# Patient Record
Sex: MALE | Race: WHITE | NOT HISPANIC OR LATINO | URBAN - METROPOLITAN AREA
[De-identification: names, ages, dates, MRNs, and addresses within clinical notes are randomized per-mention and may not be internally consistent; named-entity substitution may affect disease eponyms.]

---

## 2017-12-31 ENCOUNTER — INPATIENT (INPATIENT)
Facility: HOSPITAL | Age: 67
LOS: 1 days | Discharge: HOME | End: 2018-01-02
Attending: INTERNAL MEDICINE

## 2017-12-31 DIAGNOSIS — E78.5 HYPERLIPIDEMIA, UNSPECIFIED: ICD-10-CM

## 2017-12-31 DIAGNOSIS — I47.1 SUPRAVENTRICULAR TACHYCARDIA: ICD-10-CM

## 2017-12-31 DIAGNOSIS — F17.210 NICOTINE DEPENDENCE, CIGARETTES, UNCOMPLICATED: ICD-10-CM

## 2017-12-31 DIAGNOSIS — I21.3 ST ELEVATION (STEMI) MYOCARDIAL INFARCTION OF UNSPECIFIED SITE: ICD-10-CM

## 2017-12-31 DIAGNOSIS — N40.0 BENIGN PROSTATIC HYPERPLASIA WITHOUT LOWER URINARY TRACT SYMPTOMS: ICD-10-CM

## 2017-12-31 DIAGNOSIS — I87.2 VENOUS INSUFFICIENCY (CHRONIC) (PERIPHERAL): ICD-10-CM

## 2017-12-31 DIAGNOSIS — I25.119 ATHEROSCLEROTIC HEART DISEASE OF NATIVE CORONARY ARTERY WITH UNSPECIFIED ANGINA PECTORIS: ICD-10-CM

## 2017-12-31 DIAGNOSIS — Z79.84 LONG TERM (CURRENT) USE OF ORAL HYPOGLYCEMIC DRUGS: ICD-10-CM
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
## 2017-12-31 DIAGNOSIS — Z85.828 PERSONAL HISTORY OF OTHER MALIGNANT NEOPLASM OF SKIN: ICD-10-CM

## 2017-12-31 DIAGNOSIS — I97.89 OTHER POSTPROCEDURAL COMPLICATIONS AND DISORDERS OF THE CIRCULATORY SYSTEM, NOT ELSEWHERE CLASSIFIED: ICD-10-CM

## 2017-12-31 DIAGNOSIS — E11.9 TYPE 2 DIABETES MELLITUS WITHOUT COMPLICATIONS: ICD-10-CM

## 2018-02-07 ENCOUNTER — INPATIENT (INPATIENT)
Facility: HOSPITAL | Age: 68
LOS: 0 days | Discharge: HOME | End: 2018-02-08
Attending: INTERNAL MEDICINE

## 2018-02-07 ENCOUNTER — OUTPATIENT (OUTPATIENT)
Dept: OUTPATIENT SERVICES | Facility: HOSPITAL | Age: 68
LOS: 1 days | Discharge: HOME | End: 2018-02-07

## 2018-02-07 VITALS
TEMPERATURE: 98 F | HEART RATE: 71 BPM | OXYGEN SATURATION: 96 % | DIASTOLIC BLOOD PRESSURE: 62 MMHG | RESPIRATION RATE: 17 BRPM | SYSTOLIC BLOOD PRESSURE: 125 MMHG

## 2018-02-07 DIAGNOSIS — I25.10 ATHEROSCLEROTIC HEART DISEASE OF NATIVE CORONARY ARTERY WITHOUT ANGINA PECTORIS: ICD-10-CM

## 2018-02-07 DIAGNOSIS — E11.9 TYPE 2 DIABETES MELLITUS WITHOUT COMPLICATIONS: ICD-10-CM

## 2018-02-07 DIAGNOSIS — Z98.890 OTHER SPECIFIED POSTPROCEDURAL STATES: Chronic | ICD-10-CM

## 2018-02-07 DIAGNOSIS — Z88.0 ALLERGY STATUS TO PENICILLIN: ICD-10-CM

## 2018-02-07 LAB
HCT VFR BLD CALC: 38.3 % — LOW (ref 42–52)
HGB BLD-MCNC: 13.5 G/DL — LOW (ref 14–18)
MCHC RBC-ENTMCNC: 30.3 PG — SIGNIFICANT CHANGE UP (ref 27–31)
MCHC RBC-ENTMCNC: 35.2 G/DL — SIGNIFICANT CHANGE UP (ref 32–37)
MCV RBC AUTO: 86.1 FL — SIGNIFICANT CHANGE UP (ref 80–94)
NRBC # BLD: 0 /100 WBCS — SIGNIFICANT CHANGE UP (ref 0–0)
PLATELET # BLD AUTO: 143 K/UL — SIGNIFICANT CHANGE UP (ref 130–400)
RBC # BLD: 4.45 M/UL — LOW (ref 4.7–6.1)
RBC # FLD: 13.2 % — SIGNIFICANT CHANGE UP (ref 11.5–14.5)
WBC # BLD: 9.66 K/UL — SIGNIFICANT CHANGE UP (ref 4.8–10.8)
WBC # FLD AUTO: 9.66 K/UL — SIGNIFICANT CHANGE UP (ref 4.8–10.8)

## 2018-02-07 RX ORDER — SODIUM CHLORIDE 9 MG/ML
1000 INJECTION INTRAMUSCULAR; INTRAVENOUS; SUBCUTANEOUS
Qty: 0 | Refills: 0 | Status: DISCONTINUED | OUTPATIENT
Start: 2018-02-07 | End: 2018-02-07

## 2018-02-07 RX ORDER — GLIMEPIRIDE 1 MG
4 TABLET ORAL DAILY
Qty: 0 | Refills: 0 | Status: DISCONTINUED | OUTPATIENT
Start: 2018-02-07 | End: 2018-02-07

## 2018-02-07 RX ORDER — TAMSULOSIN HYDROCHLORIDE 0.4 MG/1
0.4 CAPSULE ORAL ONCE
Qty: 0 | Refills: 0 | Status: DISCONTINUED | OUTPATIENT
Start: 2018-02-07 | End: 2018-02-07

## 2018-02-07 RX ORDER — CLOPIDOGREL BISULFATE 75 MG/1
300 TABLET, FILM COATED ORAL ONCE
Qty: 0 | Refills: 0 | Status: COMPLETED | OUTPATIENT
Start: 2018-02-07 | End: 2018-02-07

## 2018-02-07 RX ORDER — ASPIRIN/CALCIUM CARB/MAGNESIUM 324 MG
81 TABLET ORAL DAILY
Qty: 0 | Refills: 0 | Status: DISCONTINUED | OUTPATIENT
Start: 2018-02-07 | End: 2018-02-08

## 2018-02-07 RX ORDER — METOPROLOL TARTRATE 50 MG
25 TABLET ORAL ONCE
Qty: 0 | Refills: 0 | Status: COMPLETED | OUTPATIENT
Start: 2018-02-07 | End: 2018-02-07

## 2018-02-07 RX ORDER — ATORVASTATIN CALCIUM 80 MG/1
80 TABLET, FILM COATED ORAL ONCE
Qty: 0 | Refills: 0 | Status: COMPLETED | OUTPATIENT
Start: 2018-02-07 | End: 2018-02-07

## 2018-02-07 RX ORDER — SENNA PLUS 8.6 MG/1
2 TABLET ORAL ONCE
Qty: 0 | Refills: 0 | Status: DISCONTINUED | OUTPATIENT
Start: 2018-02-07 | End: 2018-02-07

## 2018-02-07 RX ORDER — TAMSULOSIN HYDROCHLORIDE 0.4 MG/1
0.4 CAPSULE ORAL AT BEDTIME
Qty: 0 | Refills: 0 | Status: DISCONTINUED | OUTPATIENT
Start: 2018-02-07 | End: 2018-02-08

## 2018-02-07 RX ORDER — CLOPIDOGREL BISULFATE 75 MG/1
75 TABLET, FILM COATED ORAL DAILY
Qty: 0 | Refills: 0 | Status: DISCONTINUED | OUTPATIENT
Start: 2018-02-07 | End: 2018-02-08

## 2018-02-07 RX ORDER — SENNA PLUS 8.6 MG/1
2 TABLET ORAL ONCE
Qty: 0 | Refills: 0 | Status: COMPLETED | OUTPATIENT
Start: 2018-02-07 | End: 2018-02-07

## 2018-02-07 RX ORDER — ATORVASTATIN CALCIUM 80 MG/1
80 TABLET, FILM COATED ORAL ONCE
Qty: 0 | Refills: 0 | Status: DISCONTINUED | OUTPATIENT
Start: 2018-02-07 | End: 2018-02-07

## 2018-02-07 RX ORDER — SODIUM CHLORIDE 9 MG/ML
1000 INJECTION INTRAMUSCULAR; INTRAVENOUS; SUBCUTANEOUS
Qty: 0 | Refills: 0 | Status: DISCONTINUED | OUTPATIENT
Start: 2018-02-07 | End: 2018-02-08

## 2018-02-07 RX ADMIN — Medication 100 MILLIGRAM(S): at 14:42

## 2018-02-07 RX ADMIN — Medication 2.5 MILLIGRAM(S): at 21:48

## 2018-02-07 RX ADMIN — SODIUM CHLORIDE 50 MILLILITER(S): 9 INJECTION INTRAMUSCULAR; INTRAVENOUS; SUBCUTANEOUS at 12:23

## 2018-02-07 RX ADMIN — SODIUM CHLORIDE 75 MILLILITER(S): 9 INJECTION INTRAMUSCULAR; INTRAVENOUS; SUBCUTANEOUS at 19:10

## 2018-02-07 RX ADMIN — SENNA PLUS 2 TABLET(S): 8.6 TABLET ORAL at 21:39

## 2018-02-07 RX ADMIN — ATORVASTATIN CALCIUM 80 MILLIGRAM(S): 80 TABLET, FILM COATED ORAL at 21:40

## 2018-02-07 RX ADMIN — CLOPIDOGREL BISULFATE 300 MILLIGRAM(S): 75 TABLET, FILM COATED ORAL at 15:05

## 2018-02-07 RX ADMIN — TAMSULOSIN HYDROCHLORIDE 0.4 MILLIGRAM(S): 0.4 CAPSULE ORAL at 21:39

## 2018-02-07 NOTE — H&P PST ADULT - HISTORY OF PRESENT ILLNESS
68 y/o male here for staged PCI. Pt had recent STEMI 12/31/17 with stent to distal RCA with REUBEN. Pt here for staged PCI of prox LAD that was 70% and distal 80-90%, will do Rota stent of LAD via groin

## 2018-02-07 NOTE — H&P PST ADULT - PMH
ASHD (arteriosclerotic heart disease)  STEMI 12/31/17, PCI RCA  Dyspnea, unspecified type    Hypertension, unspecified type    Type 2 diabetes mellitus without complication, unspecified long term insulin use status

## 2018-02-07 NOTE — H&P PST ADULT - FAMILY HISTORY
Mother  Still living? Unknown  Family history of myocardial infarction, Age at diagnosis: Age Unknown

## 2018-02-07 NOTE — PROGRESS NOTE ADULT - SUBJECTIVE AND OBJECTIVE BOX
I have personally seen and examined the patient.  I agree with the history and physical which I have reviewed and noted any changes below.  02-07-18 @ 14:20    PRE-OP DIAGNOSIS:  CP Post STEMI with Class 3 stable angina and proximal LAD      PROCEDURE:   LEFT HEART CATHERIZATION    Physician:  Brandon Murillo MD  Assistant:  MD    ANESTHESIA TYPE:  [ X] Sedation  [ X] Local/Regional  [   ]General Anesthesia    ESTIMATED BLOOD LOSS:      less than 10 mL    CONDITION  [X ] Good  [   ] Fair  [   ] Serious  [   ] Critical      SPECIMENS REMOVED (IF APPLICABLE):   Wire        IMPLANTS (IF APPLICABLE) Stent to LAD prox and distal      FINDINGS:  LEFT HEART CATHERIZATION                                    LVEF 50%:  LVEDP:12mmHG    Left main NORMAL    LAD:  Proximal 80% Distal 99%                       Diag:  NORMAL    Left Circumflex:  NORMAL  OM:  NORMAL    Right Cornary Artery:  Patent stent  RPDA:  NORMAL  RPL:  NORMAL      RIGHT HEART CATHERIZATION ( Not Performed)  PA:  PCW:  CO/CI:    PERCUTANEOUS CORONARY INTERVENTIONS: (3.5 x 38 prox and 2.5 x 18 huong distal after Rota      COMPLICATIONS:  None      POST-OP DIAGNOSIS CAD    [ ] Normal Coronary Arteries  [ ] Luminal Irregularities  [ ] Non-obstructive CAD        PLAN OF CARE    [ ] D/C Home today   [x ]  D/C in AM  [ ] Return to In-patient bed  [ x] Admit for observation  [ ] Return for staged procedure:  [ ] CT Surgery consult called  [x ]  Continue DAPT, B-blocker & Statin therapy  [ ]  Medical Therapy  [ X] Aggressive risk factor modification. The patient should follow a low fat and low calorie diet.  CBC in 6 hours.  No need for am CBC

## 2018-02-08 ENCOUNTER — TRANSCRIPTION ENCOUNTER (OUTPATIENT)
Age: 68
End: 2018-02-08

## 2018-02-08 VITALS
RESPIRATION RATE: 17 BRPM | DIASTOLIC BLOOD PRESSURE: 61 MMHG | SYSTOLIC BLOOD PRESSURE: 125 MMHG | HEART RATE: 88 BPM | TEMPERATURE: 98 F

## 2018-02-08 LAB
ANION GAP SERPL CALC-SCNC: 8 MMOL/L — SIGNIFICANT CHANGE UP (ref 7–14)
BUN SERPL-MCNC: 11 MG/DL — SIGNIFICANT CHANGE UP (ref 10–20)
CALCIUM SERPL-MCNC: 9.1 MG/DL — SIGNIFICANT CHANGE UP (ref 8.5–10.1)
CHLORIDE SERPL-SCNC: 107 MMOL/L — SIGNIFICANT CHANGE UP (ref 98–110)
CO2 SERPL-SCNC: 24 MMOL/L — SIGNIFICANT CHANGE UP (ref 17–32)
CREAT SERPL-MCNC: 0.8 MG/DL — SIGNIFICANT CHANGE UP (ref 0.7–1.5)
GLUCOSE SERPL-MCNC: 216 MG/DL — HIGH (ref 70–110)
HCT VFR BLD CALC: 41.7 % — LOW (ref 42–52)
HGB BLD-MCNC: 14.4 G/DL — SIGNIFICANT CHANGE UP (ref 14–18)
MCHC RBC-ENTMCNC: 30 PG — SIGNIFICANT CHANGE UP (ref 27–31)
MCHC RBC-ENTMCNC: 34.5 G/DL — SIGNIFICANT CHANGE UP (ref 32–37)
MCV RBC AUTO: 86.9 FL — SIGNIFICANT CHANGE UP (ref 80–94)
NRBC # BLD: 0 /100 WBCS — SIGNIFICANT CHANGE UP (ref 0–0)
PLATELET # BLD AUTO: 142 K/UL — SIGNIFICANT CHANGE UP (ref 130–400)
POTASSIUM SERPL-MCNC: 4 MMOL/L — SIGNIFICANT CHANGE UP (ref 3.5–5)
POTASSIUM SERPL-SCNC: 4 MMOL/L — SIGNIFICANT CHANGE UP (ref 3.5–5)
RBC # BLD: 4.8 M/UL — SIGNIFICANT CHANGE UP (ref 4.7–6.1)
RBC # FLD: 13 % — SIGNIFICANT CHANGE UP (ref 11.5–14.5)
SODIUM SERPL-SCNC: 139 MMOL/L — SIGNIFICANT CHANGE UP (ref 135–146)
WBC # BLD: 10.97 K/UL — HIGH (ref 4.8–10.8)
WBC # FLD AUTO: 10.97 K/UL — HIGH (ref 4.8–10.8)

## 2018-02-08 RX ORDER — ATORVASTATIN CALCIUM 80 MG/1
80 TABLET, FILM COATED ORAL AT BEDTIME
Qty: 0 | Refills: 0 | Status: DISCONTINUED | OUTPATIENT
Start: 2018-02-08 | End: 2018-02-08

## 2018-02-08 RX ORDER — METOPROLOL TARTRATE 50 MG
25 TABLET ORAL DAILY
Qty: 0 | Refills: 0 | Status: DISCONTINUED | OUTPATIENT
Start: 2018-02-08 | End: 2018-02-08

## 2018-02-08 RX ADMIN — Medication 81 MILLIGRAM(S): at 11:27

## 2018-02-08 RX ADMIN — Medication 25 MILLIGRAM(S): at 11:29

## 2018-02-08 RX ADMIN — CLOPIDOGREL BISULFATE 75 MILLIGRAM(S): 75 TABLET, FILM COATED ORAL at 11:27

## 2018-02-08 RX ADMIN — Medication 2.5 MILLIGRAM(S): at 05:05

## 2018-02-08 NOTE — DISCHARGE NOTE ADULT - CARE PROVIDER_API CALL
Brandon Murillo), Cardiovascular Disease; Interventional Cardiology  [address] [place] [address]  Phone: [phone]  Fax: [phone]

## 2018-02-08 NOTE — DISCHARGE NOTE ADULT - CARE PLAN
Goal:	prevent complications  Assessment and plan of treatment:	continue current medications and follow-up with Dr. Murillo

## 2018-02-08 NOTE — PROGRESS NOTE ADULT - SUBJECTIVE AND OBJECTIVE BOX
CARDIAC CATH NP    S/P PCI of  LAD with REUBEN via Right Femoral access.  Vascade closure    PT AMBULATING WITHOUT SYMPTOMS  Denies chest pain, shortness of breath or dizziness      GENERAL: NAD, appears well  HEART: S1S2, no murmurs, no JVD  LUNGS: Non-labored. CTA B/L, no wheeze, no rales  ABDOMEN:  soft, non-tender +BS  EXTREMITY:             Groin:  Dressing removed.  Soft,  no hematoma	    VASCULAR:  2+Rad/ 2+PTs /2+DPs/  NEURO: A&Ox 3      EKG: NSR, NO ACUTE ST CHANGES  LABS                        13.5   9.66  )-----------( 143      ( 07 Feb 2018 13:45 )             38.3           AM LABS   PENDING          A/P:  I discussed the case with Interventional Cardiologist Dr. MAXWELL  & recommends the following:    S/P PCI  	         Continue ASA/PLAVIX,  B-Blocker, Statin Therapy, ACEI                   GIVE ONE MONTH SUPPLY OF DAPT	  ++++CHECK CBC, IF NORMAL CAN D/C HOME TODAY                                    Pt given instructions on importance of taking antiplatelet medication or risk acute stent thrombosis/death                   Post cath instructions, access site care and activity restrictions reviewed with patient                     Discussed with patient to return to hospital if experience chest pain, shortness breath, dizziness and site bleeding                   Aggressive risk factor modication, diet counseling                                     Follow up with Cardiology Dr  in one week.  Instructed to call and make an appointment

## 2018-02-08 NOTE — DISCHARGE NOTE ADULT - PATIENT PORTAL LINK FT
You can access the [facility], offered by [facility], by registering with the following website: [email or web address]

## 2018-02-08 NOTE — DISCHARGE NOTE ADULT - MEDICATION SUMMARY - MEDICATIONS TO TAKE
I will START or STAY ON the medications listed below when I get home from the hospital:    Low Dose ASA 81 mg oral tablet  -- 1 tab(s) by mouth once a day  -- Indication: For Hypertension, unspecified type    enalapril 2.5 mg oral tablet  -- 1 tab(s) by mouth once a day  -- Indication: For Hypertension, unspecified type    Flomax 0.4 mg oral capsule  -- 1 cap(s) by mouth once a day  -- Indication: For prostate    Lipitor 80 mg oral tablet  -- 1 tab(s) by mouth once a day  -- Indication: For ASHD (arteriosclerotic heart disease)    Plavix 75 mg oral tablet  -- 1 tab(s) by mouth once a day  -- Indication: For ASHD (arteriosclerotic heart disease)    metoprolol succinate 25 mg oral tablet, extended release  -- 1 tab(s) by mouth once a day  -- Indication: For Hypertension, unspecified type

## 2018-02-08 NOTE — DISCHARGE NOTE ADULT - HOSPITAL COURSE
68 y/o male here for staged PCI. Pt had recent STEMI 12/31/17 with stent to distal RCA with REUBEN. Pt here for staged PCI of prox LAD that was 70% and distal 80-90%, will do Rota stent of LAD via groin. LEFT HEART CATHERIZATION                        LVEF 50%:  LVEDP:12mmHG  Left main NORMAL  LAD:  Proximal 80% Distal 99%                       Diag:  NORMAL  Left Circumflex:  NORMAL  OM:  NORMAL  Right Cornary Artery:  Patent stent  RPDA:  NORMAL  RPL:  NORMAL  CP Post STEMI with Class 3 stable angina and proximal LAD  The patient should follow a low fat and low calorie diet.

## 2018-02-20 DIAGNOSIS — I10 ESSENTIAL (PRIMARY) HYPERTENSION: ICD-10-CM

## 2018-02-20 DIAGNOSIS — I25.10 ATHEROSCLEROTIC HEART DISEASE OF NATIVE CORONARY ARTERY WITHOUT ANGINA PECTORIS: ICD-10-CM

## 2018-02-20 DIAGNOSIS — Z87.891 PERSONAL HISTORY OF NICOTINE DEPENDENCE: ICD-10-CM

## 2018-02-20 DIAGNOSIS — Z88.0 ALLERGY STATUS TO PENICILLIN: ICD-10-CM

## 2018-02-20 DIAGNOSIS — I25.119 ATHEROSCLEROTIC HEART DISEASE OF NATIVE CORONARY ARTERY WITH UNSPECIFIED ANGINA PECTORIS: ICD-10-CM

## 2018-02-20 DIAGNOSIS — I25.2 OLD MYOCARDIAL INFARCTION: ICD-10-CM

## 2018-02-20 DIAGNOSIS — E11.9 TYPE 2 DIABETES MELLITUS WITHOUT COMPLICATIONS: ICD-10-CM

## 2018-09-25 ENCOUNTER — OUTPATIENT (OUTPATIENT)
Dept: OUTPATIENT SERVICES | Facility: HOSPITAL | Age: 68
LOS: 1 days | Discharge: HOME | End: 2018-09-25

## 2018-09-25 DIAGNOSIS — Z98.890 OTHER SPECIFIED POSTPROCEDURAL STATES: Chronic | ICD-10-CM

## 2018-09-25 DIAGNOSIS — J44.9 CHRONIC OBSTRUCTIVE PULMONARY DISEASE, UNSPECIFIED: ICD-10-CM

## 2018-09-25 DIAGNOSIS — R06.00 DYSPNEA, UNSPECIFIED: ICD-10-CM

## 2018-09-25 PROBLEM — I25.10 ATHEROSCLEROTIC HEART DISEASE OF NATIVE CORONARY ARTERY WITHOUT ANGINA PECTORIS: Chronic | Status: ACTIVE | Noted: 2018-02-07

## 2018-09-25 PROBLEM — E11.9 TYPE 2 DIABETES MELLITUS WITHOUT COMPLICATIONS: Chronic | Status: ACTIVE | Noted: 2018-02-07

## 2018-09-25 PROBLEM — I10 ESSENTIAL (PRIMARY) HYPERTENSION: Chronic | Status: ACTIVE | Noted: 2018-02-07

## 2019-10-02 ENCOUNTER — INPATIENT (INPATIENT)
Facility: HOSPITAL | Age: 69
LOS: 1 days | Discharge: HOME | End: 2019-10-04
Attending: HOSPITALIST | Admitting: HOSPITALIST
Payer: MEDICARE

## 2019-10-02 VITALS
HEART RATE: 58 BPM | TEMPERATURE: 94 F | DIASTOLIC BLOOD PRESSURE: 70 MMHG | OXYGEN SATURATION: 98 % | SYSTOLIC BLOOD PRESSURE: 154 MMHG | RESPIRATION RATE: 22 BRPM

## 2019-10-02 DIAGNOSIS — Z82.49 FAMILY HISTORY OF ISCHEMIC HEART DISEASE AND OTHER DISEASES OF THE CIRCULATORY SYSTEM: ICD-10-CM

## 2019-10-02 DIAGNOSIS — Z53.29 PROCEDURE AND TREATMENT NOT CARRIED OUT BECAUSE OF PATIENT'S DECISION FOR OTHER REASONS: ICD-10-CM

## 2019-10-02 DIAGNOSIS — N40.0 BENIGN PROSTATIC HYPERPLASIA WITHOUT LOWER URINARY TRACT SYMPTOMS: ICD-10-CM
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
## 2019-10-02 DIAGNOSIS — I25.110 ATHEROSCLEROTIC HEART DISEASE OF NATIVE CORONARY ARTERY WITH UNSTABLE ANGINA PECTORIS: ICD-10-CM

## 2019-10-02 DIAGNOSIS — R31.9 HEMATURIA, UNSPECIFIED: ICD-10-CM

## 2019-10-02 DIAGNOSIS — Z98.890 OTHER SPECIFIED POSTPROCEDURAL STATES: Chronic | ICD-10-CM

## 2019-10-02 DIAGNOSIS — Z79.02 LONG TERM (CURRENT) USE OF ANTITHROMBOTICS/ANTIPLATELETS: ICD-10-CM

## 2019-10-02 DIAGNOSIS — E78.5 HYPERLIPIDEMIA, UNSPECIFIED: ICD-10-CM

## 2019-10-02 DIAGNOSIS — Z98.49 CATARACT EXTRACTION STATUS, UNSPECIFIED EYE: Chronic | ICD-10-CM

## 2019-10-02 DIAGNOSIS — I12.9 HYPERTENSIVE CHRONIC KIDNEY DISEASE WITH STAGE 1 THROUGH STAGE 4 CHRONIC KIDNEY DISEASE, OR UNSPECIFIED CHRONIC KIDNEY DISEASE: ICD-10-CM

## 2019-10-02 DIAGNOSIS — I21.4 NON-ST ELEVATION (NSTEMI) MYOCARDIAL INFARCTION: ICD-10-CM

## 2019-10-02 DIAGNOSIS — Z88.0 ALLERGY STATUS TO PENICILLIN: ICD-10-CM

## 2019-10-02 DIAGNOSIS — Z87.891 PERSONAL HISTORY OF NICOTINE DEPENDENCE: ICD-10-CM

## 2019-10-02 DIAGNOSIS — Z98.49 CATARACT EXTRACTION STATUS, UNSPECIFIED EYE: ICD-10-CM

## 2019-10-02 DIAGNOSIS — I25.2 OLD MYOCARDIAL INFARCTION: ICD-10-CM

## 2019-10-02 DIAGNOSIS — Z98.890 OTHER SPECIFIED POSTPROCEDURAL STATES: ICD-10-CM

## 2019-10-02 DIAGNOSIS — E03.9 HYPOTHYROIDISM, UNSPECIFIED: ICD-10-CM
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
## 2019-10-02 DIAGNOSIS — E11.22 TYPE 2 DIABETES MELLITUS WITH DIABETIC CHRONIC KIDNEY DISEASE: ICD-10-CM

## 2019-10-02 DIAGNOSIS — N18.3 CHRONIC KIDNEY DISEASE, STAGE 3 (MODERATE): ICD-10-CM

## 2019-10-02 DIAGNOSIS — T82.867A THROMBOSIS DUE TO CARDIAC PROSTHETIC DEVICES, IMPLANTS AND GRAFTS, INITIAL ENCOUNTER: ICD-10-CM

## 2019-10-02 LAB
ALBUMIN SERPL ELPH-MCNC: 4.6 G/DL — SIGNIFICANT CHANGE UP (ref 3.5–5.2)
ALP SERPL-CCNC: 64 U/L — SIGNIFICANT CHANGE UP (ref 30–115)
ALT FLD-CCNC: 23 U/L — SIGNIFICANT CHANGE UP (ref 0–41)
ANION GAP SERPL CALC-SCNC: 13 MMOL/L — SIGNIFICANT CHANGE UP (ref 7–14)
APTT BLD: 34.6 SEC — SIGNIFICANT CHANGE UP (ref 27–39.2)
AST SERPL-CCNC: 22 U/L — SIGNIFICANT CHANGE UP (ref 0–41)
BASOPHILS # BLD AUTO: 0.01 K/UL — SIGNIFICANT CHANGE UP (ref 0–0.2)
BASOPHILS NFR BLD AUTO: 0.1 % — SIGNIFICANT CHANGE UP (ref 0–1)
BILIRUB SERPL-MCNC: 0.4 MG/DL — SIGNIFICANT CHANGE UP (ref 0.2–1.2)
BUN SERPL-MCNC: 22 MG/DL — HIGH (ref 10–20)
CALCIUM SERPL-MCNC: 9.3 MG/DL — SIGNIFICANT CHANGE UP (ref 8.5–10.1)
CHLORIDE SERPL-SCNC: 105 MMOL/L — SIGNIFICANT CHANGE UP (ref 98–110)
CHOLEST SERPL-MCNC: 128 MG/DL — SIGNIFICANT CHANGE UP (ref 100–200)
CO2 SERPL-SCNC: 25 MMOL/L — SIGNIFICANT CHANGE UP (ref 17–32)
CREAT SERPL-MCNC: 1.6 MG/DL — HIGH (ref 0.7–1.5)
EOSINOPHIL # BLD AUTO: 0 K/UL — SIGNIFICANT CHANGE UP (ref 0–0.7)
EOSINOPHIL NFR BLD AUTO: 0 % — SIGNIFICANT CHANGE UP (ref 0–8)
GLUCOSE BLDC GLUCOMTR-MCNC: 215 MG/DL — HIGH (ref 70–99)
GLUCOSE BLDC GLUCOMTR-MCNC: 227 MG/DL — HIGH (ref 70–99)
GLUCOSE SERPL-MCNC: 238 MG/DL — HIGH (ref 70–99)
HCT VFR BLD CALC: 39.3 % — LOW (ref 42–52)
HDLC SERPL-MCNC: 47 MG/DL — SIGNIFICANT CHANGE UP
HGB BLD-MCNC: 13 G/DL — LOW (ref 14–18)
IMM GRANULOCYTES NFR BLD AUTO: 0.6 % — HIGH (ref 0.1–0.3)
INR BLD: 1.11 RATIO — SIGNIFICANT CHANGE UP (ref 0.65–1.3)
LIPID PNL WITH DIRECT LDL SERPL: 70 MG/DL — SIGNIFICANT CHANGE UP (ref 4–129)
LYMPHOCYTES # BLD AUTO: 1.57 K/UL — SIGNIFICANT CHANGE UP (ref 1.2–3.4)
LYMPHOCYTES # BLD AUTO: 17.9 % — LOW (ref 20.5–51.1)
MCHC RBC-ENTMCNC: 26.9 PG — LOW (ref 27–31)
MCHC RBC-ENTMCNC: 33.1 G/DL — SIGNIFICANT CHANGE UP (ref 32–37)
MCV RBC AUTO: 81.2 FL — SIGNIFICANT CHANGE UP (ref 80–94)
MONOCYTES # BLD AUTO: 0.68 K/UL — HIGH (ref 0.1–0.6)
MONOCYTES NFR BLD AUTO: 7.7 % — SIGNIFICANT CHANGE UP (ref 1.7–9.3)
NEUTROPHILS # BLD AUTO: 6.48 K/UL — SIGNIFICANT CHANGE UP (ref 1.4–6.5)
NEUTROPHILS NFR BLD AUTO: 73.7 % — SIGNIFICANT CHANGE UP (ref 42.2–75.2)
NRBC # BLD: 0 /100 WBCS — SIGNIFICANT CHANGE UP (ref 0–0)
PLATELET # BLD AUTO: 127 K/UL — LOW (ref 130–400)
POTASSIUM SERPL-MCNC: 4.5 MMOL/L — SIGNIFICANT CHANGE UP (ref 3.5–5)
POTASSIUM SERPL-SCNC: 4.5 MMOL/L — SIGNIFICANT CHANGE UP (ref 3.5–5)
PROT SERPL-MCNC: 7.1 G/DL — SIGNIFICANT CHANGE UP (ref 6–8)
PROTHROM AB SERPL-ACNC: 12.8 SEC — SIGNIFICANT CHANGE UP (ref 9.95–12.87)
RBC # BLD: 4.84 M/UL — SIGNIFICANT CHANGE UP (ref 4.7–6.1)
RBC # FLD: 15.9 % — HIGH (ref 11.5–14.5)
SODIUM SERPL-SCNC: 143 MMOL/L — SIGNIFICANT CHANGE UP (ref 135–146)
TOTAL CHOLESTEROL/HDL RATIO MEASUREMENT: 2.7 RATIO — LOW (ref 4–5.5)
TRIGL SERPL-MCNC: 77 MG/DL — SIGNIFICANT CHANGE UP (ref 10–149)
TROPONIN T SERPL-MCNC: 0.06 NG/ML — CRITICAL HIGH
WBC # BLD: 8.79 K/UL — SIGNIFICANT CHANGE UP (ref 4.8–10.8)
WBC # FLD AUTO: 8.79 K/UL — SIGNIFICANT CHANGE UP (ref 4.8–10.8)

## 2019-10-02 PROCEDURE — 93010 ELECTROCARDIOGRAM REPORT: CPT | Mod: 77

## 2019-10-02 PROCEDURE — 71045 X-RAY EXAM CHEST 1 VIEW: CPT | Mod: 26

## 2019-10-02 PROCEDURE — 93010 ELECTROCARDIOGRAM REPORT: CPT | Mod: 76

## 2019-10-02 PROCEDURE — 99285 EMERGENCY DEPT VISIT HI MDM: CPT

## 2019-10-02 RX ORDER — SODIUM CHLORIDE 9 MG/ML
600 INJECTION INTRAMUSCULAR; INTRAVENOUS; SUBCUTANEOUS
Refills: 0 | Status: DISCONTINUED | OUTPATIENT
Start: 2019-10-02 | End: 2019-10-03

## 2019-10-02 RX ORDER — DEXTROSE 50 % IN WATER 50 %
25 SYRINGE (ML) INTRAVENOUS ONCE
Refills: 0 | Status: DISCONTINUED | OUTPATIENT
Start: 2019-10-02 | End: 2019-10-04

## 2019-10-02 RX ORDER — TAMSULOSIN HYDROCHLORIDE 0.4 MG/1
0.8 CAPSULE ORAL AT BEDTIME
Refills: 0 | Status: DISCONTINUED | OUTPATIENT
Start: 2019-10-02 | End: 2019-10-04

## 2019-10-02 RX ORDER — DOCUSATE SODIUM 100 MG
200 CAPSULE ORAL AT BEDTIME
Refills: 0 | Status: DISCONTINUED | OUTPATIENT
Start: 2019-10-02 | End: 2019-10-04

## 2019-10-02 RX ORDER — SODIUM CHLORIDE 9 MG/ML
1000 INJECTION, SOLUTION INTRAVENOUS ONCE
Refills: 0 | Status: COMPLETED | OUTPATIENT
Start: 2019-10-02 | End: 2019-10-02

## 2019-10-02 RX ORDER — PANTOPRAZOLE SODIUM 20 MG/1
40 TABLET, DELAYED RELEASE ORAL
Refills: 0 | Status: DISCONTINUED | OUTPATIENT
Start: 2019-10-02 | End: 2019-10-02

## 2019-10-02 RX ORDER — INSULIN LISPRO 100/ML
VIAL (ML) SUBCUTANEOUS
Refills: 0 | Status: DISCONTINUED | OUTPATIENT
Start: 2019-10-02 | End: 2019-10-03

## 2019-10-02 RX ORDER — TAMSULOSIN HYDROCHLORIDE 0.4 MG/1
1 CAPSULE ORAL
Qty: 0 | Refills: 0 | DISCHARGE

## 2019-10-02 RX ORDER — METOPROLOL TARTRATE 50 MG
25 TABLET ORAL DAILY
Refills: 0 | Status: DISCONTINUED | OUTPATIENT
Start: 2019-10-02 | End: 2019-10-04

## 2019-10-02 RX ORDER — ATORVASTATIN CALCIUM 80 MG/1
1 TABLET, FILM COATED ORAL
Qty: 0 | Refills: 0 | DISCHARGE

## 2019-10-02 RX ORDER — ASPIRIN/CALCIUM CARB/MAGNESIUM 324 MG
81 TABLET ORAL DAILY
Refills: 0 | Status: DISCONTINUED | OUTPATIENT
Start: 2019-10-02 | End: 2019-10-04

## 2019-10-02 RX ORDER — INFLUENZA VIRUS VACCINE 15; 15; 15; 15 UG/.5ML; UG/.5ML; UG/.5ML; UG/.5ML
0.5 SUSPENSION INTRAMUSCULAR ONCE
Refills: 0 | Status: COMPLETED | OUTPATIENT
Start: 2019-10-02 | End: 2019-10-02

## 2019-10-02 RX ORDER — SODIUM CHLORIDE 9 MG/ML
1000 INJECTION, SOLUTION INTRAVENOUS
Refills: 0 | Status: DISCONTINUED | OUTPATIENT
Start: 2019-10-02 | End: 2019-10-04

## 2019-10-02 RX ORDER — INSULIN GLARGINE 100 [IU]/ML
8 INJECTION, SOLUTION SUBCUTANEOUS AT BEDTIME
Refills: 0 | Status: DISCONTINUED | OUTPATIENT
Start: 2019-10-02 | End: 2019-10-03

## 2019-10-02 RX ORDER — PRASUGREL 5 MG/1
10 TABLET, FILM COATED ORAL DAILY
Refills: 0 | Status: DISCONTINUED | OUTPATIENT
Start: 2019-10-03 | End: 2019-10-04

## 2019-10-02 RX ORDER — GLUCAGON INJECTION, SOLUTION 0.5 MG/.1ML
1 INJECTION, SOLUTION SUBCUTANEOUS ONCE
Refills: 0 | Status: DISCONTINUED | OUTPATIENT
Start: 2019-10-02 | End: 2019-10-04

## 2019-10-02 RX ORDER — DEXTROSE 50 % IN WATER 50 %
12.5 SYRINGE (ML) INTRAVENOUS ONCE
Refills: 0 | Status: DISCONTINUED | OUTPATIENT
Start: 2019-10-02 | End: 2019-10-04

## 2019-10-02 RX ORDER — DEXTROSE 50 % IN WATER 50 %
15 SYRINGE (ML) INTRAVENOUS ONCE
Refills: 0 | Status: DISCONTINUED | OUTPATIENT
Start: 2019-10-02 | End: 2019-10-04

## 2019-10-02 RX ORDER — EPTIFIBATIDE 2 MG/ML
2 INJECTION, SOLUTION INTRAVENOUS
Qty: 75 | Refills: 0 | Status: DISCONTINUED | OUTPATIENT
Start: 2019-10-02 | End: 2019-10-03

## 2019-10-02 RX ORDER — ATORVASTATIN CALCIUM 80 MG/1
80 TABLET, FILM COATED ORAL AT BEDTIME
Refills: 0 | Status: DISCONTINUED | OUTPATIENT
Start: 2019-10-02 | End: 2019-10-04

## 2019-10-02 RX ADMIN — SODIUM CHLORIDE 1000 MILLILITER(S): 9 INJECTION, SOLUTION INTRAVENOUS at 16:25

## 2019-10-02 RX ADMIN — INSULIN GLARGINE 8 UNIT(S): 100 INJECTION, SOLUTION SUBCUTANEOUS at 22:32

## 2019-10-02 RX ADMIN — ATORVASTATIN CALCIUM 80 MILLIGRAM(S): 80 TABLET, FILM COATED ORAL at 21:46

## 2019-10-02 RX ADMIN — TAMSULOSIN HYDROCHLORIDE 0.8 MILLIGRAM(S): 0.4 CAPSULE ORAL at 21:50

## 2019-10-02 NOTE — CONSULT NOTE ADULT - ASSESSMENT
68 yr old male with PMH of DM, HTN, DLD, STEMI, CAD s/p PCI of distal RCA, mid/distal LAD (dec 2017/ feb 2018), CKD 3, BPH p/w sudden onset substernal 5/10 chest pressure at rest, constant for 2 hours.      ACS/ unstable angina:      -admit to CCU  -cont ASA, plavix, statin, BB, ACEi  -check ECHO, lipids, HBA1C  -repeat CE  -urgent cardiac cath to r/o occlusive CAD  -periprocedural IV hydration   -further management will be based on cath findings

## 2019-10-02 NOTE — CHART NOTE - NSCHARTNOTEFT_GEN_A_CORE
PREOPERATIVE DAY OF PROCEDURE EVALUATION:  I have personally seen and examined the patient.  I agree with the history and physical which I have reviewed and noted any changes below.  (Signed electronically by Dr Murillo)  10-02-19 @ 16:50

## 2019-10-02 NOTE — CHART NOTE - NSCHARTNOTEFT_GEN_A_CORE
PRE-OP DIAGNOSIS: STEMI    PROCEDURE: [facility] with coronary angiography    Physician: Dr Murillo  Assistant: Abilio    ANESTHESIA TYPE:  [  ]General Anesthesia  [ x ] Sedation  [  ] Local/Regional    ESTIMATED BLOOD LOSS:    10   mL    CONDITION  [  ] Critical  [  ] Serious  [  ]Fair  [ x ]Good    IV CONTRAST:   170     mL    FINDINGS  Left Heart Catheterization:  LVEF%: 45%  LVEDP:   [ ] Normal Coronary Arteries  [ ] Luminal Irregularities  [ ] Non-obstructive CAD    ACCESS:    [x ] right radial artery  [ ] right femoral artery    LEFT HEART CATHETERIZATION                                    Left main: no disease  LAD: patent stent in mid segment, mild disease distally  Left Circumflex: mild disease  Right Coronary Artery: 100% distal thrombotic occlusion      INTERVENTION  IMPLANTS: 1 REUBEN      POST-OP DIAGNOSIS  100% occlusion of distal RCA s/p PCI with REUBEN to distal RCA ,and POBA to RPL    PLAN OF CARE  admit to CCU  Asa/effient  integrilin drip for 12 hrs  check cbc every 6 hrs ( monitor for thrombocytopenia)  iv fluids for 6hrs  check 2d echo  bb, statin

## 2019-10-02 NOTE — ED PROVIDER NOTE - OBJECTIVE STATEMENT
Pt is a 67 y/o male with hx of HTN, DLD, DM, CAD s/p PCIx3 (tamburrino), presents to ED for chest pressure that began 1 hour PTA, midsternal crushing, moderate. + diaphoresis, eructations that slightly improved since onset. No SOB, vomiting, syncope.

## 2019-10-02 NOTE — H&P ADULT - NSICDXPASTMEDICALHX_GEN_ALL_CORE_FT
PAST MEDICAL HISTORY:  ASHD (arteriosclerotic heart disease) STEMI 12/31/17, PCI RCA    Chronic kidney disease (CKD) III    Dyspnea, unspecified type     Hypertension, unspecified type     Type 2 diabetes mellitus without complication, unspecified long term insulin use status

## 2019-10-02 NOTE — CONSULT NOTE ADULT - SUBJECTIVE AND OBJECTIVE BOX
Patient is a 68y old  Male who presents with a chief complaint of chest pain   HPI:  68 yr old male with PMH of DM, HTN, DLD, STEMI, CAD s/p PCI of distal RCA, mid/distal LAD (dec 2017/ feb 2018), CKD 3, BPH p/w sudden onset substernal 5/10 chest pressure at rest, constant for 2 hours. denies any SOB/LOC/ palpitations/ fever/ abd pain/ vomiting. in ED STEMI code was called  to which cardiology team responded immediately and assessed pt at bedside. ECGs reviewed (no significant ST elevation on ECG) and case discussed with interventional cardiologist on call and STEMI code was canceled. pt will be transferred to cath lab for urgent cardiac cath in view of active chest pain and suspected unstable angina.     ROS:  All other systems reviewed and are negative    PAST MEDICAL & SURGICAL HISTORY  Type 2 diabetes mellitus without complication, unspecified long term insulin use status  Hypertension, unspecified type  Dyspnea, unspecified type  ASHD (arteriosclerotic heart disease): STEMI 12/31/17, PCI RCA  History of ligation of vein: 2012  History of tonsillectomy: 1957      FAMILY HISTORY:  FAMILY HISTORY:  Family history of myocardial infarction (Mother)      SOCIAL HISTORY:  []smoker- former smoker   [-]Alcohol  [-]Drug    ALLERGIES:  penicillin (Rash (Severe))      MEDICATIONS:  MEDICATIONS  (STANDING):    MEDICATIONS  (PRN):      HOME MEDICATIONS:  Home Medications:  enalapril 2.5 mg oral tablet: 1 tab(s) orally once a day (07 Feb 2018 11:38)  Flomax 0.4 mg oral capsule: 1 cap(s) orally once a day (07 Feb 2018 11:38)  Lipitor 80 mg oral tablet: 1 tab(s) orally once a day (07 Feb 2018 11:38)  Low Dose ASA 81 mg oral tablet: 1 tab(s) orally once a day (07 Feb 2018 11:38)  metoprolol succinate 25 mg oral tablet, extended release: 1 tab(s) orally once a day (07 Feb 2018 11:38)  Plavix 75 mg oral tablet: 1 tab(s) orally once a day (07 Feb 2018 11:38)      VITALS:   T(F): 93.5 (10-02 @ 15:22), Max: 93.5 (10-02 @ 15:22)  HR: 74 (10-02 @ 16:31) (58 - 74)  BP: 141/71 (10-02 @ 16:31) (141/71 - 171/76)  BP(mean): --  RR: 20 (10-02 @ 16:31) (20 - 22)  SpO2: 98% (10-02 @ 16:31) (98% - 98%)    I&O's Summary      PHYSICAL EXAM:  GEN: Alert and oriented X 3, No acute distress  HEENT: no pallor  NECK: Supple, no JVD  LUNGS: Clear to auscultation bilaterally  CARDIOVASCULAR: S1/S2 regular, no murmurs or rubs  ABD: Soft, BS+  EXT: No Lower extremity edema/cyanosis,  normal right dayna test  NEURO: Non focal  SKIN: Intact    LABS:                        13.0   8.79  )-----------( 127      ( 02 Oct 2019 15:50 )             39.3     10-02    143  |  105  |  22<H>  ----------------------------<  238<H>  4.5   |  25  |  1.6<H>    Ca    9.3      02 Oct 2019 15:50    TPro  7.1  /  Alb  4.6  /  TBili  0.4  /  DBili  x   /  AST  22  /  ALT  23  /  AlkPhos  64  10-02    PT/INR - ( 02 Oct 2019 15:50 )   PT: 12.80 sec;   INR: 1.11 ratio         PTT - ( 02 Oct 2019 15:50 )  PTT:34.6 sec  Troponin T, Serum: 0.06 ng/mL <HH> (10-02-19 @ 15:50)    CARDIAC MARKERS ( 02 Oct 2019 15:50 )  x     / 0.06 ng/mL / x     / x     / x            Troponin trend:      10-02 Chol 128 LDL 70 HDL 47 Trig 77  Hemoglobin A1C   Thyroid      RADIOLOGY:    < from: Cardiac Cath Lab - Adult (02.07.18 @ 11:52) >  VENTRICLES: Analysis of regional contractile function demonstrated    inferolateral hypokinesis. Global left ventricular function was mildly    depressed. EF calculated by contrast ventriculography was 45 %.         CORONARY CIRCULATION: The coronary circulation is right dominant. Proximal    LAD: There was a diffuse 80 % stenosis. The lesion wasirregularly    contoured and heavily calcified. There was JOEL grade 3 flow through the    vessel (brisk flow). This lesion is a likely culprit for the patient's    anginal symptoms. An intervention was performed. Mid LAD: Distal LAD:    There was a tubular 85 % stenosis. There was JOEL grade 3 flow through the    vessel (brisk flow). This lesion is a likely culprit for the patient's    anginal symptoms. An intervention was performed. Circumflex: Angiography    showed minor luminal irregularitieswith no flow limiting lesions. RCA:    Angiography showed mild atherosclerosis with no flow limiting lesions.    Patent stent in the distal RCA.    < end of copied text >    -CATHETERIZATION: PCI of distal RCA, mid/ distal LAD dec 2017/ feb 2018      < from: Xray Chest 1 View-PORTABLE IMMEDIATE (10.02.19 @ 16:16) >  IMPRESSION:     Low lung volumes. No focal consolidation.    < end of copied text >    ECG:  SR

## 2019-10-02 NOTE — H&P ADULT - ASSESSMENT
# ACS  -  100% occlusion of distal RCA   - s/p PCI with REUBEN to distal RCA ,and POBA to RPL.  - Integrilin drip for 12 hrs; Check cbc every 6 hrs ( monitor for thrombocytopenia)  - Continue DAPT ( Effient anf ASA), Metoprolol , High intensity statin  - Check TTE  - Check lipid profile and HbA1c      # CKD III  - Cr at baseline  - Monitor renal function after contrast  - Keep on IV hydration    # DMII   Start basal bolus Insulin if >180      # HTN  Continue Enalapril # ACS  -  100% occlusion of distal RCA   - s/p PCI with REUBEN to distal RCA ,and POBA to RPL.  - Integrilin drip for 12 hrs; Check cbc every 6 hrs ( monitor for thrombocytopenia)  - Continue DAPT ( Effient anf ASA), Metoprolol , High intensity statin  - Check TTE  - Check lipid profile and HbA1c      # CKD III  - Cr at baseline  - Monitor renal function after contrast  - Keep on IV hydration    # DMII   Start basal bolus Insulin if >180      # HTN  Continue Enalapril    #BPH  - Continue Flomax

## 2019-10-02 NOTE — H&P ADULT - NSICDXFAMILYHX_GEN_ALL_CORE_FT
FAMILY HISTORY:  Mother  Still living? Unknown  Family history of myocardial infarction, Age at diagnosis: Age Unknown

## 2019-10-02 NOTE — ED ADULT NURSE NOTE - OBJECTIVE STATEMENT
patient with pmh of 3x stents complaining of chest pain for two hours. denies any shortness of breathe. ekg completed in triage stemi called

## 2019-10-02 NOTE — H&P ADULT - HISTORY OF PRESENT ILLNESS
68 year old gentleman known  to have  CAD s/p PCIx3, HTN, DM II, presents to ED for chest pressure that began 1 hour PTA, midsternal crushing, moderate. + diaphoresis, eructations that slightly improved since onset. No SOB, vomiting, syncope. 68 year old gentleman known  to have CAD s/p PCIx3, HTN, DM II, CKD III presents to ED for sudden onset of chest pain that began while at rest  3 hours prior to presentation. Pain is midsternal and crushing, associated with diaphoresis. Patient still had the pain on presentation although slightly better. Patient  is known to have CAD. He had history of STEMI s/p  s/p PCI of distal RCA, mid/distal LAD ( December 2017 and February 2018). In ED, STEMI code was called. ECGs showed no significant ST elevation. Patient was transferred to cath lab for persistent chest pain. He was found to have 100% occlusion of distal RCA s/p PCI with REUBEN to distal RCA ,and POBA to RPL.

## 2019-10-02 NOTE — H&P ADULT - NSHPLABSRESULTS_GEN_ALL_CORE
13.0   8.79  )-----------( 127      ( 02 Oct 2019 15:50 )             39.3     10-02    143  |  105  |  22<H>  ----------------------------<  238<H>  4.5   |  25  |  1.6<H>    Ca    9.3      02 Oct 2019 15:50    TPro  7.1  /  Alb  4.6  /  TBili  0.4  /  DBili  x   /  AST  22  /  ALT  23  /  AlkPhos  64  10-02    PT/INR - ( 02 Oct 2019 15:50 )   PT: 12.80 sec;   INR: 1.11 ratio         PTT - ( 02 Oct 2019 15:50 )  PTT:34.6 sec      Troponin T, Serum: 0.06 ng/mL <HH> (10-02-19 @ 15:50)      CARDIAC MARKERS ( 02 Oct 2019 15:50 )  x     / 0.06 ng/mL / x     / x     / x

## 2019-10-02 NOTE — ED ADULT NURSE NOTE - NSIMPLEMENTINTERV_GEN_ALL_ED
Implemented All Universal Safety Interventions:  [place] to call system. Call bell, personal items and telephone within reach. Instruct patient to call for assistance. Room bathroom lighting operational. Non-slip footwear when patient is off stretcher. Physically safe environment: no spills, clutter or unnecessary equipment. Stretcher in lowest position, wheels locked, appropriate side rails in place.

## 2019-10-02 NOTE — ED PROVIDER NOTE - PROGRESS NOTE DETAILS
PODLOG: EKG concerning for evolving inferior STEMI so STEMI code called upon arrival. Serial EKGs show no changes. STEMI cancelled by cardiology fellow. Will continue to monitor. Endorsed to Dr. Pelaez. PODLOG: Dr. Murillo saw pt in ED, will take pt to cath lab. Hospitalist keke. Endorsed to CCU fellow.

## 2019-10-02 NOTE — CHART NOTE - NSCHARTNOTEFT_GEN_A_CORE
Pre cath note:    indication:  [ ] STEMI                [ x] NSTEMI                 [ ] Acute coronary syndrome                     [ ]Unstable Angina   [ ] high risk  [ ] intermediate risk  [ ] low risk                     [ ] Stable Angina     non-invasive testing:                          Date:                     result: [ ] high risk  [ ] intermediate risk  [ ] low risk    Anti- Anginal medications:                    [ ] not used                       [ ] used                   [ ] not used but strong indication not to use    Ejection Fraction                   [ ] <29            [ ] 30-39%   [ ] 40-49%     [ ]>50%    CHF                   [ ] active (within last 14 days on meds   [ ] Chronic (on meds but no exacerbation)    COPD                   [ ] mild (on chronic bronchodilators)  [ ] moderate (on chronic steroid therapy)      [ ] severe (indication for home O2 or PACO2 >50)    Other risk factors:                       [x ] Previous MI                     [ ] CVA/ stroke                    [ ] carotid stent/ CEA                    [ ] PVD/PAD- (arterial aneurysm, non-palpable pulses, tortuous vessel with inability to insert catheter, infra-renal dissection, renal or subclavian artery stenosis)                    [ ] diabetic                    [ ] previous CABG                    [x ] Renal Failure                           13.0   8.79  )-----------( 127      ( 02 Oct 2019 15:50 )             39.3     10-02    143  |  105  |  22<H>  ----------------------------<  238<H>  4.5   |  25  |  1.6<H>    Ca    9.3      02 Oct 2019 15:50    TPro  7.1  /  Alb  4.6  /  TBili  0.4  /  DBili  x   /  AST  22  /  ALT  23  /  AlkPhos  64  10-02

## 2019-10-02 NOTE — ED PROVIDER NOTE - CLINICAL SUMMARY MEDICAL DECISION MAKING FREE TEXT BOX
67 y/o male with hx of CAD presented to ED for chest pain, similar to prior MI. STEMI called upon arrival due to concerning EKG. STEMI cancelled by after pt evaluated by cardiology, Dr. Murillo, will take pt to cath lab and admit to CCU. Endorsed to CCU resident.

## 2019-10-02 NOTE — H&P ADULT - NSICDXPASTSURGICALHX_GEN_ALL_CORE_FT
PAST SURGICAL HISTORY:  History of ligation of vein 2012    History of tonsillectomy 1957    S/P cataract surgery

## 2019-10-02 NOTE — ED PROVIDER NOTE - ATTENDING CONTRIBUTION TO CARE
I personally evaluated the patient. I reviewed the Resident’s or Physician Assistant’s note (as assigned above), and agree with the findings and plan except as documented in my note.    Pt is a 67 y/o male with hx of HTN, DLD, DM, CAD s/p PCIx3 (tamburrino), presents to ED for chest pressure that began 1 hour PTA, midsternal crushing, moderate. + diaphoresis, eructations that slightly improved since onset. No SOB, vomiting, syncope.    Constitutional: Well developed, well nourished. NAD.  Head: Normocephalic, atraumatic.  Eyes: PERRL. EOMI.  ENT: No nasal discharge. Mucous membranes moist.  Neck: Supple. Painless ROM.  Cardiovascular: Normal S1, S2. Regular rate and rhythm. No murmurs, rubs, or gallops.  Pulmonary: Normal respiratory rate and effort. Lungs clear to auscultation bilaterally. No wheezing, rales, or rhonchi.  Abdominal: Soft. Nondistended. Nontender. No rebound, guarding, rigidity.  Extremities. Pelvis stable. No lower extremity edema, symmetric calves.  Skin: No rashes, cyanosis.  Neuro: AAOx3. No focal neurological deficits.  Psych: Normal mood. Normal affect.

## 2019-10-02 NOTE — ED ADULT TRIAGE NOTE - CHIEF COMPLAINT QUOTE
Pt from home c/o midsternal chest pain beginning this morning, radiating up his neck. Pt also reports nausea & burping and states this feels like the MI he had 2 years ago. Pt has stents x3. Pt from home c/o midsternal chest pain beginning this morning, radiating up his neck. Pt also reports nausea & burping and states this feels like the MI he had 2 years ago. Pt has stents x3.    Code STEMI called as per MD Pelaez.

## 2019-10-02 NOTE — H&P ADULT - NSHPPHYSICALEXAM_GEN_ALL_CORE
VITAL SIGNS: Last 24 Hours  T(C): 36 (02 Oct 2019 18:30), Max: 36 (02 Oct 2019 18:30)  T(F): 96.8 (02 Oct 2019 18:30), Max: 96.8 (02 Oct 2019 18:30)  HR: 84 (02 Oct 2019 18:45) (58 - 84)  BP: 128/70 (02 Oct 2019 18:45) (123/74 - 171/76)  BP(mean): 98 (02 Oct 2019 18:45) (96 - 98)  RR: 22 (02 Oct 2019 18:45) (12 - 22)  SpO2: 96% (02 Oct 2019 18:45) (96% - 99%)    GENERAL: NAD, well-developed, AAOx3  HEENT:  Atraumatic, Normocephalic. EOMI, PERRLA, conjunctiva and sclera clear, No JVD  PULMONARY: Clear to auscultation bilaterally; No wheeze  CARDIOVASCULAR: Regular rate and rhythm; No murmurs, rubs, or gallops  GASTROINTESTINAL: Soft, Nontender, Nondistended; Bowel sounds present  MUSCULOSKELETAL:  2+ Peripheral Pulses, No clubbing, cyanosis, or edema  NEUROLOGY: non-focal  SKIN: No rashes or lesions

## 2019-10-02 NOTE — ED ADULT NURSE NOTE - CHIEF COMPLAINT QUOTE
Pt from home c/o midsternal chest pain beginning this morning, radiating up his neck. Pt also reports nausea & burping and states this feels like the MI he had 2 years ago. Pt has stents x3.    Code STEMI called as per MD Pelaez.

## 2019-10-03 ENCOUNTER — TRANSCRIPTION ENCOUNTER (OUTPATIENT)
Age: 69
End: 2019-10-03

## 2019-10-03 LAB
ALBUMIN SERPL ELPH-MCNC: 3.7 G/DL — SIGNIFICANT CHANGE UP (ref 3.5–5.2)
ALP SERPL-CCNC: 57 U/L — SIGNIFICANT CHANGE UP (ref 30–115)
ALT FLD-CCNC: 25 U/L — SIGNIFICANT CHANGE UP (ref 0–41)
ANION GAP SERPL CALC-SCNC: 9 MMOL/L — SIGNIFICANT CHANGE UP (ref 7–14)
AST SERPL-CCNC: 81 U/L — HIGH (ref 0–41)
BASOPHILS # BLD AUTO: 0.01 K/UL — SIGNIFICANT CHANGE UP (ref 0–0.2)
BASOPHILS # BLD AUTO: 0.01 K/UL — SIGNIFICANT CHANGE UP (ref 0–0.2)
BASOPHILS NFR BLD AUTO: 0.1 % — SIGNIFICANT CHANGE UP (ref 0–1)
BASOPHILS NFR BLD AUTO: 0.1 % — SIGNIFICANT CHANGE UP (ref 0–1)
BILIRUB SERPL-MCNC: 0.4 MG/DL — SIGNIFICANT CHANGE UP (ref 0.2–1.2)
BUN SERPL-MCNC: 17 MG/DL — SIGNIFICANT CHANGE UP (ref 10–20)
CALCIUM SERPL-MCNC: 8.6 MG/DL — SIGNIFICANT CHANGE UP (ref 8.5–10.1)
CHLORIDE SERPL-SCNC: 108 MMOL/L — SIGNIFICANT CHANGE UP (ref 98–110)
CHOLEST SERPL-MCNC: 104 MG/DL — SIGNIFICANT CHANGE UP (ref 100–200)
CO2 SERPL-SCNC: 26 MMOL/L — SIGNIFICANT CHANGE UP (ref 17–32)
CREAT SERPL-MCNC: 1.4 MG/DL — SIGNIFICANT CHANGE UP (ref 0.7–1.5)
EOSINOPHIL # BLD AUTO: 0 K/UL — SIGNIFICANT CHANGE UP (ref 0–0.7)
EOSINOPHIL # BLD AUTO: 0 K/UL — SIGNIFICANT CHANGE UP (ref 0–0.7)
EOSINOPHIL NFR BLD AUTO: 0 % — SIGNIFICANT CHANGE UP (ref 0–8)
EOSINOPHIL NFR BLD AUTO: 0 % — SIGNIFICANT CHANGE UP (ref 0–8)
ESTIMATED AVERAGE GLUCOSE: 177 MG/DL — HIGH (ref 68–114)
GLUCOSE BLDC GLUCOMTR-MCNC: 126 MG/DL — HIGH (ref 70–99)
GLUCOSE BLDC GLUCOMTR-MCNC: 133 MG/DL — HIGH (ref 70–99)
GLUCOSE BLDC GLUCOMTR-MCNC: 168 MG/DL — HIGH (ref 70–99)
GLUCOSE BLDC GLUCOMTR-MCNC: 174 MG/DL — HIGH (ref 70–99)
GLUCOSE SERPL-MCNC: 155 MG/DL — HIGH (ref 70–99)
HBA1C BLD-MCNC: 7.8 % — HIGH (ref 4–5.6)
HCT VFR BLD CALC: 32 % — LOW (ref 42–52)
HCT VFR BLD CALC: 34 % — LOW (ref 42–52)
HCT VFR BLD CALC: 35.2 % — LOW (ref 42–52)
HCV AB S/CO SERPL IA: 0.11 S/CO — SIGNIFICANT CHANGE UP (ref 0–0.99)
HCV AB SERPL-IMP: SIGNIFICANT CHANGE UP
HDLC SERPL-MCNC: 40 MG/DL — SIGNIFICANT CHANGE UP
HGB BLD-MCNC: 10.4 G/DL — LOW (ref 14–18)
HGB BLD-MCNC: 10.9 G/DL — LOW (ref 14–18)
HGB BLD-MCNC: 11.4 G/DL — LOW (ref 14–18)
IMM GRANULOCYTES NFR BLD AUTO: 0.5 % — HIGH (ref 0.1–0.3)
IMM GRANULOCYTES NFR BLD AUTO: 0.6 % — HIGH (ref 0.1–0.3)
LIPID PNL WITH DIRECT LDL SERPL: 57 MG/DL — SIGNIFICANT CHANGE UP (ref 4–129)
LYMPHOCYTES # BLD AUTO: 1.24 K/UL — SIGNIFICANT CHANGE UP (ref 1.2–3.4)
LYMPHOCYTES # BLD AUTO: 1.32 K/UL — SIGNIFICANT CHANGE UP (ref 1.2–3.4)
LYMPHOCYTES # BLD AUTO: 14.3 % — LOW (ref 20.5–51.1)
LYMPHOCYTES # BLD AUTO: 15.6 % — LOW (ref 20.5–51.1)
MCHC RBC-ENTMCNC: 26 PG — LOW (ref 27–31)
MCHC RBC-ENTMCNC: 26.5 PG — LOW (ref 27–31)
MCHC RBC-ENTMCNC: 26.8 PG — LOW (ref 27–31)
MCHC RBC-ENTMCNC: 32.1 G/DL — SIGNIFICANT CHANGE UP (ref 32–37)
MCHC RBC-ENTMCNC: 32.4 G/DL — SIGNIFICANT CHANGE UP (ref 32–37)
MCHC RBC-ENTMCNC: 32.5 G/DL — SIGNIFICANT CHANGE UP (ref 32–37)
MCV RBC AUTO: 81.1 FL — SIGNIFICANT CHANGE UP (ref 80–94)
MCV RBC AUTO: 81.7 FL — SIGNIFICANT CHANGE UP (ref 80–94)
MCV RBC AUTO: 82.5 FL — SIGNIFICANT CHANGE UP (ref 80–94)
MONOCYTES # BLD AUTO: 0.67 K/UL — HIGH (ref 0.1–0.6)
MONOCYTES # BLD AUTO: 0.87 K/UL — HIGH (ref 0.1–0.6)
MONOCYTES NFR BLD AUTO: 10.1 % — HIGH (ref 1.7–9.3)
MONOCYTES NFR BLD AUTO: 7.9 % — SIGNIFICANT CHANGE UP (ref 1.7–9.3)
NEUTROPHILS # BLD AUTO: 6.4 K/UL — SIGNIFICANT CHANGE UP (ref 1.4–6.5)
NEUTROPHILS # BLD AUTO: 6.49 K/UL — SIGNIFICANT CHANGE UP (ref 1.4–6.5)
NEUTROPHILS NFR BLD AUTO: 75 % — SIGNIFICANT CHANGE UP (ref 42.2–75.2)
NEUTROPHILS NFR BLD AUTO: 75.8 % — HIGH (ref 42.2–75.2)
NRBC # BLD: 0 /100 WBCS — SIGNIFICANT CHANGE UP (ref 0–0)
PLATELET # BLD AUTO: 109 K/UL — LOW (ref 130–400)
PLATELET # BLD AUTO: 114 K/UL — LOW (ref 130–400)
PLATELET # BLD AUTO: 122 K/UL — LOW (ref 130–400)
POTASSIUM SERPL-MCNC: 4 MMOL/L — SIGNIFICANT CHANGE UP (ref 3.5–5)
POTASSIUM SERPL-SCNC: 4 MMOL/L — SIGNIFICANT CHANGE UP (ref 3.5–5)
PROT SERPL-MCNC: 5.7 G/DL — LOW (ref 6–8)
RBC # BLD: 3.88 M/UL — LOW (ref 4.7–6.1)
RBC # BLD: 4.19 M/UL — LOW (ref 4.7–6.1)
RBC # BLD: 4.31 M/UL — LOW (ref 4.7–6.1)
RBC # FLD: 16.1 % — HIGH (ref 11.5–14.5)
RBC # FLD: 16.2 % — HIGH (ref 11.5–14.5)
RBC # FLD: 16.5 % — HIGH (ref 11.5–14.5)
SODIUM SERPL-SCNC: 143 MMOL/L — SIGNIFICANT CHANGE UP (ref 135–146)
TOTAL CHOLESTEROL/HDL RATIO MEASUREMENT: 2.6 RATIO — LOW (ref 4–5.5)
TRIGL SERPL-MCNC: 66 MG/DL — SIGNIFICANT CHANGE UP (ref 10–149)
TROPONIN T SERPL-MCNC: 3.19 NG/ML — CRITICAL HIGH
TSH SERPL-MCNC: 6.47 UIU/ML — HIGH (ref 0.27–4.2)
WBC # BLD: 8.1 K/UL — SIGNIFICANT CHANGE UP (ref 4.8–10.8)
WBC # BLD: 8.45 K/UL — SIGNIFICANT CHANGE UP (ref 4.8–10.8)
WBC # BLD: 8.65 K/UL — SIGNIFICANT CHANGE UP (ref 4.8–10.8)
WBC # FLD AUTO: 8.1 K/UL — SIGNIFICANT CHANGE UP (ref 4.8–10.8)
WBC # FLD AUTO: 8.45 K/UL — SIGNIFICANT CHANGE UP (ref 4.8–10.8)
WBC # FLD AUTO: 8.65 K/UL — SIGNIFICANT CHANGE UP (ref 4.8–10.8)

## 2019-10-03 PROCEDURE — 93306 TTE W/DOPPLER COMPLETE: CPT | Mod: 26

## 2019-10-03 PROCEDURE — 99222 1ST HOSP IP/OBS MODERATE 55: CPT

## 2019-10-03 PROCEDURE — 99223 1ST HOSP IP/OBS HIGH 75: CPT | Mod: AI

## 2019-10-03 PROCEDURE — 71045 X-RAY EXAM CHEST 1 VIEW: CPT | Mod: 26

## 2019-10-03 PROCEDURE — 93010 ELECTROCARDIOGRAM REPORT: CPT

## 2019-10-03 RX ORDER — INSULIN GLARGINE 100 [IU]/ML
15 INJECTION, SOLUTION SUBCUTANEOUS AT BEDTIME
Refills: 0 | Status: DISCONTINUED | OUTPATIENT
Start: 2019-10-03 | End: 2019-10-03

## 2019-10-03 RX ORDER — INSULIN LISPRO 100/ML
VIAL (ML) SUBCUTANEOUS
Refills: 0 | Status: DISCONTINUED | OUTPATIENT
Start: 2019-10-03 | End: 2019-10-04

## 2019-10-03 RX ORDER — CLOPIDOGREL BISULFATE 75 MG/1
1 TABLET, FILM COATED ORAL
Qty: 0 | Refills: 0 | DISCHARGE

## 2019-10-03 RX ORDER — LEVOTHYROXINE SODIUM 125 MCG
75 TABLET ORAL DAILY
Refills: 0 | Status: DISCONTINUED | OUTPATIENT
Start: 2019-10-03 | End: 2019-10-04

## 2019-10-03 RX ORDER — INSULIN GLARGINE 100 [IU]/ML
8 INJECTION, SOLUTION SUBCUTANEOUS AT BEDTIME
Refills: 0 | Status: DISCONTINUED | OUTPATIENT
Start: 2019-10-03 | End: 2019-10-04

## 2019-10-03 RX ORDER — PRASUGREL 5 MG/1
1 TABLET, FILM COATED ORAL
Qty: 30 | Refills: 0
Start: 2019-10-03 | End: 2019-11-01

## 2019-10-03 RX ORDER — ONDANSETRON 8 MG/1
4 TABLET, FILM COATED ORAL ONCE
Refills: 0 | Status: COMPLETED | OUTPATIENT
Start: 2019-10-03 | End: 2019-10-03

## 2019-10-03 RX ORDER — INSULIN LISPRO 100/ML
5 VIAL (ML) SUBCUTANEOUS
Refills: 0 | Status: DISCONTINUED | OUTPATIENT
Start: 2019-10-03 | End: 2019-10-04

## 2019-10-03 RX ORDER — LEVOTHYROXINE SODIUM 125 MCG
1 TABLET ORAL
Qty: 0 | Refills: 0 | DISCHARGE
Start: 2019-10-03

## 2019-10-03 RX ORDER — PRASUGREL 5 MG/1
1 TABLET, FILM COATED ORAL
Qty: 0 | Refills: 0 | DISCHARGE
Start: 2019-10-03

## 2019-10-03 RX ADMIN — Medication 81 MILLIGRAM(S): at 12:33

## 2019-10-03 RX ADMIN — INSULIN GLARGINE 8 UNIT(S): 100 INJECTION, SOLUTION SUBCUTANEOUS at 22:00

## 2019-10-03 RX ADMIN — EPTIFIBATIDE 17.27 MICROGRAM(S)/KG/MIN: 2 INJECTION, SOLUTION INTRAVENOUS at 04:43

## 2019-10-03 RX ADMIN — ATORVASTATIN CALCIUM 80 MILLIGRAM(S): 80 TABLET, FILM COATED ORAL at 21:19

## 2019-10-03 RX ADMIN — Medication 75 MICROGRAM(S): at 06:43

## 2019-10-03 RX ADMIN — TAMSULOSIN HYDROCHLORIDE 0.8 MILLIGRAM(S): 0.4 CAPSULE ORAL at 21:19

## 2019-10-03 RX ADMIN — PRASUGREL 10 MILLIGRAM(S): 5 TABLET, FILM COATED ORAL at 12:33

## 2019-10-03 RX ADMIN — ONDANSETRON 4 MILLIGRAM(S): 8 TABLET, FILM COATED ORAL at 07:42

## 2019-10-03 RX ADMIN — Medication 25 MILLIGRAM(S): at 05:27

## 2019-10-03 RX ADMIN — Medication 2.5 MILLIGRAM(S): at 05:27

## 2019-10-03 RX ADMIN — Medication 200 MILLIGRAM(S): at 21:19

## 2019-10-03 NOTE — PROGRESS NOTE ADULT - SUBJECTIVE AND OBJECTIVE BOX
Cardiology Follow up    ADINSHIRLENE SUBRAMANIANPPE   68yMale  PAST MEDICAL & SURGICAL HISTORY:  Chronic kidney disease (CKD): III  Type 2 diabetes mellitus without complication, unspecified long term insulin use status  Hypertension, unspecified type  Dyspnea, unspecified type  ASHD (arteriosclerotic heart disease): STEMI 12/31/17, PCI RCA  S/P cataract surgery  History of ligation of vein: 2012  History of tonsillectomy: 1957    Allergies    penicillin (Rash (Severe))    Intolerances        Patient without complaints. Pt ambulated without issues/symptoms  Denies CP, SOB, palpitations, or dizziness  No events on telemetry overnight    Vital Signs Last 24 Hrs  T(C): 36.1 (03 Oct 2019 00:00), Max: 36.1 (03 Oct 2019 00:00)  T(F): 97 (03 Oct 2019 00:00), Max: 97 (03 Oct 2019 00:00)  HR: 72 (03 Oct 2019 10:15) (58 - 84)  BP: 110/64 (03 Oct 2019 10:15) (105/60 - 171/76)  BP(mean): 74 (03 Oct 2019 10:15) (74 - 102)  RR: 29 (03 Oct 2019 10:15) (12 - 40)  SpO2: 97% (03 Oct 2019 10:15) (94% - 99%)Allergies    penicillin (Rash (Severe))    Intolerances        NAD, appears well  S1S2, no murmurs, no JVD  CTA B/L, no wheeze, no rales  SNT +BS  Ext:    Right/Left             Groin:  NO hematoma,     NO bruit, dressing; C/D/I   	   Right/Left              Radial : NO   hematoma,     bleeding, dressing; C/D/I      Pulses:  +Rad/ +PTs /+DPs/ same as baseline  A&Ox 3    EKG         Ventricular Rate 70 BPM    Atrial Rate 70 BPM    P-R Interval 146 ms    QRS Duration 90 ms    Q-T Interval 426 ms    QTC Calculation(Bezet) 460 ms    P Axis 58 degrees    R Axis -21 degrees    T Axis 23 degrees    Diagnosis Line Sinus rhythm with Premature atrial complexes  Possible Inferior infarct , age undetermined  Abnormal ECG    Confirmed by Flex Greenberg [id number]) on 10/3/2019 8:47:18 AM                                                                                                              2D ECHO    LABS                        10.9   8.65  )-----------( 109      ( 03 Oct 2019 05:05 )             34.0     10-03    143  |  108  |  17  ----------------------------<  155<H>  4.0   |  26  |  1.4    Ca    8.6      03 Oct 2019 05:05    TPro  5.7<L>  /  Alb  3.7  /  TBili  0.4  /  DBili  x   /  AST  81<H>  /  ALT  25  /  AlkPhos  57  10-03    CARDIAC MARKERS ( 03 Oct 2019 05:05 )  x     / 3.19 ng/mL / x     / x     / x      CARDIAC MARKERS ( 02 Oct 2019 15:50 )  x     / 0.06 ng/mL / x     / x     / x          LIVER FUNCTIONS - ( 03 Oct 2019 05:05 )  Alb: 3.7 g/dL / Pro: 5.7 g/dL / ALK PHOS: 57 U/L / ALT: 25 U/L / AST: 81 U/L / GGT: x             A/P:  I discussed the case with Interventional Cardiologist                 & recommends the following:    S/P PCI  	         Continue DAPT,  B-Blocker, Statin Therapy                                    Pt given instructions on importance of taking antiplatelet medication or risk acute stent thrombosis/death                   Post cath instructions, access site care and activity restrictions reviewed with patient                     Discussed with patient to return to hospital if experience chest pain, shortness breath, dizziness and site bleeding                   Aggressive risk factor modication, diet counseling, smoking cessation discussed with patient                       Can discharge patient from cardiac standpoint                    Follow up with Cardiology Dr                   in one week.  Instructed to call and make an appointment Cardiology Follow up    SHIRLENE BUSHPPE   68yMale  PAST MEDICAL & SURGICAL HISTORY:  Chronic kidney disease (CKD): III  Type 2 diabetes mellitus without complication, unspecified long term insulin use status  Hypertension, unspecified type  Dyspnea, unspecified type  ASHD (arteriosclerotic heart disease): STEMI 12/31/17, PCI RCA  S/P cataract surgery  History of ligation of vein: 2012  History of tonsillectomy: 1957    Allergies    penicillin (Rash (Severe))    Intolerances        Patient without complaints. Pt ambulated without issues/symptoms  Denies CP, SOB, palpitations, or dizziness  NSVT on telemetry overnight    Vital Signs Last 24 Hrs  T(C): 36.1 (03 Oct 2019 00:00), Max: 36.1 (03 Oct 2019 00:00)  T(F): 97 (03 Oct 2019 00:00), Max: 97 (03 Oct 2019 00:00)  HR: 72 (03 Oct 2019 10:15) (58 - 84)  BP: 110/64 (03 Oct 2019 10:15) (105/60 - 171/76)  BP(mean): 74 (03 Oct 2019 10:15) (74 - 102)  RR: 29 (03 Oct 2019 10:15) (12 - 40)  SpO2: 97% (03 Oct 2019 10:15) (94% - 99%)Allergies    penicillin (Rash (Severe))    Intolerances        NAD, appears well  S1S2, no murmurs, no JVD  CTA B/L, no wheeze, no rales  SNT +BS  EXT:  Right Radial : NO  hematoma or bleeding, dressing; C/D/I  , + pulses, no s/s of infx    Pulses:  +Rad/ +PTs /+DPs/ same as baseline  A&Ox 3    EKG         Ventricular Rate 70 BPM    Atrial Rate 70 BPM    P-R Interval 146 ms    QRS Duration 90 ms    Q-T Interval 426 ms    QTC Calculation(Bezet) 460 ms    P Axis 58 degrees    R Axis -21 degrees    T Axis 23 degrees    Diagnosis Line Sinus rhythm with Premature atrial complexes  Possible Inferior infarct , age undetermined  Abnormal ECG    Confirmed by Flex Greenberg [id number]) on 10/3/2019 8:47:18 AM                                                                                                              2D ECHO P    LABS                        10.9   8.65  )-----------( 109      ( 03 Oct 2019 05:05 )             34.0     10-03    143  |  108  |  17  ----------------------------<  155<H>  4.0   |  26  |  1.4    Ca    8.6      03 Oct 2019 05:05    TPro  5.7<L>  /  Alb  3.7  /  TBili  0.4  /  DBili  x   /  AST  81<H>  /  ALT  25  /  AlkPhos  57  10-03    CARDIAC MARKERS ( 03 Oct 2019 05:05 )  x     / 3.19 ng/mL / x     / x     / x      CARDIAC MARKERS ( 02 Oct 2019 15:50 )  x     / 0.06 ng/mL / x     / x     / x          LIVER FUNCTIONS - ( 03 Oct 2019 05:05 )  Alb: 3.7 g/dL / Pro: 5.7 g/dL / ALK PHOS: 57 U/L / ALT: 25 U/L / AST: 81 U/L / GGT: x             A/P:  I discussed the case with Interventional Cardiologist                 & recommends the following:    S/P PCI  	         Continue DAPT,  B-Blocker, Statin Therapy                                    Pt given instructions on importance of taking antiplatelet medication or risk acute stent thrombosis/death                   Post cath instructions, access site care and activity restrictions reviewed with patient                     Discussed with patient to return to hospital if experience chest pain, shortness breath, dizziness and site bleeding                   Aggressive risk factor modication, diet counseling, smoking cessation discussed with patient Cardiology Follow up    SHIRLENE BUSHPPE   68yMale  PAST MEDICAL & SURGICAL HISTORY:  Chronic kidney disease (CKD): III  Type 2 diabetes mellitus without complication, unspecified long term insulin use status  Hypertension, unspecified type  Dyspnea, unspecified type  ASHD (arteriosclerotic heart disease): STEMI 12/31/17, PCI RCA  S/P cataract surgery  History of ligation of vein: 2012  History of tonsillectomy: 1957    Allergies    penicillin (Rash (Severe))    Intolerances        Patient without complaints. Pt ambulated without issues/symptoms  Denies CP, SOB, palpitations, or dizziness  NSVT on telemetry overnight    Vital Signs Last 24 Hrs  T(C): 36.1 (03 Oct 2019 00:00), Max: 36.1 (03 Oct 2019 00:00)  T(F): 97 (03 Oct 2019 00:00), Max: 97 (03 Oct 2019 00:00)  HR: 72 (03 Oct 2019 10:15) (58 - 84)  BP: 110/64 (03 Oct 2019 10:15) (105/60 - 171/76)  BP(mean): 74 (03 Oct 2019 10:15) (74 - 102)  RR: 29 (03 Oct 2019 10:15) (12 - 40)  SpO2: 97% (03 Oct 2019 10:15) (94% - 99%)Allergies    penicillin (Rash (Severe))    Intolerances        NAD, appears well  S1S2, no murmurs, no JVD  CTA B/L, no wheeze, no rales  SNT +BS  EXT:  Right Radial : NO  hematoma or bleeding, dressing; C/D/I  , + pulses, no s/s of infx    Pulses:  +Rad/ +PTs /+DPs/ same as baseline  A&Ox 3    EKG         Ventricular Rate 70 BPM    Atrial Rate 70 BPM    P-R Interval 146 ms    QRS Duration 90 ms    Q-T Interval 426 ms    QTC Calculation(Bezet) 460 ms    P Axis 58 degrees    R Axis -21 degrees    T Axis 23 degrees    Diagnosis Line Sinus rhythm with Premature atrial complexes  Possible Inferior infarct , age undetermined  Abnormal ECG    Confirmed by Flex Greenberg [id number]) on 10/3/2019 8:47:18 AM                                                                                                              2D ECHO P    LABS                        10.9   8.65  )-----------( 109      ( 03 Oct 2019 05:05 )             34.0     10-03    143  |  108  |  17  ----------------------------<  155<H>  4.0   |  26  |  1.4    Ca    8.6      03 Oct 2019 05:05    TPro  5.7<L>  /  Alb  3.7  /  TBili  0.4  /  DBili  x   /  AST  81<H>  /  ALT  25  /  AlkPhos  57  10-03    CARDIAC MARKERS ( 03 Oct 2019 05:05 )  x     / 3.19 ng/mL / x     / x     / x      CARDIAC MARKERS ( 02 Oct 2019 15:50 )  x     / 0.06 ng/mL / x     / x     / x          LIVER FUNCTIONS - ( 03 Oct 2019 05:05 )  Alb: 3.7 g/dL / Pro: 5.7 g/dL / ALK PHOS: 57 U/L / ALT: 25 U/L / AST: 81 U/L / GGT: x             A/P:  I discussed the case with Interventional Cardiologist Dr. Murillo & recommends the following:    S/P PCI dRCA/POBA to RPL/STEMI  	     Continue DAPT(asa 81mg daily, effient 10 mg daily,  B-Blocker, Statin Therapy, ACE                   confirm effient coverage prior to d/c                    f/u echo and A1c results                    f/u urology recommendations                    oob-ch, ambulate                    trend CE, monitor Cr, encourage po fluids                    keep K>4, Mg >2                   Pt given instructions on importance of taking antiplatelet medication or risk acute stent thrombosis/death                   Post cath instructions, access site care and activity restrictions reviewed with patient                     Discussed with patient to return to hospital if experience chest pain, shortness breath, dizziness and site bleeding                   Aggressive risk factor modication, diet counseling, smoking cessation discussed with patient Cardiology Follow up    ANDREE BUSH   68yMale  PAST MEDICAL & SURGICAL HISTORY:  Chronic kidney disease (CKD): III  Type 2 diabetes mellitus without complication, unspecified long term insulin use status  Hypertension, unspecified type  Dyspnea, unspecified type  ASHD (arteriosclerotic heart disease): STEMI 12/31/17, PCI RCA  S/P cataract surgery  History of ligation of vein: 2012  History of tonsillectomy: 1957    Allergies    penicillin (Rash (Severe))    Intolerances        Patient without complaints. Pt ambulated without issues/symptoms  Denies CP, SOB, palpitations, or dizziness  NSVT on telemetry overnight    Vital Signs Last 24 Hrs  T(C): 36.1 (03 Oct 2019 00:00), Max: 36.1 (03 Oct 2019 00:00)  T(F): 97 (03 Oct 2019 00:00), Max: 97 (03 Oct 2019 00:00)  HR: 72 (03 Oct 2019 10:15) (58 - 84)  BP: 110/64 (03 Oct 2019 10:15) (105/60 - 171/76)  BP(mean): 74 (03 Oct 2019 10:15) (74 - 102)  RR: 29 (03 Oct 2019 10:15) (12 - 40)  SpO2: 97% (03 Oct 2019 10:15) (94% - 99%)Allergies    penicillin (Rash (Severe))    Intolerances        NAD, appears well  S1S2, no murmurs, no JVD  CTA B/L, no wheeze, no rales  SNT +BS  EXT:  Right Radial : NO  hematoma or bleeding, dressing; C/D/I  , + pulses, no s/s of infx    Pulses:  +Rad/ +PTs /+DPs/ same as baseline  A&Ox 3    EKG         Ventricular Rate 70 BPM    Atrial Rate 70 BPM    P-R Interval 146 ms    QRS Duration 90 ms    Q-T Interval 426 ms    QTC Calculation(Bezet) 460 ms    P Axis 58 degrees    R Axis -21 degrees    T Axis 23 degrees    Diagnosis Line Sinus rhythm with Premature atrial complexes  Possible Inferior infarct , age undetermined  Abnormal ECG    Confirmed by Flex Brandt [id number]) on 10/3/2019 8:47:18 AM                                                                                                              2D ECHO  EXAM:  2-D ECHO (TTE) COMPLETE        PROCEDURE DATE:  10/03/2019      INTERPRETATION:  REPORT:    TRANSTHORACIC ECHOCARDIOGRAM REPORT         Patient Name:   ANDREE BUSH Accession #: [id number]  Medical Rec #:  [id number]      Height:      68.0 in 172.7 cm  YOB: 1950      Weight:      238.0 lb 107.95 kg  Patient Age:    68 years       BSA:         2.20 m²  Patient Gender: M              BP:          142/72 mmHg       Date of Exam:        10/3/2019 7:17:45 AM  Referring Physician: [id number] ED UNASSIGNED  Sonographer:         Santosh Ochoa  Reading Physician:   Flex Brandt M.D.    Procedure:     2D Echo/Doppler/Color Doppler Complete.  Indications:   R07.9 - Chest Pain, unspecified  Diagnosis:     Chest pain, unspecified- R07.9  Study Details: Technically fair study. Study quality was adversely   affected due                 to body habitus.         Summary:   1. Left ventricular ejection fraction, by visual estimation, is 55 to   60%.   2. Basal and mid inferolateral wall and mid inferior segment are   abnormal as described above.   3. Spectral Doppler shows impaired relaxation pattern of left   ventricular myocardial filling (Grade I diastolic dysfunction).    PHYSICIAN INTERPRETATION:  Left Ventricle: Normal left ventricular size and wall thicknesses, with   normal systolic and diastolic function. Left ventricular ejection   fraction, by visual estimation, is 55 to 60%. Spectral Doppler shows   impaired relaxation pattern of left ventricular myocardial filling (Grade   I diastolic dysfunction).       LV Wall Scoring:  The basal and mid inferolateral wall and mid inferior segment are   hypokinetic.  All remaining scored segments are normal.    Right Ventricle: Normal right ventricular size and function.  Left Atrium: Normal left atrial size.  Right Atrium: Normal right atrial size.  Pericardium: There is no evidence of pericardial effusion.  Mitral Valve: Structurally normal mitral valve, with normal leaflet   excursion. The mitral valve is normal in structure. No evidence of mitral   valve regurgitation is seen.  Tricuspid Valve: Structurally normal tricuspid valve, with normal leaflet   excursion. The tricuspid valve is normal in structure. No tricuspid   regurgitation is visualized.  Aortic Valve: Normal trileaflet aortic valve with normal opening. The   aortic valve is normal. No evidence of aortic valve regurgitation is seen.  Pulmonic Valve: Structurally normal pulmonic valve, with normal leaflet   excursion. The pulmonic valve is normal. No indication of pulmonic valve   regurgitation.  Aorta: The aortic root and ascending aorta are structurally normal, with   no evidence of dilitation.  Pulmonary Artery: The main pulmonary artery is normal in size.       2D AND M-MODE MEASUREMENTS (normal ranges within parentheses):  Left                  Normal   Aorta/Left             Normal  Ventricle:                     Atrium:  IVSd (2D):  1.20 cm  (0.7-1.1) AoV Cusp       1.98  (1.5-2.6)  LVPWd (2D): 1.20 cm  (0.7-1.1) Separation:cm  LVIDd (2D): 4.23 cm  (3.4-5.7) Left Atrium    3.40  (1.9-4.0)  LVIDs (2D): 2.81 cm            (Mmode):        cm  LV FS (2D):  33.6 %   (>25%)   LA Volume      31.0  Relative      0.57    (<0.42)  Index         ml/m²  Wall  Right  Thickness                      Ventricle:                                 RVd (2D):      2.57 cm    SPECTRAL DOPPLER ANALYSIS:  LV DIASTOLIC FUNCTION:  MV Peak E: 0.94 m/s Decel Time: 205 msec  MV Peak A: 0.98 m/s  E/A Ratio: 0.96    Aortic Valve:  AoV VMax:    1.31 m/s AoV Area, Vmax: 3.17 cm² Vmax Indx: 1.44 cm²/m²  AoV Pk Grad: 6.8 mmHg    LVOT Vmax: 1.01 m/s  LVOT VTI:  0.22 m  LVOT Diam: 2.29 cm    Mitral Valve:  MV P1/2 Time: 59.53 msec  MV Area, PHT: 3.70 cm²       [id number] Flex Brandt M.D., Electronically signed on 10/3/2019 at 11:10:18   AM              *** Final ***                    FLEX BRANDT MD  This document has been electronically signed. Oct  3 2019  7:17AM            Ventricular Rate 70 BPM    Atrial Rate 70 BPM    P-R Interval 146 ms    QRS Duration 90 ms    Q-T Interval 426 ms    QTC Calculation(Bezet) 460 ms    P Axis 58 degrees    R Axis -21 degrees    T Axis 23 degrees    Diagnosis Line Sinus rhythm with Premature atrial complexes  Possible Inferior infarct , age undetermined  Abnormal ECG    Confirmed by Flex Brandt ([id number]) on 10/3/2019 8:47:18 AM        LABS                        10.9   8.65  )-----------( 109      ( 03 Oct 2019 05:05 )             34.0     10-03    143  |  108  |  17  ----------------------------<  155<H>  4.0   |  26  |  1.4    Ca    8.6      03 Oct 2019 05:05    TPro  5.7<L>  /  Alb  3.7  /  TBili  0.4  /  DBili  x   /  AST  81<H>  /  ALT  25  /  AlkPhos  57  10-03    CARDIAC MARKERS ( 03 Oct 2019 05:05 )  x     / 3.19 ng/mL / x     / x     / x      CARDIAC MARKERS ( 02 Oct 2019 15:50 )  x     / 0.06 ng/mL / x     / x     / x          LIVER FUNCTIONS - ( 03 Oct 2019 05:05 )  Alb: 3.7 g/dL / Pro: 5.7 g/dL / ALK PHOS: 57 U/L / ALT: 25 U/L / AST: 81 U/L / GGT: x             A/P:  I discussed the case with Interventional Cardiologist Dr. Murillo & recommends the following:    S/P PCI dRCA/POBA to RPL/STEMI  	     Continue DAPT(asa 81mg daily, effient 10 mg daily,  B-Blocker, Statin Therapy, ACE                   confirm effient coverage prior to d/c                    f/u echo and A1c results                    f/u urology recommendations                    oob-ch, ambulate                    trend CE, monitor Cr, encourage po fluids                    keep K>4, Mg >2                   Pt given instructions on importance of taking antiplatelet medication or risk acute stent thrombosis/death                   Post cath instructions, access site care and activity restrictions reviewed with patient                     Discussed with patient to return to hospital if experience chest pain, shortness breath, dizziness and site bleeding                   Aggressive risk factor modication, diet counseling, smoking cessation discussed with patient Cardiology Follow up    ANDREE BUSH   68yMale  PAST MEDICAL & SURGICAL HISTORY:  Chronic kidney disease (CKD): III  Type 2 diabetes mellitus without complication, unspecified long term insulin use status  Hypertension, unspecified type  Dyspnea, unspecified type  ASHD (arteriosclerotic heart disease): STEMI 12/31/17, PCI RCA  S/P cataract surgery  History of ligation of vein: 2012  History of tonsillectomy: 1957    Allergies    penicillin (Rash (Severe))    Intolerances        Patient without complaints. Pt ambulated without issues/symptoms  Denies CP, SOB, palpitations, or dizziness  NSVT on telemetry overnight    Vital Signs Last 24 Hrs  T(C): 36.1 (03 Oct 2019 00:00), Max: 36.1 (03 Oct 2019 00:00)  T(F): 97 (03 Oct 2019 00:00), Max: 97 (03 Oct 2019 00:00)  HR: 72 (03 Oct 2019 10:15) (58 - 84)  BP: 110/64 (03 Oct 2019 10:15) (105/60 - 171/76)  BP(mean): 74 (03 Oct 2019 10:15) (74 - 102)  RR: 29 (03 Oct 2019 10:15) (12 - 40)  SpO2: 97% (03 Oct 2019 10:15) (94% - 99%)Allergies    penicillin (Rash (Severe))    Intolerances        NAD, appears well  S1S2, no murmurs, no JVD  CTA B/L, no wheeze, no rales  SNT +BS  EXT:  Right Radial : NO  hematoma or bleeding, dressing; C/D/I  , + pulses, no s/s of infx    Pulses:  +Rad/ +PTs /+DPs/ same as baseline  A&Ox 3    EKG         Ventricular Rate 70 BPM    Atrial Rate 70 BPM    P-R Interval 146 ms    QRS Duration 90 ms    Q-T Interval 426 ms    QTC Calculation(Bezet) 460 ms    P Axis 58 degrees    R Axis -21 degrees    T Axis 23 degrees    Diagnosis Line Sinus rhythm with Premature atrial complexes  Possible Inferior infarct , age undetermined  Abnormal ECG    Confirmed by Flex Brandt ([id number]) on 10/3/2019 8:47:18 AM                                                                                                              2D ECHO  EXAM:  2-D ECHO (TTE) COMPLETE        PROCEDURE DATE:  10/03/2019      INTERPRETATION:  REPORT:    TRANSTHORACIC ECHOCARDIOGRAM REPORT         Patient Name:   ANDREE BUSH Accession #: [id number]  Medical Rec #:  [id number]      Height:      68.0 in 172.7 cm  YOB: 1950      Weight:      238.0 lb 107.95 kg  Patient Age:    68 years       BSA:         2.20 m²  Patient Gender: M              BP:          142/72 mmHg       Date of Exam:        10/3/2019 7:17:45 AM  Referring Physician: [id number] ED UNASSIGNED  Sonographer:         Santosh Ochoa  Reading Physician:   Flex Brandt M.D.    Procedure:     2D Echo/Doppler/Color Doppler Complete.  Indications:   R07.9 - Chest Pain, unspecified  Diagnosis:     Chest pain, unspecified- R07.9  Study Details: Technically fair study. Study quality was adversely   affected due                 to body habitus.         Summary:   1. Left ventricular ejection fraction, by visual estimation, is 55 to   60%.   2. Basal and mid inferolateral wall and mid inferior segment are   abnormal as described above.   3. Spectral Doppler shows impaired relaxation pattern of left   ventricular myocardial filling (Grade I diastolic dysfunction).    PHYSICIAN INTERPRETATION:  Left Ventricle: Normal left ventricular size and wall thicknesses, with   normal systolic and diastolic function. Left ventricular ejection   fraction, by visual estimation, is 55 to 60%. Spectral Doppler shows   impaired relaxation pattern of left ventricular myocardial filling (Grade   I diastolic dysfunction).       LV Wall Scoring:  The basal and mid inferolateral wall and mid inferior segment are   hypokinetic.  All remaining scored segments are normal.    Right Ventricle: Normal right ventricular size and function.  Left Atrium: Normal left atrial size.  Right Atrium: Normal right atrial size.  Pericardium: There is no evidence of pericardial effusion.  Mitral Valve: Structurally normal mitral valve, with normal leaflet   excursion. The mitral valve is normal in structure. No evidence of mitral   valve regurgitation is seen.  Tricuspid Valve: Structurally normal tricuspid valve, with normal leaflet   excursion. The tricuspid valve is normal in structure. No tricuspid   regurgitation is visualized.  Aortic Valve: Normal trileaflet aortic valve with normal opening. The   aortic valve is normal. No evidence of aortic valve regurgitation is seen.  Pulmonic Valve: Structurally normal pulmonic valve, with normal leaflet   excursion. The pulmonic valve is normal. No indication of pulmonic valve   regurgitation.  Aorta: The aortic root and ascending aorta are structurally normal, with   no evidence of dilitation.  Pulmonary Artery: The main pulmonary artery is normal in size.       2D AND M-MODE MEASUREMENTS (normal ranges within parentheses):  Left                  Normal   Aorta/Left             Normal  Ventricle:                     Atrium:  IVSd (2D):  1.20 cm  (0.7-1.1) AoV Cusp       1.98  (1.5-2.6)  LVPWd (2D): 1.20 cm  (0.7-1.1) Separation:cm  LVIDd (2D): 4.23 cm  (3.4-5.7) Left Atrium    3.40  (1.9-4.0)  LVIDs (2D): 2.81 cm            (Mmode):        cm  LV FS (2D):  33.6 %   (>25%)   LA Volume      31.0  Relative      0.57    (<0.42)  Index         ml/m²  Wall  Right  Thickness                      Ventricle:                                 RVd (2D):      2.57 cm    SPECTRAL DOPPLER ANALYSIS:  LV DIASTOLIC FUNCTION:  MV Peak E: 0.94 m/s Decel Time: 205 msec  MV Peak A: 0.98 m/s  E/A Ratio: 0.96    Aortic Valve:  AoV VMax:    1.31 m/s AoV Area, Vmax: 3.17 cm² Vmax Indx: 1.44 cm²/m²  AoV Pk Grad: 6.8 mmHg    LVOT Vmax: 1.01 m/s  LVOT VTI:  0.22 m  LVOT Diam: 2.29 cm    Mitral Valve:  MV P1/2 Time: 59.53 msec  MV Area, PHT: 3.70 cm²       [id number] Flex Brandt M.D., Electronically signed on 10/3/2019 at 11:10:18   AM              *** Final ***                    FLEX BRANDT MD  This document has been electronically signed. Oct  3 2019  7:17AM            Ventricular Rate 70 BPM    Atrial Rate 70 BPM    P-R Interval 146 ms    QRS Duration 90 ms    Q-T Interval 426 ms    QTC Calculation(Bezet) 460 ms    P Axis 58 degrees    R Axis -21 degrees    T Axis 23 degrees    Diagnosis Line Sinus rhythm with Premature atrial complexes  Possible Inferior infarct , age undetermined  Abnormal ECG    Confirmed by Flex Brandt ([id number]) on 10/3/2019 8:47:18 AM        LABS                        10.9   8.65  )-----------( 109      ( 03 Oct 2019 05:05 )             34.0     10-03    143  |  108  |  17  ----------------------------<  155<H>  4.0   |  26  |  1.4    Ca    8.6      03 Oct 2019 05:05    TPro  5.7<L>  /  Alb  3.7  /  TBili  0.4  /  DBili  x   /  AST  81<H>  /  ALT  25  /  AlkPhos  57  10-03    CARDIAC MARKERS ( 03 Oct 2019 05:05 )  x     / 3.19 ng/mL / x     / x     / x      CARDIAC MARKERS ( 02 Oct 2019 15:50 )  x     / 0.06 ng/mL / x     / x     / x          LIVER FUNCTIONS - ( 03 Oct 2019 05:05 )  Alb: 3.7 g/dL / Pro: 5.7 g/dL / ALK PHOS: 57 U/L / ALT: 25 U/L / AST: 81 U/L / GGT: x             A/P:  I discussed the case with Interventional Cardiologist Dr. Murillo & recommends the following:    S/P PCI dRCA/POBA to RPL/STEMI  	     Continue DAPT(asa 81mg daily, effient 10 mg daily,  B-Blocker, Statin Therapy, ACE                   confirm effient coverage prior to d/c                    f/u echo and A1c results                    f/u urology recommendations                    oob-ch, ambulate                    trend CE, monitor Cr, encourage po fluids                    keep K>4, Mg >2                   Pt given instructions on importance of taking antiplatelet medication or risk acute stent thrombosis/death                   Post cath instructions, access site care and activity restrictions reviewed with patient                     Discussed with patient to return to hospital if experience chest pain, shortness breath, dizziness and site bleeding                   Aggressive risk factor modication, diet counseling, smoking cessation discussed with patient        agree

## 2019-10-03 NOTE — DISCHARGE NOTE PROVIDER - HOSPITAL COURSE
68 year old male with history of CAD s/p PCIx3, HTN, DM II, CKD III presents to ED for sudden onset of chest pain that began while at rest  3 hours prior to presentation. Pain is midsternal and crushing, associated with diaphoresis. Patient still had the pain on presentation although slightly better. Patient  is known to have CAD. He had history of STEMI s/p  s/p PCI of distal RCA, mid/distal LAD ( December 2017 and February 2018). In ED, STEMI code was called. ECGs showed no significant ST elevation. Patient was transferred to cath lab for persistent chest pain. He was found to have 100% occlusion of distal RCA s/p PCI with REUBEN to distal RCA ,and POBA to RPL.

## 2019-10-03 NOTE — DISCHARGE NOTE PROVIDER - CARE PROVIDER_API CALL
Brandon Murillo)  Cardiovascular Disease; Interventional Cardiology  [address] [place] [address]  Phone: [phone]  Fax: [phone]  Follow Up Time: 1 week

## 2019-10-03 NOTE — CONSULT NOTE ADULT - ATTENDING COMMENTS
agree
Pt seen on Oct 3rd 2019  Agree with above  hematuria likely due to recent catheterization  has history of bph  interested in possible minimally invasive approaches for treatment of bph  follow up as outpatient for further evaluation and discussion

## 2019-10-03 NOTE — PROGRESS NOTE ADULT - SUBJECTIVE AND OBJECTIVE BOX
PATIENT:  ANDREE BUSH  [id number]    CHIEF COMPLAINT:  Patient is a 68y old  Male who presents with a chief complaint of Chest pain (03 Oct 2019 11:07)      INTERVAL HISTORYOVERNIGHT EVENTS:  Hematuria this morning. No dysuria, abdominal pain, fever, chest pain, SOB.         MEDICATIONS:  MEDICATIONS  (STANDING):  aspirin  chewable 81 milliGRAM(s) Oral daily  atorvastatin 80 milliGRAM(s) Oral at bedtime  dextrose 5%. 1000 milliLiter(s) (50 mL/Hr) IV Continuous <Continuous>  dextrose 50% Injectable 12.5 Gram(s) IV Push once  dextrose 50% Injectable 25 Gram(s) IV Push once  dextrose 50% Injectable 25 Gram(s) IV Push once  docusate sodium 200 milliGRAM(s) Oral at bedtime  enalapril 2.5 milliGRAM(s) Oral daily  levothyroxine 75 MICROGram(s) Oral daily  metoprolol succinate ER 25 milliGRAM(s) Oral daily  prasugrel 10 milliGRAM(s) Oral daily  tamsulosin 0.8 milliGRAM(s) Oral at bedtime    MEDICATIONS  (PRN):  dextrose 40% Gel 15 Gram(s) Oral once PRN Blood Glucose LESS THAN 70 milliGRAM(s)/deciliter  glucagon  Injectable 1 milliGRAM(s) IntraMuscular once PRN Glucose LESS THAN 70 milligrams/deciliter      ALLERGIES:  Allergies    penicillin (Rash (Severe))    Intolerances        OBJECTIVE:  ICU Vital Signs Last 24 Hrs  T(C): 36.2 (03 Oct 2019 08:00), Max: 36.2 (03 Oct 2019 08:00)  T(F): 97.1 (03 Oct 2019 08:00), Max: 97.1 (03 Oct 2019 08:00)  HR: 60 (03 Oct 2019 14:00) (60 - 84)  BP: 96/57 (03 Oct 2019 14:00) (96/57 - 150/63)  BP(mean): 74 (03 Oct 2019 14:00) (74 - 102)  ABP: --  ABP(mean): --  RR: 17 (03 Oct 2019 14:00) (12 - 40)  SpO2: 96% (03 Oct 2019 14:00) (94% - 99%)      Adult Advanced Hemodynamics Last 24 Hrs  CVP(mm Hg): --  CVP(cm H2O): --  CO: --  CI: --  PA: --  PA(mean): --  PCWP: --  SVR: --  SVRI: --  PVR: --  PVRI: --  CAPILLARY BLOOD GLUCOSE      POCT Blood Glucose.: 168 mg/dL (03 Oct 2019 12:27)  POCT Blood Glucose.: 126 mg/dL (03 Oct 2019 08:09)  POCT Blood Glucose.: 227 mg/dL (02 Oct 2019 21:48)  POCT Blood Glucose.: 215 mg/dL (02 Oct 2019 21:09)    CAPILLARY BLOOD GLUCOSE      POCT Blood Glucose.: 168 mg/dL (03 Oct 2019 12:27)    I&O's Summary    02 Oct 2019 07:01  -  03 Oct 2019 07:00  --------------------------------------------------------  IN: 807.6 mL / OUT: 1850 mL / NET: -1042.4 mL    03 Oct 2019 07:01  -  03 Oct 2019 15:52  --------------------------------------------------------  IN: 240 mL / OUT: 850 mL / NET: -610 mL      Daily Height in cm: 172.72 (03 Oct 2019 10:28)    Daily Weight in k.5 (03 Oct 2019 05:00)    PHYSICAL EXAMINATION:  General: WN/WD NAD  HEENT: PERRLA, EOMI, moist mucous membranes  Neurology: A&Ox3, nonfocal  Respiratory: CTA B/L, normal respiratory effort, no wheezes, crackles, rales  CV: RRR, S1S2, no murmurs, rubs or gallops  Abdominal: Soft, NT, ND  Extremities: No edema  Incisions:   Tubes:    LABS:                          10.9   8.65  )-----------( 109      ( 03 Oct 2019 05:05 )             34.0     10-03    143  |  108  |  17  ----------------------------<  155<H>  4.0   |  26  |  1.4    Ca    8.6      03 Oct 2019 05:05    TPro  5.7<L>  /  Alb  3.7  /  TBili  0.4  /  DBili  x   /  AST  81<H>  /  ALT  25  /  AlkPhos  57  10-03    LIVER FUNCTIONS - ( 03 Oct 2019 05:05 )  Alb: 3.7 g/dL / Pro: 5.7 g/dL / ALK PHOS: 57 U/L / ALT: 25 U/L / AST: 81 U/L / GGT: x           PT/INR - ( 02 Oct 2019 15:50 )   PT: 12.80 sec;   INR: 1.11 ratio         PTT - ( 02 Oct 2019 15:50 )  PTT:34.6 sec  Troponin T, Serum: 3.19 ng/mL (10-03 @ 05:05)    CARDIAC MARKERS ( 03 Oct 2019 05:05 )  x     / 3.19 ng/mL / x     / x     / x      CARDIAC MARKERS ( 02 Oct 2019 15:50 )  x     / 0.06 ng/mL / x     / x     / x              TELEMETRY:     EKG:     IMAGING: PATIENT:  ANDREE BUSH  [id number]    CHIEF COMPLAINT:  Patient is a 68y old  Male who presents with a chief complaint of Chest pain (03 Oct 2019 11:07)      INTERVAL HISTORYOVERNIGHT EVENTS:  Hematuria this morning. No dysuria, abdominal pain, fever, chest pain, SOB.         MEDICATIONS:  MEDICATIONS  (STANDING):  aspirin  chewable 81 milliGRAM(s) Oral daily  atorvastatin 80 milliGRAM(s) Oral at bedtime  dextrose 5%. 1000 milliLiter(s) (50 mL/Hr) IV Continuous <Continuous>  dextrose 50% Injectable 12.5 Gram(s) IV Push once  dextrose 50% Injectable 25 Gram(s) IV Push once  dextrose 50% Injectable 25 Gram(s) IV Push once  docusate sodium 200 milliGRAM(s) Oral at bedtime  enalapril 2.5 milliGRAM(s) Oral daily  levothyroxine 75 MICROGram(s) Oral daily  metoprolol succinate ER 25 milliGRAM(s) Oral daily  prasugrel 10 milliGRAM(s) Oral daily  tamsulosin 0.8 milliGRAM(s) Oral at bedtime    MEDICATIONS  (PRN):  dextrose 40% Gel 15 Gram(s) Oral once PRN Blood Glucose LESS THAN 70 milliGRAM(s)/deciliter  glucagon  Injectable 1 milliGRAM(s) IntraMuscular once PRN Glucose LESS THAN 70 milligrams/deciliter      ALLERGIES:  Allergies    penicillin (Rash (Severe))    Intolerances        OBJECTIVE:  ICU Vital Signs Last 24 Hrs  T(C): 36.2 (03 Oct 2019 08:00), Max: 36.2 (03 Oct 2019 08:00)  T(F): 97.1 (03 Oct 2019 08:00), Max: 97.1 (03 Oct 2019 08:00)  HR: 60 (03 Oct 2019 14:00) (60 - 84)  BP: 96/57 (03 Oct 2019 14:00) (96/57 - 150/63)  BP(mean): 74 (03 Oct 2019 14:00) (74 - 102)  RR: 17 (03 Oct 2019 14:00) (12 - 40)  SpO2: 96% (03 Oct 2019 14:00) (94% - 99%)      POCT Blood Glucose.: 168 mg/dL (03 Oct 2019 12:27)  POCT Blood Glucose.: 126 mg/dL (03 Oct 2019 08:09)  POCT Blood Glucose.: 227 mg/dL (02 Oct 2019 21:48)  POCT Blood Glucose.: 215 mg/dL (02 Oct 2019 21:09)    POCT Blood Glucose.: 168 mg/dL (03 Oct 2019 12:27)    I&O's Summary    02 Oct 2019 07:01  -  03 Oct 2019 07:00  --------------------------------------------------------  IN: 807.6 mL / OUT: 1850 mL / NET: -1042.4 mL    03 Oct 2019 07:01  -  03 Oct 2019 15:52  --------------------------------------------------------  IN: 240 mL / OUT: 850 mL / NET: -610 mL      Daily Height in cm: 172.72 (03 Oct 2019 10:28)    Daily Weight in k.5 (03 Oct 2019 05:00)    PHYSICAL EXAMINATION:  General: WN/WD NAD  HEENT: PERRLA, EOMI, moist mucous membranes  Neurology: A&Ox3, nonfocal  Respiratory: CTA B/L, normal respiratory effort, no wheezes, crackles, rales  CV: RRR, S1S2, no murmurs, rubs or gallops  Abdominal: Soft, NT, ND  Extremities: No edema      LABS:                          10.9   8.65  )-----------( 109      ( 03 Oct 2019 05:05 )             34.0     10    143  |  108  |  17  ----------------------------<  155<H>  4.0   |  26  |  1.4    Ca    8.6      03 Oct 2019 05:05    TPro  5.7<L>  /  Alb  3.7  /  TBili  0.4  /  DBili  x   /  AST  81<H>  /  ALT  25  /  AlkPhos  57  10-03    LIVER FUNCTIONS - ( 03 Oct 2019 05:05 )  Alb: 3.7 g/dL / Pro: 5.7 g/dL / ALK PHOS: 57 U/L / ALT: 25 U/L / AST: 81 U/L / GGT: x           PT/INR - ( 02 Oct 2019 15:50 )   PT: 12.80 sec;   INR: 1.11 ratio         PTT - ( 02 Oct 2019 15:50 )  PTT:34.6 sec  Troponin T, Serum: 3.19 ng/mL (10-03 @ 05:05)    CARDIAC MARKERS ( 03 Oct 2019 05:05 )  x     / 3.19 ng/mL / x     / x     / x      CARDIAC MARKERS ( 02 Oct 2019 15:50 )  x     / 0.06 ng/mL / x     / x     / x        TELEMETRY: no malignant arrythmia    TTE:   Summary:   1. Left ventricular ejection fraction, by visual estimation, is 55 to 60%.   2. Basal and mid inferolateral wall and mid inferior segment are abnormal as described above.   3. Spectral Doppler shows impaired relaxation pattern of left ventricular myocardial filling (Grade I diastolic dysfunction).

## 2019-10-03 NOTE — CONSULT NOTE ADULT - ASSESSMENT
69y/o M on ASA, Plavix, Integrillin drip, s/p PCI for 100% RCA occlusion, with hematuria, resolving    - No acute  intervention at this time, hematuria is self resolving  - Discussed plan with medical resident, recall if any issues voiding  - Will d/w attending

## 2019-10-03 NOTE — DISCHARGE NOTE PROVIDER - NSDCCPCAREPLAN_GEN_ALL_CORE_FT
PRINCIPAL DISCHARGE DIAGNOSIS  Diagnosis: Chest pain  Assessment and Plan of Treatment: - s/p cath  - continue beta-blocker, statin, aspirin, effient      SECONDARY DISCHARGE DIAGNOSES  Diagnosis: Abnormal EKG  Assessment and Plan of Treatment: PRINCIPAL DISCHARGE DIAGNOSIS  Diagnosis: Unstable angina  Assessment and Plan of Treatment: You were having chest pain due to Coronary Artery Disease  Take all your medications as instructed  Take ASA, Effient, Statins, Betablockers  Follow up with the Cardiologist in 1-2 weeks  Return to the ER for any emergency      SECONDARY DISCHARGE DIAGNOSES  Diagnosis: Painless hematuria  Assessment and Plan of Treatment: You will need to follow up with your urologist  If you spike a fever call your  as you may need antibiotics given ?recent prostatitis history.   Your urine analysis was okay  Follow up your urine cultures to rule out infection   Ask Medical records to be sent to your

## 2019-10-03 NOTE — CONSULT NOTE ADULT - SUBJECTIVE AND OBJECTIVE BOX
Patient is a 68y old  Male who presents with a chief complaint of Chest pain (03 Oct 2019 10:28)    UROLOGY: Pt is a 67y/o M s/p PCI for 100% occlusion of distal RCA, on ASA, Plavix, Integrillin drip; urology consulted 2/2 hematuria. Pt states that he was not able to urinate after being under anesthesia for PCI yesterday evening, and was straight catheterized, with 650cc UO. He noticed some blood at the end, and states that he was able to urinate spontaneously afterwards, but urinated a clear dark red color with clots. At bedside, pt urinated into urinal and presented to me a clear dark urine with a few clots. Pt states that he has an enlarged prostate and that he follows with a urologist in [place]. Denies fever, nausea, vomiting, dysuria, urgency, or frequency at this time.     HPI:  68 year old gentleman known  to have CAD s/p PCIx3, HTN, DM II, CKD III presents to ED for sudden onset of chest pain that began while at rest  3 hours prior to presentation. Pain is midsternal and crushing, associated with diaphoresis. Patient still had the pain on presentation although slightly better. Patient  is known to have CAD. He had history of STEMI s/p  s/p PCI of distal RCA, mid/distal LAD ( 2017 and 2018). In ED, STEMI code was called. ECGs showed no significant ST elevation. Patient was transferred to cath lab for persistent chest pain. He was found to have 100% occlusion of distal RCA s/p PCI with REUBEN to distal RCA ,and POBA to RPL. (02 Oct 2019 16:46)      PAST MEDICAL & SURGICAL HISTORY:  Chronic kidney disease (CKD): III  Type 2 diabetes mellitus without complication, unspecified long term insulin use status  Hypertension, unspecified type  Dyspnea, unspecified type  ASHD (arteriosclerotic heart disease): STEMI 17, PCI RCA  S/P cataract surgery  History of ligation of vein:   History of tonsillectomy: 7      REVIEW OF SYSTEMS:  CONSTITUTIONAL:  fevers or chills  HEENT: No visual changes  ENDO: No sweating  NECK: No pain or stiffness  MUSCULOSKELETAL: No back pain, no joint pain  RESPIRATORY: No shortness of breath  CARDIOVASCULAR: No chest pain  GASTROINTESTINAL: No abdominal or epigastric pain. No nausea, vomiting,  No diarrhea or constipation.   NEUROLOGICAL: No mental status changes  PSYCH: No depression, no mood changes  SKIN: No itching      MEDICATIONS  (STANDING):  aspirin  chewable 81 milliGRAM(s) Oral daily  atorvastatin 80 milliGRAM(s) Oral at bedtime  dextrose 5%. 1000 milliLiter(s) (50 mL/Hr) IV Continuous <Continuous>  dextrose 50% Injectable 12.5 Gram(s) IV Push once  dextrose 50% Injectable 25 Gram(s) IV Push once  dextrose 50% Injectable 25 Gram(s) IV Push once  docusate sodium 200 milliGRAM(s) Oral at bedtime  enalapril 2.5 milliGRAM(s) Oral daily  insulin glargine Injectable (LANTUS) 8 Unit(s) SubCutaneous at bedtime  insulin lispro (HumaLOG) corrective regimen sliding scale   SubCutaneous three times a day before meals  levothyroxine 75 MICROGram(s) Oral daily  metoprolol succinate ER 25 milliGRAM(s) Oral daily  prasugrel 10 milliGRAM(s) Oral daily  tamsulosin 0.8 milliGRAM(s) Oral at bedtime    MEDICATIONS  (PRN):  dextrose 40% Gel 15 Gram(s) Oral once PRN Blood Glucose LESS THAN 70 milliGRAM(s)/deciliter  glucagon  Injectable 1 milliGRAM(s) IntraMuscular once PRN Glucose LESS THAN 70 milligrams/deciliter      Allergies  penicillin (Rash (Severe))    SOCIAL HISTORY: No illicit drug use    FAMILY HISTORY:  Family history of myocardial infarction (Mother    Vital Signs Last 24 Hrs  T(C): 36.1 (03 Oct 2019 00:00), Max: 36.1 (03 Oct 2019 00:00)  T(F): 97 (03 Oct 2019 00:00), Max: 97 (03 Oct 2019 00:00)  HR: 72 (03 Oct 2019 10:15) (58 - 84)  BP: 110/64 (03 Oct 2019 10:15) (105/60 - 171/76)  BP(mean): 74 (03 Oct 2019 10:15) (74 - 102)  RR: 29 (03 Oct 2019 10:15) (12 - 40)  SpO2: 97% (03 Oct 2019 10:15) (94% - 99%)    Daily Height in cm: 172.72 (03 Oct 2019 10:28)    Daily Weight in k.5 (03 Oct 2019 05:00)    PHYSICAL EXAM:  Constitutional: awake, alert, NAD, lying in bed comfortably  Neck: no pain  Back: No CVA tenderness  Respiratory: No accessory respiratory muscle use  Abd: soft, nontender, nondistended, bladder nonpalpable  : Urinating by self, clear dark urine present in urinal with a few small clots, no suprapubic tenderness.  Extremities: no edema  Neurological: A/O x 3  Psychiatric: Normal mood, normal affect  Skin: No rashes    I&O's Summary    02 Oct 2019 07:01  -  03 Oct 2019 07:00  --------------------------------------------------------  IN: 807.6 mL / OUT: 1850 mL / NET: -1042.4 mL    03 Oct 2019 07:01  -  03 Oct 2019 10:48  --------------------------------------------------------  IN: 120 mL / OUT: 400 mL / NET: -280 mL        LABS:                        10.9   8.65  )-----------( 109      ( 03 Oct 2019 05:05 )             34.0     10-03    143  |  108  |  17  ----------------------------<  155<H>  4.0   |  26  |  1.4    Ca    8.6      03 Oct 2019 05:05    TPro  5.7<L>  /  Alb  3.7  /  TBili  0.4  /  DBili  x   /  AST  81<H>  /  ALT  25  /  AlkPhos  57  10-03    PT/INR - ( 02 Oct 2019 15:50 )   PT: 12.80 sec;   INR: 1.11 ratio         PTT - ( 02 Oct 2019 15:50 )  PTT:34.6 sec

## 2019-10-03 NOTE — PROGRESS NOTE ADULT - ASSESSMENT
68 year old male with history of CAD s/p PCIx3, HTN, DM II, CKD III presents to ED for sudden onset of chest pain that began while at rest  3 hours prior to presentation. Pain is midsternal and crushing, associated with diaphoresis. Patient still had the pain on presentation although slightly better. Patient  is known to have CAD. He had history of STEMI s/p  s/p PCI of distal RCA, mid/distal LAD ( December 2017 and February 2018). In ED, STEMI code was called. ECGs showed no significant ST elevation. Patient was transferred to cath lab for persistent chest pain. He was found to have 100% occlusion of distal RCA s/p PCI with REUBEN to distal RCA ,and POBA to RPL.    #Unstable Angina  - cath (10/2):  100% occlusion of distal RCA s/p PCI with REUBEN to distal RCA ,and POBA to RPL.  - finished integrillin drip, on beta-blocker, statin, aspirin  - f/u echo    #BPH  - on flomax    #hematuria  - likely from traumatic straight cath yesterday, will sono bladder/kidneys if dysuria  - monitor CBC  - urology following 68 year old male with history of CAD s/p PCIx3, HTN, DM II, CKD III presents to ED for sudden onset of chest pain that began while at rest  3 hours prior to presentation. Pain is midsternal and crushing, associated with diaphoresis. Patient still had the pain on presentation although slightly better. Patient  is known to have CAD. He had history of STEMI s/p  s/p PCI of distal RCA, mid/distal LAD ( December 2017 and February 2018). In ED, STEMI code was called. ECGs showed no significant ST elevation. Patient was transferred to cath lab for persistent chest pain. He was found to have 100% occlusion of distal RCA s/p PCI with REUBEN to distal RCA ,and POBA to RPL.    #Unstable Angina  - cath (10/2):  100% occlusion of distal RCA s/p PCI with REUBEN to distal RCA ,and POBA to RPL.  - finished integrillin drip, continue with beta-blocker, statin, aspirin, effient   - f/u echo    #BPH  - on flomax    #hematuria  - likely from traumatic straight cath yesterday, will sono bladder/kidneys if dysuria  - monitor CBC  - urology following 68 year old male with history of CAD s/p PCIx3, HTN, DM II, CKD III presents to ED for sudden onset of chest pain that began while at rest  3 hours prior to presentation. Pain is midsternal and crushing, associated with diaphoresis. Patient still had the pain on presentation although slightly better. Patient  is known to have CAD. He had history of STEMI s/p  s/p PCI of distal RCA, mid/distal LAD ( December 2017 and February 2018). In ED, STEMI code was called. ECGs showed no significant ST elevation. Patient was transferred to cath lab for persistent chest pain. He was found to have 100% occlusion of distal RCA s/p PCI with REUBEN to distal RCA ,and POBA to RPL.    #Unstable Angina  - cath (10/2):  100% occlusion of distal RCA s/p PCI with REUBEN to distal RCA ,and POBA to RPL.  - finished integrillin drip, continue with beta-blocker, statin, aspirin, effient   - f/u echo    #BPH  - on flomax    #hematuria  - likely from traumatic straight cath yesterday, will sono bladder/kidneys if dysuria  - monitor CBC  - urology following    Attending Attestation  Pt seen and examined and case d/w CCU Team and Pt at bedside.  Unstable Angina s/p REUBEN stent to Distal RCA  - c/w care per orders   Hematuria   - will watch and f/u  eval     Dispo: Possible d/c home tomorrow / f/u labs

## 2019-10-04 ENCOUNTER — TRANSCRIPTION ENCOUNTER (OUTPATIENT)
Age: 69
End: 2019-10-04

## 2019-10-04 VITALS
SYSTOLIC BLOOD PRESSURE: 118 MMHG | RESPIRATION RATE: 16 BRPM | TEMPERATURE: 99 F | HEART RATE: 76 BPM | DIASTOLIC BLOOD PRESSURE: 63 MMHG

## 2019-10-04 LAB
ALBUMIN SERPL ELPH-MCNC: 3.7 G/DL — SIGNIFICANT CHANGE UP (ref 3.5–5.2)
ALP SERPL-CCNC: 53 U/L — SIGNIFICANT CHANGE UP (ref 30–115)
ALT FLD-CCNC: 23 U/L — SIGNIFICANT CHANGE UP (ref 0–41)
ANION GAP SERPL CALC-SCNC: 10 MMOL/L — SIGNIFICANT CHANGE UP (ref 7–14)
APPEARANCE UR: ABNORMAL
AST SERPL-CCNC: 48 U/L — HIGH (ref 0–41)
BASOPHILS # BLD AUTO: 0.01 K/UL — SIGNIFICANT CHANGE UP (ref 0–0.2)
BASOPHILS NFR BLD AUTO: 0.1 % — SIGNIFICANT CHANGE UP (ref 0–1)
BILIRUB SERPL-MCNC: 0.4 MG/DL — SIGNIFICANT CHANGE UP (ref 0.2–1.2)
BILIRUB UR-MCNC: NEGATIVE — SIGNIFICANT CHANGE UP
BUN SERPL-MCNC: 15 MG/DL — SIGNIFICANT CHANGE UP (ref 10–20)
CALCIUM SERPL-MCNC: 8.4 MG/DL — LOW (ref 8.5–10.1)
CHLORIDE SERPL-SCNC: 106 MMOL/L — SIGNIFICANT CHANGE UP (ref 98–110)
CO2 SERPL-SCNC: 26 MMOL/L — SIGNIFICANT CHANGE UP (ref 17–32)
COLOR SPEC: ABNORMAL
CREAT SERPL-MCNC: 1.4 MG/DL — SIGNIFICANT CHANGE UP (ref 0.7–1.5)
DIFF PNL FLD: ABNORMAL
EOSINOPHIL # BLD AUTO: 0 K/UL — SIGNIFICANT CHANGE UP (ref 0–0.7)
EOSINOPHIL NFR BLD AUTO: 0 % — SIGNIFICANT CHANGE UP (ref 0–8)
GLUCOSE BLDC GLUCOMTR-MCNC: 118 MG/DL — HIGH (ref 70–99)
GLUCOSE BLDC GLUCOMTR-MCNC: 179 MG/DL — HIGH (ref 70–99)
GLUCOSE SERPL-MCNC: 107 MG/DL — HIGH (ref 70–99)
GLUCOSE UR QL: 100 MG/DL
HCT VFR BLD CALC: 34.6 % — LOW (ref 42–52)
HGB BLD-MCNC: 11.1 G/DL — LOW (ref 14–18)
IMM GRANULOCYTES NFR BLD AUTO: 0.7 % — HIGH (ref 0.1–0.3)
KETONES UR-MCNC: NEGATIVE — SIGNIFICANT CHANGE UP
LEUKOCYTE ESTERASE UR-ACNC: ABNORMAL
LYMPHOCYTES # BLD AUTO: 1.17 K/UL — LOW (ref 1.2–3.4)
LYMPHOCYTES # BLD AUTO: 13.5 % — LOW (ref 20.5–51.1)
MCHC RBC-ENTMCNC: 26.3 PG — LOW (ref 27–31)
MCHC RBC-ENTMCNC: 32.1 G/DL — SIGNIFICANT CHANGE UP (ref 32–37)
MCV RBC AUTO: 82 FL — SIGNIFICANT CHANGE UP (ref 80–94)
MONOCYTES # BLD AUTO: 0.76 K/UL — HIGH (ref 0.1–0.6)
MONOCYTES NFR BLD AUTO: 8.8 % — SIGNIFICANT CHANGE UP (ref 1.7–9.3)
NEUTROPHILS # BLD AUTO: 6.64 K/UL — HIGH (ref 1.4–6.5)
NEUTROPHILS NFR BLD AUTO: 76.9 % — HIGH (ref 42.2–75.2)
NITRITE UR-MCNC: NEGATIVE — SIGNIFICANT CHANGE UP
NRBC # BLD: 0 /100 WBCS — SIGNIFICANT CHANGE UP (ref 0–0)
PH UR: 6 — SIGNIFICANT CHANGE UP (ref 5–8)
PLATELET # BLD AUTO: 101 K/UL — LOW (ref 130–400)
POTASSIUM SERPL-MCNC: 3.7 MMOL/L — SIGNIFICANT CHANGE UP (ref 3.5–5)
POTASSIUM SERPL-SCNC: 3.7 MMOL/L — SIGNIFICANT CHANGE UP (ref 3.5–5)
PROT SERPL-MCNC: 5.7 G/DL — LOW (ref 6–8)
PROT UR-MCNC: >=300 MG/DL
RBC # BLD: 4.22 M/UL — LOW (ref 4.7–6.1)
RBC # FLD: 16.5 % — HIGH (ref 11.5–14.5)
RBC CASTS # UR COMP ASSIST: >50 /HPF
SODIUM SERPL-SCNC: 142 MMOL/L — SIGNIFICANT CHANGE UP (ref 135–146)
SP GR SPEC: 1.02 — SIGNIFICANT CHANGE UP (ref 1.01–1.03)
TROPONIN T SERPL-MCNC: 2.07 NG/ML — CRITICAL HIGH
UROBILINOGEN FLD QL: 0.2 MG/DL — SIGNIFICANT CHANGE UP (ref 0.2–0.2)
WBC # BLD: 8.64 K/UL — SIGNIFICANT CHANGE UP (ref 4.8–10.8)
WBC # FLD AUTO: 8.64 K/UL — SIGNIFICANT CHANGE UP (ref 4.8–10.8)

## 2019-10-04 PROCEDURE — 93010 ELECTROCARDIOGRAM REPORT: CPT

## 2019-10-04 PROCEDURE — 99238 HOSP IP/OBS DSCHRG MGMT 30/<: CPT

## 2019-10-04 RX ORDER — ACETAMINOPHEN 500 MG
650 TABLET ORAL ONCE
Refills: 0 | Status: COMPLETED | OUTPATIENT
Start: 2019-10-04 | End: 2019-10-04

## 2019-10-04 RX ORDER — CHLORHEXIDINE GLUCONATE 213 G/1000ML
1 SOLUTION TOPICAL
Refills: 0 | Status: DISCONTINUED | OUTPATIENT
Start: 2019-10-04 | End: 2019-10-04

## 2019-10-04 RX ADMIN — Medication 75 MICROGRAM(S): at 05:57

## 2019-10-04 RX ADMIN — Medication 650 MILLIGRAM(S): at 12:08

## 2019-10-04 RX ADMIN — Medication 650 MILLIGRAM(S): at 12:09

## 2019-10-04 RX ADMIN — Medication 81 MILLIGRAM(S): at 12:08

## 2019-10-04 RX ADMIN — Medication 25 MILLIGRAM(S): at 05:57

## 2019-10-04 RX ADMIN — PRASUGREL 10 MILLIGRAM(S): 5 TABLET, FILM COATED ORAL at 12:08

## 2019-10-04 RX ADMIN — Medication 2.5 MILLIGRAM(S): at 05:57

## 2019-10-04 NOTE — PROGRESS NOTE ADULT - SUBJECTIVE AND OBJECTIVE BOX
Cardiology Follow up    ANDREE BUSH   68y Male  PAST MEDICAL & SURGICAL HISTORY:  Chronic kidney disease (CKD): III  Type 2 diabetes mellitus without complication, unspecified long term insulin use status  Hypertension, unspecified type  Dyspnea, unspecified type  ASHD (arteriosclerotic heart disease): STEMI 12/31/17, PCI RCA  S/P cataract surgery  History of ligation of vein: 2012  History of tonsillectomy: 1957       HPI:  68 year old gentleman known  to have CAD s/p PCIx3, HTN, DM II, CKD III presents to ED for sudden onset of chest pain that began while at rest  3 hours prior to presentation. Pain is midsternal and crushing, associated with diaphoresis. Patient still had the pain on presentation although slightly better. Patient  is known to have CAD. He had history of STEMI s/p  s/p PCI of distal RCA, mid/distal LAD ( December 2017 and February 2018). In ED, STEMI code was called. ECGs showed no significant ST elevation. Patient was transferred to cath lab for persistent chest pain. He was found to have 100% occlusion of distal RCA s/p PCI with REUBEN to distal RCA ,and POBA to RPL. (02 Oct 2019 16:46)    Allergies    penicillin (Rash (Severe))    Intolerances      Patient reports urinary urgency/hesitation this am with hematuria. Pt ambulated without issues/symptoms  Denies CP, SOB, palpitations, or dizziness  NSVT on telemetry overnight    Vital Signs Last 24 Hrs  T(C): 37.6 (04 Oct 2019 08:00), Max: 37.6 (04 Oct 2019 08:00)  T(F): 99.7 (04 Oct 2019 08:00), Max: 99.7 (04 Oct 2019 08:00)  HR: 78 (04 Oct 2019 08:00) (60 - 88)  BP: 101/56 (04 Oct 2019 08:00) (96/57 - 142/64)  BP(mean): 68 (04 Oct 2019 08:00) (61 - 93)  RR: 19 (04 Oct 2019 08:00) (16 - 26)  SpO2: 98% (04 Oct 2019 08:00) (95% - 99%)    MEDICATIONS  (STANDING):  aspirin  chewable 81 milliGRAM(s) Oral daily  atorvastatin 80 milliGRAM(s) Oral at bedtime  chlorhexidine 4% Liquid 1 Application(s) Topical two times a day  dextrose 5%. 1000 milliLiter(s) (50 mL/Hr) IV Continuous <Continuous>  dextrose 50% Injectable 12.5 Gram(s) IV Push once  dextrose 50% Injectable 25 Gram(s) IV Push once  dextrose 50% Injectable 25 Gram(s) IV Push once  docusate sodium 200 milliGRAM(s) Oral at bedtime  enalapril 2.5 milliGRAM(s) Oral daily  insulin glargine Injectable (LANTUS) 8 Unit(s) SubCutaneous at bedtime  insulin lispro (HumaLOG) corrective regimen sliding scale   SubCutaneous three times a day before meals  insulin lispro Injectable (HumaLOG) 5 Unit(s) SubCutaneous three times a day before meals  levothyroxine 75 MICROGram(s) Oral daily  metoprolol succinate ER 25 milliGRAM(s) Oral daily  prasugrel 10 milliGRAM(s) Oral daily  tamsulosin 0.8 milliGRAM(s) Oral at bedtime    MEDICATIONS  (PRN):  dextrose 40% Gel 15 Gram(s) Oral once PRN Blood Glucose LESS THAN 70 milliGRAM(s)/deciliter  glucagon  Injectable 1 milliGRAM(s) IntraMuscular once PRN Glucose LESS THAN 70 milligrams/deciliter      REVIEW OF SYSTEMS:          CONSTITUTIONAL: No weakness, fevers or chills          EYES/ENT: No visual changes;  No vertigo or throat pain           NECK: No pain or stiffness          RESPIRATORY: No cough, wheezing, hemoptysis          CARDIOVASCULAR: no pain, no CARNEY, no palpitations           GASTROINTESTINAL: No abdominal or epigastric pain. No nausea, vomiting, or hematemesis;           GENITOURINARY: + dysuria, frequency , +hematuria          NEUROLOGICAL: No numbness or weakness          SKIN: No itching, rashes    PHYSICAL EXAM:           CONSTITUTIONAL: Well-developed; well-nourished; in no acute distress  	SKIN: warm, dry  	HEAD: Normocephalic; atraumatic  	EYES: PERRL.  	ENT: No nasal discharge, airway clear, mucous membranes moist  	NECK: Supple; non tender.  	CARD: +S1, +S2, no murmurs, gallops, or rubs. (Regular) rate and rhythm    	RESP: No wheezes, rales or rhonchi. CTA B/L  	ABD: soft ntnd, + BS x 4 quadrants  	EXT: moves all extremities,  no clubbing, cyanosis or edema  	NEURO: Alert and oriented x3, no focal deficits          PSYCH: Cooperative, appropriate          VASCULAR:  + Rad / + PTs / + DPs          EXTREMITY:  	   Right Radial: Dressing removed, access site soft, no hematoma, no pain, no numbness, no signs and symptoms of infection             ECG: P                                                                                                                2D ECHO:   EXAM:  2-D ECHO (TTE) COMPLETE        PROCEDURE DATE:  10/03/2019      INTERPRETATION:  REPORT:    TRANSTHORACIC ECHOCARDIOGRAM REPORT         Patient Name:   ANDREE BUSH Accession #: [id number]  Medical Rec #:  [id number]      Height:      68.0 in 172.7 cm  YOB: 1950      Weight:      238.0 lb 107.95 kg  Patient Age:    68 years       BSA:         2.20 m²  Patient Gender: M              BP:          142/72 mmHg       Date of Exam:        10/3/2019 7:17:45 AM  Referring Physician: [id number] ED UNASSIGNED  Sonographer:         Santosh Ochoa  Reading Physician:   Flex Greenberg M.D.    Procedure:     2D Echo/Doppler/Color Doppler Complete.  Indications:   R07.9 - Chest Pain, unspecified  Diagnosis:     Chest pain, unspecified- R07.9  Study Details: Technically fair study. Study quality was adversely   affected due                 to body habitus.         Summary:   1. Left ventricular ejection fraction, by visual estimation, is 55 to   60%.   2. Basal and mid inferolateral wall and mid inferior segment are   abnormal as described above.   3. Spectral Doppler shows impaired relaxation pattern of left   ventricular myocardial filling (Grade I diastolic dysfunction).    PHYSICIAN INTERPRETATION:  Left Ventricle: Normal left ventricular size and wall thicknesses, with   normal systolic and diastolic function. Left ventricular ejection   fraction, by visual estimation, is 55 to 60%. Spectral Doppler shows   impaired relaxation pattern of left ventricular myocardial filling (Grade   I diastolic dysfunction).       LV Wall Scoring:  The basal and mid inferolateral wall and mid inferior segment are   hypokinetic.  All remaining scored segments are normal.    Right Ventricle: Normal right ventricular size and function.  Left Atrium: Normal left atrial size.  Right Atrium: Normal right atrial size.  Pericardium: There is no evidence of pericardial effusion.  Mitral Valve: Structurally normal mitral valve, with normal leaflet   excursion. The mitral valve is normal in structure. No evidence of mitral   valve regurgitation is seen.  Tricuspid Valve: Structurally normal tricuspid valve, with normal leaflet   excursion. The tricuspid valve is normal in structure. No tricuspid   regurgitation is visualized.  Aortic Valve: Normal trileaflet aortic valve with normal opening. The   aortic valve is normal. No evidence of aortic valve regurgitation is seen.  Pulmonic Valve: Structurally normal pulmonic valve, with normal leaflet   excursion. The pulmonic valve is normal. No indication of pulmonic valve   regurgitation.  Aorta: The aortic root and ascending aorta are structurally normal, with   no evidence of dilitation.  Pulmonary Artery: The main pulmonary artery is normal in size.       2D AND M-MODE MEASUREMENTS (normal ranges within parentheses):  Left                  Normal   Aorta/Left             Normal  Ventricle:                     Atrium:  IVSd (2D):  1.20 cm  (0.7-1.1) AoV Cusp       1.98  (1.5-2.6)  LVPWd (2D): 1.20 cm  (0.7-1.1) Separation:cm  LVIDd (2D): 4.23 cm  (3.4-5.7) Left Atrium    3.40  (1.9-4.0)  LVIDs (2D): 2.81 cm            (Mmode):        cm  LV FS (2D):  33.6 %   (>25%)   LA Volume      31.0  Relative      0.57    (<0.42)  Index         ml/m²  Wall  Right  Thickness                      Ventricle:                                 RVd (2D):      2.57 cm    SPECTRAL DOPPLER ANALYSIS:  LV DIASTOLIC FUNCTION:  MV Peak E: 0.94 m/s Decel Time: 205 msec  MV Peak A: 0.98 m/s  E/A Ratio: 0.96    Aortic Valve:  AoV VMax:    1.31 m/s AoV Area, Vmax: 3.17 cm² Vmax Indx: 1.44 cm²/m²  AoV Pk Grad: 6.8 mmHg    LVOT Vmax: 1.01 m/s  LVOT VTI:  0.22 m  LVOT Diam: 2.29 cm    Mitral Valve:  MV P1/2 Time: 59.53 msec  MV Area, PHT: 3.70 cm²       [id number] Flex Greenberg M.D., Electronically signed on 10/3/2019 at 11:10:18   AM              *** Final ***              LABS:                        11.1   8.64  )-----------( 101      ( 04 Oct 2019 04:26 )             34.6     10-04    142  |  106  |  15  ----------------------------<  107<H>  3.7   |  26  |  1.4    Ca    8.4<L>      04 Oct 2019 04:26    TPro  5.7<L>  /  Alb  3.7  /  TBili  0.4  /  DBili  x   /  AST  48<H>  /  ALT  23  /  AlkPhos  53  10-04    CARDIAC MARKERS ( 04 Oct 2019 04:26 )  x     / 2.07 ng/mL / x     / x     / x      CARDIAC MARKERS ( 03 Oct 2019 05:05 )  x     / 3.19 ng/mL / x     / x     / x      CARDIAC MARKERS ( 02 Oct 2019 15:50 )  x     / 0.06 ng/mL / x     / x     / x          LIVER FUNCTIONS - ( 04 Oct 2019 04:26 )  Alb: 3.7 g/dL / Pro: 5.7 g/dL / ALK PHOS: 53 U/L / ALT: 23 U/L / AST: 48 U/L / GGT: x             S/P PCI dRCA/POBA to RPL/STEMI  	     Continue DAPT(asa 81mg daily, effient 10 mg daily,  B-Blocker, Statin Therapy, ACE                   effient coverage confirmed, $11 copay, pt aware and agreeable                   f/u urology recommendations regarding hematuria/ hesitancy, f/u UA                   encourage po fluids                    oob-ch, ambulate                    CE trending down, H/H stable                    keep K>4, Mg >2                   Pt given instructions on importance of taking antiplatelet medication or risk acute stent thrombosis/death                   Post cath instructions, access site care and activity restrictions reviewed with patient                     Discussed with patient to return to hospital if experience chest pain, shortness breath, dizziness and site bleeding                   Aggressive risk factor modication, diet counseling, smoking cessation discussed with patient                   f/u with Dr. Murillo in 1-2 weeks after discharge Cardiology Follow up    ANDREE BUSH   68y Male  PAST MEDICAL & SURGICAL HISTORY:  Chronic kidney disease (CKD): III  Type 2 diabetes mellitus without complication, unspecified long term insulin use status  Hypertension, unspecified type  Dyspnea, unspecified type  ASHD (arteriosclerotic heart disease): STEMI 12/31/17, PCI RCA  S/P cataract surgery  History of ligation of vein: 2012  History of tonsillectomy: 1957       HPI:  68 year old gentleman known  to have CAD s/p PCIx3, HTN, DM II, CKD III presents to ED for sudden onset of chest pain that began while at rest  3 hours prior to presentation. Pain is midsternal and crushing, associated with diaphoresis. Patient still had the pain on presentation although slightly better. Patient  is known to have CAD. He had history of STEMI s/p  s/p PCI of distal RCA, mid/distal LAD ( December 2017 and February 2018). In ED, STEMI code was called. ECGs showed no significant ST elevation. Patient was transferred to cath lab for persistent chest pain. He was found to have 100% occlusion of distal RCA s/p PCI with REUBEN to distal RCA ,and POBA to RPL. (02 Oct 2019 16:46)    Allergies    penicillin (Rash (Severe))    Intolerances      Patient reports urinary urgency/hesitation this am with hematuria. Pt ambulated without issues/symptoms  Denies CP, SOB, palpitations, or dizziness  NSVT on telemetry overnight    Vital Signs Last 24 Hrs  T(C): 37.6 (04 Oct 2019 08:00), Max: 37.6 (04 Oct 2019 08:00)  T(F): 99.7 (04 Oct 2019 08:00), Max: 99.7 (04 Oct 2019 08:00)  HR: 78 (04 Oct 2019 08:00) (60 - 88)  BP: 101/56 (04 Oct 2019 08:00) (96/57 - 142/64)  BP(mean): 68 (04 Oct 2019 08:00) (61 - 93)  RR: 19 (04 Oct 2019 08:00) (16 - 26)  SpO2: 98% (04 Oct 2019 08:00) (95% - 99%)    MEDICATIONS  (STANDING):  aspirin  chewable 81 milliGRAM(s) Oral daily  atorvastatin 80 milliGRAM(s) Oral at bedtime  chlorhexidine 4% Liquid 1 Application(s) Topical two times a day  dextrose 5%. 1000 milliLiter(s) (50 mL/Hr) IV Continuous <Continuous>  dextrose 50% Injectable 12.5 Gram(s) IV Push once  dextrose 50% Injectable 25 Gram(s) IV Push once  dextrose 50% Injectable 25 Gram(s) IV Push once  docusate sodium 200 milliGRAM(s) Oral at bedtime  enalapril 2.5 milliGRAM(s) Oral daily  insulin glargine Injectable (LANTUS) 8 Unit(s) SubCutaneous at bedtime  insulin lispro (HumaLOG) corrective regimen sliding scale   SubCutaneous three times a day before meals  insulin lispro Injectable (HumaLOG) 5 Unit(s) SubCutaneous three times a day before meals  levothyroxine 75 MICROGram(s) Oral daily  metoprolol succinate ER 25 milliGRAM(s) Oral daily  prasugrel 10 milliGRAM(s) Oral daily  tamsulosin 0.8 milliGRAM(s) Oral at bedtime    MEDICATIONS  (PRN):  dextrose 40% Gel 15 Gram(s) Oral once PRN Blood Glucose LESS THAN 70 milliGRAM(s)/deciliter  glucagon  Injectable 1 milliGRAM(s) IntraMuscular once PRN Glucose LESS THAN 70 milligrams/deciliter      REVIEW OF SYSTEMS:          CONSTITUTIONAL: No weakness, fevers or chills          EYES/ENT: No visual changes;  No vertigo or throat pain           NECK: No pain or stiffness          RESPIRATORY: No cough, wheezing, hemoptysis          CARDIOVASCULAR: no pain, no CARNEY, no palpitations           GASTROINTESTINAL: No abdominal or epigastric pain. No nausea, vomiting, or hematemesis;           GENITOURINARY: + dysuria, frequency , +hematuria          NEUROLOGICAL: No numbness or weakness          SKIN: No itching, rashes    PHYSICAL EXAM:           CONSTITUTIONAL: Well-developed; well-nourished; in no acute distress  	SKIN: warm, dry  	HEAD: Normocephalic; atraumatic  	EYES: PERRL.  	ENT: No nasal discharge, airway clear, mucous membranes moist  	NECK: Supple; non tender.  	CARD: +S1, +S2, no murmurs, gallops, or rubs. (Regular) rate and rhythm    	RESP: No wheezes, rales or rhonchi. CTA B/L  	ABD: soft ntnd, + BS x 4 quadrants  	EXT: moves all extremities,  no clubbing, cyanosis or edema  	NEURO: Alert and oriented x3, no focal deficits          PSYCH: Cooperative, appropriate          VASCULAR:  + Rad / + PTs / + DPs          EXTREMITY:  	   Right Radial: Dressing removed, access site soft, no hematoma, no pain, no numbness, no signs and symptoms of infection             ECG: P                                                                                                                2D ECHO:   EXAM:  2-D ECHO (TTE) COMPLETE        PROCEDURE DATE:  10/03/2019      INTERPRETATION:  REPORT:    TRANSTHORACIC ECHOCARDIOGRAM REPORT         Patient Name:   ANDREE BUSH Accession #: [id number]  Medical Rec #:  [id number]      Height:      68.0 in 172.7 cm  YOB: 1950      Weight:      238.0 lb 107.95 kg  Patient Age:    68 years       BSA:         2.20 m²  Patient Gender: M              BP:          142/72 mmHg       Date of Exam:        10/3/2019 7:17:45 AM  Referring Physician: [id number] ED UNASSIGNED  Sonographer:         Santosh Ochoa  Reading Physician:   Flex Greenberg M.D.    Procedure:     2D Echo/Doppler/Color Doppler Complete.  Indications:   R07.9 - Chest Pain, unspecified  Diagnosis:     Chest pain, unspecified- R07.9  Study Details: Technically fair study. Study quality was adversely   affected due                 to body habitus.         Summary:   1. Left ventricular ejection fraction, by visual estimation, is 55 to   60%.   2. Basal and mid inferolateral wall and mid inferior segment are   abnormal as described above.   3. Spectral Doppler shows impaired relaxation pattern of left   ventricular myocardial filling (Grade I diastolic dysfunction).    PHYSICIAN INTERPRETATION:  Left Ventricle: Normal left ventricular size and wall thicknesses, with   normal systolic and diastolic function. Left ventricular ejection   fraction, by visual estimation, is 55 to 60%. Spectral Doppler shows   impaired relaxation pattern of left ventricular myocardial filling (Grade   I diastolic dysfunction).       LV Wall Scoring:  The basal and mid inferolateral wall and mid inferior segment are   hypokinetic.  All remaining scored segments are normal.    Right Ventricle: Normal right ventricular size and function.  Left Atrium: Normal left atrial size.  Right Atrium: Normal right atrial size.  Pericardium: There is no evidence of pericardial effusion.  Mitral Valve: Structurally normal mitral valve, with normal leaflet   excursion. The mitral valve is normal in structure. No evidence of mitral   valve regurgitation is seen.  Tricuspid Valve: Structurally normal tricuspid valve, with normal leaflet   excursion. The tricuspid valve is normal in structure. No tricuspid   regurgitation is visualized.  Aortic Valve: Normal trileaflet aortic valve with normal opening. The   aortic valve is normal. No evidence of aortic valve regurgitation is seen.  Pulmonic Valve: Structurally normal pulmonic valve, with normal leaflet   excursion. The pulmonic valve is normal. No indication of pulmonic valve   regurgitation.  Aorta: The aortic root and ascending aorta are structurally normal, with   no evidence of dilitation.  Pulmonary Artery: The main pulmonary artery is normal in size.       2D AND M-MODE MEASUREMENTS (normal ranges within parentheses):  Left                  Normal   Aorta/Left             Normal  Ventricle:                     Atrium:  IVSd (2D):  1.20 cm  (0.7-1.1) AoV Cusp       1.98  (1.5-2.6)  LVPWd (2D): 1.20 cm  (0.7-1.1) Separation:cm  LVIDd (2D): 4.23 cm  (3.4-5.7) Left Atrium    3.40  (1.9-4.0)  LVIDs (2D): 2.81 cm            (Mmode):        cm  LV FS (2D):  33.6 %   (>25%)   LA Volume      31.0  Relative      0.57    (<0.42)  Index         ml/m²  Wall  Right  Thickness                      Ventricle:                                 RVd (2D):      2.57 cm    SPECTRAL DOPPLER ANALYSIS:  LV DIASTOLIC FUNCTION:  MV Peak E: 0.94 m/s Decel Time: 205 msec  MV Peak A: 0.98 m/s  E/A Ratio: 0.96    Aortic Valve:  AoV VMax:    1.31 m/s AoV Area, Vmax: 3.17 cm² Vmax Indx: 1.44 cm²/m²  AoV Pk Grad: 6.8 mmHg    LVOT Vmax: 1.01 m/s  LVOT VTI:  0.22 m  LVOT Diam: 2.29 cm    Mitral Valve:  MV P1/2 Time: 59.53 msec  MV Area, PHT: 3.70 cm²       [id number] Flex Greenberg M.D., Electronically signed on 10/3/2019 at 11:10:18   AM              *** Final ***              LABS:                        11.1   8.64  )-----------( 101      ( 04 Oct 2019 04:26 )             34.6     10-04    142  |  106  |  15  ----------------------------<  107<H>  3.7   |  26  |  1.4    Ca    8.4<L>      04 Oct 2019 04:26    TPro  5.7<L>  /  Alb  3.7  /  TBili  0.4  /  DBili  x   /  AST  48<H>  /  ALT  23  /  AlkPhos  53  10-04    CARDIAC MARKERS ( 04 Oct 2019 04:26 )  x     / 2.07 ng/mL / x     / x     / x      CARDIAC MARKERS ( 03 Oct 2019 05:05 )  x     / 3.19 ng/mL / x     / x     / x      CARDIAC MARKERS ( 02 Oct 2019 15:50 )  x     / 0.06 ng/mL / x     / x     / x          LIVER FUNCTIONS - ( 04 Oct 2019 04:26 )  Alb: 3.7 g/dL / Pro: 5.7 g/dL / ALK PHOS: 53 U/L / ALT: 23 U/L / AST: 48 U/L / GGT: x             S/P PCI dRCA/POBA to RPL/STEMI  	     Continue DAPT(asa 81mg daily, effient 10 mg daily,  B-Blocker, Statin Therapy, ACE                   DVT/ GI prophylaxis                   effient coverage confirmed, $11 copay, pt aware and agreeable                   f/u urology recommendations regarding hematuria/ hesitancy, f/u UA                   encourage po fluids                    oob-ch, ambulate                    CE trending down, H/H stable                    keep K>4, Mg >2                   Pt given instructions on importance of taking antiplatelet medication or risk acute stent thrombosis/death                   Post cath instructions, access site care and activity restrictions reviewed with patient                     Discussed with patient to return to hospital if experience chest pain, shortness breath, dizziness and site bleeding                   Aggressive risk factor modication, diet counseling, smoking cessation discussed with patient                   f/u with Dr. Murillo in 1-2 weeks after discharge Cardiology Follow up    ANDREE BUSH   68y Male  PAST MEDICAL & SURGICAL HISTORY:  Chronic kidney disease (CKD): III  Type 2 diabetes mellitus without complication, unspecified long term insulin use status  Hypertension, unspecified type  Dyspnea, unspecified type  ASHD (arteriosclerotic heart disease): STEMI 12/31/17, PCI RCA  S/P cataract surgery  History of ligation of vein: 2012  History of tonsillectomy: 1957       HPI:  68 year old gentleman known  to have CAD s/p PCIx3, HTN, DM II, CKD III presents to ED for sudden onset of chest pain that began while at rest  3 hours prior to presentation. Pain is midsternal and crushing, associated with diaphoresis. Patient still had the pain on presentation although slightly better. Patient  is known to have CAD. He had history of STEMI s/p  s/p PCI of distal RCA, mid/distal LAD ( December 2017 and February 2018). In ED, STEMI code was called. ECGs showed no significant ST elevation. Patient was transferred to cath lab for persistent chest pain. He was found to have 100% occlusion of distal RCA s/p PCI with REUBEN to distal RCA ,and POBA to RPL. (02 Oct 2019 16:46)    Allergies    penicillin (Rash (Severe))    Intolerances      Patient reports urinary urgency/hesitation this am with hematuria. Pt ambulated without issues/symptoms  Denies CP, SOB, palpitations, or dizziness  NSVT on telemetry overnight    Vital Signs Last 24 Hrs  T(C): 37.6 (04 Oct 2019 08:00), Max: 37.6 (04 Oct 2019 08:00)  T(F): 99.7 (04 Oct 2019 08:00), Max: 99.7 (04 Oct 2019 08:00)  HR: 78 (04 Oct 2019 08:00) (60 - 88)  BP: 101/56 (04 Oct 2019 08:00) (96/57 - 142/64)  BP(mean): 68 (04 Oct 2019 08:00) (61 - 93)  RR: 19 (04 Oct 2019 08:00) (16 - 26)  SpO2: 98% (04 Oct 2019 08:00) (95% - 99%)    MEDICATIONS  (STANDING):  aspirin  chewable 81 milliGRAM(s) Oral daily  atorvastatin 80 milliGRAM(s) Oral at bedtime  chlorhexidine 4% Liquid 1 Application(s) Topical two times a day  dextrose 5%. 1000 milliLiter(s) (50 mL/Hr) IV Continuous <Continuous>  dextrose 50% Injectable 12.5 Gram(s) IV Push once  dextrose 50% Injectable 25 Gram(s) IV Push once  dextrose 50% Injectable 25 Gram(s) IV Push once  docusate sodium 200 milliGRAM(s) Oral at bedtime  enalapril 2.5 milliGRAM(s) Oral daily  insulin glargine Injectable (LANTUS) 8 Unit(s) SubCutaneous at bedtime  insulin lispro (HumaLOG) corrective regimen sliding scale   SubCutaneous three times a day before meals  insulin lispro Injectable (HumaLOG) 5 Unit(s) SubCutaneous three times a day before meals  levothyroxine 75 MICROGram(s) Oral daily  metoprolol succinate ER 25 milliGRAM(s) Oral daily  prasugrel 10 milliGRAM(s) Oral daily  tamsulosin 0.8 milliGRAM(s) Oral at bedtime    MEDICATIONS  (PRN):  dextrose 40% Gel 15 Gram(s) Oral once PRN Blood Glucose LESS THAN 70 milliGRAM(s)/deciliter  glucagon  Injectable 1 milliGRAM(s) IntraMuscular once PRN Glucose LESS THAN 70 milligrams/deciliter      REVIEW OF SYSTEMS:          CONSTITUTIONAL: No weakness, fevers or chills          EYES/ENT: No visual changes;  No vertigo or throat pain           NECK: No pain or stiffness          RESPIRATORY: No cough, wheezing, hemoptysis          CARDIOVASCULAR: no pain, no CARNEY, no palpitations           GASTROINTESTINAL: No abdominal or epigastric pain. No nausea, vomiting, or hematemesis;           GENITOURINARY: + dysuria, frequency , +hematuria          NEUROLOGICAL: No numbness or weakness          SKIN: No itching, rashes    PHYSICAL EXAM:           CONSTITUTIONAL: Well-developed; well-nourished; in no acute distress  	SKIN: warm, dry  	HEAD: Normocephalic; atraumatic  	EYES: PERRL.  	ENT: No nasal discharge, airway clear, mucous membranes moist  	NECK: Supple; non tender.  	CARD: +S1, +S2, no murmurs, gallops, or rubs. (Regular) rate and rhythm    	RESP: No wheezes, rales or rhonchi. CTA B/L  	ABD: soft ntnd, + BS x 4 quadrants  	EXT: moves all extremities,  no clubbing, cyanosis or edema  	NEURO: Alert and oriented x3, no focal deficits          PSYCH: Cooperative, appropriate          VASCULAR:  + Rad / + PTs / + DPs          EXTREMITY:  	   Right Radial: Dressing removed, access site soft, no hematoma, no pain, no numbness, no signs and symptoms of infection             ECG: P                                                                                                                2D ECHO:   EXAM:  2-D ECHO (TTE) COMPLETE        PROCEDURE DATE:  10/03/2019      INTERPRETATION:  REPORT:    TRANSTHORACIC ECHOCARDIOGRAM REPORT         Patient Name:   ANDREE BUSH Accession #: [id number]  Medical Rec #:  [id number]      Height:      68.0 in 172.7 cm  YOB: 1950      Weight:      238.0 lb 107.95 kg  Patient Age:    68 years       BSA:         2.20 m²  Patient Gender: M              BP:          142/72 mmHg       Date of Exam:        10/3/2019 7:17:45 AM  Referring Physician: [id number] ED UNASSIGNED  Sonographer:         Santosh Ochoa  Reading Physician:   Flex Greenberg M.D.    Procedure:     2D Echo/Doppler/Color Doppler Complete.  Indications:   R07.9 - Chest Pain, unspecified  Diagnosis:     Chest pain, unspecified- R07.9  Study Details: Technically fair study. Study quality was adversely   affected due                 to body habitus.         Summary:   1. Left ventricular ejection fraction, by visual estimation, is 55 to   60%.   2. Basal and mid inferolateral wall and mid inferior segment are   abnormal as described above.   3. Spectral Doppler shows impaired relaxation pattern of left   ventricular myocardial filling (Grade I diastolic dysfunction).    PHYSICIAN INTERPRETATION:  Left Ventricle: Normal left ventricular size and wall thicknesses, with   normal systolic and diastolic function. Left ventricular ejection   fraction, by visual estimation, is 55 to 60%. Spectral Doppler shows   impaired relaxation pattern of left ventricular myocardial filling (Grade   I diastolic dysfunction).       LV Wall Scoring:  The basal and mid inferolateral wall and mid inferior segment are   hypokinetic.  All remaining scored segments are normal.    Right Ventricle: Normal right ventricular size and function.  Left Atrium: Normal left atrial size.  Right Atrium: Normal right atrial size.  Pericardium: There is no evidence of pericardial effusion.  Mitral Valve: Structurally normal mitral valve, with normal leaflet   excursion. The mitral valve is normal in structure. No evidence of mitral   valve regurgitation is seen.  Tricuspid Valve: Structurally normal tricuspid valve, with normal leaflet   excursion. The tricuspid valve is normal in structure. No tricuspid   regurgitation is visualized.  Aortic Valve: Normal trileaflet aortic valve with normal opening. The   aortic valve is normal. No evidence of aortic valve regurgitation is seen.  Pulmonic Valve: Structurally normal pulmonic valve, with normal leaflet   excursion. The pulmonic valve is normal. No indication of pulmonic valve   regurgitation.  Aorta: The aortic root and ascending aorta are structurally normal, with   no evidence of dilitation.  Pulmonary Artery: The main pulmonary artery is normal in size.       2D AND M-MODE MEASUREMENTS (normal ranges within parentheses):  Left                  Normal   Aorta/Left             Normal  Ventricle:                     Atrium:  IVSd (2D):  1.20 cm  (0.7-1.1) AoV Cusp       1.98  (1.5-2.6)  LVPWd (2D): 1.20 cm  (0.7-1.1) Separation:cm  LVIDd (2D): 4.23 cm  (3.4-5.7) Left Atrium    3.40  (1.9-4.0)  LVIDs (2D): 2.81 cm            (Mmode):        cm  LV FS (2D):  33.6 %   (>25%)   LA Volume      31.0  Relative      0.57    (<0.42)  Index         ml/m²  Wall  Right  Thickness                      Ventricle:                                 RVd (2D):      2.57 cm    SPECTRAL DOPPLER ANALYSIS:  LV DIASTOLIC FUNCTION:  MV Peak E: 0.94 m/s Decel Time: 205 msec  MV Peak A: 0.98 m/s  E/A Ratio: 0.96    Aortic Valve:  AoV VMax:    1.31 m/s AoV Area, Vmax: 3.17 cm² Vmax Indx: 1.44 cm²/m²  AoV Pk Grad: 6.8 mmHg    LVOT Vmax: 1.01 m/s  LVOT VTI:  0.22 m  LVOT Diam: 2.29 cm    Mitral Valve:  MV P1/2 Time: 59.53 msec  MV Area, PHT: 3.70 cm²       [id number] Flex Gerenberg M.D., Electronically signed on 10/3/2019 at 11:10:18   AM              *** Final ***              LABS:                        11.1   8.64  )-----------( 101      ( 04 Oct 2019 04:26 )             34.6     10-04    142  |  106  |  15  ----------------------------<  107<H>  3.7   |  26  |  1.4    Ca    8.4<L>      04 Oct 2019 04:26    TPro  5.7<L>  /  Alb  3.7  /  TBili  0.4  /  DBili  x   /  AST  48<H>  /  ALT  23  /  AlkPhos  53  10-04    CARDIAC MARKERS ( 04 Oct 2019 04:26 )  x     / 2.07 ng/mL / x     / x     / x      CARDIAC MARKERS ( 03 Oct 2019 05:05 )  x     / 3.19 ng/mL / x     / x     / x      CARDIAC MARKERS ( 02 Oct 2019 15:50 )  x     / 0.06 ng/mL / x     / x     / x          LIVER FUNCTIONS - ( 04 Oct 2019 04:26 )  Alb: 3.7 g/dL / Pro: 5.7 g/dL / ALK PHOS: 53 U/L / ALT: 23 U/L / AST: 48 U/L / GGT: x             S/P PCI dRCA/POBA to RPL/STEMI  	     Continue DAPT(asa 81mg daily, effient 10 mg daily,  B-Blocker, Statin Therapy, ACE                   DVT/ GI prophylaxis                   effient coverage confirmed, $11 copay, pt aware and agreeable                   f/u urology recommendations regarding hematuria/ hesitancy, f/u UA                   encourage po fluids                    oob-ch, ambulate                    CE trending down, H/H stable                    keep K>4, Mg >2                   Pt given instructions on importance of taking antiplatelet medication or risk acute stent thrombosis/death                   Post cath instructions, access site care and activity restrictions reviewed with patient                     Discussed with patient to return to hospital if experience chest pain, shortness breath, dizziness and site bleeding                   Aggressive risk factor modication, diet counseling, smoking cessation discussed with patient                   f/u with Dr. Murillo in 1-2 weeks after discharge           agree

## 2019-10-04 NOTE — PROGRESS NOTE ADULT - ASSESSMENT
68 year old male with history of CAD s/p PCIx3, HTN, DM II, CKD III presents to ED for sudden onset of chest pain that began while at rest  3 hours prior to presentation. Pain is midsternal and crushing, associated with diaphoresis. Patient still had the pain on presentation although slightly better. Patient  is known to have CAD. He had history of STEMI s/p  s/p PCI of distal RCA, mid/distal LAD ( December 2017 and February 2018). In ED, STEMI code was called. ECGs showed no significant ST elevation. Patient was transferred to cath lab for persistent chest pain. He was found to have 100% occlusion of distal RCA s/p PCI with REUBEN to distal RCA ,and POBA to RPL.    #Unstable Angina  - cath (10/2):  100% occlusion of distal RCA s/p PCI with REUBEN to distal RCA ,and POBA to RPL.  - finished integrillin drip  - continue with beta-blocker, statin, aspirin, effient   - echo (10/3): EF 55-60%, grade 1 diastolic dysfunction    #BPH  - on flomax    #hematuria  - likely from traumatic straight cath yesterday, will sono bladder/kidneys if dysuria  - monitor CBC, hgb stable  - urology following    Attending Attestation  Pt seen and examined and case d/w CCU Team and Pt at bedside.  Unstable Angina s/p REUBEN stent to Distal RCA  - c/w care per orders     Hematuria   - will watch and f/u  eval     Dispo: likely d/c today 68 year old male with history of CAD s/p PCIx3, HTN, DM II, CKD III presents to ED for sudden onset of chest pain that began while at rest  3 hours prior to presentation. Pain is midsternal and crushing, associated with diaphoresis. Patient still had the pain on presentation although slightly better. Patient  is known to have CAD. He had history of STEMI s/p  s/p PCI of distal RCA, mid/distal LAD ( December 2017 and February 2018). In ED, STEMI code was called. ECGs showed no significant ST elevation. Patient was transferred to cath lab for persistent chest pain. He was found to have 100% occlusion of distal RCA s/p PCI with REUBEN to distal RCA ,and POBA to RPL.    #Unstable Angina  - cath (10/2):  100% occlusion of distal RCA s/p PCI with ERUBEN to distal RCA ,and POBA to RPL.  - finished integrillin drip  - continue with beta-blocker, statin, aspirin, effient   - echo (10/3): EF 55-60%, grade 1 diastolic dysfunction    #BPH  - on flomax    #hematuria  - likely from traumatic straight cath yesterday, will sono bladder/kidneys if dysuria--> patient declined ultrasound, will send u/a and culture if u/a positive  - monitor CBC, hgb stable  - urology following    Attending Attestation  Pt seen and examined and case d/w CCU Team and Pt at bedside.  Unstable Angina s/p REUBEN stent to Distal RCA  - c/w care per orders     Hematuria   - will watch and f/u  eval     Dispo: likely d/c today 68 year old male with history of CAD s/p PCIx3, HTN, DM II, CKD III presents to ED for sudden onset of chest pain that began while at rest  3 hours prior to presentation. Pain is midsternal and crushing, associated with diaphoresis. Patient still had the pain on presentation although slightly better. Patient  is known to have CAD. He had history of STEMI s/p  s/p PCI of distal RCA, mid/distal LAD ( December 2017 and February 2018). In ED, STEMI code was called. ECGs showed no significant ST elevation. Patient was transferred to cath lab for persistent chest pain. He was found to have 100% occlusion of distal RCA s/p PCI with REUBEN to distal RCA ,and POBA to RPL.    #Unstable Angina  - cath (10/2):  100% occlusion of distal RCA s/p PCI with REUBEN to distal RCA ,and POBA to RPL.  - finished integrillin drip  - continue with beta-blocker, statin, aspirin, effient   - echo (10/3): EF 55-60%, grade 1 diastolic dysfunction    #BPH  - on flomax    #hematuria  - likely from traumatic straight cath yesterday, will sono bladder/kidneys if dysuria--> patient declined ultrasound, will send u/a and culture if u/a positive  - monitor CBC, hgb stable  - urology following    Attending Attestation  Pt seen and examined and case d/w CCU Team and Pt at bedside.  Unstable Angina s/p REUBEN stent to Distal RCA  - c/w care per orders     Hematuria   - will watch and f/u  eval     Dispo: discharge tomorrow 68 year old male with history of CAD s/p PCIx3, HTN, DM II, CKD III presents to ED for sudden onset of chest pain that began while at rest  3 hours prior to presentation. Pain is midsternal and crushing, associated with diaphoresis. Patient still had the pain on presentation although slightly better. Patient  is known to have CAD. He had history of STEMI s/p  s/p PCI of distal RCA, mid/distal LAD ( December 2017 and February 2018). In ED, STEMI code was called. ECGs showed no significant ST elevation. Patient was transferred to cath lab for persistent chest pain. He was found to have 100% occlusion of distal RCA s/p PCI with REUBEN to distal RCA ,and POBA to RPL.    #Unstable Angina  - cath (10/2):  100% occlusion of distal RCA s/p PCI with REUBEN to distal RCA ,and POBA to RPL.  - finished integrillin drip  - continue with beta-blocker, statin, aspirin, effient   - echo (10/3): EF 55-60%, grade 1 diastolic dysfunction  - patient declined metformin on discharge as per his endocrinologist's rec due to susu    #BPH  - on flomax    #hematuria  - likely from traumatic straight cath yesterday, will sono bladder/kidneys if dysuria--> patient declined ultrasound, will send u/a and culture if u/a positive  - monitor CBC, hgb stable  - urology following    Attending Attestation  Pt seen and examined and case d/w CCU Team and Pt at bedside.  Unstable Angina s/p REUBEN stent to Distal RCA  - c/w care per orders     Hematuria   - will watch and f/u  eval     Dispo: discharge today

## 2019-10-04 NOTE — DISCHARGE NOTE NURSING/CASE MANAGEMENT/SOCIAL WORK - NSDCPEEMAIL_GEN_ALL_CORE
[facility] for Tobacco Control email [email or web address]

## 2019-10-04 NOTE — DISCHARGE NOTE NURSING/CASE MANAGEMENT/SOCIAL WORK - NSDCPEWEB_GEN_ALL_CORE
[place] website --- [email or web address] Tobacco [email or web address] website --- [email or web address]

## 2019-10-04 NOTE — DISCHARGE NOTE NURSING/CASE MANAGEMENT/SOCIAL WORK - PATIENT PORTAL LINK FT
You can access the FollowMyHealth Patient Portal offered by [facility] by registering at the following website: [email or web address]. By joining [facility]’s FollowMyHealth portal, you will also be able to view your health information using other applications (apps) compatible with our system.

## 2019-10-04 NOTE — PROGRESS NOTE ADULT - SUBJECTIVE AND OBJECTIVE BOX
PATIENT:  ANDREE BUSH  [id number]    CHIEF COMPLAINT:  Patient is a 68y old  Male who presents with a chief complaint of Chest pain (03 Oct 2019 15:51)      INTERVAL HISTORYOVERNIGHT EVENTS:  Hematuria improving this morning. No chest pain or SOB. Able to ambulate comfortably.        MEDICATIONS:  MEDICATIONS  (STANDING):  aspirin  chewable 81 milliGRAM(s) Oral daily  atorvastatin 80 milliGRAM(s) Oral at bedtime  dextrose 5%. 1000 milliLiter(s) (50 mL/Hr) IV Continuous <Continuous>  dextrose 50% Injectable 12.5 Gram(s) IV Push once  dextrose 50% Injectable 25 Gram(s) IV Push once  dextrose 50% Injectable 25 Gram(s) IV Push once  docusate sodium 200 milliGRAM(s) Oral at bedtime  enalapril 2.5 milliGRAM(s) Oral daily  insulin glargine Injectable (LANTUS) 8 Unit(s) SubCutaneous at bedtime  insulin lispro (HumaLOG) corrective regimen sliding scale   SubCutaneous three times a day before meals  insulin lispro Injectable (HumaLOG) 5 Unit(s) SubCutaneous three times a day before meals  levothyroxine 75 MICROGram(s) Oral daily  metoprolol succinate ER 25 milliGRAM(s) Oral daily  prasugrel 10 milliGRAM(s) Oral daily  tamsulosin 0.8 milliGRAM(s) Oral at bedtime    MEDICATIONS  (PRN):  dextrose 40% Gel 15 Gram(s) Oral once PRN Blood Glucose LESS THAN 70 milliGRAM(s)/deciliter  glucagon  Injectable 1 milliGRAM(s) IntraMuscular once PRN Glucose LESS THAN 70 milligrams/deciliter      ALLERGIES:  Allergies    penicillin (Rash (Severe))    Intolerances        OBJECTIVE:  ICU Vital Signs Last 24 Hrs  T(C): 36.7 (04 Oct 2019 04:00), Max: 36.7 (03 Oct 2019 20:00)  T(F): 98 (04 Oct 2019 04:00), Max: 98 (03 Oct 2019 20:00)  HR: 88 (04 Oct 2019 06:00) (60 - 88)  BP: 140/50 (04 Oct 2019 06:00) (96/57 - 150/63)  BP(mean): 75 (04 Oct 2019 06:00) (61 - 93)  ABP: --  ABP(mean): --  RR: 20 (04 Oct 2019 06:00) (16 - 29)  SpO2: 96% (04 Oct 2019 01:00) (95% - 99%)      Adult Advanced Hemodynamics Last 24 Hrs  CVP(mm Hg): --  CVP(cm H2O): --  CO: --  CI: --  PA: --  PA(mean): --  PCWP: --  SVR: --  SVRI: --  PVR: --  PVRI: --  CAPILLARY BLOOD GLUCOSE      POCT Blood Glucose.: 133 mg/dL (03 Oct 2019 21:08)  POCT Blood Glucose.: 174 mg/dL (03 Oct 2019 16:17)  POCT Blood Glucose.: 168 mg/dL (03 Oct 2019 12:27)  POCT Blood Glucose.: 126 mg/dL (03 Oct 2019 08:09)    CAPILLARY BLOOD GLUCOSE      POCT Blood Glucose.: 133 mg/dL (03 Oct 2019 21:08)    I&O's Summary    03 Oct 2019 07:01  -  04 Oct 2019 07:00  --------------------------------------------------------  IN: 720 mL / OUT: 3150 mL / NET: -2430 mL      Daily Height in cm: 172.72 (03 Oct 2019 10:28)    Daily Weight in k.9 (04 Oct 2019 06:00)    PHYSICAL EXAMINATION:  General: NAD  HEENT: PERRLA, EOMI, moist mucous membranes  Neurology: A&Ox3, nonfocal, TIJERINA x 4  Respiratory: CTA B/L  CV: RRR, S1S2, no murmurs, rubs or gallops  Abdominal: Soft, ND  Extremities: No edema, + peripheral pulses  Incisions:   Tubes:    LABS:                          11.1   8.64  )-----------( 101      ( 04 Oct 2019 04:26 )             34.6     10-04    142  |  106  |  15  ----------------------------<  107<H>  3.7   |  26  |  1.4    Ca    8.4<L>      04 Oct 2019 04:26    TPro  5.7<L>  /  Alb  3.7  /  TBili  0.4  /  DBili  x   /  AST  48<H>  /  ALT  23  /  AlkPhos  53  10-04    LIVER FUNCTIONS - ( 04 Oct 2019 04:26 )  Alb: 3.7 g/dL / Pro: 5.7 g/dL / ALK PHOS: 53 U/L / ALT: 23 U/L / AST: 48 U/L / GGT: x           PT/INR - ( 02 Oct 2019 15:50 )   PT: 12.80 sec;   INR: 1.11 ratio         PTT - ( 02 Oct 2019 15:50 )  PTT:34.6 sec  Troponin T, Serum: 2.07 ng/mL (10-04 @ 04:26)    CARDIAC MARKERS ( 04 Oct 2019 04:26 )  x     / 2.07 ng/mL / x     / x     / x      CARDIAC MARKERS ( 03 Oct 2019 05:05 )  x     / 3.19 ng/mL / x     / x     / x      CARDIAC MARKERS ( 02 Oct 2019 15:50 )  x     / 0.06 ng/mL / x     / x     / x              TELEMETRY:     EKG:     IMAGING: PATIENT:  ANRDEE BUSH  [id number]    CHIEF COMPLAINT:  Patient is a 68y old  Male who presents with a chief complaint of Chest pain (03 Oct 2019 15:51)      INTERVAL HISTORYOVERNIGHT EVENTS:  Continues to have hematuria and dysuria. No chest pain or SOB. Able to ambulate comfortably.        MEDICATIONS:  MEDICATIONS  (STANDING):  aspirin  chewable 81 milliGRAM(s) Oral daily  atorvastatin 80 milliGRAM(s) Oral at bedtime  dextrose 5%. 1000 milliLiter(s) (50 mL/Hr) IV Continuous <Continuous>  dextrose 50% Injectable 12.5 Gram(s) IV Push once  dextrose 50% Injectable 25 Gram(s) IV Push once  dextrose 50% Injectable 25 Gram(s) IV Push once  docusate sodium 200 milliGRAM(s) Oral at bedtime  enalapril 2.5 milliGRAM(s) Oral daily  insulin glargine Injectable (LANTUS) 8 Unit(s) SubCutaneous at bedtime  insulin lispro (HumaLOG) corrective regimen sliding scale   SubCutaneous three times a day before meals  insulin lispro Injectable (HumaLOG) 5 Unit(s) SubCutaneous three times a day before meals  levothyroxine 75 MICROGram(s) Oral daily  metoprolol succinate ER 25 milliGRAM(s) Oral daily  prasugrel 10 milliGRAM(s) Oral daily  tamsulosin 0.8 milliGRAM(s) Oral at bedtime    MEDICATIONS  (PRN):  dextrose 40% Gel 15 Gram(s) Oral once PRN Blood Glucose LESS THAN 70 milliGRAM(s)/deciliter  glucagon  Injectable 1 milliGRAM(s) IntraMuscular once PRN Glucose LESS THAN 70 milligrams/deciliter      ALLERGIES:  Allergies    penicillin (Rash (Severe))    Intolerances        OBJECTIVE:  ICU Vital Signs Last 24 Hrs  T(C): 36.7 (04 Oct 2019 04:00), Max: 36.7 (03 Oct 2019 20:00)  T(F): 98 (04 Oct 2019 04:00), Max: 98 (03 Oct 2019 20:00)  HR: 88 (04 Oct 2019 06:00) (60 - 88)  BP: 140/50 (04 Oct 2019 06:00) (96/57 - 150/63)  BP(mean): 75 (04 Oct 2019 06:00) (61 - 93)  ABP: --  ABP(mean): --  RR: 20 (04 Oct 2019 06:00) (16 - 29)  SpO2: 96% (04 Oct 2019 01:00) (95% - 99%)      Adult Advanced Hemodynamics Last 24 Hrs  CVP(mm Hg): --  CVP(cm H2O): --  CO: --  CI: --  PA: --  PA(mean): --  PCWP: --  SVR: --  SVRI: --  PVR: --  PVRI: --  CAPILLARY BLOOD GLUCOSE      POCT Blood Glucose.: 133 mg/dL (03 Oct 2019 21:08)  POCT Blood Glucose.: 174 mg/dL (03 Oct 2019 16:17)  POCT Blood Glucose.: 168 mg/dL (03 Oct 2019 12:27)  POCT Blood Glucose.: 126 mg/dL (03 Oct 2019 08:09)    CAPILLARY BLOOD GLUCOSE      POCT Blood Glucose.: 133 mg/dL (03 Oct 2019 21:08)    I&O's Summary    03 Oct 2019 07:01  -  04 Oct 2019 07:00  --------------------------------------------------------  IN: 720 mL / OUT: 3150 mL / NET: -2430 mL      Daily Height in cm: 172.72 (03 Oct 2019 10:28)    Daily Weight in k.9 (04 Oct 2019 06:00)    PHYSICAL EXAMINATION:  General: NAD  HEENT: PERRLA, EOMI, moist mucous membranes  Neurology: A&Ox3, nonfocal, TIJERINA x 4  Respiratory: CTA B/L  CV: RRR, S1S2, no murmurs, rubs or gallops  Abdominal: Soft, ND  Extremities: No edema, + peripheral pulses  Incisions:   Tubes:    LABS:                          11.1   8.64  )-----------( 101      ( 04 Oct 2019 04:26 )             34.6     10-04    142  |  106  |  15  ----------------------------<  107<H>  3.7   |  26  |  1.4    Ca    8.4<L>      04 Oct 2019 04:26    TPro  5.7<L>  /  Alb  3.7  /  TBili  0.4  /  DBili  x   /  AST  48<H>  /  ALT  23  /  AlkPhos  53  10-04    LIVER FUNCTIONS - ( 04 Oct 2019 04:26 )  Alb: 3.7 g/dL / Pro: 5.7 g/dL / ALK PHOS: 53 U/L / ALT: 23 U/L / AST: 48 U/L / GGT: x           PT/INR - ( 02 Oct 2019 15:50 )   PT: 12.80 sec;   INR: 1.11 ratio         PTT - ( 02 Oct 2019 15:50 )  PTT:34.6 sec  Troponin T, Serum: 2.07 ng/mL (10-04 @ 04:26)    CARDIAC MARKERS ( 04 Oct 2019 04:26 )  x     / 2.07 ng/mL / x     / x     / x      CARDIAC MARKERS ( 03 Oct 2019 05:05 )  x     / 3.19 ng/mL / x     / x     / x      CARDIAC MARKERS ( 02 Oct 2019 15:50 )  x     / 0.06 ng/mL / x     / x     / x              TELEMETRY:     EKG:     IMAGING:

## 2021-02-04 ENCOUNTER — INPATIENT (INPATIENT)
Facility: HOSPITAL | Age: 71
LOS: 37 days | Discharge: NURSING, REHAB OR LONG-TERM CARE | End: 2021-03-14
Attending: INTERNAL MEDICINE | Admitting: INTERNAL MEDICINE
Payer: MEDICARE

## 2021-02-04 VITALS — HEIGHT: 68 IN

## 2021-02-04 DIAGNOSIS — Z98.890 OTHER SPECIFIED POSTPROCEDURAL STATES: Chronic | ICD-10-CM

## 2021-02-04 DIAGNOSIS — Z98.49 CATARACT EXTRACTION STATUS, UNSPECIFIED EYE: Chronic | ICD-10-CM

## 2021-02-04 PROBLEM — N18.9 CHRONIC KIDNEY DISEASE, UNSPECIFIED: Chronic | Status: ACTIVE | Noted: 2019-10-02

## 2021-02-04 LAB
ALBUMIN SERPL ELPH-MCNC: 3.7 G/DL — SIGNIFICANT CHANGE UP (ref 3.5–5.2)
ALP SERPL-CCNC: 91 U/L — SIGNIFICANT CHANGE UP (ref 30–115)
ALT FLD-CCNC: 27 U/L — SIGNIFICANT CHANGE UP (ref 0–41)
ANION GAP SERPL CALC-SCNC: 20 MMOL/L — HIGH (ref 7–14)
APTT BLD: 33.5 SEC — SIGNIFICANT CHANGE UP (ref 27–39.2)
AST SERPL-CCNC: 27 U/L — SIGNIFICANT CHANGE UP (ref 0–41)
BASOPHILS # BLD AUTO: 0 K/UL — SIGNIFICANT CHANGE UP (ref 0–0.2)
BASOPHILS NFR BLD AUTO: 0 % — SIGNIFICANT CHANGE UP (ref 0–1)
BILIRUB SERPL-MCNC: 0.7 MG/DL — SIGNIFICANT CHANGE UP (ref 0.2–1.2)
BUN SERPL-MCNC: 23 MG/DL — HIGH (ref 10–20)
CALCIUM SERPL-MCNC: 8.6 MG/DL — SIGNIFICANT CHANGE UP (ref 8.5–10.1)
CHLORIDE SERPL-SCNC: 93 MMOL/L — LOW (ref 98–110)
CO2 SERPL-SCNC: 19 MMOL/L — SIGNIFICANT CHANGE UP (ref 17–32)
CREAT SERPL-MCNC: 1.8 MG/DL — HIGH (ref 0.7–1.5)
D DIMER BLD IA.RAPID-MCNC: 329 NG/ML DDU — HIGH (ref 0–230)
EOSINOPHIL NFR BLD AUTO: 0 % — SIGNIFICANT CHANGE UP (ref 0–8)
GLUCOSE BLDC GLUCOMTR-MCNC: 403 MG/DL — HIGH (ref 70–99)
GLUCOSE BLDC GLUCOMTR-MCNC: 430 MG/DL — HIGH (ref 70–99)
GLUCOSE BLDC GLUCOMTR-MCNC: 464 MG/DL — CRITICAL HIGH (ref 70–99)
GLUCOSE SERPL-MCNC: 260 MG/DL — HIGH (ref 70–99)
HCT VFR BLD CALC: 38.3 % — LOW (ref 42–52)
HGB BLD-MCNC: 12.6 G/DL — LOW (ref 14–18)
INR BLD: 1.41 RATIO — HIGH (ref 0.65–1.3)
LYMPHOCYTES # BLD AUTO: 0.43 K/UL — LOW (ref 1.2–3.4)
LYMPHOCYTES # BLD AUTO: 3 % — LOW (ref 20.5–51.1)
MCHC RBC-ENTMCNC: 25.5 PG — LOW (ref 27–31)
MCHC RBC-ENTMCNC: 32.9 G/DL — SIGNIFICANT CHANGE UP (ref 32–37)
MCV RBC AUTO: 77.5 FL — LOW (ref 80–94)
MONOCYTES # BLD AUTO: 0.43 K/UL — SIGNIFICANT CHANGE UP (ref 0.1–0.6)
MONOCYTES NFR BLD AUTO: 3 % — SIGNIFICANT CHANGE UP (ref 1.7–9.3)
NEUTROPHILS # BLD AUTO: 13.35 K/UL — HIGH (ref 1.4–6.5)
NEUTROPHILS NFR BLD AUTO: 90 % — HIGH (ref 42.2–75.2)
NEUTS BAND # BLD: 4 % — SIGNIFICANT CHANGE UP (ref 0–6)
NRBC # BLD: 0 /100 — SIGNIFICANT CHANGE UP (ref 0–0)
NRBC # BLD: SIGNIFICANT CHANGE UP /100 WBCS (ref 0–0)
NT-PROBNP SERPL-SCNC: 307 PG/ML — HIGH (ref 0–300)
PLAT MORPH BLD: NORMAL — SIGNIFICANT CHANGE UP
PLATELET # BLD AUTO: 135 K/UL — SIGNIFICANT CHANGE UP (ref 130–400)
POTASSIUM SERPL-MCNC: 3.6 MMOL/L — SIGNIFICANT CHANGE UP (ref 3.5–5)
POTASSIUM SERPL-SCNC: 3.6 MMOL/L — SIGNIFICANT CHANGE UP (ref 3.5–5)
PROT SERPL-MCNC: 6.4 G/DL — SIGNIFICANT CHANGE UP (ref 6–8)
PROTHROM AB SERPL-ACNC: 16.2 SEC — HIGH (ref 9.95–12.87)
RAPID RVP RESULT: DETECTED
RBC # BLD: 4.94 M/UL — SIGNIFICANT CHANGE UP (ref 4.7–6.1)
RBC # FLD: 14.9 % — HIGH (ref 11.5–14.5)
RBC BLD AUTO: NORMAL — SIGNIFICANT CHANGE UP
SARS-COV-2 RNA SPEC QL NAA+PROBE: DETECTED
SODIUM SERPL-SCNC: 132 MMOL/L — LOW (ref 135–146)
TROPONIN T SERPL-MCNC: 0.02 NG/ML — HIGH
WBC # BLD: 14.2 K/UL — HIGH (ref 4.8–10.8)
WBC # FLD AUTO: 14.2 K/UL — HIGH (ref 4.8–10.8)

## 2021-02-04 PROCEDURE — 71275 CT ANGIOGRAPHY CHEST: CPT | Mod: 26

## 2021-02-04 PROCEDURE — 71045 X-RAY EXAM CHEST 1 VIEW: CPT | Mod: 26

## 2021-02-04 PROCEDURE — 99223 1ST HOSP IP/OBS HIGH 75: CPT | Mod: CS

## 2021-02-04 PROCEDURE — 99285 EMERGENCY DEPT VISIT HI MDM: CPT | Mod: CS,GC

## 2021-02-04 RX ORDER — SODIUM CHLORIDE 9 MG/ML
1000 INJECTION, SOLUTION INTRAVENOUS
Refills: 0 | Status: DISCONTINUED | OUTPATIENT
Start: 2021-02-04 | End: 2021-03-14

## 2021-02-04 RX ORDER — DEXAMETHASONE 0.5 MG/5ML
6 ELIXIR ORAL ONCE
Refills: 0 | Status: COMPLETED | OUTPATIENT
Start: 2021-02-04 | End: 2021-02-04

## 2021-02-04 RX ORDER — HEPARIN SODIUM 5000 [USP'U]/ML
5000 INJECTION INTRAVENOUS; SUBCUTANEOUS EVERY 8 HOURS
Refills: 0 | Status: DISCONTINUED | OUTPATIENT
Start: 2021-02-04 | End: 2021-02-05

## 2021-02-04 RX ORDER — INSULIN LISPRO 100/ML
4 VIAL (ML) SUBCUTANEOUS ONCE
Refills: 0 | Status: COMPLETED | OUTPATIENT
Start: 2021-02-04 | End: 2021-02-04

## 2021-02-04 RX ORDER — SODIUM CHLORIDE 9 MG/ML
1000 INJECTION INTRAMUSCULAR; INTRAVENOUS; SUBCUTANEOUS ONCE
Refills: 0 | Status: COMPLETED | OUTPATIENT
Start: 2021-02-04 | End: 2021-02-04

## 2021-02-04 RX ORDER — INSULIN LISPRO 100/ML
VIAL (ML) SUBCUTANEOUS
Refills: 0 | Status: DISCONTINUED | OUTPATIENT
Start: 2021-02-04 | End: 2021-02-08

## 2021-02-04 RX ORDER — INSULIN GLARGINE 100 [IU]/ML
20 INJECTION, SOLUTION SUBCUTANEOUS AT BEDTIME
Refills: 0 | Status: DISCONTINUED | OUTPATIENT
Start: 2021-02-04 | End: 2021-02-08

## 2021-02-04 RX ORDER — LEVOTHYROXINE SODIUM 125 MCG
50 TABLET ORAL DAILY
Refills: 0 | Status: DISCONTINUED | OUTPATIENT
Start: 2021-02-04 | End: 2021-03-07

## 2021-02-04 RX ORDER — METOPROLOL TARTRATE 50 MG
25 TABLET ORAL DAILY
Refills: 0 | Status: DISCONTINUED | OUTPATIENT
Start: 2021-02-04 | End: 2021-03-07

## 2021-02-04 RX ORDER — ASPIRIN/CALCIUM CARB/MAGNESIUM 324 MG
1 TABLET ORAL
Qty: 0 | Refills: 0 | DISCHARGE

## 2021-02-04 RX ORDER — ACETAMINOPHEN 500 MG
650 TABLET ORAL EVERY 6 HOURS
Refills: 0 | Status: DISCONTINUED | OUTPATIENT
Start: 2021-02-04 | End: 2021-03-07

## 2021-02-04 RX ORDER — TAMSULOSIN HYDROCHLORIDE 0.4 MG/1
0.4 CAPSULE ORAL AT BEDTIME
Refills: 0 | Status: DISCONTINUED | OUTPATIENT
Start: 2021-02-04 | End: 2021-02-17

## 2021-02-04 RX ORDER — GLUCAGON INJECTION, SOLUTION 0.5 MG/.1ML
1 INJECTION, SOLUTION SUBCUTANEOUS ONCE
Refills: 0 | Status: DISCONTINUED | OUTPATIENT
Start: 2021-02-04 | End: 2021-03-07

## 2021-02-04 RX ORDER — ATORVASTATIN CALCIUM 80 MG/1
1 TABLET, FILM COATED ORAL
Qty: 0 | Refills: 0 | DISCHARGE

## 2021-02-04 RX ORDER — GUAIFENESIN/DEXTROMETHORPHAN 600MG-30MG
5 TABLET, EXTENDED RELEASE 12 HR ORAL EVERY 6 HOURS
Refills: 0 | Status: DISCONTINUED | OUTPATIENT
Start: 2021-02-04 | End: 2021-03-07

## 2021-02-04 RX ORDER — INFLUENZA VIRUS VACCINE 15; 15; 15; 15 UG/.5ML; UG/.5ML; UG/.5ML; UG/.5ML
0.5 SUSPENSION INTRAMUSCULAR ONCE
Refills: 0 | Status: DISCONTINUED | OUTPATIENT
Start: 2021-02-04 | End: 2021-03-14

## 2021-02-04 RX ORDER — TAMSULOSIN HYDROCHLORIDE 0.4 MG/1
2 CAPSULE ORAL
Qty: 0 | Refills: 0 | DISCHARGE

## 2021-02-04 RX ORDER — INSULIN LISPRO 100/ML
7 VIAL (ML) SUBCUTANEOUS
Refills: 0 | Status: DISCONTINUED | OUTPATIENT
Start: 2021-02-04 | End: 2021-02-08

## 2021-02-04 RX ORDER — PRASUGREL 5 MG/1
10 TABLET, FILM COATED ORAL DAILY
Refills: 0 | Status: DISCONTINUED | OUTPATIENT
Start: 2021-02-04 | End: 2021-02-19

## 2021-02-04 RX ORDER — DEXTROSE 50 % IN WATER 50 %
25 SYRINGE (ML) INTRAVENOUS ONCE
Refills: 0 | Status: DISCONTINUED | OUTPATIENT
Start: 2021-02-04 | End: 2021-03-14

## 2021-02-04 RX ORDER — PANTOPRAZOLE SODIUM 20 MG/1
1 TABLET, DELAYED RELEASE ORAL
Qty: 0 | Refills: 0 | DISCHARGE

## 2021-02-04 RX ORDER — DEXTROSE 50 % IN WATER 50 %
12.5 SYRINGE (ML) INTRAVENOUS ONCE
Refills: 0 | Status: DISCONTINUED | OUTPATIENT
Start: 2021-02-04 | End: 2021-03-14

## 2021-02-04 RX ORDER — DEXTROSE 50 % IN WATER 50 %
15 SYRINGE (ML) INTRAVENOUS ONCE
Refills: 0 | Status: DISCONTINUED | OUTPATIENT
Start: 2021-02-04 | End: 2021-03-14

## 2021-02-04 RX ADMIN — SODIUM CHLORIDE 1000 MILLILITER(S): 9 INJECTION INTRAMUSCULAR; INTRAVENOUS; SUBCUTANEOUS at 18:06

## 2021-02-04 RX ADMIN — Medication 6 MILLIGRAM(S): at 13:58

## 2021-02-04 RX ADMIN — Medication 4 UNIT(S): at 22:01

## 2021-02-04 RX ADMIN — TAMSULOSIN HYDROCHLORIDE 0.4 MILLIGRAM(S): 0.4 CAPSULE ORAL at 21:17

## 2021-02-04 RX ADMIN — HEPARIN SODIUM 5000 UNIT(S): 5000 INJECTION INTRAVENOUS; SUBCUTANEOUS at 21:17

## 2021-02-04 RX ADMIN — Medication 6: at 20:40

## 2021-02-04 NOTE — H&P ADULT - HISTORY OF PRESENT ILLNESS
70 year old male with PMH of CAD, MI (s/p 4 stents), DM, HTN, BPH, hypothyroid and GERD presents to ED c/o progressive SOB, fever, and weakness x 4 days.  Pt also with recent positive COVID 19 swab + as outpatient. Pt had Pfizer COVID vaccine on 1/3 and 1/24. Pt reports chills, dry cough, and decreased po intake. He denies abd pain, N/V/D/C, lightheadedness CP.  In the ED, pt's T 37.8, . WBC 14, D-dimer 329, BUN/Cr 23/1.8, GFR 38, COVID positive.  CTA negative for PE but consistent with COVID 19 pneumonia.  Pt was given Decadron 6 mg x1 in the ED. pt saturating 99% on 3 L NC.

## 2021-02-04 NOTE — ED ADULT TRIAGE NOTE - CHIEF COMPLAINT QUOTE
BIBA from home as per EMS pt is covid positive c/o shortness of breath. pt 2 weeks post vaccine of 2nd dose. EMS placed pt on 6l nc

## 2021-02-04 NOTE — ED PROVIDER NOTE - NS ED ROS FT
Constitutional:  (+) fever, (-) chills, (-) lethargy  Eyes:  (-) eye pain (-) visual changes  ENMT: (-) nasal discharge, (-) sore throat. (-) neck pain or stiffness  Cardiac: (-) chest pain (-) palpitations  Respiratory:  (+) cough (+) respiratory distress.   GI:  (-) nausea (-) vomiting (-) diarrhea (-) abdominal pain.  :  (-) dysuria (-) frequency (-) burning.  MS:  (-) back pain (-) joint pain.  Neuro:  (-) headache (-) numbness (-) tingling (-) focal weakness  Skin:  (-) rash  Except as documented in the HPI,  all other systems are negative

## 2021-02-04 NOTE — ED PROVIDER NOTE - CLINICAL SUMMARY MEDICAL DECISION MAKING FREE TEXT BOX
70 male here for dyspnea with recent COVID 19 swab + as outpatient, here for persistent symptoms.  Had screening labs imaging medications and reevaluation, plan is for inpatient admission for continued management. CT consistent with COVID 19 pneumonia and swab is + here again. Case d/w family as well.

## 2021-02-04 NOTE — ED PROVIDER NOTE - OBJECTIVE STATEMENT
70 y.o M w/ pmhx CAD s/p PCI x3, HTN, DM, CKD p/w worsening SOB x4 days a/w cough. 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 Pt also received 2 doses COVID vaccine in January but tested positive earlier this week as a biweekly screen for work. + fever, no cp, no n/v, no abd pain, no dizziness.

## 2021-02-04 NOTE — ED ADULT NURSE NOTE - NSIMPLEMENTINTERV_GEN_ALL_ED
Implemented All Universal Safety Interventions:  [place] to call system. Call bell, personal items and telephone within reach. Instruct patient to call for assistance. Room bathroom lighting operational. Non-slip footwear when patient is off stretcher. Physically safe environment: no spills, clutter or unnecessary equipment. Stretcher in lowest position, wheels locked, appropriate side rails in place.

## 2021-02-04 NOTE — ED PROVIDER NOTE - PHYSICAL EXAMINATION
CONSTITUTIONAL: well-appearing, in NAD  SKIN: Warm dry, normal skin turgor  HEAD: NCAT  EYES: EOMI, PERRLA, no scleral icterus, conjunctiva pink  ENT: normal pharynx with no erythema or exudates  NECK: Supple; non tender. Full ROM.  CARD: RRR, no murmurs.  RESP: clear to ausculation b/l. No crackles or wheezing. Tachypneic not able to complete full sentences, pulse ox 95% on RA.   ABD: soft, non-tender, non-distended, no rebound or guarding.  EXT: Full ROM, no bony tenderness, no pedal edema, no calf tenderness  NEURO: normal motor. normal sensory.  PSYCH: Cooperative, appropriate.

## 2021-02-04 NOTE — ED PROVIDER NOTE - ATTENDING CONTRIBUTION TO CARE
I personally evaluated the patient. I reviewed the Resident’s or Physician Assistant’s note (as assigned above), and agree with the findings and plan except as documented in my note.     70 male here for evaluation of dyspnea. 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 Had COVID vaccine recently, has also been vaccinated x2 doses with Pfizer.     Complains of persistent breathlessness and exertional dyspnea.       PMH: DM, HTN, GERD    PE: male in no distress. CV: pulses intact. CHEST: increased work of breathing with tachypnea. Pulse oximetry tal 95%. No accessory muscle use. ABD: nondistended. SKIN: normal. EXT: FROM. NEURO: AAO 3 no focal deficits. HEENT: mucosa normal non icteric    Impression: dyspnea / pneumonia    Plan: IV labs imaging supportive care and reevaluation

## 2021-02-04 NOTE — ED PROVIDER NOTE - PMH
ASHD (arteriosclerotic heart disease)  STEMI 12/31/17, PCI RCA  Chronic kidney disease (CKD)  III  Dyspnea, unspecified type    Hypertension, unspecified type    Type 2 diabetes mellitus without complication, unspecified long term insulin use status

## 2021-02-04 NOTE — PATIENT PROFILE ADULT - NSTRANSFEREYEGLASSESPAIRS_GEN_A_NUR
1 pair Follow with your PMD within 48-72 hours.  Rest, no heavy lifting.  Warm compresses to area.  Light walking. Take Motrin 600 mg every 8 hours for pain with food, Flexeril 10mg every 8 hours as needed for muscle spasm- caution drowsiness/do not drive. Any worsening pain, weakness, numbness, bowel or urinary incontinence return to ER

## 2021-02-04 NOTE — H&P ADULT - NSHPPHYSICALEXAM_GEN_ALL_CORE
VITALS:   T(C): 37.8 (02-04-21 @ 13:13), Max: 37.8 (02-04-21 @ 13:13)  HR: 109 (02-04-21 @ 13:13) (109 - 109)  BP: 119/58 (02-04-21 @ 13:13) (119/58 - 119/58)  RR: 20 (02-04-21 @ 13:13) (20 - 20)  SpO2: 97% (02-04-21 @ 13:13) (97% - 97%)    GENERAL: NAD, lying in bed comfortably  HEAD:  Atraumatic, Normocephalic  EYES: EOMI, PERRLA, conjunctiva and sclera clear  ENT: Moist mucous membranes  NECK: Supple  CHEST/LUNG: decreased breath sounds b/l, no wheezing  HEART: Regular rate and rhythm; no murmurs  ABDOMEN: +BS, Soft, Nontender, Nondistended  EXTREMITIES: No Le edema b/l  NERVOUS SYSTEM:  Alert & Oriented X3, speech clear  SKIN: No rashes or lesions

## 2021-02-04 NOTE — H&P ADULT - ATTENDING COMMENTS
#Acute hypoxic resp failure, due to covid  desat to 85 on ra  nc to keep spo2 >92  decadron 6  check fibrinogen, ferritin, d-dimer, trop, crp  id consult  give 1L NS bolus now; then 100 cc/hr  #GEOVANNA, presumed prerenal  ivf as above  trend scr

## 2021-02-04 NOTE — H&P ADULT - NSHPLABSRESULTS_GEN_ALL_CORE
12.6   14.20 )-----------( 135      ( 04 Feb 2021 14:13 )             38.3       02-04    132<L>  |  93<L>  |  23<H>  ----------------------------<  260<H>  3.6   |  19  |  1.8<H>    Ca    8.6      04 Feb 2021 14:13    TPro  6.4  /  Alb  3.7  /  TBili  0.7  /  DBili  x   /  AST  27  /  ALT  27  /  AlkPhos  91  02-04          PT/INR - ( 04 Feb 2021 14:13 )   PT: 16.20 sec;   INR: 1.41 ratio         PTT - ( 04 Feb 2021 14:13 )  PTT:33.5 sec    Lactate Trend      CARDIAC MARKERS ( 04 Feb 2021 14:13 )  x     / 0.02 ng/mL / x     / x     / x            CAPILLARY BLOOD GLUCOSE    RADIOLOGY:  CT Angio Chest PE Protocol w/ IV Cont (02.04.21 @ 16:32)    IMPRESSION:  1.  No acute central or lobar pulmonary embolus.    2.  Extensive bilateral patchy groundglass opacities involving all lobes most pronounced in the upper lung zones. Findings are suspicious for an atypical viral pneumonia such as COVID-19.        Dr. King Spoke with LEONA SOMMER MD on 2/4/2021 4:49 PM with readback.      GLADYS KING MD; Attending Radiologist  This document has been electronically signed. Feb 4 2021  4:51PM

## 2021-02-04 NOTE — H&P ADULT - ASSESSMENT
70 year old male with PMH of CAD, MI (s/p 4 stents), DM, HTN, BPH, hypothyroid and GERD, recently received Pfizer vaccine (1/3 and 1/24) presents to ED c/o progressive SOB, fever, and weakness x 4 days.  Pt also with recent positive COVID 19 swab + as outpatient.    # Shortness of Breath  # COVID-19 infection  - admit to medicine and transfer to [facility] for further management  - CTA showed extensive bilateral patchy ground-glass opacities involving all lobes most pronounced in the upper lung zones, suspicious for an atypical viral pneumonia such as COVID-19  - continue Decadron 6 mg x 10 days  - isolation precautions  - supplemental oxygen  - f/u ferritin, CPR, procalcitonin  - AM labs    # GEOVANNA   - likely 2/2 decrease po intake   - gentle IVF hydration  - encourage PO intake    #CAD/MI  - continue home medications    # HTN   - continue home medications  - monitor BP    # BPH  - continue Flomax    # DM  - monitor FS  - insulin sliding scale    #Hypothyroid  - continue Synthroid    DVT/GI ppx

## 2021-02-05 LAB
A1C WITH ESTIMATED AVERAGE GLUCOSE RESULT: 9.1 % — HIGH (ref 4–5.6)
ALLERGY+IMMUNOLOGY DIAG STUDY NOTE: SIGNIFICANT CHANGE UP
ANION GAP SERPL CALC-SCNC: 14 MMOL/L — SIGNIFICANT CHANGE UP (ref 7–14)
ANION GAP SERPL CALC-SCNC: 16 MMOL/L — HIGH (ref 7–14)
BASOPHILS # BLD AUTO: 0.03 K/UL — SIGNIFICANT CHANGE UP (ref 0–0.2)
BASOPHILS NFR BLD AUTO: 0.2 % — SIGNIFICANT CHANGE UP (ref 0–1)
BLD GP AB SCN SERPL QL: SIGNIFICANT CHANGE UP
BUN SERPL-MCNC: 27 MG/DL — HIGH (ref 10–20)
BUN SERPL-MCNC: 28 MG/DL — HIGH (ref 10–20)
CALCIUM SERPL-MCNC: 8.6 MG/DL — SIGNIFICANT CHANGE UP (ref 8.5–10.1)
CALCIUM SERPL-MCNC: 8.8 MG/DL — SIGNIFICANT CHANGE UP (ref 8.5–10.1)
CHLORIDE SERPL-SCNC: 97 MMOL/L — LOW (ref 98–110)
CHLORIDE SERPL-SCNC: 98 MMOL/L — SIGNIFICANT CHANGE UP (ref 98–110)
CO2 SERPL-SCNC: 22 MMOL/L — SIGNIFICANT CHANGE UP (ref 17–32)
CO2 SERPL-SCNC: 24 MMOL/L — SIGNIFICANT CHANGE UP (ref 17–32)
CREAT SERPL-MCNC: 1.5 MG/DL — SIGNIFICANT CHANGE UP (ref 0.7–1.5)
CREAT SERPL-MCNC: 1.6 MG/DL — HIGH (ref 0.7–1.5)
CRP SERPL-MCNC: 31.59 MG/DL — HIGH (ref 0–0.4)
D DIMER BLD IA.RAPID-MCNC: 364 NG/ML DDU — HIGH (ref 0–230)
DAT IGG-SP REAG RBC-IMP: ABNORMAL
DIR ANTIGLOB POLYSPECIFIC INTERPRETATION: ABNORMAL
EOSINOPHIL # BLD AUTO: 0 K/UL — SIGNIFICANT CHANGE UP (ref 0–0.7)
EOSINOPHIL NFR BLD AUTO: 0 % — SIGNIFICANT CHANGE UP (ref 0–8)
ESTIMATED AVERAGE GLUCOSE: 214 MG/DL — HIGH (ref 68–114)
FERRITIN SERPL-MCNC: 404 NG/ML — HIGH (ref 30–400)
GLUCOSE BLDC GLUCOMTR-MCNC: 240 MG/DL — HIGH (ref 70–99)
GLUCOSE BLDC GLUCOMTR-MCNC: 271 MG/DL — HIGH (ref 70–99)
GLUCOSE BLDC GLUCOMTR-MCNC: 309 MG/DL — HIGH (ref 70–99)
GLUCOSE BLDC GLUCOMTR-MCNC: 321 MG/DL — HIGH (ref 70–99)
GLUCOSE BLDC GLUCOMTR-MCNC: 361 MG/DL — HIGH (ref 70–99)
GLUCOSE SERPL-MCNC: 292 MG/DL — HIGH (ref 70–99)
GLUCOSE SERPL-MCNC: 294 MG/DL — HIGH (ref 70–99)
HCT VFR BLD CALC: 38.4 % — LOW (ref 42–52)
HGB BLD-MCNC: 13 G/DL — LOW (ref 14–18)
IAT COMP-SP REAG SERPL QL: SIGNIFICANT CHANGE UP
IMM GRANULOCYTES NFR BLD AUTO: 0.8 % — HIGH (ref 0.1–0.3)
LYMPHOCYTES # BLD AUTO: 0.42 K/UL — LOW (ref 1.2–3.4)
LYMPHOCYTES # BLD AUTO: 2.7 % — LOW (ref 20.5–51.1)
MCHC RBC-ENTMCNC: 25.9 PG — LOW (ref 27–31)
MCHC RBC-ENTMCNC: 33.9 G/DL — SIGNIFICANT CHANGE UP (ref 32–37)
MCV RBC AUTO: 76.6 FL — LOW (ref 80–94)
MONOCYTES # BLD AUTO: 0.44 K/UL — SIGNIFICANT CHANGE UP (ref 0.1–0.6)
MONOCYTES NFR BLD AUTO: 2.8 % — SIGNIFICANT CHANGE UP (ref 1.7–9.3)
NEUTROPHILS # BLD AUTO: 14.58 K/UL — HIGH (ref 1.4–6.5)
NEUTROPHILS NFR BLD AUTO: 93.5 % — HIGH (ref 42.2–75.2)
NRBC # BLD: 0 /100 WBCS — SIGNIFICANT CHANGE UP (ref 0–0)
PLATELET # BLD AUTO: 173 K/UL — SIGNIFICANT CHANGE UP (ref 130–400)
POTASSIUM SERPL-MCNC: 4 MMOL/L — SIGNIFICANT CHANGE UP (ref 3.5–5)
POTASSIUM SERPL-MCNC: 4.5 MMOL/L — SIGNIFICANT CHANGE UP (ref 3.5–5)
POTASSIUM SERPL-SCNC: 4 MMOL/L — SIGNIFICANT CHANGE UP (ref 3.5–5)
POTASSIUM SERPL-SCNC: 4.5 MMOL/L — SIGNIFICANT CHANGE UP (ref 3.5–5)
PROCALCITONIN SERPL-MCNC: 1.61 NG/ML — HIGH (ref 0.02–0.1)
RBC # BLD: 5.01 M/UL — SIGNIFICANT CHANGE UP (ref 4.7–6.1)
RBC # FLD: 15.3 % — HIGH (ref 11.5–14.5)
SODIUM SERPL-SCNC: 135 MMOL/L — SIGNIFICANT CHANGE UP (ref 135–146)
SODIUM SERPL-SCNC: 136 MMOL/L — SIGNIFICANT CHANGE UP (ref 135–146)
WBC # BLD: 15.6 K/UL — HIGH (ref 4.8–10.8)
WBC # FLD AUTO: 15.6 K/UL — HIGH (ref 4.8–10.8)

## 2021-02-05 PROCEDURE — 99233 SBSQ HOSP IP/OBS HIGH 50: CPT | Mod: CS

## 2021-02-05 PROCEDURE — 86077 PHYS BLOOD BANK SERV XMATCH: CPT

## 2021-02-05 RX ORDER — SODIUM CHLORIDE 9 MG/ML
1000 INJECTION INTRAMUSCULAR; INTRAVENOUS; SUBCUTANEOUS
Refills: 0 | Status: DISCONTINUED | OUTPATIENT
Start: 2021-02-05 | End: 2021-02-06

## 2021-02-05 RX ORDER — DEXAMETHASONE 0.5 MG/5ML
6 ELIXIR ORAL DAILY
Refills: 0 | Status: DISCONTINUED | OUTPATIENT
Start: 2021-02-05 | End: 2021-02-06

## 2021-02-05 RX ORDER — ONDANSETRON 8 MG/1
4 TABLET, FILM COATED ORAL
Refills: 0 | Status: DISCONTINUED | OUTPATIENT
Start: 2021-02-05 | End: 2021-03-07

## 2021-02-05 RX ORDER — CEFTRIAXONE 500 MG/1
INJECTION, POWDER, FOR SOLUTION INTRAMUSCULAR; INTRAVENOUS
Refills: 0 | Status: DISCONTINUED | OUTPATIENT
Start: 2021-02-05 | End: 2021-02-13

## 2021-02-05 RX ORDER — CEFTRIAXONE 500 MG/1
2000 INJECTION, POWDER, FOR SOLUTION INTRAMUSCULAR; INTRAVENOUS ONCE
Refills: 0 | Status: DISCONTINUED | OUTPATIENT
Start: 2021-02-05 | End: 2021-02-05

## 2021-02-05 RX ORDER — CEFTRIAXONE 500 MG/1
2000 INJECTION, POWDER, FOR SOLUTION INTRAMUSCULAR; INTRAVENOUS EVERY 24 HOURS
Refills: 0 | Status: DISCONTINUED | OUTPATIENT
Start: 2021-02-06 | End: 2021-02-13

## 2021-02-05 RX ORDER — CEFTRIAXONE 500 MG/1
2000 INJECTION, POWDER, FOR SOLUTION INTRAMUSCULAR; INTRAVENOUS ONCE
Refills: 0 | Status: COMPLETED | OUTPATIENT
Start: 2021-02-05 | End: 2021-02-05

## 2021-02-05 RX ORDER — REMDESIVIR 5 MG/ML
100 INJECTION INTRAVENOUS EVERY 24 HOURS
Refills: 0 | Status: COMPLETED | OUTPATIENT
Start: 2021-02-06 | End: 2021-02-09

## 2021-02-05 RX ORDER — ENOXAPARIN SODIUM 100 MG/ML
40 INJECTION SUBCUTANEOUS
Refills: 0 | Status: DISCONTINUED | OUTPATIENT
Start: 2021-02-05 | End: 2021-02-19

## 2021-02-05 RX ORDER — CEFTRIAXONE 500 MG/1
INJECTION, POWDER, FOR SOLUTION INTRAMUSCULAR; INTRAVENOUS
Refills: 0 | Status: DISCONTINUED | OUTPATIENT
Start: 2021-02-05 | End: 2021-02-05

## 2021-02-05 RX ORDER — REMDESIVIR 5 MG/ML
INJECTION INTRAVENOUS
Refills: 0 | Status: COMPLETED | OUTPATIENT
Start: 2021-02-05 | End: 2021-02-09

## 2021-02-05 RX ORDER — REMDESIVIR 5 MG/ML
200 INJECTION INTRAVENOUS EVERY 24 HOURS
Refills: 0 | Status: COMPLETED | OUTPATIENT
Start: 2021-02-05 | End: 2021-02-05

## 2021-02-05 RX ADMIN — Medication 50 MICROGRAM(S): at 05:25

## 2021-02-05 RX ADMIN — ENOXAPARIN SODIUM 40 MILLIGRAM(S): 100 INJECTION SUBCUTANEOUS at 16:28

## 2021-02-05 RX ADMIN — Medication 7 UNIT(S): at 07:56

## 2021-02-05 RX ADMIN — Medication 5 MILLILITER(S): at 12:13

## 2021-02-05 RX ADMIN — Medication 4 UNIT(S): at 00:00

## 2021-02-05 RX ADMIN — Medication 650 MILLIGRAM(S): at 00:04

## 2021-02-05 RX ADMIN — REMDESIVIR 500 MILLIGRAM(S): 5 INJECTION INTRAVENOUS at 11:33

## 2021-02-05 RX ADMIN — Medication 7 UNIT(S): at 16:57

## 2021-02-05 RX ADMIN — HEPARIN SODIUM 5000 UNIT(S): 5000 INJECTION INTRAVENOUS; SUBCUTANEOUS at 12:14

## 2021-02-05 RX ADMIN — Medication 5: at 11:33

## 2021-02-05 RX ADMIN — Medication 6 MILLIGRAM(S): at 11:35

## 2021-02-05 RX ADMIN — Medication 5 MILLILITER(S): at 00:01

## 2021-02-05 RX ADMIN — TAMSULOSIN HYDROCHLORIDE 0.4 MILLIGRAM(S): 0.4 CAPSULE ORAL at 21:32

## 2021-02-05 RX ADMIN — Medication 25 MILLIGRAM(S): at 05:25

## 2021-02-05 RX ADMIN — INSULIN GLARGINE 20 UNIT(S): 100 INJECTION, SOLUTION SUBCUTANEOUS at 21:40

## 2021-02-05 RX ADMIN — INSULIN GLARGINE 20 UNIT(S): 100 INJECTION, SOLUTION SUBCUTANEOUS at 00:00

## 2021-02-05 RX ADMIN — HEPARIN SODIUM 5000 UNIT(S): 5000 INJECTION INTRAVENOUS; SUBCUTANEOUS at 05:26

## 2021-02-05 RX ADMIN — PRASUGREL 10 MILLIGRAM(S): 5 TABLET, FILM COATED ORAL at 11:34

## 2021-02-05 RX ADMIN — Medication 7 UNIT(S): at 11:33

## 2021-02-05 RX ADMIN — Medication 650 MILLIGRAM(S): at 13:35

## 2021-02-05 RX ADMIN — Medication 2: at 16:57

## 2021-02-05 RX ADMIN — SODIUM CHLORIDE 75 MILLILITER(S): 9 INJECTION INTRAMUSCULAR; INTRAVENOUS; SUBCUTANEOUS at 13:02

## 2021-02-05 RX ADMIN — ONDANSETRON 4 MILLIGRAM(S): 8 TABLET, FILM COATED ORAL at 13:03

## 2021-02-05 RX ADMIN — Medication 3: at 07:56

## 2021-02-05 RX ADMIN — Medication 2.5 MILLIGRAM(S): at 05:25

## 2021-02-05 NOTE — PROGRESS NOTE ADULT - ASSESSMENT
70 year old male with PMH of CAD, MI (s/p 4 stents), DM, HTN, BPH, hypothyroid and GERD, recently received Pfizer vaccine (1/3 and 1/24) presents to ED c/o progressive SOB, fever, and weakness x 4 days.  Pt also with recent positive COVID 19 swab + as outpatient.    # Acute hypoxic respiratory failure due to  COVID-19 infection  - CTA showed extensive bilateral patchy ground-glass opacities involving all lobes most pronounced in the upper lung zones, suspicious for an atypical viral pneumonia such as COVID-19  - continue Decadron 6 mg x 10 days  - isolation precautions  - supplemental oxygen, 6L NC Spo2 93%  - f/u Inflammatory markers   starting on RDV, DEX   Check ab as well.   f/u ID    # Acute kidney injury >> improving.  - likely 2/2 decrease po intake   - c/w  IVF hydration  - encourage PO intake    #CAD/MI  - continue home medications    # HTN   - continue home medications  - monitor BP    # BPH  - continue Flomax    # DM  - monitor FS  - insulin sliding scale    #Hypothyroid  - continue Synthroid    DVT/GI ppx    #Progress Note Handoff  Pending (specify):  Clinical improvement  Disposition: Home_

## 2021-02-05 NOTE — PROGRESS NOTE ADULT - SUBJECTIVE AND OBJECTIVE BOX
SUBJECTIVE:    Patient is a 70y old Male who presents with a chief complaint of shortness of breath    Currently admitted to medicine with the primary diagnosis of COVID-19     Today is hospital day 2d. Patient seen and examined at bedside this AM. No acute overnight events.    PAST MEDICAL & SURGICAL HISTORY  Chronic kidney disease (CKD)  III    Type 2 diabetes mellitus without complication, unspecified long term insulin use status    Hypertension, unspecified type    Dyspnea, unspecified type    ASHD (arteriosclerotic heart disease)  STEMI 12/31/17, PCI RCA    S/P cataract surgery    History of ligation of vein  2012    History of tonsillectomy  1957      SOCIAL HISTORY:  Negative for smoking/alcohol/drug use.     ALLERGIES:  penicillin (Rash (Severe))    MEDICATIONS:  STANDING MEDICATIONS  dexAMETHasone  Injectable 6 milliGRAM(s) IV Push daily  dextrose 40% Gel 15 Gram(s) Oral once  dextrose 5%. 1000 milliLiter(s) IV Continuous <Continuous>  dextrose 5%. 1000 milliLiter(s) IV Continuous <Continuous>  dextrose 50% Injectable 25 Gram(s) IV Push once  dextrose 50% Injectable 12.5 Gram(s) IV Push once  dextrose 50% Injectable 25 Gram(s) IV Push once  enalapril 2.5 milliGRAM(s) Oral daily  glucagon  Injectable 1 milliGRAM(s) IntraMuscular once  heparin   Injectable 5000 Unit(s) SubCutaneous every 8 hours  influenza   Vaccine 0.5 milliLiter(s) IntraMuscular once  insulin glargine Injectable (LANTUS) 20 Unit(s) SubCutaneous at bedtime  insulin lispro (ADMELOG) corrective regimen sliding scale   SubCutaneous three times a day before meals  insulin lispro Injectable (ADMELOG) 7 Unit(s) SubCutaneous three times a day before meals  levothyroxine 50 MICROGram(s) Oral daily  metoprolol succinate ER 25 milliGRAM(s) Oral daily  prasugrel 10 milliGRAM(s) Oral daily  remdesivir  IVPB   IV Intermittent   tamsulosin 0.4 milliGRAM(s) Oral at bedtime    PRN MEDICATIONS  acetaminophen   Tablet .. 650 milliGRAM(s) Oral every 6 hours PRN  guaifenesin/dextromethorphan  Syrup 5 milliLiter(s) Oral every 6 hours PRN    VITALS:   T(F): 98.7  HR: 95  BP: 130/63  RR: 18  SpO2: 93%    LABS:                        13.0   15.60 )-----------( 173      ( 05 Feb 2021 05:47 )             38.4     02-05    135  |  97<L>  |  27<H>  ----------------------------<  294<H>  4.5   |  22  |  1.6<H>    Ca    8.8      05 Feb 2021 05:47    TPro  6.4  /  Alb  3.7  /  TBili  0.7  /  DBili  x   /  AST  27  /  ALT  27  /  AlkPhos  91  02-04    PT/INR - ( 04 Feb 2021 14:13 )   PT: 16.20 sec;   INR: 1.41 ratio         PTT - ( 04 Feb 2021 14:13 )  PTT:33.5 sec      Troponin T, Serum: 0.02 ng/mL <H> (02-04-21 @ 14:13)      CARDIAC MARKERS ( 04 Feb 2021 14:13 )  x     / 0.02 ng/mL / x     / x     / x          RADIOLOGY:    PHYSICAL EXAM:  GEN: No acute distress  LUNGS: Clear to auscultation bilaterally   HEART: Regular  ABD: Soft, non-tender, non-distended.  EXT: NC/NC/NE/2+PP/TIJERINA/Skin Intact.   NEURO: AAOX3    Intravenous access:   NG tube:   Penaloza Catheter:        SUBJECTIVE:    Patient is a 70y old Male who presents with a chief complaint of shortness of breath    Currently admitted to medicine with the primary diagnosis of COVID-19     Today is hospital day 2d. Patient seen and examined at bedside this AM. No acute overnight events. States that he is feeling more short of breath today.    PAST MEDICAL & SURGICAL HISTORY  Chronic kidney disease (CKD)  III    Type 2 diabetes mellitus without complication, unspecified long term insulin use status    Hypertension, unspecified type    Dyspnea, unspecified type    ASHD (arteriosclerotic heart disease)  STEMI 12/31/17, PCI RCA    S/P cataract surgery    History of ligation of vein  2012    History of tonsillectomy  1957      SOCIAL HISTORY:  Negative for smoking/alcohol/drug use.     ALLERGIES:  penicillin (Rash (Severe))    MEDICATIONS:  STANDING MEDICATIONS  dexAMETHasone  Injectable 6 milliGRAM(s) IV Push daily  dextrose 40% Gel 15 Gram(s) Oral once  dextrose 5%. 1000 milliLiter(s) IV Continuous <Continuous>  dextrose 5%. 1000 milliLiter(s) IV Continuous <Continuous>  dextrose 50% Injectable 25 Gram(s) IV Push once  dextrose 50% Injectable 12.5 Gram(s) IV Push once  dextrose 50% Injectable 25 Gram(s) IV Push once  enalapril 2.5 milliGRAM(s) Oral daily  glucagon  Injectable 1 milliGRAM(s) IntraMuscular once  heparin   Injectable 5000 Unit(s) SubCutaneous every 8 hours  influenza   Vaccine 0.5 milliLiter(s) IntraMuscular once  insulin glargine Injectable (LANTUS) 20 Unit(s) SubCutaneous at bedtime  insulin lispro (ADMELOG) corrective regimen sliding scale   SubCutaneous three times a day before meals  insulin lispro Injectable (ADMELOG) 7 Unit(s) SubCutaneous three times a day before meals  levothyroxine 50 MICROGram(s) Oral daily  metoprolol succinate ER 25 milliGRAM(s) Oral daily  prasugrel 10 milliGRAM(s) Oral daily  remdesivir  IVPB   IV Intermittent   tamsulosin 0.4 milliGRAM(s) Oral at bedtime    PRN MEDICATIONS  acetaminophen   Tablet .. 650 milliGRAM(s) Oral every 6 hours PRN  guaifenesin/dextromethorphan  Syrup 5 milliLiter(s) Oral every 6 hours PRN    VITALS:   T(F): 98.7  HR: 95  BP: 130/63  RR: 18  SpO2: 93%    LABS:                        13.0   15.60 )-----------( 173      ( 05 Feb 2021 05:47 )             38.4     02-05    135  |  97<L>  |  27<H>  ----------------------------<  294<H>  4.5   |  22  |  1.6<H>    Ca    8.8      05 Feb 2021 05:47    TPro  6.4  /  Alb  3.7  /  TBili  0.7  /  DBili  x   /  AST  27  /  ALT  27  /  AlkPhos  91  02-04    PT/INR - ( 04 Feb 2021 14:13 )   PT: 16.20 sec;   INR: 1.41 ratio         PTT - ( 04 Feb 2021 14:13 )  PTT:33.5 sec      Troponin T, Serum: 0.02 ng/mL <H> (02-04-21 @ 14:13)      CARDIAC MARKERS ( 04 Feb 2021 14:13 )  x     / 0.02 ng/mL / x     / x     / x          PHYSICAL EXAM:  GEN: No acute distress  LUNGS: bilateral breath sounds, mild respiratory distress   HEART: S1/S2 present, not tachycardic  ABD: Soft, non-tender, non-distended  EXT: no LE edema   NEURO: AAOX3    Intravenous access:   NG tube:   Penaloza Catheter:        SUBJECTIVE:    Patient is a 70y old Male who presents with a chief complaint of shortness of breath    Currently admitted to medicine with the primary diagnosis of COVID-19     Today is hospital day 2d. Patient seen and examined at bedside this AM. No acute overnight events. States that he is feeling more short of breath today.    PAST MEDICAL & SURGICAL HISTORY  Chronic kidney disease (CKD)  III    Type 2 diabetes mellitus without complication, unspecified long term insulin use status    Hypertension, unspecified type    Dyspnea, unspecified type    ASHD (arteriosclerotic heart disease)  STEMI 12/31/17, PCI RCA    S/P cataract surgery    History of ligation of vein  2012    History of tonsillectomy  1957      SOCIAL HISTORY:  Negative for smoking/alcohol/drug use.     ALLERGIES:  penicillin (Rash (Severe))    MEDICATIONS:  STANDING MEDICATIONS  dexAMETHasone  Injectable 6 milliGRAM(s) IV Push daily  dextrose 40% Gel 15 Gram(s) Oral once  dextrose 5%. 1000 milliLiter(s) IV Continuous <Continuous>  dextrose 5%. 1000 milliLiter(s) IV Continuous <Continuous>  dextrose 50% Injectable 25 Gram(s) IV Push once  dextrose 50% Injectable 12.5 Gram(s) IV Push once  dextrose 50% Injectable 25 Gram(s) IV Push once  enalapril 2.5 milliGRAM(s) Oral daily  glucagon  Injectable 1 milliGRAM(s) IntraMuscular once  heparin   Injectable 5000 Unit(s) SubCutaneous every 8 hours  influenza   Vaccine 0.5 milliLiter(s) IntraMuscular once  insulin glargine Injectable (LANTUS) 20 Unit(s) SubCutaneous at bedtime  insulin lispro (ADMELOG) corrective regimen sliding scale   SubCutaneous three times a day before meals  insulin lispro Injectable (ADMELOG) 7 Unit(s) SubCutaneous three times a day before meals  levothyroxine 50 MICROGram(s) Oral daily  metoprolol succinate ER 25 milliGRAM(s) Oral daily  prasugrel 10 milliGRAM(s) Oral daily  remdesivir  IVPB   IV Intermittent   tamsulosin 0.4 milliGRAM(s) Oral at bedtime    PRN MEDICATIONS  acetaminophen   Tablet .. 650 milliGRAM(s) Oral every 6 hours PRN  guaifenesin/dextromethorphan  Syrup 5 milliLiter(s) Oral every 6 hours PRN    VITALS:   T(F): 98.7  HR: 95  BP: 130/63  RR: 18  SpO2: 93%    LABS:                        13.0   15.60 )-----------( 173      ( 05 Feb 2021 05:47 )             38.4     02-05    135  |  97<L>  |  27<H>  ----------------------------<  294<H>  4.5   |  22  |  1.6<H>    Ca    8.8      05 Feb 2021 05:47    TPro  6.4  /  Alb  3.7  /  TBili  0.7  /  DBili  x   /  AST  27  /  ALT  27  /  AlkPhos  91  02-04    PT/INR - ( 04 Feb 2021 14:13 )   PT: 16.20 sec;   INR: 1.41 ratio         PTT - ( 04 Feb 2021 14:13 )  PTT:33.5 sec      Troponin T, Serum: 0.02 ng/mL <H> (02-04-21 @ 14:13)      CARDIAC MARKERS ( 04 Feb 2021 14:13 )  x     / 0.02 ng/mL / x     / x     / x          PHYSICAL EXAM:  GEN: No acute distress  LUNGS: decreased breath sounds bilaterally, mild respiratory distress   HEART: S1/S2 present, not tachycardic  ABD: Soft, non-tender, non-distended  EXT: no LE edema   NEURO: AAOX3    Intravenous access:   NG tube:   Penaloza Catheter:

## 2021-02-05 NOTE — PROGRESS NOTE ADULT - SUBJECTIVE AND OBJECTIVE BOX
ANDREE BUSH  70y  Male      Patient is a 70y old  Male who presents with a chief complaint of SOB.    INTERVAL HPI/OVERNIGHT EVENTS: The patient was seen and examined at bedside.  Uncomfortable with breathing.       ******************************* REVIEW OF SYSTEMS:**********************************************    All other review of systems negative    *********************** VITALS ******************************************    T(F): 98.7 (02-05-21 @ 04:30)  HR: 95 (02-05-21 @ 04:30) (95 - 109)  BP: 130/63 (02-05-21 @ 04:30) (119/58 - 137/60)  RR: 18 (02-05-21 @ 04:30) (18 - 20)  SpO2: 93% (02-05-21 @ 07:51) (91% - 97%)    02-04-21 @ 07:01  -  02-05-21 @ 07:00  --------------------------------------------------------  IN: 0 mL / OUT: 400 mL / NET: -400 mL            02-04-21 @ 07:01  -  02-05-21 @ 07:00  --------------------------------------------------------  IN: 0 mL / OUT: 400 mL / NET: -400 mL        ******************************** PHYSICAL EXAM:**************************************************  GENERAL: NAD    PSYCH: no agitation, baseline mentation  HEENT:     NERVOUS SYSTEM:  Alert & Oriented X3, MS  5/5 B/L  UE and LE ; Sensory intact    PULMONARY: Decreased MIKE,   CARDIOVASCULAR: S1S2 RRR    GI: Soft, NT, ND; BS present.    EXTREMITIES:  2+ Peripheral Pulses, No clubbing, cyanosis, or edema    LYMPH: No lymphadenopathy noted    SKIN: No rashes or lesions      **************************** LABS *******************************************************                          13.0   15.60 )-----------( 173      ( 05 Feb 2021 05:47 )             38.4     02-05    135  |  97<L>  |  27<H>  ----------------------------<  294<H>  4.5   |  22  |  1.6<H>    Ca    8.8      05 Feb 2021 05:47    TPro  6.4  /  Alb  3.7  /  TBili  0.7  /  DBili  x   /  AST  27  /  ALT  27  /  AlkPhos  91  02-04        PT/INR - ( 04 Feb 2021 14:13 )   PT: 16.20 sec;   INR: 1.41 ratio         PTT - ( 04 Feb 2021 14:13 )  PTT:33.5 sec  Lactate Trend    CARDIAC MARKERS ( 04 Feb 2021 14:13 )  x     / 0.02 ng/mL / x     / x     / x          CAPILLARY BLOOD GLUCOSE      POCT Blood Glucose.: 361 mg/dL (05 Feb 2021 11:10)          **************************Active Medications *******************************************  penicillin (Rash (Severe))      acetaminophen   Tablet .. 650 milliGRAM(s) Oral every 6 hours PRN  dexAMETHasone  Injectable 6 milliGRAM(s) IV Push daily  dextrose 40% Gel 15 Gram(s) Oral once  dextrose 5%. 1000 milliLiter(s) IV Continuous <Continuous>  dextrose 5%. 1000 milliLiter(s) IV Continuous <Continuous>  dextrose 50% Injectable 25 Gram(s) IV Push once  dextrose 50% Injectable 12.5 Gram(s) IV Push once  dextrose 50% Injectable 25 Gram(s) IV Push once  enalapril 2.5 milliGRAM(s) Oral daily  glucagon  Injectable 1 milliGRAM(s) IntraMuscular once  guaifenesin/dextromethorphan  Syrup 5 milliLiter(s) Oral every 6 hours PRN  heparin   Injectable 5000 Unit(s) SubCutaneous every 8 hours  influenza   Vaccine 0.5 milliLiter(s) IntraMuscular once  insulin glargine Injectable (LANTUS) 20 Unit(s) SubCutaneous at bedtime  insulin lispro (ADMELOG) corrective regimen sliding scale   SubCutaneous three times a day before meals  insulin lispro Injectable (ADMELOG) 7 Unit(s) SubCutaneous three times a day before meals  levothyroxine 50 MICROGram(s) Oral daily  metoprolol succinate ER 25 milliGRAM(s) Oral daily  prasugrel 10 milliGRAM(s) Oral daily  remdesivir  IVPB   IV Intermittent   tamsulosin 0.4 milliGRAM(s) Oral at bedtime      ***************************************************  RADIOLOGY & ADDITIONAL TESTS:    Imaging Personally Reviewed:  [ ] YES  [ ] NO    HEALTH ISSUES - PROBLEM Dx:

## 2021-02-05 NOTE — CONSULT NOTE ADULT - ASSESSMENT
69 yo M with PMH of CAD, MI (s/p 4 stents), DM, HTN, BPH, hypothyroid and GERD, recently received Pfizer vaccine (1/3 and 1/24) presented to ED c/o progressive SOB, fever, and weakness. Patient stated that his symptoms began on Wednesday 2/3. COVID PCR + on 2/4. Admitted to medicine with acute hypoxemic respiratory failure secondary to COVID-19 PNA.  S/p pfizer vaccine 1/3 and booster 1/24    IMPRESSION;  COVID 19 with severe illness. SpO2 < 94% on RA and need for supplemental O2. NRB  CTA 2/4 : extensive bilateral patchy ground-glass opacities involving all lobes most pronounced in the upper lung zones. No PE  Pt is in the early viral replicative phase based on the timeline/onset of symptoms.  procalcitonin 1.61 , not suggestive of a bacterial PNA.  Ferritin 404  CRP 31.59  Ddimers 364   71 yo M with PMH of CAD, MI (s/p 4 stents), DM, HTN, BPH, hypothyroid and GERD, recently received Pfizer vaccine (1/3 and 1/24) presented to ED c/o progressive SOB, fever, and weakness. Patient stated that his symptoms began on Wednesday 2/3. COVID PCR + on 2/4. Admitted to medicine with acute hypoxemic respiratory failure secondary to COVID-19 PNA.  S/p pfizer vaccine 1/3 and booster 1/24  Routine nasal swab on 1/27 COVID-19 positive ( works in a [employer] )  Symptomatic since 2/3    IMPRESSION;  COVID 19 with severe illness. SpO2 < 94% on RA and need for supplemental O2. NRB  CTA 2/4 : extensive bilateral patchy ground-glass opacities involving all lobes most pronounced in the upper lung zones. No PE  Pt is in the early viral replicative phase based on the timeline/onset of symptoms.  procalcitonin 1.61 , not suggestive of a bacterial PNA.  Ferritin 404  CRP 31.59  Ddimers 364    RECOMMENDATIONS;  BCx  Urine for legionella/strep pneumonia antigen  Target SpO2 92 % to 96 %  Day 1 : Single  mg IV loading dose  Day 2-5 : single  mg IV once daily maintenance dose  Daily CBC,CMP.  Type and screen.  Convalescent plasma one unit over 2h.  Adverse events to watch out for generally within 4 hours of completion of infusion  TACO: transfusion associated circulatory overload  TRALI :Transfusion related acute lung injury  Severe allergic transfusion reactions  Dexamethasone 6 mg iv q24h for 10 days.  Monitor for side effects: hyperglycemia, neurological ( agitation/confusion), adrenal suppression, bacterial and fungal infections  COVID-19 antibodies  Rocephin 2 gm iv q24h     69 yo M with PMH of CAD, MI (s/p 4 stents), DM, HTN, BPH, hypothyroid and GERD, recently received Pfizer vaccine (1/3 and 1/24) presented to ED c/o progressive SOB, fever, and weakness. Patient stated that his symptoms began on Wednesday 2/3. COVID PCR + on 2/4. Admitted to medicine with acute hypoxemic respiratory failure secondary to COVID-19 PNA.  S/p pfizer vaccine 1/3 and booster 1/24  Routine nasal swab on 1/27 COVID-19 positive ( works in a [employer] )  Symptomatic since 2/3    IMPRESSION;  COVID 19 with severe illness. SpO2 < 94% on RA and need for supplemental O2. NRB  CTA 2/4 : extensive bilateral patchy ground-glass opacities involving all lobes most pronounced in the upper lung zones. No PE  Pt is in the early viral replicative phase based on the timeline/onset of symptoms.  procalcitonin 1.61 , not suggestive of a bacterial PNA.  Ferritin 404  CRP 31.59  Ddimers 364    RECOMMENDATIONS;  BCx  Urine for legionella/strep pneumonia antigen  Target SpO2 92 % to 96 %  Day 1 : Single  mg IV loading dose  Day 2-5 : single  mg IV once daily maintenance dose  Daily CBC,CMP.  Type and screen.  Convalescent plasma one unit over 2h.  Adverse events to watch out for generally within 4 hours of completion of infusion  TACO: transfusion associated circulatory overload  TRALI :Transfusion related acute lung injury  Severe allergic transfusion reactions  Dexamethasone 6 mg iv q24h for 10 days.  Monitor for side effects: hyperglycemia, neurological ( agitation/confusion), adrenal suppression, bacterial and fungal infections  COVID-19 antibodies  Rocephin 2 gm iv q24h    Discussed with Infection Control  Believe [employer] needs to be notified for genetic sequencing of the virus as pt has SEVERE DISEASE

## 2021-02-05 NOTE — CHART NOTE - NSCHARTNOTEFT_GEN_A_CORE
I made rounds today with the treatment team including the hospitalist, residents,  nurses and  and discussed the patient's current medical status and discharge  planning needs, and reviewed the chart.    T(C): 37.1 (02-05-21 @ 04:30), Max: 37.8 (02-04-21 @ 13:13)  HR: 95 (02-05-21 @ 04:30) (95 - 109)  BP: 130/63 (02-05-21 @ 04:30) (119/58 - 137/60)  RR: 18 (02-05-21 @ 04:30) (18 - 20)  SpO2: 93% (02-05-21 @ 07:51) (91% - 97%)          I reached out to the patient's health care proxy/ responsible family member-           [     ]  I reached                                     and discussed the patient's medical condition,                   family concerns, and discharge planning           [  x   ]  I left a message with family - Ami ( spouse ) [phone]               [     ]  I personally participated in rounds with the medical team and my resident and discussed the case. My resident reached                   family member/ HCP                                under my direction and supervision  and we reviewed the conversation          [     ]  My resident left a message with family under my direction and supervision                [     ]   My resident attended rounds and called the family     The following was discussed:          [     ] I spent 5-10 minutes on the above discussing medical issues with team members and family and/ or my resident    [ x    ] I spent 11-20 minutes on the above discussing medical issues with team members and family and/ or my resident    [     ] I spent 21-30 minutes on the above discussing medical issues with team members and family and/ or my resident

## 2021-02-05 NOTE — PROGRESS NOTE ADULT - ASSESSMENT
70 year old male with PMH of CAD, MI (s/p 4 stents), DM, HTN, BPH, hypothyroid and GERD, recently received Pfizer vaccine (1/3 and 1/24) presents to ED c/o progressive SOB, fever, and weakness x 4 days.  Pt also with recent positive COVID 19 swab + as outpatient.    # Shortness of Breath  # COVID-19 infection  - admit to medicine and transfer to [facility] for further management  - CTA showed extensive bilateral patchy ground-glass opacities involving all lobes most pronounced in the upper lung zones, suspicious for an atypical viral pneumonia such as COVID-19  - continue Decadron 6 mg x 10 days  - isolation precautions  - supplemental oxygen  - f/u ferritin, CPR, procalcitonin  - AM labs    # GEOVANNA   - likely 2/2 decrease po intake   - gentle IVF hydration  - encourage PO intake    #CAD/MI  - continue home medications    # HTN   - continue home medications  - monitor BP    # BPH  - continue Flomax    # DM  - monitor FS  - insulin sliding scale    #Hypothyroid  - continue Synthroid    DVT/GI ppx     69 yo M with PMH of CAD, MI (s/p 4 stents), DM, HTN, BPH, hypothyroid and GERD, recently received Pfizer vaccine (1/3 and 1/24) presented to ED c/o progressive SOB, fever, and weakness. Patient stated that his symptoms began on Wednesday 2/3. COVID PCR + on 2/4. Admitted to medicine with acute hypoxemic respiratory failure secondary to COVID-19 PNA.    #Acute hypoxemic respiratory failure secondary to COVID-19 PNA  - SpO2 91-95% on 6L N/C  - Titrate O2 to maintain SpO2 >92%  - CTA showed extensive bilateral patchy ground-glass opacities involving all lobes most pronounced in the upper lung zones  - C/w decadron 6mg IVP daily for total of 10 days  - C/w Remdesivir for 5 days (today is day 1)  - If COVID antibodies are negative, then may be able to offer convalescent plasma  - Monitor Cr, LFT's, glucose  - Inflammatory markers not greatly elevated  - Repeat inflammatory markers if clinically worsening  - F/u D-dimer, CRP, COVID antibodies  - Isolation precautions    #GEOVANNA   - Likely 2/2 decreased PO intake   - NS at 75 cc/hr  - Monitor BMP    #CAD  #MI s/p 4 stents  - C/w Effient 10mg daily  - C/w Toprol 25mg daily    #HTN   - C/w enalapril 2.5mg daily    #BPH  - C/w tamsulosin 0.4mg PO QHS    #DM  - Monitor FS  - C/w basal/bolus insulin regimen 20/7/7/7 + sliding scale    #Hypothyroidism  - C/w Synthroid    #Misc  Diet: consistent carbs, DASH/TLC  DVT PPx: heparin subq  Dispo: acute     69 yo M with PMH of CAD, MI (s/p 4 stents), DM, HTN, BPH, hypothyroid and GERD, recently received Pfizer vaccine (1/3 and 1/24) presented to ED c/o progressive SOB, fever, and weakness. Patient stated that his symptoms began on Wednesday 2/3. COVID PCR + on 2/4. Admitted to medicine with acute hypoxemic respiratory failure secondary to COVID-19 PNA.    #Acute hypoxemic respiratory failure secondary to COVID-19 PNA  - SpO2 91-95% on 6L N/C  - Titrate O2 to maintain SpO2 >92%  - CTA showed extensive bilateral patchy ground-glass opacities involving all lobes most pronounced in the upper lung zones  - C/w decadron 6mg IVP daily for total of 10 days  - C/w Remdesivir for 5 days (today is day 1)  - If COVID antibodies are negative, then may be able to offer convalescent plasma  - Monitor Cr, LFT's, glucose  - Inflammatory markers not greatly elevated  - Repeat inflammatory markers if clinically worsening  - F/u D-dimer, CRP, COVID antibodies  - Isolation precautions    #GEOVANNA   - Likely 2/2 decreased PO intake   - NS at 75 cc/hr  - Monitor BMP    #CAD  #MI s/p 4 stents  - C/w Effient 10mg daily  - C/w Toprol 25mg daily    #HTN   - C/w enalapril 2.5mg daily    #BPH  - C/w tamsulosin 0.4mg PO QHS    #DM  - Monitor FS  - C/w basal/bolus insulin regimen 20/7/7/7 + sliding scale    #Hypothyroidism  - C/w Synthroid    #Misc  Diet: consistent carbs, DASH/TLC  DVT PPx: Lovenox 40mg BID  Dispo: acute     71 yo M with PMH of CAD, MI (s/p 4 stents), DM, HTN, BPH, hypothyroid and GERD, recently received Pfizer vaccine (1/3 and 1/24) presented to ED c/o progressive SOB, fever, and weakness. Patient stated that his symptoms began on Wednesday 2/3. COVID PCR + on 2/4. Admitted to medicine with acute hypoxemic respiratory failure secondary to COVID-19 PNA.    #Acute hypoxemic respiratory failure secondary to COVID-19 PNA  - SpO2 91-95% on 6L N/C  - Titrate O2 to maintain SpO2 >92%  - CTA showed extensive bilateral patchy ground-glass opacities involving all lobes most pronounced in the upper lung zones  - C/w decadron 6mg IVP daily for total of 10 days  - C/w Remdesivir for 5 days (today is day 1)  - If COVID antibodies are negative, then may be able to offer convalescent plasma  - Monitor Cr, LFT's, glucose  - Inflammatory markers not greatly elevated  - Repeat inflammatory markers if clinically worsening  - F/u D-dimer, CRP, COVID antibodies  - Isolation precautions    #GEOVANNA   - Likely 2/2 decreased PO intake   - NS at 75 cc/hr  - Monitor BMP    #CAD  #MI s/p 4 stents  - C/w Effient 10mg daily  - C/w Toprol 25mg daily    #HTN   - C/w enalapril 2.5mg daily    #BPH  - C/w tamsulosin 0.4mg PO QHS    #DM  - Monitor FS  - C/w basal/bolus insulin regimen 20/7/7/7 + sliding scale  - A1C 9.1    #Hypothyroidism  - C/w Synthroid    #Misc  Diet: consistent carbs, DASH/TLC  DVT PPx: Lovenox 40mg BID  Dispo: acute     69 yo M with PMH of CAD, MI (s/p 4 stents), DM, HTN, BPH, hypothyroid and GERD, recently received Pfizer vaccine (1/3 and 1/24) presented to ED c/o progressive SOB, fever, and weakness. Patient stated that his symptoms began on Wednesday 2/3. COVID PCR + on 2/4. Admitted to medicine with acute hypoxemic respiratory failure secondary to COVID-19 PNA.    #Acute hypoxemic respiratory failure secondary to COVID-19 PNA  - Patient had already received 2 doses of the Pfizer vaccine. F/u ID consult.  - SpO2 91-95% on 6L N/C  - Titrate O2 to maintain SpO2 >92%  - CTA showed extensive bilateral patchy ground-glass opacities involving all lobes most pronounced in the upper lung zones  - C/w decadron 6mg IVP daily for total of 10 days  - C/w Remdesivir for 5 days (today is day 1)  - If COVID antibodies are negative, then may be able to offer convalescent plasma  - Monitor Cr, LFT's, glucose  - Inflammatory markers not greatly elevated  - Repeat inflammatory markers if clinically worsening  - F/u D-dimer, CRP, COVID antibodies  - Isolation precautions    #GEOVANNA   - Likely 2/2 decreased PO intake   - NS at 75 cc/hr  - Monitor BMP    #CAD  #MI s/p 4 stents  - C/w Effient 10mg daily  - C/w Toprol 25mg daily    #HTN   - C/w enalapril 2.5mg daily    #BPH  - C/w tamsulosin 0.4mg PO QHS    #DM  - Monitor FS  - C/w basal/bolus insulin regimen 20/7/7/7 + sliding scale  - A1C 9.1    #Hypothyroidism  - C/w Synthroid    #Misc  Diet: consistent carbs, DASH/TLC  DVT PPx: Lovenox 40mg BID  Dispo: acute     71 yo M with PMH of CAD, MI (s/p 4 stents), DM, HTN, BPH, hypothyroid and GERD, recently received Pfizer vaccine (1/3 and 1/24) presented to ED c/o progressive SOB, fever, and weakness. Patient stated that his symptoms began on Wednesday 2/3. COVID PCR + on 2/4. Admitted to medicine with acute hypoxemic respiratory failure secondary to COVID-19 PNA.    #Acute hypoxemic respiratory failure secondary to COVID-19 PNA  - Patient had already received 2 doses of the Pfizer vaccine  - ID recs appreciated  - SpO2 91-95% on 6L N/C  - Titrate O2 to maintain SpO2 >92%  - CTA showed extensive bilateral patchy ground-glass opacities involving all lobes most pronounced in the upper lung zones  - C/w decadron 6mg IVP daily for total of 10 days  - C/w Remdesivir for 5 days (today is day 1)  - F/u COVID antibodies and type and screen. Patient agreeable to receive convalescent plasma. Give one unit of convalescent plasma over 2 hours, as per ID.  - C/w Rocephin 2g Q24H, as per ID  - Patient agreeable to receive tocilizumab. F/u hepatitis panel and QuantiFeron.  - Monitor Cr, LFT's, glucose  - F/u repeat inflammatory markers  - Isolation precautions    #GEOVANNA   - Likely 2/2 decreased PO intake   - NS at 75 cc/hr  - Monitor BMP    #CAD  #MI s/p 4 stents  - C/w Effient 10mg daily  - C/w Toprol 25mg daily    #HTN   - C/w enalapril 2.5mg daily    #BPH  - C/w tamsulosin 0.4mg PO QHS    #DM  - Monitor FS  - C/w basal/bolus insulin regimen 20/7/7/7 + sliding scale  - A1C 9.1    #Hypothyroidism  - C/w Synthroid    #Misc  Diet: consistent carbs, DASH/TLC  DVT PPx: Lovenox 40mg BID  Dispo: acute

## 2021-02-05 NOTE — CONSULT NOTE ADULT - SUBJECTIVE AND OBJECTIVE BOX
ANDREE BUSH  70y, Male  Allergy: penicillin (Rash (Severe))      All historical available data reviewed.    HPI:  70 year old male with PMH of CAD, MI (s/p 4 stents), DM, HTN, BPH, hypothyroid and GERD presents to ED c/o progressive SOB, fever, and weakness x 4 days.  Pt also with recent positive COVID 19 swab + as outpatient. Pt had Pfizer COVID vaccine on 1/3 and 1/24. Pt reports chills, dry cough, and decreased po intake. He denies abd pain, N/V/D/C, lightheadedness CP.  In the ED, pt's T 37.8, . WBC 14, D-dimer 329, BUN/Cr 23/1.8, GFR 38, COVID positive.  CTA negative for PE but consistent with COVID 19 pneumonia.  Pt was given Decadron 6 mg x1 in the ED. pt saturating 99% on 3 L NC.   (04 Feb 2021 17:35)    FAMILY HISTORY:  Family history of myocardial infarction (Mother)      PAST MEDICAL & SURGICAL HISTORY:  Chronic kidney disease (CKD)  III    Type 2 diabetes mellitus without complication, unspecified long term insulin use status    Hypertension, unspecified type    Dyspnea, unspecified type    ASHD (arteriosclerotic heart disease)  STEMI 12/31/17, PCI RCA    S/P cataract surgery    History of ligation of vein  2012    History of tonsillectomy  1957          VITALS:  T(F): 98.7, Max: 99.7 (02-04-21 @ 17:55)  HR: 95  BP: 130/63  RR: 18Vital Signs Last 24 Hrs  T(C): 37.1 (05 Feb 2021 04:30), Max: 37.6 (04 Feb 2021 17:55)  T(F): 98.7 (05 Feb 2021 04:30), Max: 99.7 (04 Feb 2021 17:55)  HR: 95 (05 Feb 2021 04:30) (95 - 105)  BP: 130/63 (05 Feb 2021 04:30) (121/84 - 137/60)  BP(mean): --  RR: 18 (05 Feb 2021 04:30) (18 - 20)  SpO2: 93% (05 Feb 2021 12:52) (84% - 96%)    TESTS & MEASUREMENTS:                        13.0   15.60 )-----------( 173      ( 05 Feb 2021 05:47 )             38.4     02-05    136  |  98  |  28<H>  ----------------------------<  292<H>  4.0   |  24  |  1.5    Ca    8.6      05 Feb 2021 11:53    TPro  6.4  /  Alb  3.7  /  TBili  0.7  /  DBili  x   /  AST  27  /  ALT  27  /  AlkPhos  91  02-04    LIVER FUNCTIONS - ( 04 Feb 2021 14:13 )  Alb: 3.7 g/dL / Pro: 6.4 g/dL / ALK PHOS: 91 U/L / ALT: 27 U/L / AST: 27 U/L / GGT: x                   RADIOLOGY & ADDITIONAL TESTS:  Personal review of radiological diagnostics performed  Echo and EKG results noted when applicable.     MEDICATIONS:  acetaminophen   Tablet .. 650 milliGRAM(s) Oral every 6 hours PRN  dexAMETHasone  Injectable 6 milliGRAM(s) IV Push daily  dextrose 40% Gel 15 Gram(s) Oral once  dextrose 5%. 1000 milliLiter(s) IV Continuous <Continuous>  dextrose 5%. 1000 milliLiter(s) IV Continuous <Continuous>  dextrose 50% Injectable 25 Gram(s) IV Push once  dextrose 50% Injectable 12.5 Gram(s) IV Push once  dextrose 50% Injectable 25 Gram(s) IV Push once  enalapril 2.5 milliGRAM(s) Oral daily  enoxaparin Injectable 40 milliGRAM(s) SubCutaneous two times a day  glucagon  Injectable 1 milliGRAM(s) IntraMuscular once  guaifenesin/dextromethorphan  Syrup 5 milliLiter(s) Oral every 6 hours PRN  influenza   Vaccine 0.5 milliLiter(s) IntraMuscular once  insulin glargine Injectable (LANTUS) 20 Unit(s) SubCutaneous at bedtime  insulin lispro (ADMELOG) corrective regimen sliding scale   SubCutaneous three times a day before meals  insulin lispro Injectable (ADMELOG) 7 Unit(s) SubCutaneous three times a day before meals  levothyroxine 50 MICROGram(s) Oral daily  metoprolol succinate ER 25 milliGRAM(s) Oral daily  ondansetron    Tablet 4 milliGRAM(s) Oral two times a day PRN  prasugrel 10 milliGRAM(s) Oral daily  remdesivir  IVPB   IV Intermittent   sodium chloride 0.9%. 1000 milliLiter(s) IV Continuous <Continuous>  tamsulosin 0.4 milliGRAM(s) Oral at bedtime      ANTIBIOTICS:  remdesivir  IVPB   IV Intermittent

## 2021-02-06 LAB
ALBUMIN SERPL ELPH-MCNC: 3.2 G/DL — LOW (ref 3.5–5.2)
ALP SERPL-CCNC: 91 U/L — SIGNIFICANT CHANGE UP (ref 30–115)
ALT FLD-CCNC: 40 U/L — SIGNIFICANT CHANGE UP (ref 0–41)
ANION GAP SERPL CALC-SCNC: 11 MMOL/L — SIGNIFICANT CHANGE UP (ref 7–14)
APTT BLD: 36.3 SEC — SIGNIFICANT CHANGE UP (ref 27–39.2)
AST SERPL-CCNC: 52 U/L — HIGH (ref 0–41)
BASOPHILS # BLD AUTO: 0.02 K/UL — SIGNIFICANT CHANGE UP (ref 0–0.2)
BASOPHILS NFR BLD AUTO: 0.2 % — SIGNIFICANT CHANGE UP (ref 0–1)
BILIRUB SERPL-MCNC: 1.2 MG/DL — SIGNIFICANT CHANGE UP (ref 0.2–1.2)
BUN SERPL-MCNC: 29 MG/DL — HIGH (ref 10–20)
CALCIUM SERPL-MCNC: 8.3 MG/DL — LOW (ref 8.5–10.1)
CHLORIDE SERPL-SCNC: 99 MMOL/L — SIGNIFICANT CHANGE UP (ref 98–110)
CK SERPL-CCNC: 99 U/L — SIGNIFICANT CHANGE UP (ref 0–225)
CO2 SERPL-SCNC: 26 MMOL/L — SIGNIFICANT CHANGE UP (ref 17–32)
CREAT SERPL-MCNC: 1.3 MG/DL — SIGNIFICANT CHANGE UP (ref 0.7–1.5)
CRP SERPL-MCNC: 22.67 MG/DL — HIGH (ref 0–0.4)
CRP SERPL-MCNC: 23.95 MG/DL — HIGH (ref 0–0.4)
D DIMER BLD IA.RAPID-MCNC: 420 NG/ML DDU — HIGH (ref 0–230)
EOSINOPHIL # BLD AUTO: 0 K/UL — SIGNIFICANT CHANGE UP (ref 0–0.7)
EOSINOPHIL NFR BLD AUTO: 0 % — SIGNIFICANT CHANGE UP (ref 0–8)
FERRITIN SERPL-MCNC: 625 NG/ML — HIGH (ref 30–400)
GAS PNL BLDA: SIGNIFICANT CHANGE UP
GAS PNL BLDA: SIGNIFICANT CHANGE UP
GLUCOSE BLDC GLUCOMTR-MCNC: 175 MG/DL — HIGH (ref 70–99)
GLUCOSE BLDC GLUCOMTR-MCNC: 196 MG/DL — HIGH (ref 70–99)
GLUCOSE BLDC GLUCOMTR-MCNC: 282 MG/DL — HIGH (ref 70–99)
GLUCOSE BLDC GLUCOMTR-MCNC: 313 MG/DL — HIGH (ref 70–99)
GLUCOSE SERPL-MCNC: 215 MG/DL — HIGH (ref 70–99)
HCT VFR BLD CALC: 40.9 % — LOW (ref 42–52)
HGB BLD-MCNC: 13.1 G/DL — LOW (ref 14–18)
IMM GRANULOCYTES NFR BLD AUTO: 1.1 % — HIGH (ref 0.1–0.3)
INR BLD: 1.3 RATIO — SIGNIFICANT CHANGE UP (ref 0.65–1.3)
LACTATE SERPL-SCNC: 1.8 MMOL/L — SIGNIFICANT CHANGE UP (ref 0.7–2)
LDH SERPL L TO P-CCNC: 639 U/L — HIGH (ref 50–242)
LYMPHOCYTES # BLD AUTO: 0.59 K/UL — LOW (ref 1.2–3.4)
LYMPHOCYTES # BLD AUTO: 4.5 % — LOW (ref 20.5–51.1)
MAGNESIUM SERPL-MCNC: 2.2 MG/DL — SIGNIFICANT CHANGE UP (ref 1.8–2.4)
MCHC RBC-ENTMCNC: 25 PG — LOW (ref 27–31)
MCHC RBC-ENTMCNC: 32 G/DL — SIGNIFICANT CHANGE UP (ref 32–37)
MCV RBC AUTO: 78.1 FL — LOW (ref 80–94)
MONOCYTES # BLD AUTO: 0.65 K/UL — HIGH (ref 0.1–0.6)
MONOCYTES NFR BLD AUTO: 4.9 % — SIGNIFICANT CHANGE UP (ref 1.7–9.3)
NEUTROPHILS # BLD AUTO: 11.81 K/UL — HIGH (ref 1.4–6.5)
NEUTROPHILS NFR BLD AUTO: 89.3 % — HIGH (ref 42.2–75.2)
NRBC # BLD: 0 /100 WBCS — SIGNIFICANT CHANGE UP (ref 0–0)
PHOSPHATE SERPL-MCNC: 2.8 MG/DL — SIGNIFICANT CHANGE UP (ref 2.1–4.9)
PLATELET # BLD AUTO: 223 K/UL — SIGNIFICANT CHANGE UP (ref 130–400)
POTASSIUM SERPL-MCNC: 4.4 MMOL/L — SIGNIFICANT CHANGE UP (ref 3.5–5)
POTASSIUM SERPL-SCNC: 4.4 MMOL/L — SIGNIFICANT CHANGE UP (ref 3.5–5)
PROCALCITONIN SERPL-MCNC: 0.97 NG/ML — HIGH (ref 0.02–0.1)
PROT SERPL-MCNC: 6 G/DL — SIGNIFICANT CHANGE UP (ref 6–8)
PROTHROM AB SERPL-ACNC: 15 SEC — HIGH (ref 9.95–12.87)
RBC # BLD: 5.24 M/UL — SIGNIFICANT CHANGE UP (ref 4.7–6.1)
RBC # FLD: 15.3 % — HIGH (ref 11.5–14.5)
SARS-COV-2 IGG SERPL QL IA: NEGATIVE — SIGNIFICANT CHANGE UP
SARS-COV-2 IGM SERPL IA-ACNC: <0.1 INDEX — SIGNIFICANT CHANGE UP
SODIUM SERPL-SCNC: 136 MMOL/L — SIGNIFICANT CHANGE UP (ref 135–146)
TROPONIN T SERPL-MCNC: <0.01 NG/ML — SIGNIFICANT CHANGE UP
WBC # BLD: 13.21 K/UL — HIGH (ref 4.8–10.8)
WBC # FLD AUTO: 13.21 K/UL — HIGH (ref 4.8–10.8)

## 2021-02-06 PROCEDURE — 99233 SBSQ HOSP IP/OBS HIGH 50: CPT | Mod: CS

## 2021-02-06 PROCEDURE — 71045 X-RAY EXAM CHEST 1 VIEW: CPT | Mod: 26

## 2021-02-06 RX ORDER — GUAIFENESIN/DEXTROMETHORPHAN 600MG-30MG
10 TABLET, EXTENDED RELEASE 12 HR ORAL EVERY 6 HOURS
Refills: 0 | Status: COMPLETED | OUTPATIENT
Start: 2021-02-06 | End: 2021-02-11

## 2021-02-06 RX ORDER — TOCILIZUMAB 20 MG/ML
400 INJECTION, SOLUTION, CONCENTRATE INTRAVENOUS ONCE
Refills: 0 | Status: DISCONTINUED | OUTPATIENT
Start: 2021-02-06 | End: 2021-02-06

## 2021-02-06 RX ORDER — GUAIFENESIN/DEXTROMETHORPHAN 600MG-30MG
10 TABLET, EXTENDED RELEASE 12 HR ORAL EVERY 6 HOURS
Refills: 0 | Status: DISCONTINUED | OUTPATIENT
Start: 2021-02-06 | End: 2021-02-06

## 2021-02-06 RX ORDER — SODIUM CHLORIDE 9 MG/ML
1000 INJECTION, SOLUTION INTRAVENOUS
Refills: 0 | Status: DISCONTINUED | OUTPATIENT
Start: 2021-02-06 | End: 2021-02-07

## 2021-02-06 RX ADMIN — Medication 60 MILLIGRAM(S): at 23:22

## 2021-02-06 RX ADMIN — REMDESIVIR 500 MILLIGRAM(S): 5 INJECTION INTRAVENOUS at 13:50

## 2021-02-06 RX ADMIN — ENOXAPARIN SODIUM 40 MILLIGRAM(S): 100 INJECTION SUBCUTANEOUS at 17:48

## 2021-02-06 RX ADMIN — Medication 2.5 MILLIGRAM(S): at 05:14

## 2021-02-06 RX ADMIN — Medication 3: at 12:02

## 2021-02-06 RX ADMIN — Medication 100 MILLIGRAM(S): at 23:21

## 2021-02-06 RX ADMIN — TAMSULOSIN HYDROCHLORIDE 0.4 MILLIGRAM(S): 0.4 CAPSULE ORAL at 23:21

## 2021-02-06 RX ADMIN — Medication 1: at 09:10

## 2021-02-06 RX ADMIN — Medication 6 MILLIGRAM(S): at 05:14

## 2021-02-06 RX ADMIN — CEFTRIAXONE 100 MILLIGRAM(S): 500 INJECTION, POWDER, FOR SOLUTION INTRAMUSCULAR; INTRAVENOUS at 13:50

## 2021-02-06 RX ADMIN — Medication 10 MILLILITER(S): at 12:30

## 2021-02-06 RX ADMIN — Medication 10 MILLILITER(S): at 23:22

## 2021-02-06 RX ADMIN — Medication 7 UNIT(S): at 17:49

## 2021-02-06 RX ADMIN — Medication 7 UNIT(S): at 12:02

## 2021-02-06 RX ADMIN — ENOXAPARIN SODIUM 40 MILLIGRAM(S): 100 INJECTION SUBCUTANEOUS at 05:14

## 2021-02-06 RX ADMIN — Medication 25 MILLIGRAM(S): at 05:14

## 2021-02-06 RX ADMIN — PRASUGREL 10 MILLIGRAM(S): 5 TABLET, FILM COATED ORAL at 13:53

## 2021-02-06 RX ADMIN — CEFTRIAXONE 100 MILLIGRAM(S): 500 INJECTION, POWDER, FOR SOLUTION INTRAMUSCULAR; INTRAVENOUS at 17:46

## 2021-02-06 RX ADMIN — Medication 10 MILLILITER(S): at 17:47

## 2021-02-06 RX ADMIN — Medication 100 MILLIGRAM(S): at 13:51

## 2021-02-06 RX ADMIN — Medication 60 MILLIGRAM(S): at 13:51

## 2021-02-06 RX ADMIN — Medication 7 UNIT(S): at 09:09

## 2021-02-06 RX ADMIN — Medication 50 MICROGRAM(S): at 05:14

## 2021-02-06 RX ADMIN — Medication 1: at 17:49

## 2021-02-06 NOTE — PROGRESS NOTE ADULT - ASSESSMENT
71 yo M with PMH of CAD, MI (s/p 4 stents), DM, HTN, BPH, hypothyroid and GERD, recently received Pfizer vaccine (1/3 and 1/24) presented to ED c/o progressive SOB, fever, and weakness. Patient stated that his symptoms began on Wednesday 2/3. COVID PCR + on 2/4. Admitted to medicine with acute hypoxemic respiratory failure secondary to COVID-19 PNA.    #Acute hypoxemic respiratory failure secondary to COVID-19 PNA  - Patient had already received 2 doses of the Pfizer vaccine  - ID recs appreciated  - SpO2 91-92% on NRB >> Currently on HFNC  - CTA showed extensive bilateral patchy ground-glass opacities involving all lobes most pronounced in the upper lung zones  - Started on  decadron 6mg IVP daily for total of 10 days  - C/w Remdesivir for 5 days.   - F/u COVID antibodies and type and screen.    Patient agreeable to receive convalescent plasma.  - C/w Rocephin 2g Q24H, Procal 1.61  - Patient agreeable to receive tocilizumab.   - Monitor Cr, LFT's, glucose  -  inflammatory markers Noted >>> Ferritin 404, Ddimer >> 420 , CRP 30  - Isolation precautions    #GEOVANNA  >> Improving  - Likely 2/2 decreased PO intake   - NS at 75 cc/hr  - Monitor BMP    #CAD  #MI s/p 4 stents  - C/w Effient 10mg daily  - C/w Toprol 25mg daily    #HTN   - C/w enalapril 2.5mg daily    #BPH  - C/w tamsulosin 0.4mg PO QHS    #DM  - Monitor FS  - C/w basal/bolus insulin regimen 20/7/7/7 + sliding scale  - A1C 9.1    #Hypothyroidism  - C/w Synthroid    #Misc  Diet: consistent carbs, DASH/TLC  DVT PPx: Lovenox 40mg BID  Dispo: acute    #Progress Note Handoff  Pending (specify):  Clinical improvement  Family discussion: With Cousin Dr Gonzalez  @ [phone]  Disposition: Home_

## 2021-02-06 NOTE — CHART NOTE - NSCHARTNOTEFT_GEN_A_CORE
Upgarde to ICU  Reason: Acute hypoxemic respiratory failure 2/2 COVID-19 pneumonia - severe illness    This is a 71 yo M with PMH of CAD, MI (s/p 4 stents), DM, HTN, BPH, hypothyroid and GERD, recently received Pfizer vaccine (1/3 and 1/24) presented to ED c/o progressive SOB, fever, and weakness. Patient stated that his symptoms began on Wednesday 2/3. COVID PCR + on 2/4. Admitted to medicine with acute hypoxemic respiratory failure secondary to COVID-19 PNA. Being upgraded to ICU due to increasing oxygen requirements.     #Acute hypoxemic respiratory failure secondary to COVID-19 PNA - severe illness  - Patient had already received 2 doses of the Pfizer vaccine  - ID recs appreciated  - Sating 88% on 60L/100% HFNC as seen on ABG, will place on bipap alternating with HFNC  - CTA showed extensive bilateral patchy ground-glass opacities involving all lobes most pronounced in the upper lung zones  - CXR shows BL patchy opacities   - switched dexa to solumedrol 60mg IV q8h per pulm  - C/w Remdesivir for 5 days (today is day 2)  - Give one unit of convalescent plasma over 2 hours per ID.  - C/w Rocephin 2g Q24H. Added Levofloxacin 750mg IV q24h  - d/c tocilizumab per pulm   - Monitor Cr, LFT's, glucose  - trend inflammatory markers  - evaluated by critical care, to be upgraded to ICU     #GEOVANNA - resolved  - Likely 2/2 decreased PO intake   - holding fluids for now as GEOVANNA resolved.  - Monitor BMP    #CAD  #MI s/p 4 stents  - C/w Effient 10mg daily  - C/w Toprol 25mg daily    #HTN   - C/w enalapril 2.5mg daily    #BPH  - C/w tamsulosin 0.4mg PO QHS    #DM  - Monitor FS  - C/w basal/bolus insulin regimen 20/7/7/7 + sliding scale  - A1C 9.1    #Hypothyroidism  - C/w Synthroid    #Misc  Diet: consistent carbs, DASH/TLC. Pureed diet per patient request   DVT PPx: Lovenox 40mg BID  Dispo: upgrade to ICU Upgarde to ICU  Reason: Acute hypoxemic respiratory failure 2/2 COVID-19 pneumonia - severe illness    This is a 71 yo M with PMH of CAD, MI (s/p 4 stents), DM, HTN, BPH, hypothyroid and GERD, recently received Pfizer vaccine (1/3 and 1/24) presented to ED c/o progressive SOB, fever, and weakness. Patient stated that his symptoms began on Wednesday 2/3. COVID PCR + on 2/4. Admitted to medicine with acute hypoxemic respiratory failure secondary to COVID-19 PNA. Being upgraded to ICU due to increasing oxygen requirements.     #Acute hypoxemic respiratory failure secondary to COVID-19 PNA - severe illness  - Patient had already received 2 doses of the Pfizer vaccine  - ID recs appreciated  - Sating 88% on 60L/100% HFNC as seen on ABG, will place on bipap alternating with HFNC  - placed on bipap at 12pm today, will repeat abg now   - CTA showed extensive bilateral patchy ground-glass opacities involving all lobes most pronounced in the upper lung zones  - CXR shows BL patchy opacities   - switched dexa to solumedrol 60mg IV q8h per pulm  - C/w Remdesivir for 5 days (today is day 2)  - Give one unit of convalescent plasma over 2 hours per ID.  - C/w Rocephin 2g Q24H. Added Levofloxacin 750mg IV q24h  - d/c tocilizumab per pulm   - Monitor Cr, LFT's, glucose  - trend inflammatory markers  - evaluated by critical care, to be upgraded to ICU     #GEOVANNA - resolved  - Likely 2/2 decreased PO intake   - holding fluids for now as GEOVANNA resolved.  - Monitor BMP    #CAD  #MI s/p 4 stents  - C/w Effient 10mg daily  - C/w Toprol 25mg daily    #HTN   - C/w enalapril 2.5mg daily    #BPH  - C/w tamsulosin 0.4mg PO QHS    #DM  - Monitor FS  - C/w basal/bolus insulin regimen 20/7/7/7 + sliding scale  - A1C 9.1    #Hypothyroidism  - C/w Synthroid    #Misc  Diet: consistent carbs, DASH/TLC. Pureed diet per patient request   DVT PPx: Lovenox 40mg BID  Dispo: upgrade to ICU    Signed out to Dr. Mi

## 2021-02-06 NOTE — CONSULT NOTE ADULT - SUBJECTIVE AND OBJECTIVE BOX
Patient is a 70y old  Male who presents with a chief complaint of shortness of breath (05 Feb 2021 11:51)      HPI:  70 year old male with PMH of CAD, MI (s/p 4 stents), DM, HTN, BPH, hypothyroid and GERD presents to ED c/o progressive SOB, fever, and weakness x 4 days.  Pt also with recent positive COVID 19 swab + as outpatient. Pt had Pfizer COVID vaccine on 1/3 and 1/24. Pt reports chills, dry cough, and decreased po intake. He denies abd pain, N/V/D/C, lightheadedness CP.  In the ED, pt's T 37.8, . WBC 14, D-dimer 329, BUN/Cr 23/1.8, GFR 38, COVID positive.  CTA negative for PE but consistent with COVID 19 pneumonia.  Pt was given Decadron 6 mg x1 in the ED. pt saturating 99% on 3 L NC.   (04 Feb 2021 17:35)      PAST MEDICAL & SURGICAL HISTORY:  Chronic kidney disease (CKD)  III    Type 2 diabetes mellitus without complication, unspecified long term insulin use status    Hypertension, unspecified type    Dyspnea, unspecified type    ASHD (arteriosclerotic heart disease)  STEMI 12/31/17, PCI RCA    S/P cataract surgery    History of ligation of vein  2012    History of tonsillectomy  1957        SOCIAL HX:   Smoking  former smoker (>30 py smoking history)                       ETOH                            Other    FAMILY HISTORY:  Family history of myocardial infarction (Mother)    :  No known cardiovacular family hisotry     Review Of Systems:     All ROS are negative except per HPI       Allergies    penicillin (Rash (Severe))    Intolerances          PHYSICAL EXAM    ICU Vital Signs Last 24 Hrs  T(C): 36.7 (06 Feb 2021 06:01), Max: 36.7 (06 Feb 2021 06:01)  T(F): 98.1 (06 Feb 2021 06:01), Max: 98.1 (06 Feb 2021 06:01)  HR: 94 (06 Feb 2021 06:01) (86 - 94)  BP: 125/65 (06 Feb 2021 06:01) (125/65 - 125/69)  BP(mean): --  ABP: --  ABP(mean): --  RR: 21 (05 Feb 2021 22:01) (21 - 21)  SpO2: 92% (06 Feb 2021 06:01) (92% - 94%)      CONSTITUTIONAL:  Well nourished.   NAD    ENT:   Airway patent,   Mouth with normal mucosa.   No thrush      CARDIAC:   Normal rate,   Regular rhythm.    No edema      Vascular:   normal systolic impulse  no bruits    RESPIRATORY:   + wheezing  Bilateral BS   Not tachypneic,  No use of accessory muscles  diffuse crackles    GASTROINTESTINAL:  Abdomen soft,   Non-tender,   No guarding,       NEUROLOGICAL:   Alert and oriented   No motor deficits.    SKIN:   Skin normal color for race,   No evidence of rash.      HEME LYMPH: .  No cervical  lymphadenopathy.  No inguinal lymphadenopathy              LABS:                          13.1   13.21 )-----------( 223      ( 06 Feb 2021 08:12 )             40.9                                               02-06    136  |  99  |  29<H>  ----------------------------<  215<H>  4.4   |  26  |  1.3    Ca    8.3<L>      06 Feb 2021 08:12  Phos  2.8     02-06  Mg     2.2     02-06    TPro  6.0  /  Alb  3.2<L>  /  TBili  1.2  /  DBili  x   /  AST  52<H>  /  ALT  40  /  AlkPhos  91  02-06      PT/INR - ( 06 Feb 2021 08:12 )   PT: 15.00 sec;   INR: 1.30 ratio         PTT - ( 06 Feb 2021 08:12 )  PTT:36.3 sec                                           CARDIAC MARKERS ( 06 Feb 2021 08:12 )  x     / <0.01 ng/mL / 99 U/L / x     / x                                                LIVER FUNCTIONS - ( 06 Feb 2021 08:12 )  Alb: 3.2 g/dL / Pro: 6.0 g/dL / ALK PHOS: 91 U/L / ALT: 40 U/L / AST: 52 U/L / GGT: x                                                                                                                                   ABG - ( 06 Feb 2021 14:29 )  pH, Arterial: 7.43  pH, Blood: x     /  pCO2: 41    /  pO2: 56    / HCO3: 27    / Base Excess: 2.5   /  SaO2: 90                  X-Rays reviewed                                                                                     ECHO    CXR interpreted by me     MEDICATIONS  (STANDING):  benzonatate 100 milliGRAM(s) Oral every 8 hours  cefTRIAXone   IVPB      cefTRIAXone   IVPB 2000 milliGRAM(s) IV Intermittent once  cefTRIAXone   IVPB 2000 milliGRAM(s) IV Intermittent every 24 hours  dextrose 40% Gel 15 Gram(s) Oral once  dextrose 5%. 1000 milliLiter(s) (50 mL/Hr) IV Continuous <Continuous>  dextrose 5%. 1000 milliLiter(s) (100 mL/Hr) IV Continuous <Continuous>  dextrose 50% Injectable 25 Gram(s) IV Push once  dextrose 50% Injectable 12.5 Gram(s) IV Push once  dextrose 50% Injectable 25 Gram(s) IV Push once  enoxaparin Injectable 40 milliGRAM(s) SubCutaneous two times a day  glucagon  Injectable 1 milliGRAM(s) IntraMuscular once  guaifenesin/dextromethorphan  Syrup 10 milliLiter(s) Oral every 6 hours  influenza   Vaccine 0.5 milliLiter(s) IntraMuscular once  insulin glargine Injectable (LANTUS) 20 Unit(s) SubCutaneous at bedtime  insulin lispro (ADMELOG) corrective regimen sliding scale   SubCutaneous three times a day before meals  insulin lispro Injectable (ADMELOG) 7 Unit(s) SubCutaneous three times a day before meals  levoFLOXacin IVPB      levoFLOXacin IVPB 750 milliGRAM(s) IV Intermittent once  levothyroxine 50 MICROGram(s) Oral daily  methylPREDNISolone sodium succinate Injectable 60 milliGRAM(s) IV Push every 8 hours  metoprolol succinate ER 25 milliGRAM(s) Oral daily  prasugrel 10 milliGRAM(s) Oral daily  remdesivir  IVPB   IV Intermittent   remdesivir  IVPB 100 milliGRAM(s) IV Intermittent every 24 hours  tamsulosin 0.4 milliGRAM(s) Oral at bedtime    MEDICATIONS  (PRN):  acetaminophen   Tablet .. 650 milliGRAM(s) Oral every 6 hours PRN Temp greater or equal to 38C (100.4F), Mild Pain (1 - 3)  guaifenesin/dextromethorphan  Syrup 5 milliLiter(s) Oral every 6 hours PRN Cough  ondansetron    Tablet 4 milliGRAM(s) Oral two times a day PRN Nausea and/or Vomiting

## 2021-02-06 NOTE — PROGRESS NOTE ADULT - SUBJECTIVE AND OBJECTIVE BOX
Patient is a 70y old  Male who presents with a chief complaint of shortness of breath (05 Feb 2021 11:51)      HPI:  70 year old male with PMH of CAD, MI (s/p 4 stents), DM, HTN, BPH, hypothyroid and GERD presents to ED c/o progressive SOB, fever, and weakness x 4 days.  Pt also with recent positive COVID 19 swab + as outpatient. Pt had Pfizer COVID vaccine on 1/3 and 1/24. Pt reports chills, dry cough, and decreased po intake. He denies abd pain, N/V/D/C, lightheadedness CP.  In the ED, pt's T 37.8, . WBC 14, D-dimer 329, BUN/Cr 23/1.8, GFR 38, COVID positive.  CTA negative for PE but consistent with COVID 19 pneumonia.  Pt was given Decadron 6 mg x1 in the ED. pt saturating 99% on 3 L NC.   (04 Feb 2021 17:35)      PAST MEDICAL & SURGICAL HISTORY:  Chronic kidney disease (CKD)  III    Type 2 diabetes mellitus without complication, unspecified long term insulin use status    Hypertension, unspecified type    Dyspnea, unspecified type    ASHD (arteriosclerotic heart disease)  STEMI 12/31/17, PCI RCA    S/P cataract surgery    History of ligation of vein  2012    History of tonsillectomy  1957        Home Medications:  enalapril 2.5 mg oral tablet: 1 tab(s) orally once a day (04 Feb 2021 17:59)  ezetimibe 10 mg oral tablet: 1 tab(s) orally once a day (04 Feb 2021 17:59)  Flomax 0.4 mg oral capsule: 1 cap(s) orally once a day (04 Feb 2021 17:59)  glipiZIDE 5 mg oral tablet: 1 tab(s) orally once a day (04 Feb 2021 17:59)  metoprolol succinate 25 mg oral tablet, extended release: 1 tab(s) orally once a day (04 Feb 2021 17:59)  Synthroid 50 mcg (0.05 mg) oral tablet: 1 tab(s) orally once a day (04 Feb 2021 17:59)      Allergies:  penicillin (Rash (Severe))      FAMILY HISTORY:  Family history of myocardial infarction (Mother)        Hospital Medications:  acetaminophen   Tablet .. 650 milliGRAM(s) Oral every 6 hours PRN  benzonatate 100 milliGRAM(s) Oral every 8 hours  cefTRIAXone   IVPB      cefTRIAXone   IVPB 2000 milliGRAM(s) IV Intermittent once  cefTRIAXone   IVPB 2000 milliGRAM(s) IV Intermittent every 24 hours  dexAMETHasone  Injectable 6 milliGRAM(s) IV Push daily  dextrose 40% Gel 15 Gram(s) Oral once  dextrose 5%. 1000 milliLiter(s) IV Continuous <Continuous>  dextrose 5%. 1000 milliLiter(s) IV Continuous <Continuous>  dextrose 50% Injectable 25 Gram(s) IV Push once  dextrose 50% Injectable 12.5 Gram(s) IV Push once  dextrose 50% Injectable 25 Gram(s) IV Push once  enoxaparin Injectable 40 milliGRAM(s) SubCutaneous two times a day  glucagon  Injectable 1 milliGRAM(s) IntraMuscular once  guaifenesin/dextromethorphan  Syrup 5 milliLiter(s) Oral every 6 hours PRN  guaifenesin/dextromethorphan  Syrup 10 milliLiter(s) Oral every 6 hours  influenza   Vaccine 0.5 milliLiter(s) IntraMuscular once  insulin glargine Injectable (LANTUS) 20 Unit(s) SubCutaneous at bedtime  insulin lispro (ADMELOG) corrective regimen sliding scale   SubCutaneous three times a day before meals  insulin lispro Injectable (ADMELOG) 7 Unit(s) SubCutaneous three times a day before meals  levoFLOXacin IVPB      levothyroxine 50 MICROGram(s) Oral daily  methylPREDNISolone sodium succinate Injectable 60 milliGRAM(s) IV Push every 8 hours  metoprolol succinate ER 25 milliGRAM(s) Oral daily  ondansetron    Tablet 4 milliGRAM(s) Oral two times a day PRN  prasugrel 10 milliGRAM(s) Oral daily  remdesivir  IVPB   IV Intermittent   remdesivir  IVPB 100 milliGRAM(s) IV Intermittent every 24 hours  tamsulosin 0.4 milliGRAM(s) Oral at bedtime      Vital Signs Last 24 Hrs  T(C): 36.7 (06 Feb 2021 06:01), Max: 38.7 (05 Feb 2021 13:50)  T(F): 98.1 (06 Feb 2021 06:01), Max: 101.7 (05 Feb 2021 13:50)  HR: 94 (06 Feb 2021 06:01) (86 - 99)  BP: 125/65 (06 Feb 2021 06:01) (125/65 - 130/65)  BP(mean): --  RR: 21 (05 Feb 2021 22:01) (18 - 21)  SpO2: 92% (06 Feb 2021 06:01) (84% - 94%)    I&O's Summary      LABS:                        13.1   13.21 )-----------( 223      ( 06 Feb 2021 08:12 )             40.9       02-06    136  |  99  |  29<H>  ----------------------------<  215<H>  4.4   |  26  |  1.3    Ca    8.3<L>      06 Feb 2021 08:12  Phos  2.8     02-06  Mg     2.2     02-06    TPro  6.0  /  Alb  3.2<L>  /  TBili  1.2  /  DBili  x   /  AST  52<H>  /  ALT  40  /  AlkPhos  91  02-06      CARDIAC MARKERS ( 06 Feb 2021 08:12 )  x     / <0.01 ng/mL / 99 U/L / x     / x      CARDIAC MARKERS ( 04 Feb 2021 14:13 )  x     / 0.02 ng/mL / x     / x     / x                PHYSICAL EXAM:  GENERAL: NAD, lying in bed on HFNC, dyspneic   HEAD:  Atraumatic, Normocephalic  EYES: EOMI, conjunctiva and sclera clear  ENT: Moist mucous membranes  NECK: Supple, No JVD  CHEST/LUNG: Crackles heard BL  HEART: Regular rate and rhythm; No murmurs, rubs, or gallops  ABDOMEN: Bowel sounds present; Soft, Nontender, Nondistended. No hepatomegaly   EXTREMITIES: No clubbing, cyanosis, or edema  NERVOUS SYSTEM:  Alert & Oriented X3, speech clear. No deficits     IMAGES:    < from: Xray Chest 1 View- PORTABLE-Urgent (02.04.21 @ 14:34) >  IMPRESSION:    Bilateral perihilar patchy airspace opacities.    < from: CT Angio Chest PE Protocol w/ IV Cont (02.04.21 @ 16:32) >  IMPRESSION:  1.  No acute central or lobar pulmonary embolus.    2.  Extensive bilateral patchy groundglass opacities involving all lobes most pronounced in the upper lung zones. Findings are suspicious for an atypical viral pneumonia such as COVID-19.

## 2021-02-06 NOTE — PROGRESS NOTE ADULT - SUBJECTIVE AND OBJECTIVE BOX
ANDREE BUSH  70y  Male      Patient is a 70y old  Male who presents with a chief complaint of shortness of breath .      INTERVAL HPI/OVERNIGHT EVENTS: The patient was seen and examined at bedside.      ******************************* REVIEW OF SYSTEMS:**********************************************    All other review of systems negative    *********************** VITALS ******************************************    T(F): 98.1 (02-06-21 @ 06:01)  HR: 94 (02-06-21 @ 06:01) (86 - 99)  BP: 125/65 (02-06-21 @ 06:01) (125/65 - 130/65)  RR: 21 (02-05-21 @ 22:01) (18 - 21)  SpO2: 92% (02-06-21 @ 06:01) (84% - 94%)            ******************************** PHYSICAL EXAM:**************************************************  GENERAL: NAD    PSYCH: no agitation, baseline mentation  HEENT:     NERVOUS SYSTEM:  Alert & Oriented X3,   PULMONARY: Decreased  MIKE,     CARDIOVASCULAR: S1S2 RRR    GI: Soft, NT, ND; BS present.    EXTREMITIES:  2+ Peripheral Pulses, No clubbing, cyanosis, or edema    LYMPH: No lymphadenopathy noted    SKIN: No rashes or lesions      **************************** LABS *******************************************************                          13.1   13.21 )-----------( 223      ( 06 Feb 2021 08:12 )             40.9     02-06    136  |  99  |  29<H>  ----------------------------<  215<H>  4.4   |  26  |  1.3    Ca    8.3<L>      06 Feb 2021 08:12  Phos  2.8     02-06  Mg     2.2     02-06    TPro  6.0  /  Alb  3.2<L>  /  TBili  1.2  /  DBili  x   /  AST  52<H>  /  ALT  40  /  AlkPhos  91  02-06    ABG - ( 06 Feb 2021 09:35 )  pH, Arterial: 7.45  pH, Blood: x     /  pCO2: 37    /  pO2: 51    / HCO3: 26    / Base Excess: 2.0   /  SaO2: 88                  PT/INR - ( 06 Feb 2021 08:12 )   PT: 15.00 sec;   INR: 1.30 ratio         PTT - ( 06 Feb 2021 08:12 )  PTT:36.3 sec  Lactate Trend  02-06 @ 08:12 Lactate:1.8     CARDIAC MARKERS ( 06 Feb 2021 08:12 )  x     / <0.01 ng/mL / 99 U/L / x     / x      CARDIAC MARKERS ( 04 Feb 2021 14:13 )  x     / 0.02 ng/mL / x     / x     / x          CAPILLARY BLOOD GLUCOSE      POCT Blood Glucose.: 282 mg/dL (06 Feb 2021 10:59)          **************************Active Medications *******************************************  penicillin (Rash (Severe))      acetaminophen   Tablet .. 650 milliGRAM(s) Oral every 6 hours PRN  benzonatate 100 milliGRAM(s) Oral every 8 hours  cefTRIAXone   IVPB      cefTRIAXone   IVPB 2000 milliGRAM(s) IV Intermittent once  cefTRIAXone   IVPB 2000 milliGRAM(s) IV Intermittent every 24 hours  dexAMETHasone  Injectable 6 milliGRAM(s) IV Push daily  dextrose 40% Gel 15 Gram(s) Oral once  dextrose 5%. 1000 milliLiter(s) IV Continuous <Continuous>  dextrose 5%. 1000 milliLiter(s) IV Continuous <Continuous>  dextrose 50% Injectable 25 Gram(s) IV Push once  dextrose 50% Injectable 12.5 Gram(s) IV Push once  dextrose 50% Injectable 25 Gram(s) IV Push once  enoxaparin Injectable 40 milliGRAM(s) SubCutaneous two times a day  glucagon  Injectable 1 milliGRAM(s) IntraMuscular once  guaifenesin/dextromethorphan  Syrup 5 milliLiter(s) Oral every 6 hours PRN  guaifenesin/dextromethorphan  Syrup 10 milliLiter(s) Oral every 6 hours  influenza   Vaccine 0.5 milliLiter(s) IntraMuscular once  insulin glargine Injectable (LANTUS) 20 Unit(s) SubCutaneous at bedtime  insulin lispro (ADMELOG) corrective regimen sliding scale   SubCutaneous three times a day before meals  insulin lispro Injectable (ADMELOG) 7 Unit(s) SubCutaneous three times a day before meals  levothyroxine 50 MICROGram(s) Oral daily  metoprolol succinate ER 25 milliGRAM(s) Oral daily  ondansetron    Tablet 4 milliGRAM(s) Oral two times a day PRN  prasugrel 10 milliGRAM(s) Oral daily  remdesivir  IVPB   IV Intermittent   remdesivir  IVPB 100 milliGRAM(s) IV Intermittent every 24 hours  tamsulosin 0.4 milliGRAM(s) Oral at bedtime  tocilizumab IVPB 400 milliGRAM(s) IV Intermittent once      ***************************************************  RADIOLOGY & ADDITIONAL TESTS:    Imaging Personally Reviewed:  [ ] YES  [ ] NO    HEALTH ISSUES - PROBLEM Dx:

## 2021-02-06 NOTE — CONSULT NOTE ADULT - ASSESSMENT
IMPRESSION:  Acute hypoxemic respiratory failure  Covid PNA  Probable superimposed bacterial infection  COPD exacerbation  HO CAD    PLAN:    CNS: avoid sedatives    HEENT: Oral care    PULMONARY:  HOB @ 45 degrees.  Aspiration precautions. HFNC alternate with  NIV during sleep and PRN, AVAPS (, RR 18, FiO2 100%, EPAP 8, Ipap min 14, Max 18), solumedrol 60mg q8h. nebs q6h atc    CARDIOVASCULAR: Goal Map >60, Avoid volume overload    GI: GI prophylaxis.  Feeding.  Bowel regimen     RENAL:  Follow up lytes.  Correct as needed    INFECTIOUS DISEASE: Follow up cultures rocephin + levaquin.  No Toci. Agree with plasma, remdesivir.     HEMATOLOGICAL:  DVT prophylaxis.    ENDOCRINE:  Follow up FS.  Insulin protocol if needed.    MUSCULOSKELETAL: Bedrest    Dispo: MICU IMPRESSION:  Acute hypoxemic respiratory failure  Covid PNA  Probable superimposed bacterial infection  COPD exacerbation  HO CAD    PLAN:    CNS: avoid sedatives    HEENT: Oral care    PULMONARY:  HOB @ 45 degrees.  Aspiration precautions. HFNC alternate with  NIV during sleep and PRN, AVAPS (, RR 18, FiO2 100%, EPAP 8, Ipap min 14, Max 18), solumedrol 60mg q8h. nebs q6h atc    CARDIOVASCULAR: Goal Map >60, Avoid volume overload    GI: GI prophylaxis.  Feeding.  Bowel regimen     RENAL:  Follow up lytes.  Correct as needed    INFECTIOUS DISEASE: Follow up cultures rocephin + levaquin. Agree with plasma, remdesivir.     HEMATOLOGICAL:  DVT prophylaxis.    ENDOCRINE:  Follow up FS.  Insulin protocol if needed.    MUSCULOSKELETAL: Bedrest    Dispo: MICU

## 2021-02-06 NOTE — PROGRESS NOTE ADULT - ASSESSMENT
71 yo M with PMH of CAD, MI (s/p 4 stents), DM, HTN, BPH, hypothyroid and GERD, recently received Pfizer vaccine (1/3 and 1/24) presented to ED c/o progressive SOB, fever, and weakness. Patient stated that his symptoms began on Wednesday 2/3. COVID PCR + on 2/4. Admitted to medicine with acute hypoxemic respiratory failure secondary to COVID-19 PNA.    #Acute hypoxemic respiratory failure secondary to COVID-19 PNA - severe illness  - Patient had already received 2 doses of the Pfizer vaccine  - ID recs appreciated  - Sating 88% on 60L/100% HFNC as seen on ABG  - Titrate O2 to maintain SpO2 >92%  - CTA showed extensive bilateral patchy ground-glass opacities involving all lobes most pronounced in the upper lung zones  - C/w decadron 6mg IVP daily for total of 10 days  - C/w Remdesivir for 5 days (today is day 1)  - F/u COVID antibodies and type and screen. Patient agreeable to receive convalescent plasma. Give one unit of convalescent plasma over 2 hours, as per ID.  - C/w Rocephin 2g Q24H, as per ID  - Patient agreeable to receive tocilizumab. F/u hepatitis panel and QuantiFeron.  - Monitor Cr, LFT's, glucose  - F/u repeat inflammatory markers  - Isolation precautions    #GEOVANNA   - Likely 2/2 decreased PO intake   - NS at 75 cc/hr  - Monitor BMP    #CAD  #MI s/p 4 stents  - C/w Effient 10mg daily  - C/w Toprol 25mg daily    #HTN   - C/w enalapril 2.5mg daily    #BPH  - C/w tamsulosin 0.4mg PO QHS    #DM  - Monitor FS  - C/w basal/bolus insulin regimen 20/7/7/7 + sliding scale  - A1C 9.1    #Hypothyroidism  - C/w Synthroid    #Misc  Diet: consistent carbs, DASH/TLC  DVT PPx: Lovenox 40mg BID  Dispo: acute   71 yo M with PMH of CAD, MI (s/p 4 stents), DM, HTN, BPH, hypothyroid and GERD, recently received Pfizer vaccine (1/3 and 1/24) presented to ED c/o progressive SOB, fever, and weakness. Patient stated that his symptoms began on Wednesday 2/3. COVID PCR + on 2/4. Admitted to medicine with acute hypoxemic respiratory failure secondary to COVID-19 PNA.    #Acute hypoxemic respiratory failure secondary to COVID-19 PNA - severe illness  - Patient had already received 2 doses of the Pfizer vaccine  - ID recs appreciated  - Sating 88% on 60L/100% HFNC as seen on ABG, will place on bipap alternating with HFNC  - Titrate O2 to maintain SpO2 >92%  - CTA showed extensive bilateral patchy ground-glass opacities involving all lobes most pronounced in the upper lung zones  - CXR shows BL patchy opacities   - switched dexa to solumedrol 60mg IV q8h per pulm  - C/w Remdesivir for 5 days (today is day 2)  - Give one unit of convalescent plasma over 2 hours, as per ID.  - C/w Rocephin 2g Q24H, as per ID. Added Levofloxacin 750mg IV q24h  - d/c tocilizumab per pulm   - Monitor Cr, LFT's, glucose  - trend inflammatory markers  - evaluated by critical care, to be upgraded to ICU     #GEOVANNA   - Likely 2/2 decreased PO intake   - holding fluids for now  - Monitor BMP    #CAD  #MI s/p 4 stents  - C/w Effient 10mg daily  - C/w Toprol 25mg daily    #HTN   - C/w enalapril 2.5mg daily    #BPH  - C/w tamsulosin 0.4mg PO QHS    #DM  - Monitor FS  - C/w basal/bolus insulin regimen 20/7/7/7 + sliding scale  - A1C 9.1    #Hypothyroidism  - C/w Synthroid    #Misc  Diet: consistent carbs, DASH/TLC  DVT PPx: Lovenox 40mg BID  Dispo: upgrade to ICU

## 2021-02-07 LAB
GLUCOSE BLDC GLUCOMTR-MCNC: 309 MG/DL — HIGH (ref 70–99)
GLUCOSE BLDC GLUCOMTR-MCNC: 333 MG/DL — HIGH (ref 70–99)
HAV IGM SER-ACNC: SIGNIFICANT CHANGE UP
HBV CORE IGM SER-ACNC: SIGNIFICANT CHANGE UP
HBV SURFACE AB SER-ACNC: REACTIVE
HBV SURFACE AG SER-ACNC: SIGNIFICANT CHANGE UP
HCV AB S/CO SERPL IA: 0.1 S/CO — SIGNIFICANT CHANGE UP (ref 0–0.99)
HCV AB SERPL-IMP: SIGNIFICANT CHANGE UP

## 2021-02-07 PROCEDURE — 71045 X-RAY EXAM CHEST 1 VIEW: CPT | Mod: 26

## 2021-02-07 RX ORDER — PANTOPRAZOLE SODIUM 20 MG/1
40 TABLET, DELAYED RELEASE ORAL DAILY
Refills: 0 | Status: DISCONTINUED | OUTPATIENT
Start: 2021-02-07 | End: 2021-02-08

## 2021-02-07 RX ORDER — CHLORHEXIDINE GLUCONATE 213 G/1000ML
1 SOLUTION TOPICAL DAILY
Refills: 0 | Status: DISCONTINUED | OUTPATIENT
Start: 2021-02-07 | End: 2021-03-14

## 2021-02-07 RX ORDER — SODIUM CHLORIDE 9 MG/ML
1000 INJECTION, SOLUTION INTRAVENOUS
Refills: 0 | Status: DISCONTINUED | OUTPATIENT
Start: 2021-02-07 | End: 2021-02-08

## 2021-02-07 RX ADMIN — Medication 4: at 10:55

## 2021-02-07 RX ADMIN — CHLORHEXIDINE GLUCONATE 1 APPLICATION(S): 213 SOLUTION TOPICAL at 18:35

## 2021-02-07 RX ADMIN — ENOXAPARIN SODIUM 40 MILLIGRAM(S): 100 INJECTION SUBCUTANEOUS at 18:36

## 2021-02-07 RX ADMIN — Medication 4: at 16:13

## 2021-02-07 RX ADMIN — Medication 50 MICROGRAM(S): at 05:35

## 2021-02-07 RX ADMIN — Medication 4: at 05:34

## 2021-02-07 RX ADMIN — Medication 60 MILLIGRAM(S): at 14:08

## 2021-02-07 RX ADMIN — Medication 10 MILLILITER(S): at 12:50

## 2021-02-07 RX ADMIN — Medication 100 MILLIGRAM(S): at 14:09

## 2021-02-07 RX ADMIN — Medication 60 MILLIGRAM(S): at 05:35

## 2021-02-07 RX ADMIN — Medication 10 MILLILITER(S): at 05:34

## 2021-02-07 RX ADMIN — Medication 100 MILLIGRAM(S): at 05:34

## 2021-02-07 RX ADMIN — INSULIN GLARGINE 20 UNIT(S): 100 INJECTION, SOLUTION SUBCUTANEOUS at 00:02

## 2021-02-07 RX ADMIN — PRASUGREL 10 MILLIGRAM(S): 5 TABLET, FILM COATED ORAL at 12:50

## 2021-02-07 RX ADMIN — REMDESIVIR 500 MILLIGRAM(S): 5 INJECTION INTRAVENOUS at 11:53

## 2021-02-07 RX ADMIN — Medication 10 MILLILITER(S): at 18:36

## 2021-02-07 RX ADMIN — Medication 25 MILLIGRAM(S): at 05:35

## 2021-02-07 RX ADMIN — PANTOPRAZOLE SODIUM 40 MILLIGRAM(S): 20 TABLET, DELAYED RELEASE ORAL at 18:36

## 2021-02-07 RX ADMIN — CEFTRIAXONE 100 MILLIGRAM(S): 500 INJECTION, POWDER, FOR SOLUTION INTRAMUSCULAR; INTRAVENOUS at 14:09

## 2021-02-07 RX ADMIN — ENOXAPARIN SODIUM 40 MILLIGRAM(S): 100 INJECTION SUBCUTANEOUS at 05:34

## 2021-02-07 NOTE — PROGRESS NOTE ADULT - ASSESSMENT
71 yo M with PMH of CAD, MI (s/p 4 stents), DM, HTN, BPH, hypothyroid and GERD, recently received Pfizer vaccine (1/3 and 1/24) presented to ED c/o progressive SOB, fever, and weakness. Patient stated that his symptoms began on Wednesday 2/3. COVID PCR + on 2/4. Admitted to medicine with acute hypoxemic respiratory failure secondary to COVID-19 PNA. Upgraded to ICU from 3A due to increasing oxygen requirements.     #Acute hypoxemic respiratory failure secondary to COVID-19 PNA - severe illness  - Patient had already received 2 doses of the Pfizer vaccine  - ID recs appreciated  - Sating 88% on 60L/100% HFNC as seen on ABG, will place on bipap alternating with HFNC  - *Been on bipap since 2/6 not tolerating HFNC  - CTA showed extensive bilateral patchy ground-glass opacities involving all lobes most pronounced in the upper lung zones  - CXR shows BL patchy opacities   - switched dexa to solumedrol 60mg IV q8h per pulm  - Remdesivir Day 3/5   - Give one unit of convalescent plasma over 2 hours per ID. Ab are neg.  Ordered IG assay F/U  - C/w Rocephin 2g Q24H. And Levofloxacin 750mg IV q24h  - d/c tocilizumab per pulm   - Monitor Cr, LFT's, glucose  - trend inflammatory markers    #GEOVANNA - resolved  - Likely 2/2 decreased PO intake   - holding fluids for now as GEOVANNA resolved.  - Monitor BMP    #CAD  #MI s/p 4 stents  - C/w Effient 10mg daily  - C/w Toprol 25mg daily    #HTN   - C/w enalapril 2.5mg daily    #BPH  - C/w tamsulosin 0.4mg PO QHS    #DM  - Monitor FS  - C/w basal/bolus insulin regimen 20/7/7/7 + sliding scale  - A1C 9.1    #Hypothyroidism  - C/w Synthroid    #Misc  Diet: consistent carbs, DASH/TLC. Pureed diet per patient request   DVT PPx: Lovenox 40mg BID

## 2021-02-07 NOTE — PROGRESS NOTE ADULT - ASSESSMENT
IMPRESSION:  Acute hypoxemic respiratory failure  Covid PNA  Probable superimposed bacterial infection  COPD exacerbation  HO CAD    PLAN:    CNS: avoid sedatives    HEENT: Oral care    PULMONARY:  HOB @ 45 degrees.  Aspiration precautions. HFNC alternate with  NIV during sleep and PRN, Solumedrol 60mg q8h. nebs q6h atc    CARDIOVASCULAR: Goal Map >60, Avoid volume overload.  Gentle hydration     GI: GI prophylaxis.  NPO while on NIV.  Bowel regimen     RENAL:  Follow up lytes.  Correct as needed    INFECTIOUS DISEASE: Follow up cultures.  Rocephin + Levaquin.  Per ID      HEMATOLOGICAL:  DVT prophylaxis.  Trend Dimer     ENDOCRINE:  Follow up FS.  Insulin protocol if needed.    MUSCULOSKELETAL: Bedrest    Dispo: MICU

## 2021-02-07 NOTE — PROGRESS NOTE ADULT - SUBJECTIVE AND OBJECTIVE BOX
Patient is a 70y old  Male who presents with a chief complaint of covid PNA (06 Feb 2021 14:49)        Over Night Events:  Remains BiPAP dependent 100% O2.  Off pressors.          ROS:     All ROS are negative except HPI         PHYSICAL EXAM    ICU Vital Signs Last 24 Hrs  T(C): 36.1 (07 Feb 2021 04:00), Max: 36.7 (06 Feb 2021 14:40)  T(F): 97 (07 Feb 2021 04:00), Max: 98 (06 Feb 2021 14:40)  HR: 68 (07 Feb 2021 06:00) (58 - 72)  BP: 127/67 (07 Feb 2021 06:00) (106/58 - 130/75)  BP(mean): 92 (07 Feb 2021 06:00) (72 - 94)  ABP: --  ABP(mean): --  RR: 27 (07 Feb 2021 06:00) (20 - 34)  SpO2: 93% (07 Feb 2021 06:00) (87% - 98%)      CONSTITUTIONAL:  Well nourished.  NAD    ENT:   Airway patent,   Mouth with normal mucosa.   No thrush    EYES:   Pupils equal,   Round and reactive to light.    CARDIAC:   Normal rate,   Regular rhythm.    No edema      Vascular:  Normal systolic impulse  No Carotid bruits    RESPIRATORY:   Slight wheezing  Bilateral crackles   Normal chest expansion  Not tachypneic,  No use of accessory muscles    GASTROINTESTINAL:  Abdomen soft,   Non-tender,   No guarding,   + BS    MUSCULOSKELETAL:   Range of motion is not limited,  No clubbing, cyanosis    NEUROLOGICAL:   Alert and oriented   No motor  deficits.    SKIN:   Skin normal color for race,   Warm and dry   No evidence of rash.    PSYCHIATRIC:   Normal mood and affect.   No apparent risk to self or others.    HEMATOLOGICAL:  No cervical  lymphadenopathy.  no inguinal lymphadenopathy      02-06-21 @ 07:01  -  02-07-21 @ 07:00  --------------------------------------------------------  IN:    dextrose 5% + sodium chloride 0.45%: 375 mL  Total IN: 375 mL    OUT:    Voided (mL): 650 mL  Total OUT: 650 mL    Total NET: -275 mL          LABS:                            13.1   13.21 )-----------( 223      ( 06 Feb 2021 08:12 )             40.9                                               02-06    136  |  99  |  29<H>  ----------------------------<  215<H>  4.4   |  26  |  1.3    Ca    8.3<L>      06 Feb 2021 08:12  Phos  2.8     02-06  Mg     2.2     02-06    TPro  6.0  /  Alb  3.2<L>  /  TBili  1.2  /  DBili  x   /  AST  52<H>  /  ALT  40  /  AlkPhos  91  02-06      PT/INR - ( 06 Feb 2021 08:12 )   PT: 15.00 sec;   INR: 1.30 ratio         PTT - ( 06 Feb 2021 08:12 )  PTT:36.3 sec                                           CARDIAC MARKERS ( 06 Feb 2021 08:12 )  x     / <0.01 ng/mL / 99 U/L / x     / x                                                LIVER FUNCTIONS - ( 06 Feb 2021 08:12 )  Alb: 3.2 g/dL / Pro: 6.0 g/dL / ALK PHOS: 91 U/L / ALT: 40 U/L / AST: 52 U/L / GGT: x                                                                                                                                   ABG - ( 06 Feb 2021 14:29 )  pH, Arterial: 7.43  pH, Blood: x     /  pCO2: 41    /  pO2: 56    / HCO3: 27    / Base Excess: 2.5   /  SaO2: 90                  MEDICATIONS  (STANDING):  benzonatate 100 milliGRAM(s) Oral every 8 hours  cefTRIAXone   IVPB      cefTRIAXone   IVPB 2000 milliGRAM(s) IV Intermittent every 24 hours  dextrose 40% Gel 15 Gram(s) Oral once  dextrose 5%. 1000 milliLiter(s) (50 mL/Hr) IV Continuous <Continuous>  dextrose 5%. 1000 milliLiter(s) (100 mL/Hr) IV Continuous <Continuous>  dextrose 50% Injectable 25 Gram(s) IV Push once  dextrose 50% Injectable 12.5 Gram(s) IV Push once  dextrose 50% Injectable 25 Gram(s) IV Push once  enoxaparin Injectable 40 milliGRAM(s) SubCutaneous two times a day  glucagon  Injectable 1 milliGRAM(s) IntraMuscular once  guaifenesin/dextromethorphan  Syrup 10 milliLiter(s) Oral every 6 hours  influenza   Vaccine 0.5 milliLiter(s) IntraMuscular once  insulin glargine Injectable (LANTUS) 20 Unit(s) SubCutaneous at bedtime  insulin lispro (ADMELOG) corrective regimen sliding scale   SubCutaneous three times a day before meals  insulin lispro Injectable (ADMELOG) 7 Unit(s) SubCutaneous three times a day before meals  levoFLOXacin IVPB      levoFLOXacin IVPB 750 milliGRAM(s) IV Intermittent every 24 hours  levothyroxine 50 MICROGram(s) Oral daily  methylPREDNISolone sodium succinate Injectable 60 milliGRAM(s) IV Push every 8 hours  metoprolol succinate ER 25 milliGRAM(s) Oral daily  prasugrel 10 milliGRAM(s) Oral daily  remdesivir  IVPB   IV Intermittent   remdesivir  IVPB 100 milliGRAM(s) IV Intermittent every 24 hours  tamsulosin 0.4 milliGRAM(s) Oral at bedtime    MEDICATIONS  (PRN):  acetaminophen   Tablet .. 650 milliGRAM(s) Oral every 6 hours PRN Temp greater or equal to 38C (100.4F), Mild Pain (1 - 3)  guaifenesin/dextromethorphan  Syrup 5 milliLiter(s) Oral every 6 hours PRN Cough  ondansetron    Tablet 4 milliGRAM(s) Oral two times a day PRN Nausea and/or Vomiting      New X-rays reviewed:                                                                                  ECHO    CXR interpreted by me:  Bilateral infiltrates

## 2021-02-07 NOTE — PROGRESS NOTE ADULT - SUBJECTIVE AND OBJECTIVE BOX
LENGTH OF HOSPITAL STAY: 3d      CHIEF COMPLAINT:   Patient is a 70y old  Male who presents with a chief complaint of covid PNA (06 Feb 2021 14:49)      OVER Past 24hrs:  The patient was seen and examined at bedside there were no overnight events.  Remained on Bipap.    HISTORY OF PRESENTING ILLNESS:    HPI:  70 year old male with PMH of CAD, MI (s/p 4 stents), DM, HTN, BPH, hypothyroid and GERD presents to ED c/o progressive SOB, fever, and weakness x 4 days.  Pt also with recent positive COVID 19 swab + as outpatient. Pt had Pfizer COVID vaccine on 1/3 and 1/24. Pt reports chills, dry cough, and decreased po intake. He denies abd pain, N/V/D/C, lightheadedness CP.  In the ED, pt's T 37.8, . WBC 14, D-dimer 329, BUN/Cr 23/1.8, GFR 38, COVID positive.  CTA negative for PE but consistent with COVID 19 pneumonia.  Pt was given Decadron 6 mg x1 in the ED. pt saturating 99% on 3 L NC.   (04 Feb 2021 17:35)    PAST MEDICAL & SURGICAL HISTORY  PAST MEDICAL & SURGICAL HISTORY:  Chronic kidney disease (CKD)  III    Type 2 diabetes mellitus without complication, unspecified long term insulin use status    Hypertension, unspecified type    Dyspnea, unspecified type    ASHD (arteriosclerotic heart disease)  STEMI 12/31/17, PCI RCA    S/P cataract surgery    History of ligation of vein  2012    History of tonsillectomy  1957          REVIEW OF SYSTEMS  CONSTITUTIONAL: No weakness, fevers or chills, no weight loss   EYES/ENT: No visual changes;  No vertigo or throat pain   NECK: No pain or stiffness  RESPIRATORY: No cough, wheezing, hemoptysis; No shortness of breath  CARDIOVASCULAR: No chest pain or palpitations  GASTROINTESTINAL: No abdominal or epigastric pain. No nausea, vomiting, or hematemesis; No diarrhea or constipation. No melena or hematochezia.  GENITOURINARY: No dysuria, frequency or hematuria  NEUROLOGICAL: No numbness or weakness  All other review of systems is negative unless indicated above.    ALLERGIES:  penicillin (Rash (Severe))    MEDICATIONS:  STANDING MEDICATIONS  benzonatate 100 milliGRAM(s) Oral every 8 hours  cefTRIAXone   IVPB      cefTRIAXone   IVPB 2000 milliGRAM(s) IV Intermittent every 24 hours  dextrose 40% Gel 15 Gram(s) Oral once  dextrose 5%. 1000 milliLiter(s) IV Continuous <Continuous>  dextrose 5%. 1000 milliLiter(s) IV Continuous <Continuous>  dextrose 50% Injectable 25 Gram(s) IV Push once  dextrose 50% Injectable 12.5 Gram(s) IV Push once  dextrose 50% Injectable 25 Gram(s) IV Push once  enoxaparin Injectable 40 milliGRAM(s) SubCutaneous two times a day  glucagon  Injectable 1 milliGRAM(s) IntraMuscular once  guaifenesin/dextromethorphan  Syrup 10 milliLiter(s) Oral every 6 hours  influenza   Vaccine 0.5 milliLiter(s) IntraMuscular once  insulin glargine Injectable (LANTUS) 20 Unit(s) SubCutaneous at bedtime  insulin lispro (ADMELOG) corrective regimen sliding scale   SubCutaneous three times a day before meals  insulin lispro Injectable (ADMELOG) 7 Unit(s) SubCutaneous three times a day before meals  levoFLOXacin IVPB      levoFLOXacin IVPB 750 milliGRAM(s) IV Intermittent every 24 hours  levothyroxine 50 MICROGram(s) Oral daily  methylPREDNISolone sodium succinate Injectable 60 milliGRAM(s) IV Push every 8 hours  metoprolol succinate ER 25 milliGRAM(s) Oral daily  prasugrel 10 milliGRAM(s) Oral daily  remdesivir  IVPB   IV Intermittent   remdesivir  IVPB 100 milliGRAM(s) IV Intermittent every 24 hours  tamsulosin 0.4 milliGRAM(s) Oral at bedtime    PRN MEDICATIONS  acetaminophen   Tablet .. 650 milliGRAM(s) Oral every 6 hours PRN  guaifenesin/dextromethorphan  Syrup 5 milliLiter(s) Oral every 6 hours PRN  ondansetron    Tablet 4 milliGRAM(s) Oral two times a day PRN    VITALS:   T(F): 96.5  HR: 64  BP: 108/62  RR: 22  SpO2: 95%    PHYSICAL EXAM:  General: No acute distress.     HEENT: Pupils equal, reactive to light symmetrically.    PULM: Clear to auscultation bilaterally, no significant sputum production.    CVS: Regular rate and rhythm, no murmurs, rubs, or gallops.    GI: Soft, nondistended, nontender, no masses.    MSK: No edema, nontender.    SKIN: Warm and well perfused, no rashes noted.    PSYCH: AAOx3    NEURO: No FND    LABS:                        13.1   13.21 )-----------( 223      ( 06 Feb 2021 08:12 )             40.9     02-06    136  |  99  |  29<H>  ----------------------------<  215<H>  4.4   |  26  |  1.3    Ca    8.3<L>      06 Feb 2021 08:12  Phos  2.8     02-06  Mg     2.2     02-06    TPro  6.0  /  Alb  3.2<L>  /  TBili  1.2  /  DBili  x   /  AST  52<H>  /  ALT  40  /  AlkPhos  91  02-06    PT/INR - ( 06 Feb 2021 08:12 )   PT: 15.00 sec;   INR: 1.30 ratio         PTT - ( 06 Feb 2021 08:12 )  PTT:36.3 sec    ABG - ( 06 Feb 2021 14:29 )  pH, Arterial: 7.43  pH, Blood: x     /  pCO2: 41    /  pO2: 56    / HCO3: 27    / Base Excess: 2.5   /  SaO2: 90                Creatine Kinase, Serum: 99 U/L (02-06-21 @ 08:12)  Lactate, Blood: 1.8 mmol/L (02-06-21 @ 08:12)  Troponin T, Serum: <0.01 ng/mL (02-06-21 @ 08:12)      CARDIAC MARKERS ( 06 Feb 2021 08:12 )  x     / <0.01 ng/mL / 99 U/L / x     / x          RADIOLOGY:

## 2021-02-07 NOTE — PROGRESS NOTE ADULT - SUBJECTIVE AND OBJECTIVE BOX
ANDREE BUSH  70y, Male  Allergy: penicillin (Rash (Severe))      LOS  3d    CHIEF COMPLAINT: covid PNA (06 Feb 2021 14:49)      INTERVAL EVENTS/HPI  - HFNC, CRP downtrending   - T(F): , Max: 98 (02-06-21 @ 14:40)  - Denies any worsening symptoms  - Tolerating medication  - WBC Count: 13.21 (02-06-21 @ 08:12) downtrending   WBC Count: 15.60 (02-05-21 @ 05:47)     - Creatinine, Serum: 1.3 (02-06-21 @ 08:12)       ROS  General: Denies rigors, nightsweats  HEENT: Denies headache, rhinorrhea, sore throat, eye pain  CV: Denies CP, palpitations  PULM: Denies wheezing, hemoptysis  GI: Denies hematemesis, hematochezia, melena  : Denies discharge, hematuria  MSK: Denies arthralgias, myalgias  SKIN: Denies rash, lesions  NEURO: Denies paresthesias, weakness  PSYCH: Denies depression, anxiety    VITALS:  T(F): 97, Max: 98 (02-06-21 @ 14:40)  HR: 78  BP: 138/62  RR: 31Vital Signs Last 24 Hrs  T(C): 36.1 (07 Feb 2021 04:00), Max: 36.7 (06 Feb 2021 14:40)  T(F): 97 (07 Feb 2021 04:00), Max: 98 (06 Feb 2021 14:40)  HR: 78 (07 Feb 2021 11:00) (58 - 78)  BP: 138/62 (07 Feb 2021 11:00) (106/58 - 141/77)  BP(mean): 85 (07 Feb 2021 11:00) (72 - 101)  RR: 31 (07 Feb 2021 11:00) (20 - 34)  SpO2: 90% (07 Feb 2021 11:00) (87% - 98%)    PHYSICAL EXAM:  Gen: HFNC  HEENT: Normocephalic, atraumatic  Neck: supple, no lymphadenopathy  CV: Regular rate & regular rhythm  Lungs: decreased BS at bases, no fremitus  Abdomen: Soft, BS present  Ext: Warm, well perfused  Neuro: non focal, awake  Skin: no rash, no erythema  Lines: no phlebitis    FH: Non-contributory  Social Hx: Non-contributory    TESTS & MEASUREMENTS:                        13.1   13.21 )-----------( 223      ( 06 Feb 2021 08:12 )             40.9     02-06    136  |  99  |  29<H>  ----------------------------<  215<H>  4.4   |  26  |  1.3    Ca    8.3<L>      06 Feb 2021 08:12  Phos  2.8     02-06  Mg     2.2     02-06    TPro  6.0  /  Alb  3.2<L>  /  TBili  1.2  /  DBili  x   /  AST  52<H>  /  ALT  40  /  AlkPhos  91  02-06      LIVER FUNCTIONS - ( 06 Feb 2021 08:12 )  Alb: 3.2 g/dL / Pro: 6.0 g/dL / ALK PHOS: 91 U/L / ALT: 40 U/L / AST: 52 U/L / GGT: x                 Lactate, Blood: 1.8 mmol/L (02-06-21 @ 08:12)      INFECTIOUS DISEASES TESTING  Procalcitonin, Serum: 0.97 (02-06-21 @ 08:12)  Procalcitonin, Serum: 1.61 (02-04-21 @ 14:13)  Rapid RVP Result: Detected (02-04-21 @ 13:40)      INFLAMMATORY MARKERS  C-Reactive Protein, Serum: 22.67 mg/dL (02-06-21 @ 10:59)  C-Reactive Protein, Serum: 23.95 mg/dL (02-06-21 @ 08:12)  C-Reactive Protein, Serum: 31.59 mg/dL (02-04-21 @ 14:13)      RADIOLOGY & ADDITIONAL TESTS:  I have personally reviewed the last available Chest xray  CXR  Xray Chest 1 View- PORTABLE-Urgent:   EXAM:  XR CHEST PORTABLE URGENT 1V            PROCEDURE DATE:  02/07/2021            INTERPRETATION:  Clinical History / Reason for exam: Pneumonia    Comparison : Chest radiograph February 6, 2021.    Technique/Positioning: Single AP view of the chest.    Findings:    Support devices: None.    Cardiac/mediastinum/hilum: Unchanged.    Lung parenchyma/Pleura: Diffuse bilateral parenchymal opacities, overall unchanged. No pneumothorax.    Skeleton/soft tissues: Unchanged.    Impression:    Diffuse bilateral parenchymal opacities, overall unchanged.                  ELLIOT LANDAU MD; Attending Radiologist  This document has been electronically signed. Feb 7 2021 11:10AM (02-07-21 @ 11:03)      CT  CT Angio Chest PE Protocol w/ IV Cont:   EXAM:  CT ANGIO CHEST PE PROTOCOL IC            PROCEDURE DATE:  02/04/2021            INTERPRETATION:  CLINICAL STATEMENT: Shortness of breath. Received Covid 19 vaccine 2 weeks ago. Per EMS, Covid positive.    TECHNIQUE: Pulmonary embolism protocol. Multislice helical sections were obtained from the thoracic inlet to the lung bases during rapid administration of intravenous contrast. Thin sections were reconstructed through the pulmonary vasculature. Coronal and sagittal reformatted images and 3D MIPs are also submitted.    COMPARISON: None.    FINDINGS:    PULMONARY EMBOLUS: No acute central or lobar pulmonary embolus. Limited evaluation for segmental and subsegmental emboli secondary to motion artifact.    TUBES/LINES: None.    LUNGS, PLEURA, AND AIRWAYS: Extensive bilateral patchy groundglass opacities involving all lobes, most pronounced in the upper lung zones. No pleural effusions or pneumothorax. Patent central airways.    MEDIASTINUM/LYMPH NODES: Few prominent mediastinal and hilar lymph nodes. For example, a right hilar lymph node measures 1.2 x 1.1 cm and a right paratracheal node measures 1.9 x 1.0 cm.    HEART/GREAT VESSELS: Normal heart size. No pericardial effusion. Atherosclerotic vascular calcifications including coronary arteries.    BONES/SOFT TISSUES: Unremarkable.    VISUALIZED UPPER ABDOMEN: Cholelithiasis. Partially imaged right renal cyst.    IMPRESSION:  1.  No acute central or lobar pulmonary embolus.    2.  Extensive bilateral patchy groundglass opacities involving all lobes most pronounced in the upper lung zones. Findings are suspicious for an atypical viral pneumonia such as COVID-19.        Dr. King Spoke with LEONA SOMMER MD on 2/4/2021 4:49 PM with readback.              GLADYS KING MD; Attending Radiologist  This document has been electronically signed. Feb 4 2021  4:51PM (02-04-21 @ 16:32)      CARDIOLOGY TESTING  12 Lead ECG:   Ventricular Rate 119 BPM    Atrial Rate 119 BPM    P-R Interval 138 ms    QRS Duration 88 ms    Q-T Interval 324 ms    QTC Calculation(Bazett) 455 ms    P Axis 47 degrees    R Axis -24 degrees    T Axis 37 degrees    Diagnosis Line Sinus tachycardia with Premature atrial complexes  Inferior infarct , age undetermined  Abnormal ECG    Confirmed by RIGO CLEMENT MD ([id number]) on 2/5/2021 11:04:15 AM (02-04-21 @ 13:18)      MEDICATIONS  benzonatate 100 Oral every 8 hours  cefTRIAXone   IVPB 2000 IV Intermittent every 24 hours  cefTRIAXone   IVPB     dextrose 40% Gel 15 Oral once  dextrose 5%. 1000 IV Continuous <Continuous>  dextrose 5%. 1000 IV Continuous <Continuous>  dextrose 50% Injectable 25 IV Push once  dextrose 50% Injectable 12.5 IV Push once  dextrose 50% Injectable 25 IV Push once  enoxaparin Injectable 40 SubCutaneous two times a day  glucagon  Injectable 1 IntraMuscular once  guaifenesin/dextromethorphan  Syrup 10 Oral every 6 hours  influenza   Vaccine 0.5 IntraMuscular once  insulin glargine Injectable (LANTUS) 20 SubCutaneous at bedtime  insulin lispro (ADMELOG) corrective regimen sliding scale  SubCutaneous three times a day before meals  insulin lispro Injectable (ADMELOG) 7 SubCutaneous three times a day before meals  lactated ringers. 1000 IV Continuous <Continuous>  levoFLOXacin IVPB     levoFLOXacin IVPB 750 IV Intermittent every 24 hours  levothyroxine 50 Oral daily  methylPREDNISolone sodium succinate Injectable 60 IV Push every 8 hours  metoprolol succinate ER 25 Oral daily  prasugrel 10 Oral daily  remdesivir  IVPB  IV Intermittent   remdesivir  IVPB 100 IV Intermittent every 24 hours  tamsulosin 0.4 Oral at bedtime      WEIGHT        ANTIBIOTICS:  cefTRIAXone   IVPB 2000 milliGRAM(s) IV Intermittent every 24 hours  cefTRIAXone   IVPB      levoFLOXacin IVPB      levoFLOXacin IVPB 750 milliGRAM(s) IV Intermittent every 24 hours  remdesivir  IVPB   IV Intermittent   remdesivir  IVPB 100 milliGRAM(s) IV Intermittent every 24 hours      All available historical records have been reviewed

## 2021-02-08 LAB
ALBUMIN SERPL ELPH-MCNC: 3.3 G/DL — LOW (ref 3.5–5.2)
ALP SERPL-CCNC: 81 U/L — SIGNIFICANT CHANGE UP (ref 30–115)
ALT FLD-CCNC: 38 U/L — SIGNIFICANT CHANGE UP (ref 0–41)
ANION GAP SERPL CALC-SCNC: 12 MMOL/L — SIGNIFICANT CHANGE UP (ref 7–14)
AST SERPL-CCNC: 31 U/L — SIGNIFICANT CHANGE UP (ref 0–41)
BASOPHILS # BLD AUTO: 0.03 K/UL — SIGNIFICANT CHANGE UP (ref 0–0.2)
BASOPHILS NFR BLD AUTO: 0.2 % — SIGNIFICANT CHANGE UP (ref 0–1)
BILIRUB SERPL-MCNC: 0.2 MG/DL — SIGNIFICANT CHANGE UP (ref 0.2–1.2)
BUN SERPL-MCNC: 43 MG/DL — HIGH (ref 10–20)
CALCIUM SERPL-MCNC: 8.6 MG/DL — SIGNIFICANT CHANGE UP (ref 8.5–10.1)
CHLORIDE SERPL-SCNC: 99 MMOL/L — SIGNIFICANT CHANGE UP (ref 98–110)
CO2 SERPL-SCNC: 25 MMOL/L — SIGNIFICANT CHANGE UP (ref 17–32)
CREAT SERPL-MCNC: 1.2 MG/DL — SIGNIFICANT CHANGE UP (ref 0.7–1.5)
CRP SERPL-MCNC: 7.54 MG/DL — HIGH (ref 0–0.4)
D DIMER BLD IA.RAPID-MCNC: 241 NG/ML DDU — HIGH (ref 0–230)
EOSINOPHIL # BLD AUTO: 0 K/UL — SIGNIFICANT CHANGE UP (ref 0–0.7)
EOSINOPHIL NFR BLD AUTO: 0 % — SIGNIFICANT CHANGE UP (ref 0–8)
GLUCOSE BLDC GLUCOMTR-MCNC: 200 MG/DL — HIGH (ref 70–99)
GLUCOSE BLDC GLUCOMTR-MCNC: 229 MG/DL — HIGH (ref 70–99)
GLUCOSE BLDC GLUCOMTR-MCNC: 237 MG/DL — HIGH (ref 70–99)
GLUCOSE BLDC GLUCOMTR-MCNC: 264 MG/DL — HIGH (ref 70–99)
GLUCOSE BLDC GLUCOMTR-MCNC: 280 MG/DL — HIGH (ref 70–99)
GLUCOSE BLDC GLUCOMTR-MCNC: 331 MG/DL — HIGH (ref 70–99)
GLUCOSE BLDC GLUCOMTR-MCNC: 336 MG/DL — HIGH (ref 70–99)
GLUCOSE BLDC GLUCOMTR-MCNC: 489 MG/DL — CRITICAL HIGH (ref 70–99)
GLUCOSE SERPL-MCNC: 280 MG/DL — HIGH (ref 70–99)
HCT VFR BLD CALC: 44.1 % — SIGNIFICANT CHANGE UP (ref 42–52)
HGB BLD-MCNC: 14.4 G/DL — SIGNIFICANT CHANGE UP (ref 14–18)
IMM GRANULOCYTES NFR BLD AUTO: 1.7 % — HIGH (ref 0.1–0.3)
LYMPHOCYTES # BLD AUTO: 0.58 K/UL — LOW (ref 1.2–3.4)
LYMPHOCYTES # BLD AUTO: 3.6 % — LOW (ref 20.5–51.1)
MAGNESIUM SERPL-MCNC: 2.3 MG/DL — SIGNIFICANT CHANGE UP (ref 1.8–2.4)
MCHC RBC-ENTMCNC: 25 PG — LOW (ref 27–31)
MCHC RBC-ENTMCNC: 32.7 G/DL — SIGNIFICANT CHANGE UP (ref 32–37)
MCV RBC AUTO: 76.4 FL — LOW (ref 80–94)
MONOCYTES # BLD AUTO: 0.74 K/UL — HIGH (ref 0.1–0.6)
MONOCYTES NFR BLD AUTO: 4.6 % — SIGNIFICANT CHANGE UP (ref 1.7–9.3)
NEUTROPHILS # BLD AUTO: 14.43 K/UL — HIGH (ref 1.4–6.5)
NEUTROPHILS NFR BLD AUTO: 89.9 % — HIGH (ref 42.2–75.2)
NRBC # BLD: 0 /100 WBCS — SIGNIFICANT CHANGE UP (ref 0–0)
PLATELET # BLD AUTO: 312 K/UL — SIGNIFICANT CHANGE UP (ref 130–400)
POTASSIUM SERPL-MCNC: 4.8 MMOL/L — SIGNIFICANT CHANGE UP (ref 3.5–5)
POTASSIUM SERPL-SCNC: 4.8 MMOL/L — SIGNIFICANT CHANGE UP (ref 3.5–5)
PROCALCITONIN SERPL-MCNC: 0.32 NG/ML — HIGH (ref 0.02–0.1)
PROT SERPL-MCNC: 6 G/DL — SIGNIFICANT CHANGE UP (ref 6–8)
RBC # BLD: 5.77 M/UL — SIGNIFICANT CHANGE UP (ref 4.7–6.1)
RBC # FLD: 15.9 % — HIGH (ref 11.5–14.5)
SODIUM SERPL-SCNC: 136 MMOL/L — SIGNIFICANT CHANGE UP (ref 135–146)
WBC # BLD: 16.05 K/UL — HIGH (ref 4.8–10.8)
WBC # FLD AUTO: 16.05 K/UL — HIGH (ref 4.8–10.8)

## 2021-02-08 PROCEDURE — 93970 EXTREMITY STUDY: CPT | Mod: 26

## 2021-02-08 PROCEDURE — 71045 X-RAY EXAM CHEST 1 VIEW: CPT | Mod: 26

## 2021-02-08 RX ORDER — INSULIN LISPRO 100/ML
VIAL (ML) SUBCUTANEOUS
Refills: 0 | Status: DISCONTINUED | OUTPATIENT
Start: 2021-02-08 | End: 2021-03-14

## 2021-02-08 RX ORDER — INSULIN LISPRO 100/ML
9 VIAL (ML) SUBCUTANEOUS
Refills: 0 | Status: DISCONTINUED | OUTPATIENT
Start: 2021-02-08 | End: 2021-02-22

## 2021-02-08 RX ORDER — INSULIN LISPRO 100/ML
14 VIAL (ML) SUBCUTANEOUS ONCE
Refills: 0 | Status: COMPLETED | OUTPATIENT
Start: 2021-02-08 | End: 2021-02-08

## 2021-02-08 RX ORDER — PANTOPRAZOLE SODIUM 20 MG/1
40 TABLET, DELAYED RELEASE ORAL DAILY
Refills: 0 | Status: DISCONTINUED | OUTPATIENT
Start: 2021-02-09 | End: 2021-02-11

## 2021-02-08 RX ORDER — INSULIN GLARGINE 100 [IU]/ML
25 INJECTION, SOLUTION SUBCUTANEOUS AT BEDTIME
Refills: 0 | Status: DISCONTINUED | OUTPATIENT
Start: 2021-02-08 | End: 2021-02-10

## 2021-02-08 RX ORDER — INSULIN LISPRO 100/ML
10 VIAL (ML) SUBCUTANEOUS ONCE
Refills: 0 | Status: COMPLETED | OUTPATIENT
Start: 2021-02-08 | End: 2021-02-08

## 2021-02-08 RX ADMIN — Medication 100 MILLIGRAM(S): at 12:51

## 2021-02-08 RX ADMIN — Medication 4: at 17:19

## 2021-02-08 RX ADMIN — Medication 60 MILLIGRAM(S): at 05:09

## 2021-02-08 RX ADMIN — Medication 3: at 12:02

## 2021-02-08 RX ADMIN — TAMSULOSIN HYDROCHLORIDE 0.4 MILLIGRAM(S): 0.4 CAPSULE ORAL at 00:14

## 2021-02-08 RX ADMIN — Medication 10 MILLILITER(S): at 12:04

## 2021-02-08 RX ADMIN — Medication 100 MILLIGRAM(S): at 05:10

## 2021-02-08 RX ADMIN — Medication 10 MILLILITER(S): at 05:10

## 2021-02-08 RX ADMIN — Medication 25 MILLIGRAM(S): at 05:09

## 2021-02-08 RX ADMIN — INSULIN GLARGINE 20 UNIT(S): 100 INJECTION, SOLUTION SUBCUTANEOUS at 00:14

## 2021-02-08 RX ADMIN — Medication 60 MILLIGRAM(S): at 17:23

## 2021-02-08 RX ADMIN — INSULIN GLARGINE 25 UNIT(S): 100 INJECTION, SOLUTION SUBCUTANEOUS at 21:22

## 2021-02-08 RX ADMIN — CHLORHEXIDINE GLUCONATE 1 APPLICATION(S): 213 SOLUTION TOPICAL at 12:05

## 2021-02-08 RX ADMIN — Medication 10 UNIT(S): at 00:56

## 2021-02-08 RX ADMIN — Medication 14 UNIT(S): at 02:30

## 2021-02-08 RX ADMIN — PANTOPRAZOLE SODIUM 40 MILLIGRAM(S): 20 TABLET, DELAYED RELEASE ORAL at 12:05

## 2021-02-08 RX ADMIN — Medication 9 UNIT(S): at 12:02

## 2021-02-08 RX ADMIN — TAMSULOSIN HYDROCHLORIDE 0.4 MILLIGRAM(S): 0.4 CAPSULE ORAL at 20:43

## 2021-02-08 RX ADMIN — Medication 100 MILLIGRAM(S): at 00:15

## 2021-02-08 RX ADMIN — PRASUGREL 10 MILLIGRAM(S): 5 TABLET, FILM COATED ORAL at 12:04

## 2021-02-08 RX ADMIN — ENOXAPARIN SODIUM 40 MILLIGRAM(S): 100 INJECTION SUBCUTANEOUS at 17:20

## 2021-02-08 RX ADMIN — Medication 10 MILLILITER(S): at 17:18

## 2021-02-08 RX ADMIN — Medication 50 MICROGRAM(S): at 05:09

## 2021-02-08 RX ADMIN — ENOXAPARIN SODIUM 40 MILLIGRAM(S): 100 INJECTION SUBCUTANEOUS at 05:10

## 2021-02-08 RX ADMIN — CEFTRIAXONE 100 MILLIGRAM(S): 500 INJECTION, POWDER, FOR SOLUTION INTRAMUSCULAR; INTRAVENOUS at 15:23

## 2021-02-08 RX ADMIN — Medication 7 UNIT(S): at 08:59

## 2021-02-08 RX ADMIN — REMDESIVIR 500 MILLIGRAM(S): 5 INJECTION INTRAVENOUS at 12:04

## 2021-02-08 RX ADMIN — Medication 9 UNIT(S): at 17:19

## 2021-02-08 RX ADMIN — Medication 3: at 05:10

## 2021-02-08 RX ADMIN — Medication 60 MILLIGRAM(S): at 00:14

## 2021-02-08 RX ADMIN — Medication 10 MILLILITER(S): at 00:15

## 2021-02-08 RX ADMIN — Medication 100 MILLIGRAM(S): at 20:43

## 2021-02-08 NOTE — PROGRESS NOTE ADULT - ASSESSMENT
IMPRESSION:    Acute hypoxemic respiratory failure on HHFNC  severe Covid PNA  Probable superimposed bacterial infection high procal  COPD exacerbation  HO CAD    PLAN:    CNS: avoid sedatives    HEENT: Oral care    PULMONARY:  HOB @ 45 degrees.  Aspiration precautions. HFNC alternate with  NIV during sleep and PRN, Solumedrol 60mg q8h. nebs q6h atc    CARDIOVASCULAR: Goal Map >60, Avoid volume overload.  dc IVF    GI: GI prophylaxis. PO    RENAL:  Follow up lytes.  Correct as needed    INFECTIOUS DISEASE: Follow up cultures.  Rocephin + Levaquin.  Per ID      HEMATOLOGICAL:  DVT prophylaxis.  Trend Dimer , INF MAKERS, BED SOLUMEDROL    ENDOCRINE:  Follow up FS.  Insulin protocol if needed.    MUSCULOSKELETAL: Bedrest    Dispo: MICU

## 2021-02-08 NOTE — PROGRESS NOTE ADULT - ASSESSMENT
· Assessment	  71 yo M with PMH of CAD, MI (s/p 4 stents), DM, HTN, BPH, hypothyroid and GERD, recently received Pfizer vaccine (1/3 and 1/24) presented to ED c/o progressive SOB, fever, and weakness. Patient stated that his symptoms began on Wednesday 2/3. COVID PCR + on 2/4. Admitted to medicine with acute hypoxemic respiratory failure secondary to COVID-19 PNA.  S/p pfizer vaccine 1/3 and booster 1/24  Routine nasal swab on 1/27 COVID-19 positive ( works in a [employer] )  Symptomatic since 2/3    IMPRESSION;  COVID 19 with severe illness. SpO2 < 94% on RA and need for supplemental O2. NRB  CTA 2/4 : extensive bilateral patchy ground-glass opacities involving all lobes most pronounced in the upper lung zones. No PE  Pt is in the early viral replicative phase based on the timeline/onset of symptoms.  COVID-19 antibodies NG    procalcitonin 1.61 > 0.97  Ferritin 404>625  CRP 31.59>22.67  Ddimers 364>420  BCx 2/6 NGTD    s/p plasma 2/7    RECOMMENDATIONS;  Urine for legionella/strep pneumonia antigen  Target SpO2 92 % to 96 %  Day 1 : Single  mg IV loading dose  Day 2-5 : single  mg IV once daily maintenance dose  Daily CBC,CMP.  Dexamethasone 6 mg iv q24h for 10 days.  Monitor for side effects: hyperglycemia, neurological ( agitation/confusion), adrenal suppression, bacterial and fungal infections  Rocephin 2 gm iv q24h  Levoquin 500 mg iv q24h

## 2021-02-08 NOTE — PROGRESS NOTE ADULT - ASSESSMENT
69 yo M with PMH of CAD, MI (s/p 4 stents), DM, HTN, BPH, hypothyroid and GERD, recently received Pfizer vaccine (1/3 and 1/24) presented to ED c/o progressive SOB, fever, and weakness. Patient stated that his symptoms began on Wednesday 2/3. COVID PCR + on 2/4. Admitted to medicine with acute hypoxemic respiratory failure secondary to COVID-19 PNA. Upgraded to ICU from 3A due to increasing oxygen requirements.     #Acute hypoxemic respiratory failure secondary to COVID-19 PNA - severe illness  - Patient had already received 2 doses of the Pfizer vaccine  - ID recs appreciated  -Weaned over the course of admission from continuous BiPAP to HFNC/BiPAP at night  - *Been on bipap since 2/6 not tolerating HFNC  - CTA showed extensive bilateral patchy ground-glass opacities involving all lobes most pronounced in the upper lung zones  - CXR shows BL patchy opacities   - switched dexa to solumedrol 60mg IV q8h per pulm  - Remdesivir Day 3/5   - Give one unit of convalescent plasma over 2 hours per ID. Ab are neg.  Ordered IG assay F/U  - C/w Rocephin 2g Q24H. And Levofloxacin 750mg IV q24h  - d/c tocilizumab per pulm   - Monitor Cr, LFT's, glucose  - trend inflammatory markers    #GEOVANNA - resolved  - Likely 2/2 decreased PO intake   - holding fluids for now as GEOVANNA resolved.  - Monitor BMP    #CAD  #MI s/p 4 stents  - C/w Effient 10mg daily  - C/w Toprol 25mg daily    #HTN   - C/w enalapril 2.5mg daily    #BPH  - C/w tamsulosin 0.4mg PO QHS    #DM  - Monitor FS  - C/w basal/bolus insulin regimen 20/7/7/7 + sliding scale  - A1C 9.1    #Hypothyroidism  - C/w Synthroid    #Misc  Diet: consistent carbs, DASH/TLC. Pureed diet per patient request   DVT PPx: Lovenox 40mg BID     69 yo M with PMH of CAD, MI (s/p 4 stents), DM, HTN, BPH, hypothyroid and GERD, recently received Pfizer vaccine (1/3 and 1/24) presented to ED c/o progressive SOB, fever, and weakness. Patient stated that his symptoms began on Wednesday 2/3. COVID PCR + on 2/4. Admitted to medicine with acute hypoxemic respiratory failure secondary to COVID-19 PNA. Upgraded to ICU from 3A due to increasing oxygen requirements    #Acute hypoxemic respiratory failure secondary to COVID-19 PNA - severe illness  - Patient had already received 2 doses of the Pfizer vaccine  - CTA 02/04 showed extensive bilateral patchy ground-glass opacities involving all lobes most pronounced in the upper lung zones  - CXR this am showing BL patchy opacities unchanged since yesterday  - s/p 1 unit plasma  - lower solumedrol to 60mg IV q12h per pulm  - c/w Remdesivir (Day 4/5)   - C/w Rocephin 2g Q24H. And Levofloxacin 750mg IV q24h per ID  - Monitor Cr, LFT's, glucose  - trend inflammatory markers    #GEOVANNA - resolved  - Likely 2/2 decreased PO intake   - d/c fluids for now as GEOVANNA resolved.  - Monitor BMP    #CAD  #MI s/p 4 stents  - C/w Effient 10mg daily  - C/w Toprol 25mg daily    #HTN   - C/w enalapril 2.5mg daily    #BPH  - C/w tamsulosin 0.4mg PO QHS    #DM  - Ptn remains hyperglycemic throughout admission  - Refusing insulin drip  - s/p 10 units +14 units Lispro overnight for uncontrolled BS  - BS remains 200+  - Monitor FS  - Increase basal/bolus insulin regimen to 25/9/9/9 + aggressive sliding scale  - A1C 9.1-->likely non-compliant at home  - c/w counselling    #Hypothyroidism  - C/w Synthroid    #Misc  Diet: consistent carbs, DASH/TLC. Pureed diet per patient request   DVT PPx: Lovenox 40mg BID

## 2021-02-08 NOTE — PROGRESS NOTE ADULT - SUBJECTIVE AND OBJECTIVE BOX
SUBJECTIVE:    Patient is a 70y old Male who presents with a chief complaint of sob (08 Feb 2021 08:17)      HPI:  70 year old male with PMH of CAD, MI (s/p 4 stents), DM, HTN, BPH, hypothyroid and GERD presents to ED c/o progressive SOB, fever, and weakness x 4 days.  Pt also with recent positive COVID 19 swab + as outpatient. Pt had Pfizer COVID vaccine on 1/3 and 1/24. Pt reports chills, dry cough, and decreased po intake. He denies abd pain, N/V/D/C, lightheadedness CP.  In the ED, pt's T 37.8, . WBC 14, D-dimer 329, BUN/Cr 23/1.8, GFR 38, COVID positive.  CTA negative for PE but consistent with COVID 19 pneumonia.  Pt was given Decadron 6 mg x1 in the ED. pt saturating 99% on 3 L NC.   (04 Feb 2021 17:35)      Currently admitted to medicine with the primary diagnosis of Shortness of breath         Besides the pertinent positives and negatives described above, the ROS was within normal limits.    PAST MEDICAL & SURGICAL HISTORY  Chronic kidney disease (CKD)  III    Type 2 diabetes mellitus without complication, unspecified long term insulin use status    Hypertension, unspecified type    Dyspnea, unspecified type    ASHD (arteriosclerotic heart disease)  STEMI 12/31/17, PCI RCA    S/P cataract surgery    History of ligation of vein  2012    History of tonsillectomy  1957      SOCIAL HISTORY:    ALLERGIES:  penicillin (Rash (Severe))    MEDICATIONS:  STANDING MEDICATIONS  benzonatate 100 milliGRAM(s) Oral every 8 hours  cefTRIAXone   IVPB      cefTRIAXone   IVPB 2000 milliGRAM(s) IV Intermittent every 24 hours  chlorhexidine 4% Liquid 1 Application(s) Topical daily  dextrose 40% Gel 15 Gram(s) Oral once  dextrose 5%. 1000 milliLiter(s) IV Continuous <Continuous>  dextrose 5%. 1000 milliLiter(s) IV Continuous <Continuous>  dextrose 50% Injectable 25 Gram(s) IV Push once  dextrose 50% Injectable 12.5 Gram(s) IV Push once  dextrose 50% Injectable 25 Gram(s) IV Push once  enoxaparin Injectable 40 milliGRAM(s) SubCutaneous two times a day  glucagon  Injectable 1 milliGRAM(s) IntraMuscular once  guaifenesin/dextromethorphan  Syrup 10 milliLiter(s) Oral every 6 hours  influenza   Vaccine 0.5 milliLiter(s) IntraMuscular once  insulin glargine Injectable (LANTUS) 20 Unit(s) SubCutaneous at bedtime  insulin lispro (ADMELOG) corrective regimen sliding scale   SubCutaneous three times a day before meals  insulin lispro Injectable (ADMELOG) 7 Unit(s) SubCutaneous three times a day before meals  levoFLOXacin IVPB 750 milliGRAM(s) IV Intermittent every 24 hours  levoFLOXacin IVPB      levothyroxine 50 MICROGram(s) Oral daily  methylPREDNISolone sodium succinate Injectable 60 milliGRAM(s) IV Push every 12 hours  metoprolol succinate ER 25 milliGRAM(s) Oral daily  pantoprazole  Injectable 40 milliGRAM(s) IV Push daily  prasugrel 10 milliGRAM(s) Oral daily  remdesivir  IVPB   IV Intermittent   remdesivir  IVPB 100 milliGRAM(s) IV Intermittent every 24 hours  tamsulosin 0.4 milliGRAM(s) Oral at bedtime    PRN MEDICATIONS  acetaminophen   Tablet .. 650 milliGRAM(s) Oral every 6 hours PRN  guaifenesin/dextromethorphan  Syrup 5 milliLiter(s) Oral every 6 hours PRN  ondansetron    Tablet 4 milliGRAM(s) Oral two times a day PRN    VITALS:   T(F): 97  HR: 76  BP: 139/74  RR: 25  SpO2: 90%    LABS:                        14.4   16.05 )-----------( 312      ( 08 Feb 2021 04:48 )             44.1     02-08    136  |  99  |  43<H>  ----------------------------<  280<H>  4.8   |  25  |  1.2    Ca    8.6      08 Feb 2021 04:48  Mg     2.3     02-08    TPro  6.0  /  Alb  3.3<L>  /  TBili  0.2  /  DBili  x   /  AST  31  /  ALT  38  /  AlkPhos  81  02-08        ABG - ( 06 Feb 2021 14:29 )  pH, Arterial: 7.43  pH, Blood: x     /  pCO2: 41    /  pO2: 56    / HCO3: 27    / Base Excess: 2.5   /  SaO2: 90                    Culture - Blood (collected 06 Feb 2021 07:25)  Source: .Blood Blood-Venous  Preliminary Report (07 Feb 2021 18:01):    No growth to date.          RADIOLOGY:    PROCEDURE DATE:  02/08/2021      INTERPRETATION:  Clinical History / Reason for exam: Shortness of breath    Comparison : Chest radiograph 2/7/2021.    Technique/Positioning: Single frontal chestradiograph.    Findings:    Support devices: None.    Cardiac/mediastinum/hilum: Unchanged    Lung parenchyma/Pleura: Diffuse bilateral pulmonary opacities similar to prior. No pneumothorax.    Skeleton/soft tissues: Unchanged    Impression:    Diffuse bilateral pulmonary opacities similar to prior.      PHYSICAL EXAM:  GEN: No acute distress on HFNC  LUNGS: Decreased breath sounds at lung bases b/l  HEART: Regular  ABD: Soft, non-tender, non-distended.  EXT: NC/NC/NE/2+PP/TIJERINA/Skin Intact.   NEURO: AAOX3

## 2021-02-08 NOTE — PROGRESS NOTE ADULT - SUBJECTIVE AND OBJECTIVE BOX
ANDREE BUSH  70y, Male    All available historical data reviewed    OVERNIGHT EVENTS:  no fevers  feels well and has no complaints  HFNC  sore throat    ROS:  General: Denies rigors, nightsweats  HEENT: Denies headache, rhinorrhea, sore throat, eye pain  CV: Denies CP, palpitations  PULM: Denies wheezing, hemoptysis  GI: Denies hematemesis, hematochezia, melena  : Denies discharge, hematuria  MSK: Denies arthralgias, myalgias  SKIN: Denies rash, lesions  NEURO: Denies paresthesias, weakness  PSYCH: Denies depression, anxiety    VITALS:  T(F): 97, Max: 97.3 (02-07-21 @ 16:00)  HR: 76  BP: 139/74  RR: 25Vital Signs Last 24 Hrs  T(C): 36.1 (08 Feb 2021 08:00), Max: 36.3 (07 Feb 2021 16:00)  T(F): 97 (08 Feb 2021 08:00), Max: 97.3 (07 Feb 2021 16:00)  HR: 76 (08 Feb 2021 09:00) (64 - 94)  BP: 139/74 (08 Feb 2021 09:00) (117/69 - 142/68)  BP(mean): 102 (08 Feb 2021 09:00) (77 - 110)  RR: 25 (08 Feb 2021 09:00) (22 - 45)  SpO2: 90% (08 Feb 2021 09:00) (88% - 96%)    TESTS & MEASUREMENTS:                        14.4   16.05 )-----------( 312      ( 08 Feb 2021 04:48 )             44.1     02-08    136  |  99  |  43<H>  ----------------------------<  280<H>  4.8   |  25  |  1.2    Ca    8.6      08 Feb 2021 04:48  Mg     2.3     02-08    TPro  6.0  /  Alb  3.3<L>  /  TBili  0.2  /  DBili  x   /  AST  31  /  ALT  38  /  AlkPhos  81  02-08    LIVER FUNCTIONS - ( 08 Feb 2021 04:48 )  Alb: 3.3 g/dL / Pro: 6.0 g/dL / ALK PHOS: 81 U/L / ALT: 38 U/L / AST: 31 U/L / GGT: x             Culture - Blood (collected 02-06-21 @ 07:25)  Source: .Blood Blood-Venous  Preliminary Report (02-07-21 @ 18:01):    No growth to date.            RADIOLOGY & ADDITIONAL TESTS:  Personal review of radiological diagnostics performed  Echo and EKG results noted when applicable.     MEDICATIONS:  acetaminophen   Tablet .. 650 milliGRAM(s) Oral every 6 hours PRN  benzonatate 100 milliGRAM(s) Oral every 8 hours  cefTRIAXone   IVPB      cefTRIAXone   IVPB 2000 milliGRAM(s) IV Intermittent every 24 hours  chlorhexidine 4% Liquid 1 Application(s) Topical daily  dextrose 40% Gel 15 Gram(s) Oral once  dextrose 5%. 1000 milliLiter(s) IV Continuous <Continuous>  dextrose 5%. 1000 milliLiter(s) IV Continuous <Continuous>  dextrose 50% Injectable 25 Gram(s) IV Push once  dextrose 50% Injectable 12.5 Gram(s) IV Push once  dextrose 50% Injectable 25 Gram(s) IV Push once  enoxaparin Injectable 40 milliGRAM(s) SubCutaneous two times a day  glucagon  Injectable 1 milliGRAM(s) IntraMuscular once  guaifenesin/dextromethorphan  Syrup 5 milliLiter(s) Oral every 6 hours PRN  guaifenesin/dextromethorphan  Syrup 10 milliLiter(s) Oral every 6 hours  influenza   Vaccine 0.5 milliLiter(s) IntraMuscular once  insulin glargine Injectable (LANTUS) 25 Unit(s) SubCutaneous at bedtime  insulin lispro (ADMELOG) corrective regimen sliding scale   SubCutaneous three times a day before meals  insulin lispro Injectable (ADMELOG) 9 Unit(s) SubCutaneous three times a day before meals  levoFLOXacin IVPB 750 milliGRAM(s) IV Intermittent every 24 hours  levoFLOXacin IVPB      levothyroxine 50 MICROGram(s) Oral daily  methylPREDNISolone sodium succinate Injectable 60 milliGRAM(s) IV Push every 12 hours  metoprolol succinate ER 25 milliGRAM(s) Oral daily  ondansetron    Tablet 4 milliGRAM(s) Oral two times a day PRN  pantoprazole  Injectable 40 milliGRAM(s) IV Push daily  prasugrel 10 milliGRAM(s) Oral daily  remdesivir  IVPB   IV Intermittent   remdesivir  IVPB 100 milliGRAM(s) IV Intermittent every 24 hours  tamsulosin 0.4 milliGRAM(s) Oral at bedtime      ANTIBIOTICS:  cefTRIAXone   IVPB      cefTRIAXone   IVPB 2000 milliGRAM(s) IV Intermittent every 24 hours  levoFLOXacin IVPB 750 milliGRAM(s) IV Intermittent every 24 hours  levoFLOXacin IVPB      remdesivir  IVPB   IV Intermittent   remdesivir  IVPB 100 milliGRAM(s) IV Intermittent every 24 hours

## 2021-02-08 NOTE — PROGRESS NOTE ADULT - SUBJECTIVE AND OBJECTIVE BOX
OVERNIGHT EVENTS: events noted, on HHFNC 60/100, off bipap,    Vital Signs Last 24 Hrs  T(C): 36.1 (08 Feb 2021 00:00), Max: 36.3 (07 Feb 2021 16:00)  T(F): 97 (08 Feb 2021 00:00), Max: 97.3 (07 Feb 2021 16:00)  HR: 76 (08 Feb 2021 03:00) (64 - 94)  BP: 134/75 (08 Feb 2021 03:00) (117/69 - 142/68)  BP(mean): 99 (08 Feb 2021 03:00) (77 - 110)  RR: 25 (08 Feb 2021 03:00) (21 - 45)  SpO2: 93% (08 Feb 2021 03:00) (88% - 96%)    PHYSICAL EXAMINATION:    GENERAL: ill looking    HEENT: Head is normocephalic and atraumatic.     NECK: Supple.    LUNGS: bl crackles    HEART: Regular rate and rhythm without murmur.    ABDOMEN: Soft, nontender, and nondistended.      EXTREMITIES: Without any cyanosis, clubbing, rash, lesions or edema.    NEUROLOGIC: Grossly intact.    SKIN: No ulceration or induration present.      LABS:                        14.4   16.05 )-----------( 312      ( 08 Feb 2021 04:48 )             44.1     02-08    136  |  99  |  43<H>  ----------------------------<  280<H>  4.8   |  25  |  1.2    Ca    8.6      08 Feb 2021 04:48  Mg     2.3     02-08    TPro  6.0  /  Alb  3.3<L>  /  TBili  0.2  /  DBili  x   /  AST  31  /  ALT  38  /  AlkPhos  81  02-08        ABG - ( 06 Feb 2021 14:29 )  pH, Arterial: 7.43  pH, Blood: x     /  pCO2: 41    /  pO2: 56    / HCO3: 27    / Base Excess: 2.5   /  SaO2: 90                    D-Dimer Assay, Quantitative: 241 ng/mL DDU (02-08-21 @ 04:48)        Procalcitonin, Serum: 0.97 ng/mL (02-06-21 @ 08:12)        02-07-21 @ 07:01  -  02-08-21 @ 07:00  --------------------------------------------------------  IN: 1550 mL / OUT: 1550 mL / NET: 0 mL        MICROBIOLOGY:  Culture Results:   No growth to date. (02-06 @ 07:25)      MEDICATIONS  (STANDING):  benzonatate 100 milliGRAM(s) Oral every 8 hours  cefTRIAXone   IVPB      cefTRIAXone   IVPB 2000 milliGRAM(s) IV Intermittent every 24 hours  chlorhexidine 4% Liquid 1 Application(s) Topical daily  dextrose 40% Gel 15 Gram(s) Oral once  dextrose 5%. 1000 milliLiter(s) (50 mL/Hr) IV Continuous <Continuous>  dextrose 5%. 1000 milliLiter(s) (100 mL/Hr) IV Continuous <Continuous>  dextrose 50% Injectable 25 Gram(s) IV Push once  dextrose 50% Injectable 12.5 Gram(s) IV Push once  dextrose 50% Injectable 25 Gram(s) IV Push once  enoxaparin Injectable 40 milliGRAM(s) SubCutaneous two times a day  glucagon  Injectable 1 milliGRAM(s) IntraMuscular once  guaifenesin/dextromethorphan  Syrup 10 milliLiter(s) Oral every 6 hours  influenza   Vaccine 0.5 milliLiter(s) IntraMuscular once  insulin glargine Injectable (LANTUS) 20 Unit(s) SubCutaneous at bedtime  insulin lispro (ADMELOG) corrective regimen sliding scale   SubCutaneous three times a day before meals  insulin lispro Injectable (ADMELOG) 7 Unit(s) SubCutaneous three times a day before meals  lactated ringers. 1000 milliLiter(s) (70 mL/Hr) IV Continuous <Continuous>  levoFLOXacin IVPB 750 milliGRAM(s) IV Intermittent every 24 hours  levoFLOXacin IVPB      levothyroxine 50 MICROGram(s) Oral daily  methylPREDNISolone sodium succinate Injectable 60 milliGRAM(s) IV Push every 8 hours  metoprolol succinate ER 25 milliGRAM(s) Oral daily  pantoprazole  Injectable 40 milliGRAM(s) IV Push daily  prasugrel 10 milliGRAM(s) Oral daily  remdesivir  IVPB   IV Intermittent   remdesivir  IVPB 100 milliGRAM(s) IV Intermittent every 24 hours  tamsulosin 0.4 milliGRAM(s) Oral at bedtime    MEDICATIONS  (PRN):  acetaminophen   Tablet .. 650 milliGRAM(s) Oral every 6 hours PRN Temp greater or equal to 38C (100.4F), Mild Pain (1 - 3)  guaifenesin/dextromethorphan  Syrup 5 milliLiter(s) Oral every 6 hours PRN Cough  ondansetron    Tablet 4 milliGRAM(s) Oral two times a day PRN Nausea and/or Vomiting      RADIOLOGY & ADDITIONAL STUDIES:

## 2021-02-09 LAB
ALBUMIN SERPL ELPH-MCNC: 3 G/DL — LOW (ref 3.5–5.2)
ALP SERPL-CCNC: 60 U/L — SIGNIFICANT CHANGE UP (ref 30–115)
ALT FLD-CCNC: 41 U/L — SIGNIFICANT CHANGE UP (ref 0–41)
ANION GAP SERPL CALC-SCNC: 10 MMOL/L — SIGNIFICANT CHANGE UP (ref 7–14)
AST SERPL-CCNC: 33 U/L — SIGNIFICANT CHANGE UP (ref 0–41)
BASOPHILS # BLD AUTO: 0.05 K/UL — SIGNIFICANT CHANGE UP (ref 0–0.2)
BASOPHILS NFR BLD AUTO: 0.3 % — SIGNIFICANT CHANGE UP (ref 0–1)
BILIRUB SERPL-MCNC: 0.3 MG/DL — SIGNIFICANT CHANGE UP (ref 0.2–1.2)
BUN SERPL-MCNC: 40 MG/DL — HIGH (ref 10–20)
CALCIUM SERPL-MCNC: 8.4 MG/DL — LOW (ref 8.5–10.1)
CHLORIDE SERPL-SCNC: 101 MMOL/L — SIGNIFICANT CHANGE UP (ref 98–110)
CO2 SERPL-SCNC: 26 MMOL/L — SIGNIFICANT CHANGE UP (ref 17–32)
CREAT SERPL-MCNC: 1.2 MG/DL — SIGNIFICANT CHANGE UP (ref 0.7–1.5)
D DIMER BLD IA.RAPID-MCNC: 180 NG/ML DDU — SIGNIFICANT CHANGE UP (ref 0–230)
EOSINOPHIL # BLD AUTO: 0 K/UL — SIGNIFICANT CHANGE UP (ref 0–0.7)
EOSINOPHIL NFR BLD AUTO: 0 % — SIGNIFICANT CHANGE UP (ref 0–8)
GAMMA INTERFERON BACKGROUND BLD IA-ACNC: 0.05 IU/ML — SIGNIFICANT CHANGE UP
GLUCOSE BLDC GLUCOMTR-MCNC: 136 MG/DL — HIGH (ref 70–99)
GLUCOSE BLDC GLUCOMTR-MCNC: 208 MG/DL — HIGH (ref 70–99)
GLUCOSE BLDC GLUCOMTR-MCNC: 236 MG/DL — HIGH (ref 70–99)
GLUCOSE BLDC GLUCOMTR-MCNC: 261 MG/DL — HIGH (ref 70–99)
GLUCOSE BLDC GLUCOMTR-MCNC: 265 MG/DL — HIGH (ref 70–99)
GLUCOSE SERPL-MCNC: 252 MG/DL — HIGH (ref 70–99)
HCT VFR BLD CALC: 41.5 % — LOW (ref 42–52)
HGB BLD-MCNC: 13.3 G/DL — LOW (ref 14–18)
IMM GRANULOCYTES NFR BLD AUTO: 1.5 % — HIGH (ref 0.1–0.3)
LYMPHOCYTES # BLD AUTO: 0.68 K/UL — LOW (ref 1.2–3.4)
LYMPHOCYTES # BLD AUTO: 4.1 % — LOW (ref 20.5–51.1)
M TB IFN-G BLD-IMP: ABNORMAL
M TB IFN-G CD4+ BCKGRND COR BLD-ACNC: 0 IU/ML — SIGNIFICANT CHANGE UP
M TB IFN-G CD4+CD8+ BCKGRND COR BLD-ACNC: 0 IU/ML — SIGNIFICANT CHANGE UP
MAGNESIUM SERPL-MCNC: 2 MG/DL — SIGNIFICANT CHANGE UP (ref 1.8–2.4)
MCHC RBC-ENTMCNC: 24.6 PG — LOW (ref 27–31)
MCHC RBC-ENTMCNC: 32 G/DL — SIGNIFICANT CHANGE UP (ref 32–37)
MCV RBC AUTO: 76.9 FL — LOW (ref 80–94)
MONOCYTES # BLD AUTO: 0.88 K/UL — HIGH (ref 0.1–0.6)
MONOCYTES NFR BLD AUTO: 5.3 % — SIGNIFICANT CHANGE UP (ref 1.7–9.3)
NEUTROPHILS # BLD AUTO: 14.84 K/UL — HIGH (ref 1.4–6.5)
NEUTROPHILS NFR BLD AUTO: 88.8 % — HIGH (ref 42.2–75.2)
NRBC # BLD: 0 /100 WBCS — SIGNIFICANT CHANGE UP (ref 0–0)
PLATELET # BLD AUTO: 304 K/UL — SIGNIFICANT CHANGE UP (ref 130–400)
POTASSIUM SERPL-MCNC: 4.3 MMOL/L — SIGNIFICANT CHANGE UP (ref 3.5–5)
POTASSIUM SERPL-SCNC: 4.3 MMOL/L — SIGNIFICANT CHANGE UP (ref 3.5–5)
PROCALCITONIN SERPL-MCNC: 0.13 NG/ML — HIGH (ref 0.02–0.1)
PROT SERPL-MCNC: 5.4 G/DL — LOW (ref 6–8)
QUANT TB PLUS MITOGEN MINUS NIL: 0.17 IU/ML — SIGNIFICANT CHANGE UP
RBC # BLD: 5.4 M/UL — SIGNIFICANT CHANGE UP (ref 4.7–6.1)
RBC # FLD: 15.5 % — HIGH (ref 11.5–14.5)
SODIUM SERPL-SCNC: 137 MMOL/L — SIGNIFICANT CHANGE UP (ref 135–146)
WBC # BLD: 16.7 K/UL — HIGH (ref 4.8–10.8)
WBC # FLD AUTO: 16.7 K/UL — HIGH (ref 4.8–10.8)

## 2021-02-09 PROCEDURE — 71045 X-RAY EXAM CHEST 1 VIEW: CPT | Mod: 26

## 2021-02-09 RX ORDER — POLYETHYLENE GLYCOL 3350 17 G/17G
17 POWDER, FOR SOLUTION ORAL DAILY
Refills: 0 | Status: DISCONTINUED | OUTPATIENT
Start: 2021-02-09 | End: 2021-03-14

## 2021-02-09 RX ADMIN — REMDESIVIR 500 MILLIGRAM(S): 5 INJECTION INTRAVENOUS at 14:07

## 2021-02-09 RX ADMIN — Medication 60 MILLIGRAM(S): at 06:26

## 2021-02-09 RX ADMIN — Medication 6: at 18:02

## 2021-02-09 RX ADMIN — Medication 10 MILLILITER(S): at 12:33

## 2021-02-09 RX ADMIN — Medication 100 MILLIGRAM(S): at 06:27

## 2021-02-09 RX ADMIN — Medication 25 MILLIGRAM(S): at 06:27

## 2021-02-09 RX ADMIN — CEFTRIAXONE 100 MILLIGRAM(S): 500 INJECTION, POWDER, FOR SOLUTION INTRAMUSCULAR; INTRAVENOUS at 17:44

## 2021-02-09 RX ADMIN — INSULIN GLARGINE 25 UNIT(S): 100 INJECTION, SOLUTION SUBCUTANEOUS at 22:13

## 2021-02-09 RX ADMIN — Medication 4: at 06:30

## 2021-02-09 RX ADMIN — PANTOPRAZOLE SODIUM 40 MILLIGRAM(S): 20 TABLET, DELAYED RELEASE ORAL at 12:34

## 2021-02-09 RX ADMIN — Medication 50 MICROGRAM(S): at 06:27

## 2021-02-09 RX ADMIN — Medication 100 MILLIGRAM(S): at 21:40

## 2021-02-09 RX ADMIN — Medication 10 MILLILITER(S): at 06:29

## 2021-02-09 RX ADMIN — Medication 10 MILLILITER(S): at 17:45

## 2021-02-09 RX ADMIN — CHLORHEXIDINE GLUCONATE 1 APPLICATION(S): 213 SOLUTION TOPICAL at 12:34

## 2021-02-09 RX ADMIN — ENOXAPARIN SODIUM 40 MILLIGRAM(S): 100 INJECTION SUBCUTANEOUS at 17:46

## 2021-02-09 RX ADMIN — Medication 6: at 13:00

## 2021-02-09 RX ADMIN — ENOXAPARIN SODIUM 40 MILLIGRAM(S): 100 INJECTION SUBCUTANEOUS at 06:26

## 2021-02-09 RX ADMIN — Medication 10 MILLILITER(S): at 22:13

## 2021-02-09 RX ADMIN — TAMSULOSIN HYDROCHLORIDE 0.4 MILLIGRAM(S): 0.4 CAPSULE ORAL at 21:40

## 2021-02-09 RX ADMIN — Medication 9 UNIT(S): at 18:02

## 2021-02-09 RX ADMIN — POLYETHYLENE GLYCOL 3350 17 GRAM(S): 17 POWDER, FOR SOLUTION ORAL at 09:26

## 2021-02-09 RX ADMIN — Medication 60 MILLIGRAM(S): at 17:46

## 2021-02-09 RX ADMIN — Medication 100 MILLIGRAM(S): at 12:34

## 2021-02-09 RX ADMIN — Medication 9 UNIT(S): at 08:15

## 2021-02-09 RX ADMIN — Medication 9 UNIT(S): at 13:00

## 2021-02-09 RX ADMIN — PRASUGREL 10 MILLIGRAM(S): 5 TABLET, FILM COATED ORAL at 14:07

## 2021-02-09 NOTE — PROGRESS NOTE ADULT - ASSESSMENT
IMPRESSION:    Acute hypoxemic respiratory failure on HHFNC 100%  severe Covid PNA  Probable superimposed bacterial infection high procal  COPD exacerbation  HO CAD    PLAN:    CNS: avoid sedatives    HEENT: Oral care    PULMONARY:  HOB @ 45 degrees.  Aspiration precautions. HFNC alternate with  NIV during sleep and PRN, Solumedrol 60mg q12h. nebs q6h atc    CARDIOVASCULAR: Goal Map >60, Avoid volume overload.     GI: GI prophylaxis. PO    RENAL:  Follow up lytes.  Correct as needed    INFECTIOUS DISEASE: Follow up cultures.   Per ID      HEMATOLOGICAL:  DVT prophylaxis.  Trend Dimer , INF MAKERS    ENDOCRINE:  Follow up FS.  Insulin protocol if needed.    MUSCULOSKELETAL: Bedrest    Dispo: MICU

## 2021-02-09 NOTE — PROGRESS NOTE ADULT - SUBJECTIVE AND OBJECTIVE BOX
ANDREE BUSH  70y, Male    All available historical data reviewed    OVERNIGHT EVENTS:  no fevers  feels well and has no complaints  no sore throat  NHFNC    ROS:  General: Denies rigors, nightsweats  HEENT: Denies headache, rhinorrhea, sore throat, eye pain  CV: Denies CP, palpitations  PULM: Denies wheezing, hemoptysis  GI: Denies hematemesis, hematochezia, melena  : Denies discharge, hematuria  MSK: Denies arthralgias, myalgias  SKIN: Denies rash, lesions  NEURO: Denies paresthesias, weakness  PSYCH: Denies depression, anxiety    VITALS:  T(F): 98.1, Max: 98.1 (02-09-21 @ 08:00)  HR: 72  BP: 140/73  RR: 31Vital Signs Last 24 Hrs  T(C): 36.7 (09 Feb 2021 08:00), Max: 36.7 (09 Feb 2021 08:00)  T(F): 98.1 (09 Feb 2021 08:00), Max: 98.1 (09 Feb 2021 08:00)  HR: 72 (09 Feb 2021 09:00) (60 - 80)  BP: 140/73 (09 Feb 2021 09:00) (83/67 - 150/79)  BP(mean): 103 (09 Feb 2021 09:00) (78 - 123)  RR: 31 (09 Feb 2021 09:00) (19 - 38)  SpO2: 97% (09 Feb 2021 09:00) (90% - 98%)    TESTS & MEASUREMENTS:                        13.3   16.70 )-----------( 304      ( 09 Feb 2021 05:39 )             41.5     02-09    137  |  101  |  40<H>  ----------------------------<  252<H>  4.3   |  26  |  1.2    Ca    8.4<L>      09 Feb 2021 05:39  Mg     2.0     02-09    TPro  5.4<L>  /  Alb  3.0<L>  /  TBili  0.3  /  DBili  x   /  AST  33  /  ALT  41  /  AlkPhos  60  02-09    LIVER FUNCTIONS - ( 09 Feb 2021 05:39 )  Alb: 3.0 g/dL / Pro: 5.4 g/dL / ALK PHOS: 60 U/L / ALT: 41 U/L / AST: 33 U/L / GGT: x             Culture - Blood (collected 02-06-21 @ 07:25)  Source: .Blood Blood-Venous  Preliminary Report (02-07-21 @ 18:01):    No growth to date.            RADIOLOGY & ADDITIONAL TESTS:  Personal review of radiological diagnostics performed  Echo and EKG results noted when applicable.     MEDICATIONS:  acetaminophen   Tablet .. 650 milliGRAM(s) Oral every 6 hours PRN  benzonatate 100 milliGRAM(s) Oral every 8 hours  cefTRIAXone   IVPB      cefTRIAXone   IVPB 2000 milliGRAM(s) IV Intermittent every 24 hours  chlorhexidine 4% Liquid 1 Application(s) Topical daily  dextrose 40% Gel 15 Gram(s) Oral once  dextrose 5%. 1000 milliLiter(s) IV Continuous <Continuous>  dextrose 5%. 1000 milliLiter(s) IV Continuous <Continuous>  dextrose 50% Injectable 25 Gram(s) IV Push once  dextrose 50% Injectable 12.5 Gram(s) IV Push once  dextrose 50% Injectable 25 Gram(s) IV Push once  enoxaparin Injectable 40 milliGRAM(s) SubCutaneous two times a day  glucagon  Injectable 1 milliGRAM(s) IntraMuscular once  guaifenesin/dextromethorphan  Syrup 5 milliLiter(s) Oral every 6 hours PRN  guaifenesin/dextromethorphan  Syrup 10 milliLiter(s) Oral every 6 hours  influenza   Vaccine 0.5 milliLiter(s) IntraMuscular once  insulin glargine Injectable (LANTUS) 25 Unit(s) SubCutaneous at bedtime  insulin lispro (ADMELOG) corrective regimen sliding scale   SubCutaneous three times a day before meals  insulin lispro Injectable (ADMELOG) 9 Unit(s) SubCutaneous three times a day before meals  levoFLOXacin IVPB 750 milliGRAM(s) IV Intermittent every 24 hours  levoFLOXacin IVPB      levothyroxine 50 MICROGram(s) Oral daily  methylPREDNISolone sodium succinate Injectable 60 milliGRAM(s) IV Push every 12 hours  metoprolol succinate ER 25 milliGRAM(s) Oral daily  ondansetron    Tablet 4 milliGRAM(s) Oral two times a day PRN  pantoprazole   Suspension 40 milliGRAM(s) Oral daily  polyethylene glycol 3350 17 Gram(s) Oral daily  prasugrel 10 milliGRAM(s) Oral daily  remdesivir  IVPB   IV Intermittent   remdesivir  IVPB 100 milliGRAM(s) IV Intermittent every 24 hours  tamsulosin 0.4 milliGRAM(s) Oral at bedtime      ANTIBIOTICS:  cefTRIAXone   IVPB      cefTRIAXone   IVPB 2000 milliGRAM(s) IV Intermittent every 24 hours  levoFLOXacin IVPB 750 milliGRAM(s) IV Intermittent every 24 hours  levoFLOXacin IVPB      remdesivir  IVPB   IV Intermittent   remdesivir  IVPB 100 milliGRAM(s) IV Intermittent every 24 hours

## 2021-02-09 NOTE — PROGRESS NOTE ADULT - ASSESSMENT
· Assessment	  69 yo M with PMH of CAD, MI (s/p 4 stents), DM, HTN, BPH, hypothyroid and GERD, recently received Pfizer vaccine (1/3 and 1/24) presented to ED c/o progressive SOB, fever, and weakness. Patient stated that his symptoms began on Wednesday 2/3. COVID PCR + on 2/4. Admitted to medicine with acute hypoxemic respiratory failure secondary to COVID-19 PNA.  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 SpO2 < 94% on RA and need for supplemental O2. HFNC  CTA 2/4 : extensive bilateral patchy ground-glass opacities involving all lobes most pronounced in the upper lung zones. No PE  Pt is in the early viral replicative phase based on the timeline/onset of symptoms.  COVID-19 antibodies NG    procalcitonin 1.61 > 0.97  Ferritin 404>625  CRP 31.59>22.67  Ddimers 364>420  BCx 2/6 NGTD    Constipated    s/p plasma 2/7    RECOMMENDATIONS;  Treat constipation  Urine for legionella/strep pneumonia antigen  Target SpO2 92 % to 96 %  Day 1 : Single  mg IV loading dose  Day 2-5 : single  mg IV once daily maintenance dose  Daily CBC,CMP.  Dexamethasone 6 mg iv q24h for 10 days.  Monitor for side effects: hyperglycemia, neurological ( agitation/confusion), adrenal suppression, bacterial and fungal infections  Rocephin 2 gm iv q24h  Levoquin 500 mg iv q24h

## 2021-02-09 NOTE — PROGRESS NOTE ADULT - SUBJECTIVE AND OBJECTIVE BOX
OVERNIGHT EVENTS: events noted, on BIPAP, HHFNC 60/100    Vital Signs Last 24 Hrs  T(C): 36.2 (09 Feb 2021 04:00), Max: 36.6 (08 Feb 2021 20:00)  T(F): 97.1 (09 Feb 2021 04:00), Max: 97.8 (08 Feb 2021 20:00)  HR: 62 (09 Feb 2021 07:00) (60 - 80)  BP: 141/67 (09 Feb 2021 07:00) (83/67 - 150/79)  BP(mean): 96 (09 Feb 2021 07:00) (78 - 123)  RR: 25 (09 Feb 2021 07:00) (19 - 38)  SpO2: 93% (09 Feb 2021 07:00) (90% - 98%)    PHYSICAL EXAMINATION:    GENERAL: ILL LOOKING    HEENT: Head is normocephalic and atraumatic.    NECK: Supple.    LUNGS:  bl crackles    HEART: Regular rate and rhythm without murmur.    ABDOMEN: Soft, nontender, and nondistended.      EXTREMITIES: Without any cyanosis, clubbing, rash, lesions or edema.    NEUROLOGIC: Grossly intact.    SKIN: No ulceration or induration present.      LABS:                        13.3   16.70 )-----------( 304      ( 09 Feb 2021 05:39 )             41.5     02-09    137  |  101  |  40<H>  ----------------------------<  252<H>  4.3   |  26  |  1.2    Ca    8.4<L>      09 Feb 2021 05:39  Mg     2.0     02-09    TPro  5.4<L>  /  Alb  3.0<L>  /  TBili  0.3  /  DBili  x   /  AST  33  /  ALT  41  /  AlkPhos  60  02-09              D-Dimer Assay, Quantitative: 180 ng/mL DDU (02-09-21 @ 05:39)        Procalcitonin, Serum: 0.32 ng/mL (02-08-21 @ 04:48)        02-08-21 @ 07:01  -  02-09-21 @ 07:00  --------------------------------------------------------  IN: 750 mL / OUT: 1300 mL / NET: -550 mL        MICROBIOLOGY:  Culture Results:   No growth to date. (02-06 @ 07:25)      MEDICATIONS  (STANDING):  benzonatate 100 milliGRAM(s) Oral every 8 hours  cefTRIAXone   IVPB      cefTRIAXone   IVPB 2000 milliGRAM(s) IV Intermittent every 24 hours  chlorhexidine 4% Liquid 1 Application(s) Topical daily  dextrose 40% Gel 15 Gram(s) Oral once  dextrose 5%. 1000 milliLiter(s) (50 mL/Hr) IV Continuous <Continuous>  dextrose 5%. 1000 milliLiter(s) (100 mL/Hr) IV Continuous <Continuous>  dextrose 50% Injectable 25 Gram(s) IV Push once  dextrose 50% Injectable 12.5 Gram(s) IV Push once  dextrose 50% Injectable 25 Gram(s) IV Push once  enoxaparin Injectable 40 milliGRAM(s) SubCutaneous two times a day  glucagon  Injectable 1 milliGRAM(s) IntraMuscular once  guaifenesin/dextromethorphan  Syrup 10 milliLiter(s) Oral every 6 hours  influenza   Vaccine 0.5 milliLiter(s) IntraMuscular once  insulin glargine Injectable (LANTUS) 25 Unit(s) SubCutaneous at bedtime  insulin lispro (ADMELOG) corrective regimen sliding scale   SubCutaneous three times a day before meals  insulin lispro Injectable (ADMELOG) 9 Unit(s) SubCutaneous three times a day before meals  levoFLOXacin IVPB 750 milliGRAM(s) IV Intermittent every 24 hours  levoFLOXacin IVPB      levothyroxine 50 MICROGram(s) Oral daily  methylPREDNISolone sodium succinate Injectable 60 milliGRAM(s) IV Push every 12 hours  metoprolol succinate ER 25 milliGRAM(s) Oral daily  pantoprazole   Suspension 40 milliGRAM(s) Oral daily  prasugrel 10 milliGRAM(s) Oral daily  remdesivir  IVPB   IV Intermittent   remdesivir  IVPB 100 milliGRAM(s) IV Intermittent every 24 hours  tamsulosin 0.4 milliGRAM(s) Oral at bedtime    MEDICATIONS  (PRN):  acetaminophen   Tablet .. 650 milliGRAM(s) Oral every 6 hours PRN Temp greater or equal to 38C (100.4F), Mild Pain (1 - 3)  guaifenesin/dextromethorphan  Syrup 5 milliLiter(s) Oral every 6 hours PRN Cough  ondansetron    Tablet 4 milliGRAM(s) Oral two times a day PRN Nausea and/or Vomiting      RADIOLOGY & ADDITIONAL STUDIES:

## 2021-02-09 NOTE — PROGRESS NOTE ADULT - SUBJECTIVE AND OBJECTIVE BOX
ANDREE BUSH 70y Male  MRN#: [id number]   Hospital Day: 5d    SUBJECTIVE  Patient is a 70y old Male who presents with a chief complaint of sob (09 Feb 2021 08:15)  Currently admitted to medicine with the primary diagnosis of Shortness of breath      INTERVAL HPI AND OVERNIGHT EVENTS:  Patient was examined and seen at bedside. No overnight events.     OBJECTIVE  PAST MEDICAL & SURGICAL HISTORY  Chronic kidney disease (CKD)  III    Type 2 diabetes mellitus without complication, unspecified long term insulin use status    Hypertension, unspecified type    Dyspnea, unspecified type    ASHD (arteriosclerotic heart disease)  STEMI 12/31/17, PCI RCA    S/P cataract surgery    History of ligation of vein  2012    History of tonsillectomy  1957      ALLERGIES:  penicillin (Rash (Severe))    MEDICATIONS:  STANDING MEDICATIONS  benzonatate 100 milliGRAM(s) Oral every 8 hours  cefTRIAXone   IVPB      cefTRIAXone   IVPB 2000 milliGRAM(s) IV Intermittent every 24 hours  chlorhexidine 4% Liquid 1 Application(s) Topical daily  dextrose 40% Gel 15 Gram(s) Oral once  dextrose 5%. 1000 milliLiter(s) IV Continuous <Continuous>  dextrose 5%. 1000 milliLiter(s) IV Continuous <Continuous>  dextrose 50% Injectable 25 Gram(s) IV Push once  dextrose 50% Injectable 12.5 Gram(s) IV Push once  dextrose 50% Injectable 25 Gram(s) IV Push once  enoxaparin Injectable 40 milliGRAM(s) SubCutaneous two times a day  glucagon  Injectable 1 milliGRAM(s) IntraMuscular once  guaifenesin/dextromethorphan  Syrup 10 milliLiter(s) Oral every 6 hours  influenza   Vaccine 0.5 milliLiter(s) IntraMuscular once  insulin glargine Injectable (LANTUS) 25 Unit(s) SubCutaneous at bedtime  insulin lispro (ADMELOG) corrective regimen sliding scale   SubCutaneous three times a day before meals  insulin lispro Injectable (ADMELOG) 9 Unit(s) SubCutaneous three times a day before meals  levoFLOXacin IVPB 750 milliGRAM(s) IV Intermittent every 24 hours  levoFLOXacin IVPB      levothyroxine 50 MICROGram(s) Oral daily  methylPREDNISolone sodium succinate Injectable 60 milliGRAM(s) IV Push every 12 hours  metoprolol succinate ER 25 milliGRAM(s) Oral daily  pantoprazole   Suspension 40 milliGRAM(s) Oral daily  prasugrel 10 milliGRAM(s) Oral daily  remdesivir  IVPB   IV Intermittent   remdesivir  IVPB 100 milliGRAM(s) IV Intermittent every 24 hours  tamsulosin 0.4 milliGRAM(s) Oral at bedtime    PRN MEDICATIONS  acetaminophen   Tablet .. 650 milliGRAM(s) Oral every 6 hours PRN  guaifenesin/dextromethorphan  Syrup 5 milliLiter(s) Oral every 6 hours PRN  ondansetron    Tablet 4 milliGRAM(s) Oral two times a day PRN      VITAL SIGNS: Last 24 Hours  T(C): 36.2 (09 Feb 2021 04:00), Max: 36.6 (08 Feb 2021 20:00)  T(F): 97.1 (09 Feb 2021 04:00), Max: 97.8 (08 Feb 2021 20:00)  HR: 62 (09 Feb 2021 07:00) (60 - 80)  BP: 141/67 (09 Feb 2021 07:00) (83/67 - 150/79)  BP(mean): 96 (09 Feb 2021 07:00) (78 - 123)  RR: 25 (09 Feb 2021 07:00) (19 - 38)  SpO2: 93% (09 Feb 2021 07:00) (90% - 98%)    LABS:                        13.3   16.70 )-----------( 304      ( 09 Feb 2021 05:39 )             41.5     02-09    137  |  101  |  40<H>  ----------------------------<  252<H>  4.3   |  26  |  1.2    Ca    8.4<L>      09 Feb 2021 05:39  Mg     2.0     02-09    TPro  5.4<L>  /  Alb  3.0<L>  /  TBili  0.3  /  DBili  x   /  AST  33  /  ALT  41  /  AlkPhos  60  02-09        RADIOLOGY:  Xray Chest 1 View- PORTABLE-Routine (Xray Chest 1 View- PORTABLE-Routine in AM.) (02.08.21 @ 06:34) >  Impression: Diffuse bilateral pulmonary opacities similar to prior.        PHYSICAL EXAM:  GEN: No acute distress on HFNC  LUNGS: Decreased breath sounds at lung bases b/l  HEART: Regular  ABD: Soft, non-tender, non-distended.  EXT: NC/NC/NE/2+PP/TIJERINA/Skin Intact.   NEURO: AAOX3    ASSESSMENT & PLAN  71 yo M with PMH of CAD, MI (s/p 4 stents), DM, HTN, BPH, hypothyroid and GERD, recently received Pfizer vaccine (1/3 and 1/24) presented to ED c/o progressive SOB, fever, and weakness. Patient stated that his symptoms began on Wednesday 2/3. COVID PCR + on 2/4. Admitted to medicine with acute hypoxemic respiratory failure secondary to COVID-19 PNA. Upgraded to ICU from 3A due to increasing oxygen requirements    #Acute hypoxemic respiratory failure secondary to COVID-19 PNA - severe illness  - Patient had already received 2 doses of the Pfizer vaccine  - Tolerated Bipap overnight, now on 60L/100% high Flow NC saturating well   - CTA 02/04 showed extensive bilateral patchy ground-glass opacities involving all lobes most pronounced in the upper lung zones  - CXR this am showing BL patchy opacities unchanged since yesterday  - s/p 1 unit plasma  - lower solumedrol to 60mg IV q12h per pulm  - c/w Remdesivir (Day 5/5)   - C/w Rocephin 2g Q24H. And Levofloxacin 750mg IV q24h per ID, procal coming down  - Monitor Cr, LFT's, glucose  - trend inflammatory markers    #GEOVANNA - resolved  - Likely 2/2 decreased PO intake   - d/c fluids for now as GEOVANNA resolved.  - Monitor BMP    #CAD  #MI s/p 4 stents  - C/w Effient 10mg daily  - C/w Toprol 25mg daily    #HTN   - C/w enalapril 2.5mg daily    #BPH  - C/w tamsulosin 0.4mg PO QHS    #DM  - Ptn remains hyperglycemic (200+), refusing insulin drip   - Monitor FS  - Increase basal/bolus insulin regimen to 25/9/9/9 + aggressive sliding scale  - A1C 9.1-->likely non-compliant at home  - c/w counselling    #Hypothyroidism  - C/w Synthroid    #Misc  Diet: consistent carbs, DASH/TLC (mechanical soft, per patient request)   DVT PPx: Lovenox 40mg BID

## 2021-02-10 LAB
ALBUMIN SERPL ELPH-MCNC: 2.9 G/DL — LOW (ref 3.5–5.2)
ALP SERPL-CCNC: 68 U/L — SIGNIFICANT CHANGE UP (ref 30–115)
ALT FLD-CCNC: 46 U/L — HIGH (ref 0–41)
ANION GAP SERPL CALC-SCNC: 12 MMOL/L — SIGNIFICANT CHANGE UP (ref 7–14)
AST SERPL-CCNC: 44 U/L — HIGH (ref 0–41)
BASOPHILS # BLD AUTO: 0.03 K/UL — SIGNIFICANT CHANGE UP (ref 0–0.2)
BASOPHILS NFR BLD AUTO: 0.2 % — SIGNIFICANT CHANGE UP (ref 0–1)
BILIRUB SERPL-MCNC: 0.3 MG/DL — SIGNIFICANT CHANGE UP (ref 0.2–1.2)
BUN SERPL-MCNC: 30 MG/DL — HIGH (ref 10–20)
CALCIUM SERPL-MCNC: 7.9 MG/DL — LOW (ref 8.5–10.1)
CHLORIDE SERPL-SCNC: 99 MMOL/L — SIGNIFICANT CHANGE UP (ref 98–110)
CO2 SERPL-SCNC: 27 MMOL/L — SIGNIFICANT CHANGE UP (ref 17–32)
CREAT SERPL-MCNC: 1 MG/DL — SIGNIFICANT CHANGE UP (ref 0.7–1.5)
CRP SERPL-MCNC: 4.26 MG/DL — HIGH (ref 0–0.4)
EOSINOPHIL # BLD AUTO: 0 K/UL — SIGNIFICANT CHANGE UP (ref 0–0.7)
EOSINOPHIL NFR BLD AUTO: 0 % — SIGNIFICANT CHANGE UP (ref 0–8)
GLUCOSE BLDC GLUCOMTR-MCNC: 103 MG/DL — HIGH (ref 70–99)
GLUCOSE BLDC GLUCOMTR-MCNC: 166 MG/DL — HIGH (ref 70–99)
GLUCOSE BLDC GLUCOMTR-MCNC: 201 MG/DL — HIGH (ref 70–99)
GLUCOSE BLDC GLUCOMTR-MCNC: 215 MG/DL — HIGH (ref 70–99)
GLUCOSE BLDC GLUCOMTR-MCNC: 273 MG/DL — HIGH (ref 70–99)
GLUCOSE BLDC GLUCOMTR-MCNC: 67 MG/DL — LOW (ref 70–99)
GLUCOSE SERPL-MCNC: 63 MG/DL — LOW (ref 70–99)
HCT VFR BLD CALC: 44.2 % — SIGNIFICANT CHANGE UP (ref 42–52)
HGB BLD-MCNC: 14.1 G/DL — SIGNIFICANT CHANGE UP (ref 14–18)
IMM GRANULOCYTES NFR BLD AUTO: 2.3 % — HIGH (ref 0.1–0.3)
LEGIONELLA AG UR QL: NEGATIVE — SIGNIFICANT CHANGE UP
LYMPHOCYTES # BLD AUTO: 0.56 K/UL — LOW (ref 1.2–3.4)
LYMPHOCYTES # BLD AUTO: 3.8 % — LOW (ref 20.5–51.1)
MAGNESIUM SERPL-MCNC: 2 MG/DL — SIGNIFICANT CHANGE UP (ref 1.8–2.4)
MCHC RBC-ENTMCNC: 24.7 PG — LOW (ref 27–31)
MCHC RBC-ENTMCNC: 31.9 G/DL — LOW (ref 32–37)
MCV RBC AUTO: 77.3 FL — LOW (ref 80–94)
MONOCYTES # BLD AUTO: 0.27 K/UL — SIGNIFICANT CHANGE UP (ref 0.1–0.6)
MONOCYTES NFR BLD AUTO: 1.9 % — SIGNIFICANT CHANGE UP (ref 1.7–9.3)
NEUTROPHILS # BLD AUTO: 13.4 K/UL — HIGH (ref 1.4–6.5)
NEUTROPHILS NFR BLD AUTO: 91.8 % — HIGH (ref 42.2–75.2)
NRBC # BLD: 0 /100 WBCS — SIGNIFICANT CHANGE UP (ref 0–0)
PLATELET # BLD AUTO: 280 K/UL — SIGNIFICANT CHANGE UP (ref 130–400)
POTASSIUM SERPL-MCNC: 3.9 MMOL/L — SIGNIFICANT CHANGE UP (ref 3.5–5)
POTASSIUM SERPL-SCNC: 3.9 MMOL/L — SIGNIFICANT CHANGE UP (ref 3.5–5)
PROT SERPL-MCNC: 5.3 G/DL — LOW (ref 6–8)
RBC # BLD: 5.72 M/UL — SIGNIFICANT CHANGE UP (ref 4.7–6.1)
RBC # FLD: 15.6 % — HIGH (ref 11.5–14.5)
SODIUM SERPL-SCNC: 138 MMOL/L — SIGNIFICANT CHANGE UP (ref 135–146)
WBC # BLD: 14.59 K/UL — HIGH (ref 4.8–10.8)
WBC # FLD AUTO: 14.59 K/UL — HIGH (ref 4.8–10.8)

## 2021-02-10 PROCEDURE — 71045 X-RAY EXAM CHEST 1 VIEW: CPT | Mod: 26

## 2021-02-10 RX ORDER — INSULIN GLARGINE 100 [IU]/ML
20 INJECTION, SOLUTION SUBCUTANEOUS AT BEDTIME
Refills: 0 | Status: DISCONTINUED | OUTPATIENT
Start: 2021-02-10 | End: 2021-02-22

## 2021-02-10 RX ADMIN — Medication 10 MILLILITER(S): at 11:48

## 2021-02-10 RX ADMIN — Medication 100 MILLIGRAM(S): at 13:13

## 2021-02-10 RX ADMIN — Medication 60 MILLIGRAM(S): at 17:18

## 2021-02-10 RX ADMIN — POLYETHYLENE GLYCOL 3350 17 GRAM(S): 17 POWDER, FOR SOLUTION ORAL at 11:48

## 2021-02-10 RX ADMIN — TAMSULOSIN HYDROCHLORIDE 0.4 MILLIGRAM(S): 0.4 CAPSULE ORAL at 22:11

## 2021-02-10 RX ADMIN — Medication 25 MILLIGRAM(S): at 05:51

## 2021-02-10 RX ADMIN — Medication 2: at 17:17

## 2021-02-10 RX ADMIN — CHLORHEXIDINE GLUCONATE 1 APPLICATION(S): 213 SOLUTION TOPICAL at 11:48

## 2021-02-10 RX ADMIN — PRASUGREL 10 MILLIGRAM(S): 5 TABLET, FILM COATED ORAL at 11:47

## 2021-02-10 RX ADMIN — Medication 50 MICROGRAM(S): at 05:51

## 2021-02-10 RX ADMIN — INSULIN GLARGINE 20 UNIT(S): 100 INJECTION, SOLUTION SUBCUTANEOUS at 22:11

## 2021-02-10 RX ADMIN — Medication 650 MILLIGRAM(S): at 06:40

## 2021-02-10 RX ADMIN — Medication 10 MILLILITER(S): at 05:52

## 2021-02-10 RX ADMIN — Medication 4: at 11:51

## 2021-02-10 RX ADMIN — Medication 100 MILLIGRAM(S): at 05:51

## 2021-02-10 RX ADMIN — Medication 9 UNIT(S): at 17:20

## 2021-02-10 RX ADMIN — ENOXAPARIN SODIUM 40 MILLIGRAM(S): 100 INJECTION SUBCUTANEOUS at 17:18

## 2021-02-10 RX ADMIN — Medication 100 MILLIGRAM(S): at 22:11

## 2021-02-10 RX ADMIN — Medication 60 MILLIGRAM(S): at 05:52

## 2021-02-10 RX ADMIN — ENOXAPARIN SODIUM 40 MILLIGRAM(S): 100 INJECTION SUBCUTANEOUS at 05:51

## 2021-02-10 RX ADMIN — CEFTRIAXONE 100 MILLIGRAM(S): 500 INJECTION, POWDER, FOR SOLUTION INTRAMUSCULAR; INTRAVENOUS at 14:30

## 2021-02-10 RX ADMIN — PANTOPRAZOLE SODIUM 40 MILLIGRAM(S): 20 TABLET, DELAYED RELEASE ORAL at 11:48

## 2021-02-10 RX ADMIN — Medication 10 MILLILITER(S): at 17:18

## 2021-02-10 RX ADMIN — Medication 9 UNIT(S): at 11:51

## 2021-02-10 NOTE — PROGRESS NOTE ADULT - SUBJECTIVE AND OBJECTIVE BOX
OVERNIGHT EVENTS: events noted, on HHFNC 60/100%, COUGH    Vital Signs Last 24 Hrs  T(C): 36.4 (10 Feb 2021 04:00), Max: 36.6 (09 Feb 2021 12:00)  T(F): 97.6 (10 Feb 2021 04:00), Max: 97.9 (09 Feb 2021 12:00)  HR: 72 (10 Feb 2021 07:00) (58 - 74)  BP: 139/68 (10 Feb 2021 07:00) (107/50 - 150/67)  BP(mean): 88 (10 Feb 2021 07:00) (67 - 104)  RR: 25 (10 Feb 2021 07:00) (18 - 34)  SpO2: 97% (10 Feb 2021 07:00) (92% - 100%)    PHYSICAL EXAMINATION:    GENERAL: ill looking    HEENT: Head is normocephalic and atraumatic.     NECK: Supple.    LUNGS: bl crackles    HEART: Regular rate and rhythm without murmur.    ABDOMEN: Soft, nontender, and nondistended.      EXTREMITIES: Without any cyanosis, clubbing, rash, lesions or edema.    NEUROLOGIC: Grossly intact.    SKIN: No ulceration or induration present.      LABS:                        14.1   14.59 )-----------( 280      ( 10 Feb 2021 04:39 )             44.2     02-10    138  |  99  |  30<H>  ----------------------------<  63<L>  3.9   |  27  |  1.0    Ca    7.9<L>      10 Feb 2021 04:39  Mg     2.0     02-10    TPro  5.3<L>  /  Alb  2.9<L>  /  TBili  0.3  /  DBili  x   /  AST  44<H>  /  ALT  46<H>  /  AlkPhos  68  02-10                    Procalcitonin, Serum: 0.13 ng/mL (02-09-21 @ 04:30)  Procalcitonin, Serum: 0.32 ng/mL (02-08-21 @ 04:48)        02-09-21 @ 07:01  -  02-10-21 @ 07:00  --------------------------------------------------------  IN: 350 mL / OUT: 1700 mL / NET: -1350 mL        MICROBIOLOGY:      MEDICATIONS  (STANDING):  benzonatate 100 milliGRAM(s) Oral every 8 hours  cefTRIAXone   IVPB      cefTRIAXone   IVPB 2000 milliGRAM(s) IV Intermittent every 24 hours  chlorhexidine 4% Liquid 1 Application(s) Topical daily  dextrose 40% Gel 15 Gram(s) Oral once  dextrose 5%. 1000 milliLiter(s) (50 mL/Hr) IV Continuous <Continuous>  dextrose 5%. 1000 milliLiter(s) (100 mL/Hr) IV Continuous <Continuous>  dextrose 50% Injectable 25 Gram(s) IV Push once  dextrose 50% Injectable 12.5 Gram(s) IV Push once  dextrose 50% Injectable 25 Gram(s) IV Push once  enoxaparin Injectable 40 milliGRAM(s) SubCutaneous two times a day  glucagon  Injectable 1 milliGRAM(s) IntraMuscular once  guaifenesin/dextromethorphan  Syrup 10 milliLiter(s) Oral every 6 hours  influenza   Vaccine 0.5 milliLiter(s) IntraMuscular once  insulin glargine Injectable (LANTUS) 25 Unit(s) SubCutaneous at bedtime  insulin lispro (ADMELOG) corrective regimen sliding scale   SubCutaneous three times a day before meals  insulin lispro Injectable (ADMELOG) 9 Unit(s) SubCutaneous three times a day before meals  levoFLOXacin IVPB 750 milliGRAM(s) IV Intermittent every 24 hours  levoFLOXacin IVPB      levothyroxine 50 MICROGram(s) Oral daily  methylPREDNISolone sodium succinate Injectable 60 milliGRAM(s) IV Push every 12 hours  metoprolol succinate ER 25 milliGRAM(s) Oral daily  pantoprazole   Suspension 40 milliGRAM(s) Oral daily  polyethylene glycol 3350 17 Gram(s) Oral daily  prasugrel 10 milliGRAM(s) Oral daily  tamsulosin 0.4 milliGRAM(s) Oral at bedtime    MEDICATIONS  (PRN):  acetaminophen   Tablet .. 650 milliGRAM(s) Oral every 6 hours PRN Temp greater or equal to 38C (100.4F), Mild Pain (1 - 3)  guaifenesin/dextromethorphan  Syrup 5 milliLiter(s) Oral every 6 hours PRN Cough  ondansetron    Tablet 4 milliGRAM(s) Oral two times a day PRN Nausea and/or Vomiting      RADIOLOGY & ADDITIONAL STUDIES:

## 2021-02-10 NOTE — PROGRESS NOTE ADULT - SUBJECTIVE AND OBJECTIVE BOX
ANDREE BUSH  70y, Male    All available historical data reviewed    OVERNIGHT EVENTS:  no fevers  feels well and has no complaints  minimal sore throat  had a BM  HFNC    ROS:  General: Denies rigors, nightsweats  HEENT: Denies headache, rhinorrhea, sore throat, eye pain  CV: Denies CP, palpitations  PULM: Denies wheezing, hemoptysis  GI: Denies hematemesis, hematochezia, melena  : Denies discharge, hematuria  MSK: Denies arthralgias, myalgias  SKIN: Denies rash, lesions  NEURO: Denies paresthesias, weakness  PSYCH: Denies depression, anxiety    VITALS:  T(F): 97.6, Max: 97.6 (02-09-21 @ 16:00)  HR: 68  BP: 112/54  RR: 18Vital Signs Last 24 Hrs  T(C): 36.4 (10 Feb 2021 08:00), Max: 36.4 (09 Feb 2021 16:00)  T(F): 97.6 (10 Feb 2021 08:00), Max: 97.6 (09 Feb 2021 16:00)  HR: 68 (10 Feb 2021 12:00) (58 - 74)  BP: 112/54 (10 Feb 2021 11:00) (107/50 - 150/67)  BP(mean): 71 (10 Feb 2021 11:00) (67 - 104)  RR: 18 (10 Feb 2021 12:00) (17 - 32)  SpO2: 99% (10 Feb 2021 12:00) (92% - 100%)    TESTS & MEASUREMENTS:                        14.1   14.59 )-----------( 280      ( 10 Feb 2021 04:39 )             44.2     02-10    138  |  99  |  30<H>  ----------------------------<  63<L>  3.9   |  27  |  1.0    Ca    7.9<L>      10 Feb 2021 04:39  Mg     2.0     02-10    TPro  5.3<L>  /  Alb  2.9<L>  /  TBili  0.3  /  DBili  x   /  AST  44<H>  /  ALT  46<H>  /  AlkPhos  68  02-10    LIVER FUNCTIONS - ( 10 Feb 2021 04:39 )  Alb: 2.9 g/dL / Pro: 5.3 g/dL / ALK PHOS: 68 U/L / ALT: 46 U/L / AST: 44 U/L / GGT: x             Culture - Blood (collected 02-09-21 @ 04:45)  Source: .Blood Blood  Preliminary Report (02-10-21 @ 13:02):    No growth to date.    Culture - Blood (collected 02-06-21 @ 07:25)  Source: .Blood Blood-Venous  Preliminary Report (02-07-21 @ 18:01):    No growth to date.            RADIOLOGY & ADDITIONAL TESTS:  Personal review of radiological diagnostics performed  Echo and EKG results noted when applicable.     MEDICATIONS:  acetaminophen   Tablet .. 650 milliGRAM(s) Oral every 6 hours PRN  benzonatate 100 milliGRAM(s) Oral every 8 hours  cefTRIAXone   IVPB      cefTRIAXone   IVPB 2000 milliGRAM(s) IV Intermittent every 24 hours  chlorhexidine 4% Liquid 1 Application(s) Topical daily  dextrose 40% Gel 15 Gram(s) Oral once  dextrose 5%. 1000 milliLiter(s) IV Continuous <Continuous>  dextrose 5%. 1000 milliLiter(s) IV Continuous <Continuous>  dextrose 50% Injectable 25 Gram(s) IV Push once  dextrose 50% Injectable 12.5 Gram(s) IV Push once  dextrose 50% Injectable 25 Gram(s) IV Push once  enoxaparin Injectable 40 milliGRAM(s) SubCutaneous two times a day  glucagon  Injectable 1 milliGRAM(s) IntraMuscular once  guaifenesin/dextromethorphan  Syrup 5 milliLiter(s) Oral every 6 hours PRN  guaifenesin/dextromethorphan  Syrup 10 milliLiter(s) Oral every 6 hours  influenza   Vaccine 0.5 milliLiter(s) IntraMuscular once  insulin glargine Injectable (LANTUS) 20 Unit(s) SubCutaneous at bedtime  insulin lispro (ADMELOG) corrective regimen sliding scale   SubCutaneous three times a day before meals  insulin lispro Injectable (ADMELOG) 9 Unit(s) SubCutaneous three times a day before meals  levoFLOXacin IVPB 500 milliGRAM(s) IV Intermittent every 24 hours  levothyroxine 50 MICROGram(s) Oral daily  methylPREDNISolone sodium succinate Injectable 60 milliGRAM(s) IV Push every 12 hours  metoprolol succinate ER 25 milliGRAM(s) Oral daily  ondansetron    Tablet 4 milliGRAM(s) Oral two times a day PRN  pantoprazole   Suspension 40 milliGRAM(s) Oral daily  polyethylene glycol 3350 17 Gram(s) Oral daily  prasugrel 10 milliGRAM(s) Oral daily  tamsulosin 0.4 milliGRAM(s) Oral at bedtime      ANTIBIOTICS:  cefTRIAXone   IVPB      cefTRIAXone   IVPB 2000 milliGRAM(s) IV Intermittent every 24 hours  levoFLOXacin IVPB 500 milliGRAM(s) IV Intermittent every 24 hours

## 2021-02-10 NOTE — PROGRESS NOTE ADULT - SUBJECTIVE AND OBJECTIVE BOX
ANDREE BUSH 70y Male  MRN#: [id number]   Hospital Day: 6d    SUBJECTIVE  Patient is a 70y old Male who presents with a chief complaint of SOB (10 Feb 2021 08:12)  Currently admitted to medicine with the primary diagnosis of Shortness of breath      INTERVAL HPI AND OVERNIGHT EVENTS:  Patient was examined and seen at bedside. This morning he is resting comfortably in bed and reports no issues or overnight events.    REVIEW OF SYMPTOMS: Unable to obtain due to patient's mental status      OBJECTIVE  PAST MEDICAL & SURGICAL HISTORY  Chronic kidney disease (CKD)  III    Type 2 diabetes mellitus without complication, unspecified long term insulin use status    Hypertension, unspecified type    Dyspnea, unspecified type    ASHD (arteriosclerotic heart disease)  STEMI 12/31/17, PCI RCA    S/P cataract surgery    History of ligation of vein  2012    History of tonsillectomy  1957        ALLERGIES:  penicillin (Rash (Severe))      MEDICATIONS:  STANDING MEDICATIONS  benzonatate 100 milliGRAM(s) Oral every 8 hours  cefTRIAXone   IVPB      cefTRIAXone   IVPB 2000 milliGRAM(s) IV Intermittent every 24 hours  chlorhexidine 4% Liquid 1 Application(s) Topical daily  dextrose 40% Gel 15 Gram(s) Oral once  dextrose 5%. 1000 milliLiter(s) IV Continuous <Continuous>  dextrose 5%. 1000 milliLiter(s) IV Continuous <Continuous>  dextrose 50% Injectable 25 Gram(s) IV Push once  dextrose 50% Injectable 12.5 Gram(s) IV Push once  dextrose 50% Injectable 25 Gram(s) IV Push once  enoxaparin Injectable 40 milliGRAM(s) SubCutaneous two times a day  glucagon  Injectable 1 milliGRAM(s) IntraMuscular once  guaifenesin/dextromethorphan  Syrup 10 milliLiter(s) Oral every 6 hours  influenza   Vaccine 0.5 milliLiter(s) IntraMuscular once  insulin glargine Injectable (LANTUS) 25 Unit(s) SubCutaneous at bedtime  insulin lispro (ADMELOG) corrective regimen sliding scale   SubCutaneous three times a day before meals  insulin lispro Injectable (ADMELOG) 9 Unit(s) SubCutaneous three times a day before meals  levoFLOXacin IVPB 750 milliGRAM(s) IV Intermittent every 24 hours  levoFLOXacin IVPB      levothyroxine 50 MICROGram(s) Oral daily  methylPREDNISolone sodium succinate Injectable 60 milliGRAM(s) IV Push every 12 hours  metoprolol succinate ER 25 milliGRAM(s) Oral daily  pantoprazole   Suspension 40 milliGRAM(s) Oral daily  polyethylene glycol 3350 17 Gram(s) Oral daily  prasugrel 10 milliGRAM(s) Oral daily  tamsulosin 0.4 milliGRAM(s) Oral at bedtime    PRN MEDICATIONS  acetaminophen   Tablet .. 650 milliGRAM(s) Oral every 6 hours PRN  guaifenesin/dextromethorphan  Syrup 5 milliLiter(s) Oral every 6 hours PRN  ondansetron    Tablet 4 milliGRAM(s) Oral two times a day PRN      VITAL SIGNS: Last 24 Hours  T(C): 36.4 (10 Feb 2021 08:00), Max: 36.6 (09 Feb 2021 12:00)  T(F): 97.6 (10 Feb 2021 08:00), Max: 97.9 (09 Feb 2021 12:00)  HR: 72 (10 Feb 2021 09:00) (58 - 74)  BP: 115/70 (10 Feb 2021 09:00) (107/50 - 150/67)  BP(mean): 87 (10 Feb 2021 09:00) (67 - 104)  RR: 32 (10 Feb 2021 09:00) (18 - 34)  SpO2: 99% (10 Feb 2021 09:00) (92% - 100%)    LABS:                        14.1   14.59 )-----------( 280      ( 10 Feb 2021 04:39 )             44.2     02-10    138  |  99  |  30<H>  ----------------------------<  63<L>  3.9   |  27  |  1.0    Ca    7.9<L>      10 Feb 2021 04:39  Mg     2.0     02-10    TPro  5.3<L>  /  Alb  2.9<L>  /  TBili  0.3  /  DBili  x   /  AST  44<H>  /  ALT  46<H>  /  AlkPhos  68  02-10          RADIOLOGY:  Xray Chest 1 View- PORTABLE-Routine (Xray Chest 1 View- PORTABLE-Routine in AM.) (02.09.21 @ 07:36)     IMPRESSION:    No change in bilateral airspace opacities.          PHYSICAL EXAM:  CONSTITUTIONAL: AOx3, on high flow nasal canula   HEAD: Atraumatic, normocephalic  EYES: conjunctiva and sclera clear  PULMONARY: Clear to auscultation bilaterally  CARDIOVASCULAR: Regular rate and rhythm  GASTROINTESTINAL: Soft, non-distended; bowel sounds present  MUSCULOSKELETAL: no edema  NEUROLOGY: non-focal  SKIN: No rashes or lesions; warm and dry    ASSESSMENT & PLAN  69 yo M with PMH of CAD, MI (s/p 4 stents), DM, HTN, BPH, hypothyroid and GERD, recently received Pfizer vaccine (1/3 and 1/24) presented to ED c/o progressive SOB, fever, and weakness. Patient stated that his symptoms began on Wednesday 2/3. COVID PCR + on 2/4. Admitted to medicine with acute hypoxemic respiratory failure secondary to COVID-19 PNA. Upgraded to ICU from 3A due to increasing oxygen requirements    #Acute hypoxemic respiratory failure secondary to COVID-19 PNA - severe illness  - Patient had already received 2 doses of the Pfizer vaccine  - Tolerated Bipap overnight, now on 60L/100% high Flow NC saturating well, wean to 90% today if tolerating   - CTA 02/04 showed extensive bilateral patchy ground-glass opacities involving all lobes most pronounced in the upper lung zones  - CXR this am showing BL patchy opacities unchanged since yesterday  - s/p 1 unit plasma  - lower solumedrol to 60mg IV q12h per pulm  - s/p Remdesivir completed 2/9  - C/w Rocephin 2g Q24H. And Levofloxacin 500mg IV q24h per ID  - inflammatory markers trending down  - LE Duplex negative     #GEOVANNA - resolved  - Likely 2/2 decreased PO intake   - d/c fluids for now as GEOVANNA resolved.  - Monitor BMP    #CAD  #MI s/p 4 stents  - C/w Effient 10mg daily  - C/w Toprol 25mg daily    #HTN   - C/w enalapril 2.5mg daily    #BPH  - C/w tamsulosin 0.4mg PO QHS    #DM   - AM FSG 67, decreasing lantus and monitor FSG   - Monitor FS  - A1C 9.1-->only on glipizide as outpatient   - c/w counselling    #Hypothyroidism  - C/w Synthroid    #Misc  Diet: consistent carbs, DASH/TLC (mechanical soft, per patient request) + ensure   DVT PPx: Lovenox 40mg BID

## 2021-02-10 NOTE — PROGRESS NOTE ADULT - ASSESSMENT
· Assessment	  71 yo M with PMH of CAD, MI (s/p 4 stents), DM, HTN, BPH, hypothyroid and GERD, recently received Pfizer vaccine (1/3 and 1/24) presented to ED c/o progressive SOB, fever, and weakness. Patient stated that his symptoms began on Wednesday 2/3. COVID PCR + on 2/4. Admitted to medicine with acute hypoxemic respiratory failure secondary to COVID-19 PNA.  S/p pfizer vaccine 1/3 and booster 1/24  Routine nasal swab on 1/27 COVID-19 positive ( works in a [employer] )  Symptomatic since 2/3    IMPRESSION;  CXR much improved  Clinically improving with decreasing inflammatory markers  COVID 19 with severe illness. SpO2 < 94% on RA and need for supplemental O2. HFNC  CTA 2/4 : extensive bilateral patchy ground-glass opacities involving all lobes most pronounced in the upper lung zones. No PE  Pt is in the early viral replicative phase based on the timeline/onset of symptoms.  COVID-19 antibodies NG    procalcitonin 1.61 > 0.97  Ferritin 404>625  CRP 31.59>22.67  Ddimers 364>420  BCx 2/6,9 NGTD  Legionella NG    Constipation ; resolved    s/p plasma 2/7  s/p RDV 2/5-9    RECOMMENDATIONS;  No need to repeat BCx  Target SpO2 92 % to 96 %  Dexamethasone 6 mg iv q24h for 10 days.  Monitor for side effects: hyperglycemia, neurological ( agitation/confusion), adrenal suppression, bacterial and fungal infections  Rocephin 2 gm iv q24h  Levoquin 500 mg iv q24h  Abx 7 days in all

## 2021-02-11 LAB
ALBUMIN SERPL ELPH-MCNC: 2.8 G/DL — LOW (ref 3.5–5.2)
ALP SERPL-CCNC: 82 U/L — SIGNIFICANT CHANGE UP (ref 30–115)
ALT FLD-CCNC: 39 U/L — SIGNIFICANT CHANGE UP (ref 0–41)
ANION GAP SERPL CALC-SCNC: 9 MMOL/L — SIGNIFICANT CHANGE UP (ref 7–14)
AST SERPL-CCNC: 31 U/L — SIGNIFICANT CHANGE UP (ref 0–41)
BASOPHILS # BLD AUTO: 0.04 K/UL — SIGNIFICANT CHANGE UP (ref 0–0.2)
BASOPHILS NFR BLD AUTO: 0.2 % — SIGNIFICANT CHANGE UP (ref 0–1)
BILIRUB SERPL-MCNC: 0.3 MG/DL — SIGNIFICANT CHANGE UP (ref 0.2–1.2)
BUN SERPL-MCNC: 28 MG/DL — HIGH (ref 10–20)
CALCIUM SERPL-MCNC: 7 MG/DL — LOW (ref 8.5–10.1)
CHLORIDE SERPL-SCNC: 99 MMOL/L — SIGNIFICANT CHANGE UP (ref 98–110)
CO2 SERPL-SCNC: 27 MMOL/L — SIGNIFICANT CHANGE UP (ref 17–32)
CREAT SERPL-MCNC: 0.9 MG/DL — SIGNIFICANT CHANGE UP (ref 0.7–1.5)
CULTURE RESULTS: SIGNIFICANT CHANGE UP
EOSINOPHIL # BLD AUTO: 0.01 K/UL — SIGNIFICANT CHANGE UP (ref 0–0.7)
EOSINOPHIL NFR BLD AUTO: 0.1 % — SIGNIFICANT CHANGE UP (ref 0–8)
GLUCOSE BLDC GLUCOMTR-MCNC: 112 MG/DL — HIGH (ref 70–99)
GLUCOSE BLDC GLUCOMTR-MCNC: 136 MG/DL — HIGH (ref 70–99)
GLUCOSE BLDC GLUCOMTR-MCNC: 203 MG/DL — HIGH (ref 70–99)
GLUCOSE BLDC GLUCOMTR-MCNC: 290 MG/DL — HIGH (ref 70–99)
GLUCOSE SERPL-MCNC: 144 MG/DL — HIGH (ref 70–99)
HCT VFR BLD CALC: 42.9 % — SIGNIFICANT CHANGE UP (ref 42–52)
HGB BLD-MCNC: 14.1 G/DL — SIGNIFICANT CHANGE UP (ref 14–18)
IMM GRANULOCYTES NFR BLD AUTO: 2.5 % — HIGH (ref 0.1–0.3)
LYMPHOCYTES # BLD AUTO: 0.5 K/UL — LOW (ref 1.2–3.4)
LYMPHOCYTES # BLD AUTO: 3 % — LOW (ref 20.5–51.1)
MAGNESIUM SERPL-MCNC: 2.1 MG/DL — SIGNIFICANT CHANGE UP (ref 1.8–2.4)
MCHC RBC-ENTMCNC: 25.2 PG — LOW (ref 27–31)
MCHC RBC-ENTMCNC: 32.9 G/DL — SIGNIFICANT CHANGE UP (ref 32–37)
MCV RBC AUTO: 76.6 FL — LOW (ref 80–94)
MONOCYTES # BLD AUTO: 0.27 K/UL — SIGNIFICANT CHANGE UP (ref 0.1–0.6)
MONOCYTES NFR BLD AUTO: 1.6 % — LOW (ref 1.7–9.3)
NEUTROPHILS # BLD AUTO: 15.46 K/UL — HIGH (ref 1.4–6.5)
NEUTROPHILS NFR BLD AUTO: 92.6 % — HIGH (ref 42.2–75.2)
NRBC # BLD: 0 /100 WBCS — SIGNIFICANT CHANGE UP (ref 0–0)
PLATELET # BLD AUTO: 276 K/UL — SIGNIFICANT CHANGE UP (ref 130–400)
POTASSIUM SERPL-MCNC: 4.7 MMOL/L — SIGNIFICANT CHANGE UP (ref 3.5–5)
POTASSIUM SERPL-SCNC: 4.7 MMOL/L — SIGNIFICANT CHANGE UP (ref 3.5–5)
PROT SERPL-MCNC: 5 G/DL — LOW (ref 6–8)
RBC # BLD: 5.6 M/UL — SIGNIFICANT CHANGE UP (ref 4.7–6.1)
RBC # FLD: 15.7 % — HIGH (ref 11.5–14.5)
S PNEUM AG UR QL: NEGATIVE — SIGNIFICANT CHANGE UP
SODIUM SERPL-SCNC: 135 MMOL/L — SIGNIFICANT CHANGE UP (ref 135–146)
SPECIMEN SOURCE: SIGNIFICANT CHANGE UP
WBC # BLD: 16.69 K/UL — HIGH (ref 4.8–10.8)
WBC # FLD AUTO: 16.69 K/UL — HIGH (ref 4.8–10.8)

## 2021-02-11 RX ORDER — PANTOPRAZOLE SODIUM 20 MG/1
40 TABLET, DELAYED RELEASE ORAL
Refills: 0 | Status: DISCONTINUED | OUTPATIENT
Start: 2021-02-12 | End: 2021-02-21

## 2021-02-11 RX ORDER — NYSTATIN 500MM UNIT
500000 POWDER (EA) MISCELLANEOUS THREE TIMES A DAY
Refills: 0 | Status: DISCONTINUED | OUTPATIENT
Start: 2021-02-11 | End: 2021-02-17

## 2021-02-11 RX ORDER — ALPRAZOLAM 0.25 MG
0.25 TABLET ORAL AT BEDTIME
Refills: 0 | Status: DISCONTINUED | OUTPATIENT
Start: 2021-02-11 | End: 2021-02-12

## 2021-02-11 RX ORDER — SENNA PLUS 8.6 MG/1
2 TABLET ORAL AT BEDTIME
Refills: 0 | Status: DISCONTINUED | OUTPATIENT
Start: 2021-02-11 | End: 2021-03-14

## 2021-02-11 RX ADMIN — Medication 9 UNIT(S): at 17:37

## 2021-02-11 RX ADMIN — Medication 500000 UNIT(S): at 12:52

## 2021-02-11 RX ADMIN — Medication 9 UNIT(S): at 12:50

## 2021-02-11 RX ADMIN — PRASUGREL 10 MILLIGRAM(S): 5 TABLET, FILM COATED ORAL at 12:29

## 2021-02-11 RX ADMIN — CHLORHEXIDINE GLUCONATE 1 APPLICATION(S): 213 SOLUTION TOPICAL at 12:13

## 2021-02-11 RX ADMIN — Medication 9 UNIT(S): at 08:00

## 2021-02-11 RX ADMIN — Medication 25 MILLIGRAM(S): at 06:34

## 2021-02-11 RX ADMIN — ENOXAPARIN SODIUM 40 MILLIGRAM(S): 100 INJECTION SUBCUTANEOUS at 06:34

## 2021-02-11 RX ADMIN — Medication 4: at 17:37

## 2021-02-11 RX ADMIN — POLYETHYLENE GLYCOL 3350 17 GRAM(S): 17 POWDER, FOR SOLUTION ORAL at 12:29

## 2021-02-11 RX ADMIN — SENNA PLUS 2 TABLET(S): 8.6 TABLET ORAL at 21:52

## 2021-02-11 RX ADMIN — ENOXAPARIN SODIUM 40 MILLIGRAM(S): 100 INJECTION SUBCUTANEOUS at 16:57

## 2021-02-11 RX ADMIN — Medication 50 MICROGRAM(S): at 06:35

## 2021-02-11 RX ADMIN — PANTOPRAZOLE SODIUM 40 MILLIGRAM(S): 20 TABLET, DELAYED RELEASE ORAL at 11:22

## 2021-02-11 RX ADMIN — Medication 100 MILLIGRAM(S): at 06:34

## 2021-02-11 RX ADMIN — CEFTRIAXONE 100 MILLIGRAM(S): 500 INJECTION, POWDER, FOR SOLUTION INTRAMUSCULAR; INTRAVENOUS at 15:22

## 2021-02-11 RX ADMIN — TAMSULOSIN HYDROCHLORIDE 0.4 MILLIGRAM(S): 0.4 CAPSULE ORAL at 21:52

## 2021-02-11 RX ADMIN — Medication 40 MILLIGRAM(S): at 16:57

## 2021-02-11 RX ADMIN — Medication 6: at 12:50

## 2021-02-11 RX ADMIN — Medication 60 MILLIGRAM(S): at 06:35

## 2021-02-11 RX ADMIN — Medication 500000 UNIT(S): at 21:53

## 2021-02-11 NOTE — PROGRESS NOTE ADULT - SUBJECTIVE AND OBJECTIVE BOX
ANDREE BUSH 70y Male  MRN#: [id number]   Hospital Day: 7d    SUBJECTIVE  Patient is a 70y old Male who presents with a chief complaint of sob (11 Feb 2021 08:18)  Currently admitted to medicine with the primary diagnosis of Shortness of breath      INTERVAL HPI AND OVERNIGHT EVENTS:  Patient was examined and seen at bedside. No overnight events, tolerating bipap well.       OBJECTIVE  PAST MEDICAL & SURGICAL HISTORY  Chronic kidney disease (CKD)  III    Type 2 diabetes mellitus without complication, unspecified long term insulin use status    Hypertension, unspecified type    Dyspnea, unspecified type    ASHD (arteriosclerotic heart disease)  STEMI 12/31/17, PCI RCA    S/P cataract surgery    History of ligation of vein  2012    History of tonsillectomy  1957      ALLERGIES:  penicillin (Rash (Severe))    MEDICATIONS:  STANDING MEDICATIONS  ALPRAZolam 0.25 milliGRAM(s) Oral at bedtime  cefTRIAXone   IVPB      cefTRIAXone   IVPB 2000 milliGRAM(s) IV Intermittent every 24 hours  chlorhexidine 4% Liquid 1 Application(s) Topical daily  dextrose 40% Gel 15 Gram(s) Oral once  dextrose 5%. 1000 milliLiter(s) IV Continuous <Continuous>  dextrose 5%. 1000 milliLiter(s) IV Continuous <Continuous>  dextrose 50% Injectable 25 Gram(s) IV Push once  dextrose 50% Injectable 12.5 Gram(s) IV Push once  dextrose 50% Injectable 25 Gram(s) IV Push once  enoxaparin Injectable 40 milliGRAM(s) SubCutaneous two times a day  glucagon  Injectable 1 milliGRAM(s) IntraMuscular once  influenza   Vaccine 0.5 milliLiter(s) IntraMuscular once  insulin glargine Injectable (LANTUS) 20 Unit(s) SubCutaneous at bedtime  insulin lispro (ADMELOG) corrective regimen sliding scale   SubCutaneous three times a day before meals  insulin lispro Injectable (ADMELOG) 9 Unit(s) SubCutaneous three times a day before meals  levoFLOXacin IVPB 500 milliGRAM(s) IV Intermittent every 24 hours  levothyroxine 50 MICROGram(s) Oral daily  methylPREDNISolone sodium succinate Injectable 40 milliGRAM(s) IV Push every 12 hours  metoprolol succinate ER 25 milliGRAM(s) Oral daily  nystatin    Suspension 439379 Unit(s) Oral three times a day  pantoprazole   Suspension 40 milliGRAM(s) Oral daily  polyethylene glycol 3350 17 Gram(s) Oral daily  prasugrel 10 milliGRAM(s) Oral daily  tamsulosin 0.4 milliGRAM(s) Oral at bedtime    PRN MEDICATIONS  acetaminophen   Tablet .. 650 milliGRAM(s) Oral every 6 hours PRN  guaifenesin/dextromethorphan  Syrup 5 milliLiter(s) Oral every 6 hours PRN  ondansetron    Tablet 4 milliGRAM(s) Oral two times a day PRN      VITAL SIGNS: Last 24 Hours  T(C): 35.9 (11 Feb 2021 08:00), Max: 36.2 (10 Feb 2021 15:00)  T(F): 96.7 (11 Feb 2021 08:00), Max: 97.1 (10 Feb 2021 15:00)  HR: 66 (11 Feb 2021 08:00) (58 - 80)  BP: 125/53 (11 Feb 2021 08:00) (112/54 - 154/68)  BP(mean): 78 (11 Feb 2021 08:00) (71 - 115)  RR: 26 (11 Feb 2021 08:00) (17 - 37)  SpO2: 99% (11 Feb 2021 08:19) (92% - 100%)    LABS:                        14.1   16.69 )-----------( 276      ( 11 Feb 2021 04:20 )             42.9     02-11    135  |  99  |  28<H>  ----------------------------<  144<H>  4.7   |  27  |  0.9    Ca    7.0<L>      11 Feb 2021 04:20  Mg     2.1     02-11    TPro  5.0<L>  /  Alb  2.8<L>  /  TBili  0.3  /  DBili  x   /  AST  31  /  ALT  39  /  AlkPhos  82  02-11              Culture - Blood (collected 09 Feb 2021 04:45)  Source: .Blood Blood  Preliminary Report (10 Feb 2021 13:02):    No growth to date.          RADIOLOGY:  Xray Chest 1 View- PORTABLE-Routine (Xray Chest 1 View- PORTABLE-Routine in AM.) (02.10.21 @ 06:49) > Impression: Stable bilateral lung opacities        PHYSICAL EXAM:  CONSTITUTIONAL: AOx3, on high flow nasal canula   HEAD: Atraumatic, normocephalic  EYES: conjunctiva and sclera clear  PULMONARY: Clear to auscultation bilaterally  CARDIOVASCULAR: Regular rate and rhythm  GASTROINTESTINAL: Soft, non-distended; bowel sounds present  MUSCULOSKELETAL: no edema  NEUROLOGY: non-focal  SKIN: No rashes or lesions; warm and dry    ASSESSMENT & PLAN  69 yo M with PMH of CAD, MI (s/p 4 stents), DM, HTN, BPH, hypothyroid and GERD, recently received Pfizer vaccine (1/3 and 1/24) presented to ED c/o progressive SOB, fever, and weakness. Patient stated that his symptoms began on Wednesday 2/3. COVID PCR + on 2/4. Admitted to medicine with acute hypoxemic respiratory failure secondary to COVID-19 PNA. Upgraded to ICU from 3A due to increasing oxygen requirements    #Acute hypoxemic respiratory failure secondary to COVID-19 PNA - severe illness  - Patient had already received 2 doses of the Pfizer vaccine  - Tolerated Bipap overnight, now on 60L/90% high Flow NC saturating well, saturating well on 80%/60L  - CTA 02/04 showed extensive bilateral patchy ground-glass opacities involving all lobes most pronounced in the upper lung zones  - CXR this am showing BL patchy opacities unchanged since yesterday  - s/p 1 unit plasma  - lower solumedrol to 40mg IV q12h per pulm  - s/p Remdesivir completed 2/9  - C/w Rocephin 2g Q24H. And Levofloxacin 500mg IV q24h per ID  - inflammatory markers trending down  - LE Duplex negative     #Constipation   - last bowel movement 2/9  - c/w miralax  - add senna     #GEOVANNA - resolved  - Likely 2/2 decreased PO intake   - d/c fluids for now as GEOVANNA resolved.  - Monitor BMP    #CAD  #MI s/p 4 stents  - C/w Effient 10mg daily  - C/w Toprol 25mg daily    #HTN   - C/w enalapril 2.5mg daily    #BPH  - C/w tamsulosin 0.4mg PO QHS    #DM   - AM FSG 67, decreasing lantus and monitor FSG   - Monitor FS  - A1C 9.1-->only on glipizide as outpatient   - c/w counselling    #Hypothyroidism  - C/w Synthroid    #Misc  Diet: consistent carbs, DASH/TLC (mechanical soft, per patient request) + ensure   DVT PPx: Lovenox 40mg BID ANDREE BUSH 70y Male  MRN#: [id number]   Hospital Day: 7d    SUBJECTIVE  Patient is a 70y old Male who presents with a chief complaint of sob (11 Feb 2021 08:18)  Currently admitted to medicine with the primary diagnosis of Shortness of breath      INTERVAL HPI AND OVERNIGHT EVENTS:  Patient was examined and seen at bedside. No overnight events, tolerating bipap well.       OBJECTIVE  PAST MEDICAL & SURGICAL HISTORY  Chronic kidney disease (CKD)  III    Type 2 diabetes mellitus without complication, unspecified long term insulin use status    Hypertension, unspecified type    Dyspnea, unspecified type    ASHD (arteriosclerotic heart disease)  STEMI 12/31/17, PCI RCA    S/P cataract surgery    History of ligation of vein  2012    History of tonsillectomy  1957      ALLERGIES:  penicillin (Rash (Severe))    MEDICATIONS:  STANDING MEDICATIONS  ALPRAZolam 0.25 milliGRAM(s) Oral at bedtime  cefTRIAXone   IVPB      cefTRIAXone   IVPB 2000 milliGRAM(s) IV Intermittent every 24 hours  chlorhexidine 4% Liquid 1 Application(s) Topical daily  dextrose 40% Gel 15 Gram(s) Oral once  dextrose 5%. 1000 milliLiter(s) IV Continuous <Continuous>  dextrose 5%. 1000 milliLiter(s) IV Continuous <Continuous>  dextrose 50% Injectable 25 Gram(s) IV Push once  dextrose 50% Injectable 12.5 Gram(s) IV Push once  dextrose 50% Injectable 25 Gram(s) IV Push once  enoxaparin Injectable 40 milliGRAM(s) SubCutaneous two times a day  glucagon  Injectable 1 milliGRAM(s) IntraMuscular once  influenza   Vaccine 0.5 milliLiter(s) IntraMuscular once  insulin glargine Injectable (LANTUS) 20 Unit(s) SubCutaneous at bedtime  insulin lispro (ADMELOG) corrective regimen sliding scale   SubCutaneous three times a day before meals  insulin lispro Injectable (ADMELOG) 9 Unit(s) SubCutaneous three times a day before meals  levoFLOXacin IVPB 500 milliGRAM(s) IV Intermittent every 24 hours  levothyroxine 50 MICROGram(s) Oral daily  methylPREDNISolone sodium succinate Injectable 40 milliGRAM(s) IV Push every 12 hours  metoprolol succinate ER 25 milliGRAM(s) Oral daily  nystatin    Suspension 121412 Unit(s) Oral three times a day  pantoprazole   Suspension 40 milliGRAM(s) Oral daily  polyethylene glycol 3350 17 Gram(s) Oral daily  prasugrel 10 milliGRAM(s) Oral daily  tamsulosin 0.4 milliGRAM(s) Oral at bedtime    PRN MEDICATIONS  acetaminophen   Tablet .. 650 milliGRAM(s) Oral every 6 hours PRN  guaifenesin/dextromethorphan  Syrup 5 milliLiter(s) Oral every 6 hours PRN  ondansetron    Tablet 4 milliGRAM(s) Oral two times a day PRN      VITAL SIGNS: Last 24 Hours  T(C): 35.9 (11 Feb 2021 08:00), Max: 36.2 (10 Feb 2021 15:00)  T(F): 96.7 (11 Feb 2021 08:00), Max: 97.1 (10 Feb 2021 15:00)  HR: 66 (11 Feb 2021 08:00) (58 - 80)  BP: 125/53 (11 Feb 2021 08:00) (112/54 - 154/68)  BP(mean): 78 (11 Feb 2021 08:00) (71 - 115)  RR: 26 (11 Feb 2021 08:00) (17 - 37)  SpO2: 99% (11 Feb 2021 08:19) (92% - 100%)    LABS:                        14.1   16.69 )-----------( 276      ( 11 Feb 2021 04:20 )             42.9     02-11    135  |  99  |  28<H>  ----------------------------<  144<H>  4.7   |  27  |  0.9    Ca    7.0<L>      11 Feb 2021 04:20  Mg     2.1     02-11    TPro  5.0<L>  /  Alb  2.8<L>  /  TBili  0.3  /  DBili  x   /  AST  31  /  ALT  39  /  AlkPhos  82  02-11              Culture - Blood (collected 09 Feb 2021 04:45)  Source: .Blood Blood  Preliminary Report (10 Feb 2021 13:02):    No growth to date.          RADIOLOGY:  Xray Chest 1 View- PORTABLE-Routine (Xray Chest 1 View- PORTABLE-Routine in AM.) (02.10.21 @ 06:49) > Impression: Stable bilateral lung opacities        PHYSICAL EXAM:  CONSTITUTIONAL: AOx3, on high flow nasal canula   HEAD: Atraumatic, normocephalic  EYES: conjunctiva and sclera clear  PULMONARY: Clear to auscultation bilaterally  CARDIOVASCULAR: Regular rate and rhythm  GASTROINTESTINAL: Soft, non-distended; bowel sounds present  MUSCULOSKELETAL: no edema  NEUROLOGY: non-focal  SKIN: No rashes or lesions; warm and dry    ASSESSMENT & PLAN  71 yo M with PMH of CAD, MI (s/p 4 stents), DM, HTN, BPH, hypothyroid and GERD, recently received Pfizer vaccine (1/3 and 1/24) presented to ED c/o progressive SOB, fever, and weakness. Patient stated that his symptoms began on Wednesday 2/3. COVID PCR + on 2/4. Admitted to medicine with acute hypoxemic respiratory failure secondary to COVID-19 PNA. Upgraded to ICU from 3A due to increasing oxygen requirements    #Acute hypoxemic respiratory failure secondary to COVID-19 PNA - severe illness  - Patient had already received 2 doses of the Pfizer vaccine  - Tolerated Bipap overnight, now on 60L/90% high Flow NC saturating well, saturating well on 80%/60L  - CTA 02/04 showed extensive bilateral patchy ground-glass opacities involving all lobes most pronounced in the upper lung zones  - CXR this am showing BL patchy opacities unchanged since yesterday  - s/p 1 unit plasma  - lower solumedrol to 40mg IV q12h per pulm  - s/p Remdesivir completed 2/9  - C/w Rocephin 2g Q24H. And Levofloxacin 500mg IV q24h per ID  - inflammatory markers trending down  - LE Duplex negative     Oral Thrush  - start nystatin     #Constipation   - last bowel movement 2/9  - c/w miralax  - add senna     #GEOVANNA - resolved  - Likely 2/2 decreased PO intake   - d/c fluids for now as GEOVANNA resolved.  - Monitor BMP    #CAD  #MI s/p 4 stents  - C/w Effient 10mg daily  - C/w Toprol 25mg daily    #HTN   - C/w enalapril 2.5mg daily    #BPH  - C/w tamsulosin 0.4mg PO QHS    #DM   - AM FSG 67, decreasing lantus and monitor FSG   - Monitor FS  - A1C 9.1-->only on glipizide as outpatient   - c/w counselling    #Hypothyroidism  - C/w Synthroid    #Misc  Diet: consistent carbs, DASH/TLC (mechanical soft, per patient request) + ensure   DVT PPx: Lovenox 40mg BID

## 2021-02-11 NOTE — DIETITIAN INITIAL EVALUATION ADULT. - PERTINENT MEDS FT
abx, insulin, Miralax, methylprednisolone, ondansetron,. acetaminophen, abx, levothyroxine, metoprolol,

## 2021-02-11 NOTE — DIETITIAN INITIAL EVALUATION ADULT. - REASON INDICATOR FOR ASSESSMENT
ARGELIA - Dr. Odonnell has been seen alert and oriented since he got to ICU unit from the floor. Pt has been on PO, and today 2/11 pt noted to me that he is eating good (with thumbs up), and no chew/swallow issue noted. On Ensure drinks. LBM 2/9 per EMR, ecchymosis, no edema. Tried contacting pt's cell phone at [phone] at 9:50am but his phone went to voicemail. No nutrition hx obtained. on HFNC.

## 2021-02-11 NOTE — DIETITIAN INITIAL EVALUATION ADULT. - OTHER CALCULATIONS
ABW: 100kg(dosing on 2/6), IBW 70kg --------  CALORIE: 8322-2110 kcal/day (MSJ x 1.1-1.2);          PROTEIN: 70-91g/day (1.0-1.3 g/kg of IBW) noted with CKD3 but stable;           FLUID: per ICU team.

## 2021-02-11 NOTE — DIETITIAN INITIAL EVALUATION ADULT. - CONTINUE CURRENT NUTRITION CARE PLAN
pt to consume and tolerate >75% of all meals and snacks through LOS 7 days. Meals and snacks. RD to monitor diet order, energy intake, NFPF. Medical food supplement. Vitamin and mineral supplement.

## 2021-02-11 NOTE — PROGRESS NOTE ADULT - SUBJECTIVE AND OBJECTIVE BOX
ANDREE BUSH  70y, Male    All available historical data reviewed    OVERNIGHT EVENTS:  no fevers  feels well and has no complaints  HFNC    ROS:  General: Denies rigors, nightsweats  HEENT: Denies headache, rhinorrhea, sore throat, eye pain  CV: Denies CP, palpitations  PULM: Denies wheezing, hemoptysis  GI: Denies hematemesis, hematochezia, melena  : Denies discharge, hematuria  MSK: Denies arthralgias, myalgias  SKIN: Denies rash, lesions  NEURO: Denies paresthesias, weakness  PSYCH: Denies depression, anxiety    VITALS:  T(F): 96.7, Max: 97.1 (02-10-21 @ 15:00)  HR: 66  BP: 125/53  RR: 26Vital Signs Last 24 Hrs  T(C): 35.9 (11 Feb 2021 08:00), Max: 36.2 (10 Feb 2021 15:00)  T(F): 96.7 (11 Feb 2021 08:00), Max: 97.1 (10 Feb 2021 15:00)  HR: 66 (11 Feb 2021 08:00) (58 - 80)  BP: 125/53 (11 Feb 2021 08:00) (112/54 - 154/68)  BP(mean): 78 (11 Feb 2021 08:00) (71 - 115)  RR: 26 (11 Feb 2021 08:00) (17 - 37)  SpO2: 98% (11 Feb 2021 10:02) (92% - 100%)    TESTS & MEASUREMENTS:                        14.1   16.69 )-----------( 276      ( 11 Feb 2021 04:20 )             42.9     02-11    135  |  99  |  28<H>  ----------------------------<  144<H>  4.7   |  27  |  0.9    Ca    7.0<L>      11 Feb 2021 04:20  Mg     2.1     02-11    TPro  5.0<L>  /  Alb  2.8<L>  /  TBili  0.3  /  DBili  x   /  AST  31  /  ALT  39  /  AlkPhos  82  02-11    LIVER FUNCTIONS - ( 11 Feb 2021 04:20 )  Alb: 2.8 g/dL / Pro: 5.0 g/dL / ALK PHOS: 82 U/L / ALT: 39 U/L / AST: 31 U/L / GGT: x             Culture - Blood (collected 02-09-21 @ 04:45)  Source: .Blood Blood  Preliminary Report (02-10-21 @ 13:02):    No growth to date.    Culture - Blood (collected 02-06-21 @ 07:25)  Source: .Blood Blood-Venous  Preliminary Report (02-07-21 @ 18:01):    No growth to date.            RADIOLOGY & ADDITIONAL TESTS:  Personal review of radiological diagnostics performed  Echo and EKG results noted when applicable.     MEDICATIONS:  acetaminophen   Tablet .. 650 milliGRAM(s) Oral every 6 hours PRN  ALPRAZolam 0.25 milliGRAM(s) Oral at bedtime  cefTRIAXone   IVPB      cefTRIAXone   IVPB 2000 milliGRAM(s) IV Intermittent every 24 hours  chlorhexidine 4% Liquid 1 Application(s) Topical daily  dextrose 40% Gel 15 Gram(s) Oral once  dextrose 5%. 1000 milliLiter(s) IV Continuous <Continuous>  dextrose 5%. 1000 milliLiter(s) IV Continuous <Continuous>  dextrose 50% Injectable 25 Gram(s) IV Push once  dextrose 50% Injectable 12.5 Gram(s) IV Push once  dextrose 50% Injectable 25 Gram(s) IV Push once  enoxaparin Injectable 40 milliGRAM(s) SubCutaneous two times a day  glucagon  Injectable 1 milliGRAM(s) IntraMuscular once  guaifenesin/dextromethorphan  Syrup 5 milliLiter(s) Oral every 6 hours PRN  influenza   Vaccine 0.5 milliLiter(s) IntraMuscular once  insulin glargine Injectable (LANTUS) 20 Unit(s) SubCutaneous at bedtime  insulin lispro (ADMELOG) corrective regimen sliding scale   SubCutaneous three times a day before meals  insulin lispro Injectable (ADMELOG) 9 Unit(s) SubCutaneous three times a day before meals  levoFLOXacin IVPB 500 milliGRAM(s) IV Intermittent every 24 hours  levothyroxine 50 MICROGram(s) Oral daily  methylPREDNISolone sodium succinate Injectable 40 milliGRAM(s) IV Push every 12 hours  metoprolol succinate ER 25 milliGRAM(s) Oral daily  nystatin    Suspension 535143 Unit(s) Oral three times a day  ondansetron    Tablet 4 milliGRAM(s) Oral two times a day PRN  pantoprazole   Suspension 40 milliGRAM(s) Oral daily  polyethylene glycol 3350 17 Gram(s) Oral daily  prasugrel 10 milliGRAM(s) Oral daily  senna 2 Tablet(s) Oral at bedtime  tamsulosin 0.4 milliGRAM(s) Oral at bedtime      ANTIBIOTICS:  cefTRIAXone   IVPB      cefTRIAXone   IVPB 2000 milliGRAM(s) IV Intermittent every 24 hours  levoFLOXacin IVPB 500 milliGRAM(s) IV Intermittent every 24 hours  nystatin    Suspension 806772 Unit(s) Oral three times a day

## 2021-02-11 NOTE — PROGRESS NOTE ADULT - ASSESSMENT
· Assessment	  69 yo M with PMH of CAD, MI (s/p 4 stents), DM, HTN, BPH, hypothyroid and GERD, recently received Pfizer vaccine (1/3 and 1/24) presented to ED c/o progressive SOB, fever, and weakness. Patient stated that his symptoms began on Wednesday 2/3. COVID PCR + on 2/4. Admitted to medicine with acute hypoxemic respiratory failure secondary to COVID-19 PNA.  S/p pfizer vaccine 1/3 and booster 1/24  Routine nasal swab on 1/27 COVID-19 positive ( works in a [employer] )  Symptomatic since 2/3    IMPRESSION;  CXR much improved  Clinically improving with decreasing inflammatory markers  COVID 19 with severe illness. SpO2 < 94% on RA and need for supplemental O2. HFNC  CTA 2/4 : extensive bilateral patchy ground-glass opacities involving all lobes most pronounced in the upper lung zones. No PE  Pt is in the early viral replicative phase based on the timeline/onset of symptoms.  COVID-19 antibodies NG    procalcitonin 1.61 > 0.97  Ferritin 404>625  CRP 31.59>22.67  Ddimers 364>420  BCx 2/6,9 NGTD  Legionella NG    Constipation ; resolved    s/p plasma 2/7  s/p RDV 2/5-9    RECOMMENDATIONS;  No need to repeat BCx  Target SpO2 92 % to 96 %  Dexamethasone 6 mg iv q24h for 10 days.  Monitor for side effects: hyperglycemia, neurological ( agitation/confusion), adrenal suppression, bacterial and fungal infections  Rocephin 2 gm iv q24h  Levoquin 500 mg iv q24h  Abx 7 days in all

## 2021-02-11 NOTE — DIETITIAN INITIAL EVALUATION ADULT. - OTHER INFO
p/w SOB, FEVER, weakness x4 days. Acute hypoxemic resp failure 2/2 COVID-severe. Pt had 2x Pfizer vaccines, upgraded from 3A due to increased oxygen needs. CXR shows b/l patchy opacities. s/p remdesivir. GEOVANNA resolved. c/w meds for CAD. DM on meds. HgbA1c 9.1,

## 2021-02-11 NOTE — PROGRESS NOTE ADULT - ASSESSMENT
IMPRESSION:    Acute hypoxemic respiratory failure on HHFNC 80%  severe Covid PNA  Probable superimposed bacterial infection high procal  COPD exacerbation  HO CAD    PLAN:    CNS: avoid sedatives    HEENT: Oral care    PULMONARY:  HOB @ 45 degrees.  Aspiration precautions. HFNC alternate with  NIV during sleep and PRN, Solumedrol 40mg q12h. nebs q6h atc    CARDIOVASCULAR: Goal Map >60, Avoid volume overload.     GI: GI prophylaxis. PO    RENAL:  Follow up lytes.  Correct as needed    INFECTIOUS DISEASE: Follow up cultures.   Per ID      HEMATOLOGICAL:  DVT prophylaxis.  Trend  INF MAKERS    ENDOCRINE:  Follow up FS.  Insulin protocol if needed.    MUSCULOSKELETAL: Bedrest    Dispo: MICU

## 2021-02-11 NOTE — PROGRESS NOTE ADULT - SUBJECTIVE AND OBJECTIVE BOX
OVERNIGHT EVENTS: events noted, on [facility] 60/90    Vital Signs Last 24 Hrs  T(C): 35.9 (11 Feb 2021 00:00), Max: 36.2 (10 Feb 2021 15:00)  T(F): 96.7 (11 Feb 2021 00:00), Max: 97.1 (10 Feb 2021 15:00)  HR: 66 (11 Feb 2021 08:00) (58 - 80)  BP: 125/53 (11 Feb 2021 08:00) (112/54 - 154/68)  BP(mean): 78 (11 Feb 2021 08:00) (71 - 115)  RR: 26 (11 Feb 2021 08:00) (17 - 37)  SpO2: 99% (11 Feb 2021 08:00) (92% - 100%)    PHYSICAL EXAMINATION:    GENERAL: The patient is awake and alert in no apparent distress.     HEENT: Head is normocephalic and atraumatic.     NECK: Supple.    LUNGS: dec bs both abses    HEART: Regular rate and rhythm without murmur.    ABDOMEN: Soft, nontender, and nondistended.      EXTREMITIES: Without any cyanosis, clubbing, rash, lesions or edema.    NEUROLOGIC: Grossly intact.    SKIN: No ulceration or induration present.      LABS:                        14.1   16.69 )-----------( 276      ( 11 Feb 2021 04:20 )             42.9     02-11    135  |  99  |  28<H>  ----------------------------<  144<H>  4.7   |  27  |  0.9    Ca    7.0<L>      11 Feb 2021 04:20  Mg     2.1     02-11    TPro  5.0<L>  /  Alb  2.8<L>  /  TBili  0.3  /  DBili  x   /  AST  31  /  ALT  39  /  AlkPhos  82  02-11                    Procalcitonin, Serum: 0.13 ng/mL (02-09-21 @ 04:30)        02-10-21 @ 07:01  -  02-11-21 @ 07:00  --------------------------------------------------------  IN: 620 mL / OUT: 1545 mL / NET: -925 mL        MICROBIOLOGY:  Culture Results:   No growth to date. (02-09 @ 04:45)      MEDICATIONS  (STANDING):  cefTRIAXone   IVPB      cefTRIAXone   IVPB 2000 milliGRAM(s) IV Intermittent every 24 hours  chlorhexidine 4% Liquid 1 Application(s) Topical daily  dextrose 40% Gel 15 Gram(s) Oral once  dextrose 5%. 1000 milliLiter(s) (50 mL/Hr) IV Continuous <Continuous>  dextrose 5%. 1000 milliLiter(s) (100 mL/Hr) IV Continuous <Continuous>  dextrose 50% Injectable 25 Gram(s) IV Push once  dextrose 50% Injectable 12.5 Gram(s) IV Push once  dextrose 50% Injectable 25 Gram(s) IV Push once  enoxaparin Injectable 40 milliGRAM(s) SubCutaneous two times a day  glucagon  Injectable 1 milliGRAM(s) IntraMuscular once  influenza   Vaccine 0.5 milliLiter(s) IntraMuscular once  insulin glargine Injectable (LANTUS) 20 Unit(s) SubCutaneous at bedtime  insulin lispro (ADMELOG) corrective regimen sliding scale   SubCutaneous three times a day before meals  insulin lispro Injectable (ADMELOG) 9 Unit(s) SubCutaneous three times a day before meals  levoFLOXacin IVPB 500 milliGRAM(s) IV Intermittent every 24 hours  levothyroxine 50 MICROGram(s) Oral daily  methylPREDNISolone sodium succinate Injectable 60 milliGRAM(s) IV Push every 12 hours  metoprolol succinate ER 25 milliGRAM(s) Oral daily  pantoprazole   Suspension 40 milliGRAM(s) Oral daily  polyethylene glycol 3350 17 Gram(s) Oral daily  prasugrel 10 milliGRAM(s) Oral daily  tamsulosin 0.4 milliGRAM(s) Oral at bedtime    MEDICATIONS  (PRN):  acetaminophen   Tablet .. 650 milliGRAM(s) Oral every 6 hours PRN Temp greater or equal to 38C (100.4F), Mild Pain (1 - 3)  guaifenesin/dextromethorphan  Syrup 5 milliLiter(s) Oral every 6 hours PRN Cough  ondansetron    Tablet 4 milliGRAM(s) Oral two times a day PRN Nausea and/or Vomiting      RADIOLOGY & ADDITIONAL STUDIES:

## 2021-02-11 NOTE — DIETITIAN INITIAL EVALUATION ADULT. - ADD RECOMMEND
I have personally seen patient been eating quite well on multiple days during ICU stay. Pt also admits to eating well currently. Continue current Mechanical Soft, Carbohydrate consistent no snack, and SWITCH ENSURE to GLUCERNA q8hr due to HgbA1c 9.1. Consider a MVI q24hr.

## 2021-02-12 LAB
ALBUMIN SERPL ELPH-MCNC: 3 G/DL — LOW (ref 3.5–5.2)
ALP SERPL-CCNC: 74 U/L — SIGNIFICANT CHANGE UP (ref 30–115)
ALT FLD-CCNC: 33 U/L — SIGNIFICANT CHANGE UP (ref 0–41)
ANION GAP SERPL CALC-SCNC: 10 MMOL/L — SIGNIFICANT CHANGE UP (ref 7–14)
AST SERPL-CCNC: 33 U/L — SIGNIFICANT CHANGE UP (ref 0–41)
BASOPHILS # BLD AUTO: 0.05 K/UL — SIGNIFICANT CHANGE UP (ref 0–0.2)
BASOPHILS NFR BLD AUTO: 0.3 % — SIGNIFICANT CHANGE UP (ref 0–1)
BILIRUB SERPL-MCNC: 0.4 MG/DL — SIGNIFICANT CHANGE UP (ref 0.2–1.2)
BUN SERPL-MCNC: 33 MG/DL — HIGH (ref 10–20)
CALCIUM SERPL-MCNC: 7.8 MG/DL — LOW (ref 8.5–10.1)
CHLORIDE SERPL-SCNC: 99 MMOL/L — SIGNIFICANT CHANGE UP (ref 98–110)
CO2 SERPL-SCNC: 28 MMOL/L — SIGNIFICANT CHANGE UP (ref 17–32)
CREAT SERPL-MCNC: 1 MG/DL — SIGNIFICANT CHANGE UP (ref 0.7–1.5)
EOSINOPHIL # BLD AUTO: 0.01 K/UL — SIGNIFICANT CHANGE UP (ref 0–0.7)
EOSINOPHIL NFR BLD AUTO: 0.1 % — SIGNIFICANT CHANGE UP (ref 0–8)
GLUCOSE BLDC GLUCOMTR-MCNC: 283 MG/DL — HIGH (ref 70–99)
GLUCOSE BLDC GLUCOMTR-MCNC: 284 MG/DL — HIGH (ref 70–99)
GLUCOSE BLDC GLUCOMTR-MCNC: 286 MG/DL — HIGH (ref 70–99)
GLUCOSE SERPL-MCNC: 148 MG/DL — HIGH (ref 70–99)
HCT VFR BLD CALC: 44.6 % — SIGNIFICANT CHANGE UP (ref 42–52)
HGB BLD-MCNC: 14.5 G/DL — SIGNIFICANT CHANGE UP (ref 14–18)
IMM GRANULOCYTES NFR BLD AUTO: 1.8 % — HIGH (ref 0.1–0.3)
LYMPHOCYTES # BLD AUTO: 0.57 K/UL — LOW (ref 1.2–3.4)
LYMPHOCYTES # BLD AUTO: 3.1 % — LOW (ref 20.5–51.1)
MAGNESIUM SERPL-MCNC: 2 MG/DL — SIGNIFICANT CHANGE UP (ref 1.8–2.4)
MCHC RBC-ENTMCNC: 25.1 PG — LOW (ref 27–31)
MCHC RBC-ENTMCNC: 32.5 G/DL — SIGNIFICANT CHANGE UP (ref 32–37)
MCV RBC AUTO: 77.3 FL — LOW (ref 80–94)
MONOCYTES # BLD AUTO: 0.29 K/UL — SIGNIFICANT CHANGE UP (ref 0.1–0.6)
MONOCYTES NFR BLD AUTO: 1.6 % — LOW (ref 1.7–9.3)
NEUTROPHILS # BLD AUTO: 17.21 K/UL — HIGH (ref 1.4–6.5)
NEUTROPHILS NFR BLD AUTO: 93.1 % — HIGH (ref 42.2–75.2)
NRBC # BLD: 0 /100 WBCS — SIGNIFICANT CHANGE UP (ref 0–0)
PLATELET # BLD AUTO: 256 K/UL — SIGNIFICANT CHANGE UP (ref 130–400)
POTASSIUM SERPL-MCNC: 5 MMOL/L — SIGNIFICANT CHANGE UP (ref 3.5–5)
POTASSIUM SERPL-SCNC: 5 MMOL/L — SIGNIFICANT CHANGE UP (ref 3.5–5)
PROT SERPL-MCNC: 5.1 G/DL — LOW (ref 6–8)
RBC # BLD: 5.77 M/UL — SIGNIFICANT CHANGE UP (ref 4.7–6.1)
RBC # FLD: 16.4 % — HIGH (ref 11.5–14.5)
SODIUM SERPL-SCNC: 137 MMOL/L — SIGNIFICANT CHANGE UP (ref 135–146)
WBC # BLD: 18.46 K/UL — HIGH (ref 4.8–10.8)
WBC # FLD AUTO: 18.46 K/UL — HIGH (ref 4.8–10.8)

## 2021-02-12 PROCEDURE — 71045 X-RAY EXAM CHEST 1 VIEW: CPT | Mod: 26

## 2021-02-12 RX ADMIN — Medication 500000 UNIT(S): at 05:32

## 2021-02-12 RX ADMIN — POLYETHYLENE GLYCOL 3350 17 GRAM(S): 17 POWDER, FOR SOLUTION ORAL at 11:52

## 2021-02-12 RX ADMIN — INSULIN GLARGINE 20 UNIT(S): 100 INJECTION, SOLUTION SUBCUTANEOUS at 21:18

## 2021-02-12 RX ADMIN — PANTOPRAZOLE SODIUM 40 MILLIGRAM(S): 20 TABLET, DELAYED RELEASE ORAL at 10:07

## 2021-02-12 RX ADMIN — Medication 9 UNIT(S): at 16:11

## 2021-02-12 RX ADMIN — PRASUGREL 10 MILLIGRAM(S): 5 TABLET, FILM COATED ORAL at 11:52

## 2021-02-12 RX ADMIN — Medication 25 MILLIGRAM(S): at 05:28

## 2021-02-12 RX ADMIN — Medication 6: at 16:11

## 2021-02-12 RX ADMIN — TAMSULOSIN HYDROCHLORIDE 0.4 MILLIGRAM(S): 0.4 CAPSULE ORAL at 21:19

## 2021-02-12 RX ADMIN — ENOXAPARIN SODIUM 40 MILLIGRAM(S): 100 INJECTION SUBCUTANEOUS at 16:02

## 2021-02-12 RX ADMIN — Medication 40 MILLIGRAM(S): at 16:02

## 2021-02-12 RX ADMIN — Medication 40 MILLIGRAM(S): at 05:27

## 2021-02-12 RX ADMIN — SENNA PLUS 2 TABLET(S): 8.6 TABLET ORAL at 21:18

## 2021-02-12 RX ADMIN — Medication 50 MICROGRAM(S): at 05:27

## 2021-02-12 RX ADMIN — CEFTRIAXONE 100 MILLIGRAM(S): 500 INJECTION, POWDER, FOR SOLUTION INTRAMUSCULAR; INTRAVENOUS at 15:31

## 2021-02-12 RX ADMIN — ENOXAPARIN SODIUM 40 MILLIGRAM(S): 100 INJECTION SUBCUTANEOUS at 05:27

## 2021-02-12 RX ADMIN — Medication 500000 UNIT(S): at 14:29

## 2021-02-12 RX ADMIN — Medication 500000 UNIT(S): at 21:18

## 2021-02-12 RX ADMIN — Medication 650 MILLIGRAM(S): at 23:09

## 2021-02-12 RX ADMIN — Medication 6: at 12:17

## 2021-02-12 NOTE — PROGRESS NOTE ADULT - SUBJECTIVE AND OBJECTIVE BOX
OVERNIGHT EVENTS: Events noted, on HHFNC 80%/ 60 , refused bipap    Vital Signs Last 24 Hrs  T(C): 36 (11 Feb 2021 16:00), Max: 36.1 (11 Feb 2021 12:00)  T(F): 96.8 (11 Feb 2021 16:00), Max: 97 (11 Feb 2021 12:00)  HR: 70 (12 Feb 2021 06:12) (62 - 88)  BP: 114/60 (12 Feb 2021 06:00) (98/60 - 146/75)  BP(mean): 77 (12 Feb 2021 06:00) (66 - 107)  RR: 23 (12 Feb 2021 06:12) (18 - 35)  SpO2: 94% (12 Feb 2021 06:12) (80% - 99%)    PHYSICAL EXAMINATION:    GENERAL: ill looking    HEENT: Head is normocephalic and atraumatic.     NECK: Supple.    LUNGS: bl crackles    HEART: Regular rate and rhythm without murmur.    ABDOMEN: Soft, nontender, and nondistended.      EXTREMITIES: Without any cyanosis, clubbing, rash, lesions or edema.    NEUROLOGIC: Grossly intact.    SKIN: No ulceration or induration present.      LABS:                        14.5   18.46 )-----------( 256      ( 12 Feb 2021 04:33 )             44.6     02-12    137  |  99  |  33<H>  ----------------------------<  148<H>  5.0   |  28  |  1.0    Ca    7.8<L>      12 Feb 2021 04:33  Mg     2.0     02-12    TPro  5.1<L>  /  Alb  3.0<L>  /  TBili  0.4  /  DBili  x   /  AST  33  /  ALT  33  /  AlkPhos  74  02-12 02-11-21 @ 07:01  -  02-12-21 @ 07:00  --------------------------------------------------------  IN: 1190 mL / OUT: 1950 mL / NET: -760 mL        MICROBIOLOGY:  Culture Results:   No growth to date. (02-09 @ 04:45)      MEDICATIONS  (STANDING):  ALPRAZolam 0.25 milliGRAM(s) Oral at bedtime  cefTRIAXone   IVPB      cefTRIAXone   IVPB 2000 milliGRAM(s) IV Intermittent every 24 hours  chlorhexidine 4% Liquid 1 Application(s) Topical daily  dextrose 40% Gel 15 Gram(s) Oral once  dextrose 5%. 1000 milliLiter(s) (50 mL/Hr) IV Continuous <Continuous>  dextrose 5%. 1000 milliLiter(s) (100 mL/Hr) IV Continuous <Continuous>  dextrose 50% Injectable 25 Gram(s) IV Push once  dextrose 50% Injectable 12.5 Gram(s) IV Push once  dextrose 50% Injectable 25 Gram(s) IV Push once  enoxaparin Injectable 40 milliGRAM(s) SubCutaneous two times a day  glucagon  Injectable 1 milliGRAM(s) IntraMuscular once  influenza   Vaccine 0.5 milliLiter(s) IntraMuscular once  insulin glargine Injectable (LANTUS) 20 Unit(s) SubCutaneous at bedtime  insulin lispro (ADMELOG) corrective regimen sliding scale   SubCutaneous three times a day before meals  insulin lispro Injectable (ADMELOG) 9 Unit(s) SubCutaneous three times a day before meals  levoFLOXacin IVPB 500 milliGRAM(s) IV Intermittent every 24 hours  levothyroxine 50 MICROGram(s) Oral daily  methylPREDNISolone sodium succinate Injectable 40 milliGRAM(s) IV Push every 12 hours  metoprolol succinate ER 25 milliGRAM(s) Oral daily  nystatin    Suspension 106067 Unit(s) Oral three times a day  pantoprazole    Tablet 40 milliGRAM(s) Oral before breakfast  polyethylene glycol 3350 17 Gram(s) Oral daily  prasugrel 10 milliGRAM(s) Oral daily  senna 2 Tablet(s) Oral at bedtime  tamsulosin 0.4 milliGRAM(s) Oral at bedtime    MEDICATIONS  (PRN):  acetaminophen   Tablet .. 650 milliGRAM(s) Oral every 6 hours PRN Temp greater or equal to 38C (100.4F), Mild Pain (1 - 3)  guaifenesin/dextromethorphan  Syrup 5 milliLiter(s) Oral every 6 hours PRN Cough  ondansetron    Tablet 4 milliGRAM(s) Oral two times a day PRN Nausea and/or Vomiting      RADIOLOGY & ADDITIONAL STUDIES:

## 2021-02-12 NOTE — PROGRESS NOTE ADULT - SUBJECTIVE AND OBJECTIVE BOX
ANDREE BUSH 70y Male  MRN#: [id number]   Hospital Day: 8d    SUBJECTIVE  Patient is a 70y old Male who presents with a chief complaint of SOB (12 Feb 2021 08:03)  Currently admitted to medicine with the primary diagnosis of Shortness of breath      INTERVAL HPI AND OVERNIGHT EVENTS:  Patient was examined and seen at bedside. Patient tolerated High Flow Nasal canula overnight, no acute events.     OBJECTIVE  PAST MEDICAL & SURGICAL HISTORY  Chronic kidney disease (CKD)  III    Type 2 diabetes mellitus without complication, unspecified long term insulin use status    Hypertension, unspecified type    Dyspnea, unspecified type    ASHD (arteriosclerotic heart disease)  STEMI 12/31/17, PCI RCA    S/P cataract surgery    History of ligation of vein  2012    History of tonsillectomy  1957      ALLERGIES:  penicillin (Rash (Severe))    MEDICATIONS:  STANDING MEDICATIONS  cefTRIAXone   IVPB      cefTRIAXone   IVPB 2000 milliGRAM(s) IV Intermittent every 24 hours  chlorhexidine 4% Liquid 1 Application(s) Topical daily  dextrose 40% Gel 15 Gram(s) Oral once  dextrose 5%. 1000 milliLiter(s) IV Continuous <Continuous>  dextrose 5%. 1000 milliLiter(s) IV Continuous <Continuous>  dextrose 50% Injectable 25 Gram(s) IV Push once  dextrose 50% Injectable 12.5 Gram(s) IV Push once  dextrose 50% Injectable 25 Gram(s) IV Push once  enoxaparin Injectable 40 milliGRAM(s) SubCutaneous two times a day  glucagon  Injectable 1 milliGRAM(s) IntraMuscular once  influenza   Vaccine 0.5 milliLiter(s) IntraMuscular once  insulin glargine Injectable (LANTUS) 20 Unit(s) SubCutaneous at bedtime  insulin lispro (ADMELOG) corrective regimen sliding scale   SubCutaneous three times a day before meals  insulin lispro Injectable (ADMELOG) 9 Unit(s) SubCutaneous three times a day before meals  levoFLOXacin IVPB 500 milliGRAM(s) IV Intermittent every 24 hours  levothyroxine 50 MICROGram(s) Oral daily  methylPREDNISolone sodium succinate Injectable 40 milliGRAM(s) IV Push every 12 hours  metoprolol succinate ER 25 milliGRAM(s) Oral daily  nystatin    Suspension 726431 Unit(s) Oral three times a day  pantoprazole    Tablet 40 milliGRAM(s) Oral before breakfast  polyethylene glycol 3350 17 Gram(s) Oral daily  prasugrel 10 milliGRAM(s) Oral daily  senna 2 Tablet(s) Oral at bedtime  tamsulosin 0.4 milliGRAM(s) Oral at bedtime    PRN MEDICATIONS  acetaminophen   Tablet .. 650 milliGRAM(s) Oral every 6 hours PRN  guaifenesin/dextromethorphan  Syrup 5 milliLiter(s) Oral every 6 hours PRN  ondansetron    Tablet 4 milliGRAM(s) Oral two times a day PRN      VITAL SIGNS: Last 24 Hours  T(C): 35.9 (12 Feb 2021 08:00), Max: 36.1 (11 Feb 2021 12:00)  T(F): 96.6 (12 Feb 2021 08:00), Max: 97 (11 Feb 2021 12:00)  HR: 72 (12 Feb 2021 08:00) (62 - 88)  BP: 110/66 (12 Feb 2021 08:00) (98/60 - 146/75)  BP(mean): 79 (12 Feb 2021 08:00) (66 - 107)  RR: 28 (12 Feb 2021 08:00) (18 - 35)  SpO2: 91% (12 Feb 2021 08:00) (80% - 99%)    LABS:                        14.5   18.46 )-----------( 256      ( 12 Feb 2021 04:33 )             44.6     02-12    137  |  99  |  33<H>  ----------------------------<  148<H>  5.0   |  28  |  1.0    Ca    7.8<L>      12 Feb 2021 04:33  Mg     2.0     02-12    TPro  5.1<L>  /  Alb  3.0<L>  /  TBili  0.4  /  DBili  x   /  AST  33  /  ALT  33  /  AlkPhos  74  02-12        RADIOLOGY:   Xray Chest 1 View- PORTABLE-Routine (Xray Chest 1 View- PORTABLE-Routine in AM.) (02.10.21 @ 06:49) >  Impression:    Stable bilateral lung opacities      Physical Exam:   CONSTITUTIONAL: AOx3, on high flow nasal canula   HEAD: Atraumatic, normocephalic  EYES: conjunctiva and sclera clear  PULMONARY: Clear to auscultation bilaterally  CARDIOVASCULAR: Regular rate and rhythm  GASTROINTESTINAL: Soft, non-distended; bowel sounds present  MUSCULOSKELETAL: no edema  NEUROLOGY: non-focal  SKIN: No rashes or lesions; warm and dry    ASSESSMENT & PLAN  69 yo M with PMH of CAD, MI (s/p 4 stents), DM, HTN, BPH, hypothyroid and GERD, recently received Pfizer vaccine (1/3 and 1/24) presented to ED c/o progressive SOB, fever, and weakness. Patient stated that his symptoms began on Wednesday 2/3. COVID PCR + on 2/4. Admitted to medicine with acute hypoxemic respiratory failure secondary to COVID-19 PNA. Upgraded to ICU from 3A due to increasing oxygen requirements    #Acute hypoxemic respiratory failure secondary to COVID-19 PNA - severe illness  - Patient had already received 2 doses of the Pfizer vaccine  - Tolerated Bipap overnight, now on 60L/80%, trial of 70% today   - CTA 02/04 showed extensive bilateral patchy ground-glass opacities involving all lobes most pronounced in the upper lung zones  - CXR this am showing BL patchy opacities unchanged since yesterday  - s/p 1 unit plasma  - lower solumedrol to 40mg IV q12h per pulm  - s/p Remdesivir completed 2/9  - C/w Rocephin 2g Q24H. And Levofloxacin 500mg IV q24h per ID  - inflammatory markers trending down, ferritin pending   - LE Duplex negative     Oral Thrush  - start nystatin     #Constipation   - last bowel movement 2/9  - c/w miralax, senna   - adding lactulose     #GEOVANNA - resolved  - Likely 2/2 decreased PO intake   - d/c fluids for now as GEOVANNA resolved.  - Monitor BMP    #CAD  #MI s/p 4 stents  - C/w Effient 10mg daily  - C/w Toprol 25mg daily    #HTN   - C/w enalapril 2.5mg daily    #BPH  - C/w tamsulosin 0.4mg PO QHS    #DM   - Monitor FS  - A1C 9.1-->only on glipizide as outpatient   - c/w counselling    #Hypothyroidism  - C/w Synthroid    #Misc  Diet: consistent carbs, DASH/TLC + Glucerna   DVT PPx: Lovenox 40mg BID

## 2021-02-13 LAB
ALBUMIN SERPL ELPH-MCNC: 3 G/DL — LOW (ref 3.5–5.2)
ALP SERPL-CCNC: 88 U/L — SIGNIFICANT CHANGE UP (ref 30–115)
ALT FLD-CCNC: 40 U/L — SIGNIFICANT CHANGE UP (ref 0–41)
ANION GAP SERPL CALC-SCNC: 15 MMOL/L — HIGH (ref 7–14)
AST SERPL-CCNC: 40 U/L — SIGNIFICANT CHANGE UP (ref 0–41)
BASOPHILS # BLD AUTO: 0.04 K/UL — SIGNIFICANT CHANGE UP (ref 0–0.2)
BASOPHILS NFR BLD AUTO: 0.2 % — SIGNIFICANT CHANGE UP (ref 0–1)
BILIRUB SERPL-MCNC: 0.4 MG/DL — SIGNIFICANT CHANGE UP (ref 0.2–1.2)
BUN SERPL-MCNC: 32 MG/DL — HIGH (ref 10–20)
CALCIUM SERPL-MCNC: 8.1 MG/DL — LOW (ref 8.5–10.1)
CHLORIDE SERPL-SCNC: 96 MMOL/L — LOW (ref 98–110)
CO2 SERPL-SCNC: 27 MMOL/L — SIGNIFICANT CHANGE UP (ref 17–32)
CREAT SERPL-MCNC: 1 MG/DL — SIGNIFICANT CHANGE UP (ref 0.7–1.5)
D DIMER BLD IA.RAPID-MCNC: 543 NG/ML DDU — HIGH (ref 0–230)
EOSINOPHIL # BLD AUTO: 0.04 K/UL — SIGNIFICANT CHANGE UP (ref 0–0.7)
EOSINOPHIL NFR BLD AUTO: 0.2 % — SIGNIFICANT CHANGE UP (ref 0–8)
GLUCOSE BLDC GLUCOMTR-MCNC: 143 MG/DL — HIGH (ref 70–99)
GLUCOSE BLDC GLUCOMTR-MCNC: 155 MG/DL — HIGH (ref 70–99)
GLUCOSE BLDC GLUCOMTR-MCNC: 198 MG/DL — HIGH (ref 70–99)
GLUCOSE BLDC GLUCOMTR-MCNC: 259 MG/DL — HIGH (ref 70–99)
GLUCOSE SERPL-MCNC: 171 MG/DL — HIGH (ref 70–99)
HCT VFR BLD CALC: 43.8 % — SIGNIFICANT CHANGE UP (ref 42–52)
HGB BLD-MCNC: 14.4 G/DL — SIGNIFICANT CHANGE UP (ref 14–18)
IMM GRANULOCYTES NFR BLD AUTO: 1.7 % — HIGH (ref 0.1–0.3)
LYMPHOCYTES # BLD AUTO: 0.69 K/UL — LOW (ref 1.2–3.4)
LYMPHOCYTES # BLD AUTO: 3.9 % — LOW (ref 20.5–51.1)
MAGNESIUM SERPL-MCNC: 2 MG/DL — SIGNIFICANT CHANGE UP (ref 1.8–2.4)
MCHC RBC-ENTMCNC: 25.3 PG — LOW (ref 27–31)
MCHC RBC-ENTMCNC: 32.9 G/DL — SIGNIFICANT CHANGE UP (ref 32–37)
MCV RBC AUTO: 77 FL — LOW (ref 80–94)
MONOCYTES # BLD AUTO: 0.37 K/UL — SIGNIFICANT CHANGE UP (ref 0.1–0.6)
MONOCYTES NFR BLD AUTO: 2.1 % — SIGNIFICANT CHANGE UP (ref 1.7–9.3)
NEUTROPHILS # BLD AUTO: 16.27 K/UL — HIGH (ref 1.4–6.5)
NEUTROPHILS NFR BLD AUTO: 91.9 % — HIGH (ref 42.2–75.2)
NRBC # BLD: 0 /100 WBCS — SIGNIFICANT CHANGE UP (ref 0–0)
PLATELET # BLD AUTO: 240 K/UL — SIGNIFICANT CHANGE UP (ref 130–400)
POTASSIUM SERPL-MCNC: 4.9 MMOL/L — SIGNIFICANT CHANGE UP (ref 3.5–5)
POTASSIUM SERPL-SCNC: 4.9 MMOL/L — SIGNIFICANT CHANGE UP (ref 3.5–5)
PROT SERPL-MCNC: 5.4 G/DL — LOW (ref 6–8)
RBC # BLD: 5.69 M/UL — SIGNIFICANT CHANGE UP (ref 4.7–6.1)
RBC # FLD: 17.1 % — HIGH (ref 11.5–14.5)
SODIUM SERPL-SCNC: 138 MMOL/L — SIGNIFICANT CHANGE UP (ref 135–146)
WBC # BLD: 17.71 K/UL — HIGH (ref 4.8–10.8)
WBC # FLD AUTO: 17.71 K/UL — HIGH (ref 4.8–10.8)

## 2021-02-13 PROCEDURE — 71045 X-RAY EXAM CHEST 1 VIEW: CPT | Mod: 26

## 2021-02-13 RX ADMIN — ENOXAPARIN SODIUM 40 MILLIGRAM(S): 100 INJECTION SUBCUTANEOUS at 16:43

## 2021-02-13 RX ADMIN — SENNA PLUS 2 TABLET(S): 8.6 TABLET ORAL at 21:05

## 2021-02-13 RX ADMIN — ENOXAPARIN SODIUM 40 MILLIGRAM(S): 100 INJECTION SUBCUTANEOUS at 06:04

## 2021-02-13 RX ADMIN — CHLORHEXIDINE GLUCONATE 1 APPLICATION(S): 213 SOLUTION TOPICAL at 11:25

## 2021-02-13 RX ADMIN — Medication 9 UNIT(S): at 07:38

## 2021-02-13 RX ADMIN — Medication 40 MILLIGRAM(S): at 06:03

## 2021-02-13 RX ADMIN — Medication 25 MILLIGRAM(S): at 06:04

## 2021-02-13 RX ADMIN — POLYETHYLENE GLYCOL 3350 17 GRAM(S): 17 POWDER, FOR SOLUTION ORAL at 12:08

## 2021-02-13 RX ADMIN — PANTOPRAZOLE SODIUM 40 MILLIGRAM(S): 20 TABLET, DELAYED RELEASE ORAL at 06:04

## 2021-02-13 RX ADMIN — TAMSULOSIN HYDROCHLORIDE 0.4 MILLIGRAM(S): 0.4 CAPSULE ORAL at 21:05

## 2021-02-13 RX ADMIN — Medication 2: at 11:24

## 2021-02-13 RX ADMIN — Medication 40 MILLIGRAM(S): at 16:43

## 2021-02-13 RX ADMIN — PRASUGREL 10 MILLIGRAM(S): 5 TABLET, FILM COATED ORAL at 12:07

## 2021-02-13 RX ADMIN — Medication 50 MICROGRAM(S): at 06:04

## 2021-02-13 RX ADMIN — Medication 9 UNIT(S): at 16:43

## 2021-02-13 RX ADMIN — CHLORHEXIDINE GLUCONATE 1 APPLICATION(S): 213 SOLUTION TOPICAL at 06:04

## 2021-02-13 RX ADMIN — Medication 500000 UNIT(S): at 21:05

## 2021-02-13 RX ADMIN — Medication 650 MILLIGRAM(S): at 23:19

## 2021-02-13 RX ADMIN — Medication 9 UNIT(S): at 11:24

## 2021-02-13 RX ADMIN — INSULIN GLARGINE 20 UNIT(S): 100 INJECTION, SOLUTION SUBCUTANEOUS at 21:05

## 2021-02-13 RX ADMIN — Medication 500000 UNIT(S): at 06:04

## 2021-02-13 RX ADMIN — Medication 500000 UNIT(S): at 12:08

## 2021-02-13 RX ADMIN — Medication 6: at 16:43

## 2021-02-13 NOTE — PROGRESS NOTE ADULT - SUBJECTIVE AND OBJECTIVE BOX
OVERNIGHT EVENTS: events noted, on HHFNC, afebrile    Vital Signs Last 24 Hrs  T(C): 36.4 (13 Feb 2021 00:00), Max: 36.6 (12 Feb 2021 20:00)  T(F): 97.6 (13 Feb 2021 00:00), Max: 97.8 (12 Feb 2021 20:00)  HR: 72 (13 Feb 2021 05:00) (66 - 98)  BP: 130/71 (13 Feb 2021 05:00) (97/42 - 137/70)  BP(mean): 90 (13 Feb 2021 05:00) (55 - 105)  RR: 22 (13 Feb 2021 05:00) (19 - 39)  SpO2: 94% (13 Feb 2021 05:00) (91% - 100%)    PHYSICAL EXAMINATION:    GENERAL: ill looking    HEENT: Head is normocephalic and atraumatic.     NECK: Supple.    LUNGS: bl crackles    HEART: Regular rate and rhythm without murmur.    ABDOMEN: Soft, nontender, and nondistended.      EXTREMITIES: Without any cyanosis, clubbing, rash, lesions or edema.    NEUROLOGIC: Grossly intact.    SKIN: No ulceration or induration present.      LABS:                        14.5   18.46 )-----------( 256      ( 12 Feb 2021 04:33 )             44.6     02-12    137  |  99  |  33<H>  ----------------------------<  148<H>  5.0   |  28  |  1.0    Ca    7.8<L>      12 Feb 2021 04:33  Mg     2.0     02-12    TPro  5.1<L>  /  Alb  3.0<L>  /  TBili  0.4  /  DBili  x   /  AST  33  /  ALT  33  /  AlkPhos  74  02-12 02-11-21 @ 07:01  -  02-12-21 @ 07:00  --------------------------------------------------------  IN: 1190 mL / OUT: 1950 mL / NET: -760 mL    02-12-21 @ 07:01  -  02-13-21 @ 06:06  --------------------------------------------------------  IN: 620 mL / OUT: 1700 mL / NET: -1080 mL        MICROBIOLOGY:      MEDICATIONS  (STANDING):  cefTRIAXone   IVPB      cefTRIAXone   IVPB 2000 milliGRAM(s) IV Intermittent every 24 hours  chlorhexidine 4% Liquid 1 Application(s) Topical daily  dextrose 40% Gel 15 Gram(s) Oral once  dextrose 5%. 1000 milliLiter(s) (50 mL/Hr) IV Continuous <Continuous>  dextrose 5%. 1000 milliLiter(s) (100 mL/Hr) IV Continuous <Continuous>  dextrose 50% Injectable 25 Gram(s) IV Push once  dextrose 50% Injectable 12.5 Gram(s) IV Push once  dextrose 50% Injectable 25 Gram(s) IV Push once  enoxaparin Injectable 40 milliGRAM(s) SubCutaneous two times a day  glucagon  Injectable 1 milliGRAM(s) IntraMuscular once  influenza   Vaccine 0.5 milliLiter(s) IntraMuscular once  insulin glargine Injectable (LANTUS) 20 Unit(s) SubCutaneous at bedtime  insulin lispro (ADMELOG) corrective regimen sliding scale   SubCutaneous three times a day before meals  insulin lispro Injectable (ADMELOG) 9 Unit(s) SubCutaneous three times a day before meals  levoFLOXacin IVPB 500 milliGRAM(s) IV Intermittent every 24 hours  levothyroxine 50 MICROGram(s) Oral daily  methylPREDNISolone sodium succinate Injectable 40 milliGRAM(s) IV Push every 12 hours  metoprolol succinate ER 25 milliGRAM(s) Oral daily  nystatin    Suspension 057440 Unit(s) Oral three times a day  pantoprazole    Tablet 40 milliGRAM(s) Oral before breakfast  polyethylene glycol 3350 17 Gram(s) Oral daily  prasugrel 10 milliGRAM(s) Oral daily  senna 2 Tablet(s) Oral at bedtime  tamsulosin 0.4 milliGRAM(s) Oral at bedtime    MEDICATIONS  (PRN):  acetaminophen   Tablet .. 650 milliGRAM(s) Oral every 6 hours PRN Temp greater or equal to 38C (100.4F), Mild Pain (1 - 3)  guaifenesin/dextromethorphan  Syrup 5 milliLiter(s) Oral every 6 hours PRN Cough  ondansetron    Tablet 4 milliGRAM(s) Oral two times a day PRN Nausea and/or Vomiting      RADIOLOGY & ADDITIONAL STUDIES:         OVERNIGHT EVENTS: events noted, on HHFNC, afebrile 60/60    Vital Signs Last 24 Hrs  T(C): 36.4 (13 Feb 2021 00:00), Max: 36.6 (12 Feb 2021 20:00)  T(F): 97.6 (13 Feb 2021 00:00), Max: 97.8 (12 Feb 2021 20:00)  HR: 72 (13 Feb 2021 05:00) (66 - 98)  BP: 130/71 (13 Feb 2021 05:00) (97/42 - 137/70)  BP(mean): 90 (13 Feb 2021 05:00) (55 - 105)  RR: 22 (13 Feb 2021 05:00) (19 - 39)  SpO2: 94% (13 Feb 2021 05:00) (91% - 100%)    PHYSICAL EXAMINATION:    GENERAL: ill looking    HEENT: Head is normocephalic and atraumatic.     NECK: Supple.    LUNGS: bl crackles    HEART: Regular rate and rhythm without murmur.    ABDOMEN: Soft, nontender, and nondistended.      EXTREMITIES: Without any cyanosis, clubbing, rash, lesions or edema.    NEUROLOGIC: Grossly intact.    SKIN: No ulceration or induration present.      LABS:                        14.5   18.46 )-----------( 256      ( 12 Feb 2021 04:33 )             44.6     02-12    137  |  99  |  33<H>  ----------------------------<  148<H>  5.0   |  28  |  1.0    Ca    7.8<L>      12 Feb 2021 04:33  Mg     2.0     02-12    TPro  5.1<L>  /  Alb  3.0<L>  /  TBili  0.4  /  DBili  x   /  AST  33  /  ALT  33  /  AlkPhos  74  02-12 02-11-21 @ 07:01  -  02-12-21 @ 07:00  --------------------------------------------------------  IN: 1190 mL / OUT: 1950 mL / NET: -760 mL    02-12-21 @ 07:01  -  02-13-21 @ 06:06  --------------------------------------------------------  IN: 620 mL / OUT: 1700 mL / NET: -1080 mL        MICROBIOLOGY:      MEDICATIONS  (STANDING):  cefTRIAXone   IVPB      cefTRIAXone   IVPB 2000 milliGRAM(s) IV Intermittent every 24 hours  chlorhexidine 4% Liquid 1 Application(s) Topical daily  dextrose 40% Gel 15 Gram(s) Oral once  dextrose 5%. 1000 milliLiter(s) (50 mL/Hr) IV Continuous <Continuous>  dextrose 5%. 1000 milliLiter(s) (100 mL/Hr) IV Continuous <Continuous>  dextrose 50% Injectable 25 Gram(s) IV Push once  dextrose 50% Injectable 12.5 Gram(s) IV Push once  dextrose 50% Injectable 25 Gram(s) IV Push once  enoxaparin Injectable 40 milliGRAM(s) SubCutaneous two times a day  glucagon  Injectable 1 milliGRAM(s) IntraMuscular once  influenza   Vaccine 0.5 milliLiter(s) IntraMuscular once  insulin glargine Injectable (LANTUS) 20 Unit(s) SubCutaneous at bedtime  insulin lispro (ADMELOG) corrective regimen sliding scale   SubCutaneous three times a day before meals  insulin lispro Injectable (ADMELOG) 9 Unit(s) SubCutaneous three times a day before meals  levoFLOXacin IVPB 500 milliGRAM(s) IV Intermittent every 24 hours  levothyroxine 50 MICROGram(s) Oral daily  methylPREDNISolone sodium succinate Injectable 40 milliGRAM(s) IV Push every 12 hours  metoprolol succinate ER 25 milliGRAM(s) Oral daily  nystatin    Suspension 325339 Unit(s) Oral three times a day  pantoprazole    Tablet 40 milliGRAM(s) Oral before breakfast  polyethylene glycol 3350 17 Gram(s) Oral daily  prasugrel 10 milliGRAM(s) Oral daily  senna 2 Tablet(s) Oral at bedtime  tamsulosin 0.4 milliGRAM(s) Oral at bedtime    MEDICATIONS  (PRN):  acetaminophen   Tablet .. 650 milliGRAM(s) Oral every 6 hours PRN Temp greater or equal to 38C (100.4F), Mild Pain (1 - 3)  guaifenesin/dextromethorphan  Syrup 5 milliLiter(s) Oral every 6 hours PRN Cough  ondansetron    Tablet 4 milliGRAM(s) Oral two times a day PRN Nausea and/or Vomiting      RADIOLOGY & ADDITIONAL STUDIES:

## 2021-02-13 NOTE — PROGRESS NOTE ADULT - SUBJECTIVE AND OBJECTIVE BOX
ANDREE BUSH 70y Male  MRN#: [id number]   Hospital Day: 9d    SUBJECTIVE  Patient is a 70y old Male who presents with a chief complaint of sob (13 Feb 2021 06:06)  Currently admitted to medicine with the primary diagnosis of Shortness of breath      INTERVAL HPI AND OVERNIGHT EVENTS:  Patient was examined and seen at bedside. No acute overnight events.     REVIEW OF SYMPTOMS: Unable to obtain due to patient's mental status      OBJECTIVE  PAST MEDICAL & SURGICAL HISTORY  Chronic kidney disease (CKD)  III    Type 2 diabetes mellitus without complication, unspecified long term insulin use status    Hypertension, unspecified type    Dyspnea, unspecified type    ASHD (arteriosclerotic heart disease)  STEMI 12/31/17, PCI RCA    S/P cataract surgery    History of ligation of vein  2012    History of tonsillectomy  1957      ALLERGIES:  penicillin (Rash (Severe))    MEDICATIONS:  STANDING MEDICATIONS  chlorhexidine 4% Liquid 1 Application(s) Topical daily  dextrose 40% Gel 15 Gram(s) Oral once  dextrose 5%. 1000 milliLiter(s) IV Continuous <Continuous>  dextrose 5%. 1000 milliLiter(s) IV Continuous <Continuous>  dextrose 50% Injectable 25 Gram(s) IV Push once  dextrose 50% Injectable 12.5 Gram(s) IV Push once  dextrose 50% Injectable 25 Gram(s) IV Push once  enoxaparin Injectable 40 milliGRAM(s) SubCutaneous two times a day  glucagon  Injectable 1 milliGRAM(s) IntraMuscular once  influenza   Vaccine 0.5 milliLiter(s) IntraMuscular once  insulin glargine Injectable (LANTUS) 20 Unit(s) SubCutaneous at bedtime  insulin lispro (ADMELOG) corrective regimen sliding scale   SubCutaneous three times a day before meals  insulin lispro Injectable (ADMELOG) 9 Unit(s) SubCutaneous three times a day before meals  levothyroxine 50 MICROGram(s) Oral daily  methylPREDNISolone sodium succinate Injectable 40 milliGRAM(s) IV Push every 12 hours  metoprolol succinate ER 25 milliGRAM(s) Oral daily  nystatin    Suspension 877397 Unit(s) Oral three times a day  pantoprazole    Tablet 40 milliGRAM(s) Oral before breakfast  polyethylene glycol 3350 17 Gram(s) Oral daily  prasugrel 10 milliGRAM(s) Oral daily  senna 2 Tablet(s) Oral at bedtime  tamsulosin 0.4 milliGRAM(s) Oral at bedtime    PRN MEDICATIONS  acetaminophen   Tablet .. 650 milliGRAM(s) Oral every 6 hours PRN  guaifenesin/dextromethorphan  Syrup 5 milliLiter(s) Oral every 6 hours PRN  ondansetron    Tablet 4 milliGRAM(s) Oral two times a day PRN      VITAL SIGNS: Last 24 Hours  T(C): 36.4 (13 Feb 2021 12:00), Max: 36.6 (12 Feb 2021 20:00)  T(F): 97.5 (13 Feb 2021 12:00), Max: 97.8 (12 Feb 2021 20:00)  HR: 86 (13 Feb 2021 12:00) (66 - 88)  BP: 120/67 (13 Feb 2021 12:00) (109/63 - 137/70)  BP(mean): 85 (13 Feb 2021 12:00) (77 - 101)  RR: 35 (13 Feb 2021 12:00) (19 - 35)  SpO2: 93% (13 Feb 2021 12:00) (89% - 100%)    LABS:                        14.4   17.71 )-----------( 240      ( 13 Feb 2021 04:30 )             43.8     02-13    138  |  96<L>  |  32<H>  ----------------------------<  171<H>  4.9   |  27  |  1.0    Ca    8.1<L>      13 Feb 2021 04:30  Mg     2.0     02-13    TPro  5.4<L>  /  Alb  3.0<L>  /  TBili  0.4  /  DBili  x   /  AST  40  /  ALT  40  /  AlkPhos  88  02-13          RADIOLOGY:  Xray Chest 1 View- PORTABLE-Routine (Xray Chest 1 View- PORTABLE-Routine in AM.) (02.13.21 @ 04:24) >  Impression:    Bilateral opacities, stable.      PHYSICAL EXAM:  CONSTITUTIONAL: No acute distress, well-developed, well-groomed, AAOx3  HEAD: Atraumatic, normocephalic  EYES: EOM intact, PERRLA, conjunctiva and sclera clear  PULMONARY: Clear to auscultation bilaterally  CARDIOVASCULAR: Regular rate and rhythm; no murmurs, rubs, or gallops  GASTROINTESTINAL: Soft, non-tender, non-distended; bowel sounds present  MUSCULOSKELETAL: no edema, moving all extremities, OOBTC   NEUROLOGY: non-focal  SKIN: No rashes or lesions; warm and dry    ASSESSMENT & PLAN  71 yo M with PMH of CAD, MI (s/p 4 stents), DM, HTN, BPH, hypothyroid and GERD, recently received Pfizer vaccine (1/3 and 1/24) presented to ED c/o progressive SOB, fever, and weakness. Patient stated that his symptoms began on Wednesday 2/3. COVID PCR + on 2/4. Admitted to medicine with acute hypoxemic respiratory failure secondary to COVID-19 PNA. Upgraded to ICU from 3A due to increasing oxygen requirements    #Acute hypoxemic respiratory failure secondary to COVID-19 PNA - severe illness  - Patient s/p  2 doses of the Pfizer vaccine (last dose 1/24)  - O2: High Flow 60/60 ---> Trial of 60/50 L today   - CTA 02/04 showed extensive bilateral patchy ground-glass opacities involving all lobes most pronounced in the upper lung zones  - CXR this am showing BL patchy opacities- stable   - s/p 1 unit plasma  - c/w  solumedrol to 40mg IV q12h per pulm  - s/p Remdesivir completed 2/9  - s/p 1 week of rocephin & Levaquin per ID  - inflammatory markers trending down, ferritin pending   - LE Duplex negative     #Oral Thrush  - c/w  nystatin     #Constipation   - c/w miralax, senna   - consider adding lactulose for ongoing constipation     #GEOVANNA - resolved  - Likely 2/2 decreased PO intake   - d/c fluids for now as GEOVANNA resolved.  - Monitor BMP    #CAD  #MI s/p 4 stents  - C/w Effient 10mg daily  - C/w Toprol 25mg daily    #HTN   - C/w enalapril 2.5mg daily    #BPH  - C/w tamsulosin 0.4mg PO QHS    #DM   - Monitor FS, c/w insulin regimen   - A1C 9.1-->only on glipizide as outpatient   - c/w counselling    #Hypothyroidism  - C/w Synthroid    #Misc  Diet: consistent carbs, DASH/TLC + Glucerna   DVT PPx: Lovenox 40mg BID

## 2021-02-13 NOTE — PROGRESS NOTE ADULT - ASSESSMENT
IMPRESSION:    Acute hypoxemic respiratory failure on HHFNC  severe Covid PNA  Probable superimposed bacterial infection high procal  COPD exacerbation  HO CAD    PLAN:    CNS: avoid CNS depressant    HEENT: Oral care    PULMONARY:  HOB @ 45 degrees.  Aspiration precautions. HFNC alternate with  NIV during sleep and PRN, Solumedrol 40mg q12h. nebs q6h atc    CARDIOVASCULAR: Goal Map >60, Avoid volume overload.     GI: GI prophylaxis. PO    RENAL:  Follow up lytes.  Correct as needed    INFECTIOUS DISEASE: Follow up cultures.   Per ID  dc abx    HEMATOLOGICAL:  DVT prophylaxis.  Trend  INF MAKERS    ENDOCRINE:  Follow up FS.  Insulin protocol if needed.    MUSCULOSKELETAL: OOB to chair    Dispo: MICU IMPRESSION:    Acute hypoxemic respiratory failure on HHFNC  severe Covid PNA  Probable superimposed bacterial infection high procal  COPD exacerbation  HO CAD    PLAN:    CNS: avoid CNS depressant    HEENT: Oral care    PULMONARY:  HOB @ 45 degrees.  Aspiration precautions. HFNC alternate with  NIV during sleep and PRN, Solumedrol 40mg q12h. nebs q6h atc dec to 50%    CARDIOVASCULAR: Goal Map >60, Avoid volume overload.     GI: GI prophylaxis. PO    RENAL:  Follow up lytes.  Correct as needed    INFECTIOUS DISEASE: Follow up cultures.   Per ID  dc abx    HEMATOLOGICAL:  DVT prophylaxis.     ENDOCRINE:  Follow up FS.  Insulin protocol if needed.    MUSCULOSKELETAL: OOB to chair    Dispo: MICU

## 2021-02-14 LAB
ALBUMIN SERPL ELPH-MCNC: 3.1 G/DL — LOW (ref 3.5–5.2)
ALP SERPL-CCNC: 88 U/L — SIGNIFICANT CHANGE UP (ref 30–115)
ALT FLD-CCNC: 45 U/L — HIGH (ref 0–41)
ANION GAP SERPL CALC-SCNC: 7 MMOL/L — SIGNIFICANT CHANGE UP (ref 7–14)
AST SERPL-CCNC: 36 U/L — SIGNIFICANT CHANGE UP (ref 0–41)
BASOPHILS # BLD AUTO: 0.02 K/UL — SIGNIFICANT CHANGE UP (ref 0–0.2)
BASOPHILS NFR BLD AUTO: 0.1 % — SIGNIFICANT CHANGE UP (ref 0–1)
BILIRUB SERPL-MCNC: 0.5 MG/DL — SIGNIFICANT CHANGE UP (ref 0.2–1.2)
BUN SERPL-MCNC: 29 MG/DL — HIGH (ref 10–20)
CALCIUM SERPL-MCNC: 7.9 MG/DL — LOW (ref 8.5–10.1)
CHLORIDE SERPL-SCNC: 93 MMOL/L — LOW (ref 98–110)
CO2 SERPL-SCNC: 34 MMOL/L — HIGH (ref 17–32)
CREAT SERPL-MCNC: 0.9 MG/DL — SIGNIFICANT CHANGE UP (ref 0.7–1.5)
CULTURE RESULTS: SIGNIFICANT CHANGE UP
D DIMER BLD IA.RAPID-MCNC: 939 NG/ML DDU — HIGH (ref 0–230)
EOSINOPHIL # BLD AUTO: 0.02 K/UL — SIGNIFICANT CHANGE UP (ref 0–0.7)
EOSINOPHIL NFR BLD AUTO: 0.1 % — SIGNIFICANT CHANGE UP (ref 0–8)
GLUCOSE BLDC GLUCOMTR-MCNC: 121 MG/DL — HIGH (ref 70–99)
GLUCOSE BLDC GLUCOMTR-MCNC: 218 MG/DL — HIGH (ref 70–99)
GLUCOSE BLDC GLUCOMTR-MCNC: 282 MG/DL — HIGH (ref 70–99)
GLUCOSE BLDC GLUCOMTR-MCNC: 284 MG/DL — HIGH (ref 70–99)
GLUCOSE SERPL-MCNC: 86 MG/DL — SIGNIFICANT CHANGE UP (ref 70–99)
HCT VFR BLD CALC: 45 % — SIGNIFICANT CHANGE UP (ref 42–52)
HGB BLD-MCNC: 14.3 G/DL — SIGNIFICANT CHANGE UP (ref 14–18)
IMM GRANULOCYTES NFR BLD AUTO: 1.7 % — HIGH (ref 0.1–0.3)
LYMPHOCYTES # BLD AUTO: 0.67 K/UL — LOW (ref 1.2–3.4)
LYMPHOCYTES # BLD AUTO: 4.3 % — LOW (ref 20.5–51.1)
MAGNESIUM SERPL-MCNC: 2.1 MG/DL — SIGNIFICANT CHANGE UP (ref 1.8–2.4)
MCHC RBC-ENTMCNC: 25.2 PG — LOW (ref 27–31)
MCHC RBC-ENTMCNC: 31.8 G/DL — LOW (ref 32–37)
MCV RBC AUTO: 79.4 FL — LOW (ref 80–94)
MONOCYTES # BLD AUTO: 0.4 K/UL — SIGNIFICANT CHANGE UP (ref 0.1–0.6)
MONOCYTES NFR BLD AUTO: 2.6 % — SIGNIFICANT CHANGE UP (ref 1.7–9.3)
NEUTROPHILS # BLD AUTO: 14.14 K/UL — HIGH (ref 1.4–6.5)
NEUTROPHILS NFR BLD AUTO: 91.2 % — HIGH (ref 42.2–75.2)
NRBC # BLD: 0 /100 WBCS — SIGNIFICANT CHANGE UP (ref 0–0)
PLATELET # BLD AUTO: 211 K/UL — SIGNIFICANT CHANGE UP (ref 130–400)
POTASSIUM SERPL-MCNC: 4.9 MMOL/L — SIGNIFICANT CHANGE UP (ref 3.5–5)
POTASSIUM SERPL-SCNC: 4.9 MMOL/L — SIGNIFICANT CHANGE UP (ref 3.5–5)
PROT SERPL-MCNC: 5.1 G/DL — LOW (ref 6–8)
RBC # BLD: 5.67 M/UL — SIGNIFICANT CHANGE UP (ref 4.7–6.1)
RBC # FLD: 16.5 % — HIGH (ref 11.5–14.5)
SODIUM SERPL-SCNC: 134 MMOL/L — LOW (ref 135–146)
SPECIMEN SOURCE: SIGNIFICANT CHANGE UP
WBC # BLD: 15.51 K/UL — HIGH (ref 4.8–10.8)
WBC # FLD AUTO: 15.51 K/UL — HIGH (ref 4.8–10.8)

## 2021-02-14 PROCEDURE — 71045 X-RAY EXAM CHEST 1 VIEW: CPT | Mod: 26

## 2021-02-14 PROCEDURE — 93970 EXTREMITY STUDY: CPT | Mod: 26

## 2021-02-14 RX ORDER — ALPRAZOLAM 0.25 MG
0.25 TABLET ORAL ONCE
Refills: 0 | Status: DISCONTINUED | OUTPATIENT
Start: 2021-02-14 | End: 2021-02-14

## 2021-02-14 RX ADMIN — INSULIN GLARGINE 20 UNIT(S): 100 INJECTION, SOLUTION SUBCUTANEOUS at 22:00

## 2021-02-14 RX ADMIN — Medication 500000 UNIT(S): at 21:24

## 2021-02-14 RX ADMIN — Medication 40 MILLIGRAM(S): at 16:33

## 2021-02-14 RX ADMIN — Medication 40 MILLIGRAM(S): at 05:18

## 2021-02-14 RX ADMIN — TAMSULOSIN HYDROCHLORIDE 0.4 MILLIGRAM(S): 0.4 CAPSULE ORAL at 21:23

## 2021-02-14 RX ADMIN — ENOXAPARIN SODIUM 40 MILLIGRAM(S): 100 INJECTION SUBCUTANEOUS at 05:18

## 2021-02-14 RX ADMIN — Medication 650 MILLIGRAM(S): at 23:30

## 2021-02-14 RX ADMIN — Medication 50 MICROGRAM(S): at 05:18

## 2021-02-14 RX ADMIN — Medication 25 MILLIGRAM(S): at 05:18

## 2021-02-14 RX ADMIN — Medication 9 UNIT(S): at 11:32

## 2021-02-14 RX ADMIN — Medication 0.25 MILLIGRAM(S): at 23:30

## 2021-02-14 RX ADMIN — CHLORHEXIDINE GLUCONATE 1 APPLICATION(S): 213 SOLUTION TOPICAL at 10:17

## 2021-02-14 RX ADMIN — Medication 500000 UNIT(S): at 13:29

## 2021-02-14 RX ADMIN — PANTOPRAZOLE SODIUM 40 MILLIGRAM(S): 20 TABLET, DELAYED RELEASE ORAL at 05:18

## 2021-02-14 RX ADMIN — Medication 500000 UNIT(S): at 05:17

## 2021-02-14 RX ADMIN — Medication 6: at 11:32

## 2021-02-14 RX ADMIN — Medication 9 UNIT(S): at 16:32

## 2021-02-14 RX ADMIN — ENOXAPARIN SODIUM 40 MILLIGRAM(S): 100 INJECTION SUBCUTANEOUS at 16:33

## 2021-02-14 RX ADMIN — Medication 6: at 16:33

## 2021-02-14 RX ADMIN — Medication 9 UNIT(S): at 07:53

## 2021-02-14 RX ADMIN — PRASUGREL 10 MILLIGRAM(S): 5 TABLET, FILM COATED ORAL at 11:32

## 2021-02-14 NOTE — PROGRESS NOTE ADULT - SUBJECTIVE AND OBJECTIVE BOX
OVERNIGHT EVENTS: events noted, on [facility]    Vital Signs Last 24 Hrs  T(C): 35.8 (14 Feb 2021 05:00), Max: 36.7 (13 Feb 2021 20:00)  T(F): 96.5 (14 Feb 2021 05:00), Max: 98 (13 Feb 2021 20:00)  HR: 72 (14 Feb 2021 06:00) (64 - 94)  BP: 116/63 (14 Feb 2021 06:00) (109/62 - 141/63)  BP(mean): 90 (14 Feb 2021 06:00) (77 - 98)  RR: 21 (14 Feb 2021 06:00) (12 - 35)  SpO2: 98% (14 Feb 2021 06:00) (89% - 100%)    PHYSICAL EXAMINATION:    GENERAL: ill looking     HEENT: Head is normocephalic and atraumatic.     NECK: Supple.    LUNGS: bl rhonchi    HEART: Regular rate and rhythm without murmur.    ABDOMEN: Soft, nontender, and nondistended.      EXTREMITIES: Without any cyanosis, clubbing, rash, lesions or edema.    NEUROLOGIC: Grossly intact.    SKIN: No ulceration or induration present.      LABS:                        14.4   17.71 )-----------( 240      ( 13 Feb 2021 04:30 )             43.8     02-13    138  |  96<L>  |  32<H>  ----------------------------<  171<H>  4.9   |  27  |  1.0    Ca    8.1<L>      13 Feb 2021 04:30  Mg     2.0     02-13    TPro  5.4<L>  /  Alb  3.0<L>  /  TBili  0.4  /  DBili  x   /  AST  40  /  ALT  40  /  AlkPhos  88  02-13 02-12-21 @ 07:01  -  02-13-21 @ 07:00  --------------------------------------------------------  IN: 620 mL / OUT: 1700 mL / NET: -1080 mL    02-13-21 @ 07:01  -  02-14-21 @ 06:22  --------------------------------------------------------  IN: 880 mL / OUT: 1725 mL / NET: -845 mL        MICROBIOLOGY:      MEDICATIONS  (STANDING):  chlorhexidine 4% Liquid 1 Application(s) Topical daily  dextrose 40% Gel 15 Gram(s) Oral once  dextrose 5%. 1000 milliLiter(s) (50 mL/Hr) IV Continuous <Continuous>  dextrose 5%. 1000 milliLiter(s) (100 mL/Hr) IV Continuous <Continuous>  dextrose 50% Injectable 12.5 Gram(s) IV Push once  dextrose 50% Injectable 25 Gram(s) IV Push once  dextrose 50% Injectable 25 Gram(s) IV Push once  enoxaparin Injectable 40 milliGRAM(s) SubCutaneous two times a day  glucagon  Injectable 1 milliGRAM(s) IntraMuscular once  influenza   Vaccine 0.5 milliLiter(s) IntraMuscular once  insulin glargine Injectable (LANTUS) 20 Unit(s) SubCutaneous at bedtime  insulin lispro (ADMELOG) corrective regimen sliding scale   SubCutaneous three times a day before meals  insulin lispro Injectable (ADMELOG) 9 Unit(s) SubCutaneous three times a day before meals  levothyroxine 50 MICROGram(s) Oral daily  methylPREDNISolone sodium succinate Injectable 40 milliGRAM(s) IV Push every 12 hours  metoprolol succinate ER 25 milliGRAM(s) Oral daily  nystatin    Suspension 802247 Unit(s) Oral three times a day  pantoprazole    Tablet 40 milliGRAM(s) Oral before breakfast  polyethylene glycol 3350 17 Gram(s) Oral daily  prasugrel 10 milliGRAM(s) Oral daily  senna 2 Tablet(s) Oral at bedtime  tamsulosin 0.4 milliGRAM(s) Oral at bedtime    MEDICATIONS  (PRN):  acetaminophen   Tablet .. 650 milliGRAM(s) Oral every 6 hours PRN Temp greater or equal to 38C (100.4F), Mild Pain (1 - 3)  guaifenesin/dextromethorphan  Syrup 5 milliLiter(s) Oral every 6 hours PRN Cough  ondansetron    Tablet 4 milliGRAM(s) Oral two times a day PRN Nausea and/or Vomiting      RADIOLOGY & ADDITIONAL STUDIES:         OVERNIGHT EVENTS: events noted, on [facility] 60%    Vital Signs Last 24 Hrs  T(C): 35.8 (14 Feb 2021 05:00), Max: 36.7 (13 Feb 2021 20:00)  T(F): 96.5 (14 Feb 2021 05:00), Max: 98 (13 Feb 2021 20:00)  HR: 72 (14 Feb 2021 06:00) (64 - 94)  BP: 116/63 (14 Feb 2021 06:00) (109/62 - 141/63)  BP(mean): 90 (14 Feb 2021 06:00) (77 - 98)  RR: 21 (14 Feb 2021 06:00) (12 - 35)  SpO2: 98% (14 Feb 2021 06:00) (89% - 100%)    PHYSICAL EXAMINATION:    GENERAL: ill looking     HEENT: Head is normocephalic and atraumatic.     NECK: Supple.    LUNGS: bl rhonchi    HEART: Regular rate and rhythm without murmur.    ABDOMEN: Soft, nontender, and nondistended.      EXTREMITIES: Without any cyanosis, clubbing, rash, lesions or edema.    NEUROLOGIC: Grossly intact.    SKIN: No ulceration or induration present.      LABS:                        14.4   17.71 )-----------( 240      ( 13 Feb 2021 04:30 )             43.8     02-13    138  |  96<L>  |  32<H>  ----------------------------<  171<H>  4.9   |  27  |  1.0    Ca    8.1<L>      13 Feb 2021 04:30  Mg     2.0     02-13    TPro  5.4<L>  /  Alb  3.0<L>  /  TBili  0.4  /  DBili  x   /  AST  40  /  ALT  40  /  AlkPhos  88  02-13 02-12-21 @ 07:01  -  02-13-21 @ 07:00  --------------------------------------------------------  IN: 620 mL / OUT: 1700 mL / NET: -1080 mL    02-13-21 @ 07:01  -  02-14-21 @ 06:22  --------------------------------------------------------  IN: 880 mL / OUT: 1725 mL / NET: -845 mL        MICROBIOLOGY:      MEDICATIONS  (STANDING):  chlorhexidine 4% Liquid 1 Application(s) Topical daily  dextrose 40% Gel 15 Gram(s) Oral once  dextrose 5%. 1000 milliLiter(s) (50 mL/Hr) IV Continuous <Continuous>  dextrose 5%. 1000 milliLiter(s) (100 mL/Hr) IV Continuous <Continuous>  dextrose 50% Injectable 12.5 Gram(s) IV Push once  dextrose 50% Injectable 25 Gram(s) IV Push once  dextrose 50% Injectable 25 Gram(s) IV Push once  enoxaparin Injectable 40 milliGRAM(s) SubCutaneous two times a day  glucagon  Injectable 1 milliGRAM(s) IntraMuscular once  influenza   Vaccine 0.5 milliLiter(s) IntraMuscular once  insulin glargine Injectable (LANTUS) 20 Unit(s) SubCutaneous at bedtime  insulin lispro (ADMELOG) corrective regimen sliding scale   SubCutaneous three times a day before meals  insulin lispro Injectable (ADMELOG) 9 Unit(s) SubCutaneous three times a day before meals  levothyroxine 50 MICROGram(s) Oral daily  methylPREDNISolone sodium succinate Injectable 40 milliGRAM(s) IV Push every 12 hours  metoprolol succinate ER 25 milliGRAM(s) Oral daily  nystatin    Suspension 251053 Unit(s) Oral three times a day  pantoprazole    Tablet 40 milliGRAM(s) Oral before breakfast  polyethylene glycol 3350 17 Gram(s) Oral daily  prasugrel 10 milliGRAM(s) Oral daily  senna 2 Tablet(s) Oral at bedtime  tamsulosin 0.4 milliGRAM(s) Oral at bedtime    MEDICATIONS  (PRN):  acetaminophen   Tablet .. 650 milliGRAM(s) Oral every 6 hours PRN Temp greater or equal to 38C (100.4F), Mild Pain (1 - 3)  guaifenesin/dextromethorphan  Syrup 5 milliLiter(s) Oral every 6 hours PRN Cough  ondansetron    Tablet 4 milliGRAM(s) Oral two times a day PRN Nausea and/or Vomiting      RADIOLOGY & ADDITIONAL STUDIES:

## 2021-02-14 NOTE — PROGRESS NOTE ADULT - ASSESSMENT
IMPRESSION:    Acute hypoxemic respiratory failure on HHFNC  severe Covid PNA  Probable superimposed bacterial infection high procal  COPD exacerbation  HO CAD    PLAN:    CNS: avoid CNS depressant    HEENT: Oral care    PULMONARY:  HOB @ 45 degrees.  Aspiration precautions. HFNC alternate with  NIV during sleep and PRN, Solumedrol 40mg q24h.    CARDIOVASCULAR: Goal Map >60, Avoid volume overload.     GI: GI prophylaxis. PO    RENAL:  Follow up lytes.  Correct as needed    INFECTIOUS DISEASE: off abx    HEMATOLOGICAL:  DVT prophylaxis. ( doppler - on 2/8)    ENDOCRINE:  Follow up FS.  Insulin protocol if needed.    MUSCULOSKELETAL: OOB to chair  PT/OT    Dispo: MICU

## 2021-02-15 LAB
ALBUMIN SERPL ELPH-MCNC: 2.8 G/DL — LOW (ref 3.5–5.2)
ALP SERPL-CCNC: 78 U/L — SIGNIFICANT CHANGE UP (ref 30–115)
ALT FLD-CCNC: 39 U/L — SIGNIFICANT CHANGE UP (ref 0–41)
ANION GAP SERPL CALC-SCNC: 7 MMOL/L — SIGNIFICANT CHANGE UP (ref 7–14)
AST SERPL-CCNC: 27 U/L — SIGNIFICANT CHANGE UP (ref 0–41)
BASOPHILS # BLD AUTO: 0.02 K/UL — SIGNIFICANT CHANGE UP (ref 0–0.2)
BASOPHILS NFR BLD AUTO: 0.1 % — SIGNIFICANT CHANGE UP (ref 0–1)
BILIRUB SERPL-MCNC: 0.4 MG/DL — SIGNIFICANT CHANGE UP (ref 0.2–1.2)
BUN SERPL-MCNC: 28 MG/DL — HIGH (ref 10–20)
CALCIUM SERPL-MCNC: 7.9 MG/DL — LOW (ref 8.5–10.1)
CHLORIDE SERPL-SCNC: 97 MMOL/L — LOW (ref 98–110)
CO2 SERPL-SCNC: 31 MMOL/L — SIGNIFICANT CHANGE UP (ref 17–32)
CREAT SERPL-MCNC: 0.9 MG/DL — SIGNIFICANT CHANGE UP (ref 0.7–1.5)
CRP SERPL-MCNC: 0.54 MG/DL — HIGH (ref 0–0.4)
EOSINOPHIL # BLD AUTO: 0.02 K/UL — SIGNIFICANT CHANGE UP (ref 0–0.7)
EOSINOPHIL NFR BLD AUTO: 0.1 % — SIGNIFICANT CHANGE UP (ref 0–8)
GLUCOSE BLDC GLUCOMTR-MCNC: 131 MG/DL — HIGH (ref 70–99)
GLUCOSE BLDC GLUCOMTR-MCNC: 148 MG/DL — HIGH (ref 70–99)
GLUCOSE BLDC GLUCOMTR-MCNC: 214 MG/DL — HIGH (ref 70–99)
GLUCOSE BLDC GLUCOMTR-MCNC: 233 MG/DL — HIGH (ref 70–99)
GLUCOSE BLDC GLUCOMTR-MCNC: 266 MG/DL — HIGH (ref 70–99)
GLUCOSE SERPL-MCNC: 166 MG/DL — HIGH (ref 70–99)
HCT VFR BLD CALC: 41.5 % — LOW (ref 42–52)
HGB BLD-MCNC: 13.6 G/DL — LOW (ref 14–18)
IMM GRANULOCYTES NFR BLD AUTO: 1.7 % — HIGH (ref 0.1–0.3)
LYMPHOCYTES # BLD AUTO: 0.62 K/UL — LOW (ref 1.2–3.4)
LYMPHOCYTES # BLD AUTO: 4.1 % — LOW (ref 20.5–51.1)
MAGNESIUM SERPL-MCNC: 1.9 MG/DL — SIGNIFICANT CHANGE UP (ref 1.8–2.4)
MCHC RBC-ENTMCNC: 25.5 PG — LOW (ref 27–31)
MCHC RBC-ENTMCNC: 32.8 G/DL — SIGNIFICANT CHANGE UP (ref 32–37)
MCV RBC AUTO: 77.7 FL — LOW (ref 80–94)
MONOCYTES # BLD AUTO: 0.44 K/UL — SIGNIFICANT CHANGE UP (ref 0.1–0.6)
MONOCYTES NFR BLD AUTO: 2.9 % — SIGNIFICANT CHANGE UP (ref 1.7–9.3)
NEUTROPHILS # BLD AUTO: 13.75 K/UL — HIGH (ref 1.4–6.5)
NEUTROPHILS NFR BLD AUTO: 91.1 % — HIGH (ref 42.2–75.2)
NRBC # BLD: 0 /100 WBCS — SIGNIFICANT CHANGE UP (ref 0–0)
PLATELET # BLD AUTO: 197 K/UL — SIGNIFICANT CHANGE UP (ref 130–400)
POTASSIUM SERPL-MCNC: 4.5 MMOL/L — SIGNIFICANT CHANGE UP (ref 3.5–5)
POTASSIUM SERPL-SCNC: 4.5 MMOL/L — SIGNIFICANT CHANGE UP (ref 3.5–5)
PROCALCITONIN SERPL-MCNC: 0.08 NG/ML — SIGNIFICANT CHANGE UP (ref 0.02–0.1)
PROT SERPL-MCNC: 5 G/DL — LOW (ref 6–8)
RBC # BLD: 5.34 M/UL — SIGNIFICANT CHANGE UP (ref 4.7–6.1)
RBC # FLD: 16.3 % — HIGH (ref 11.5–14.5)
SODIUM SERPL-SCNC: 135 MMOL/L — SIGNIFICANT CHANGE UP (ref 135–146)
WBC # BLD: 15.11 K/UL — HIGH (ref 4.8–10.8)
WBC # FLD AUTO: 15.11 K/UL — HIGH (ref 4.8–10.8)

## 2021-02-15 PROCEDURE — 71045 X-RAY EXAM CHEST 1 VIEW: CPT | Mod: 26

## 2021-02-15 RX ORDER — ALPRAZOLAM 0.25 MG
0.25 TABLET ORAL AT BEDTIME
Refills: 0 | Status: DISCONTINUED | OUTPATIENT
Start: 2021-02-15 | End: 2021-02-22

## 2021-02-15 RX ADMIN — Medication 6: at 12:17

## 2021-02-15 RX ADMIN — ENOXAPARIN SODIUM 40 MILLIGRAM(S): 100 INJECTION SUBCUTANEOUS at 17:15

## 2021-02-15 RX ADMIN — PRASUGREL 10 MILLIGRAM(S): 5 TABLET, FILM COATED ORAL at 12:16

## 2021-02-15 RX ADMIN — Medication 25 MILLIGRAM(S): at 05:54

## 2021-02-15 RX ADMIN — Medication 650 MILLIGRAM(S): at 21:01

## 2021-02-15 RX ADMIN — Medication 50 MICROGRAM(S): at 05:58

## 2021-02-15 RX ADMIN — PANTOPRAZOLE SODIUM 40 MILLIGRAM(S): 20 TABLET, DELAYED RELEASE ORAL at 05:54

## 2021-02-15 RX ADMIN — Medication 500000 UNIT(S): at 21:02

## 2021-02-15 RX ADMIN — CHLORHEXIDINE GLUCONATE 1 APPLICATION(S): 213 SOLUTION TOPICAL at 12:17

## 2021-02-15 RX ADMIN — Medication 0.25 MILLIGRAM(S): at 21:01

## 2021-02-15 RX ADMIN — Medication 9 UNIT(S): at 16:41

## 2021-02-15 RX ADMIN — ENOXAPARIN SODIUM 40 MILLIGRAM(S): 100 INJECTION SUBCUTANEOUS at 05:55

## 2021-02-15 RX ADMIN — Medication 9 UNIT(S): at 12:16

## 2021-02-15 RX ADMIN — Medication 500000 UNIT(S): at 05:54

## 2021-02-15 RX ADMIN — INSULIN GLARGINE 20 UNIT(S): 100 INJECTION, SOLUTION SUBCUTANEOUS at 21:09

## 2021-02-15 RX ADMIN — Medication 9 UNIT(S): at 08:04

## 2021-02-15 RX ADMIN — TAMSULOSIN HYDROCHLORIDE 0.4 MILLIGRAM(S): 0.4 CAPSULE ORAL at 21:02

## 2021-02-15 RX ADMIN — Medication 4: at 16:42

## 2021-02-15 RX ADMIN — Medication 40 MILLIGRAM(S): at 05:55

## 2021-02-15 RX ADMIN — SENNA PLUS 2 TABLET(S): 8.6 TABLET ORAL at 21:02

## 2021-02-15 RX ADMIN — Medication 500000 UNIT(S): at 12:17

## 2021-02-15 NOTE — CHART NOTE - NSCHARTNOTEFT_GEN_A_CORE
Patient's emergency contact is his wife. However patient states we do not need to update family and he can call everyday.

## 2021-02-15 NOTE — PHYSICAL THERAPY INITIAL EVALUATION ADULT - LEVEL OF INDEPENDENCE: SIT/STAND, REHAB EVAL
Ongoing SW/CM Assessment/Plan of Care Note     See SW/CM flowsheets for goals and other objective data  Patient/Family discharge goal (s):  Goal #1: Home discharge arranged        PT Recommendation:     Recommendation for Discharge: PT IL: Patient needs intensive skilled therapy for a minimum of 3 hours daily by at least two disciplines with the oversight of a physical medicine and rehabilitation physician, Patient needs daily, skilled therapy for 1-3 hours a day by at least two disciplines  OT Recommendation:     Recommendations for Discharge: OT IL: Patient needs intensive skilled therapy for a minimum of 3 hours daily by at least two disciplines with the oversight of a physical medicine and rehabilitation physician, Patient needs daily, skilled therapy for 1-3 hours a day by at least two disciplines(versus, pending pt's pain control for L LE and progression w/ acute therapies)  Disposition:AIR      Progress note: Updates sent to pending AIRs:    [facility]- [phone] - pending  [facility]- [phone] - pending  [facility]- [phone] -pending  [facility] [phone] - pending   [facility] [phone] - pending     SW following.   Macey Ellington LCSW             minimum assist (75% patients effort)

## 2021-02-15 NOTE — PROGRESS NOTE ADULT - ASSESSMENT
IMPRESSION:    Acute hypoxemic respiratory failure on HHFNC  severe Covid PNA  Probable superimposed bacterial infection high procal  COPD exacerbation  HO CAD    PLAN:    CNS: avoid CNS depressant    HEENT: Oral care    PULMONARY:  HOB @ 45 degrees.  Aspiration precautions. HFNC alternate with  NIV during sleep and PRN, Solumedrol 40mg q24h.    CARDIOVASCULAR: Goal Map >60, Avoid volume overload.     GI: GI prophylaxis. feeding.     RENAL:  Follow up lytes.  Correct as needed    INFECTIOUS DISEASE: Monitor off abx    HEMATOLOGICAL:  DVT prophylaxis. ( doppler - on 2/8)    ENDOCRINE:  Follow up FS.  Insulin protocol if needed.    MUSCULOSKELETAL: OOB to chair    PT/OT    Dispo: MICU

## 2021-02-15 NOTE — PHYSICAL THERAPY INITIAL EVALUATION ADULT - GENERAL OBSERVATIONS, REHAB EVAL
10:00-10:33. Pt encountered semifowler in bed with (+) BP cuff, tele, pulse ox and HFNC @50L/m. SpO2 @ 93% in semifowler. Pt left sitting in b/s chair with pulse ox, tele and HFNC @50L/m. SpO2 91% in sitting.

## 2021-02-15 NOTE — OCCUPATIONAL THERAPY INITIAL EVALUATION ADULT - ADL RETRAINING, OT EVAL
Patient will perform lower body dressing with minimal assistance with use of appropriate adaptive equipment as needed by discharge. ; Patient will perform upper body dressing with set-up assistance by discharge.

## 2021-02-15 NOTE — OCCUPATIONAL THERAPY INITIAL EVALUATION ADULT - TRANSFER TRAINING, PT EVAL
Pt will improve toilet transfer to minimal assistance with appropriate DME by discharge ; Patient will perform bed <>chair transfer with supervision with appropriate assistive device by discharge.

## 2021-02-15 NOTE — PROGRESS NOTE ADULT - SUBJECTIVE AND OBJECTIVE BOX
SUBJECTIVE:    Patient is a 70y old Male who presents with a chief complaint of sob (14 Feb 2021 06:22)    Currently admitted to medicine with the primary diagnosis of Shortness of breath       Today is hospital day 11d.     Overnight no events.     This morning patient reports that he feels he's doing well and breathing fine. He worked with PT/OT today and they got him OOBTC.     PAST MEDICAL & SURGICAL HISTORY  Chronic kidney disease (CKD)  III    Type 2 diabetes mellitus without complication, unspecified long term insulin use status    Hypertension, unspecified type    Dyspnea, unspecified type    ASHD (arteriosclerotic heart disease)  STEMI 12/31/17, PCI RCA    S/P cataract surgery    History of ligation of vein  2012    History of tonsillectomy  1957        ALLERGIES:  penicillin (Rash (Severe))    MEDICATIONS:  STANDING MEDICATIONS  chlorhexidine 4% Liquid 1 Application(s) Topical daily  dextrose 40% Gel 15 Gram(s) Oral once  dextrose 5%. 1000 milliLiter(s) IV Continuous <Continuous>  dextrose 5%. 1000 milliLiter(s) IV Continuous <Continuous>  dextrose 50% Injectable 25 Gram(s) IV Push once  dextrose 50% Injectable 12.5 Gram(s) IV Push once  dextrose 50% Injectable 25 Gram(s) IV Push once  enoxaparin Injectable 40 milliGRAM(s) SubCutaneous two times a day  glucagon  Injectable 1 milliGRAM(s) IntraMuscular once  influenza   Vaccine 0.5 milliLiter(s) IntraMuscular once  insulin glargine Injectable (LANTUS) 20 Unit(s) SubCutaneous at bedtime  insulin lispro (ADMELOG) corrective regimen sliding scale   SubCutaneous three times a day before meals  insulin lispro Injectable (ADMELOG) 9 Unit(s) SubCutaneous three times a day before meals  levothyroxine 50 MICROGram(s) Oral daily  methylPREDNISolone sodium succinate Injectable 40 milliGRAM(s) IV Push every 24 hours  metoprolol succinate ER 25 milliGRAM(s) Oral daily  nystatin    Suspension 400859 Unit(s) Oral three times a day  pantoprazole    Tablet 40 milliGRAM(s) Oral before breakfast  polyethylene glycol 3350 17 Gram(s) Oral daily  prasugrel 10 milliGRAM(s) Oral daily  senna 2 Tablet(s) Oral at bedtime  tamsulosin 0.4 milliGRAM(s) Oral at bedtime    PRN MEDICATIONS  acetaminophen   Tablet .. 650 milliGRAM(s) Oral every 6 hours PRN  guaifenesin/dextromethorphan  Syrup 5 milliLiter(s) Oral every 6 hours PRN  ondansetron    Tablet 4 milliGRAM(s) Oral two times a day PRN    VITALS:   T(F): 96.7  HR: 80  BP: 143/65  RR: 17  SpO2: 91%    LABS:                        13.6   15.11 )-----------( 197      ( 15 Feb 2021 05:34 )             41.5     02-15    135  |  97<L>  |  28<H>  ----------------------------<  166<H>  4.5   |  31  |  0.9    Ca    7.9<L>      15 Feb 2021 05:34  Mg     1.9     02-15    TPro  5.0<L>  /  Alb  2.8<L>  /  TBili  0.4  /  DBili  x   /  AST  27  /  ALT  39  /  AlkPhos  78  02-15                PHYSICAL EXAM:  GEN: No acute distress  PULM/CHEST: Clear to auscultation bilaterally, no rales, rhonchi or wheezes   CVS: Regular rate and rhythm, S1-S2, no murmurs  ABD: Soft, non-tender, non-distended, +BS  EXT: No edema  NEURO: AAOx3

## 2021-02-15 NOTE — PHYSICAL THERAPY INITIAL EVALUATION ADULT - GAIT DISTANCE, PT EVAL
Pt. able to perform side steps from bed to chair for transfer without use of AD; further ambulation held today due to O2 desaturation to 80%/bed to chair

## 2021-02-15 NOTE — OCCUPATIONAL THERAPY INITIAL EVALUATION ADULT - ADDITIONAL COMMENTS
+driving, +working, pt states he has grab bars and shower chair in shower for spouse who recently had CVA, also has chair lift for spouse as well

## 2021-02-15 NOTE — PHYSICAL THERAPY INITIAL EVALUATION ADULT - GAIT TRAINING, PT EVAL
Pt will be Sup to amb 150' without AD by d/c.       Pt will be Sup/CGA to negotiate 13 steps by 1 HR by d/c.

## 2021-02-15 NOTE — PHYSICAL THERAPY INITIAL EVALUATION ADULT - PERTINENT HX OF CURRENT PROBLEM, REHAB EVAL
70 year old male with PMH of CAD, MI (s/p 4 stents), DM, HTN, BPH, hypothyroid and GERD presents to ED c/o progressive SOB, fever, and weakness x 4 days.  Pt also with recent positive COVID 19 swab + as outpatient. Pt had Pfizer COVID vaccine on 1/3 and 1/24. Pt reports chills, dry cough, and decreased po intake. He denies abd pain, N/V/D/C, lightheadedness CP.

## 2021-02-15 NOTE — PHYSICAL THERAPY INITIAL EVALUATION ADULT - SPECIFY REASON(S)
Pt extremely lethargic and unable to safely participate in PT session at this time. Attempted to take A&O. Pt able to identify name and ; however quickly fell asleep. PT to f/u when appropriate.

## 2021-02-15 NOTE — PROGRESS NOTE ADULT - ASSESSMENT
71 yo M with PMH of CAD, MI (s/p 4 stents), DM, HTN, BPH, hypothyroid and GERD, recently received Pfizer vaccine (1/3 and 1/24) presented to ED c/o progressive SOB, fever, and weakness. Patient stated that his symptoms began on Wednesday 2/3. COVID PCR + on 2/4. Admitted to medicine with acute hypoxemic respiratory failure secondary to COVID-19 PNA. Upgraded to ICU from 3A due to increasing oxygen requirements    #Acute hypoxemic respiratory failure secondary to COVID-19 PNA   #likely superimposed bacterial pneumonia   - Patient s/p  2 doses of the Pfizer vaccine (last dose 1/24)  - O2:   - CTA 02/04 showed extensive bilateral patchy ground-glass opacities involving all lobes most pronounced in the upper lung zones  - CXR this am showing BL patchy opacities- stable   - s/p 1 unit plasma  - wean solumedrol to 40mg q24hrs IV today   - s/p Remdesivir completed 2/9  - s/p 1 week of rocephin & Levaquin per ID  - inflammatory markers monitor q48hrs  Ddimer: 180 > 543 > 939  Procal: 1.61 > 0.97 > 0.32 > 0.13  Ferritin: 404 > 625  CRP: 22.67 > 7.54 > 4.26  - LE Duplex negative   - PT/OT consult     #Oral Thrush  - c/w  nystatin     #Constipation   - c/w miralax, senna   - consider adding lactulose for ongoing constipation     #GEOVANNA - resolved  - Likely 2/2 decreased PO intake   - d/c fluids for now as GEOVANNA resolved.  - Monitor BMP    #CAD  #MI s/p 4 stents  - C/w Effient 10mg daily  - C/w Toprol 25mg daily    #HTN   - C/w enalapril 2.5mg daily    #BPH  - C/w tamsulosin 0.4mg PO QHS    #DM   - Monitor FS, c/w insulin regimen   - A1C 9.1-->only on glipizide as outpatient   - c/w counselling    #Hypothyroidism  - C/w Synthroid    #GERD  - c/w protonix    Diet: consistent carbs, DASH/TLC + Glucerna   DVT PPx: Lovenox 40mg BID  GI ppx: protonix  Code Status: FULL CODE  Dispo: acute, micu monitoring, possible downgrade tomorrow; PT/OT; wean solumedrol    Family contact:   Ami Odonnell - Wife  [phone]

## 2021-02-15 NOTE — OCCUPATIONAL THERAPY INITIAL EVALUATION ADULT - PHYSICAL ASSIST/NONPHYSICAL ASSIST:DRESS LOWER BODY, OT EVAL
pt with dec. O2 upon attempting to reach to floor to initiate LB dressing/verbal cues/1 person assist

## 2021-02-15 NOTE — PROGRESS NOTE ADULT - SUBJECTIVE AND OBJECTIVE BOX
Patient is a 70y old  Male who presents with a chief complaint of sob (14 Feb 2021 06:22)        Over Night Events:  Remains on HFNCO2.  50 liters 55%.  NIV during sleep        ROS:     All ROS are negative except HPI         PHYSICAL EXAM    ICU Vital Signs Last 24 Hrs  T(C): 35.9 (15 Feb 2021 08:00), Max: 36.7 (15 Feb 2021 00:00)  T(F): 96.7 (15 Feb 2021 08:00), Max: 98 (15 Feb 2021 00:00)  HR: 74 (15 Feb 2021 08:00) (66 - 94)  BP: 125/67 (15 Feb 2021 08:00) (96/62 - 150/78)  BP(mean): 90 (15 Feb 2021 08:00) (72 - 105)  ABP: --  ABP(mean): --  RR: 24 (15 Feb 2021 08:00) (14 - 48)  SpO2: 89% (15 Feb 2021 08:00) (89% - 98%)      CONSTITUTIONAL:  Well nourished.  NAD    ENT:   Airway patent,   Mouth with normal mucosa.   No thrush    EYES:   Pupils equal,   Round and reactive to light.    CARDIAC:   Normal rate,   Regular rhythm.    No edema      Vascular:  Normal systolic impulse  No Carotid bruits    RESPIRATORY:   No wheezing  Bilateral BS  Normal chest expansion  Not tachypneic,  No use of accessory muscles    GASTROINTESTINAL:  Abdomen soft,   Non-tender,   No guarding,   + BS    MUSCULOSKELETAL:   Range of motion is not limited,  No clubbing, cyanosis    NEUROLOGICAL:   Alert and oriented   No motor  deficits.    SKIN:   Skin normal color for race,   Warm and dry and intact.   No evidence of rash.    PSYCHIATRIC:   Normal mood and affect.   No apparent risk to self or others.    HEMATOLOGICAL:  No cervical  lymphadenopathy.  no inguinal lymphadenopathy      02-14-21 @ 07:01  -  02-15-21 @ 07:00  --------------------------------------------------------  IN:    IV PiggyBack: 240 mL    Oral Fluid: 120 mL  Total IN: 360 mL    OUT:    Voided (mL): 1800 mL  Total OUT: 1800 mL    Total NET: -1440 mL          LABS:                            13.6   15.11 )-----------( 197      ( 15 Feb 2021 05:34 )             41.5                                               02-15    135  |  97<L>  |  28<H>  ----------------------------<  166<H>  4.5   |  31  |  0.9    Ca    7.9<L>      15 Feb 2021 05:34  Mg     1.9     02-15    TPro  5.0<L>  /  Alb  2.8<L>  /  TBili  0.4  /  DBili  x   /  AST  27  /  ALT  39  /  AlkPhos  78  02-15                                                                                           LIVER FUNCTIONS - ( 15 Feb 2021 05:34 )  Alb: 2.8 g/dL / Pro: 5.0 g/dL / ALK PHOS: 78 U/L / ALT: 39 U/L / AST: 27 U/L / GGT: x                                                                                                                                       MEDICATIONS  (STANDING):  chlorhexidine 4% Liquid 1 Application(s) Topical daily  dextrose 40% Gel 15 Gram(s) Oral once  dextrose 5%. 1000 milliLiter(s) (50 mL/Hr) IV Continuous <Continuous>  dextrose 5%. 1000 milliLiter(s) (100 mL/Hr) IV Continuous <Continuous>  dextrose 50% Injectable 25 Gram(s) IV Push once  dextrose 50% Injectable 12.5 Gram(s) IV Push once  dextrose 50% Injectable 25 Gram(s) IV Push once  enoxaparin Injectable 40 milliGRAM(s) SubCutaneous two times a day  glucagon  Injectable 1 milliGRAM(s) IntraMuscular once  influenza   Vaccine 0.5 milliLiter(s) IntraMuscular once  insulin glargine Injectable (LANTUS) 20 Unit(s) SubCutaneous at bedtime  insulin lispro (ADMELOG) corrective regimen sliding scale   SubCutaneous three times a day before meals  insulin lispro Injectable (ADMELOG) 9 Unit(s) SubCutaneous three times a day before meals  levothyroxine 50 MICROGram(s) Oral daily  methylPREDNISolone sodium succinate Injectable 40 milliGRAM(s) IV Push every 12 hours  metoprolol succinate ER 25 milliGRAM(s) Oral daily  nystatin    Suspension 866074 Unit(s) Oral three times a day  pantoprazole    Tablet 40 milliGRAM(s) Oral before breakfast  polyethylene glycol 3350 17 Gram(s) Oral daily  prasugrel 10 milliGRAM(s) Oral daily  senna 2 Tablet(s) Oral at bedtime  tamsulosin 0.4 milliGRAM(s) Oral at bedtime    MEDICATIONS  (PRN):  acetaminophen   Tablet .. 650 milliGRAM(s) Oral every 6 hours PRN Temp greater or equal to 38C (100.4F), Mild Pain (1 - 3)  guaifenesin/dextromethorphan  Syrup 5 milliLiter(s) Oral every 6 hours PRN Cough  ondansetron    Tablet 4 milliGRAM(s) Oral two times a day PRN Nausea and/or Vomiting      New X-rays reviewed:                                                                                  ECHO    CXR interpreted by me:  Bilateral infiltrates

## 2021-02-15 NOTE — OCCUPATIONAL THERAPY INITIAL EVALUATION ADULT - PLANNED THERAPY INTERVENTIONS, OT EVAL
ADL retraining/balance training/bed mobility training/cognitive, visual perceptual/fine motor coordination training/motor coordination training/parent/caregiver training.../ROM/strengthening/stretching/transfer training

## 2021-02-15 NOTE — OCCUPATIONAL THERAPY INITIAL EVALUATION ADULT - GENERAL OBSERVATIONS, REHAB EVAL
pt received semi garcia in bed in NAD, SpO2 sat 93% upon receipt, +tele, +Bp cuff, +pulse oxi, +HFNC 50L55%, PT present throughout for safety and maximal pt ability/safety, left seated in b/s chair with b/l LE elevated at pt request, all lines intact RN aware, SpO2 dec to 86% sitting EOB ret. to 89%, s/p transfer to chair dec to 80% with recovery to 90% ~4 minutes. RN aware of all changes

## 2021-02-15 NOTE — OCCUPATIONAL THERAPY INITIAL EVALUATION ADULT - IMPAIRED TRANSFERS: SIT/STAND, REHAB EVAL
decreased endurance; RPE 7 per pt following return to sitting during active recovery/impaired balance/decreased ROM/decreased strength

## 2021-02-16 LAB
ALBUMIN SERPL ELPH-MCNC: 2.8 G/DL — LOW (ref 3.5–5.2)
ALP SERPL-CCNC: 85 U/L — SIGNIFICANT CHANGE UP (ref 30–115)
ALT FLD-CCNC: 38 U/L — SIGNIFICANT CHANGE UP (ref 0–41)
ANION GAP SERPL CALC-SCNC: 9 MMOL/L — SIGNIFICANT CHANGE UP (ref 7–14)
AST SERPL-CCNC: 33 U/L — SIGNIFICANT CHANGE UP (ref 0–41)
BASOPHILS # BLD AUTO: 0.03 K/UL — SIGNIFICANT CHANGE UP (ref 0–0.2)
BASOPHILS NFR BLD AUTO: 0.2 % — SIGNIFICANT CHANGE UP (ref 0–1)
BILIRUB SERPL-MCNC: 0.3 MG/DL — SIGNIFICANT CHANGE UP (ref 0.2–1.2)
BUN SERPL-MCNC: 27 MG/DL — HIGH (ref 10–20)
CALCIUM SERPL-MCNC: 7.9 MG/DL — LOW (ref 8.5–10.1)
CHLORIDE SERPL-SCNC: 98 MMOL/L — SIGNIFICANT CHANGE UP (ref 98–110)
CO2 SERPL-SCNC: 30 MMOL/L — SIGNIFICANT CHANGE UP (ref 17–32)
CREAT SERPL-MCNC: 0.8 MG/DL — SIGNIFICANT CHANGE UP (ref 0.7–1.5)
CRP SERPL-MCNC: 0.6 MG/DL — HIGH (ref 0–0.4)
D DIMER BLD IA.RAPID-MCNC: 1785 NG/ML DDU — HIGH (ref 0–230)
EOSINOPHIL # BLD AUTO: 0.02 K/UL — SIGNIFICANT CHANGE UP (ref 0–0.7)
EOSINOPHIL NFR BLD AUTO: 0.1 % — SIGNIFICANT CHANGE UP (ref 0–8)
GLUCOSE BLDC GLUCOMTR-MCNC: 131 MG/DL — HIGH (ref 70–99)
GLUCOSE BLDC GLUCOMTR-MCNC: 143 MG/DL — HIGH (ref 70–99)
GLUCOSE BLDC GLUCOMTR-MCNC: 281 MG/DL — HIGH (ref 70–99)
GLUCOSE BLDC GLUCOMTR-MCNC: 292 MG/DL — HIGH (ref 70–99)
GLUCOSE SERPL-MCNC: 79 MG/DL — SIGNIFICANT CHANGE UP (ref 70–99)
HCT VFR BLD CALC: 44.1 % — SIGNIFICANT CHANGE UP (ref 42–52)
HGB BLD-MCNC: 14.3 G/DL — SIGNIFICANT CHANGE UP (ref 14–18)
IMM GRANULOCYTES NFR BLD AUTO: 1.5 % — HIGH (ref 0.1–0.3)
LYMPHOCYTES # BLD AUTO: 0.56 K/UL — LOW (ref 1.2–3.4)
LYMPHOCYTES # BLD AUTO: 3.8 % — LOW (ref 20.5–51.1)
MAGNESIUM SERPL-MCNC: 2 MG/DL — SIGNIFICANT CHANGE UP (ref 1.8–2.4)
MCHC RBC-ENTMCNC: 25.2 PG — LOW (ref 27–31)
MCHC RBC-ENTMCNC: 32.4 G/DL — SIGNIFICANT CHANGE UP (ref 32–37)
MCV RBC AUTO: 77.8 FL — LOW (ref 80–94)
MONOCYTES # BLD AUTO: 0.37 K/UL — SIGNIFICANT CHANGE UP (ref 0.1–0.6)
MONOCYTES NFR BLD AUTO: 2.5 % — SIGNIFICANT CHANGE UP (ref 1.7–9.3)
NEUTROPHILS # BLD AUTO: 13.51 K/UL — HIGH (ref 1.4–6.5)
NEUTROPHILS NFR BLD AUTO: 91.9 % — HIGH (ref 42.2–75.2)
NRBC # BLD: 0 /100 WBCS — SIGNIFICANT CHANGE UP (ref 0–0)
PLATELET # BLD AUTO: 190 K/UL — SIGNIFICANT CHANGE UP (ref 130–400)
POTASSIUM SERPL-MCNC: 4.2 MMOL/L — SIGNIFICANT CHANGE UP (ref 3.5–5)
POTASSIUM SERPL-SCNC: 4.2 MMOL/L — SIGNIFICANT CHANGE UP (ref 3.5–5)
PROCALCITONIN SERPL-MCNC: 0.12 NG/ML — HIGH (ref 0.02–0.1)
PROT SERPL-MCNC: 5.2 G/DL — LOW (ref 6–8)
RBC # BLD: 5.67 M/UL — SIGNIFICANT CHANGE UP (ref 4.7–6.1)
RBC # FLD: 17 % — HIGH (ref 11.5–14.5)
SODIUM SERPL-SCNC: 137 MMOL/L — SIGNIFICANT CHANGE UP (ref 135–146)
WBC # BLD: 14.71 K/UL — HIGH (ref 4.8–10.8)
WBC # FLD AUTO: 14.71 K/UL — HIGH (ref 4.8–10.8)

## 2021-02-16 PROCEDURE — 71045 X-RAY EXAM CHEST 1 VIEW: CPT | Mod: 26

## 2021-02-16 RX ADMIN — Medication 500000 UNIT(S): at 05:22

## 2021-02-16 RX ADMIN — Medication 0.25 MILLIGRAM(S): at 22:54

## 2021-02-16 RX ADMIN — ENOXAPARIN SODIUM 40 MILLIGRAM(S): 100 INJECTION SUBCUTANEOUS at 16:38

## 2021-02-16 RX ADMIN — POLYETHYLENE GLYCOL 3350 17 GRAM(S): 17 POWDER, FOR SOLUTION ORAL at 11:13

## 2021-02-16 RX ADMIN — Medication 9 UNIT(S): at 16:38

## 2021-02-16 RX ADMIN — Medication 6: at 11:30

## 2021-02-16 RX ADMIN — Medication 500000 UNIT(S): at 16:36

## 2021-02-16 RX ADMIN — PANTOPRAZOLE SODIUM 40 MILLIGRAM(S): 20 TABLET, DELAYED RELEASE ORAL at 05:22

## 2021-02-16 RX ADMIN — Medication 50 MICROGRAM(S): at 05:22

## 2021-02-16 RX ADMIN — TAMSULOSIN HYDROCHLORIDE 0.4 MILLIGRAM(S): 0.4 CAPSULE ORAL at 21:33

## 2021-02-16 RX ADMIN — Medication 40 MILLIGRAM(S): at 05:22

## 2021-02-16 RX ADMIN — INSULIN GLARGINE 20 UNIT(S): 100 INJECTION, SOLUTION SUBCUTANEOUS at 21:33

## 2021-02-16 RX ADMIN — Medication 6: at 16:54

## 2021-02-16 RX ADMIN — Medication 9 UNIT(S): at 08:03

## 2021-02-16 RX ADMIN — ENOXAPARIN SODIUM 40 MILLIGRAM(S): 100 INJECTION SUBCUTANEOUS at 05:22

## 2021-02-16 RX ADMIN — SENNA PLUS 2 TABLET(S): 8.6 TABLET ORAL at 21:33

## 2021-02-16 RX ADMIN — Medication 9 UNIT(S): at 11:14

## 2021-02-16 RX ADMIN — PRASUGREL 10 MILLIGRAM(S): 5 TABLET, FILM COATED ORAL at 11:12

## 2021-02-16 RX ADMIN — CHLORHEXIDINE GLUCONATE 1 APPLICATION(S): 213 SOLUTION TOPICAL at 11:14

## 2021-02-16 RX ADMIN — Medication 25 MILLIGRAM(S): at 05:22

## 2021-02-16 RX ADMIN — Medication 650 MILLIGRAM(S): at 22:54

## 2021-02-16 NOTE — PROGRESS NOTE ADULT - SUBJECTIVE AND OBJECTIVE BOX
Patient is a 70y old  Male who presents with a chief complaint of sob (14 Feb 2021 06:22)        Over Night Events:  Remains on HFNCO2.  Off pressors.          ROS:     All ROS are negative except HPI         PHYSICAL EXAM    ICU Vital Signs Last 24 Hrs  T(C): 35.9 (16 Feb 2021 06:00), Max: 36.2 (15 Feb 2021 12:00)  T(F): 96.7 (16 Feb 2021 06:00), Max: 97.2 (15 Feb 2021 12:00)  HR: 90 (16 Feb 2021 06:00) (76 - 94)  BP: 113/64 (16 Feb 2021 06:00) (96/68 - 143/65)  BP(mean): 77 (16 Feb 2021 06:00) (62 - 90)  ABP: --  ABP(mean): --  RR: 25 (16 Feb 2021 06:00) (12 - 38)  SpO2: 92% (16 Feb 2021 06:00) (86% - 97%)      CONSTITUTIONAL:  Well nourished.  NAD    ENT:   Airway patent,   Mouth with normal mucosa.   No thrush    EYES:   Pupils equal,   Round and reactive to light.    CARDIAC:   Tachycardic   Regular rhythm.    No edema      Vascular:  Normal systolic impulse  No Carotid bruits    RESPIRATORY:   No wheezing  Bilateral crackles   Normal chest expansion  Not tachypneic,  No use of accessory muscles    GASTROINTESTINAL:  Abdomen soft,   Non-tender,   No guarding,   + BS    MUSCULOSKELETAL:   Range of motion is not limited,  No clubbing, cyanosis    NEUROLOGICAL:   Alert and oriented   No motor  deficits.    SKIN:   Skin normal color for race,   Warm and dry and intact.   No evidence of rash.    PSYCHIATRIC:   Normal mood and affect.   No apparent risk to self or others.    HEMATOLOGICAL:  No cervical  lymphadenopathy.  no inguinal lymphadenopathy      02-15-21 @ 07:01  -  02-16-21 @ 07:00  --------------------------------------------------------  IN:    Oral Fluid: 460 mL  Total IN: 460 mL    OUT:    Voided (mL): 1575 mL  Total OUT: 1575 mL    Total NET: -1115 mL          LABS:                            14.3   14.71 )-----------( 190      ( 16 Feb 2021 04:23 )             44.1                                               02-16    137  |  98  |  27<H>  ----------------------------<  79  4.2   |  30  |  0.8    Ca    7.9<L>      16 Feb 2021 04:23  Mg     2.0     02-16    TPro  5.2<L>  /  Alb  2.8<L>  /  TBili  0.3  /  DBili  x   /  AST  33  /  ALT  38  /  AlkPhos  85  02-16                                                                                           LIVER FUNCTIONS - ( 16 Feb 2021 04:23 )  Alb: 2.8 g/dL / Pro: 5.2 g/dL / ALK PHOS: 85 U/L / ALT: 38 U/L / AST: 33 U/L / GGT: x                                                                                                                                       MEDICATIONS  (STANDING):  chlorhexidine 4% Liquid 1 Application(s) Topical daily  dextrose 40% Gel 15 Gram(s) Oral once  dextrose 5%. 1000 milliLiter(s) (50 mL/Hr) IV Continuous <Continuous>  dextrose 5%. 1000 milliLiter(s) (100 mL/Hr) IV Continuous <Continuous>  dextrose 50% Injectable 25 Gram(s) IV Push once  dextrose 50% Injectable 12.5 Gram(s) IV Push once  dextrose 50% Injectable 25 Gram(s) IV Push once  enoxaparin Injectable 40 milliGRAM(s) SubCutaneous two times a day  glucagon  Injectable 1 milliGRAM(s) IntraMuscular once  influenza   Vaccine 0.5 milliLiter(s) IntraMuscular once  insulin glargine Injectable (LANTUS) 20 Unit(s) SubCutaneous at bedtime  insulin lispro (ADMELOG) corrective regimen sliding scale   SubCutaneous three times a day before meals  insulin lispro Injectable (ADMELOG) 9 Unit(s) SubCutaneous three times a day before meals  levothyroxine 50 MICROGram(s) Oral daily  methylPREDNISolone sodium succinate Injectable 40 milliGRAM(s) IV Push every 24 hours  metoprolol succinate ER 25 milliGRAM(s) Oral daily  nystatin    Suspension 186091 Unit(s) Oral three times a day  pantoprazole    Tablet 40 milliGRAM(s) Oral before breakfast  polyethylene glycol 3350 17 Gram(s) Oral daily  prasugrel 10 milliGRAM(s) Oral daily  senna 2 Tablet(s) Oral at bedtime  tamsulosin 0.4 milliGRAM(s) Oral at bedtime    MEDICATIONS  (PRN):  acetaminophen   Tablet .. 650 milliGRAM(s) Oral every 6 hours PRN Temp greater or equal to 38C (100.4F), Mild Pain (1 - 3)  ALPRAZolam 0.25 milliGRAM(s) Oral at bedtime PRN Anxiety  guaifenesin/dextromethorphan  Syrup 5 milliLiter(s) Oral every 6 hours PRN Cough  ondansetron    Tablet 4 milliGRAM(s) Oral two times a day PRN Nausea and/or Vomiting      New X-rays reviewed:                                                                                  ECHO    CXR interpreted by me:  Bilateral infiltrates

## 2021-02-16 NOTE — PROGRESS NOTE ADULT - ASSESSMENT
69 yo M with PMH of CAD, MI (s/p 4 stents), DM, HTN, BPH, hypothyroid and GERD, recently received Pfizer vaccine (1/3 and 1/24) presented to ED c/o progressive SOB, fever, and weakness. Patient stated that his symptoms began on Wednesday 2/3. COVID PCR + on 2/4. Admitted to medicine with acute hypoxemic respiratory failure secondary to COVID-19 PNA. Upgraded to ICU from 3A due to increasing oxygen requirements    #Acute hypoxemic respiratory failure secondary to COVID-19 PNA   #likely superimposed bacterial pneumonia   - Patient s/p  2 doses of the Pfizer vaccine (last dose 1/24)  - CTA 02/04 showed extensive bilateral patchy ground-glass opacities involving all lobes most pronounced in the upper lung zones  - CXR this am showing BL patchy opacities- stable   - s/p 1 unit plasma  - wean solumedrol to 40mg q24hrs IV today   - s/p Remdesivir completed 2/9  - s/p 1 week of rocephin & Levaquin per ID  - inflammatory markers monitor q48hrs  Ddimer: 180 > 543 > 939 > 1758  Procal: 1.61 > 0.97 > 0.32 > 0.13 > 0.08  Ferritin: 404 > 625   CRP: 22.67 > 7.54 > 4.26 > 0.54  - LE Duplex negative   - PT/OT consult     #Oral Thrush  - c/w nystatin     #Constipation   - c/w miralax, senna   - consider adding lactulose for ongoing constipation     #GEOVANNA - resolved  - Likely 2/2 decreased PO intake   - d/c fluids for now as GEOVANNA resolved.  - Monitor BMP    #CAD  #MI s/p 4 stents  - C/w Effient 10mg daily  - C/w Toprol 25mg daily    #HTN   - C/w enalapril 2.5mg daily    #BPH  - C/w tamsulosin 0.4mg PO QHS    #DM   - Monitor FS, c/w insulin regimen   - A1C 9.1-->only on glipizide as outpatient   - c/w counselling    #Hypothyroidism  - C/w Synthroid    #GERD  - c/w protonix    Diet: consistent carbs, DASH/TLC + Glucerna   DVT PPx: Lovenox 40mg BID  GI ppx: protonix  Code Status: FULL CODE  Dispo: acute, micu monitoring, PT/OT; wean solumedrol    Family contact in case of emergency. Patient states we do not need to regularly call family everyday.   Ami Odonnell - Wife  [phone]

## 2021-02-16 NOTE — PROGRESS NOTE ADULT - SUBJECTIVE AND OBJECTIVE BOX
SUBJECTIVE:    Patient is a 70y old Male who presents with a chief complaint of sob (14 Feb 2021 06:22)    Currently admitted to medicine with the primary diagnosis of Shortness of breath       Today is hospital day 12d.     Overnight no events.    This morning patient states hes feeling well and believes he can be downgraded. However, he will be monitored in MICU for now.     PAST MEDICAL & SURGICAL HISTORY  Chronic kidney disease (CKD)  III    Type 2 diabetes mellitus without complication, unspecified long term insulin use status    Hypertension, unspecified type    Dyspnea, unspecified type    ASHD (arteriosclerotic heart disease)  STEMI 12/31/17, PCI RCA    S/P cataract surgery    History of ligation of vein  2012    History of tonsillectomy  1957        ALLERGIES:  penicillin (Rash (Severe))    MEDICATIONS:  STANDING MEDICATIONS  chlorhexidine 4% Liquid 1 Application(s) Topical daily  dextrose 40% Gel 15 Gram(s) Oral once  dextrose 5%. 1000 milliLiter(s) IV Continuous <Continuous>  dextrose 5%. 1000 milliLiter(s) IV Continuous <Continuous>  dextrose 50% Injectable 25 Gram(s) IV Push once  dextrose 50% Injectable 12.5 Gram(s) IV Push once  dextrose 50% Injectable 25 Gram(s) IV Push once  enoxaparin Injectable 40 milliGRAM(s) SubCutaneous two times a day  glucagon  Injectable 1 milliGRAM(s) IntraMuscular once  influenza   Vaccine 0.5 milliLiter(s) IntraMuscular once  insulin glargine Injectable (LANTUS) 20 Unit(s) SubCutaneous at bedtime  insulin lispro (ADMELOG) corrective regimen sliding scale   SubCutaneous three times a day before meals  insulin lispro Injectable (ADMELOG) 9 Unit(s) SubCutaneous three times a day before meals  levothyroxine 50 MICROGram(s) Oral daily  methylPREDNISolone sodium succinate Injectable 40 milliGRAM(s) IV Push every 24 hours  metoprolol succinate ER 25 milliGRAM(s) Oral daily  nystatin    Suspension 846029 Unit(s) Oral three times a day  pantoprazole    Tablet 40 milliGRAM(s) Oral before breakfast  polyethylene glycol 3350 17 Gram(s) Oral daily  prasugrel 10 milliGRAM(s) Oral daily  senna 2 Tablet(s) Oral at bedtime  tamsulosin 0.4 milliGRAM(s) Oral at bedtime    PRN MEDICATIONS  acetaminophen   Tablet .. 650 milliGRAM(s) Oral every 6 hours PRN  ALPRAZolam 0.25 milliGRAM(s) Oral at bedtime PRN  guaifenesin/dextromethorphan  Syrup 5 milliLiter(s) Oral every 6 hours PRN  ondansetron    Tablet 4 milliGRAM(s) Oral two times a day PRN    VITALS:   T(F): 97.5  HR: 84  BP: 123/60  RR: 22  SpO2: 94%    LABS:                        14.3   14.71 )-----------( 190      ( 16 Feb 2021 04:23 )             44.1     02-16    137  |  98  |  27<H>  ----------------------------<  79  4.2   |  30  |  0.8    Ca    7.9<L>      16 Feb 2021 04:23  Mg     2.0     02-16    TPro  5.2<L>  /  Alb  2.8<L>  /  TBili  0.3  /  DBili  x   /  AST  33  /  ALT  38  /  AlkPhos  85  02-16                  RADIOLOGY:    CXR 2/16: increased b/l hazy opacities (wet read)     PHYSICAL EXAM:  GEN: No acute distress  PULM/CHEST: bibasilar crackles   CVS: Regular rate and rhythm, S1-S2, no murmurs  ABD: Soft, non-tender, non-distended, +BS  EXT: No edema  NEURO: AAOx3

## 2021-02-16 NOTE — PROGRESS NOTE ADULT - SUBJECTIVE AND OBJECTIVE BOX
ANDREE BUSH  70y, Male    All available historical data reviewed    OVERNIGHT EVENTS:  no fevers  HFNC  feels well and has no complaints     ROS:  General: Denies rigors, nightsweats  HEENT: Denies headache, rhinorrhea, sore throat, eye pain  CV: Denies CP, palpitations  PULM: Denies wheezing, hemoptysis  GI: Denies hematemesis, hematochezia, melena  : Denies discharge, hematuria  MSK: Denies arthralgias, myalgias  SKIN: Denies rash, lesions  NEURO: Denies paresthesias, weakness  PSYCH: Denies depression, anxiety    VITALS:  T(F): 97.5, Max: 97.5 (02-16-21 @ 08:00)  HR: 84  BP: 123/60  RR: 22Vital Signs Last 24 Hrs  T(C): 36.4 (16 Feb 2021 08:00), Max: 36.4 (16 Feb 2021 08:00)  T(F): 97.5 (16 Feb 2021 08:00), Max: 97.5 (16 Feb 2021 08:00)  HR: 84 (16 Feb 2021 08:00) (76 - 94)  BP: 123/60 (16 Feb 2021 08:00) (96/68 - 127/63)  BP(mean): 75 (16 Feb 2021 08:00) (65 - 85)  RR: 22 (16 Feb 2021 08:00) (12 - 38)  SpO2: 94% (16 Feb 2021 08:31) (86% - 97%)    TESTS & MEASUREMENTS:                        14.3   14.71 )-----------( 190      ( 16 Feb 2021 04:23 )             44.1     02-16    137  |  98  |  27<H>  ----------------------------<  79  4.2   |  30  |  0.8    Ca    7.9<L>      16 Feb 2021 04:23  Mg     2.0     02-16    TPro  5.2<L>  /  Alb  2.8<L>  /  TBili  0.3  /  DBili  x   /  AST  33  /  ALT  38  /  AlkPhos  85  02-16    LIVER FUNCTIONS - ( 16 Feb 2021 04:23 )  Alb: 2.8 g/dL / Pro: 5.2 g/dL / ALK PHOS: 85 U/L / ALT: 38 U/L / AST: 33 U/L / GGT: x                   RADIOLOGY & ADDITIONAL TESTS:  Personal review of radiological diagnostics performed  Echo and EKG results noted when applicable.     MEDICATIONS:  acetaminophen   Tablet .. 650 milliGRAM(s) Oral every 6 hours PRN  ALPRAZolam 0.25 milliGRAM(s) Oral at bedtime PRN  chlorhexidine 4% Liquid 1 Application(s) Topical daily  dextrose 40% Gel 15 Gram(s) Oral once  dextrose 5%. 1000 milliLiter(s) IV Continuous <Continuous>  dextrose 5%. 1000 milliLiter(s) IV Continuous <Continuous>  dextrose 50% Injectable 25 Gram(s) IV Push once  dextrose 50% Injectable 12.5 Gram(s) IV Push once  dextrose 50% Injectable 25 Gram(s) IV Push once  enoxaparin Injectable 40 milliGRAM(s) SubCutaneous two times a day  glucagon  Injectable 1 milliGRAM(s) IntraMuscular once  guaifenesin/dextromethorphan  Syrup 5 milliLiter(s) Oral every 6 hours PRN  influenza   Vaccine 0.5 milliLiter(s) IntraMuscular once  insulin glargine Injectable (LANTUS) 20 Unit(s) SubCutaneous at bedtime  insulin lispro (ADMELOG) corrective regimen sliding scale   SubCutaneous three times a day before meals  insulin lispro Injectable (ADMELOG) 9 Unit(s) SubCutaneous three times a day before meals  levothyroxine 50 MICROGram(s) Oral daily  methylPREDNISolone sodium succinate Injectable 40 milliGRAM(s) IV Push every 24 hours  metoprolol succinate ER 25 milliGRAM(s) Oral daily  nystatin    Suspension 433842 Unit(s) Oral three times a day  ondansetron    Tablet 4 milliGRAM(s) Oral two times a day PRN  pantoprazole    Tablet 40 milliGRAM(s) Oral before breakfast  polyethylene glycol 3350 17 Gram(s) Oral daily  prasugrel 10 milliGRAM(s) Oral daily  senna 2 Tablet(s) Oral at bedtime  tamsulosin 0.4 milliGRAM(s) Oral at bedtime      ANTIBIOTICS:  nystatin    Suspension 342235 Unit(s) Oral three times a day

## 2021-02-16 NOTE — PROGRESS NOTE ADULT - ASSESSMENT
IMPRESSION:    Acute hypoxemic respiratory failure on HHFNC  severe Covid PNA  Probable superimposed bacterial infection high procal  COPD exacerbation  HO CAD    PLAN:    CNS: avoid CNS depressant    HEENT: Oral care    PULMONARY:  HOB @ 45 degrees.  Aspiration precautions. HFNC alternate with  NIV during sleep and PRN, Solumedrol 40mg q24h.    CARDIOVASCULAR: Goal Map >60, Avoid volume overload.     GI: GI prophylaxis. feeding.     RENAL:  Follow up lytes.  Correct as needed    INFECTIOUS DISEASE: Monitor off abx    HEMATOLOGICAL:  DVT prophylaxis.    ENDOCRINE:  Follow up FS.  Insulin protocol if needed.    MUSCULOSKELETAL: OOB to chair    PT/OT    Dispo: MICU

## 2021-02-16 NOTE — PROGRESS NOTE ADULT - ASSESSMENT
· Assessment	  69 yo M with PMH of CAD, MI (s/p 4 stents), DM, HTN, BPH, hypothyroid and GERD, recently received Pfizer vaccine (1/3 and 1/24) presented to ED c/o progressive SOB, fever, and weakness. Patient stated that his symptoms began on Wednesday 2/3. COVID PCR + on 2/4. Admitted to medicine with acute hypoxemic respiratory failure secondary to COVID-19 PNA.  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 SpO2 < 94% on RA and need for supplemental O2. HFNC  CTA 2/4 : extensive bilateral patchy ground-glass opacities involving all lobes most pronounced in the upper lung zones. No PE  Pt is in the early viral replicative phase based on the timeline/onset of symptoms.  COVID-19 antibodies NG    procalcitonin 1.61 > 0.97>0.08  Ferritin 404>625  CRP 31.59>22.67 >0.54  Ddimers 364>420 >1785 ( doppler LE no DVT 2/14 )  BCx 2/6,9 NGTD  Legionella NG      s/p plasma 2/7  s/p RDV 2/5-9  s/p Rocephin/levo    RECOMMENDATIONS;  Target SpO2 92 % to 96 %  Dexamethasone 6 mg iv q24h for 10 days.  Monitor for side effects: hyperglycemia, neurological ( agitation/confusion), adrenal suppression, bacterial and fungal infections

## 2021-02-17 LAB
ALBUMIN SERPL ELPH-MCNC: 2.8 G/DL — LOW (ref 3.5–5.2)
ALP SERPL-CCNC: 90 U/L — SIGNIFICANT CHANGE UP (ref 30–115)
ALT FLD-CCNC: 32 U/L — SIGNIFICANT CHANGE UP (ref 0–41)
ANION GAP SERPL CALC-SCNC: 9 MMOL/L — SIGNIFICANT CHANGE UP (ref 7–14)
AST SERPL-CCNC: 27 U/L — SIGNIFICANT CHANGE UP (ref 0–41)
BASOPHILS # BLD AUTO: 0.03 K/UL — SIGNIFICANT CHANGE UP (ref 0–0.2)
BASOPHILS NFR BLD AUTO: 0.2 % — SIGNIFICANT CHANGE UP (ref 0–1)
BILIRUB SERPL-MCNC: 0.3 MG/DL — SIGNIFICANT CHANGE UP (ref 0.2–1.2)
BUN SERPL-MCNC: 26 MG/DL — HIGH (ref 10–20)
CALCIUM SERPL-MCNC: 8 MG/DL — LOW (ref 8.5–10.1)
CHLORIDE SERPL-SCNC: 99 MMOL/L — SIGNIFICANT CHANGE UP (ref 98–110)
CO2 SERPL-SCNC: 31 MMOL/L — SIGNIFICANT CHANGE UP (ref 17–32)
CREAT SERPL-MCNC: 0.9 MG/DL — SIGNIFICANT CHANGE UP (ref 0.7–1.5)
EOSINOPHIL # BLD AUTO: 0.02 K/UL — SIGNIFICANT CHANGE UP (ref 0–0.7)
EOSINOPHIL NFR BLD AUTO: 0.1 % — SIGNIFICANT CHANGE UP (ref 0–8)
GLUCOSE BLDC GLUCOMTR-MCNC: 112 MG/DL — HIGH (ref 70–99)
GLUCOSE BLDC GLUCOMTR-MCNC: 264 MG/DL — HIGH (ref 70–99)
GLUCOSE BLDC GLUCOMTR-MCNC: 268 MG/DL — HIGH (ref 70–99)
GLUCOSE BLDC GLUCOMTR-MCNC: 99 MG/DL — SIGNIFICANT CHANGE UP (ref 70–99)
GLUCOSE SERPL-MCNC: 84 MG/DL — SIGNIFICANT CHANGE UP (ref 70–99)
HCT VFR BLD CALC: 42.5 % — SIGNIFICANT CHANGE UP (ref 42–52)
HGB BLD-MCNC: 13.9 G/DL — LOW (ref 14–18)
IMM GRANULOCYTES NFR BLD AUTO: 1.8 % — HIGH (ref 0.1–0.3)
LYMPHOCYTES # BLD AUTO: 0.65 K/UL — LOW (ref 1.2–3.4)
LYMPHOCYTES # BLD AUTO: 4.5 % — LOW (ref 20.5–51.1)
MAGNESIUM SERPL-MCNC: 1.9 MG/DL — SIGNIFICANT CHANGE UP (ref 1.8–2.4)
MCHC RBC-ENTMCNC: 25.4 PG — LOW (ref 27–31)
MCHC RBC-ENTMCNC: 32.7 G/DL — SIGNIFICANT CHANGE UP (ref 32–37)
MCV RBC AUTO: 77.7 FL — LOW (ref 80–94)
MONOCYTES # BLD AUTO: 0.42 K/UL — SIGNIFICANT CHANGE UP (ref 0.1–0.6)
MONOCYTES NFR BLD AUTO: 2.9 % — SIGNIFICANT CHANGE UP (ref 1.7–9.3)
NEUTROPHILS # BLD AUTO: 13.18 K/UL — HIGH (ref 1.4–6.5)
NEUTROPHILS NFR BLD AUTO: 90.5 % — HIGH (ref 42.2–75.2)
NRBC # BLD: 0 /100 WBCS — SIGNIFICANT CHANGE UP (ref 0–0)
PLATELET # BLD AUTO: 173 K/UL — SIGNIFICANT CHANGE UP (ref 130–400)
POTASSIUM SERPL-MCNC: 4.5 MMOL/L — SIGNIFICANT CHANGE UP (ref 3.5–5)
POTASSIUM SERPL-SCNC: 4.5 MMOL/L — SIGNIFICANT CHANGE UP (ref 3.5–5)
PROT SERPL-MCNC: 4.9 G/DL — LOW (ref 6–8)
RBC # BLD: 5.47 M/UL — SIGNIFICANT CHANGE UP (ref 4.7–6.1)
RBC # FLD: 17.2 % — HIGH (ref 11.5–14.5)
SODIUM SERPL-SCNC: 139 MMOL/L — SIGNIFICANT CHANGE UP (ref 135–146)
WBC # BLD: 14.56 K/UL — HIGH (ref 4.8–10.8)
WBC # FLD AUTO: 14.56 K/UL — HIGH (ref 4.8–10.8)

## 2021-02-17 PROCEDURE — 71045 X-RAY EXAM CHEST 1 VIEW: CPT | Mod: 26

## 2021-02-17 RX ORDER — TAMSULOSIN HYDROCHLORIDE 0.4 MG/1
0.8 CAPSULE ORAL AT BEDTIME
Refills: 0 | Status: DISCONTINUED | OUTPATIENT
Start: 2021-02-17 | End: 2021-03-14

## 2021-02-17 RX ORDER — FUROSEMIDE 40 MG
40 TABLET ORAL ONCE
Refills: 0 | Status: COMPLETED | OUTPATIENT
Start: 2021-02-17 | End: 2021-02-17

## 2021-02-17 RX ADMIN — Medication 9 UNIT(S): at 12:30

## 2021-02-17 RX ADMIN — INSULIN GLARGINE 20 UNIT(S): 100 INJECTION, SOLUTION SUBCUTANEOUS at 21:15

## 2021-02-17 RX ADMIN — Medication 9 UNIT(S): at 16:57

## 2021-02-17 RX ADMIN — PANTOPRAZOLE SODIUM 40 MILLIGRAM(S): 20 TABLET, DELAYED RELEASE ORAL at 05:34

## 2021-02-17 RX ADMIN — Medication 50 MICROGRAM(S): at 05:34

## 2021-02-17 RX ADMIN — ENOXAPARIN SODIUM 40 MILLIGRAM(S): 100 INJECTION SUBCUTANEOUS at 16:50

## 2021-02-17 RX ADMIN — Medication 25 MILLIGRAM(S): at 05:34

## 2021-02-17 RX ADMIN — Medication 0.25 MILLIGRAM(S): at 22:53

## 2021-02-17 RX ADMIN — Medication 9 UNIT(S): at 08:40

## 2021-02-17 RX ADMIN — Medication 6: at 16:57

## 2021-02-17 RX ADMIN — Medication 40 MILLIGRAM(S): at 10:56

## 2021-02-17 RX ADMIN — PRASUGREL 10 MILLIGRAM(S): 5 TABLET, FILM COATED ORAL at 11:40

## 2021-02-17 RX ADMIN — POLYETHYLENE GLYCOL 3350 17 GRAM(S): 17 POWDER, FOR SOLUTION ORAL at 11:41

## 2021-02-17 RX ADMIN — Medication 40 MILLIGRAM(S): at 05:35

## 2021-02-17 RX ADMIN — ENOXAPARIN SODIUM 40 MILLIGRAM(S): 100 INJECTION SUBCUTANEOUS at 05:34

## 2021-02-17 RX ADMIN — SENNA PLUS 2 TABLET(S): 8.6 TABLET ORAL at 21:14

## 2021-02-17 RX ADMIN — TAMSULOSIN HYDROCHLORIDE 0.8 MILLIGRAM(S): 0.4 CAPSULE ORAL at 21:15

## 2021-02-17 RX ADMIN — Medication 650 MILLIGRAM(S): at 22:55

## 2021-02-17 RX ADMIN — CHLORHEXIDINE GLUCONATE 1 APPLICATION(S): 213 SOLUTION TOPICAL at 12:32

## 2021-02-17 RX ADMIN — Medication 6: at 12:31

## 2021-02-17 NOTE — PROGRESS NOTE ADULT - SUBJECTIVE AND OBJECTIVE BOX
ANDREE BUSH  70y, Male    All available historical data reviewed    OVERNIGHT EVENTS:  no fevers  feels well and has no complaints  HFNC    ROS:  General: Denies rigors, nightsweats  HEENT: Denies headache, rhinorrhea, sore throat, eye pain  CV: Denies CP, palpitations  PULM: Denies wheezing, hemoptysis  GI: Denies hematemesis, hematochezia, melena  : Denies discharge, hematuria  MSK: Denies arthralgias, myalgias  SKIN: Denies rash, lesions  NEURO: Denies paresthesias, weakness  PSYCH: Denies depression, anxiety    VITALS:  T(F): 96.2, Max: 97.7 (02-16-21 @ 20:16)  HR: 86  BP: 112/64  RR: 23Vital Signs Last 24 Hrs  T(C): 35.7 (17 Feb 2021 08:00), Max: 36.5 (16 Feb 2021 20:16)  T(F): 96.2 (17 Feb 2021 08:00), Max: 97.7 (16 Feb 2021 20:16)  HR: 86 (17 Feb 2021 08:00) (74 - 110)  BP: 112/64 (17 Feb 2021 08:00) (96/64 - 124/67)  BP(mean): 88 (17 Feb 2021 08:00) (68 - 88)  RR: 23 (17 Feb 2021 08:00) (17 - 43)  SpO2: 92% (17 Feb 2021 08:00) (84% - 97%)    TESTS & MEASUREMENTS:                        13.9   14.56 )-----------( 173      ( 17 Feb 2021 04:40 )             42.5     02-17    139  |  99  |  26<H>  ----------------------------<  84  4.5   |  31  |  0.9    Ca    8.0<L>      17 Feb 2021 04:40  Mg     1.9     02-17    TPro  4.9<L>  /  Alb  2.8<L>  /  TBili  0.3  /  DBili  x   /  AST  27  /  ALT  32  /  AlkPhos  90  02-17    LIVER FUNCTIONS - ( 17 Feb 2021 04:40 )  Alb: 2.8 g/dL / Pro: 4.9 g/dL / ALK PHOS: 90 U/L / ALT: 32 U/L / AST: 27 U/L / GGT: x                   RADIOLOGY & ADDITIONAL TESTS:  Personal review of radiological diagnostics performed  Echo and EKG results noted when applicable.     MEDICATIONS:  acetaminophen   Tablet .. 650 milliGRAM(s) Oral every 6 hours PRN  ALPRAZolam 0.25 milliGRAM(s) Oral at bedtime PRN  chlorhexidine 4% Liquid 1 Application(s) Topical daily  dextrose 40% Gel 15 Gram(s) Oral once  dextrose 5%. 1000 milliLiter(s) IV Continuous <Continuous>  dextrose 5%. 1000 milliLiter(s) IV Continuous <Continuous>  dextrose 50% Injectable 25 Gram(s) IV Push once  dextrose 50% Injectable 12.5 Gram(s) IV Push once  dextrose 50% Injectable 25 Gram(s) IV Push once  enoxaparin Injectable 40 milliGRAM(s) SubCutaneous two times a day  glucagon  Injectable 1 milliGRAM(s) IntraMuscular once  guaifenesin/dextromethorphan  Syrup 5 milliLiter(s) Oral every 6 hours PRN  influenza   Vaccine 0.5 milliLiter(s) IntraMuscular once  insulin glargine Injectable (LANTUS) 20 Unit(s) SubCutaneous at bedtime  insulin lispro (ADMELOG) corrective regimen sliding scale   SubCutaneous three times a day before meals  insulin lispro Injectable (ADMELOG) 9 Unit(s) SubCutaneous three times a day before meals  levothyroxine 50 MICROGram(s) Oral daily  methylPREDNISolone sodium succinate Injectable 40 milliGRAM(s) IV Push every 24 hours  metoprolol succinate ER 25 milliGRAM(s) Oral daily  ondansetron    Tablet 4 milliGRAM(s) Oral two times a day PRN  pantoprazole    Tablet 40 milliGRAM(s) Oral before breakfast  polyethylene glycol 3350 17 Gram(s) Oral daily  prasugrel 10 milliGRAM(s) Oral daily  senna 2 Tablet(s) Oral at bedtime  tamsulosin 0.4 milliGRAM(s) Oral at bedtime      ANTIBIOTICS:

## 2021-02-17 NOTE — PROGRESS NOTE ADULT - ASSESSMENT
· Assessment	  71 yo M with PMH of CAD, MI (s/p 4 stents), DM, HTN, BPH, hypothyroid and GERD, recently received Pfizer vaccine (1/3 and 1/24) presented to ED c/o progressive SOB, fever, and weakness. Patient stated that his symptoms began on Wednesday 2/3. COVID PCR + on 2/4. Admitted to medicine with acute hypoxemic respiratory failure secondary to COVID-19 PNA.  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 SpO2 < 94% on RA and need for supplemental O2. HFNC  CTA 2/4 : extensive bilateral patchy ground-glass opacities involving all lobes most pronounced in the upper lung zones. No PE  Pt is in the early viral replicative phase based on the timeline/onset of symptoms.  COVID-19 antibodies NG    procalcitonin 1.61 > 0.97>0.08>.012  Ferritin 404>625  CRP 31.59>22.67 >0.54>0.60  Ddimers 364>420 >1785 ( doppler LE no DVT 2/14 )  BCx 2/6,9 NGTD  Legionella NG      s/p plasma 2/7  s/p RDV 2/5-9  s/p Rocephin/levo  s/p dexamethason    RECOMMENDATIONS;  NO NEED TO REPEAT INFLAMMATORY MARKERS.   Target SpO2 92 % to 96 %  Anticoagulation as per team

## 2021-02-17 NOTE — PROGRESS NOTE ADULT - SUBJECTIVE AND OBJECTIVE BOX
SUBJECTIVE:    Patient is a 70y old Male who presents with a chief complaint of COVID-19 (16 Feb 2021 10:57)    Currently admitted to medicine with the primary diagnosis of Shortness of breath       Today is hospital day 13d.     Overnight no events.    This morning patient has no complaints.    PAST MEDICAL & SURGICAL HISTORY  Chronic kidney disease (CKD)  III    Type 2 diabetes mellitus without complication, unspecified long term insulin use status    Hypertension, unspecified type    Dyspnea, unspecified type    ASHD (arteriosclerotic heart disease)  STEMI 12/31/17, PCI RCA    S/P cataract surgery    History of ligation of vein  2012    History of tonsillectomy  1957        ALLERGIES:  penicillin (Rash (Severe))    MEDICATIONS:  STANDING MEDICATIONS  chlorhexidine 4% Liquid 1 Application(s) Topical daily  dextrose 40% Gel 15 Gram(s) Oral once  dextrose 5%. 1000 milliLiter(s) IV Continuous <Continuous>  dextrose 5%. 1000 milliLiter(s) IV Continuous <Continuous>  dextrose 50% Injectable 25 Gram(s) IV Push once  dextrose 50% Injectable 12.5 Gram(s) IV Push once  dextrose 50% Injectable 25 Gram(s) IV Push once  enoxaparin Injectable 40 milliGRAM(s) SubCutaneous two times a day  furosemide   Injectable 40 milliGRAM(s) IV Push once  glucagon  Injectable 1 milliGRAM(s) IntraMuscular once  influenza   Vaccine 0.5 milliLiter(s) IntraMuscular once  insulin glargine Injectable (LANTUS) 20 Unit(s) SubCutaneous at bedtime  insulin lispro (ADMELOG) corrective regimen sliding scale   SubCutaneous three times a day before meals  insulin lispro Injectable (ADMELOG) 9 Unit(s) SubCutaneous three times a day before meals  levothyroxine 50 MICROGram(s) Oral daily  methylPREDNISolone sodium succinate Injectable 40 milliGRAM(s) IV Push every 24 hours  metoprolol succinate ER 25 milliGRAM(s) Oral daily  pantoprazole    Tablet 40 milliGRAM(s) Oral before breakfast  polyethylene glycol 3350 17 Gram(s) Oral daily  prasugrel 10 milliGRAM(s) Oral daily  senna 2 Tablet(s) Oral at bedtime  tamsulosin 0.4 milliGRAM(s) Oral at bedtime    PRN MEDICATIONS  acetaminophen   Tablet .. 650 milliGRAM(s) Oral every 6 hours PRN  ALPRAZolam 0.25 milliGRAM(s) Oral at bedtime PRN  guaifenesin/dextromethorphan  Syrup 5 milliLiter(s) Oral every 6 hours PRN  ondansetron    Tablet 4 milliGRAM(s) Oral two times a day PRN    VITALS:   T(F): 96.2  HR: 94  BP: 116/69  RR: 25  SpO2: 89%    LABS:                        13.9   14.56 )-----------( 173      ( 17 Feb 2021 04:40 )             42.5     02-17    139  |  99  |  26<H>  ----------------------------<  84  4.5   |  31  |  0.9    Ca    8.0<L>      17 Feb 2021 04:40  Mg     1.9     02-17    TPro  4.9<L>  /  Alb  2.8<L>  /  TBili  0.3  /  DBili  x   /  AST  27  /  ALT  32  /  AlkPhos  90  02-17                  RADIOLOGY:    PHYSICAL EXAM:  GEN: No acute distress  PULM/CHEST: Clear to auscultation bilaterally, no rales, rhonchi or wheezes   CVS: Regular rate and rhythm, S1-S2, no murmurs  ABD: Soft, non-tender, non-distended, +BS  EXT: No edema  NEURO: AAOx3    Penaloza Catheter:

## 2021-02-17 NOTE — PROGRESS NOTE ADULT - ASSESSMENT
IMPRESSION:    Acute hypoxemic respiratory failure on HHFNC  severe Covid PNA  Probable superimposed bacterial infection high procal  COPD exacerbation  HO CAD    PLAN:    CNS: avoid CNS depressant    HEENT: Oral care    PULMONARY:  HOB @ 45 degrees.  Aspiration precautions. HFNC alternate with  NIV during sleep and PRN, Solumedrol 40mg q24h. wean O2    CARDIOVASCULAR: Goal Map >60, Avoid volume overload. lasix IV x1    GI: GI prophylaxis. feeding.     RENAL:  Follow up lytes.  Correct as needed    INFECTIOUS DISEASE: Monitor off abx    HEMATOLOGICAL:  DVT prophylaxis.    ENDOCRINE:  Follow up FS.  Insulin protocol if needed.    MUSCULOSKELETAL: OOB to chair    PT/OT    Dispo: MICU

## 2021-02-17 NOTE — CHART NOTE - NSCHARTNOTEFT_GEN_A_CORE
limited f/u by LEVI Slater    Pt looks well, I spoke with him yesterday and today.  Pt reported that he doesn't want the low salt diet any longer because they are awful tasting.   While he is drinking 2x glucerna shakes daily with most foods, he wants regular food.  although he wears dentures, he doesn't want mechanical soft foods. No chew/swallow issue. on HFNC.  No GI issue per pt, BM normal.  PTA pt eats well. NKFA. UBW stable.  Labs and meds reviewed.   1+ b/l ankle edema.  Skin intact.   Weight likely stable.  on Carbohydrate consistent no snack, DASH/TLC, and Glucerna Shake x3 diet.  Patient's Blood Pressure is also WNL.     under MICU management at this time. PT/OT.  GEOVANNA resolved.  CAD, HTN, on meds  Oral Thrush c/w nystatin.  Had Constipation on bowel regimen.  COVID19      REC: (1) per pt requests, with BP WNL, on meds currently, please discontinue current DASH/TLC. Continue Carbohydrate Consistent w/o snack and Glucerna Shake q8hr.

## 2021-02-17 NOTE — PROGRESS NOTE ADULT - SUBJECTIVE AND OBJECTIVE BOX
Patient is a 70y old  Male who presents with a chief complaint of COVID-19 (16 Feb 2021 10:57)        Over Night Events:  off pressors  on HFNCO2 55%      ROS:     All ROS are negative except HPI         PHYSICAL EXAM    ICU Vital Signs Last 24 Hrs  T(C): 35.7 (17 Feb 2021 08:00), Max: 36.5 (16 Feb 2021 20:16)  T(F): 96.2 (17 Feb 2021 08:00), Max: 97.7 (16 Feb 2021 20:16)  HR: 94 (17 Feb 2021 09:00) (74 - 108)  BP: 116/69 (17 Feb 2021 09:00) (96/64 - 124/67)  BP(mean): 86 (17 Feb 2021 09:00) (68 - 88)  RR: 25 (17 Feb 2021 09:00) (17 - 43)  SpO2: 89% (17 Feb 2021 09:00) (84% - 97%)      CONSTITUTIONAL:  Well nourished.  NAD    ENT:   Airway patent,   Mouth with normal mucosa.   No thrush    EYES:   Pupils equal,   Round and reactive to light.    CARDIAC:   Normal rate,   Regular rhythm.    No edema      Vascular:  Normal systolic impulse  No Carotid bruits    RESPIRATORY:   No wheezing  Bilateral BS  Normal chest expansion  Not tachypneic,  No use of accessory muscles  + crackles    GASTROINTESTINAL:  Abdomen soft,   Non-tender,   No guarding,     MUSCULOSKELETAL:   Range of motion is not limited,  No clubbing, cyanosis    NEUROLOGICAL:   Alert and oriented   No motor  deficits.    SKIN:   Skin normal color for race,   Warm and dry and intact.   No evidence of rash.    PSYCHIATRIC:   Normal mood and affect.   No apparent risk to self or others.    HEMATOLOGICAL:  No cervical  lymphadenopathy.  no inguinal lymphadenopathy      02-16-21 @ 07:01  -  02-17-21 @ 07:00  --------------------------------------------------------  IN:  Total IN: 0 mL    OUT:    Voided (mL): 1550 mL  Total OUT: 1550 mL    Total NET: -1550 mL      02-17-21 @ 07:01  -  02-17-21 @ 10:15  --------------------------------------------------------  IN:  Total IN: 0 mL    OUT:    Voided (mL): 200 mL  Total OUT: 200 mL    Total NET: -200 mL          LABS:                            13.9   14.56 )-----------( 173      ( 17 Feb 2021 04:40 )             42.5                                               02-17    139  |  99  |  26<H>  ----------------------------<  84  4.5   |  31  |  0.9    Ca    8.0<L>      17 Feb 2021 04:40  Mg     1.9     02-17    TPro  4.9<L>  /  Alb  2.8<L>  /  TBili  0.3  /  DBili  x   /  AST  27  /  ALT  32  /  AlkPhos  90  02-17                                                                                           LIVER FUNCTIONS - ( 17 Feb 2021 04:40 )  Alb: 2.8 g/dL / Pro: 4.9 g/dL / ALK PHOS: 90 U/L / ALT: 32 U/L / AST: 27 U/L / GGT: x                                                                                                                                       MEDICATIONS  (STANDING):  chlorhexidine 4% Liquid 1 Application(s) Topical daily  dextrose 40% Gel 15 Gram(s) Oral once  dextrose 5%. 1000 milliLiter(s) (50 mL/Hr) IV Continuous <Continuous>  dextrose 5%. 1000 milliLiter(s) (100 mL/Hr) IV Continuous <Continuous>  dextrose 50% Injectable 25 Gram(s) IV Push once  dextrose 50% Injectable 12.5 Gram(s) IV Push once  dextrose 50% Injectable 25 Gram(s) IV Push once  enoxaparin Injectable 40 milliGRAM(s) SubCutaneous two times a day  glucagon  Injectable 1 milliGRAM(s) IntraMuscular once  influenza   Vaccine 0.5 milliLiter(s) IntraMuscular once  insulin glargine Injectable (LANTUS) 20 Unit(s) SubCutaneous at bedtime  insulin lispro (ADMELOG) corrective regimen sliding scale   SubCutaneous three times a day before meals  insulin lispro Injectable (ADMELOG) 9 Unit(s) SubCutaneous three times a day before meals  levothyroxine 50 MICROGram(s) Oral daily  methylPREDNISolone sodium succinate Injectable 40 milliGRAM(s) IV Push every 24 hours  metoprolol succinate ER 25 milliGRAM(s) Oral daily  pantoprazole    Tablet 40 milliGRAM(s) Oral before breakfast  polyethylene glycol 3350 17 Gram(s) Oral daily  prasugrel 10 milliGRAM(s) Oral daily  senna 2 Tablet(s) Oral at bedtime  tamsulosin 0.4 milliGRAM(s) Oral at bedtime    MEDICATIONS  (PRN):  acetaminophen   Tablet .. 650 milliGRAM(s) Oral every 6 hours PRN Temp greater or equal to 38C (100.4F), Mild Pain (1 - 3)  ALPRAZolam 0.25 milliGRAM(s) Oral at bedtime PRN Anxiety  guaifenesin/dextromethorphan  Syrup 5 milliLiter(s) Oral every 6 hours PRN Cough  ondansetron    Tablet 4 milliGRAM(s) Oral two times a day PRN Nausea and/or Vomiting      New X-rays reviewed:                                                                                  ECHO    CXR interpreted by me:

## 2021-02-17 NOTE — PROGRESS NOTE ADULT - ASSESSMENT
71 yo M with PMH of CAD, MI (s/p 4 stents), DM, HTN, BPH, hypothyroid and GERD, recently received Pfizer vaccine (1/3 and 1/24) presented to ED c/o progressive SOB, fever, and weakness. Patient stated that his symptoms began on Wednesday 2/3. COVID PCR + on 2/4. Admitted to medicine with acute hypoxemic respiratory failure secondary to COVID-19 PNA. Upgraded to ICU from 3A due to increasing oxygen requirements    #Acute hypoxemic respiratory failure secondary to COVID-19 PNA   #likely superimposed bacterial pneumonia   - Patient s/p  2 doses of the Pfizer vaccine (last dose 1/24)  - CTA 02/04 showed extensive bilateral patchy ground-glass opacities involving all lobes most pronounced in the upper lung zones  - CXR this am showing BL patchy opacities- stable   - s/p 1 unit plasma  - wean solumedrol to 40mg q24hrs IV   - s/p Remdesivir completed 2/9  - s/p 1 week of rocephin & Levaquin per ID  - inflammatory markers monitor q48hrs  Ddimer: 180 > 543 > 939 > 1758  Procal: 1.61 > 0.97 > 0.32 > 0.13 > 0.08 > 0.12  Ferritin: 404 > 625   CRP: 22.67 > 7.54 > 4.26 > 0.54 > 0.60  - LE Duplex negative 2/8 and 2/14   - PT/OT consult     #Constipation   - c/w miralax, senna     #CAD  #MI s/p 4 stents  - C/w Effient 10mg daily  - C/w Toprol 25mg daily    #HTN   - C/w enalapril 2.5mg daily    #BPH  - C/w tamsulosin 0.4mg PO QHS    #DM   - Monitor FS, c/w insulin regimen   - A1C 9.1-->only on glipizide as outpatient   - c/w counselling    #Hypothyroidism  - C/w Synthroid    #GERD  - c/w protonix    Diet: consistent carbs, DASH/TLC + Glucerna   DVT PPx: Lovenox 40mg BID  GI ppx: protonix  Code Status: FULL CODE  Dispo: acute, micu monitoring, PT/OT; monitor and possibly wean O2     Family contact in case of emergency. Patient states we do not need to regularly call family everyday.   Ami Odonnell - Wife  [phone]

## 2021-02-18 LAB
ALBUMIN SERPL ELPH-MCNC: 3 G/DL — LOW (ref 3.5–5.2)
ALP SERPL-CCNC: 77 U/L — SIGNIFICANT CHANGE UP (ref 30–115)
ALT FLD-CCNC: 33 U/L — SIGNIFICANT CHANGE UP (ref 0–41)
ANION GAP SERPL CALC-SCNC: 8 MMOL/L — SIGNIFICANT CHANGE UP (ref 7–14)
AST SERPL-CCNC: 29 U/L — SIGNIFICANT CHANGE UP (ref 0–41)
BASOPHILS # BLD AUTO: 0.03 K/UL — SIGNIFICANT CHANGE UP (ref 0–0.2)
BASOPHILS # BLD AUTO: 0.03 K/UL — SIGNIFICANT CHANGE UP (ref 0–0.2)
BASOPHILS NFR BLD AUTO: 0.2 % — SIGNIFICANT CHANGE UP (ref 0–1)
BASOPHILS NFR BLD AUTO: 0.2 % — SIGNIFICANT CHANGE UP (ref 0–1)
BILIRUB SERPL-MCNC: 0.4 MG/DL — SIGNIFICANT CHANGE UP (ref 0.2–1.2)
BUN SERPL-MCNC: 32 MG/DL — HIGH (ref 10–20)
CALCIUM SERPL-MCNC: 8.5 MG/DL — SIGNIFICANT CHANGE UP (ref 8.5–10.1)
CHLORIDE SERPL-SCNC: 98 MMOL/L — SIGNIFICANT CHANGE UP (ref 98–110)
CO2 SERPL-SCNC: 36 MMOL/L — HIGH (ref 17–32)
CREAT SERPL-MCNC: 1.2 MG/DL — SIGNIFICANT CHANGE UP (ref 0.7–1.5)
CRP SERPL-MCNC: 3.3 MG/DL — HIGH (ref 0–0.4)
D DIMER BLD IA.RAPID-MCNC: 1751 NG/ML DDU — HIGH (ref 0–230)
EOSINOPHIL # BLD AUTO: 0 K/UL — SIGNIFICANT CHANGE UP (ref 0–0.7)
EOSINOPHIL # BLD AUTO: 0.01 K/UL — SIGNIFICANT CHANGE UP (ref 0–0.7)
EOSINOPHIL NFR BLD AUTO: 0 % — SIGNIFICANT CHANGE UP (ref 0–8)
EOSINOPHIL NFR BLD AUTO: 0.1 % — SIGNIFICANT CHANGE UP (ref 0–8)
GLUCOSE BLDC GLUCOMTR-MCNC: 169 MG/DL — HIGH (ref 70–99)
GLUCOSE BLDC GLUCOMTR-MCNC: 195 MG/DL — HIGH (ref 70–99)
GLUCOSE BLDC GLUCOMTR-MCNC: 220 MG/DL — HIGH (ref 70–99)
GLUCOSE BLDC GLUCOMTR-MCNC: 299 MG/DL — HIGH (ref 70–99)
GLUCOSE SERPL-MCNC: 79 MG/DL — SIGNIFICANT CHANGE UP (ref 70–99)
HCT VFR BLD CALC: 38.2 % — LOW (ref 42–52)
HCT VFR BLD CALC: 44.2 % — SIGNIFICANT CHANGE UP (ref 42–52)
HGB BLD-MCNC: 12.2 G/DL — LOW (ref 14–18)
HGB BLD-MCNC: 14.4 G/DL — SIGNIFICANT CHANGE UP (ref 14–18)
IMM GRANULOCYTES NFR BLD AUTO: 1 % — HIGH (ref 0.1–0.3)
IMM GRANULOCYTES NFR BLD AUTO: 1.2 % — HIGH (ref 0.1–0.3)
LYMPHOCYTES # BLD AUTO: 0.66 K/UL — LOW (ref 1.2–3.4)
LYMPHOCYTES # BLD AUTO: 0.7 K/UL — LOW (ref 1.2–3.4)
LYMPHOCYTES # BLD AUTO: 4.5 % — LOW (ref 20.5–51.1)
LYMPHOCYTES # BLD AUTO: 4.8 % — LOW (ref 20.5–51.1)
MAGNESIUM SERPL-MCNC: 2 MG/DL — SIGNIFICANT CHANGE UP (ref 1.8–2.4)
MCHC RBC-ENTMCNC: 25.2 PG — LOW (ref 27–31)
MCHC RBC-ENTMCNC: 25.5 PG — LOW (ref 27–31)
MCHC RBC-ENTMCNC: 31.9 G/DL — LOW (ref 32–37)
MCHC RBC-ENTMCNC: 32.6 G/DL — SIGNIFICANT CHANGE UP (ref 32–37)
MCV RBC AUTO: 78.2 FL — LOW (ref 80–94)
MCV RBC AUTO: 78.9 FL — LOW (ref 80–94)
MONOCYTES # BLD AUTO: 0.42 K/UL — SIGNIFICANT CHANGE UP (ref 0.1–0.6)
MONOCYTES # BLD AUTO: 0.44 K/UL — SIGNIFICANT CHANGE UP (ref 0.1–0.6)
MONOCYTES NFR BLD AUTO: 2.8 % — SIGNIFICANT CHANGE UP (ref 1.7–9.3)
MONOCYTES NFR BLD AUTO: 3.1 % — SIGNIFICANT CHANGE UP (ref 1.7–9.3)
NEUTROPHILS # BLD AUTO: 12.39 K/UL — HIGH (ref 1.4–6.5)
NEUTROPHILS # BLD AUTO: 14.13 K/UL — HIGH (ref 1.4–6.5)
NEUTROPHILS NFR BLD AUTO: 90.6 % — HIGH (ref 42.2–75.2)
NEUTROPHILS NFR BLD AUTO: 91.5 % — HIGH (ref 42.2–75.2)
NRBC # BLD: 0 /100 WBCS — SIGNIFICANT CHANGE UP (ref 0–0)
NRBC # BLD: 0 /100 WBCS — SIGNIFICANT CHANGE UP (ref 0–0)
PLATELET # BLD AUTO: 178 K/UL — SIGNIFICANT CHANGE UP (ref 130–400)
PLATELET # BLD AUTO: 199 K/UL — SIGNIFICANT CHANGE UP (ref 130–400)
POTASSIUM SERPL-MCNC: 4.9 MMOL/L — SIGNIFICANT CHANGE UP (ref 3.5–5)
POTASSIUM SERPL-SCNC: 4.9 MMOL/L — SIGNIFICANT CHANGE UP (ref 3.5–5)
PROCALCITONIN SERPL-MCNC: 0.15 NG/ML — HIGH (ref 0.02–0.1)
PROT SERPL-MCNC: 5.3 G/DL — LOW (ref 6–8)
RBC # BLD: 4.84 M/UL — SIGNIFICANT CHANGE UP (ref 4.7–6.1)
RBC # BLD: 5.65 M/UL — SIGNIFICANT CHANGE UP (ref 4.7–6.1)
RBC # FLD: 16.6 % — HIGH (ref 11.5–14.5)
RBC # FLD: 17.7 % — HIGH (ref 11.5–14.5)
SODIUM SERPL-SCNC: 142 MMOL/L — SIGNIFICANT CHANGE UP (ref 135–146)
WBC # BLD: 13.67 K/UL — HIGH (ref 4.8–10.8)
WBC # BLD: 15.46 K/UL — HIGH (ref 4.8–10.8)
WBC # FLD AUTO: 13.67 K/UL — HIGH (ref 4.8–10.8)
WBC # FLD AUTO: 15.46 K/UL — HIGH (ref 4.8–10.8)

## 2021-02-18 PROCEDURE — 93970 EXTREMITY STUDY: CPT | Mod: 26

## 2021-02-18 PROCEDURE — 99222 1ST HOSP IP/OBS MODERATE 55: CPT

## 2021-02-18 PROCEDURE — 71045 X-RAY EXAM CHEST 1 VIEW: CPT | Mod: 26

## 2021-02-18 RX ORDER — BACITRACIN ZINC 500 UNIT/G
1 OINTMENT IN PACKET (EA) TOPICAL
Refills: 0 | Status: DISCONTINUED | OUTPATIENT
Start: 2021-02-18 | End: 2021-03-05

## 2021-02-18 RX ADMIN — PANTOPRAZOLE SODIUM 40 MILLIGRAM(S): 20 TABLET, DELAYED RELEASE ORAL at 05:36

## 2021-02-18 RX ADMIN — CHLORHEXIDINE GLUCONATE 1 APPLICATION(S): 213 SOLUTION TOPICAL at 12:53

## 2021-02-18 RX ADMIN — INSULIN GLARGINE 20 UNIT(S): 100 INJECTION, SOLUTION SUBCUTANEOUS at 21:04

## 2021-02-18 RX ADMIN — SENNA PLUS 2 TABLET(S): 8.6 TABLET ORAL at 21:04

## 2021-02-18 RX ADMIN — Medication 4: at 17:35

## 2021-02-18 RX ADMIN — Medication 9 UNIT(S): at 13:31

## 2021-02-18 RX ADMIN — Medication 50 MICROGRAM(S): at 05:36

## 2021-02-18 RX ADMIN — Medication 1 APPLICATION(S): at 17:41

## 2021-02-18 RX ADMIN — POLYETHYLENE GLYCOL 3350 17 GRAM(S): 17 POWDER, FOR SOLUTION ORAL at 12:02

## 2021-02-18 RX ADMIN — Medication 9 UNIT(S): at 17:35

## 2021-02-18 RX ADMIN — TAMSULOSIN HYDROCHLORIDE 0.8 MILLIGRAM(S): 0.4 CAPSULE ORAL at 21:03

## 2021-02-18 RX ADMIN — Medication 6: at 13:32

## 2021-02-18 RX ADMIN — Medication 1 APPLICATION(S): at 05:37

## 2021-02-18 RX ADMIN — Medication 40 MILLIGRAM(S): at 05:36

## 2021-02-18 NOTE — PROGRESS NOTE ADULT - ASSESSMENT
IMPRESSION:    Acute hypoxemic respiratory failure on NRM  severe epistaxis sp packing  severe Covid PNA  Probable superimposed bacterial infection sp abx  COPD exacerbation  HO CAD    PLAN:    CNS: avoid CNS depressant    HEENT: Oral care    PULMONARY:  HOB @ 45 degrees.  Aspiration precautions. NRM, Solumedrol 40mg q24h. wean O2    CARDIOVASCULAR: Goal Map >60, Avoid volume overload.     GI: GI prophylaxis. feeding.     RENAL:  Follow up lytes.  Correct as needed    INFECTIOUS DISEASE: ABX PER ID    HEMATOLOGICAL:  DVT prophylaxis.    ENDOCRINE:  Follow up FS.  Insulin protocol if needed.    MUSCULOSKELETAL: OOB to chair    PT/OT    Dispo: MICU

## 2021-02-18 NOTE — PROGRESS NOTE ADULT - SUBJECTIVE AND OBJECTIVE BOX
OVERNIGHT EVENTS: events noted l nostril epistaxis s/p packing by ENT, now on NRM    Vital Signs Last 24 Hrs  T(C): 36.5 (17 Feb 2021 20:00), Max: 36.5 (17 Feb 2021 20:00)  T(F): 97.7 (17 Feb 2021 20:00), Max: 97.7 (17 Feb 2021 20:00)  HR: 114 (18 Feb 2021 07:00) (84 - 116)  BP: 116/76 (18 Feb 2021 07:00) (87/57 - 140/79)  BP(mean): 95 (18 Feb 2021 07:00) (66 - 102)  RR: 30 (18 Feb 2021 07:00) (18 - 40)  SpO2: 96% (18 Feb 2021 07:00) (83% - 96%)    PHYSICAL EXAMINATION:    GENERAL: ILL LOOKING    HEENT: Head is normocephalic and atraumatic.     NECK: Supple.    LUNGS: dec bs both bases    HEART: Regular rate and rhythm without murmur.    ABDOMEN: Soft, nontender, and nondistended.      EXTREMITIES: Without any cyanosis, clubbing, rash, lesions or edema.    NEUROLOGIC: Grossly intact.    SKIN: No ulceration or induration present.      LABS:                        14.4   13.67 )-----------( 178      ( 18 Feb 2021 04:27 )             44.2     02-18    142  |  98  |  32<H>  ----------------------------<  79  4.9   |  36<H>  |  1.2    Ca    8.5      18 Feb 2021 04:27  Mg     2.0     02-18    TPro  5.3<L>  /  Alb  3.0<L>  /  TBili  0.4  /  DBili  x   /  AST  29  /  ALT  33  /  AlkPhos  77  02-18              D-Dimer Assay, Quantitative: 1751 ng/mL DDU (02-18-21 @ 04:27)        Procalcitonin, Serum: 0.12 ng/mL (02-16-21 @ 04:23)        02-17-21 @ 07:01  -  02-18-21 @ 07:00  --------------------------------------------------------  IN: 360 mL / OUT: 2475 mL / NET: -2115 mL        MICROBIOLOGY:      MEDICATIONS  (STANDING):  BACItracin   Ointment 1 Application(s) Topical two times a day  chlorhexidine 4% Liquid 1 Application(s) Topical daily  dextrose 40% Gel 15 Gram(s) Oral once  dextrose 5%. 1000 milliLiter(s) (50 mL/Hr) IV Continuous <Continuous>  dextrose 5%. 1000 milliLiter(s) (100 mL/Hr) IV Continuous <Continuous>  dextrose 50% Injectable 25 Gram(s) IV Push once  dextrose 50% Injectable 12.5 Gram(s) IV Push once  dextrose 50% Injectable 25 Gram(s) IV Push once  enoxaparin Injectable 40 milliGRAM(s) SubCutaneous two times a day  glucagon  Injectable 1 milliGRAM(s) IntraMuscular once  influenza   Vaccine 0.5 milliLiter(s) IntraMuscular once  insulin glargine Injectable (LANTUS) 20 Unit(s) SubCutaneous at bedtime  insulin lispro (ADMELOG) corrective regimen sliding scale   SubCutaneous three times a day before meals  insulin lispro Injectable (ADMELOG) 9 Unit(s) SubCutaneous three times a day before meals  levothyroxine 50 MICROGram(s) Oral daily  methylPREDNISolone sodium succinate Injectable 40 milliGRAM(s) IV Push every 24 hours  metoprolol succinate ER 25 milliGRAM(s) Oral daily  pantoprazole    Tablet 40 milliGRAM(s) Oral before breakfast  polyethylene glycol 3350 17 Gram(s) Oral daily  prasugrel 10 milliGRAM(s) Oral daily  senna 2 Tablet(s) Oral at bedtime  tamsulosin 0.8 milliGRAM(s) Oral at bedtime    MEDICATIONS  (PRN):  acetaminophen   Tablet .. 650 milliGRAM(s) Oral every 6 hours PRN Temp greater or equal to 38C (100.4F), Mild Pain (1 - 3)  ALPRAZolam 0.25 milliGRAM(s) Oral at bedtime PRN Anxiety  guaifenesin/dextromethorphan  Syrup 5 milliLiter(s) Oral every 6 hours PRN Cough  ondansetron    Tablet 4 milliGRAM(s) Oral two times a day PRN Nausea and/or Vomiting      RADIOLOGY & ADDITIONAL STUDIES:

## 2021-02-18 NOTE — PROGRESS NOTE ADULT - SUBJECTIVE AND OBJECTIVE BOX
ENT: Pt is a 71y/o M a/w COVID+ respiratory failure and LEFT epistaxis. Pt seen and examined at bedside. No acute issues since LEFT 7.5cm rhino rocket was placed. Currently on NRB, no difficulty breathing, SOB, or any further episodes of bleeding.    [ x ] A 10 Point Review of Systems was negative except where noted.    Vital Signs Last 24 Hrs  T(C): 35.6 (18 Feb 2021 08:00), Max: 36.5 (17 Feb 2021 20:00)  T(F): 96 (18 Feb 2021 08:00), Max: 97.7 (17 Feb 2021 20:00)  HR: 116 (18 Feb 2021 10:00) (84 - 118)  BP: 109/65 (18 Feb 2021 10:00) (87/57 - 140/79)  BP(mean): 85 (18 Feb 2021 10:00) (66 - 102)  RR: 30 (18 Feb 2021 10:00) (18 - 39)  SpO2: 94% (18 Feb 2021 10:00) (83% - 96%)    PHYSICAL EXAM:  Gen: NAD, well-developed  Skin: Good color  HEENT: Head NC/AT. Right nare patent, no e/o bleeding. Left nare packed with 7.5cm RHINO rocket, no active bleed noted. On NRB. Oral cavity no erythema/edema. Tongue wnl. Uvula midline. Posterior oropharynx clear, no discharge/blood noted.   Neck: Flat, supple, no lymphadenopathy, trachea midline  Resp: on nonrebreather  CV: no peripheral edema/cyanosis  Abd: soft, nontender  Ext: TIJERINA x 4    LABS:             14.4   13.67 )-----------( 178      ( 18 Feb 2021 04:27 )             44.2

## 2021-02-18 NOTE — PROGRESS NOTE ADULT - SUBJECTIVE AND OBJECTIVE BOX
SUBJECTIVE:    Patient is a 70y old Male who presents with a chief complaint of COVID 19 PNA (18 Feb 2021 10:03)    Currently admitted to medicine with the primary diagnosis of Shortness of breath       Today is hospital day 14d.     Overnight patient had profound epistaxis likely secondary to the HFNC. ENT saw patient and placed left rhinorocket and bleeding stopped by am.     This morning patient denied any SOB and epistaxis. He was saturating normally on non-rebreather.     PAST MEDICAL & SURGICAL HISTORY  Chronic kidney disease (CKD)  III    Type 2 diabetes mellitus without complication, unspecified long term insulin use status    Hypertension, unspecified type    Dyspnea, unspecified type    ASHD (arteriosclerotic heart disease)  STEMI 12/31/17, PCI RCA    S/P cataract surgery    History of ligation of vein  2012    History of tonsillectomy  1957        ALLERGIES:  penicillin (Rash (Severe))    MEDICATIONS:  STANDING MEDICATIONS  BACItracin   Ointment 1 Application(s) Topical two times a day  chlorhexidine 4% Liquid 1 Application(s) Topical daily  dextrose 40% Gel 15 Gram(s) Oral once  dextrose 5%. 1000 milliLiter(s) IV Continuous <Continuous>  dextrose 5%. 1000 milliLiter(s) IV Continuous <Continuous>  dextrose 50% Injectable 25 Gram(s) IV Push once  dextrose 50% Injectable 12.5 Gram(s) IV Push once  dextrose 50% Injectable 25 Gram(s) IV Push once  enoxaparin Injectable 40 milliGRAM(s) SubCutaneous two times a day  glucagon  Injectable 1 milliGRAM(s) IntraMuscular once  influenza   Vaccine 0.5 milliLiter(s) IntraMuscular once  insulin glargine Injectable (LANTUS) 20 Unit(s) SubCutaneous at bedtime  insulin lispro (ADMELOG) corrective regimen sliding scale   SubCutaneous three times a day before meals  insulin lispro Injectable (ADMELOG) 9 Unit(s) SubCutaneous three times a day before meals  levothyroxine 50 MICROGram(s) Oral daily  methylPREDNISolone sodium succinate Injectable 40 milliGRAM(s) IV Push every 24 hours  metoprolol succinate ER 25 milliGRAM(s) Oral daily  pantoprazole    Tablet 40 milliGRAM(s) Oral before breakfast  polyethylene glycol 3350 17 Gram(s) Oral daily  prasugrel 10 milliGRAM(s) Oral daily  senna 2 Tablet(s) Oral at bedtime  tamsulosin 0.8 milliGRAM(s) Oral at bedtime    PRN MEDICATIONS  acetaminophen   Tablet .. 650 milliGRAM(s) Oral every 6 hours PRN  ALPRAZolam 0.25 milliGRAM(s) Oral at bedtime PRN  guaifenesin/dextromethorphan  Syrup 5 milliLiter(s) Oral every 6 hours PRN  ondansetron    Tablet 4 milliGRAM(s) Oral two times a day PRN    VITALS:   T(F): 96  HR: 116  BP: 109/65  RR: 30  SpO2: 94%    LABS:                        14.4   13.67 )-----------( 178      ( 18 Feb 2021 04:27 )             44.2     02-18    142  |  98  |  32<H>  ----------------------------<  79  4.9   |  36<H>  |  1.2    Ca    8.5      18 Feb 2021 04:27  Mg     2.0     02-18    TPro  5.3<L>  /  Alb  3.0<L>  /  TBili  0.4  /  DBili  x   /  AST  29  /  ALT  33  /  AlkPhos  77  02-18          PHYSICAL EXAM:  GEN: No acute distress  HEENT: dried blood around nares   PULM/CHEST: Clear to auscultation bilaterally, no rales, rhonchi or wheezes   CVS: Regular rate and rhythm, S1-S2, no murmurs  ABD: Soft, non-tender, non-distended, +BS  EXT: No edema  NEURO: AAOx3

## 2021-02-18 NOTE — PROGRESS NOTE ADULT - ASSESSMENT
71y/o M a/w COVID+ respiratory failure and LEFT epistaxis    - Continue LEFT 7.5cm RHINO rocket, was placed 2/17 overnight, will consider deflating 2/19 afternoon.   - No nose blowing, no nose picking, avoid digital trauma, please humidify O2 if possible  - Bacitracin to nares BID  - Will d/w attending.

## 2021-02-18 NOTE — PROGRESS NOTE ADULT - GASTROINTESTINAL
Soft, non-tender, no hepatosplenomegaly, normal bowel sounds
detailed exam
Soft, non-tender, no hepatosplenomegaly, normal bowel sounds

## 2021-02-18 NOTE — PROGRESS NOTE ADULT - ASSESSMENT
71 yo M with PMH of CAD, MI (s/p 4 stents), DM, HTN, BPH, hypothyroid and GERD, recently received Pfizer vaccine (1/3 and 1/24) presented to ED c/o progressive SOB, fever, and weakness. Patient stated that his symptoms began on Wednesday 2/3. COVID PCR + on 2/4. Admitted to medicine with acute hypoxemic respiratory failure secondary to COVID-19 PNA. Upgraded to ICU from 3A due to increasing oxygen requirements    #Acute hypoxemic respiratory failure secondary to COVID-19 PNA   #likely superimposed bacterial pneumonia   - Patient s/p  2 doses of the Pfizer vaccine (last dose 1/24)  - CTA 02/04 showed extensive bilateral patchy ground-glass opacities involving all lobes most pronounced in the upper lung zones  - s/p 1 unit plasma  - wean solumedrol to 40mg q24hrs IV   - s/p Remdesivir completed 2/9  - s/p 1 week of rocephin & Levaquin per ID  - inflammatory markers monitor q48hrs  Ddimer: 180 > 543 > 939 > 1758  Procal: 1.61 > 0.97 > 0.32 > 0.13 > 0.08 > 0.12  Ferritin: 404 > 625   CRP: 22.67 > 7.54 > 4.26 > 0.54 > 0.60  - LE Duplex negative 2/8 and 2/14   - PT/OT consult   - was given one dose of lasix yesterday; none today as CXR has improved opacities b/l lower lobes     #epistaxis   - likely due to HFNC   - ENT called and placed left sided rhinorocket   - if packing stays in for prolonged period of time, will consider ancef     #maroon stool  - unclear if true bloody BM; may be due to swallowing epistaxis this am  - will repeat CBC 4pm     #Constipation   - c/w miralax, senna     #CAD  #MI s/p 4 stents  - C/w Effient 10mg daily  - C/w Toprol 25mg daily    #HTN   - C/w enalapril 2.5mg daily    #BPH  - C/w tamsulosin 0.4mg PO QHS    #DM   - Monitor FS, c/w insulin regimen   - A1C 9.1-->only on glipizide as outpatient   - c/w counselling    #Hypothyroidism  - C/w Synthroid    #GERD  - c/w protonix    Diet: consistent carbs, DASH/TLC + Glucerna   DVT PPx: Lovenox 40mg BID  GI ppx: protonix  Code Status: FULL CODE  Dispo: acute, micu monitoring, PT/OT; monitor and possibly wean O2     Family contact in case of emergency. Patient states we do not need to regularly call family everyday.   Ami Odonnell - Wife  [phone]

## 2021-02-18 NOTE — CONSULT NOTE ADULT - ASSESSMENT
70 y.o M admitted to ICU with COVID 19 respiratory failure ENT called for left nare epistaxis on Efferent 10 mg Daily and Lovenox 40 mg BID, hemostasis achieved after placement of left nare 7.5 cm nasal packing.  balloon inflated with 3 cc of air.      Plan:  Monitor for re-bleeding please call ENT if active bleeding.  Monitor H/H, transfuse prn   Cont. Nasal Packing x 72 hrs.   Ppx Abx until packing removed.  Strict BP cont  Avoid nasal trauma; no nose rubbing, blowing or manipulating nasal packing.  Sneeze with mouth open and pinching nares.  Avoid bending with head blow the waist.    No heavy lifting.  Petroleum Jelly to right nare to keep it moist.

## 2021-02-18 NOTE — PROGRESS NOTE ADULT - SUBJECTIVE AND OBJECTIVE BOX
ANDREE BUSH  70y, Male    All available historical data reviewed    OVERNIGHT EVENTS:  no fevers  venturi mask  epistaxis    ROS:  General: Denies rigors, nightsweats  HEENT: Denies headache, rhinorrhea, sore throat, eye pain  CV: Denies CP, palpitations  PULM: Denies wheezing, hemoptysis  GI: Denies hematemesis, hematochezia, melena  : Denies discharge, hematuria  MSK: Denies arthralgias, myalgias  SKIN: Denies rash, lesions  NEURO: Denies paresthesias, weakness  PSYCH: Denies depression, anxiety    VITALS:  T(F): 96, Max: 97.7 (02-17-21 @ 20:00)  HR: 118  BP: 120/64  RR: 30Vital Signs Last 24 Hrs  T(C): 35.6 (18 Feb 2021 08:00), Max: 36.5 (17 Feb 2021 20:00)  T(F): 96 (18 Feb 2021 08:00), Max: 97.7 (17 Feb 2021 20:00)  HR: 118 (18 Feb 2021 09:00) (84 - 118)  BP: 120/64 (18 Feb 2021 09:00) (87/57 - 140/79)  BP(mean): 78 (18 Feb 2021 09:00) (66 - 102)  RR: 30 (18 Feb 2021 09:00) (18 - 40)  SpO2: 90% (18 Feb 2021 09:00) (83% - 96%)    TESTS & MEASUREMENTS:                        14.4   13.67 )-----------( 178      ( 18 Feb 2021 04:27 )             44.2     02-18    142  |  98  |  32<H>  ----------------------------<  79  4.9   |  36<H>  |  1.2    Ca    8.5      18 Feb 2021 04:27  Mg     2.0     02-18    TPro  5.3<L>  /  Alb  3.0<L>  /  TBili  0.4  /  DBili  x   /  AST  29  /  ALT  33  /  AlkPhos  77  02-18    LIVER FUNCTIONS - ( 18 Feb 2021 04:27 )  Alb: 3.0 g/dL / Pro: 5.3 g/dL / ALK PHOS: 77 U/L / ALT: 33 U/L / AST: 29 U/L / GGT: x                   RADIOLOGY & ADDITIONAL TESTS:  Personal review of radiological diagnostics performed  Echo and EKG results noted when applicable.     MEDICATIONS:  acetaminophen   Tablet .. 650 milliGRAM(s) Oral every 6 hours PRN  ALPRAZolam 0.25 milliGRAM(s) Oral at bedtime PRN  BACItracin   Ointment 1 Application(s) Topical two times a day  chlorhexidine 4% Liquid 1 Application(s) Topical daily  dextrose 40% Gel 15 Gram(s) Oral once  dextrose 5%. 1000 milliLiter(s) IV Continuous <Continuous>  dextrose 5%. 1000 milliLiter(s) IV Continuous <Continuous>  dextrose 50% Injectable 25 Gram(s) IV Push once  dextrose 50% Injectable 12.5 Gram(s) IV Push once  dextrose 50% Injectable 25 Gram(s) IV Push once  enoxaparin Injectable 40 milliGRAM(s) SubCutaneous two times a day  glucagon  Injectable 1 milliGRAM(s) IntraMuscular once  guaifenesin/dextromethorphan  Syrup 5 milliLiter(s) Oral every 6 hours PRN  influenza   Vaccine 0.5 milliLiter(s) IntraMuscular once  insulin glargine Injectable (LANTUS) 20 Unit(s) SubCutaneous at bedtime  insulin lispro (ADMELOG) corrective regimen sliding scale   SubCutaneous three times a day before meals  insulin lispro Injectable (ADMELOG) 9 Unit(s) SubCutaneous three times a day before meals  levothyroxine 50 MICROGram(s) Oral daily  methylPREDNISolone sodium succinate Injectable 40 milliGRAM(s) IV Push every 24 hours  metoprolol succinate ER 25 milliGRAM(s) Oral daily  ondansetron    Tablet 4 milliGRAM(s) Oral two times a day PRN  pantoprazole    Tablet 40 milliGRAM(s) Oral before breakfast  polyethylene glycol 3350 17 Gram(s) Oral daily  prasugrel 10 milliGRAM(s) Oral daily  senna 2 Tablet(s) Oral at bedtime  tamsulosin 0.8 milliGRAM(s) Oral at bedtime      ANTIBIOTICS:

## 2021-02-18 NOTE — PROGRESS NOTE ADULT - ASSESSMENT
· Assessment	  69 yo M with PMH of CAD, MI (s/p 4 stents), DM, HTN, BPH, hypothyroid and GERD, recently received Pfizer vaccine (1/3 and 1/24) presented to ED c/o progressive SOB, fever, and weakness. Patient stated that his symptoms began on Wednesday 2/3. COVID PCR + on 2/4. Admitted to medicine with acute hypoxemic respiratory failure secondary to COVID-19 PNA.  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 SpO2 < 94% on RA and need for supplemental O2. HFNC  CTA 2/4 : extensive bilateral patchy ground-glass opacities involving all lobes most pronounced in the upper lung zones. No PE  Pt is in the early viral replicative phase based on the timeline/onset of symptoms.  COVID-19 antibodies NG  Epistxis : transient secondary to dry nasal mucosa    procalcitonin 1.61 > 0.97>0.08>.012  Ferritin 404>625  CRP 31.59>22.67 >0.54>0.60  Ddimers 364>420 >1785 ( doppler LE no DVT 2/14 )  BCx 2/6,9 NGTD  Legionella NG      s/p plasma 2/7  s/p RDV 2/5-9  s/p Rocephin/levo  s/p dexamethason    RECOMMENDATIONS;  NO NEED TO REPEAT INFLAMMATORY MARKERS.   Target SpO2 92 % to 96 %  Anticoagulation as per team  recall prn please

## 2021-02-19 LAB
ALBUMIN SERPL ELPH-MCNC: 2.7 G/DL — LOW (ref 3.5–5.2)
ALP SERPL-CCNC: 80 U/L — SIGNIFICANT CHANGE UP (ref 30–115)
ALT FLD-CCNC: 28 U/L — SIGNIFICANT CHANGE UP (ref 0–41)
ANION GAP SERPL CALC-SCNC: 8 MMOL/L — SIGNIFICANT CHANGE UP (ref 7–14)
AST SERPL-CCNC: 29 U/L — SIGNIFICANT CHANGE UP (ref 0–41)
BASOPHILS # BLD AUTO: 0.03 K/UL — SIGNIFICANT CHANGE UP (ref 0–0.2)
BASOPHILS NFR BLD AUTO: 0.2 % — SIGNIFICANT CHANGE UP (ref 0–1)
BILIRUB SERPL-MCNC: 0.5 MG/DL — SIGNIFICANT CHANGE UP (ref 0.2–1.2)
BUN SERPL-MCNC: 33 MG/DL — HIGH (ref 10–20)
CALCIUM SERPL-MCNC: 8.1 MG/DL — LOW (ref 8.5–10.1)
CHLORIDE SERPL-SCNC: 94 MMOL/L — LOW (ref 98–110)
CO2 SERPL-SCNC: 32 MMOL/L — SIGNIFICANT CHANGE UP (ref 17–32)
CREAT SERPL-MCNC: 0.9 MG/DL — SIGNIFICANT CHANGE UP (ref 0.7–1.5)
EOSINOPHIL # BLD AUTO: 0 K/UL — SIGNIFICANT CHANGE UP (ref 0–0.7)
EOSINOPHIL NFR BLD AUTO: 0 % — SIGNIFICANT CHANGE UP (ref 0–8)
FERRITIN SERPL-MCNC: 229 NG/ML — SIGNIFICANT CHANGE UP (ref 30–400)
GLUCOSE BLDC GLUCOMTR-MCNC: 189 MG/DL — HIGH (ref 70–99)
GLUCOSE BLDC GLUCOMTR-MCNC: 191 MG/DL — HIGH (ref 70–99)
GLUCOSE BLDC GLUCOMTR-MCNC: 199 MG/DL — HIGH (ref 70–99)
GLUCOSE BLDC GLUCOMTR-MCNC: 373 MG/DL — HIGH (ref 70–99)
GLUCOSE SERPL-MCNC: 137 MG/DL — HIGH (ref 70–99)
HCT VFR BLD CALC: 37.4 % — LOW (ref 42–52)
HGB BLD-MCNC: 12.1 G/DL — LOW (ref 14–18)
IMM GRANULOCYTES NFR BLD AUTO: 1.2 % — HIGH (ref 0.1–0.3)
LYMPHOCYTES # BLD AUTO: 0.55 K/UL — LOW (ref 1.2–3.4)
LYMPHOCYTES # BLD AUTO: 3.5 % — LOW (ref 20.5–51.1)
MAGNESIUM SERPL-MCNC: 2 MG/DL — SIGNIFICANT CHANGE UP (ref 1.8–2.4)
MCHC RBC-ENTMCNC: 25.6 PG — LOW (ref 27–31)
MCHC RBC-ENTMCNC: 32.4 G/DL — SIGNIFICANT CHANGE UP (ref 32–37)
MCV RBC AUTO: 79.1 FL — LOW (ref 80–94)
MONOCYTES # BLD AUTO: 0.46 K/UL — SIGNIFICANT CHANGE UP (ref 0.1–0.6)
MONOCYTES NFR BLD AUTO: 2.9 % — SIGNIFICANT CHANGE UP (ref 1.7–9.3)
NEUTROPHILS # BLD AUTO: 14.68 K/UL — HIGH (ref 1.4–6.5)
NEUTROPHILS NFR BLD AUTO: 92.2 % — HIGH (ref 42.2–75.2)
NRBC # BLD: 0 /100 WBCS — SIGNIFICANT CHANGE UP (ref 0–0)
PLATELET # BLD AUTO: 175 K/UL — SIGNIFICANT CHANGE UP (ref 130–400)
POTASSIUM SERPL-MCNC: 4.2 MMOL/L — SIGNIFICANT CHANGE UP (ref 3.5–5)
POTASSIUM SERPL-SCNC: 4.2 MMOL/L — SIGNIFICANT CHANGE UP (ref 3.5–5)
PROT SERPL-MCNC: 5 G/DL — LOW (ref 6–8)
RBC # BLD: 4.73 M/UL — SIGNIFICANT CHANGE UP (ref 4.7–6.1)
RBC # FLD: 16.6 % — HIGH (ref 11.5–14.5)
SODIUM SERPL-SCNC: 134 MMOL/L — LOW (ref 135–146)
WBC # BLD: 15.91 K/UL — HIGH (ref 4.8–10.8)
WBC # FLD AUTO: 15.91 K/UL — HIGH (ref 4.8–10.8)

## 2021-02-19 PROCEDURE — 71045 X-RAY EXAM CHEST 1 VIEW: CPT | Mod: 26

## 2021-02-19 RX ORDER — PRASUGREL 5 MG/1
10 TABLET, FILM COATED ORAL DAILY
Refills: 0 | Status: DISCONTINUED | OUTPATIENT
Start: 2021-02-20 | End: 2021-02-20

## 2021-02-19 RX ORDER — ENOXAPARIN SODIUM 100 MG/ML
40 INJECTION SUBCUTANEOUS
Refills: 0 | Status: DISCONTINUED | OUTPATIENT
Start: 2021-02-19 | End: 2021-02-20

## 2021-02-19 RX ADMIN — Medication 9 UNIT(S): at 12:17

## 2021-02-19 RX ADMIN — Medication 60 MILLIGRAM(S): at 17:17

## 2021-02-19 RX ADMIN — ENOXAPARIN SODIUM 40 MILLIGRAM(S): 100 INJECTION SUBCUTANEOUS at 11:07

## 2021-02-19 RX ADMIN — Medication 50 MICROGRAM(S): at 05:09

## 2021-02-19 RX ADMIN — Medication 10: at 12:18

## 2021-02-19 RX ADMIN — Medication 2: at 17:15

## 2021-02-19 RX ADMIN — PANTOPRAZOLE SODIUM 40 MILLIGRAM(S): 20 TABLET, DELAYED RELEASE ORAL at 05:09

## 2021-02-19 RX ADMIN — INSULIN GLARGINE 20 UNIT(S): 100 INJECTION, SOLUTION SUBCUTANEOUS at 23:12

## 2021-02-19 RX ADMIN — Medication 1 APPLICATION(S): at 05:08

## 2021-02-19 RX ADMIN — Medication 40 MILLIGRAM(S): at 05:08

## 2021-02-19 RX ADMIN — Medication 5 MILLILITER(S): at 23:42

## 2021-02-19 RX ADMIN — Medication 9 UNIT(S): at 08:41

## 2021-02-19 RX ADMIN — SENNA PLUS 2 TABLET(S): 8.6 TABLET ORAL at 22:21

## 2021-02-19 RX ADMIN — ENOXAPARIN SODIUM 40 MILLIGRAM(S): 100 INJECTION SUBCUTANEOUS at 17:16

## 2021-02-19 RX ADMIN — Medication 1 APPLICATION(S): at 17:16

## 2021-02-19 RX ADMIN — Medication 9 UNIT(S): at 17:15

## 2021-02-19 RX ADMIN — POLYETHYLENE GLYCOL 3350 17 GRAM(S): 17 POWDER, FOR SOLUTION ORAL at 11:09

## 2021-02-19 RX ADMIN — TAMSULOSIN HYDROCHLORIDE 0.8 MILLIGRAM(S): 0.4 CAPSULE ORAL at 22:21

## 2021-02-19 RX ADMIN — Medication 650 MILLIGRAM(S): at 23:11

## 2021-02-19 RX ADMIN — Medication 2: at 08:41

## 2021-02-19 RX ADMIN — CHLORHEXIDINE GLUCONATE 1 APPLICATION(S): 213 SOLUTION TOPICAL at 11:08

## 2021-02-19 RX ADMIN — Medication 25 MILLIGRAM(S): at 05:08

## 2021-02-19 NOTE — PROGRESS NOTE ADULT - SUBJECTIVE AND OBJECTIVE BOX
OVERNIGHT EVENTS: events noted, 100% NRM, tired, ENT reviewed    Vital Signs Last 24 Hrs  T(C): 36.2 (19 Feb 2021 04:00), Max: 37 (18 Feb 2021 12:00)  T(F): 97.1 (19 Feb 2021 04:00), Max: 98.6 (18 Feb 2021 12:00)  HR: 98 (19 Feb 2021 07:00) (94 - 118)  BP: 95/59 (19 Feb 2021 07:00) (80/59 - 120/64)  BP(mean): 69 (19 Feb 2021 07:00) (61 - 93)  RR: 45 (19 Feb 2021 07:00) (24 - 45)  SpO2: 97% (19 Feb 2021 07:00) (89% - 97%)    PHYSICAL EXAMINATION:    GENERAL: ill looking    HEENT: Head is normocephalic and atraumatic. nasal packing    NECK: Supple.    LUNGS: bl crackles    HEART: Regular rate and rhythm without murmur.    ABDOMEN: Soft, nontender, and nondistended.      EXTREMITIES: Without any cyanosis, clubbing, rash, lesions or edema.    NEUROLOGIC: Grossly intact.    SKIN: No ulceration or induration present.      LABS:                        12.1   15.91 )-----------( 175      ( 19 Feb 2021 04:50 )             37.4     02-19    134<L>  |  94<L>  |  33<H>  ----------------------------<  137<H>  4.2   |  32  |  0.9    Ca    8.1<L>      19 Feb 2021 04:50  Mg     2.0     02-19    TPro  5.0<L>  /  Alb  2.7<L>  /  TBili  0.5  /  DBili  x   /  AST  29  /  ALT  28  /  AlkPhos  80  02-19                    Procalcitonin, Serum: 0.15 ng/mL (02-18-21 @ 04:27)        02-18-21 @ 07:01  -  02-19-21 @ 07:00  --------------------------------------------------------  IN: 1180 mL / OUT: 2000 mL / NET: -820 mL        MICROBIOLOGY:      MEDICATIONS  (STANDING):  BACItracin   Ointment 1 Application(s) Topical two times a day  chlorhexidine 4% Liquid 1 Application(s) Topical daily  dextrose 40% Gel 15 Gram(s) Oral once  dextrose 5%. 1000 milliLiter(s) (50 mL/Hr) IV Continuous <Continuous>  dextrose 5%. 1000 milliLiter(s) (100 mL/Hr) IV Continuous <Continuous>  dextrose 50% Injectable 25 Gram(s) IV Push once  dextrose 50% Injectable 12.5 Gram(s) IV Push once  dextrose 50% Injectable 25 Gram(s) IV Push once  enoxaparin Injectable 40 milliGRAM(s) SubCutaneous two times a day  glucagon  Injectable 1 milliGRAM(s) IntraMuscular once  influenza   Vaccine 0.5 milliLiter(s) IntraMuscular once  insulin glargine Injectable (LANTUS) 20 Unit(s) SubCutaneous at bedtime  insulin lispro (ADMELOG) corrective regimen sliding scale   SubCutaneous three times a day before meals  insulin lispro Injectable (ADMELOG) 9 Unit(s) SubCutaneous three times a day before meals  levothyroxine 50 MICROGram(s) Oral daily  methylPREDNISolone sodium succinate Injectable 40 milliGRAM(s) IV Push every 24 hours  metoprolol succinate ER 25 milliGRAM(s) Oral daily  pantoprazole    Tablet 40 milliGRAM(s) Oral before breakfast  polyethylene glycol 3350 17 Gram(s) Oral daily  prasugrel 10 milliGRAM(s) Oral daily  senna 2 Tablet(s) Oral at bedtime  tamsulosin 0.8 milliGRAM(s) Oral at bedtime    MEDICATIONS  (PRN):  acetaminophen   Tablet .. 650 milliGRAM(s) Oral every 6 hours PRN Temp greater or equal to 38C (100.4F), Mild Pain (1 - 3)  ALPRAZolam 0.25 milliGRAM(s) Oral at bedtime PRN Anxiety  guaifenesin/dextromethorphan  Syrup 5 milliLiter(s) Oral every 6 hours PRN Cough  ondansetron    Tablet 4 milliGRAM(s) Oral two times a day PRN Nausea and/or Vomiting      RADIOLOGY & ADDITIONAL STUDIES:

## 2021-02-19 NOTE — PROGRESS NOTE ADULT - SUBJECTIVE AND OBJECTIVE BOX
SUBJECTIVE:    Patient is a 70y old Male who presents with a chief complaint of sob (19 Feb 2021 08:14)    Currently admitted to medicine with the primary diagnosis of Shortness of breath       Today is hospital day 15d.    Overnight no events    This morning patient says he feels tired but is saturating well on 100% NRB.     PAST MEDICAL & SURGICAL HISTORY  Chronic kidney disease (CKD)  III    Type 2 diabetes mellitus without complication, unspecified long term insulin use status    Hypertension, unspecified type    Dyspnea, unspecified type    ASHD (arteriosclerotic heart disease)  STEMI 12/31/17, PCI RCA    S/P cataract surgery    History of ligation of vein  2012    History of tonsillectomy  1957        ALLERGIES:  penicillin (Rash (Severe))    MEDICATIONS:  STANDING MEDICATIONS  BACItracin   Ointment 1 Application(s) Topical two times a day  chlorhexidine 4% Liquid 1 Application(s) Topical daily  dextrose 40% Gel 15 Gram(s) Oral once  dextrose 5%. 1000 milliLiter(s) IV Continuous <Continuous>  dextrose 5%. 1000 milliLiter(s) IV Continuous <Continuous>  dextrose 50% Injectable 25 Gram(s) IV Push once  dextrose 50% Injectable 12.5 Gram(s) IV Push once  dextrose 50% Injectable 25 Gram(s) IV Push once  enoxaparin Injectable 40 milliGRAM(s) SubCutaneous two times a day  glucagon  Injectable 1 milliGRAM(s) IntraMuscular once  influenza   Vaccine 0.5 milliLiter(s) IntraMuscular once  insulin glargine Injectable (LANTUS) 20 Unit(s) SubCutaneous at bedtime  insulin lispro (ADMELOG) corrective regimen sliding scale   SubCutaneous three times a day before meals  insulin lispro Injectable (ADMELOG) 9 Unit(s) SubCutaneous three times a day before meals  levothyroxine 50 MICROGram(s) Oral daily  methylPREDNISolone sodium succinate Injectable 60 milliGRAM(s) IV Push every 12 hours  metoprolol succinate ER 25 milliGRAM(s) Oral daily  pantoprazole    Tablet 40 milliGRAM(s) Oral before breakfast  polyethylene glycol 3350 17 Gram(s) Oral daily  senna 2 Tablet(s) Oral at bedtime  tamsulosin 0.8 milliGRAM(s) Oral at bedtime    PRN MEDICATIONS  acetaminophen   Tablet .. 650 milliGRAM(s) Oral every 6 hours PRN  ALPRAZolam 0.25 milliGRAM(s) Oral at bedtime PRN  guaifenesin/dextromethorphan  Syrup 5 milliLiter(s) Oral every 6 hours PRN  ondansetron    Tablet 4 milliGRAM(s) Oral two times a day PRN    VITALS:   T(F): 96.9  HR: 96  BP: 112/60  RR: 27  SpO2: 93%    LABS:                        12.1   15.91 )-----------( 175      ( 19 Feb 2021 04:50 )             37.4     02-19    134<L>  |  94<L>  |  33<H>  ----------------------------<  137<H>  4.2   |  32  |  0.9    Ca    8.1<L>      19 Feb 2021 04:50  Mg     2.0     02-19    TPro  5.0<L>  /  Alb  2.7<L>  /  TBili  0.5  /  DBili  x   /  AST  29  /  ALT  28  /  AlkPhos  80  02-19        PHYSICAL EXAM:  GEN: No acute distress  PULM/CHEST: Clear to auscultation bilaterally, no rales, rhonchi or wheezes   CVS: Regular rate and rhythm, S1-S2, no murmurs  ABD: Soft, non-tender, non-distended, +BS  EXT: No edema  NEURO: AAOx3

## 2021-02-19 NOTE — PROGRESS NOTE ADULT - SUBJECTIVE AND OBJECTIVE BOX
ENT DAILY PROGRESS NOTE    Pt is a 70y Male a/w COVID PNA, epistaxis following use of HFNC, s/p LEFT nasal packing - seen and examined at bedside. Pt doing well, feeling tired. Pt denies any bleeding from the nose or the posterior oropharynx, no dripping. Patient denies any acute SOB/diff breathing with NRB in place.       REVIEW OF SYSTEMS   [x] A ten-point review of systems was otherwise negative except as noted.      Allergies    penicillin (Rash (Severe))    Intolerances      MEDICATIONS:  acetaminophen   Tablet .. 650 milliGRAM(s) Oral every 6 hours PRN  ALPRAZolam 0.25 milliGRAM(s) Oral at bedtime PRN  BACItracin   Ointment 1 Application(s) Topical two times a day  chlorhexidine 4% Liquid 1 Application(s) Topical daily  dextrose 40% Gel 15 Gram(s) Oral once  dextrose 5%. 1000 milliLiter(s) IV Continuous <Continuous>  dextrose 5%. 1000 milliLiter(s) IV Continuous <Continuous>  dextrose 50% Injectable 25 Gram(s) IV Push once  dextrose 50% Injectable 12.5 Gram(s) IV Push once  dextrose 50% Injectable 25 Gram(s) IV Push once  enoxaparin Injectable 40 milliGRAM(s) SubCutaneous two times a day  glucagon  Injectable 1 milliGRAM(s) IntraMuscular once  guaifenesin/dextromethorphan  Syrup 5 milliLiter(s) Oral every 6 hours PRN  influenza   Vaccine 0.5 milliLiter(s) IntraMuscular once  insulin glargine Injectable (LANTUS) 20 Unit(s) SubCutaneous at bedtime  insulin lispro (ADMELOG) corrective regimen sliding scale   SubCutaneous three times a day before meals  insulin lispro Injectable (ADMELOG) 9 Unit(s) SubCutaneous three times a day before meals  levothyroxine 50 MICROGram(s) Oral daily  methylPREDNISolone sodium succinate Injectable 60 milliGRAM(s) IV Push every 12 hours  metoprolol succinate ER 25 milliGRAM(s) Oral daily  ondansetron    Tablet 4 milliGRAM(s) Oral two times a day PRN  pantoprazole    Tablet 40 milliGRAM(s) Oral before breakfast  polyethylene glycol 3350 17 Gram(s) Oral daily  prasugrel 10 milliGRAM(s) Oral daily  senna 2 Tablet(s) Oral at bedtime  tamsulosin 0.8 milliGRAM(s) Oral at bedtime      Vital Signs Last 24 Hrs  T(C): 36.1 (19 Feb 2021 08:00), Max: 37 (18 Feb 2021 12:00)  T(F): 96.9 (19 Feb 2021 08:00), Max: 98.6 (18 Feb 2021 12:00)  HR: 96 (19 Feb 2021 08:00) (94 - 118)  BP: 112/60 (19 Feb 2021 08:00) (80/59 - 119/72)  BP(mean): 71 (19 Feb 2021 08:00) (61 - 93)  RR: 27 (19 Feb 2021 08:00) (24 - 45)  SpO2: 93% (19 Feb 2021 08:00) (89% - 97%)      02-18 @ 07:01  -  02-19 @ 07:00  --------------------------------------------------------  IN:    Oral Fluid: 1180 mL  Total IN: 1180 mL    OUT:    Voided (mL): 2000 mL  Total OUT: 2000 mL    Total NET: -820 mL      PHYSICAL EXAM:    GEN: Well-developed, well-nourished. NAD, awake and alert. No drooling or pooling of secretions. No stridor or stertor. Good vocal quality, no hoarseness.   SKIN: Good color, non diaphoretic  HEENT: NC/AT; Oral mucosa pink and moist. No erythema or edema noted to buccal mucosa, tongue, FOM, uvula or posterior oropharynx. Uvula midline. no active bleeding or clot noted to post OP. + thick frothy secretions noted in oral cavity, pt clearing his secretions appropriately.   NARES: + dry blood noted to LEFT nare, + nasal packing in place. no active bleeding noted around packing.   NECK:  Trachea midline. Neck supple, no TTP to B/L lateral neck, no cervical LAD.  RESP: No dyspnea, non-labored breathing. No use of accessory muscles. + NRB mask in place.  CARDIO: +S1/S2  ABDO: Soft, NT.  EXT: TIJERINA x 4    LABS:  CBC-                        12.1   15.91 )-----------( 175      ( 19 Feb 2021 04:50 )             37.4     BMP/CMP-  19 Feb 2021 04:50    134    |  94     |  33     ----------------------------<  137    4.2     |  32     |  0.9      Ca    8.1        19 Feb 2021 04:50  Mg     2.0       19 Feb 2021 04:50    TPro  5.0    /  Alb  2.7    /  TBili  0.5    /  DBili  x      /  AST  29     /  ALT  28     /  AlkPhos  80     19 Feb 2021 04:50    Coagulation Studies-    Endocrine Panel-  Calcium, Total Serum: 8.1 mg/dL (02-19 @ 04:50)

## 2021-02-19 NOTE — PROGRESS NOTE ADULT - ASSESSMENT
Pt is a 70 y.o male with COVID PNA, LEFt epistaxis 2* HFNC - packed with rhino rocket and now on NRB. No active bleeding since packing placed.     ·	deflated balloon nasal packing at bedside  ·	if no bleeding, will D/C packing this afternoon  ·	holding effient for now -> Would prefer to cont to hold until packing is out, but if risk is too high for patient, can restart Effient now.   ·	cont NRB/02 as per MICU/pulm  ·	if packing removed, petroleum jelly/bacitracin to nares b/l  ·	NO NASAL 02 WHEN PACKING REMOVED  ·	w/d with attng

## 2021-02-19 NOTE — PROGRESS NOTE ADULT - ASSESSMENT
IMPRESSION:    Acute hypoxemic respiratory failure on NRM  severe epistaxis sp packing  severe Covid PNA  Probable superimposed bacterial infection sp abx  COPD exacerbation  HO CAD    PLAN:    CNS: avoid CNS depressant    HEENT: Oral care, PACKING    PULMONARY:  HOB @ 45 degrees.  Aspiration precautions. NRM, Solumedrol 60 q 12h. wean O2    CARDIOVASCULAR: Goal Map >60, Avoid volume overload.     GI: GI prophylaxis. feeding.     RENAL:  Follow up lytes.  Correct as needed    INFECTIOUS DISEASE: ABX PER ID    HEMATOLOGICAL:  DVT prophylaxis.    ENDOCRINE:  Follow up FS.  Insulin protocol if needed.    MUSCULOSKELETAL: OOB to chair    Dispo: MICU  poor prognosis

## 2021-02-19 NOTE — CHART NOTE - NSCHARTNOTEFT_GEN_A_CORE
Pt with COVID PNA and epistaxis 2* nasal trauma from HFNC - packing deflated earlier this morning. No bleeding or events since. Packing removed easily at bedside, no bleeding noted to nasal septum or to posterior OP.     Continue petroleum jelly/baci/A&D ointment to nares B/L, do not stick anything up the nose. Ok to sniff back, cannot blow nose. Would recommend to keep patient on face masks only for any 02 supplementation, no nasal cannula or nasal high flow. Restart effient as per primary team.     Recall ENT prn. If rebleeds, hold pressure to nares & call ENT [phone].

## 2021-02-19 NOTE — PROGRESS NOTE ADULT - ASSESSMENT
71 yo M with PMH of CAD, MI (s/p 4 stents), DM, HTN, BPH, hypothyroid and GERD, recently received Pfizer vaccine (1/3 and 1/24) presented to ED c/o progressive SOB, fever, and weakness. Patient stated that his symptoms began on Wednesday 2/3. COVID PCR + on 2/4. Admitted to medicine with acute hypoxemic respiratory failure secondary to COVID-19 PNA. Upgraded to ICU from 3A due to increasing oxygen requirements    #Acute hypoxemic respiratory failure secondary to COVID-19 PNA   #likely superimposed bacterial pneumonia   - Patient s/p  2 doses of the Pfizer vaccine (last dose 1/24)  - CTA 02/04 showed extensive bilateral patchy ground-glass opacities involving all lobes most pronounced in the upper lung zones  - s/p 1 unit plasma  - s/p Remdesivir completed 2/9  - s/p 1 week of rocephin & Levaquin per ID  - inflammatory markers monitor q48hrs  Ddimer: 180 > 543 > 939 > 1758 > 1751  Procal: 1.61 > 0.97 > 0.32 > 0.13 > 0.08 > 0.12 > 0.15  Ferritin: 404 > 625 >   CRP: 22.67 > 7.54 > 4.26 > 0.54 > 0.60 > 3.30  - LE Duplex negative 2/8 and 2/14   - PT/OT following  - increased to solumedrol 60mg IV BID   - will get fungitell   - asked patient today that while O2 is stable, there is always risk for intubation, he wishes to remain full code with intubation as last resort     #epistaxis   - likely due to HFNC   - ENT will try to remove left packing today   - if packing stays in for prolonged period of time, will consider ancef   - hold effient one more day and resume after packing out; c/w lovenox ppx     #maroon stool  - unclear if true bloody BM; may be due to swallowing epistaxis   - no bloody bms after; hgb downtrended; daily cbcs; denies hx of hemorrhoids     #Constipation   - c/w miralax, senna     #CAD  #MI s/p 4 stents  - C/w Effient 10mg daily  - C/w Toprol 25mg daily    #HTN   - C/w enalapril 2.5mg daily    #BPH  - C/w tamsulosin 0.4mg PO QHS    #DM   - Monitor FS, c/w insulin regimen   - A1C 9.1-->only on glipizide as outpatient   - c/w counselling    #Hypothyroidism  - C/w Synthroid    #GERD  - c/w protonix    Diet: consistent carbs, DASH/TLC + Glucerna   DVT PPx: Lovenox 40mg BID  GI ppx: protonix  Code Status: FULL CODE  Dispo: acute, micu monitoring, PT/OT; monitor and possibly wean O2     Family contact in case of emergency. Patient states we do not need to regularly call family everyday.   Ami Odonnell - Wife  [phone]

## 2021-02-20 LAB
ALBUMIN SERPL ELPH-MCNC: 3 G/DL — LOW (ref 3.5–5.2)
ALP SERPL-CCNC: 73 U/L — SIGNIFICANT CHANGE UP (ref 30–115)
ALT FLD-CCNC: 30 U/L — SIGNIFICANT CHANGE UP (ref 0–41)
ANION GAP SERPL CALC-SCNC: 8 MMOL/L — SIGNIFICANT CHANGE UP (ref 7–14)
AST SERPL-CCNC: 26 U/L — SIGNIFICANT CHANGE UP (ref 0–41)
BASOPHILS # BLD AUTO: 0.02 K/UL — SIGNIFICANT CHANGE UP (ref 0–0.2)
BASOPHILS NFR BLD AUTO: 0.2 % — SIGNIFICANT CHANGE UP (ref 0–1)
BILIRUB SERPL-MCNC: 0.3 MG/DL — SIGNIFICANT CHANGE UP (ref 0.2–1.2)
BLD GP AB SCN SERPL QL: SIGNIFICANT CHANGE UP
BUN SERPL-MCNC: 30 MG/DL — HIGH (ref 10–20)
CALCIUM SERPL-MCNC: 8.2 MG/DL — LOW (ref 8.5–10.1)
CHLORIDE SERPL-SCNC: 97 MMOL/L — LOW (ref 98–110)
CO2 SERPL-SCNC: 32 MMOL/L — SIGNIFICANT CHANGE UP (ref 17–32)
CREAT SERPL-MCNC: 1 MG/DL — SIGNIFICANT CHANGE UP (ref 0.7–1.5)
CRP SERPL-MCNC: 6.73 MG/DL — HIGH (ref 0–0.4)
D DIMER BLD IA.RAPID-MCNC: 986 NG/ML DDU — HIGH (ref 0–230)
EOSINOPHIL # BLD AUTO: 0 K/UL — SIGNIFICANT CHANGE UP (ref 0–0.7)
EOSINOPHIL NFR BLD AUTO: 0 % — SIGNIFICANT CHANGE UP (ref 0–8)
GLUCOSE BLDC GLUCOMTR-MCNC: 187 MG/DL — HIGH (ref 70–99)
GLUCOSE BLDC GLUCOMTR-MCNC: 228 MG/DL — HIGH (ref 70–99)
GLUCOSE BLDC GLUCOMTR-MCNC: 265 MG/DL — HIGH (ref 70–99)
GLUCOSE BLDC GLUCOMTR-MCNC: 266 MG/DL — HIGH (ref 70–99)
GLUCOSE BLDC GLUCOMTR-MCNC: 275 MG/DL — HIGH (ref 70–99)
GLUCOSE SERPL-MCNC: 174 MG/DL — HIGH (ref 70–99)
HCT VFR BLD CALC: 34.7 % — LOW (ref 42–52)
HGB BLD-MCNC: 11.4 G/DL — LOW (ref 14–18)
IMM GRANULOCYTES NFR BLD AUTO: 0.9 % — HIGH (ref 0.1–0.3)
LYMPHOCYTES # BLD AUTO: 0.67 K/UL — LOW (ref 1.2–3.4)
LYMPHOCYTES # BLD AUTO: 5.2 % — LOW (ref 20.5–51.1)
MAGNESIUM SERPL-MCNC: 2.1 MG/DL — SIGNIFICANT CHANGE UP (ref 1.8–2.4)
MCHC RBC-ENTMCNC: 25.9 PG — LOW (ref 27–31)
MCHC RBC-ENTMCNC: 32.9 G/DL — SIGNIFICANT CHANGE UP (ref 32–37)
MCV RBC AUTO: 78.9 FL — LOW (ref 80–94)
MONOCYTES # BLD AUTO: 0.35 K/UL — SIGNIFICANT CHANGE UP (ref 0.1–0.6)
MONOCYTES NFR BLD AUTO: 2.7 % — SIGNIFICANT CHANGE UP (ref 1.7–9.3)
NEUTROPHILS # BLD AUTO: 11.76 K/UL — HIGH (ref 1.4–6.5)
NEUTROPHILS NFR BLD AUTO: 91 % — HIGH (ref 42.2–75.2)
NRBC # BLD: 0 /100 WBCS — SIGNIFICANT CHANGE UP (ref 0–0)
PLATELET # BLD AUTO: 163 K/UL — SIGNIFICANT CHANGE UP (ref 130–400)
POTASSIUM SERPL-MCNC: 5.4 MMOL/L — HIGH (ref 3.5–5)
POTASSIUM SERPL-SCNC: 5.4 MMOL/L — HIGH (ref 3.5–5)
PROCALCITONIN SERPL-MCNC: 0.37 NG/ML — HIGH (ref 0.02–0.1)
PROT SERPL-MCNC: 5.2 G/DL — LOW (ref 6–8)
RBC # BLD: 4.4 M/UL — LOW (ref 4.7–6.1)
RBC # FLD: 16.8 % — HIGH (ref 11.5–14.5)
SODIUM SERPL-SCNC: 137 MMOL/L — SIGNIFICANT CHANGE UP (ref 135–146)
WBC # BLD: 12.91 K/UL — HIGH (ref 4.8–10.8)
WBC # FLD AUTO: 12.91 K/UL — HIGH (ref 4.8–10.8)

## 2021-02-20 PROCEDURE — 99232 SBSQ HOSP IP/OBS MODERATE 35: CPT | Mod: CS

## 2021-02-20 PROCEDURE — 71045 X-RAY EXAM CHEST 1 VIEW: CPT | Mod: 26

## 2021-02-20 PROCEDURE — 30903 CONTROL OF NOSEBLEED: CPT | Mod: RT

## 2021-02-20 RX ORDER — SODIUM ZIRCONIUM CYCLOSILICATE 10 G/10G
10 POWDER, FOR SUSPENSION ORAL EVERY 8 HOURS
Refills: 0 | Status: DISCONTINUED | OUTPATIENT
Start: 2021-02-20 | End: 2021-02-23

## 2021-02-20 RX ADMIN — INSULIN GLARGINE 20 UNIT(S): 100 INJECTION, SOLUTION SUBCUTANEOUS at 22:16

## 2021-02-20 RX ADMIN — Medication 1 APPLICATION(S): at 16:52

## 2021-02-20 RX ADMIN — TAMSULOSIN HYDROCHLORIDE 0.8 MILLIGRAM(S): 0.4 CAPSULE ORAL at 22:15

## 2021-02-20 RX ADMIN — Medication 1 APPLICATION(S): at 05:07

## 2021-02-20 RX ADMIN — Medication 9 UNIT(S): at 08:22

## 2021-02-20 RX ADMIN — SODIUM ZIRCONIUM CYCLOSILICATE 10 GRAM(S): 10 POWDER, FOR SUSPENSION ORAL at 13:23

## 2021-02-20 RX ADMIN — Medication 60 MILLIGRAM(S): at 16:52

## 2021-02-20 RX ADMIN — Medication 50 MICROGRAM(S): at 05:07

## 2021-02-20 RX ADMIN — Medication 60 MILLIGRAM(S): at 05:08

## 2021-02-20 RX ADMIN — Medication 6: at 16:52

## 2021-02-20 RX ADMIN — CHLORHEXIDINE GLUCONATE 1 APPLICATION(S): 213 SOLUTION TOPICAL at 11:16

## 2021-02-20 RX ADMIN — ENOXAPARIN SODIUM 40 MILLIGRAM(S): 100 INJECTION SUBCUTANEOUS at 05:36

## 2021-02-20 RX ADMIN — Medication 9 UNIT(S): at 16:51

## 2021-02-20 RX ADMIN — Medication 9 UNIT(S): at 11:17

## 2021-02-20 RX ADMIN — PANTOPRAZOLE SODIUM 40 MILLIGRAM(S): 20 TABLET, DELAYED RELEASE ORAL at 05:07

## 2021-02-20 RX ADMIN — Medication 25 MILLIGRAM(S): at 05:08

## 2021-02-20 RX ADMIN — Medication 5 MILLILITER(S): at 20:17

## 2021-02-20 RX ADMIN — SODIUM ZIRCONIUM CYCLOSILICATE 10 GRAM(S): 10 POWDER, FOR SUSPENSION ORAL at 22:16

## 2021-02-20 RX ADMIN — Medication 2: at 08:22

## 2021-02-20 RX ADMIN — Medication 6: at 11:17

## 2021-02-20 RX ADMIN — SENNA PLUS 2 TABLET(S): 8.6 TABLET ORAL at 22:16

## 2021-02-20 NOTE — PROGRESS NOTE ADULT - SUBJECTIVE AND OBJECTIVE BOX
Patient is a 70y old  Male who presents with a chief complaint of COVID PNA (19 Feb 2021 10:46)        HPI:  70 year old male with PMH of CAD, MI (s/p 4 stents), DM, HTN, BPH, hypothyroid and GERD presents to ED c/o progressive SOB, fever, and weakness x 4 days.  Pt also with recent positive COVID 19 swab + as outpatient. Pt had Pfizer COVID vaccine on 1/3 and 1/24. Pt reports chills, dry cough, and decreased po intake. He denies abd pain, N/V/D/C, lightheadedness CP.  In the ED, pt's T 37.8, . WBC 14, D-dimer 329, BUN/Cr 23/1.8, GFR 38, COVID positive.  CTA negative for PE but consistent with COVID 19 pneumonia.  Pt was given Decadron 6 mg x1 in the ED. pt saturating 99% on 3 L NC.   (04 Feb 2021 17:35)    Pt evaluated on AM rounds.  Interval Events: No overnight events.    REVIEW OF SYSTEMS:   see HPI      OBJECTIVE:  ICU Vital Signs Last 24 Hrs  T(C): 36.2 (20 Feb 2021 00:00), Max: 36.2 (20 Feb 2021 00:00)  T(F): 97.1 (20 Feb 2021 00:00), Max: 97.1 (20 Feb 2021 00:00)  HR: 74 (20 Feb 2021 05:00) (74 - 114)  BP: 124/67 (20 Feb 2021 05:00) (92/60 - 128/64)  BP(mean): 81 (20 Feb 2021 05:00) (69 - 95)  RR: 22 (20 Feb 2021 05:00) (14 - 45)  SpO2: 100% (20 Feb 2021 05:00) (90% - 100%)        02-18 @ 07:01  -  02-19 @ 07:00  --------------------------------------------------------  IN: 1180 mL / OUT: 2000 mL / NET: -820 mL    02-19 @ 07:01 - 02-20 @ 06:31  --------------------------------------------------------  IN: 1380 mL / OUT: 1050 mL / NET: 330 mL      CAPILLARY BLOOD GLUCOSE      POCT Blood Glucose.: 199 mg/dL (19 Feb 2021 22:25)        PHYSICAL EXAM:     · CONSTITUTIONAL:   not septic appearing,   well nourished,   NAD    · ENMT:   Airway patent,   Nasal mucosa clear.  Mouth with normal mucosa.   No thrush    · EYES:   Clear bilaterally,   pupils equal,   round and reactive to light.    · CARDIAC:   Normal rate,   regular rhythm.    Heart sounds S1, S2.   No murmurs, no rubs or gallops on auscultation  no edema        CAROTID:   normal systolic impulse  no bruits    · RESPIRATORY:   rales  normal chest expansion  tachypnea,  no retractions or use of accessory muscles  palpation of chest is normal with no fremitus  percussion of chest demonstrates no hyperresonance or dullness    · GASTROINTESTINAL:  Abdomen soft,   non-tender,   + BS  liver/spleen not palpable    · MUSCULOSKELETAL:   no clubbing, cyanosis      · NEUROLOGICAL:   awake alert oriented  no obvious cranial nerve abnormalities      · SKIN:   Skin normal color for race,   warm, dry   No evidence of rash.    · PSYCHIATRIC:   stable  no threat to self or others      · HEME LYMPH:   no splenomegaly.  No cervical  lymphadenopathy.  no inguinal lymphadenopathy    HOSPITAL MEDICATIONS:  MEDICATIONS  (STANDING):  BACItracin   Ointment 1 Application(s) Topical two times a day  chlorhexidine 4% Liquid 1 Application(s) Topical daily  dextrose 40% Gel 15 Gram(s) Oral once  dextrose 5%. 1000 milliLiter(s) (50 mL/Hr) IV Continuous <Continuous>  dextrose 5%. 1000 milliLiter(s) (100 mL/Hr) IV Continuous <Continuous>  dextrose 50% Injectable 25 Gram(s) IV Push once  dextrose 50% Injectable 12.5 Gram(s) IV Push once  dextrose 50% Injectable 25 Gram(s) IV Push once  enoxaparin Injectable 40 milliGRAM(s) SubCutaneous two times a day  glucagon  Injectable 1 milliGRAM(s) IntraMuscular once  influenza   Vaccine 0.5 milliLiter(s) IntraMuscular once  insulin glargine Injectable (LANTUS) 20 Unit(s) SubCutaneous at bedtime  insulin lispro (ADMELOG) corrective regimen sliding scale   SubCutaneous three times a day before meals  insulin lispro Injectable (ADMELOG) 9 Unit(s) SubCutaneous three times a day before meals  levothyroxine 50 MICROGram(s) Oral daily  methylPREDNISolone sodium succinate Injectable 60 milliGRAM(s) IV Push every 12 hours  metoprolol succinate ER 25 milliGRAM(s) Oral daily  pantoprazole    Tablet 40 milliGRAM(s) Oral before breakfast  polyethylene glycol 3350 17 Gram(s) Oral daily  prasugrel 10 milliGRAM(s) Oral daily  senna 2 Tablet(s) Oral at bedtime  tamsulosin 0.8 milliGRAM(s) Oral at bedtime    MEDICATIONS  (PRN):  acetaminophen   Tablet .. 650 milliGRAM(s) Oral every 6 hours PRN Temp greater or equal to 38C (100.4F), Mild Pain (1 - 3)  ALPRAZolam 0.25 milliGRAM(s) Oral at bedtime PRN Anxiety  guaifenesin/dextromethorphan  Syrup 5 milliLiter(s) Oral every 6 hours PRN Cough  ondansetron    Tablet 4 milliGRAM(s) Oral two times a day PRN Nausea and/or Vomiting    lactated ringers.: Solution, 1000 milliLiter(s) infuse at 70 mL/Hr  dextrose 5% + sodium chloride 0.45%.: Solution, 1000 milliLiter(s) infuse at 75 mL/Hr  sodium chloride 0.9%.: Solution, 1000 milliLiter(s) infuse at 75 mL/Hr  sodium chloride 0.9% Bolus:   1000 milliLiter(s), IV Bolus, once, infuse over 60 Minute(s), Stop After 1 Doses  Provider's Contact #: [phone]      LABS:                        12.1   15.91 )-----------( 175      ( 19 Feb 2021 04:50 )             37.4     02-20    137  |  97<L>  |  30<H>  ----------------------------<  174<H>  5.4<H>   |  32  |  1.0    Ca    8.2<L>      20 Feb 2021 04:30  Mg     2.1     02-20    TPro  5.2<L>  /  Alb  3.0<L>  /  TBili  0.3  /  DBili  x   /  AST  26  /  ALT  30  /  AlkPhos  73  02-20                          RADIOLOGY: I personally reviewed latest CXR and other pertinent films.

## 2021-02-20 NOTE — PROCEDURE NOTE - ADDITIONAL PROCEDURE DETAILS
ENT called by ICU Pt. re-bleeded today at 6 AM. Pt. seen and examined at bedside, active right nare bleeding.   7.5 cm rhino rocket packing placed to right nare. 3 cc of air inflated to balloon hemostasis achieved.  Pt. tolerated procedure well left in NAD.

## 2021-02-20 NOTE — PROGRESS NOTE ADULT - ASSESSMENT
IMPRESSION:    Acute hypoxemic respiratory failure on NRM  severe epistaxis sp packing  severe Covid PNA  Probable superimposed bacterial infection sp abx  COPD exacerbation  HO CAD    PLAN:    CNS: avoid CNS depressant    HEENT: Oral care    PULMONARY:  HOB @ 45 degrees.  Aspiration precautions.   NRM,   Solumedrol 40mg q24h. wean O2  low tolerance for advancing resp therapy    CARDIOVASCULAR: Goal Map >60, Avoid volume overload.     GI: GI prophylaxis. feeding.     RENAL:  Follow up lytes.  Correct as needed    INFECTIOUS DISEASE: ABX PER ID    HEMATOLOGICAL:  DVT prophylaxis.    ENDOCRINE:  Follow up FS.  Insulin protocol if needed.    MUSCULOSKELETAL: OOB to chair    PT/OT    Dispo: MICU

## 2021-02-20 NOTE — PROGRESS NOTE ADULT - SUBJECTIVE AND OBJECTIVE BOX
ENT DAILY PROGRESS NOTE    Pt is a 70y Male a/w COVID PNA, following for epistaxis  - seen and examined at bedside. Pt had packing removed yesterday - was doing well until this AM - rebled & required re insertion of packing. Pt denies any further bleeding from the nose or mouth.      REVIEW OF SYSTEMS   [x] A ten-point review of systems was otherwise negative except as noted.    Allergies    penicillin (Rash (Severe))    Intolerances      MEDICATIONS:  acetaminophen   Tablet .. 650 milliGRAM(s) Oral every 6 hours PRN  ALPRAZolam 0.25 milliGRAM(s) Oral at bedtime PRN  BACItracin   Ointment 1 Application(s) Topical two times a day  chlorhexidine 4% Liquid 1 Application(s) Topical daily  dextrose 40% Gel 15 Gram(s) Oral once  dextrose 5%. 1000 milliLiter(s) IV Continuous <Continuous>  dextrose 5%. 1000 milliLiter(s) IV Continuous <Continuous>  dextrose 50% Injectable 25 Gram(s) IV Push once  dextrose 50% Injectable 12.5 Gram(s) IV Push once  dextrose 50% Injectable 25 Gram(s) IV Push once  glucagon  Injectable 1 milliGRAM(s) IntraMuscular once  guaifenesin/dextromethorphan  Syrup 5 milliLiter(s) Oral every 6 hours PRN  influenza   Vaccine 0.5 milliLiter(s) IntraMuscular once  insulin glargine Injectable (LANTUS) 20 Unit(s) SubCutaneous at bedtime  insulin lispro (ADMELOG) corrective regimen sliding scale   SubCutaneous three times a day before meals  insulin lispro Injectable (ADMELOG) 9 Unit(s) SubCutaneous three times a day before meals  levothyroxine 50 MICROGram(s) Oral daily  methylPREDNISolone sodium succinate Injectable 60 milliGRAM(s) IV Push every 12 hours  metoprolol succinate ER 25 milliGRAM(s) Oral daily  ondansetron    Tablet 4 milliGRAM(s) Oral two times a day PRN  pantoprazole    Tablet 40 milliGRAM(s) Oral before breakfast  polyethylene glycol 3350 17 Gram(s) Oral daily  senna 2 Tablet(s) Oral at bedtime  sodium zirconium cyclosilicate 10 Gram(s) Oral every 8 hours  tamsulosin 0.8 milliGRAM(s) Oral at bedtime      Vital Signs Last 24 Hrs  T(C): 36.4 (20 Feb 2021 08:00), Max: 36.4 (20 Feb 2021 08:00)  T(F): 97.5 (20 Feb 2021 08:00), Max: 97.5 (20 Feb 2021 08:00)  HR: 86 (20 Feb 2021 11:00) (74 - 100)  BP: 115/62 (20 Feb 2021 11:00) (92/60 - 140/72)  BP(mean): 83 (20 Feb 2021 11:00) (70 - 98)  RR: 21 (20 Feb 2021 11:00) (14 - 40)  SpO2: 100% (20 Feb 2021 11:00) (86% - 100%)      02-19 @ 07:01  -  02-20 @ 07:00  --------------------------------------------------------  IN:    Oral Fluid: 1380 mL  Total IN: 1380 mL    OUT:    Voided (mL): 1050 mL  Total OUT: 1050 mL    Total NET: 330 mL      02-20 @ 07:01  -  02-20 @ 11:55  --------------------------------------------------------  IN:    Oral Fluid: 120 mL  Total IN: 120 mL    OUT:    Voided (mL): 125 mL  Total OUT: 125 mL    Total NET: -5 mL    PHYSICAL EXAM:    GEN: NAD, awake and alert. No drooling or pooling of secretions. No stridor or stertor. Good vocal quality, no hoarseness.   SKIN: pale, non diaphoretic  HEENT: NC/AT; Oral mucosa pink and moist. No erythema or edema noted to buccal mucosa, tongue, FOM, uvula or posterior oropharynx. Uvula midline. no active bleeding or clots noted to post OP  NARES: + 7.5 rapid rhino noted in the LEFT nare, no bleeding or clots noted surrounding area.   NECK:  Trachea midline. Neck supple, no TTP to B/L lateral neck, no cervical LAD.  RESP: No dyspnea, non-labored breathing. No use of accessory muscles. + NRB  CARDIO: +S1/S2  ABDO: Soft, NT.  EXT: TIJERINA x 4    LABS:  CBC-                        11.4   12.91 )-----------( 163      ( 20 Feb 2021 04:30 )             34.7     BMP/CMP-  20 Feb 2021 04:30    137    |  97     |  30     ----------------------------<  174    5.4     |  32     |  1.0      Ca    8.2        20 Feb 2021 04:30  Mg     2.1       20 Feb 2021 04:30    TPro  5.2    /  Alb  3.0    /  TBili  0.3    /  DBili  x      /  AST  26     /  ALT  30     /  AlkPhos  73     20 Feb 2021 04:30    Coagulation Studies-    Endocrine Panel-  Calcium, Total Serum: 8.2 mg/dL (02-20 @ 04:30)

## 2021-02-21 LAB
ALBUMIN SERPL ELPH-MCNC: 3 G/DL — LOW (ref 3.5–5.2)
ALP SERPL-CCNC: 74 U/L — SIGNIFICANT CHANGE UP (ref 30–115)
ALT FLD-CCNC: 49 U/L — HIGH (ref 0–41)
ANION GAP SERPL CALC-SCNC: 8 MMOL/L — SIGNIFICANT CHANGE UP (ref 7–14)
AST SERPL-CCNC: 36 U/L — SIGNIFICANT CHANGE UP (ref 0–41)
BASOPHILS # BLD AUTO: 0.02 K/UL — SIGNIFICANT CHANGE UP (ref 0–0.2)
BASOPHILS NFR BLD AUTO: 0.1 % — SIGNIFICANT CHANGE UP (ref 0–1)
BILIRUB SERPL-MCNC: 0.2 MG/DL — SIGNIFICANT CHANGE UP (ref 0.2–1.2)
BUN SERPL-MCNC: 37 MG/DL — HIGH (ref 10–20)
CALCIUM SERPL-MCNC: 8.1 MG/DL — LOW (ref 8.5–10.1)
CHLORIDE SERPL-SCNC: 97 MMOL/L — LOW (ref 98–110)
CO2 SERPL-SCNC: 31 MMOL/L — SIGNIFICANT CHANGE UP (ref 17–32)
CREAT SERPL-MCNC: 0.9 MG/DL — SIGNIFICANT CHANGE UP (ref 0.7–1.5)
D DIMER BLD IA.RAPID-MCNC: 855 NG/ML DDU — HIGH (ref 0–230)
EOSINOPHIL # BLD AUTO: 0 K/UL — SIGNIFICANT CHANGE UP (ref 0–0.7)
EOSINOPHIL NFR BLD AUTO: 0 % — SIGNIFICANT CHANGE UP (ref 0–8)
GLUCOSE BLDC GLUCOMTR-MCNC: 140 MG/DL — HIGH (ref 70–99)
GLUCOSE BLDC GLUCOMTR-MCNC: 214 MG/DL — HIGH (ref 70–99)
GLUCOSE BLDC GLUCOMTR-MCNC: 241 MG/DL — HIGH (ref 70–99)
GLUCOSE SERPL-MCNC: 214 MG/DL — HIGH (ref 70–99)
HCT VFR BLD CALC: 33.6 % — LOW (ref 42–52)
HCT VFR BLD CALC: 35.5 % — LOW (ref 42–52)
HGB BLD-MCNC: 11.1 G/DL — LOW (ref 14–18)
HGB BLD-MCNC: 11.8 G/DL — LOW (ref 14–18)
IMM GRANULOCYTES NFR BLD AUTO: 1.3 % — HIGH (ref 0.1–0.3)
LYMPHOCYTES # BLD AUTO: 0.72 K/UL — LOW (ref 1.2–3.4)
LYMPHOCYTES # BLD AUTO: 5.2 % — LOW (ref 20.5–51.1)
MAGNESIUM SERPL-MCNC: 2.1 MG/DL — SIGNIFICANT CHANGE UP (ref 1.8–2.4)
MCHC RBC-ENTMCNC: 26.1 PG — LOW (ref 27–31)
MCHC RBC-ENTMCNC: 26.3 PG — LOW (ref 27–31)
MCHC RBC-ENTMCNC: 33 G/DL — SIGNIFICANT CHANGE UP (ref 32–37)
MCHC RBC-ENTMCNC: 33.2 G/DL — SIGNIFICANT CHANGE UP (ref 32–37)
MCV RBC AUTO: 78.9 FL — LOW (ref 80–94)
MCV RBC AUTO: 79.2 FL — LOW (ref 80–94)
MONOCYTES # BLD AUTO: 0.53 K/UL — SIGNIFICANT CHANGE UP (ref 0.1–0.6)
MONOCYTES NFR BLD AUTO: 3.9 % — SIGNIFICANT CHANGE UP (ref 1.7–9.3)
NEUTROPHILS # BLD AUTO: 12.27 K/UL — HIGH (ref 1.4–6.5)
NEUTROPHILS NFR BLD AUTO: 89.5 % — HIGH (ref 42.2–75.2)
NRBC # BLD: 0 /100 WBCS — SIGNIFICANT CHANGE UP (ref 0–0)
NRBC # BLD: 0 /100 WBCS — SIGNIFICANT CHANGE UP (ref 0–0)
PLATELET # BLD AUTO: 192 K/UL — SIGNIFICANT CHANGE UP (ref 130–400)
PLATELET # BLD AUTO: 210 K/UL — SIGNIFICANT CHANGE UP (ref 130–400)
POTASSIUM SERPL-MCNC: 5 MMOL/L — SIGNIFICANT CHANGE UP (ref 3.5–5)
POTASSIUM SERPL-SCNC: 5 MMOL/L — SIGNIFICANT CHANGE UP (ref 3.5–5)
PROT SERPL-MCNC: 5.3 G/DL — LOW (ref 6–8)
RBC # BLD: 4.26 M/UL — LOW (ref 4.7–6.1)
RBC # BLD: 4.48 M/UL — LOW (ref 4.7–6.1)
RBC # FLD: 16.6 % — HIGH (ref 11.5–14.5)
RBC # FLD: 16.9 % — HIGH (ref 11.5–14.5)
SODIUM SERPL-SCNC: 136 MMOL/L — SIGNIFICANT CHANGE UP (ref 135–146)
WBC # BLD: 13.72 K/UL — HIGH (ref 4.8–10.8)
WBC # BLD: 16.81 K/UL — HIGH (ref 4.8–10.8)
WBC # FLD AUTO: 13.72 K/UL — HIGH (ref 4.8–10.8)
WBC # FLD AUTO: 16.81 K/UL — HIGH (ref 4.8–10.8)

## 2021-02-21 PROCEDURE — 99231 SBSQ HOSP IP/OBS SF/LOW 25: CPT

## 2021-02-21 PROCEDURE — 99223 1ST HOSP IP/OBS HIGH 75: CPT | Mod: CS

## 2021-02-21 PROCEDURE — 71045 X-RAY EXAM CHEST 1 VIEW: CPT | Mod: 26

## 2021-02-21 PROCEDURE — 99232 SBSQ HOSP IP/OBS MODERATE 35: CPT | Mod: CS

## 2021-02-21 RX ORDER — ENOXAPARIN SODIUM 100 MG/ML
40 INJECTION SUBCUTANEOUS DAILY
Refills: 0 | Status: DISCONTINUED | OUTPATIENT
Start: 2021-02-21 | End: 2021-02-21

## 2021-02-21 RX ORDER — ACETAMINOPHEN 500 MG
325 TABLET ORAL ONCE
Refills: 0 | Status: COMPLETED | OUTPATIENT
Start: 2021-02-21 | End: 2021-02-21

## 2021-02-21 RX ORDER — FUROSEMIDE 40 MG
40 TABLET ORAL ONCE
Refills: 0 | Status: COMPLETED | OUTPATIENT
Start: 2021-02-21 | End: 2021-02-21

## 2021-02-21 RX ORDER — PANTOPRAZOLE SODIUM 20 MG/1
40 TABLET, DELAYED RELEASE ORAL
Refills: 0 | Status: DISCONTINUED | OUTPATIENT
Start: 2021-02-21 | End: 2021-02-23

## 2021-02-21 RX ADMIN — Medication 4: at 12:00

## 2021-02-21 RX ADMIN — Medication 650 MILLIGRAM(S): at 11:06

## 2021-02-21 RX ADMIN — Medication 4: at 06:40

## 2021-02-21 RX ADMIN — Medication 60 MILLIGRAM(S): at 05:22

## 2021-02-21 RX ADMIN — Medication 40 MILLIGRAM(S): at 11:07

## 2021-02-21 RX ADMIN — SODIUM ZIRCONIUM CYCLOSILICATE 10 GRAM(S): 10 POWDER, FOR SUSPENSION ORAL at 16:46

## 2021-02-21 RX ADMIN — Medication 9 UNIT(S): at 16:55

## 2021-02-21 RX ADMIN — Medication 1 APPLICATION(S): at 05:20

## 2021-02-21 RX ADMIN — PANTOPRAZOLE SODIUM 40 MILLIGRAM(S): 20 TABLET, DELAYED RELEASE ORAL at 06:32

## 2021-02-21 RX ADMIN — Medication 325 MILLIGRAM(S): at 06:50

## 2021-02-21 RX ADMIN — INSULIN GLARGINE 20 UNIT(S): 100 INJECTION, SOLUTION SUBCUTANEOUS at 23:05

## 2021-02-21 RX ADMIN — Medication 60 MILLIGRAM(S): at 16:46

## 2021-02-21 RX ADMIN — Medication 9 UNIT(S): at 12:00

## 2021-02-21 RX ADMIN — Medication 1 APPLICATION(S): at 17:37

## 2021-02-21 RX ADMIN — Medication 50 MICROGRAM(S): at 05:21

## 2021-02-21 RX ADMIN — TAMSULOSIN HYDROCHLORIDE 0.8 MILLIGRAM(S): 0.4 CAPSULE ORAL at 23:03

## 2021-02-21 RX ADMIN — POLYETHYLENE GLYCOL 3350 17 GRAM(S): 17 POWDER, FOR SOLUTION ORAL at 11:06

## 2021-02-21 RX ADMIN — Medication 650 MILLIGRAM(S): at 05:31

## 2021-02-21 RX ADMIN — Medication 25 MILLIGRAM(S): at 05:22

## 2021-02-21 RX ADMIN — SODIUM ZIRCONIUM CYCLOSILICATE 10 GRAM(S): 10 POWDER, FOR SUSPENSION ORAL at 05:23

## 2021-02-21 RX ADMIN — Medication 9 UNIT(S): at 06:40

## 2021-02-21 RX ADMIN — SODIUM ZIRCONIUM CYCLOSILICATE 10 GRAM(S): 10 POWDER, FOR SUSPENSION ORAL at 22:52

## 2021-02-21 RX ADMIN — SENNA PLUS 2 TABLET(S): 8.6 TABLET ORAL at 22:52

## 2021-02-21 RX ADMIN — PANTOPRAZOLE SODIUM 40 MILLIGRAM(S): 20 TABLET, DELAYED RELEASE ORAL at 17:36

## 2021-02-21 NOTE — PROGRESS NOTE ADULT - ASSESSMENT
71 yo M with PMH of CAD, MI (s/p 4 stents), DM, HTN, BPH, hypothyroid and GERD, recently received Pfizer vaccine (1/3 and 1/24) presented to ED c/o progressive SOB, fever, and weakness. Patient stated that his symptoms began on Wednesday 2/3. COVID PCR + on 2/4. Admitted to medicine with acute hypoxemic respiratory failure secondary to COVID-19 PNA. Upgraded to ICU from 3A due to increasing oxygen requirements    #Acute hypoxemic respiratory failure secondary to COVID-19 PNA   #likely superimposed bacterial pneumonia   - Patient s/p  2 doses of the Pfizer vaccine (last dose 1/24)  - CTA 02/04 showed extensive bilateral patchy ground-glass opacities involving all lobes most pronounced in the upper lung zones  - s/p 1 unit plasma  - s/p Remdesivir completed 2/9  - s/p 1 week of rocephin & Levaquin per ID  - inflammatory markers monitor q48hrs  Ddimer: 180 > 543 > 939 > 1758 > 1751  Procal: 1.61 > 0.97 > 0.32 > 0.13 > 0.08 > 0.12 > 0.15  Ferritin: 404 > 625 >   CRP: 22.67 > 7.54 > 4.26 > 0.54 > 0.60 > 3.30  - LE Duplex negative 2/8 and 2/14   - PT/OT following  - increased to solumedrol 60mg IV BID   - f/u fungitell   - asked patient today that while O2 is stable, there is always risk for intubation, he wishes to remain full code with intubation as last resort     #epistaxis   - likely due to HFNC   - recurrent, patient had second bleed; ENT following   - prasugrel and lovenox ppx on hold   - monitor CBCs     #Constipation   - c/w miralax, senna     #CAD  #MI s/p 4 stents  - C/w Effient 10mg daily  - C/w Toprol 25mg daily    #HTN   - C/w enalapril 2.5mg daily    #BPH  - C/w tamsulosin 0.4mg PO QHS    #DM   - Monitor FS, c/w insulin regimen   - A1C 9.1-->only on glipizide as outpatient   - c/w counselling    #Hypothyroidism  - C/w Synthroid    #GERD  - c/w protonix    Diet: consistent carbs, DASH/TLC + Glucerna   DVT PPx: hold due to epistaxis   GI ppx: protonix  Code Status: FULL CODE  Dispo: acute, micu monitoring, PT/OT; monitor and possibly wean O2     Family contact in case of emergency. Patient states we do not need to regularly call family everyday.   Ami Odonnell - Wife  [phone]     71 yo M with PMH of CAD, MI (s/p 4 stents), DM, HTN, BPH, hypothyroid and GERD, recently received Pfizer vaccine (1/3 and 1/24) presented to ED c/o progressive SOB, fever, and weakness. Patient stated that his symptoms began on Wednesday 2/3. COVID PCR + on 2/4. Admitted to medicine with acute hypoxemic respiratory failure secondary to COVID-19 PNA. Upgraded to ICU from 3A due to increasing oxygen requirements    #Acute hypoxemic respiratory failure secondary to COVID-19 PNA   #likely superimposed bacterial pneumonia   - Patient s/p  2 doses of the Pfizer vaccine (last dose 1/24)  - CTA 02/04 showed extensive bilateral patchy ground-glass opacities involving all lobes most pronounced in the upper lung zones  - s/p 1 unit plasma  - s/p Remdesivir completed 2/9  - s/p 1 week of rocephin & Levaquin per ID  - inflammatory markers monitor q48hrs  Ddimer: 180 > 543 > 939 > 1758 > 1751  Procal: 1.61 > 0.97 > 0.32 > 0.13 > 0.08 > 0.12 > 0.15  Ferritin: 404 > 625 >   CRP: 22.67 > 7.54 > 4.26 > 0.54 > 0.60 > 3.30  - LE Duplex negative 2/8 and 2/14   - PT/OT following  - increased to solumedrol 60mg IV q8hrs; will give one dose lasix today  - f/u fungitell   - asked patient today that while O2 is stable, there is always risk for intubation, he wishes to remain full code with intubation as last resort     #epistaxis   - likely due to HFNC   - recurrent, patient had second bleed; ENT following   - prasugrel and lovenox ppx on hold   - monitor CBCs     #Constipation   - c/w miralax, senna     #CAD  #MI s/p 4 stents  - C/w Effient 10mg daily  - C/w Toprol 25mg daily    #HTN   - C/w enalapril 2.5mg daily    #BPH  - C/w tamsulosin 0.4mg PO QHS    #DM   - Monitor FS, c/w insulin regimen   - A1C 9.1-->only on glipizide as outpatient   - c/w counselling    #Hypothyroidism  - C/w Synthroid    #GERD  - c/w protonix    Diet: consistent carbs, DASH/TLC + Glucerna   DVT PPx: hold due to epistaxis   GI ppx: protonix  Code Status: FULL CODE  Dispo: acute, micu monitoring, PT/OT; monitor and possibly wean O2     Family contact in case of emergency. Patient states we do not need to regularly call family everyday.   Ami Odonnell - Wife  [phone]  If wife does not answer: Francisca - daughter  [phone]     71 yo M with PMH of CAD, MI (s/p 4 stents), DM, HTN, BPH, hypothyroid and GERD, recently received Pfizer vaccine (1/3 and 1/24) presented to ED c/o progressive SOB, fever, and weakness. Patient stated that his symptoms began on Wednesday 2/3. COVID PCR + on 2/4. Admitted to medicine with acute hypoxemic respiratory failure secondary to COVID-19 PNA. Upgraded to ICU from 3A due to increasing oxygen requirements    #Acute hypoxemic respiratory failure secondary to COVID-19 PNA   #likely superimposed bacterial pneumonia   - Patient s/p  2 doses of the Pfizer vaccine (last dose 1/24)  - CTA 02/04 showed extensive bilateral patchy ground-glass opacities involving all lobes most pronounced in the upper lung zones  - s/p 1 unit plasma  - s/p Remdesivir completed 2/9  - s/p 1 week of rocephin & Levaquin per ID  - inflammatory markers monitor q48hrs  Ddimer: 180 > 543 > 939 > 1758 > 1751  Procal: 1.61 > 0.97 > 0.32 > 0.13 > 0.08 > 0.12 > 0.15  Ferritin: 404 > 625 >   CRP: 22.67 > 7.54 > 4.26 > 0.54 > 0.60 > 3.30  - LE Duplex negative 2/8 and 2/14   - PT/OT following  - increased to solumedrol 60mg IV q8hrs; will give one dose lasix today  - f/u fungitell   - asked patient today that while O2 is stable, there is always risk for intubation, he wishes to remain full code with intubation as last resort     #epistaxis   - likely due to HFNC   - recurrent, patient had second bleed; ENT following   - prasugrel and lovenox ppx on hold   - monitor CBCs     #Constipation   - c/w miralax, senna     #CAD/STEMI s/p 4 stents  - Hx of STEMI s/p  s/p PCI of distal RCA, mid/distal LAD ( December 2017 and February 2018)  - Oct 2019: 100% occlusion of distal RCA s/p PCI with REUBEN to distal RCA ,and POBA to RPL  - will consult cardiology regarding benefit/risks of holding effient due to epistaxis   - o/p cardio: Dr. Murillo   - C/w Toprol 25mg daily    #HTN   - C/w enalapril 2.5mg daily    #BPH  - C/w tamsulosin 0.4mg PO QHS    #DM   - Monitor FS, c/w insulin regimen   - A1C 9.1-->only on glipizide as outpatient   - c/w counselling    #Hypothyroidism  - C/w Synthroid    #GERD  - c/w protonix    Diet: consistent carbs, DASH/TLC + Glucerna   DVT PPx: hold due to epistaxis   GI ppx: protonix  Code Status: FULL CODE  Dispo: acute, micu monitoring, PT/OT; monitor and possibly wean O2     Family contact in case of emergency. Patient states we do not need to regularly call family everyday.   Ami Odonnell - Wife  [phone]  If wife does not answer: Francisca - daughter  [phone]     69 yo M with PMH of CAD, MI (s/p 4 stents), DM, HTN, BPH, hypothyroid and GERD, recently received Pfizer vaccine (1/3 and 1/24) presented to ED c/o progressive SOB, fever, and weakness. Patient stated that his symptoms began on Wednesday 2/3. COVID PCR + on 2/4. Admitted to medicine with acute hypoxemic respiratory failure secondary to COVID-19 PNA. Upgraded to ICU from 3A due to increasing oxygen requirements    #Acute hypoxemic respiratory failure secondary to COVID-19 PNA   #likely superimposed bacterial pneumonia   - Patient s/p  2 doses of the Pfizer vaccine (last dose 1/24)  - CTA 02/04 showed extensive bilateral patchy ground-glass opacities involving all lobes most pronounced in the upper lung zones  - s/p 1 unit plasma  - s/p Remdesivir completed 2/9  - s/p 1 week of rocephin & Levaquin per ID  - inflammatory markers monitor q48hrs  Ddimer: 180 > 543 > 939 > 1758 > 1751  Procal: 1.61 > 0.97 > 0.32 > 0.13 > 0.08 > 0.12 > 0.15  Ferritin: 404 > 625 >   CRP: 22.67 > 7.54 > 4.26 > 0.54 > 0.60 > 3.30  - LE Duplex negative 2/8 and 2/14   - PT/OT following  - increased to solumedrol 60mg IV q8hrs; will give one dose lasix today  - f/u fungitell   - asked patient today that while O2 is stable, there is always risk for intubation, he wishes to remain full code with intubation as last resort     #epistaxis   - likely due to HFNC   - recurrent, patient had second bleed; ENT following   - prasugrel and lovenox ppx on hold   - monitor CBCs     #melena  - one time episode 2/21  - spoke to GI fellow, likely due to epistaxis   - will put on PPI IV BID and trend CBCs     #Constipation   - c/w miralax, senna     #CAD/STEMI s/p 4 stents  - Hx of STEMI s/p  s/p PCI of distal RCA, mid/distal LAD ( December 2017 and February 2018)  - Oct 2019: 100% occlusion of distal RCA s/p PCI with REUBEN to distal RCA ,and POBA to RPL  - will consult cardiology regarding benefit/risks of holding effient due to epistaxis   - o/p cardio: Dr. Murillo   - C/w Toprol 25mg daily    #HTN   - C/w enalapril 2.5mg daily    #BPH  - C/w tamsulosin 0.4mg PO QHS    #DM   - Monitor FS, c/w insulin regimen   - A1C 9.1-->only on glipizide as outpatient   - c/w counselling    #Hypothyroidism  - C/w Synthroid    #GERD  - c/w protonix    Diet: consistent carbs, DASH/TLC + Glucerna   DVT PPx: hold due to epistaxis   GI ppx: protonix  Code Status: FULL CODE  Dispo: acute, micu monitoring, PT/OT; monitor and possibly wean O2     Family contact in case of emergency. Patient states we do not need to regularly call family everyday.   Ami Odonnell - Wife  [phone]  If wife does not answer: Francisca - daughter  [phone]

## 2021-02-21 NOTE — PROGRESS NOTE ADULT - ASSESSMENT
Pt is a 70y Male a/w COVID PNA, following for epistaxis - required reinsertion of packing 2/20 AM & another 1-2cc of air in balloon overnight. No active bleeding currently.     ·	removed 1 cc of air from balloon as pt c/o pain/pressure  ·	keep packing in place for 72 hours (Tues AM)  ·	pt concerned about re bleeding & asking about cauterization/embolization -> rediscussed options and need to reassess after 72 hours, pt understands  ·	cont bacitracin to R nare  ·	can consider spraying afrin x 1 dose to packing if oozing persists & if BP ok  ·	w/d with attng, will follow

## 2021-02-21 NOTE — PROGRESS NOTE ADULT - ASSESSMENT
IMPRESSION:    Acute hypoxemic respiratory failure on NRM  severe epistaxis sp packing  severe Covid PNA  Probable superimposed bacterial infection sp abx  COPD exacerbation  HO CAD    PLAN:    CNS: avoid CNS depressant    HEENT: Oral care    PULMONARY:  HOB @ 45 degrees.  Aspiration precautions.   NRM,   Solumedrol 60mg q12h. wean O2  low tolerance for intubation    CARDIOVASCULAR: Goal Map >60, Avoid volume overload.     GI: GI prophylaxis. feeding.     RENAL:  Follow up lytes.  Correct as needed    INFECTIOUS DISEASE: ABX PER ID  fungitell    HEMATOLOGICAL:  DVT prophylaxis.    ENDOCRINE:  Follow up FS.  Insulin protocol if needed.    MUSCULOSKELETAL: OOB to chair    PT/OT    Dispo: MICU IMPRESSION:    Acute hypoxemic respiratory failure on NRM  severe epistaxis sp packing  severe Covid PNA  Probable superimposed bacterial infection sp abx  COPD exacerbation  HO CAD    PLAN:    CNS: avoid CNS depressant    HEENT: Oral care ENT f/u  continue epistat    PULMONARY:  HOB @ 45 degrees.  Aspiration precautions.   NRM,   Solumedrol 60mg q12h. wean O2  low tolerance for intubation    CARDIOVASCULAR: Goal Map >60, Avoid volume overload.     GI: GI prophylaxis. feeding.     RENAL:  Follow up lytes.  Correct as needed    INFECTIOUS DISEASE:  empiric ABX given packing  fungitell    HEMATOLOGICAL:  DVT prophylaxis.    ENDOCRINE:  Follow up FS.  Insulin protocol if needed.    MUSCULOSKELETAL: OOB to chair    PT/OT    Dispo: MICU

## 2021-02-21 NOTE — PROGRESS NOTE ADULT - SUBJECTIVE AND OBJECTIVE BOX
ENT DAILY PROGRESS NOTE    Pt is a 70y Male a/w COVID PNA, following for epistaxis  - seen and examined at bedside. PT developed some oozing from the nare anteriorly, required additional 1-2cc to be placed into balloon overnight. Pt denies any active dripping down the back of his throat, no other episodes of bleeding in general. No change in respiratory status.       REVIEW OF SYSTEMS   [x] A ten-point review of systems was otherwise negative except as noted.    Allergies    penicillin (Rash (Severe))    Intolerances      MEDICATIONS:  acetaminophen   Tablet .. 650 milliGRAM(s) Oral every 6 hours PRN  ALPRAZolam 0.25 milliGRAM(s) Oral at bedtime PRN  BACItracin   Ointment 1 Application(s) Topical two times a day  chlorhexidine 4% Liquid 1 Application(s) Topical daily  dextrose 40% Gel 15 Gram(s) Oral once  dextrose 5%. 1000 milliLiter(s) IV Continuous <Continuous>  dextrose 5%. 1000 milliLiter(s) IV Continuous <Continuous>  dextrose 50% Injectable 25 Gram(s) IV Push once  dextrose 50% Injectable 12.5 Gram(s) IV Push once  dextrose 50% Injectable 25 Gram(s) IV Push once  glucagon  Injectable 1 milliGRAM(s) IntraMuscular once  guaifenesin/dextromethorphan  Syrup 5 milliLiter(s) Oral every 6 hours PRN  influenza   Vaccine 0.5 milliLiter(s) IntraMuscular once  insulin glargine Injectable (LANTUS) 20 Unit(s) SubCutaneous at bedtime  insulin lispro (ADMELOG) corrective regimen sliding scale   SubCutaneous three times a day before meals  insulin lispro Injectable (ADMELOG) 9 Unit(s) SubCutaneous three times a day before meals  levoFLOXacin IVPB      levoFLOXacin IVPB 750 milliGRAM(s) IV Intermittent once  levothyroxine 50 MICROGram(s) Oral daily  methylPREDNISolone sodium succinate Injectable 60 milliGRAM(s) IV Push every 8 hours  metoprolol succinate ER 25 milliGRAM(s) Oral daily  ondansetron    Tablet 4 milliGRAM(s) Oral two times a day PRN  pantoprazole  Injectable 40 milliGRAM(s) IV Push two times a day  polyethylene glycol 3350 17 Gram(s) Oral daily  senna 2 Tablet(s) Oral at bedtime  sodium zirconium cyclosilicate 10 Gram(s) Oral every 8 hours  tamsulosin 0.8 milliGRAM(s) Oral at bedtime      Vital Signs Last 24 Hrs  T(C): 36.1 (21 Feb 2021 08:00), Max: 36.6 (20 Feb 2021 20:00)  T(F): 96.9 (21 Feb 2021 08:00), Max: 97.8 (20 Feb 2021 20:00)  HR: 90 (21 Feb 2021 12:00) (68 - 96)  BP: 133/94 (21 Feb 2021 12:00) (119/64 - 152/75)  BP(mean): 107 (21 Feb 2021 12:00) (77 - 114)  RR: 29 (21 Feb 2021 12:00) (14 - 40)  SpO2: 95% (21 Feb 2021 12:00) (93% - 100%)      02-20 @ 07:01  -  02-21 @ 07:00  --------------------------------------------------------  IN:    Oral Fluid: 840 mL  Total IN: 840 mL    OUT:    Voided (mL): 1075 mL  Total OUT: 1075 mL    Total NET: -235 mL      02-21 @ 07:01  -  02-21 @ 13:18  --------------------------------------------------------  IN:  Total IN: 0 mL    OUT:    Voided (mL): 800 mL  Total OUT: 800 mL    Total NET: -800 mL    PHYSICAL EXAM:    GEN: Well-developed, well-nourished. NAD, awake and alert. No drooling or pooling of secretions. No stridor or stertor. Good vocal quality, no hoarseness.   SKIN: Good color, non diaphoretic  HEENT: NC/AT; Oral mucosa pink and moist. No erythema or edema noted to buccal mucosa, tongue, FOM, uvula or posterior oropharynx. Uvula midline. no active bleeding or clots noted to post OP.  NARES: + nasal packing noted to LEFT nare, no active bleeding or clots noted.   NECK:  Trachea midline. Neck supple, no TTP to B/L lateral neck, no cervical LAD.  RESP: No dyspnea, non-labored breathing. No use of accessory muscles. + NRBM  CARDIO: +S1/S2  ABDO: Soft, NT.  EXT: TIJERINA x 4    LABS:  CBC-                        11.1   13.72 )-----------( 192      ( 21 Feb 2021 04:20 )             33.6     BMP/CMP-  21 Feb 2021 04:20    136    |  97     |  37     ----------------------------<  214    5.0     |  31     |  0.9      Ca    8.1        21 Feb 2021 04:20  Mg     2.1       21 Feb 2021 04:20    TPro  5.3    /  Alb  3.0    /  TBili  0.2    /  DBili  x      /  AST  36     /  ALT  49     /  AlkPhos  74     21 Feb 2021 04:20    Coagulation Studies-    Endocrine Panel-  Calcium, Total Serum: 8.1 mg/dL (02-21 @ 04:20)              RADIOLOGY & ADDITIONAL STUDIES:

## 2021-02-21 NOTE — CONSULT NOTE ADULT - SUBJECTIVE AND OBJECTIVE BOX
Gastroenterology Consultation:    Patient is a 70y old  Male who presents with a chief complaint of COVID (21 Feb 2021 06:06)      Admitted on: 02-04-21  HPI:  70 year old male with PMH of CAD, STEMI, NSTEMI (s/p 4 stents 2019 last one on prasugrel), DM, HTN, BPH, hypothyroid and GERD presents to ED c/o progressive SOB, fever, and weakness x 4 days.  positive COVID 19 swab. In the ED, pt's T 37.8, . WBC 14, D-dimer 329, BUN/Cr 23/1.8, GFR 38. CTA negative for PE but consistent with COVID 19 pneumonia.     patient has been monitored in ICU was HFNC. stay complicated by epistaxis requiring packing     Prior records Reviewed (Y/N):  History obtained from person other than patient (Y/N):    Prior EGD:  Prior Colonoscopy:      PAST MEDICAL & SURGICAL HISTORY:  Chronic kidney disease (CKD)  III    Type 2 diabetes mellitus without complication, unspecified long term insulin use status    Hypertension, unspecified type    Dyspnea, unspecified type    ASHD (arteriosclerotic heart disease)  STEMI 12/31/17, PCI RCA    S/P cataract surgery    History of ligation of vein  2012    History of tonsillectomy  1957        FAMILY HISTORY:  Family history of myocardial infarction (Mother)        Social History:  Tobacco:  Alcohol:  Drugs:    Home Medications:  enalapril 2.5 mg oral tablet: 1 tab(s) orally once a day (04 Feb 2021 17:59)  ezetimibe 10 mg oral tablet: 1 tab(s) orally once a day (04 Feb 2021 17:59)  Flomax 0.4 mg oral capsule: 1 cap(s) orally once a day (04 Feb 2021 17:59)  glipiZIDE 5 mg oral tablet: 1 tab(s) orally once a day (04 Feb 2021 17:59)  metoprolol succinate 25 mg oral tablet, extended release: 1 tab(s) orally once a day (04 Feb 2021 17:59)  Synthroid 50 mcg (0.05 mg) oral tablet: 1 tab(s) orally once a day (04 Feb 2021 17:59)    MEDICATIONS  (STANDING):  BACItracin   Ointment 1 Application(s) Topical two times a day  chlorhexidine 4% Liquid 1 Application(s) Topical daily  dextrose 40% Gel 15 Gram(s) Oral once  dextrose 5%. 1000 milliLiter(s) (50 mL/Hr) IV Continuous <Continuous>  dextrose 5%. 1000 milliLiter(s) (100 mL/Hr) IV Continuous <Continuous>  dextrose 50% Injectable 25 Gram(s) IV Push once  dextrose 50% Injectable 12.5 Gram(s) IV Push once  dextrose 50% Injectable 25 Gram(s) IV Push once  glucagon  Injectable 1 milliGRAM(s) IntraMuscular once  influenza   Vaccine 0.5 milliLiter(s) IntraMuscular once  insulin glargine Injectable (LANTUS) 20 Unit(s) SubCutaneous at bedtime  insulin lispro (ADMELOG) corrective regimen sliding scale   SubCutaneous three times a day before meals  insulin lispro Injectable (ADMELOG) 9 Unit(s) SubCutaneous three times a day before meals  levoFLOXacin IVPB      levoFLOXacin IVPB 750 milliGRAM(s) IV Intermittent once  levothyroxine 50 MICROGram(s) Oral daily  methylPREDNISolone sodium succinate Injectable 60 milliGRAM(s) IV Push every 8 hours  metoprolol succinate ER 25 milliGRAM(s) Oral daily  pantoprazole  Injectable 40 milliGRAM(s) IV Push two times a day  polyethylene glycol 3350 17 Gram(s) Oral daily  senna 2 Tablet(s) Oral at bedtime  sodium zirconium cyclosilicate 10 Gram(s) Oral every 8 hours  tamsulosin 0.8 milliGRAM(s) Oral at bedtime    MEDICATIONS  (PRN):  acetaminophen   Tablet .. 650 milliGRAM(s) Oral every 6 hours PRN Temp greater or equal to 38C (100.4F), Mild Pain (1 - 3)  ALPRAZolam 0.25 milliGRAM(s) Oral at bedtime PRN Anxiety  guaifenesin/dextromethorphan  Syrup 5 milliLiter(s) Oral every 6 hours PRN Cough  ondansetron    Tablet 4 milliGRAM(s) Oral two times a day PRN Nausea and/or Vomiting      Allergies  penicillin (Rash (Severe))      Review of Systems:   Constitutional:  No Fever, No Chills  ENT/Mouth:  No Hearing Changes,  No Difficulty Swallowing  Eyes:  No Eye Pain, No Vision Changes  Cardiovascular:  No Chest Pain, No Palpitations  Respiratory:  No Cough, No Dyspnea  Gastrointestinal:  As described in HPI  Musculoskeletal:  No Joint Swelling, No Back Pain  Skin:  No Skin Lesions, No Jaundice  Neuro:  No Syncope, No Dizziness  Heme/Lymph:  No Bruising, No Bleeding.          Physical Examination:  T(C): 36.1 (02-21-21 @ 08:00), Max: 36.6 (02-20-21 @ 20:00)  HR: 90 (02-21-21 @ 12:00) (68 - 102)  BP: 133/94 (02-21-21 @ 12:00) (119/64 - 152/75)  RR: 29 (02-21-21 @ 12:00) (14 - 40)  SpO2: 95% (02-21-21 @ 12:00) (93% - 100%)      02-19-21 @ 07:01  -  02-20-21 @ 07:00  --------------------------------------------------------  IN: 1380 mL / OUT: 1050 mL / NET: 330 mL    02-20-21 @ 07:01  -  02-21-21 @ 07:00  --------------------------------------------------------  IN: 840 mL / OUT: 1075 mL / NET: -235 mL    02-21-21 @ 07:01  -  02-21-21 @ 12:52  --------------------------------------------------------  IN: 0 mL / OUT: 800 mL / NET: -800 mL        Constitutional: No acute distress.  Eyes:. Conjunctivae are clear, Sclera is non-icteric.  Ears Nose and Throat: The external ears are normal appearing,  Oral mucosa is pink and moist.  Respiratory:  No signs of respiratory distress. Lung sounds are clear bilaterally.  Cardiovascular:  S1 S2, Regular rate and rhythm.  GI: Abdomen is soft, symmetric, and non-tender without distention. There are no visible lesions or scars. Bowel sounds are present and normoactive in all four quadrants. No masses, hepatomegaly, or splenomegaly are noted.   Neuro: No Tremor, No involuntary movements  Skin: No rashes, No Jaundice.          Data: (reviewed by attending)                        11.1   13.72 )-----------( 192      ( 21 Feb 2021 04:20 )             33.6     Hgb Trend:  11.1  02-21-21 @ 04:20  11.4  02-20-21 @ 04:30  12.1  02-19-21 @ 04:50  12.2  02-18-21 @ 17:10        02-21    136  |  97<L>  |  37<H>  ----------------------------<  214<H>  5.0   |  31  |  0.9    Ca    8.1<L>      21 Feb 2021 04:20  Mg     2.1     02-21    TPro  5.3<L>  /  Alb  3.0<L>  /  TBili  0.2  /  DBili  x   /  AST  36  /  ALT  49<H>  /  AlkPhos  74  02-21    Liver panel trend:  TBili 0.2   /   AST 36   /   ALT 49   /   AlkP 74   /   Tptn 5.3   /   Alb 3.0    /   DBili --      02-21  TBili 0.3   /   AST 26   /   ALT 30   /   AlkP 73   /   Tptn 5.2   /   Alb 3.0    /   DBili --      02-20  TBili 0.5   /   AST 29   /   ALT 28   /   AlkP 80   /   Tptn 5.0   /   Alb 2.7    /   DBili --      02-19  TBili 0.4   /   AST 29   /   ALT 33   /   AlkP 77   /   Tptn 5.3   /   Alb 3.0    /   DBili --      02-18  TBili 0.3   /   AST 27   /   ALT 32   /   AlkP 90   /   Tptn 4.9   /   Alb 2.8    /   DBili --      02-17  TBili 0.3   /   AST 33   /   ALT 38   /   AlkP 85   /   Tptn 5.2   /   Alb 2.8    /   DBili --      02-16  TBili 0.4   /   AST 27   /   ALT 39   /   AlkP 78   /   Tptn 5.0   /   Alb 2.8    /   DBili --      02-15  TBili 0.5   /   AST 36   /   ALT 45   /   AlkP 88   /   Tptn 5.1   /   Alb 3.1    /   DBili --      02-14  TBili 0.4   /   AST 40   /   ALT 40   /   AlkP 88   /   Tptn 5.4   /   Alb 3.0    /   DBili --      02-13  TBili 0.4   /   AST 33   /   ALT 33   /   AlkP 74   /   Tptn 5.1   /   Alb 3.0    /   DBili --      02-12              Radiology:(reviewed by attending)       Gastroenterology Consultation:    Patient is a 70y old  Male who presents with a chief complaint of COVID (21 Feb 2021 06:06)      Admitted on: 02-04-21  HPI:  70 year old male with PMH of CAD, STEMI, NSTEMI (s/p 4 stents 2019 last one on prasugrel), DM, HTN, BPH, hypothyroid and GERD presents to ED c/o progressive SOB, fever, and weakness x 4 days.  positive COVID 19 swab. In the ED, pt's T 37.8, . WBC 14, D-dimer 329, BUN/Cr 23/1.8, GFR 38. CTA negative for PE but consistent with COVID 19 pneumonia.     patient has been monitored in ICU was HFNC. stay complicated by epistaxis for the last 3 days requiring packing by ENT  patient denies, epigastric pain, hematemesis.     Prior records Reviewed (Y/N):Y  History obtained from person other than patient (Y/N): N    Prior EGD: previously normal per the patient  Prior Colonoscopy: never      PAST MEDICAL & SURGICAL HISTORY:  Chronic kidney disease (CKD)  III    Type 2 diabetes mellitus without complication, unspecified long term insulin use status    Hypertension, unspecified type    Dyspnea, unspecified type    ASHD (arteriosclerotic heart disease)  STEMI 12/31/17, PCI RCA    S/P cataract surgery    History of ligation of vein  2012    History of tonsillectomy  1957        FAMILY HISTORY:  Family history of myocardial infarction (Mother)        Social History:  Tobacco: former  Alcohol: N  Drugs: N    Home Medications:  enalapril 2.5 mg oral tablet: 1 tab(s) orally once a day (04 Feb 2021 17:59)  ezetimibe 10 mg oral tablet: 1 tab(s) orally once a day (04 Feb 2021 17:59)  Flomax 0.4 mg oral capsule: 1 cap(s) orally once a day (04 Feb 2021 17:59)  glipiZIDE 5 mg oral tablet: 1 tab(s) orally once a day (04 Feb 2021 17:59)  metoprolol succinate 25 mg oral tablet, extended release: 1 tab(s) orally once a day (04 Feb 2021 17:59)  Synthroid 50 mcg (0.05 mg) oral tablet: 1 tab(s) orally once a day (04 Feb 2021 17:59)    MEDICATIONS  (STANDING):  BACItracin   Ointment 1 Application(s) Topical two times a day  chlorhexidine 4% Liquid 1 Application(s) Topical daily  dextrose 40% Gel 15 Gram(s) Oral once  dextrose 5%. 1000 milliLiter(s) (50 mL/Hr) IV Continuous <Continuous>  dextrose 5%. 1000 milliLiter(s) (100 mL/Hr) IV Continuous <Continuous>  dextrose 50% Injectable 25 Gram(s) IV Push once  dextrose 50% Injectable 12.5 Gram(s) IV Push once  dextrose 50% Injectable 25 Gram(s) IV Push once  glucagon  Injectable 1 milliGRAM(s) IntraMuscular once  influenza   Vaccine 0.5 milliLiter(s) IntraMuscular once  insulin glargine Injectable (LANTUS) 20 Unit(s) SubCutaneous at bedtime  insulin lispro (ADMELOG) corrective regimen sliding scale   SubCutaneous three times a day before meals  insulin lispro Injectable (ADMELOG) 9 Unit(s) SubCutaneous three times a day before meals  levoFLOXacin IVPB      levoFLOXacin IVPB 750 milliGRAM(s) IV Intermittent once  levothyroxine 50 MICROGram(s) Oral daily  methylPREDNISolone sodium succinate Injectable 60 milliGRAM(s) IV Push every 8 hours  metoprolol succinate ER 25 milliGRAM(s) Oral daily  pantoprazole  Injectable 40 milliGRAM(s) IV Push two times a day  polyethylene glycol 3350 17 Gram(s) Oral daily  senna 2 Tablet(s) Oral at bedtime  sodium zirconium cyclosilicate 10 Gram(s) Oral every 8 hours  tamsulosin 0.8 milliGRAM(s) Oral at bedtime    MEDICATIONS  (PRN):  acetaminophen   Tablet .. 650 milliGRAM(s) Oral every 6 hours PRN Temp greater or equal to 38C (100.4F), Mild Pain (1 - 3)  ALPRAZolam 0.25 milliGRAM(s) Oral at bedtime PRN Anxiety  guaifenesin/dextromethorphan  Syrup 5 milliLiter(s) Oral every 6 hours PRN Cough  ondansetron    Tablet 4 milliGRAM(s) Oral two times a day PRN Nausea and/or Vomiting      Allergies  penicillin (Rash (Severe))      Review of Systems:   Constitutional:  No Fever, No Chills  ENT/Mouth:  No Hearing Changes,  No Difficulty Swallowing  Eyes:  No Eye Pain, No Vision Changes  Cardiovascular:  No Chest Pain, No Palpitations  Respiratory:  +Cough,  +Dyspnea  Gastrointestinal:  As described in HPI  Musculoskeletal:  No Joint Swelling, No Back Pain  Skin:  No Skin Lesions, No Jaundice  Neuro:  No Syncope, No Dizziness     Physical Examination:  T(C): 36.1 (02-21-21 @ 08:00), Max: 36.6 (02-20-21 @ 20:00)  HR: 90 (02-21-21 @ 12:00) (68 - 102)  BP: 133/94 (02-21-21 @ 12:00) (119/64 - 152/75)  RR: 29 (02-21-21 @ 12:00) (14 - 40)  SpO2: 95% (02-21-21 @ 12:00) (93% - 100%)    Constitutional: No acute distress.  Eyes:. Conjunctivae are clear, Sclera is non-icteric.  Ears Nose and Throat: left nare with packing  Respiratory:  on non rebreather  Cardiovascular:  S1 S2, Regular rate and rhythm.  GI: Abdomen is soft, symmetric, and non-tender without distention. stool seen, melena  Neuro: No Tremor, No involuntary movements  Skin: No rashes, No Jaundice.    Data: (reviewed by attending)                        11.1   13.72 )-----------( 192      ( 21 Feb 2021 04:20 )             33.6     Hgb Trend:  11.1  02-21-21 @ 04:20  11.4  02-20-21 @ 04:30  12.1  02-19-21 @ 04:50  12.2  02-18-21 @ 17:10        02-21    136  |  97<L>  |  37<H>  ----------------------------<  214<H>  5.0   |  31  |  0.9    Ca    8.1<L>      21 Feb 2021 04:20  Mg     2.1     02-21    TPro  5.3<L>  /  Alb  3.0<L>  /  TBili  0.2  /  DBili  x   /  AST  36  /  ALT  49<H>  /  AlkPhos  74  02-21    Liver panel trend:  TBili 0.2   /   AST 36   /   ALT 49   /   AlkP 74   /   Tptn 5.3   /   Alb 3.0    /   DBili --      02-21  TBili 0.3   /   AST 26   /   ALT 30   /   AlkP 73   /   Tptn 5.2   /   Alb 3.0    /   DBili --      02-20  TBili 0.5   /   AST 29   /   ALT 28   /   AlkP 80   /   Tptn 5.0   /   Alb 2.7    /   DBili --      02-19  TBili 0.4   /   AST 29   /   ALT 33   /   AlkP 77   /   Tptn 5.3   /   Alb 3.0    /   DBili --      02-18  TBili 0.3   /   AST 27   /   ALT 32   /   AlkP 90   /   Tptn 4.9   /   Alb 2.8    /   DBili --      02-17  TBili 0.3   /   AST 33   /   ALT 38   /   AlkP 85   /   Tptn 5.2   /   Alb 2.8    /   DBili --      02-16  TBili 0.4   /   AST 27   /   ALT 39   /   AlkP 78   /   Tptn 5.0   /   Alb 2.8    /   DBili --      02-15  TBili 0.5   /   AST 36   /   ALT 45   /   AlkP 88   /   Tptn 5.1   /   Alb 3.1    /   DBili --      02-14  TBili 0.4   /   AST 40   /   ALT 40   /   AlkP 88   /   Tptn 5.4   /   Alb 3.0    /   DBili --      02-13  TBili 0.4   /   AST 33   /   ALT 33   /   AlkP 74   /   Tptn 5.1   /   Alb 3.0    /   DBili --      02-12    Radiology:(reviewed by attending)      < from: Xray Chest 1 View- PORTABLE-Routine (Xray Chest 1 View- PORTABLE-Routine in AM.) (02.21.21 @ 05:51) >    EXAM:  XR CHEST PORTABLE ROUTINE 1V            PROCEDURE DATE:  02/21/2021            INTERPRETATION:  Clinical History / Reason for exam: Dyspnea    Comparison : Chest radiograph 2/20/2021.    Technique/Positioning: Frontal.    Findings:    Support devices: None.    Cardiac/mediastinum/hilum: Indeterminate    Lung parenchyma/Pleura: Bilateral opacities unchanged. No pleural effusion or air leak    Skeleton/soft tissues: Unremarkable.    Impression:    Bilateral opacities unchanged. No pleural effusion or air leak    KIMBERLY BROSNON MD; Attending Radiologist  This document has been electronically signed. Feb 21 2021  3:34PM    < end of copied text >

## 2021-02-21 NOTE — PROGRESS NOTE ADULT - SUBJECTIVE AND OBJECTIVE BOX
SUBJECTIVE:    Patient is a 70y old Male who presents with a chief complaint of COVID PNA (19 Feb 2021 10:46)    Currently admitted to medicine with the primary diagnosis of Shortness of breath       Today is hospital day 17d.     Overnight patient had epistaxis and ENT saw patient.     This morning patient has no complaints.     PAST MEDICAL & SURGICAL HISTORY  Chronic kidney disease (CKD)  III    Type 2 diabetes mellitus without complication, unspecified long term insulin use status    Hypertension, unspecified type    Dyspnea, unspecified type    ASHD (arteriosclerotic heart disease)  STEMI 12/31/17, PCI RCA    S/P cataract surgery    History of ligation of vein  2012    History of tonsillectomy  1957        ALLERGIES:  penicillin (Rash (Severe))    MEDICATIONS:  STANDING MEDICATIONS  acetaminophen   Tablet .. 325 milliGRAM(s) Oral once  BACItracin   Ointment 1 Application(s) Topical two times a day  chlorhexidine 4% Liquid 1 Application(s) Topical daily  dextrose 40% Gel 15 Gram(s) Oral once  dextrose 5%. 1000 milliLiter(s) IV Continuous <Continuous>  dextrose 5%. 1000 milliLiter(s) IV Continuous <Continuous>  dextrose 50% Injectable 25 Gram(s) IV Push once  dextrose 50% Injectable 12.5 Gram(s) IV Push once  dextrose 50% Injectable 25 Gram(s) IV Push once  glucagon  Injectable 1 milliGRAM(s) IntraMuscular once  influenza   Vaccine 0.5 milliLiter(s) IntraMuscular once  insulin glargine Injectable (LANTUS) 20 Unit(s) SubCutaneous at bedtime  insulin lispro (ADMELOG) corrective regimen sliding scale   SubCutaneous three times a day before meals  insulin lispro Injectable (ADMELOG) 9 Unit(s) SubCutaneous three times a day before meals  levothyroxine 50 MICROGram(s) Oral daily  methylPREDNISolone sodium succinate Injectable 60 milliGRAM(s) IV Push every 12 hours  metoprolol succinate ER 25 milliGRAM(s) Oral daily  pantoprazole    Tablet 40 milliGRAM(s) Oral before breakfast  polyethylene glycol 3350 17 Gram(s) Oral daily  senna 2 Tablet(s) Oral at bedtime  sodium zirconium cyclosilicate 10 Gram(s) Oral every 8 hours  tamsulosin 0.8 milliGRAM(s) Oral at bedtime    PRN MEDICATIONS  acetaminophen   Tablet .. 650 milliGRAM(s) Oral every 6 hours PRN  ALPRAZolam 0.25 milliGRAM(s) Oral at bedtime PRN  guaifenesin/dextromethorphan  Syrup 5 milliLiter(s) Oral every 6 hours PRN  ondansetron    Tablet 4 milliGRAM(s) Oral two times a day PRN    VITALS:   T(F): 97.6  HR: 78  BP: 134/70  RR: 32  SpO2: 98%    LABS:                        11.1   13.72 )-----------( 192      ( 21 Feb 2021 04:20 )             33.6     02-21    136  |  97<L>  |  37<H>  ----------------------------<  214<H>  5.0   |  31  |  0.9    Ca    8.1<L>      21 Feb 2021 04:20  Mg     2.1     02-21    TPro  5.3<L>  /  Alb  3.0<L>  /  TBili  0.2  /  DBili  x   /  AST  36  /  ALT  49<H>  /  AlkPhos  74  02-21              PHYSICAL EXAM:  GEN: No acute distress  PULM/CHEST: Clear to auscultation bilaterally, no rales, rhonchi or wheezes   CVS: Regular rate and rhythm, S1-S2, no murmurs  ABD: Soft, non-tender, non-distended, +BS  EXT: No edema  NEURO: AAOx3

## 2021-02-21 NOTE — CONSULT NOTE ADULT - ASSESSMENT
70 year old male with PMH of CAD, STEMI, NSTEMI (s/p 4 stents 2019 last one on prasugrel), DM, HTN, BPH, hypothyroid and GERD presents to ED c/o progressive SOB, fever, and weakness x 4 days.  positive COVID 19 swab. In the ED, pt's T 37.8, . WBC 14, D-dimer 329, BUN/Cr 23/1.8, GFR 38. CTA negative for PE but consistent with COVID 19 pneumonia.     patient has been monitored in ICU was HFNC. stay complicated by epistaxis for the last 3 days requiring packing by ENT  patient denies, epigastric pain, hematemesis.     GI called for melena  -melena can be explained by epistaxis  -HD stable  -Hb dropped 1 unit    plan  -c/w ppi bid, can be given po  -ENT follow up  -consider cardiology evaluation before stopping antiplatelets as patient has multiple stents, STEMI and NSTEMI, cardiology dr Gan   -will sign off  -call as needed, [phone] during weekdays till 5pm and call GI service after 5pm and on weekends [phone]  -OP follow with his private GI

## 2021-02-21 NOTE — PROGRESS NOTE ADULT - SUBJECTIVE AND OBJECTIVE BOX
Patient is a 70y old  Male who presents with a chief complaint of COVID (21 Feb 2021 06:06)        HPI:  70 year old male with PMH of CAD, MI (s/p 4 stents), DM, HTN, BPH, hypothyroid and GERD presents to ED c/o progressive SOB, fever, and weakness x 4 days.  Pt also with recent positive COVID 19 swab + as outpatient. Pt had Pfizer COVID vaccine on 1/3 and 1/24. Pt reports chills, dry cough, and decreased po intake. He denies abd pain, N/V/D/C, lightheadedness CP.  In the ED, pt's T 37.8, . WBC 14, D-dimer 329, BUN/Cr 23/1.8, GFR 38, COVID positive.  CTA negative for PE but consistent with COVID 19 pneumonia.  Pt was given Decadron 6 mg x1 in the ED. pt saturating 99% on 3 L NC.   (04 Feb 2021 17:35)    Pt evaluated on AM rounds.  Interval Events: No overnight events.    REVIEW OF SYSTEMS:   see HPI      OBJECTIVE:  ICU Vital Signs Last 24 Hrs  T(C): 36.4 (21 Feb 2021 04:00), Max: 36.6 (20 Feb 2021 20:00)  T(F): 97.6 (21 Feb 2021 04:00), Max: 97.8 (20 Feb 2021 20:00)  HR: 72 (21 Feb 2021 06:00) (72 - 102)  BP: 138/81 (21 Feb 2021 06:00) (112/67 - 148/76)  BP(mean): 93 (21 Feb 2021 06:00) (77 - 114)  RR: 26 (21 Feb 2021 06:00) (14 - 40)  SpO2: 96% (21 Feb 2021 06:00) (86% - 100%)        02-20 @ 07:01  -  02-21 @ 07:00  --------------------------------------------------------  IN: 840 mL / OUT: 1075 mL / NET: -235 mL      CAPILLARY BLOOD GLUCOSE      POCT Blood Glucose.: 228 mg/dL (20 Feb 2021 19:43)        PHYSICAL EXAM:     · CONSTITUTIONAL:   not septic appearing,   well nourished,   NAD    · ENMT:   Airway patent,   Nasal mucosa clear.  Mouth with normal mucosa.   No thrush    · EYES:   Clear bilaterally,   pupils equal,   round and reactive to light.    · CARDIAC:   Normal rate,   regular rhythm.    Heart sounds S1, S2.   No murmurs, no rubs or gallops on auscultation  no edema        CAROTID:   normal systolic impulse  no bruits    · RESPIRATORY:   rales  normal chest expansion  tachypnea,  no retractions or use of accessory muscles  palpation of chest is normal with no fremitus  percussion of chest demonstrates no hyperresonance or dullness    · GASTROINTESTINAL:  Abdomen soft,   non-tender,   + BS  liver/spleen not palpable    · MUSCULOSKELETAL:   no clubbing, cyanosis      · NEUROLOGICAL:   awake alert oriented  no obvious cranial nerve abnormalities      · SKIN:   Skin normal color for race,   warm, dry   No evidence of rash.    · PSYCHIATRIC:   stable  no threat to self or others      · HEME LYMPH:   no splenomegaly.  No cervical  lymphadenopathy.  no inguinal lymphadenopathy    HOSPITAL MEDICATIONS:  MEDICATIONS  (STANDING):  BACItracin   Ointment 1 Application(s) Topical two times a day  chlorhexidine 4% Liquid 1 Application(s) Topical daily  dextrose 40% Gel 15 Gram(s) Oral once  dextrose 5%. 1000 milliLiter(s) (50 mL/Hr) IV Continuous <Continuous>  dextrose 5%. 1000 milliLiter(s) (100 mL/Hr) IV Continuous <Continuous>  dextrose 50% Injectable 25 Gram(s) IV Push once  dextrose 50% Injectable 12.5 Gram(s) IV Push once  dextrose 50% Injectable 25 Gram(s) IV Push once  glucagon  Injectable 1 milliGRAM(s) IntraMuscular once  influenza   Vaccine 0.5 milliLiter(s) IntraMuscular once  insulin glargine Injectable (LANTUS) 20 Unit(s) SubCutaneous at bedtime  insulin lispro (ADMELOG) corrective regimen sliding scale   SubCutaneous three times a day before meals  insulin lispro Injectable (ADMELOG) 9 Unit(s) SubCutaneous three times a day before meals  levothyroxine 50 MICROGram(s) Oral daily  methylPREDNISolone sodium succinate Injectable 60 milliGRAM(s) IV Push every 12 hours  metoprolol succinate ER 25 milliGRAM(s) Oral daily  pantoprazole    Tablet 40 milliGRAM(s) Oral before breakfast  polyethylene glycol 3350 17 Gram(s) Oral daily  senna 2 Tablet(s) Oral at bedtime  sodium zirconium cyclosilicate 10 Gram(s) Oral every 8 hours  tamsulosin 0.8 milliGRAM(s) Oral at bedtime    MEDICATIONS  (PRN):  acetaminophen   Tablet .. 650 milliGRAM(s) Oral every 6 hours PRN Temp greater or equal to 38C (100.4F), Mild Pain (1 - 3)  ALPRAZolam 0.25 milliGRAM(s) Oral at bedtime PRN Anxiety  guaifenesin/dextromethorphan  Syrup 5 milliLiter(s) Oral every 6 hours PRN Cough  ondansetron    Tablet 4 milliGRAM(s) Oral two times a day PRN Nausea and/or Vomiting    lactated ringers.: Solution, 1000 milliLiter(s) infuse at 70 mL/Hr  dextrose 5% + sodium chloride 0.45%.: Solution, 1000 milliLiter(s) infuse at 75 mL/Hr  sodium chloride 0.9%.: Solution, 1000 milliLiter(s) infuse at 75 mL/Hr  sodium chloride 0.9% Bolus:   1000 milliLiter(s), IV Bolus, once, infuse over 60 Minute(s), Stop After 1 Doses  Provider's Contact #: [phone]      LABS:                        11.1   13.72 )-----------( 192      ( 21 Feb 2021 04:20 )             33.6     02-21    136  |  97<L>  |  37<H>  ----------------------------<  214<H>  5.0   |  31  |  0.9    Ca    8.1<L>      21 Feb 2021 04:20  Mg     2.1     02-21    TPro  5.3<L>  /  Alb  3.0<L>  /  TBili  0.2  /  DBili  x   /  AST  36  /  ALT  49<H>  /  AlkPhos  74  02-21                          RADIOLOGY: I personally reviewed latest CXR and other pertinent films.           Patient is a 70y old  Male who presents with a chief complaint of COVID (21 Feb 2021 06:06)        HPI:  70 year old male with PMH of CAD, MI (s/p 4 stents), DM, HTN, BPH, hypothyroid and GERD presents to ED c/o progressive SOB, fever, and weakness x 4 days.  Pt also with recent positive COVID 19 swab + as outpatient. Pt had Pfizer COVID vaccine on 1/3 and 1/24. Pt reports chills, dry cough, and decreased po intake. He denies abd pain, N/V/D/C, lightheadedness CP.  In the ED, pt's T 37.8, . WBC 14, D-dimer 329, BUN/Cr 23/1.8, GFR 38, COVID positive.  CTA negative for PE but consistent with COVID 19 pneumonia.  Pt was given Decadron 6 mg x1 in the ED. pt saturating 99% on 3 L NC.   (04 Feb 2021 17:35)    Pt evaluated on AM rounds.  Interval Events: L nares bled again overnight. Pressure in Epistat increased with good result    REVIEW OF SYSTEMS:   see HPI      OBJECTIVE:  ICU Vital Signs Last 24 Hrs  T(C): 36.4 (21 Feb 2021 04:00), Max: 36.6 (20 Feb 2021 20:00)  T(F): 97.6 (21 Feb 2021 04:00), Max: 97.8 (20 Feb 2021 20:00)  HR: 72 (21 Feb 2021 06:00) (72 - 102)  BP: 138/81 (21 Feb 2021 06:00) (112/67 - 148/76)  BP(mean): 93 (21 Feb 2021 06:00) (77 - 114)  RR: 26 (21 Feb 2021 06:00) (14 - 40)  SpO2: 96% (21 Feb 2021 06:00) (86% - 100%)        02-20 @ 07:01  -  02-21 @ 07:00  --------------------------------------------------------  IN: 840 mL / OUT: 1075 mL / NET: -235 mL      CAPILLARY BLOOD GLUCOSE      POCT Blood Glucose.: 228 mg/dL (20 Feb 2021 19:43)        PHYSICAL EXAM:     · CONSTITUTIONAL:   not septic appearing,   well nourished,   NAD    · ENMT:   Airway patent,   Nasal mucosa clear.  Mouth with normal mucosa.   No thrush    · EYES:   Clear bilaterally,   pupils equal,   round and reactive to light.    · CARDIAC:   Normal rate,   regular rhythm.    Heart sounds S1, S2.   No murmurs, no rubs or gallops on auscultation  no edema        CAROTID:   normal systolic impulse  no bruits    · RESPIRATORY:   rales  normal chest expansion  tachypnea,  no retractions or use of accessory muscles  palpation of chest is normal with no fremitus  percussion of chest demonstrates no hyperresonance or dullness    · GASTROINTESTINAL:  Abdomen soft,   non-tender,   + BS  liver/spleen not palpable    · MUSCULOSKELETAL:   no clubbing, cyanosis      · NEUROLOGICAL:   awake alert oriented  no obvious cranial nerve abnormalities      · SKIN:   Skin normal color for race,   warm, dry   No evidence of rash.    · PSYCHIATRIC:   stable  no threat to self or others      · HEME LYMPH:   no splenomegaly.  No cervical  lymphadenopathy.  no inguinal lymphadenopathy    HOSPITAL MEDICATIONS:  MEDICATIONS  (STANDING):  BACItracin   Ointment 1 Application(s) Topical two times a day  chlorhexidine 4% Liquid 1 Application(s) Topical daily  dextrose 40% Gel 15 Gram(s) Oral once  dextrose 5%. 1000 milliLiter(s) (50 mL/Hr) IV Continuous <Continuous>  dextrose 5%. 1000 milliLiter(s) (100 mL/Hr) IV Continuous <Continuous>  dextrose 50% Injectable 25 Gram(s) IV Push once  dextrose 50% Injectable 12.5 Gram(s) IV Push once  dextrose 50% Injectable 25 Gram(s) IV Push once  glucagon  Injectable 1 milliGRAM(s) IntraMuscular once  influenza   Vaccine 0.5 milliLiter(s) IntraMuscular once  insulin glargine Injectable (LANTUS) 20 Unit(s) SubCutaneous at bedtime  insulin lispro (ADMELOG) corrective regimen sliding scale   SubCutaneous three times a day before meals  insulin lispro Injectable (ADMELOG) 9 Unit(s) SubCutaneous three times a day before meals  levothyroxine 50 MICROGram(s) Oral daily  methylPREDNISolone sodium succinate Injectable 60 milliGRAM(s) IV Push every 12 hours  metoprolol succinate ER 25 milliGRAM(s) Oral daily  pantoprazole    Tablet 40 milliGRAM(s) Oral before breakfast  polyethylene glycol 3350 17 Gram(s) Oral daily  senna 2 Tablet(s) Oral at bedtime  sodium zirconium cyclosilicate 10 Gram(s) Oral every 8 hours  tamsulosin 0.8 milliGRAM(s) Oral at bedtime    MEDICATIONS  (PRN):  acetaminophen   Tablet .. 650 milliGRAM(s) Oral every 6 hours PRN Temp greater or equal to 38C (100.4F), Mild Pain (1 - 3)  ALPRAZolam 0.25 milliGRAM(s) Oral at bedtime PRN Anxiety  guaifenesin/dextromethorphan  Syrup 5 milliLiter(s) Oral every 6 hours PRN Cough  ondansetron    Tablet 4 milliGRAM(s) Oral two times a day PRN Nausea and/or Vomiting    lactated ringers.: Solution, 1000 milliLiter(s) infuse at 70 mL/Hr  dextrose 5% + sodium chloride 0.45%.: Solution, 1000 milliLiter(s) infuse at 75 mL/Hr  sodium chloride 0.9%.: Solution, 1000 milliLiter(s) infuse at 75 mL/Hr  sodium chloride 0.9% Bolus:   1000 milliLiter(s), IV Bolus, once, infuse over 60 Minute(s), Stop After 1 Doses  Provider's Contact #: [phone]      LABS:                        11.1   13.72 )-----------( 192      ( 21 Feb 2021 04:20 )             33.6     02-21    136  |  97<L>  |  37<H>  ----------------------------<  214<H>  5.0   |  31  |  0.9    Ca    8.1<L>      21 Feb 2021 04:20  Mg     2.1     02-21    TPro  5.3<L>  /  Alb  3.0<L>  /  TBili  0.2  /  DBili  x   /  AST  36  /  ALT  49<H>  /  AlkPhos  74  02-21                          RADIOLOGY: I personally reviewed latest CXR and other pertinent films.

## 2021-02-21 NOTE — PROGRESS NOTE ADULT - SUBJECTIVE AND OBJECTIVE BOX
Called by housestaff to evaluate pt for bleeding from left nasal packing site. On exam there is some oozing and dripping of blood from the left nare.  The posterior oropharynx was clear. I inflated the balloon with 2 cc of air and observed for several minutes and the bleeding stopped. Will continue to monitor as needed.

## 2021-02-22 LAB
ALBUMIN SERPL ELPH-MCNC: 2.9 G/DL — LOW (ref 3.5–5.2)
ALP SERPL-CCNC: 71 U/L — SIGNIFICANT CHANGE UP (ref 30–115)
ALT FLD-CCNC: 55 U/L — HIGH (ref 0–41)
ANION GAP SERPL CALC-SCNC: 6 MMOL/L — LOW (ref 7–14)
AST SERPL-CCNC: 32 U/L — SIGNIFICANT CHANGE UP (ref 0–41)
BASOPHILS # BLD AUTO: 0.02 K/UL — SIGNIFICANT CHANGE UP (ref 0–0.2)
BASOPHILS NFR BLD AUTO: 0.2 % — SIGNIFICANT CHANGE UP (ref 0–1)
BILIRUB SERPL-MCNC: 0.3 MG/DL — SIGNIFICANT CHANGE UP (ref 0.2–1.2)
BUN SERPL-MCNC: 31 MG/DL — HIGH (ref 10–20)
CALCIUM SERPL-MCNC: 8.5 MG/DL — SIGNIFICANT CHANGE UP (ref 8.5–10.1)
CHLORIDE SERPL-SCNC: 95 MMOL/L — LOW (ref 98–110)
CO2 SERPL-SCNC: 36 MMOL/L — HIGH (ref 17–32)
CREAT SERPL-MCNC: 0.9 MG/DL — SIGNIFICANT CHANGE UP (ref 0.7–1.5)
CRP SERPL-MCNC: 4.06 MG/DL — HIGH (ref 0–0.4)
EOSINOPHIL # BLD AUTO: 0 K/UL — SIGNIFICANT CHANGE UP (ref 0–0.7)
EOSINOPHIL NFR BLD AUTO: 0 % — SIGNIFICANT CHANGE UP (ref 0–8)
GLUCOSE BLDC GLUCOMTR-MCNC: 197 MG/DL — HIGH (ref 70–99)
GLUCOSE BLDC GLUCOMTR-MCNC: 206 MG/DL — HIGH (ref 70–99)
GLUCOSE BLDC GLUCOMTR-MCNC: 244 MG/DL — HIGH (ref 70–99)
GLUCOSE BLDC GLUCOMTR-MCNC: 260 MG/DL — HIGH (ref 70–99)
GLUCOSE BLDC GLUCOMTR-MCNC: 396 MG/DL — HIGH (ref 70–99)
GLUCOSE BLDC GLUCOMTR-MCNC: 403 MG/DL — HIGH (ref 70–99)
GLUCOSE SERPL-MCNC: 247 MG/DL — HIGH (ref 70–99)
HCT VFR BLD CALC: 33.7 % — LOW (ref 42–52)
HGB BLD-MCNC: 10.8 G/DL — LOW (ref 14–18)
IMM GRANULOCYTES NFR BLD AUTO: 1.2 % — HIGH (ref 0.1–0.3)
LYMPHOCYTES # BLD AUTO: 0.5 K/UL — LOW (ref 1.2–3.4)
LYMPHOCYTES # BLD AUTO: 4.3 % — LOW (ref 20.5–51.1)
MAGNESIUM SERPL-MCNC: 2.1 MG/DL — SIGNIFICANT CHANGE UP (ref 1.8–2.4)
MCHC RBC-ENTMCNC: 25.7 PG — LOW (ref 27–31)
MCHC RBC-ENTMCNC: 32 G/DL — SIGNIFICANT CHANGE UP (ref 32–37)
MCV RBC AUTO: 80.2 FL — SIGNIFICANT CHANGE UP (ref 80–94)
MONOCYTES # BLD AUTO: 0.28 K/UL — SIGNIFICANT CHANGE UP (ref 0.1–0.6)
MONOCYTES NFR BLD AUTO: 2.4 % — SIGNIFICANT CHANGE UP (ref 1.7–9.3)
NEUTROPHILS # BLD AUTO: 10.79 K/UL — HIGH (ref 1.4–6.5)
NEUTROPHILS NFR BLD AUTO: 91.9 % — HIGH (ref 42.2–75.2)
NRBC # BLD: 0 /100 WBCS — SIGNIFICANT CHANGE UP (ref 0–0)
PLATELET # BLD AUTO: 177 K/UL — SIGNIFICANT CHANGE UP (ref 130–400)
POTASSIUM SERPL-MCNC: 5.3 MMOL/L — HIGH (ref 3.5–5)
POTASSIUM SERPL-SCNC: 5.3 MMOL/L — HIGH (ref 3.5–5)
PROCALCITONIN SERPL-MCNC: 0.19 NG/ML — HIGH (ref 0.02–0.1)
PROT SERPL-MCNC: 5.4 G/DL — LOW (ref 6–8)
RBC # BLD: 4.2 M/UL — LOW (ref 4.7–6.1)
RBC # FLD: 16.7 % — HIGH (ref 11.5–14.5)
SODIUM SERPL-SCNC: 137 MMOL/L — SIGNIFICANT CHANGE UP (ref 135–146)
WBC # BLD: 11.73 K/UL — HIGH (ref 4.8–10.8)
WBC # FLD AUTO: 11.73 K/UL — HIGH (ref 4.8–10.8)

## 2021-02-22 PROCEDURE — 71045 X-RAY EXAM CHEST 1 VIEW: CPT | Mod: 26

## 2021-02-22 RX ORDER — FUROSEMIDE 40 MG
40 TABLET ORAL ONCE
Refills: 0 | Status: COMPLETED | OUTPATIENT
Start: 2021-02-22 | End: 2021-02-22

## 2021-02-22 RX ORDER — INSULIN LISPRO 100/ML
11 VIAL (ML) SUBCUTANEOUS
Refills: 0 | Status: DISCONTINUED | OUTPATIENT
Start: 2021-02-22 | End: 2021-02-26

## 2021-02-22 RX ORDER — CEFAZOLIN SODIUM 1 G
2000 VIAL (EA) INJECTION EVERY 8 HOURS
Refills: 0 | Status: COMPLETED | OUTPATIENT
Start: 2021-02-22 | End: 2021-02-23

## 2021-02-22 RX ORDER — INSULIN GLARGINE 100 [IU]/ML
29 INJECTION, SOLUTION SUBCUTANEOUS AT BEDTIME
Refills: 0 | Status: DISCONTINUED | OUTPATIENT
Start: 2021-02-22 | End: 2021-03-05

## 2021-02-22 RX ORDER — ALPRAZOLAM 0.25 MG
0.25 TABLET ORAL DAILY
Refills: 0 | Status: DISCONTINUED | OUTPATIENT
Start: 2021-02-22 | End: 2021-02-24

## 2021-02-22 RX ADMIN — Medication 60 MILLIGRAM(S): at 22:30

## 2021-02-22 RX ADMIN — Medication 11 UNIT(S): at 17:03

## 2021-02-22 RX ADMIN — Medication 100 MILLIGRAM(S): at 16:39

## 2021-02-22 RX ADMIN — Medication 1 APPLICATION(S): at 05:32

## 2021-02-22 RX ADMIN — SODIUM ZIRCONIUM CYCLOSILICATE 10 GRAM(S): 10 POWDER, FOR SUSPENSION ORAL at 22:41

## 2021-02-22 RX ADMIN — Medication 60 MILLIGRAM(S): at 14:54

## 2021-02-22 RX ADMIN — Medication 9 UNIT(S): at 11:39

## 2021-02-22 RX ADMIN — POLYETHYLENE GLYCOL 3350 17 GRAM(S): 17 POWDER, FOR SOLUTION ORAL at 11:39

## 2021-02-22 RX ADMIN — Medication 4: at 07:24

## 2021-02-22 RX ADMIN — Medication 25 MILLIGRAM(S): at 05:32

## 2021-02-22 RX ADMIN — Medication 50 MICROGRAM(S): at 05:32

## 2021-02-22 RX ADMIN — TAMSULOSIN HYDROCHLORIDE 0.8 MILLIGRAM(S): 0.4 CAPSULE ORAL at 22:30

## 2021-02-22 RX ADMIN — CHLORHEXIDINE GLUCONATE 1 APPLICATION(S): 213 SOLUTION TOPICAL at 11:51

## 2021-02-22 RX ADMIN — SODIUM ZIRCONIUM CYCLOSILICATE 10 GRAM(S): 10 POWDER, FOR SUSPENSION ORAL at 05:33

## 2021-02-22 RX ADMIN — Medication 60 MILLIGRAM(S): at 00:05

## 2021-02-22 RX ADMIN — Medication 0.25 MILLIGRAM(S): at 00:05

## 2021-02-22 RX ADMIN — Medication 40 MILLIGRAM(S): at 10:31

## 2021-02-22 RX ADMIN — Medication 6: at 17:03

## 2021-02-22 RX ADMIN — INSULIN GLARGINE 29 UNIT(S): 100 INJECTION, SOLUTION SUBCUTANEOUS at 22:25

## 2021-02-22 RX ADMIN — SODIUM ZIRCONIUM CYCLOSILICATE 10 GRAM(S): 10 POWDER, FOR SUSPENSION ORAL at 14:54

## 2021-02-22 RX ADMIN — Medication 60 MILLIGRAM(S): at 05:32

## 2021-02-22 RX ADMIN — PANTOPRAZOLE SODIUM 40 MILLIGRAM(S): 20 TABLET, DELAYED RELEASE ORAL at 05:33

## 2021-02-22 RX ADMIN — Medication 9 UNIT(S): at 07:24

## 2021-02-22 RX ADMIN — Medication 10: at 11:39

## 2021-02-22 NOTE — PROGRESS NOTE ADULT - ASSESSMENT
IMPRESSION:    Acute hypoxemic respiratory failure on NRM  severe epistaxis sp packing  severe Covid PNA  Probable superimposed bacterial infection sp abx  COPD exacerbation  HO CAD    PLAN:    CNS: avoid CNS depressant    HEENT: Oral care ENT f/u  continue epistat per ENT    PULMONARY:  HOB @ 45 degrees.  Aspiration precautions.   NRM,   Solumedrol 60mg q12h. wean O2  low treshold for intubation    CARDIOVASCULAR: Goal Map >60, Avoid volume overload. lasix once    GI: GI prophylaxis. feeding.     RENAL:  Follow up lytes.  Correct as needed    INFECTIOUS DISEASE:  empiric ABX given packing      HEMATOLOGICAL:  DVT prophylaxis.    ENDOCRINE:  Follow up FS.  Insulin protocol if needed.    MUSCULOSKELETAL: OOB to chair    PT/OT    Dispo: MICU

## 2021-02-22 NOTE — PROGRESS NOTE ADULT - ASSESSMENT
Pt is a 70y Male a/w COVID PNA, following for epistaxis- clinically looking much better, no bleeding    ·	plan to deflate packing tomorrow AM, will remove if no bleeding  ·	cont to monitor H/H  ·	w/d w/ attng

## 2021-02-22 NOTE — PROGRESS NOTE ADULT - SUBJECTIVE AND OBJECTIVE BOX
SUBJECTIVE:    Patient is a 70y old Male who presents with a chief complaint of sob (22 Feb 2021 08:09)    Currently admitted to medicine with the primary diagnosis of Shortness of breath       Today is hospital day 18d.     Overnight no events.     This morning patient denies complaints and feels he is breathing well on 100% NRB. Denies any more epistaxis.     PAST MEDICAL & SURGICAL HISTORY  Chronic kidney disease (CKD)  III    Type 2 diabetes mellitus without complication, unspecified long term insulin use status    Hypertension, unspecified type    Dyspnea, unspecified type    ASHD (arteriosclerotic heart disease)  STEMI 12/31/17, PCI RCA    S/P cataract surgery    History of ligation of vein  2012    History of tonsillectomy  1957        ALLERGIES:  penicillin (Rash (Severe))    MEDICATIONS:  STANDING MEDICATIONS  BACItracin   Ointment 1 Application(s) Topical two times a day  chlorhexidine 4% Liquid 1 Application(s) Topical daily  dextrose 40% Gel 15 Gram(s) Oral once  dextrose 5%. 1000 milliLiter(s) IV Continuous <Continuous>  dextrose 5%. 1000 milliLiter(s) IV Continuous <Continuous>  dextrose 50% Injectable 25 Gram(s) IV Push once  dextrose 50% Injectable 12.5 Gram(s) IV Push once  dextrose 50% Injectable 25 Gram(s) IV Push once  glucagon  Injectable 1 milliGRAM(s) IntraMuscular once  influenza   Vaccine 0.5 milliLiter(s) IntraMuscular once  insulin glargine Injectable (LANTUS) 20 Unit(s) SubCutaneous at bedtime  insulin lispro (ADMELOG) corrective regimen sliding scale   SubCutaneous three times a day before meals  insulin lispro Injectable (ADMELOG) 9 Unit(s) SubCutaneous three times a day before meals  levothyroxine 50 MICROGram(s) Oral daily  methylPREDNISolone sodium succinate Injectable 60 milliGRAM(s) IV Push every 8 hours  metoprolol succinate ER 25 milliGRAM(s) Oral daily  pantoprazole  Injectable 40 milliGRAM(s) IV Push two times a day  polyethylene glycol 3350 17 Gram(s) Oral daily  senna 2 Tablet(s) Oral at bedtime  sodium zirconium cyclosilicate 10 Gram(s) Oral every 8 hours  tamsulosin 0.8 milliGRAM(s) Oral at bedtime    PRN MEDICATIONS  acetaminophen   Tablet .. 650 milliGRAM(s) Oral every 6 hours PRN  guaifenesin/dextromethorphan  Syrup 5 milliLiter(s) Oral every 6 hours PRN  ondansetron    Tablet 4 milliGRAM(s) Oral two times a day PRN    VITALS:   T(F): 96.5  HR: 80  BP: 105/64  RR: 29  SpO2: 100%    LABS:                        10.8   11.73 )-----------( 177      ( 22 Feb 2021 04:27 )             33.7     02-22    137  |  95<L>  |  31<H>  ----------------------------<  247<H>  5.3<H>   |  36<H>  |  0.9    Ca    8.5      22 Feb 2021 04:27  Mg     2.1     02-22    TPro  5.4<L>  /  Alb  2.9<L>  /  TBili  0.3  /  DBili  x   /  AST  32  /  ALT  55<H>  /  AlkPhos  71  02-22          PHYSICAL EXAM:  GEN: No acute distress  HEENT: dried blood in L nares with nasal packing in place   PULM/CHEST: Clear to auscultation bilaterally, no rales, rhonchi or wheezes   CVS: Regular rate and rhythm, S1-S2, no murmurs  ABD: Soft, non-tender, non-distended, +BS  EXT: No edema  NEURO: AAOx3

## 2021-02-22 NOTE — PROGRESS NOTE ADULT - SUBJECTIVE AND OBJECTIVE BOX
OVERNIGHT EVENTS: events noted, on 100% NRM, feels better, afebrile    Vital Signs Last 24 Hrs  T(C): 35.6 (22 Feb 2021 04:00), Max: 36.1 (21 Feb 2021 16:00)  T(F): 96.1 (22 Feb 2021 04:00), Max: 96.9 (21 Feb 2021 16:00)  HR: 64 (22 Feb 2021 07:00) (64 - 90)  BP: 118/66 (22 Feb 2021 07:00) (106/58 - 152/75)  BP(mean): 81 (22 Feb 2021 07:00) (76 - 108)  RR: 23 (22 Feb 2021 07:00) (14 - 42)  SpO2: 100% (22 Feb 2021 07:00) (92% - 100%)    PHYSICAL EXAMINATION:    GENERAL: comfortable, l nasal packing    HEENT: Head is normocephalic and atraumatic.     NECK: Supple.    LUNGS: bl crackles    HEART: Regular rate and rhythm without murmur.    ABDOMEN: Soft, nontender, and nondistended.      EXTREMITIES: Without any cyanosis, clubbing, rash, lesions or edema.    NEUROLOGIC: Grossly intact.    SKIN: No ulceration or induration present.      LABS:                        10.8   11.73 )-----------( 177      ( 22 Feb 2021 04:27 )             33.7     02-22    137  |  95<L>  |  31<H>  ----------------------------<  247<H>  5.3<H>   |  36<H>  |  0.9    Ca    8.5      22 Feb 2021 04:27  Mg     2.1     02-22    TPro  5.4<L>  /  Alb  2.9<L>  /  TBili  0.3  /  DBili  x   /  AST  32  /  ALT  55<H>  /  AlkPhos  71  02-22                    Procalcitonin, Serum: 0.37 ng/mL (02-20-21 @ 04:30)        02-21-21 @ 07:01  -  02-22-21 @ 07:00  --------------------------------------------------------  IN: 390 mL / OUT: 1950 mL / NET: -1560 mL        MICROBIOLOGY:      MEDICATIONS  (STANDING):  BACItracin   Ointment 1 Application(s) Topical two times a day  chlorhexidine 4% Liquid 1 Application(s) Topical daily  dextrose 40% Gel 15 Gram(s) Oral once  dextrose 5%. 1000 milliLiter(s) (50 mL/Hr) IV Continuous <Continuous>  dextrose 5%. 1000 milliLiter(s) (100 mL/Hr) IV Continuous <Continuous>  dextrose 50% Injectable 25 Gram(s) IV Push once  dextrose 50% Injectable 12.5 Gram(s) IV Push once  dextrose 50% Injectable 25 Gram(s) IV Push once  glucagon  Injectable 1 milliGRAM(s) IntraMuscular once  influenza   Vaccine 0.5 milliLiter(s) IntraMuscular once  insulin glargine Injectable (LANTUS) 20 Unit(s) SubCutaneous at bedtime  insulin lispro (ADMELOG) corrective regimen sliding scale   SubCutaneous three times a day before meals  insulin lispro Injectable (ADMELOG) 9 Unit(s) SubCutaneous three times a day before meals  levoFLOXacin IVPB      levoFLOXacin IVPB 500 milliGRAM(s) IV Intermittent every 24 hours  levothyroxine 50 MICROGram(s) Oral daily  methylPREDNISolone sodium succinate Injectable 60 milliGRAM(s) IV Push every 8 hours  metoprolol succinate ER 25 milliGRAM(s) Oral daily  pantoprazole  Injectable 40 milliGRAM(s) IV Push two times a day  polyethylene glycol 3350 17 Gram(s) Oral daily  senna 2 Tablet(s) Oral at bedtime  sodium zirconium cyclosilicate 10 Gram(s) Oral every 8 hours  tamsulosin 0.8 milliGRAM(s) Oral at bedtime    MEDICATIONS  (PRN):  acetaminophen   Tablet .. 650 milliGRAM(s) Oral every 6 hours PRN Temp greater or equal to 38C (100.4F), Mild Pain (1 - 3)  guaifenesin/dextromethorphan  Syrup 5 milliLiter(s) Oral every 6 hours PRN Cough  ondansetron    Tablet 4 milliGRAM(s) Oral two times a day PRN Nausea and/or Vomiting      RADIOLOGY & ADDITIONAL STUDIES:

## 2021-02-22 NOTE — PROGRESS NOTE ADULT - SUBJECTIVE AND OBJECTIVE BOX
ENT DAILY PROGRESS NOTE    Pt is a 70y Male a/w COVID PNA, following for epistaxis  - seen and examined at bedside. PT doing much better overall today. Pt denies any dripping from the nose or mouth, no other issues.      REVIEW OF SYSTEMS   [x] A ten-point review of systems was otherwise negative except as noted.    Allergies    penicillin (Rash (Severe))    Intolerances      MEDICATIONS:  acetaminophen   Tablet .. 650 milliGRAM(s) Oral every 6 hours PRN  BACItracin   Ointment 1 Application(s) Topical two times a day  chlorhexidine 4% Liquid 1 Application(s) Topical daily  dextrose 40% Gel 15 Gram(s) Oral once  dextrose 5%. 1000 milliLiter(s) IV Continuous <Continuous>  dextrose 5%. 1000 milliLiter(s) IV Continuous <Continuous>  dextrose 50% Injectable 25 Gram(s) IV Push once  dextrose 50% Injectable 12.5 Gram(s) IV Push once  dextrose 50% Injectable 25 Gram(s) IV Push once  glucagon  Injectable 1 milliGRAM(s) IntraMuscular once  guaifenesin/dextromethorphan  Syrup 5 milliLiter(s) Oral every 6 hours PRN  influenza   Vaccine 0.5 milliLiter(s) IntraMuscular once  insulin glargine Injectable (LANTUS) 20 Unit(s) SubCutaneous at bedtime  insulin lispro (ADMELOG) corrective regimen sliding scale   SubCutaneous three times a day before meals  insulin lispro Injectable (ADMELOG) 9 Unit(s) SubCutaneous three times a day before meals  levothyroxine 50 MICROGram(s) Oral daily  methylPREDNISolone sodium succinate Injectable 60 milliGRAM(s) IV Push every 8 hours  metoprolol succinate ER 25 milliGRAM(s) Oral daily  ondansetron    Tablet 4 milliGRAM(s) Oral two times a day PRN  pantoprazole  Injectable 40 milliGRAM(s) IV Push two times a day  polyethylene glycol 3350 17 Gram(s) Oral daily  senna 2 Tablet(s) Oral at bedtime  sodium zirconium cyclosilicate 10 Gram(s) Oral every 8 hours  tamsulosin 0.8 milliGRAM(s) Oral at bedtime      Vital Signs Last 24 Hrs  T(C): 35.8 (22 Feb 2021 08:00), Max: 36.1 (21 Feb 2021 16:00)  T(F): 96.5 (22 Feb 2021 08:00), Max: 96.9 (21 Feb 2021 16:00)  HR: 80 (22 Feb 2021 10:00) (64 - 92)  BP: 105/64 (22 Feb 2021 10:00) (105/64 - 150/79)  BP(mean): 91 (22 Feb 2021 10:00) (76 - 108)  RR: 29 (22 Feb 2021 10:00) (14 - 42)  SpO2: 100% (22 Feb 2021 10:00) (92% - 100%)      02-21 @ 07:01  -  02-22 @ 07:00  --------------------------------------------------------  IN:    IV PiggyBack: 150 mL    Oral Fluid: 240 mL  Total IN: 390 mL    OUT:    Voided (mL): 1950 mL  Total OUT: 1950 mL    Total NET: -1560 mL          PHYSICAL EXAM:    GEN: Well-developed, well-nourished. NAD, awake and alert. No drooling or pooling of secretions. No stridor or stertor. Good vocal quality, no hoarseness.   SKIN: Good color, non diaphoretic  HEENT: NC/AT; Oral mucosa pink and moist. No erythema or edema noted to buccal mucosa, tongue, FOM, uvula or posterior oropharynx. Uvula midline. no active bleeding or clots noted to post OP.  NARES: + nasal packing noted to LEFT nare, no active bleeding or clots noted.   NECK:  Trachea midline. Neck supple, no TTP to B/L lateral neck, no cervical LAD.  RESP: No dyspnea, non-labored breathing. No use of accessory muscles. + NRBM  CARDIO: +S1/S2  ABDO: Soft, NT.  EXT: TIJERINA x 4      LABS:  CBC-                        10.8   11.73 )-----------( 177      ( 22 Feb 2021 04:27 )             33.7     BMP/CMP-  22 Feb 2021 04:27    137    |  95     |  31     ----------------------------<  247    5.3     |  36     |  0.9      Ca    8.5        22 Feb 2021 04:27  Mg     2.1       22 Feb 2021 04:27    TPro  5.4    /  Alb  2.9    /  TBili  0.3    /  DBili  x      /  AST  32     /  ALT  55     /  AlkPhos  71     22 Feb 2021 04:27    Coagulation Studies-    Endocrine Panel-  Calcium, Total Serum: 8.5 mg/dL (02-22 @ 04:27)

## 2021-02-22 NOTE — PROGRESS NOTE ADULT - ASSESSMENT
71 yo M with PMH of CAD, MI (s/p 4 stents), DM, HTN, BPH, hypothyroid and GERD, recently received Pfizer vaccine (1/3 and 1/24) presented to ED c/o progressive SOB, fever, and weakness. Patient stated that his symptoms began on Wednesday 2/3. COVID PCR + on 2/4. Admitted to medicine with acute hypoxemic respiratory failure secondary to COVID-19 PNA. Upgraded to ICU from 3A due to increasing oxygen requirements    #Acute hypoxemic respiratory failure secondary to COVID-19 PNA   #likely superimposed bacterial pneumonia   - Patient s/p  2 doses of the Pfizer vaccine (last dose 1/24)  - CTA 02/04 showed extensive bilateral patchy ground-glass opacities involving all lobes most pronounced in the upper lung zones  - s/p 1 unit plasma  - s/p Remdesivir completed 2/9  - s/p 1 week of rocephin & Levaquin per ID  - inflammatory markers monitor q48hrs  Ddimer: 180 > 543 > 939 > 1758 > 1751 > 986 > 855  Procal: 1.61 > 0.97 > 0.32 > 0.13 > 0.08 > 0.12 > 0.15 > 0.37  Ferritin: 404 > 625 > 229  CRP: 22.67 > 7.54 > 4.26 > 0.54 > 0.60 > 3.30 > 6.73  - LE Duplex negative 2/8, 2/14, 2/18   - PT/OT following  - increased to solumedrol 60mg IV q8hrs; will give one dose lasix today  - f/u fungitell   - asked patient today that while O2 is stable, there is always risk for intubation, he wishes to remain full code with intubation as last resort     #epistaxis likely due to HFNC   - recurrent x 2 episodes; ENT following; packing to be removed tomorrow am  - will do levaquin 500mg IV for prophylaxis (patient allergic to penicillins - hives so ancef is poor choice)   - prasugrel and lovenox ppx on hold; monitor CBCs     #melena  - one time episode 2/21  - spoke to GI fellow, likely due to epistaxis   - will put on PPI IV BID and trend CBCs     #Constipation   - c/w miralax, senna     #CAD/STEMI s/p 4 stents  - Hx of STEMI s/p  s/p PCI of distal RCA, mid/distal LAD ( December 2017 and February 2018)  - Oct 2019: 100% occlusion of distal RCA s/p PCI with REUBEN to distal RCA ,and POBA to RPL  - will consult cardiology regarding benefit/risks of holding effient due to epistaxis   - o/p cardio: Dr. Murillo   - C/w Toprol 25mg daily    #HTN   - C/w enalapril 2.5mg daily    #BPH  - C/w tamsulosin 0.4mg PO QHS    #DM   - Monitor FS, c/w insulin regimen   - A1C 9.1-->only on glipizide as outpatient   - c/w counselling    #Hypothyroidism  - C/w Synthroid    #GERD  - c/w protonix    Diet: consistent carbs, DASH/TLC + Glucerna   DVT PPx: hold due to epistaxis   GI ppx: protonix  Code Status: FULL CODE  Dispo: acute, micu monitoring; cardio consult; ENT remove packing tmrw     Family contact in case of emergency. Patient states we do not need to regularly call family everyday.   Ami Odonnell - Wife  [phone]  If wife does not answer: Francisca - daughter  [phone]           69 yo M with PMH of CAD, MI (s/p 4 stents), DM, HTN, BPH, hypothyroid and GERD, recently received Pfizer vaccine (1/3 and 1/24) presented to ED c/o progressive SOB, fever, and weakness. Patient stated that his symptoms began on Wednesday 2/3. COVID PCR + on 2/4. Admitted to medicine with acute hypoxemic respiratory failure secondary to COVID-19 PNA. Upgraded to ICU from 3A due to increasing oxygen requirements    #Acute hypoxemic respiratory failure secondary to COVID-19 PNA   #likely superimposed bacterial pneumonia   - Patient s/p  2 doses of the Pfizer vaccine (last dose 1/24)  - CTA 02/04 showed extensive bilateral patchy ground-glass opacities involving all lobes most pronounced in the upper lung zones  - s/p 1 unit plasma  - s/p Remdesivir completed 2/9  - s/p 1 week of rocephin & Levaquin per ID  - inflammatory markers monitor q48hrs  Ddimer: 180 > 543 > 939 > 1758 > 1751 > 986 > 855  Procal: 1.61 > 0.97 > 0.32 > 0.13 > 0.08 > 0.12 > 0.15 > 0.37  Ferritin: 404 > 625 > 229  CRP: 22.67 > 7.54 > 4.26 > 0.54 > 0.60 > 3.30 > 6.73  - LE Duplex negative 2/8, 2/14, 2/18   - PT/OT following  - increased to solumedrol 60mg IV q8hrs; will give one dose lasix today  - f/u fungitell   - asked patient today that while O2 is stable, there is always risk for intubation, he wishes to remain full code with intubation as last resort     #epistaxis likely due to HFNC   - recurrent x 2 episodes; ENT following; packing to be removed tomorrow am  - was given levofloxacin but will do ancep 2g q8hrs IV until tomorrow      - spoke to ID, despite penicillin allergy (hives), patient can take ancef   - prasugrel and lovenox ppx on hold; monitor CBCs     #melena  - one time episode 2/21  - spoke to GI fellow, likely due to epistaxis   - will put on PPI IV BID and trend CBCs     #Constipation   - c/w miralax, senna     #CAD/STEMI s/p 4 stents  - Hx of STEMI s/p  s/p PCI of distal RCA, mid/distal LAD ( December 2017 and February 2018)  - Oct 2019: 100% occlusion of distal RCA s/p PCI with REUBEN to distal RCA ,and POBA to RPL  - will consult cardiology regarding benefit/risks of holding effient due to epistaxis   - o/p cardio: Dr. Murillo   - C/w Toprol 25mg daily    #HTN   - C/w enalapril 2.5mg daily    #BPH  - C/w tamsulosin 0.4mg PO QHS    #DM   - Monitor FS, c/w insulin regimen   - A1C 9.1-->only on glipizide as outpatient   - c/w counselling    #Hypothyroidism  - C/w Synthroid    #GERD  - c/w protonix    Diet: consistent carbs, DASH/TLC + Glucerna   DVT PPx: hold due to epistaxis   GI ppx: protonix  Code Status: FULL CODE  Dispo: acute, micu monitoring; cardio consult; ENT remove packing tmrw     Family contact in case of emergency. Patient states we do not need to regularly call family everyday.   Ami Odonnell - Wife  [phone]  If wife does not answer: Francisca - daughter  [phone]

## 2021-02-23 LAB
ALBUMIN SERPL ELPH-MCNC: 2.9 G/DL — LOW (ref 3.5–5.2)
ALP SERPL-CCNC: 66 U/L — SIGNIFICANT CHANGE UP (ref 30–115)
ALT FLD-CCNC: 48 U/L — HIGH (ref 0–41)
ANION GAP SERPL CALC-SCNC: 7 MMOL/L — SIGNIFICANT CHANGE UP (ref 7–14)
AST SERPL-CCNC: 24 U/L — SIGNIFICANT CHANGE UP (ref 0–41)
BASOPHILS # BLD AUTO: 0.02 K/UL — SIGNIFICANT CHANGE UP (ref 0–0.2)
BASOPHILS NFR BLD AUTO: 0.2 % — SIGNIFICANT CHANGE UP (ref 0–1)
BILIRUB SERPL-MCNC: 0.2 MG/DL — SIGNIFICANT CHANGE UP (ref 0.2–1.2)
BUN SERPL-MCNC: 36 MG/DL — HIGH (ref 10–20)
CALCIUM SERPL-MCNC: 8.2 MG/DL — LOW (ref 8.5–10.1)
CHLORIDE SERPL-SCNC: 93 MMOL/L — LOW (ref 98–110)
CO2 SERPL-SCNC: 38 MMOL/L — HIGH (ref 17–32)
CREAT SERPL-MCNC: 0.8 MG/DL — SIGNIFICANT CHANGE UP (ref 0.7–1.5)
CRP SERPL-MCNC: 1.23 MG/DL — HIGH (ref 0–0.4)
D DIMER BLD IA.RAPID-MCNC: 973 NG/ML DDU — HIGH (ref 0–230)
EOSINOPHIL # BLD AUTO: 0 K/UL — SIGNIFICANT CHANGE UP (ref 0–0.7)
EOSINOPHIL NFR BLD AUTO: 0 % — SIGNIFICANT CHANGE UP (ref 0–8)
FERRITIN SERPL-MCNC: 132 NG/ML — SIGNIFICANT CHANGE UP (ref 30–400)
GLUCOSE BLDC GLUCOMTR-MCNC: 103 MG/DL — HIGH (ref 70–99)
GLUCOSE BLDC GLUCOMTR-MCNC: 151 MG/DL — HIGH (ref 70–99)
GLUCOSE BLDC GLUCOMTR-MCNC: 201 MG/DL — HIGH (ref 70–99)
GLUCOSE BLDC GLUCOMTR-MCNC: 292 MG/DL — HIGH (ref 70–99)
GLUCOSE BLDC GLUCOMTR-MCNC: 71 MG/DL — SIGNIFICANT CHANGE UP (ref 70–99)
GLUCOSE SERPL-MCNC: 148 MG/DL — HIGH (ref 70–99)
HCT VFR BLD CALC: 34.8 % — LOW (ref 42–52)
HGB BLD-MCNC: 11.2 G/DL — LOW (ref 14–18)
IMM GRANULOCYTES NFR BLD AUTO: 1.4 % — HIGH (ref 0.1–0.3)
LYMPHOCYTES # BLD AUTO: 0.62 K/UL — LOW (ref 1.2–3.4)
LYMPHOCYTES # BLD AUTO: 4.8 % — LOW (ref 20.5–51.1)
MAGNESIUM SERPL-MCNC: 2.1 MG/DL — SIGNIFICANT CHANGE UP (ref 1.8–2.4)
MCHC RBC-ENTMCNC: 25.7 PG — LOW (ref 27–31)
MCHC RBC-ENTMCNC: 32.2 G/DL — SIGNIFICANT CHANGE UP (ref 32–37)
MCV RBC AUTO: 80 FL — SIGNIFICANT CHANGE UP (ref 80–94)
MONOCYTES # BLD AUTO: 0.3 K/UL — SIGNIFICANT CHANGE UP (ref 0.1–0.6)
MONOCYTES NFR BLD AUTO: 2.3 % — SIGNIFICANT CHANGE UP (ref 1.7–9.3)
NEUTROPHILS # BLD AUTO: 11.83 K/UL — HIGH (ref 1.4–6.5)
NEUTROPHILS NFR BLD AUTO: 91.3 % — HIGH (ref 42.2–75.2)
NRBC # BLD: 0 /100 WBCS — SIGNIFICANT CHANGE UP (ref 0–0)
PLATELET # BLD AUTO: 162 K/UL — SIGNIFICANT CHANGE UP (ref 130–400)
POTASSIUM SERPL-MCNC: 4.9 MMOL/L — SIGNIFICANT CHANGE UP (ref 3.5–5)
POTASSIUM SERPL-SCNC: 4.9 MMOL/L — SIGNIFICANT CHANGE UP (ref 3.5–5)
PROCALCITONIN SERPL-MCNC: 0.11 NG/ML — HIGH (ref 0.02–0.1)
PROT SERPL-MCNC: 5.3 G/DL — LOW (ref 6–8)
RBC # BLD: 4.35 M/UL — LOW (ref 4.7–6.1)
RBC # FLD: 17 % — HIGH (ref 11.5–14.5)
SODIUM SERPL-SCNC: 138 MMOL/L — SIGNIFICANT CHANGE UP (ref 135–146)
WBC # BLD: 12.95 K/UL — HIGH (ref 4.8–10.8)
WBC # FLD AUTO: 12.95 K/UL — HIGH (ref 4.8–10.8)

## 2021-02-23 PROCEDURE — 71045 X-RAY EXAM CHEST 1 VIEW: CPT | Mod: 26

## 2021-02-23 RX ORDER — ASPIRIN/CALCIUM CARB/MAGNESIUM 324 MG
81 TABLET ORAL DAILY
Refills: 0 | Status: DISCONTINUED | OUTPATIENT
Start: 2021-02-24 | End: 2021-03-14

## 2021-02-23 RX ORDER — LACTULOSE 10 G/15ML
20 SOLUTION ORAL EVERY 12 HOURS
Refills: 0 | Status: DISCONTINUED | OUTPATIENT
Start: 2021-02-23 | End: 2021-02-23

## 2021-02-23 RX ORDER — PANTOPRAZOLE SODIUM 20 MG/1
40 TABLET, DELAYED RELEASE ORAL EVERY 12 HOURS
Refills: 0 | Status: DISCONTINUED | OUTPATIENT
Start: 2021-02-23 | End: 2021-03-07

## 2021-02-23 RX ORDER — OXYMETAZOLINE HYDROCHLORIDE 0.5 MG/ML
2 SPRAY NASAL ONCE
Refills: 0 | Status: COMPLETED | OUTPATIENT
Start: 2021-02-23 | End: 2021-02-23

## 2021-02-23 RX ORDER — FUROSEMIDE 40 MG
60 TABLET ORAL ONCE
Refills: 0 | Status: COMPLETED | OUTPATIENT
Start: 2021-02-23 | End: 2021-02-23

## 2021-02-23 RX ADMIN — Medication 11 UNIT(S): at 16:50

## 2021-02-23 RX ADMIN — TAMSULOSIN HYDROCHLORIDE 0.8 MILLIGRAM(S): 0.4 CAPSULE ORAL at 21:20

## 2021-02-23 RX ADMIN — Medication 4: at 12:21

## 2021-02-23 RX ADMIN — Medication 100 MILLIGRAM(S): at 07:41

## 2021-02-23 RX ADMIN — Medication 11 UNIT(S): at 07:32

## 2021-02-23 RX ADMIN — Medication 60 MILLIGRAM(S): at 16:50

## 2021-02-23 RX ADMIN — Medication 25 MILLIGRAM(S): at 05:36

## 2021-02-23 RX ADMIN — POLYETHYLENE GLYCOL 3350 17 GRAM(S): 17 POWDER, FOR SOLUTION ORAL at 12:25

## 2021-02-23 RX ADMIN — Medication 0.25 MILLIGRAM(S): at 00:01

## 2021-02-23 RX ADMIN — Medication 1 APPLICATION(S): at 16:52

## 2021-02-23 RX ADMIN — Medication 60 MILLIGRAM(S): at 08:32

## 2021-02-23 RX ADMIN — Medication 2: at 07:32

## 2021-02-23 RX ADMIN — Medication 60 MILLIGRAM(S): at 05:36

## 2021-02-23 RX ADMIN — PANTOPRAZOLE SODIUM 40 MILLIGRAM(S): 20 TABLET, DELAYED RELEASE ORAL at 16:51

## 2021-02-23 RX ADMIN — SODIUM ZIRCONIUM CYCLOSILICATE 10 GRAM(S): 10 POWDER, FOR SUSPENSION ORAL at 05:36

## 2021-02-23 RX ADMIN — INSULIN GLARGINE 29 UNIT(S): 100 INJECTION, SOLUTION SUBCUTANEOUS at 21:21

## 2021-02-23 RX ADMIN — OXYMETAZOLINE HYDROCHLORIDE 2 SPRAY(S): 0.5 SPRAY NASAL at 10:04

## 2021-02-23 RX ADMIN — Medication 100 MILLIGRAM(S): at 00:01

## 2021-02-23 RX ADMIN — SENNA PLUS 2 TABLET(S): 8.6 TABLET ORAL at 21:20

## 2021-02-23 RX ADMIN — Medication 1 APPLICATION(S): at 05:37

## 2021-02-23 RX ADMIN — PANTOPRAZOLE SODIUM 40 MILLIGRAM(S): 20 TABLET, DELAYED RELEASE ORAL at 05:37

## 2021-02-23 RX ADMIN — Medication 50 MICROGRAM(S): at 05:36

## 2021-02-23 RX ADMIN — Medication 11 UNIT(S): at 12:21

## 2021-02-23 RX ADMIN — Medication 5 MILLILITER(S): at 21:21

## 2021-02-23 NOTE — CONSULT NOTE ADULT - ASSESSMENT
The patient is a 70 year-old male with a PMH of CAD, MI s/p PCI to distal RCA 12/2017, distal LAD 2/2018, RCA 10/2019 (previously on ASA, Plavix, then switched to Effient), DM, HTN, BPH, presenting for shortness of breath, fever and weakness, admitted for COVID-19 pneumonia, u/g to MICU 2/6 for increasing O2 requirements, now deescalated to NRB, admission c/b epistaxis on 2/18 s/p left nare packing.    # Epistaxis x 2 most recently 2/18, resolved  S/p left nare packing removal this AM by ENT  Intermediate dose Lovenox and Effient currently being held  Hb 11.2 <-- baseline 13-14; patient reporting dark stool x 2 last 2 days but no BRBPR  Ok to continue holding Effient x 1 more day as per ENT  F/u ENT    # Covid-19 pneumonia  S/p Remdesivir 2/9 and 1 unit plasma  C/w steroids per primary team  Deescalate O2 requirements as tolerated  F/u inflammatory markers as per ID    ** Note incomplete, pending attending's additions. Will follow. The patient is a 70 year-old male with a PMH of CAD, MI s/p PCI to distal RCA 12/2017, distal LAD 2/2018, RCA 10/2019 (previously on ASA, Plavix, then switched to Effient), DM, HTN, BPH, presenting for shortness of breath, fever and weakness, admitted for COVID-19 pneumonia, u/g to MICU 2/6 for increasing O2 requirements, now deescalated to NRB, admission c/b epistaxis on 2/18 s/p left nare packing.    # Epistaxis x 2 most recently 2/18, resolved  S/p left nare packing removal this AM by ENT  Intermediate dose Lovenox and Effient currently being held  Hb 11.2 <-- baseline 13-14; patient reporting dark stool x 2 last 2 days but no BRBPR  Stop Effient.  Resume ASA 81 mg once daily tomorrow.  C/w ezetimibe, metoprolol, enalapril as tolerated  F/u ENT    # Covid-19 pneumonia  S/p Remdesivir 2/9 and 1 unit plasma  C/w steroids per primary team  Deescalate O2 requirements as tolerated  F/u inflammatory markers as per ID    ** Note incomplete, pending attending's additions. Will follow. The patient is a 70 year-old male with a PMH of CAD, MI s/p PCI to distal RCA 12/2017, distal LAD 2/2018, RCA 10/2019 (previously on ASA, Plavix, then switched to Effient), DM, HTN, BPH, presenting for shortness of breath, fever and weakness, admitted for COVID-19 pneumonia, u/g to MICU 2/6 for increasing O2 requirements, now deescalated to NRB, admission c/b epistaxis on 2/18 s/p left nare packing.    # Epistaxis x 2 most recently 2/18, resolved  S/p left nare packing removal this AM by ENT  Intermediate dose Lovenox and Effient currently being held  Hb 11.2 <-- baseline 13-14; patient reporting dark stool x 2 last 2 days but no BRBPR  Stop Effient.  Resume ASA 81 mg once daily tomorrow.  C/w ezetimibe, metoprolol, enalapril as tolerated  F/u ENT    # Covid-19 pneumonia  S/p Remdesivir 2/9 and 1 unit plasma  C/w steroids per primary team  Deescalate O2 requirements as tolerated  F/u inflammatory markers as per ID

## 2021-02-23 NOTE — PROGRESS NOTE ADULT - SUBJECTIVE AND OBJECTIVE BOX
SUBJECTIVE:    Patient is a 70y old Male who presents with a chief complaint of sob (23 Feb 2021 08:07)    Currently admitted to medicine with the primary diagnosis of Shortness of breath       Today is hospital day 19d.     Overnight no events.     This morning patient denies any complaints.     PAST MEDICAL & SURGICAL HISTORY  Chronic kidney disease (CKD)  III    Type 2 diabetes mellitus without complication, unspecified long term insulin use status    Hypertension, unspecified type    Dyspnea, unspecified type    ASHD (arteriosclerotic heart disease)  STEMI 12/31/17, PCI RCA    S/P cataract surgery    History of ligation of vein  2012    History of tonsillectomy  1957        ALLERGIES:  penicillin (Rash (Severe))    MEDICATIONS:  STANDING MEDICATIONS  BACItracin   Ointment 1 Application(s) Topical two times a day  chlorhexidine 4% Liquid 1 Application(s) Topical daily  dextrose 40% Gel 15 Gram(s) Oral once  dextrose 5%. 1000 milliLiter(s) IV Continuous <Continuous>  dextrose 5%. 1000 milliLiter(s) IV Continuous <Continuous>  dextrose 50% Injectable 25 Gram(s) IV Push once  dextrose 50% Injectable 12.5 Gram(s) IV Push once  dextrose 50% Injectable 25 Gram(s) IV Push once  glucagon  Injectable 1 milliGRAM(s) IntraMuscular once  influenza   Vaccine 0.5 milliLiter(s) IntraMuscular once  insulin glargine Injectable (LANTUS) 29 Unit(s) SubCutaneous at bedtime  insulin lispro (ADMELOG) corrective regimen sliding scale   SubCutaneous three times a day before meals  insulin lispro Injectable (ADMELOG) 11 Unit(s) SubCutaneous three times a day before meals  levothyroxine 50 MICROGram(s) Oral daily  methylPREDNISolone sodium succinate Injectable 60 milliGRAM(s) IV Push every 12 hours  metoprolol succinate ER 25 milliGRAM(s) Oral daily  oxymetazoline 0.05% Nasal Spray 2 Spray(s) Left Nostril once  pantoprazole  Injectable 40 milliGRAM(s) IV Push two times a day  polyethylene glycol 3350 17 Gram(s) Oral daily  senna 2 Tablet(s) Oral at bedtime  tamsulosin 0.8 milliGRAM(s) Oral at bedtime    PRN MEDICATIONS  acetaminophen   Tablet .. 650 milliGRAM(s) Oral every 6 hours PRN  ALPRAZolam 0.25 milliGRAM(s) Oral daily PRN  guaifenesin/dextromethorphan  Syrup 5 milliLiter(s) Oral every 6 hours PRN  ondansetron    Tablet 4 milliGRAM(s) Oral two times a day PRN    VITALS:   T(F): 96.5  HR: 74  BP: 120/66  RR: 12  SpO2: 100%    LABS:                        11.2   12.95 )-----------( 162      ( 23 Feb 2021 05:02 )             34.8     02-23    138  |  93<L>  |  36<H>  ----------------------------<  148<H>  4.9   |  38<H>  |  0.8    Ca    8.2<L>      23 Feb 2021 05:02  Mg     2.1     02-23    TPro  5.3<L>  /  Alb  2.9<L>  /  TBili  0.2  /  DBili  x   /  AST  24  /  ALT  48<H>  /  AlkPhos  66  02-23          PHYSICAL EXAM:  GEN: No acute distress  HEENT: dried blood in L nares with nasal packing in place   PULM/CHEST: Clear to auscultation bilaterally, no rales, rhonchi or wheezes   CVS: Regular rate and rhythm, S1-S2, no murmurs  ABD: Soft, non-tender, non-distended, +BS  EXT: No edema  NEURO: AAOx3

## 2021-02-23 NOTE — CONSULT NOTE ADULT - SUBJECTIVE AND OBJECTIVE BOX
Date of Admission:    CHIEF COMPLAINT:    HISTORY OF PRESENT ILLNESS: 70yMale with PMH below presented to the hospital for shortness of breath beginning 2/3 after receiving dose 2 of the Pfizer COVID vaccine 1/24. He tested COVID-positive 2/4. He was initially placed on nasal cannula and was upgraded to MICU 2/6 as he was then requiring high flow NC alternating w/ BiPAP. He denied any associated chest pain or pressure, but states that when standing up he begins to feel short of breath even on the HFNC.    On 2/18 the patient had an episode of epistaxis for which ENT packed the left nare; the patient could not quantify the blood loss but his Hb dropped from baseline 13-14 to ~11 and the patient endorsed 2 episodes of "dark stools" starting 2 days ago which GI believed was secondary to the epistaxis; denied melanotic stool or bright red blood mixed w/ stool. Due to epistaxis his Lovenox 40 mg SQ BID and the Effient were both d/c'd.       PAST MEDICAL & SURGICAL HISTORY:  Chronic kidney disease (CKD) stage III  Type 2 diabetes mellitus without complication, unspecified long term insulin use status  Hypertension, unspecified type  ASHD (arteriosclerotic heart disease)  STEMI 12/31/17, PCI distal RCA; PCI distal LAD 2/2018, PCI RCA 10/2019  S/P cataract surgery  History of ligation of vein  2012  History of tonsillectomy  1957        FAMILY HISTORY:  [ ] no pertinent family history of premature cardiovascular disease in first degree relatives.  Mother:   Father:   Siblings:     SOCIAL HISTORY:    [ ] Non-smoker  [ ] Smoker  [ ] Alcohol    Allergies    penicillin (Rash (Severe))    Intolerances    	    REVIEW OF SYSTEMS:  CONSTITUTIONAL: No fever, weight loss, + fatigue  CARDIOLOGY: PAtient denies chest pain, shortness of breath or syncopal episodes.   RESPIRATORY: denies wheezing; + SOB on standing   NEUROLOGICAL: NO weakness, no focal deficits to report.  ENDOCRINOLOGICAL: no recent change in diabetic medications.   GI: no BRBPR, no N,V, diarrhea.    PSYCHIATRY: normal mood and affect  HEENT: no nasal discharge, no ecchymosis  SKIN: no ecchymosis, no breakdown  MUSCULOSKELETAL: Full range of motion x4.     PHYSICAL EXAM:  T(C): 35.8 (02-23-21 @ 08:00), Max: 37 (02-23-21 @ 00:00)  HR: 88 (02-23-21 @ 10:00) (66 - 112)  BP: 121/67 (02-23-21 @ 10:00) (105/61 - 148/67)  RR: 21 (02-23-21 @ 10:00) (12 - 42)  SpO2: 99% (02-23-21 @ 10:00) (91% - 100%)  Wt(kg): --  I&O's Summary    22 Feb 2021 07:01  -  23 Feb 2021 07:00  --------------------------------------------------------  IN: 370 mL / OUT: 2150 mL / NET: -1780 mL    23 Feb 2021 07:01  -  23 Feb 2021 11:15  --------------------------------------------------------  IN: 0 mL / OUT: 350 mL / NET: -350 mL        General Appearance: well appearing, normal for age and gender. 	  Neck: normal JVP, no bruit.   Eyes: No xanthomalasia, Extra Ocular muscles intact.   Cardiovascular: regular rate and rhythm S1 S2, No JVD, No murmurs, No edema  Respiratory: Slight bilateral crackles to auscultation; non-tachypneic; non-cyanotic; on NRB  Psychiatry: Alert and oriented x 3, Mood & affect appropriate  Gastrointestinal:  Soft, Non-tender  Skin: No rashes, No ecchymoses, No cyanosis	  Neurologic: Non-focal  Musculoskeletal/ extremities: Normal range of motion, No clubbing, cyanosis or edema  Vascular: Peripheral pulses palpable 2+ bilaterally    LABS:	 	                          11.2   12.95 )-----------( 162      ( 23 Feb 2021 05:02 )             34.8     02-23    138  |  93<L>  |  36<H>  ----------------------------<  148<H>  4.9   |  38<H>  |  0.8    Ca    8.2<L>      23 Feb 2021 05:02  Mg     2.1     02-23    TPro  5.3<L>  /  Alb  2.9<L>  /  TBili  0.2  /  DBili  x   /  AST  24  /  ALT  48<H>  /  AlkPhos  66  02-23    CARDIAC MARKERS: 0.02 on admission 2/4/21, repeat <0.01 on 2/6/21      TELEMETRY EVENTS: 	    ECG:  	  RADIOLOGY:  OTHER: 	    PREVIOUS DIAGNOSTIC TESTING:    [ ] Echocardiogram: < from: Transthoracic Echocardiogram (10.03.19 @ 07:17) >  Summary:   1. Left ventricular ejection fraction, by visual estimation, is 55 to   60%.   2. Basal and mid inferolateral wall and mid inferior segment are   abnormal as described above.   3. Spectral Doppler shows impaired relaxation pattern of left   ventricular myocardial filling (Grade I diastolic dysfunction).    PHYSICIAN INTERPRETATION:  Left Ventricle: Normal left ventricular size and wall thicknesses, with   normal systolic and diastolic function. Left ventricular ejection   fraction, by visual estimation, is 55 to 60%. Spectral Doppler shows   impaired relaxation pattern of left ventricular myocardial filling (Grade   I diastolic dysfunction).       LV Wall Scoring:  The basal and mid inferolateral wall and mid inferior segment are   hypokinetic.  All remaining scored segments are normal.    Right Ventricle: Normal right ventricular size and function.  Left Atrium: Normal left atrial size.  Right Atrium: Normal right atrial size.  Pericardium: There is no evidence of pericardial effusion.  Mitral Valve: Structurally normal mitral valve, with normal leaflet   excursion. The mitral valve is normal in structure. No evidence of mitral   valve regurgitation is seen.  Tricuspid Valve: Structurally normal tricuspid valve, with normal leaflet   excursion. The tricuspid valve is normal in structure. No tricuspid   regurgitation is visualized.  Aortic Valve: Normal trileaflet aortic valve with normal opening. The   aortic valve is normal. No evidence of aortic valve regurgitation is seen.  Pulmonic Valve: Structurally normal pulmonic valve, with normal leaflet   excursion. The pulmonic valve is normal. No indication of pulmonic valve   regurgitation.  Aorta: The aortic root and ascending aorta are structurally normal, with   no evidence of dilitation.  Pulmonary Artery: The main pulmonary artery is normal in size.    [x ] Stress Test: exercise stress test 1/22/21 reporting ST elevation in leads III, aVF, V1-V5, but appears to be artifact  	    Home Medications:  enalapril 2.5 mg oral tablet: 1 tab(s) orally once a day (04 Feb 2021 17:59)  ezetimibe 10 mg oral tablet: 1 tab(s) orally once a day (04 Feb 2021 17:59)  Flomax 0.4 mg oral capsule: 1 cap(s) orally once a day (04 Feb 2021 17:59)  glipiZIDE 5 mg oral tablet: 1 tab(s) orally once a day (04 Feb 2021 17:59)  metoprolol succinate 25 mg oral tablet, extended release: 1 tab(s) orally once a day (04 Feb 2021 17:59)  Synthroid 50 mcg (0.05 mg) oral tablet: 1 tab(s) orally once a day (04 Feb 2021 17:59)    MEDICATIONS  (STANDING):  BACItracin   Ointment 1 Application(s) Topical two times a day  chlorhexidine 4% Liquid 1 Application(s) Topical daily  dextrose 40% Gel 15 Gram(s) Oral once  dextrose 5%. 1000 milliLiter(s) (50 mL/Hr) IV Continuous <Continuous>  dextrose 5%. 1000 milliLiter(s) (100 mL/Hr) IV Continuous <Continuous>  dextrose 50% Injectable 25 Gram(s) IV Push once  dextrose 50% Injectable 12.5 Gram(s) IV Push once  dextrose 50% Injectable 25 Gram(s) IV Push once  glucagon  Injectable 1 milliGRAM(s) IntraMuscular once  influenza   Vaccine 0.5 milliLiter(s) IntraMuscular once  insulin glargine Injectable (LANTUS) 29 Unit(s) SubCutaneous at bedtime  insulin lispro (ADMELOG) corrective regimen sliding scale   SubCutaneous three times a day before meals  insulin lispro Injectable (ADMELOG) 11 Unit(s) SubCutaneous three times a day before meals  levothyroxine 50 MICROGram(s) Oral daily  methylPREDNISolone sodium succinate Injectable 60 milliGRAM(s) IV Push every 12 hours  metoprolol succinate ER 25 milliGRAM(s) Oral daily  pantoprazole  Injectable 40 milliGRAM(s) IV Push two times a day  polyethylene glycol 3350 17 Gram(s) Oral daily  senna 2 Tablet(s) Oral at bedtime  tamsulosin 0.8 milliGRAM(s) Oral at bedtime    MEDICATIONS  (PRN):  acetaminophen   Tablet .. 650 milliGRAM(s) Oral every 6 hours PRN Temp greater or equal to 38C (100.4F), Mild Pain (1 - 3)  ALPRAZolam 0.25 milliGRAM(s) Oral daily PRN anxiety  guaifenesin/dextromethorphan  Syrup 5 milliLiter(s) Oral every 6 hours PRN Cough  ondansetron    Tablet 4 milliGRAM(s) Oral two times a day PRN Nausea and/or Vomiting

## 2021-02-23 NOTE — PROGRESS NOTE ADULT - SUBJECTIVE AND OBJECTIVE BOX
ENT DAILY PROGRESS NOTE    Pt is a 70y Male a/w COVID PNA, being followed for epistaxis s/p packing x 2 - seen and examined at bedside. Pt doing well, no complaints overnight. Pt denies any bleeding from the nose or mouth overnight.      REVIEW OF SYSTEMS   [x] A ten-point review of systems was otherwise negative except as noted.      Allergies    penicillin (Rash (Severe))    Intolerances      MEDICATIONS:  acetaminophen   Tablet .. 650 milliGRAM(s) Oral every 6 hours PRN  ALPRAZolam 0.25 milliGRAM(s) Oral daily PRN  BACItracin   Ointment 1 Application(s) Topical two times a day  chlorhexidine 4% Liquid 1 Application(s) Topical daily  dextrose 40% Gel 15 Gram(s) Oral once  dextrose 5%. 1000 milliLiter(s) IV Continuous <Continuous>  dextrose 5%. 1000 milliLiter(s) IV Continuous <Continuous>  dextrose 50% Injectable 25 Gram(s) IV Push once  dextrose 50% Injectable 12.5 Gram(s) IV Push once  dextrose 50% Injectable 25 Gram(s) IV Push once  glucagon  Injectable 1 milliGRAM(s) IntraMuscular once  guaifenesin/dextromethorphan  Syrup 5 milliLiter(s) Oral every 6 hours PRN  influenza   Vaccine 0.5 milliLiter(s) IntraMuscular once  insulin glargine Injectable (LANTUS) 29 Unit(s) SubCutaneous at bedtime  insulin lispro (ADMELOG) corrective regimen sliding scale   SubCutaneous three times a day before meals  insulin lispro Injectable (ADMELOG) 11 Unit(s) SubCutaneous three times a day before meals  levothyroxine 50 MICROGram(s) Oral daily  methylPREDNISolone sodium succinate Injectable 60 milliGRAM(s) IV Push every 12 hours  metoprolol succinate ER 25 milliGRAM(s) Oral daily  ondansetron    Tablet 4 milliGRAM(s) Oral two times a day PRN  pantoprazole  Injectable 40 milliGRAM(s) IV Push two times a day  polyethylene glycol 3350 17 Gram(s) Oral daily  senna 2 Tablet(s) Oral at bedtime  tamsulosin 0.8 milliGRAM(s) Oral at bedtime      Vital Signs Last 24 Hrs  T(C): 35.8 (23 Feb 2021 08:00), Max: 37 (23 Feb 2021 00:00)  T(F): 96.5 (23 Feb 2021 08:00), Max: 98.6 (23 Feb 2021 00:00)  HR: 80 (23 Feb 2021 12:00) (66 - 92)  BP: 133/73 (23 Feb 2021 12:00) (105/61 - 148/67)  BP(mean): 100 (23 Feb 2021 12:00) (73 - 100)  RR: 24 (23 Feb 2021 12:00) (12 - 42)  SpO2: 100% (23 Feb 2021 12:00) (93% - 100%)      02-22 @ 07:01  -  02-23 @ 07:00  --------------------------------------------------------  IN:    IV PiggyBack: 100 mL    Oral Fluid: 270 mL  Total IN: 370 mL    OUT:    Voided (mL): 2150 mL  Total OUT: 2150 mL    Total NET: -1780 mL      02-23 @ 07:01  -  02-23 @ 13:21  --------------------------------------------------------  IN:    Oral Fluid: 350 mL  Total IN: 350 mL    OUT:    Voided (mL): 1350 mL  Total OUT: 1350 mL    Total NET: -1000 mL      PHYSICAL EXAM:    GEN: Well-developed, well-nourished. NAD, awake and alert. No drooling or pooling of secretions. No stridor or stertor. Good vocal quality, no hoarseness.   SKIN: Good color, non diaphoretic  HEENT: NC/AT; Oral mucosa very dry. No erythema or edema noted to buccal mucosa, tongue, FOM, uvula or posterior oropharynx. Uvula midline. no active bleeding or clots noted to post OP.  NARES: + packing in the LEFT nare deflated. sprayed two sprays of Afrin onto the nasal packing. packing also soaked with saline. no bleeding noted.  NECK:  Trachea midline. Neck supple, no TTP to B/L lateral neck, no cervical LAD.  RESP: No dyspnea, non-labored breathing. No use of accessory muscles. + NRBM  CARDIO: +S1/S2  ABDO: Soft, NT.  EXT: TIJERINA x 4    LABS:  CBC-                        11.2   12.95 )-----------( 162      ( 23 Feb 2021 05:02 )             34.8     BMP/CMP-  23 Feb 2021 05:02    138    |  93     |  36     ----------------------------<  148    4.9     |  38     |  0.8      Ca    8.2        23 Feb 2021 05:02  Mg     2.1       23 Feb 2021 05:02    TPro  5.3    /  Alb  2.9    /  TBili  0.2    /  DBili  x      /  AST  24     /  ALT  48     /  AlkPhos  66     23 Feb 2021 05:02    Coagulation Studies-    Endocrine Panel-  Calcium, Total Serum: 8.2 mg/dL (02-23 @ 05:02)

## 2021-02-23 NOTE — PROGRESS NOTE ADULT - ASSESSMENT
IMPRESSION:    Acute hypoxemic respiratory failure on NRM  severe epistaxis sp packing  severe Covid PNA  Probable superimposed bacterial infection sp abx  COPD exacerbation  HO CAD    PLAN:    CNS: avoid CNS depressant    HEENT: Oral care ENT f/u  continue epistat per ENT    PULMONARY:  HOB @ 45 degrees.  Aspiration precautions. incentive spirometry  NRM,   Solumedrol 60mg q12h. wean O2  low treshold for intubation    CARDIOVASCULAR: Goal Map >60, Avoid volume overload. lasix today, cardio f/up     GI: GI prophylaxis. feeding.     RENAL:  Follow up lytes.  Correct as needed    INFECTIOUS DISEASE:  empiric ABX given packing      HEMATOLOGICAL:  DVT prophylaxis.    ENDOCRINE:  Follow up FS.  Insulin protocol if needed.    MUSCULOSKELETAL: OOB to chair    PT/OT    Dispo: MICU

## 2021-02-23 NOTE — CONSULT NOTE ADULT - CONSULT REASON
Acute hypoxemic respiratory failure
danica
hx of MI w/ stents; holding effient due to epistaxis 2/2 covid and high flow NC
Epistaxis
COVID-19

## 2021-02-23 NOTE — PROGRESS NOTE ADULT - ASSESSMENT
Pt is a 70y Male a/w COVID PNA, being followed for epistaxis s/p packing x 2 - packing removed at bedside.    ·	pt given Afrin spray x 2 sprays & saline into nare, packing removed easily without bleeding  ·	bacitracin placed to L nare  ·	cont to moisturize nares & oral cavity  ·	would recommend to hold Effient/Lovenox until tomorrow if cleared/stable to hold  ·	w/d with attng

## 2021-02-23 NOTE — PROGRESS NOTE ADULT - SUBJECTIVE AND OBJECTIVE BOX
OVERNIGHT EVENTS: events noted, no drips 100% NRM    Vital Signs Last 24 Hrs  T(C): 35.8 (23 Feb 2021 04:00), Max: 37 (23 Feb 2021 00:00)  T(F): 96.4 (23 Feb 2021 04:00), Max: 98.6 (23 Feb 2021 00:00)  HR: 70 (23 Feb 2021 07:00) (68 - 112)  BP: 116/63 (23 Feb 2021 07:00) (105/61 - 148/67)  BP(mean): 79 (23 Feb 2021 07:00) (73 - 96)  RR: 12 (23 Feb 2021 07:00) (12 - 42)  SpO2: 99% (23 Feb 2021 07:00) (91% - 100%)    PHYSICAL EXAMINATION:    GENERAL: ILL LOOKING    HEENT: Head is normocephalic and atraumatic.     NECK: Supple.    LUNGS: bl crakcles    HEART: Regular rate and rhythm without murmur.    ABDOMEN: Soft, nontender, and nondistended.      EXTREMITIES: Without any cyanosis, clubbing, rash, lesions or edema.    NEUROLOGIC: Grossly intact.    SKIN: No ulceration or induration present.      LABS:                        11.2   12.95 )-----------( 162      ( 23 Feb 2021 05:02 )             34.8     02-23    138  |  93<L>  |  36<H>  ----------------------------<  148<H>  4.9   |  38<H>  |  0.8    Ca    8.2<L>      23 Feb 2021 05:02  Mg     2.1     02-23    TPro  5.3<L>  /  Alb  2.9<L>  /  TBili  0.2  /  DBili  x   /  AST  24  /  ALT  48<H>  /  AlkPhos  66  02-23              D-Dimer Assay, Quantitative: 973 ng/mL DDU (02-23-21 @ 05:02)        Procalcitonin, Serum: 0.19 ng/mL (02-21-21 @ 04:20)        02-22-21 @ 07:01  -  02-23-21 @ 07:00  --------------------------------------------------------  IN: 370 mL / OUT: 2150 mL / NET: -1780 mL        MICROBIOLOGY:      MEDICATIONS  (STANDING):  BACItracin   Ointment 1 Application(s) Topical two times a day  chlorhexidine 4% Liquid 1 Application(s) Topical daily  dextrose 40% Gel 15 Gram(s) Oral once  dextrose 5%. 1000 milliLiter(s) (50 mL/Hr) IV Continuous <Continuous>  dextrose 5%. 1000 milliLiter(s) (100 mL/Hr) IV Continuous <Continuous>  dextrose 50% Injectable 25 Gram(s) IV Push once  dextrose 50% Injectable 12.5 Gram(s) IV Push once  dextrose 50% Injectable 25 Gram(s) IV Push once  glucagon  Injectable 1 milliGRAM(s) IntraMuscular once  influenza   Vaccine 0.5 milliLiter(s) IntraMuscular once  insulin glargine Injectable (LANTUS) 29 Unit(s) SubCutaneous at bedtime  insulin lispro (ADMELOG) corrective regimen sliding scale   SubCutaneous three times a day before meals  insulin lispro Injectable (ADMELOG) 11 Unit(s) SubCutaneous three times a day before meals  levothyroxine 50 MICROGram(s) Oral daily  methylPREDNISolone sodium succinate Injectable 60 milliGRAM(s) IV Push every 8 hours  metoprolol succinate ER 25 milliGRAM(s) Oral daily  oxymetazoline 0.05% Nasal Spray 2 Spray(s) Left Nostril once  pantoprazole  Injectable 40 milliGRAM(s) IV Push two times a day  polyethylene glycol 3350 17 Gram(s) Oral daily  senna 2 Tablet(s) Oral at bedtime  sodium zirconium cyclosilicate 10 Gram(s) Oral every 8 hours  tamsulosin 0.8 milliGRAM(s) Oral at bedtime    MEDICATIONS  (PRN):  acetaminophen   Tablet .. 650 milliGRAM(s) Oral every 6 hours PRN Temp greater or equal to 38C (100.4F), Mild Pain (1 - 3)  ALPRAZolam 0.25 milliGRAM(s) Oral daily PRN anxiety  guaifenesin/dextromethorphan  Syrup 5 milliLiter(s) Oral every 6 hours PRN Cough  ondansetron    Tablet 4 milliGRAM(s) Oral two times a day PRN Nausea and/or Vomiting      RADIOLOGY & ADDITIONAL STUDIES:

## 2021-02-23 NOTE — PROGRESS NOTE ADULT - ASSESSMENT
71 yo M with PMH of CAD, MI (s/p 4 stents), DM, HTN, BPH, hypothyroid and GERD, recently received Pfizer vaccine (1/3 and 1/24) presented to ED c/o progressive SOB, fever, and weakness. Patient stated that his symptoms began on Wednesday 2/3. COVID PCR + on 2/4. Admitted to medicine with acute hypoxemic respiratory failure secondary to COVID-19 PNA. Upgraded to ICU from 3A due to increasing oxygen requirements    #Acute hypoxemic respiratory failure secondary to COVID-19 PNA   #likely superimposed bacterial pneumonia   - Patient s/p  2 doses of the Pfizer vaccine (last dose 1/24)  - O2: will try to wean to lower setting of NRB or venti mask   - CTA 02/04 showed extensive bilateral patchy ground-glass opacities involving all lobes most pronounced in the upper lung zones  - s/p 1 unit plasma  - s/p Remdesivir completed 2/9  - s/p 1 week of rocephin & Levaquin per ID  - inflammatory markers monitor q48hrs  Ddimer: 180 > 543 > 939 > 1758 > 1751 > 986 > 855 > 973  Procal: 1.61 > 0.97 > 0.32 > 0.13 > 0.08 > 0.12 > 0.15 > 0.37 > 0.19  Ferritin: 404 > 625 > 229   CRP: 22.67 > 7.54 > 4.26 > 0.54 > 0.60 > 3.30 > 6.73 > 2.06  - LE Duplex negative 2/8, 2/14, 2/18   - PT/OT following  - increased to solumedrol 60mg IV q8hrs  - one dose lasix today   - f/u fungitell   - asked patient today that while O2 is stable, there is always risk for intubation, he wishes to remain full code with intubation as last resort     #epistaxis likely due to HFNC   - recurrent x 2 episodes; ENT following; packing to be removed tomorrow am  - was given levofloxacin but will do ancep 2g q8hrs IV until tomorrow      - spoke to ID, despite penicillin allergy (hives), patient can take ancef   - prasugrel and lovenox ppx on hold; monitor CBCs     #melena  - one time episode 2/21  - spoke to GI fellow, likely due to epistaxis   - will put on PPI IV BID and trend CBCs     #Constipation   - c/w miralax, senna     #CAD/STEMI s/p 4 stents  - Hx of STEMI s/p  s/p PCI of distal RCA, mid/distal LAD ( December 2017 and February 2018)  - Oct 2019: 100% occlusion of distal RCA s/p PCI with REUBEN to distal RCA, and POBA to RPL  - will consult cardiology regarding benefit/risks of holding effient due to epistaxis   - o/p cardio: Dr. Murillo   - C/w Toprol 25mg daily  - will ask patient about aspirin because its not part of his home meds     #HTN   - hold enalapril 2.5mg daily    #BPH  - C/w tamsulosin 0.4mg PO QHS    #DM   - Monitor FS, c/w insulin regimen   - A1C 9.1-->only on glipizide as outpatient   - c/w counselling    #Hypothyroidism  - C/w Synthroid    #GERD  - c/w protonix    Diet: consistent carbs, DASH/TLC + Glucerna   DVT PPx: hold due to epistaxis   GI ppx: protonix  Code Status: FULL CODE  Dispo: acute, micu monitoring; cardio consult for antiplatelet therapy; ENT remove packing possibly today; will try to wean to lower setting of NRB or venti mask     Family contact in case of emergency. Patient states we do not need to regularly call family everyday.   Ami Odonnell - Wife  [phone]  If wife does not answer: Francisca - daughter  [phone]

## 2021-02-24 LAB
ALBUMIN SERPL ELPH-MCNC: 2.8 G/DL — LOW (ref 3.5–5.2)
ALP SERPL-CCNC: 71 U/L — SIGNIFICANT CHANGE UP (ref 30–115)
ALT FLD-CCNC: 59 U/L — HIGH (ref 0–41)
ANION GAP SERPL CALC-SCNC: 12 MMOL/L — SIGNIFICANT CHANGE UP (ref 7–14)
AST SERPL-CCNC: 43 U/L — HIGH (ref 0–41)
BASOPHILS # BLD AUTO: 0.03 K/UL — SIGNIFICANT CHANGE UP (ref 0–0.2)
BASOPHILS NFR BLD AUTO: 0.2 % — SIGNIFICANT CHANGE UP (ref 0–1)
BILIRUB SERPL-MCNC: 0.2 MG/DL — SIGNIFICANT CHANGE UP (ref 0.2–1.2)
BUN SERPL-MCNC: 40 MG/DL — HIGH (ref 10–20)
CALCIUM SERPL-MCNC: 8 MG/DL — LOW (ref 8.5–10.1)
CHLORIDE SERPL-SCNC: 90 MMOL/L — LOW (ref 98–110)
CO2 SERPL-SCNC: 36 MMOL/L — HIGH (ref 17–32)
CREAT SERPL-MCNC: 0.8 MG/DL — SIGNIFICANT CHANGE UP (ref 0.7–1.5)
EOSINOPHIL # BLD AUTO: 0.02 K/UL — SIGNIFICANT CHANGE UP (ref 0–0.7)
EOSINOPHIL NFR BLD AUTO: 0.2 % — SIGNIFICANT CHANGE UP (ref 0–8)
GLUCOSE BLDC GLUCOMTR-MCNC: 136 MG/DL — HIGH (ref 70–99)
GLUCOSE BLDC GLUCOMTR-MCNC: 181 MG/DL — HIGH (ref 70–99)
GLUCOSE BLDC GLUCOMTR-MCNC: 182 MG/DL — HIGH (ref 70–99)
GLUCOSE BLDC GLUCOMTR-MCNC: 283 MG/DL — HIGH (ref 70–99)
GLUCOSE BLDC GLUCOMTR-MCNC: 311 MG/DL — HIGH (ref 70–99)
GLUCOSE SERPL-MCNC: 189 MG/DL — HIGH (ref 70–99)
HCT VFR BLD CALC: 35.6 % — LOW (ref 42–52)
HGB BLD-MCNC: 11.7 G/DL — LOW (ref 14–18)
IMM GRANULOCYTES NFR BLD AUTO: 1.5 % — HIGH (ref 0.1–0.3)
LYMPHOCYTES # BLD AUTO: 1 K/UL — LOW (ref 1.2–3.4)
LYMPHOCYTES # BLD AUTO: 7.7 % — LOW (ref 20.5–51.1)
MAGNESIUM SERPL-MCNC: 2.1 MG/DL — SIGNIFICANT CHANGE UP (ref 1.8–2.4)
MCHC RBC-ENTMCNC: 25.9 PG — LOW (ref 27–31)
MCHC RBC-ENTMCNC: 32.9 G/DL — SIGNIFICANT CHANGE UP (ref 32–37)
MCV RBC AUTO: 78.9 FL — LOW (ref 80–94)
MONOCYTES # BLD AUTO: 0.55 K/UL — SIGNIFICANT CHANGE UP (ref 0.1–0.6)
MONOCYTES NFR BLD AUTO: 4.2 % — SIGNIFICANT CHANGE UP (ref 1.7–9.3)
NEUTROPHILS # BLD AUTO: 11.17 K/UL — HIGH (ref 1.4–6.5)
NEUTROPHILS NFR BLD AUTO: 86.2 % — HIGH (ref 42.2–75.2)
NRBC # BLD: 0 /100 WBCS — SIGNIFICANT CHANGE UP (ref 0–0)
PLATELET # BLD AUTO: 161 K/UL — SIGNIFICANT CHANGE UP (ref 130–400)
POTASSIUM SERPL-MCNC: 4.7 MMOL/L — SIGNIFICANT CHANGE UP (ref 3.5–5)
POTASSIUM SERPL-SCNC: 4.7 MMOL/L — SIGNIFICANT CHANGE UP (ref 3.5–5)
PROT SERPL-MCNC: 5.4 G/DL — LOW (ref 6–8)
RBC # BLD: 4.51 M/UL — LOW (ref 4.7–6.1)
RBC # FLD: 17.4 % — HIGH (ref 11.5–14.5)
SODIUM SERPL-SCNC: 138 MMOL/L — SIGNIFICANT CHANGE UP (ref 135–146)
WBC # BLD: 12.96 K/UL — HIGH (ref 4.8–10.8)
WBC # FLD AUTO: 12.96 K/UL — HIGH (ref 4.8–10.8)

## 2021-02-24 PROCEDURE — 93970 EXTREMITY STUDY: CPT | Mod: 26

## 2021-02-24 PROCEDURE — 71045 X-RAY EXAM CHEST 1 VIEW: CPT | Mod: 26

## 2021-02-24 RX ORDER — ENOXAPARIN SODIUM 100 MG/ML
40 INJECTION SUBCUTANEOUS DAILY
Refills: 0 | Status: DISCONTINUED | OUTPATIENT
Start: 2021-02-24 | End: 2021-03-02

## 2021-02-24 RX ADMIN — Medication 10 MILLIGRAM(S): at 12:18

## 2021-02-24 RX ADMIN — Medication 8: at 17:27

## 2021-02-24 RX ADMIN — Medication 50 MICROGRAM(S): at 06:27

## 2021-02-24 RX ADMIN — Medication 0.25 MILLIGRAM(S): at 23:10

## 2021-02-24 RX ADMIN — Medication 11 UNIT(S): at 12:18

## 2021-02-24 RX ADMIN — PANTOPRAZOLE SODIUM 40 MILLIGRAM(S): 20 TABLET, DELAYED RELEASE ORAL at 06:27

## 2021-02-24 RX ADMIN — Medication 60 MILLIGRAM(S): at 17:28

## 2021-02-24 RX ADMIN — SENNA PLUS 2 TABLET(S): 8.6 TABLET ORAL at 21:03

## 2021-02-24 RX ADMIN — Medication 1 APPLICATION(S): at 17:28

## 2021-02-24 RX ADMIN — Medication 11 UNIT(S): at 08:17

## 2021-02-24 RX ADMIN — Medication 25 MILLIGRAM(S): at 06:27

## 2021-02-24 RX ADMIN — Medication 81 MILLIGRAM(S): at 12:17

## 2021-02-24 RX ADMIN — Medication 60 MILLIGRAM(S): at 06:26

## 2021-02-24 RX ADMIN — Medication 6: at 12:18

## 2021-02-24 RX ADMIN — INSULIN GLARGINE 29 UNIT(S): 100 INJECTION, SOLUTION SUBCUTANEOUS at 21:22

## 2021-02-24 RX ADMIN — CHLORHEXIDINE GLUCONATE 1 APPLICATION(S): 213 SOLUTION TOPICAL at 05:30

## 2021-02-24 RX ADMIN — Medication 11 UNIT(S): at 17:27

## 2021-02-24 RX ADMIN — ENOXAPARIN SODIUM 40 MILLIGRAM(S): 100 INJECTION SUBCUTANEOUS at 12:17

## 2021-02-24 RX ADMIN — Medication 1 APPLICATION(S): at 06:27

## 2021-02-24 RX ADMIN — TAMSULOSIN HYDROCHLORIDE 0.8 MILLIGRAM(S): 0.4 CAPSULE ORAL at 21:03

## 2021-02-24 RX ADMIN — POLYETHYLENE GLYCOL 3350 17 GRAM(S): 17 POWDER, FOR SOLUTION ORAL at 12:17

## 2021-02-24 RX ADMIN — PANTOPRAZOLE SODIUM 40 MILLIGRAM(S): 20 TABLET, DELAYED RELEASE ORAL at 17:28

## 2021-02-24 NOTE — PROGRESS NOTE ADULT - SUBJECTIVE AND OBJECTIVE BOX
ENT DAILY PROGRESS NOTE    Pt is a 70y M a/w COVID+ PNA; ENT following for epistaxis s/p packing x 2. Pt had L nare rhino rocket deflated and removed yesterday. Pt states he has not rebled since packing was removed, and does not feel blood draining to the back of this mouth.     REVIEW OF SYSTEMS   [x] A ten-point review of systems was otherwise negative except as noted.    Allergies  penicillin (Rash (Severe))    MEDICATIONS:  acetaminophen   Tablet .. 650 milliGRAM(s) Oral every 6 hours PRN  ALPRAZolam 0.25 milliGRAM(s) Oral daily PRN  aspirin enteric coated 81 milliGRAM(s) Oral daily  BACItracin   Ointment 1 Application(s) Topical two times a day  bisacodyl Suppository 10 milliGRAM(s) Rectal daily PRN  chlorhexidine 4% Liquid 1 Application(s) Topical daily  dextrose 40% Gel 15 Gram(s) Oral once  dextrose 5%. 1000 milliLiter(s) IV Continuous <Continuous>  dextrose 5%. 1000 milliLiter(s) IV Continuous <Continuous>  dextrose 50% Injectable 25 Gram(s) IV Push once  dextrose 50% Injectable 12.5 Gram(s) IV Push once  dextrose 50% Injectable 25 Gram(s) IV Push once  enoxaparin Injectable 40 milliGRAM(s) SubCutaneous daily  glucagon  Injectable 1 milliGRAM(s) IntraMuscular once  guaifenesin/dextromethorphan  Syrup 5 milliLiter(s) Oral every 6 hours PRN  influenza   Vaccine 0.5 milliLiter(s) IntraMuscular once  insulin glargine Injectable (LANTUS) 29 Unit(s) SubCutaneous at bedtime  insulin lispro (ADMELOG) corrective regimen sliding scale   SubCutaneous three times a day before meals  insulin lispro Injectable (ADMELOG) 11 Unit(s) SubCutaneous three times a day before meals  levothyroxine 50 MICROGram(s) Oral daily  methylPREDNISolone sodium succinate Injectable 60 milliGRAM(s) IV Push every 12 hours  metoprolol succinate ER 25 milliGRAM(s) Oral daily  ondansetron    Tablet 4 milliGRAM(s) Oral two times a day PRN  pantoprazole    Tablet 40 milliGRAM(s) Oral every 12 hours  polyethylene glycol 3350 17 Gram(s) Oral daily  senna 2 Tablet(s) Oral at bedtime  tamsulosin 0.8 milliGRAM(s) Oral at bedtime    Vital Signs Last 24 Hrs  T(C): 35.7 (24 Feb 2021 12:00), Max: 36.2 (23 Feb 2021 20:00)  T(F): 96.3 (24 Feb 2021 12:00), Max: 97.1 (23 Feb 2021 20:00)  HR: 92 (24 Feb 2021 13:00) (74 - 108)  BP: 121/62 (24 Feb 2021 13:00) (97/62 - 127/75)  BP(mean): 73 (24 Feb 2021 13:00) (70 - 94)  RR: 25 (24 Feb 2021 13:00) (13 - 45)  SpO2: 90% (24 Feb 2021 13:00) (84% - 98%)      02-23 @ 07:01  -  02-24 @ 07:00  --------------------------------------------------------  IN:    Oral Fluid: 650 mL  Total IN: 650 mL    OUT:    Voided (mL): 2335 mL  Total OUT: 2335 mL    Total NET: -1685 mL      02-24 @ 07:01  -  02-24 @ 13:24  --------------------------------------------------------  IN:    Oral Fluid: 540 mL  Total IN: 540 mL    OUT:    Voided (mL): 300 mL  Total OUT: 300 mL    Total NET: 240 mL    PHYSICAL EXAM:  GEN: NAD  NEURO: Awake and alert   ENT: +dried blood noted to L nare, no active bleeding/oozing; no active bleeding or clots noted to posterior OP  RESP: +venturi mask in place   CARDIO: +S1/S2  ABDO: Soft, obese abdomen, NT  EXT: TIJERINA x 4     LABS:  CBC-                        11.7   12.96 )-----------( 161      ( 24 Feb 2021 04:23 )             35.6     BMP/CMP-  24 Feb 2021 04:23    138    |  90     |  40     ----------------------------<  189    4.7     |  36     |  0.8      Ca    8.0        24 Feb 2021 04:23  Mg     2.1       24 Feb 2021 04:23    TPro  5.4    /  Alb  2.8    /  TBili  0.2    /  DBili  x      /  AST  43     /  ALT  59     /  AlkPhos  71     24 Feb 2021 04:23    Endocrine Panel-  Calcium, Total Serum: 8.0 mg/dL (02-24 @ 04:23)

## 2021-02-24 NOTE — PROGRESS NOTE ADULT - ASSESSMENT
IMPRESSION:    Acute hypoxemic respiratory failure on 50%  severe epistaxis sp packing now removed  severe Covid PNA  Probable superimposed bacterial infection sp abx  COPD exacerbation  HO CAD    PLAN:    CNS: avoid CNS depressant    HEENT: Oral care ENT f/u      PULMONARY:  HOB @ 45 degrees.  Aspiration precautions. incentive spirometry  NRM,   Solumedrol 60mg q12h. wean O2      CARDIOVASCULAR: Goal Map >60, Avoid volume overload.     GI: GI prophylaxis. feeding.     RENAL:  Follow up lytes.  Correct as needed    INFECTIOUS DISEASE:  empiric ABX given packing      HEMATOLOGICAL:  DVT prophylaxis. lE doppler, SCD    ENDOCRINE:  Follow up FS.  Insulin protocol if needed.    MUSCULOSKELETAL: OOB to chair    PT/OT    Dispo: MICU

## 2021-02-24 NOTE — PROGRESS NOTE ADULT - ASSESSMENT
Pt is a 70y M a/w COVID+ PNA; ENT following for epistaxis s/p packing x 2. Pt had L nare rhino rocket deflated and removed yesterday- no rebleed since packing was removed.     ·	Cont with bacitracin to b/l nares  ·	No acute ENT intervention at this time  ·	Hold pressure and recall ENT (7560) if patient rebleeds   ·	Will d/w attng

## 2021-02-24 NOTE — PROGRESS NOTE ADULT - SUBJECTIVE AND OBJECTIVE BOX
OVERNIGHT EVENTS: events noted, nasal packing removed, sp cardio eval, 50% FM    Vital Signs Last 24 Hrs  T(C): 35.8 (24 Feb 2021 04:00), Max: 36.2 (23 Feb 2021 20:00)  T(F): 96.5 (24 Feb 2021 04:00), Max: 97.1 (23 Feb 2021 20:00)  HR: 78 (24 Feb 2021 07:00) (74 - 124)  BP: 119/66 (24 Feb 2021 07:00) (97/62 - 144/78)  BP(mean): 80 (24 Feb 2021 07:00) (70 - 100)  RR: 24 (24 Feb 2021 07:00) (12 - 45)  SpO2: 92% (24 Feb 2021 07:00) (71% - 100%)    PHYSICAL EXAMINATION:    GENERAL: ILL looking    HEENT: Head is normocephalic and atraumatic.    NECK: Supple.    LUNGS: bl crackles    HEART: Regular rate and rhythm without murmur.    ABDOMEN: Soft, nontender, and nondistended.      EXTREMITIES: Without any cyanosis, clubbing, rash, lesions or edema.    NEUROLOGIC: Grossly intact.    SKIN: No ulceration or induration present.      LABS:                        11.7   12.96 )-----------( 161      ( 24 Feb 2021 04:23 )             35.6     02-24    138  |  90<L>  |  40<H>  ----------------------------<  189<H>  4.7   |  36<H>  |  0.8    Ca    8.0<L>      24 Feb 2021 04:23  Mg     2.1     02-24    TPro  5.4<L>  /  Alb  2.8<L>  /  TBili  0.2  /  DBili  x   /  AST  43<H>  /  ALT  59<H>  /  AlkPhos  71  02-24                    Procalcitonin, Serum: 0.11 ng/mL (02-23-21 @ 05:02)        02-23-21 @ 07:01  -  02-24-21 @ 07:00  --------------------------------------------------------  IN: 650 mL / OUT: 2335 mL / NET: -1685 mL        MICROBIOLOGY:      MEDICATIONS  (STANDING):  aspirin enteric coated 81 milliGRAM(s) Oral daily  BACItracin   Ointment 1 Application(s) Topical two times a day  chlorhexidine 4% Liquid 1 Application(s) Topical daily  dextrose 40% Gel 15 Gram(s) Oral once  dextrose 5%. 1000 milliLiter(s) (50 mL/Hr) IV Continuous <Continuous>  dextrose 5%. 1000 milliLiter(s) (100 mL/Hr) IV Continuous <Continuous>  dextrose 50% Injectable 25 Gram(s) IV Push once  dextrose 50% Injectable 12.5 Gram(s) IV Push once  dextrose 50% Injectable 25 Gram(s) IV Push once  glucagon  Injectable 1 milliGRAM(s) IntraMuscular once  influenza   Vaccine 0.5 milliLiter(s) IntraMuscular once  insulin glargine Injectable (LANTUS) 29 Unit(s) SubCutaneous at bedtime  insulin lispro (ADMELOG) corrective regimen sliding scale   SubCutaneous three times a day before meals  insulin lispro Injectable (ADMELOG) 11 Unit(s) SubCutaneous three times a day before meals  levothyroxine 50 MICROGram(s) Oral daily  methylPREDNISolone sodium succinate Injectable 60 milliGRAM(s) IV Push every 12 hours  metoprolol succinate ER 25 milliGRAM(s) Oral daily  pantoprazole    Tablet 40 milliGRAM(s) Oral every 12 hours  polyethylene glycol 3350 17 Gram(s) Oral daily  senna 2 Tablet(s) Oral at bedtime  tamsulosin 0.8 milliGRAM(s) Oral at bedtime    MEDICATIONS  (PRN):  acetaminophen   Tablet .. 650 milliGRAM(s) Oral every 6 hours PRN Temp greater or equal to 38C (100.4F), Mild Pain (1 - 3)  ALPRAZolam 0.25 milliGRAM(s) Oral daily PRN anxiety  bisacodyl Suppository 10 milliGRAM(s) Rectal daily PRN Constipation  guaifenesin/dextromethorphan  Syrup 5 milliLiter(s) Oral every 6 hours PRN Cough  ondansetron    Tablet 4 milliGRAM(s) Oral two times a day PRN Nausea and/or Vomiting      RADIOLOGY & ADDITIONAL STUDIES:

## 2021-02-24 NOTE — CHART NOTE - NSCHARTNOTEFT_GEN_A_CORE
limited f/u by LEVI Slater    Pt looks well, on mask.  Pt previously reported that he doesn't want the low salt diet any longer because they are awful tasting. Provided change to LIP but not accepted.  about % of PO trend documented per staff.   although he wears dentures, he doesn't want mechanical soft foods. No chew/swallow issue. on HFNC.  No GI issue per pt, LBM 2/23.  No edema.  PTA pt eats well.   Labs and meds reviewed.   Skin intact.   Weight history:  (2/11): 100kg  (2/23): 100kg  (2/24): 96.8kg    on Carbohydrate consistent no snack, DASH/TLC, and Glucerna Shake x3 diet.    under MICU management at this time.  for COVID19 PNA.   epistaxis 2/2 HFNC  constipation c/w bowel regimen  c/w med CAD/STEMI      REC: (1) Continue Carbohydrate Consistent w/o snack, DASH/TLC, and Glucerna Shake q8hr.  Remove DASH/TLC if possible. limited f/u by LEVI Slater    Pt looks well, on mask.  Spoken with him, he is with good appetite. Still drinking at least 1-2 bottles of glucerna and finishing most meals.   Pt previously reported that he doesn't want the low salt diet any longer because they are awful tasting. Provided change to LIP but not accepted.  about % of PO trend documented per staff.   although he wears dentures, he doesn't want mechanical soft foods. No chew/swallow issue. on HFNC.  No GI issue per pt, LBM 2/23.  No edema.  PTA pt eats well.   Labs and meds reviewed.   Skin intact.   Weight history:  (2/11): 100kg  (2/23): 100kg  (2/24): 96.8kg    on Carbohydrate consistent no snack, DASH/TLC, and Glucerna Shake x3 diet.    under MICU management at this time.  for COVID19 PNA.   epistaxis 2/2 HFNC  constipation c/w bowel regimen  c/w med CAD/STEMI      REC: (1) Continue Carbohydrate Consistent w/o snack, DASH/TLC, and Glucerna Shake q8hr.  Remove DASH/TLC if possible.

## 2021-02-24 NOTE — PROGRESS NOTE ADULT - ASSESSMENT
69 yo M with PMH of CAD, MI (s/p 4 stents), DM, HTN, BPH, hypothyroid and GERD, recently received Pfizer vaccine (1/3 and 1/24) presented to ED c/o progressive SOB, fever, and weakness. Patient stated that his symptoms began on Wednesday 2/3. COVID PCR + on 2/4. Admitted to medicine with acute hypoxemic respiratory failure secondary to COVID-19 PNA. Upgraded to ICU from 3A due to increasing oxygen requirements on 2/6. ICU course complicated by epistaxis x 2 due to HFNC.     #Acute hypoxemic respiratory failure secondary to COVID-19 PNA   #likely superimposed bacterial pneumonia   - Patient s/p  2 doses of the Pfizer vaccine (last dose 1/24)  - CTA 02/04 showed extensive bilateral patchy ground-glass opacities involving all lobes most pronounced in the upper lung zones  - O2: venti mask (if desat -> NRB); hold off on HFNC   - s/p 1 unit plasma  - s/p Remdesivir completed 2/9  - s/p 1 week of rocephin & Levaquin per ID  - inflammatory markers:  Ddimer: 1758 > 1751 > 986 > 855 > 973   Procal:  0.08 > 0.12 > 0.15 > 0.37 > 0.19 > 0.11  Ferritin: 404 > 625 > 229 > 132  CRP: 0.54 > 0.60 > 3.30 > 6.73 > 2.06 > 1.23  - LE Duplex negative 2/8, 2/14, 2/18   - PT/OT following  - solumedrol 60mg IV q12hrs; hold off lasix today   - f/u fungitell   - asked patient today that while O2 is stable, there is always risk for intubation, he wishes to remain full code with intubation as last resort     #epistaxis likely due to HFNC   #melena likely due to epistaxis vs. true GI bleed   - recurrent x 2 episodes; ENT following; packing removed 2/23; will resume lovenox 40mg daily  - monitor CBC daily   - melena: one time episode 2/21; patient has hx of endoscopy 2 years ago which was not significant   - will put on PPI po BID    #Constipation   - c/w miralax, senna; dulcolax suppository prn     #CAD/STEMI s/p 4 stents  - Hx of STEMI s/p  s/p PCI of distal RCA, mid/distal LAD (December 2017 and February 2018)  - Oct 2019: 100% occlusion of distal RCA s/p PCI with REUBEN to distal RCA, and POBA to RPL  - As per cardio (Dr. Murillo), will hold effient and start asa 81 daily   - C/w Toprol 25mg daily    #HTN   - hold enalapril 2.5mg daily due to soft BP in setting of severe covid     #BPH  - C/w tamsulosin 0.4mg PO QHS    #DM   - Monitor FS, c/w insulin regimen   - A1C 9.1-->only on glipizide as outpatient   - c/w counselling    #Hypothyroidism  - C/w Synthroid    #GERD  - c/w protonix    Diet: consistent carbs, DASH/TLC + Glucerna   DVT PPx: lovenox 40mg daily   GI ppx: protonix  Code Status: FULL CODE  Dispo: acute, micu monitoring; wean O2     Family contact in case of emergency. Patient states we do not need to regularly call family everyday.   Ami Odonnell - Wife  [phone]  If wife does not answer: Francisca - daughter  [phone]

## 2021-02-25 LAB
ALBUMIN SERPL ELPH-MCNC: 3 G/DL — LOW (ref 3.5–5.2)
ALP SERPL-CCNC: 68 U/L — SIGNIFICANT CHANGE UP (ref 30–115)
ALT FLD-CCNC: 44 U/L — HIGH (ref 0–41)
ANION GAP SERPL CALC-SCNC: 9 MMOL/L — SIGNIFICANT CHANGE UP (ref 7–14)
AST SERPL-CCNC: 23 U/L — SIGNIFICANT CHANGE UP (ref 0–41)
BASOPHILS # BLD AUTO: 0.03 K/UL — SIGNIFICANT CHANGE UP (ref 0–0.2)
BASOPHILS NFR BLD AUTO: 0.3 % — SIGNIFICANT CHANGE UP (ref 0–1)
BILIRUB SERPL-MCNC: 0.3 MG/DL — SIGNIFICANT CHANGE UP (ref 0.2–1.2)
BUN SERPL-MCNC: 37 MG/DL — HIGH (ref 10–20)
CALCIUM SERPL-MCNC: 8.2 MG/DL — LOW (ref 8.5–10.1)
CHLORIDE SERPL-SCNC: 94 MMOL/L — LOW (ref 98–110)
CO2 SERPL-SCNC: 35 MMOL/L — HIGH (ref 17–32)
CREAT SERPL-MCNC: 0.9 MG/DL — SIGNIFICANT CHANGE UP (ref 0.7–1.5)
EOSINOPHIL # BLD AUTO: 0.02 K/UL — SIGNIFICANT CHANGE UP (ref 0–0.7)
EOSINOPHIL NFR BLD AUTO: 0.2 % — SIGNIFICANT CHANGE UP (ref 0–8)
GLUCOSE BLDC GLUCOMTR-MCNC: 152 MG/DL — HIGH (ref 70–99)
GLUCOSE BLDC GLUCOMTR-MCNC: 252 MG/DL — HIGH (ref 70–99)
GLUCOSE BLDC GLUCOMTR-MCNC: 258 MG/DL — HIGH (ref 70–99)
GLUCOSE BLDC GLUCOMTR-MCNC: 326 MG/DL — HIGH (ref 70–99)
GLUCOSE SERPL-MCNC: 158 MG/DL — HIGH (ref 70–99)
HCT VFR BLD CALC: 36.3 % — LOW (ref 42–52)
HGB BLD-MCNC: 11.7 G/DL — LOW (ref 14–18)
IMM GRANULOCYTES NFR BLD AUTO: 2 % — HIGH (ref 0.1–0.3)
LYMPHOCYTES # BLD AUTO: 0.97 K/UL — LOW (ref 1.2–3.4)
LYMPHOCYTES # BLD AUTO: 8.7 % — LOW (ref 20.5–51.1)
MAGNESIUM SERPL-MCNC: 2.1 MG/DL — SIGNIFICANT CHANGE UP (ref 1.8–2.4)
MCHC RBC-ENTMCNC: 26 PG — LOW (ref 27–31)
MCHC RBC-ENTMCNC: 32.2 G/DL — SIGNIFICANT CHANGE UP (ref 32–37)
MCV RBC AUTO: 80.7 FL — SIGNIFICANT CHANGE UP (ref 80–94)
MONOCYTES # BLD AUTO: 0.42 K/UL — SIGNIFICANT CHANGE UP (ref 0.1–0.6)
MONOCYTES NFR BLD AUTO: 3.8 % — SIGNIFICANT CHANGE UP (ref 1.7–9.3)
NEUTROPHILS # BLD AUTO: 9.54 K/UL — HIGH (ref 1.4–6.5)
NEUTROPHILS NFR BLD AUTO: 85 % — HIGH (ref 42.2–75.2)
NRBC # BLD: 0 /100 WBCS — SIGNIFICANT CHANGE UP (ref 0–0)
PLATELET # BLD AUTO: 147 K/UL — SIGNIFICANT CHANGE UP (ref 130–400)
POTASSIUM SERPL-MCNC: 4.6 MMOL/L — SIGNIFICANT CHANGE UP (ref 3.5–5)
POTASSIUM SERPL-SCNC: 4.6 MMOL/L — SIGNIFICANT CHANGE UP (ref 3.5–5)
PROT SERPL-MCNC: 5.3 G/DL — LOW (ref 6–8)
RBC # BLD: 4.5 M/UL — LOW (ref 4.7–6.1)
RBC # FLD: 17.7 % — HIGH (ref 11.5–14.5)
SODIUM SERPL-SCNC: 138 MMOL/L — SIGNIFICANT CHANGE UP (ref 135–146)
WBC # BLD: 11.2 K/UL — HIGH (ref 4.8–10.8)
WBC # FLD AUTO: 11.2 K/UL — HIGH (ref 4.8–10.8)

## 2021-02-25 PROCEDURE — 71045 X-RAY EXAM CHEST 1 VIEW: CPT | Mod: 26

## 2021-02-25 RX ORDER — LACTULOSE 10 G/15ML
20 SOLUTION ORAL EVERY 8 HOURS
Refills: 0 | Status: DISCONTINUED | OUTPATIENT
Start: 2021-02-25 | End: 2021-02-25

## 2021-02-25 RX ORDER — LACTULOSE 10 G/15ML
20 SOLUTION ORAL EVERY 6 HOURS
Refills: 0 | Status: COMPLETED | OUTPATIENT
Start: 2021-02-25 | End: 2021-02-25

## 2021-02-25 RX ADMIN — Medication 2: at 06:31

## 2021-02-25 RX ADMIN — Medication 1 APPLICATION(S): at 06:32

## 2021-02-25 RX ADMIN — Medication 11 UNIT(S): at 17:26

## 2021-02-25 RX ADMIN — Medication 40 MILLIGRAM(S): at 17:28

## 2021-02-25 RX ADMIN — TAMSULOSIN HYDROCHLORIDE 0.8 MILLIGRAM(S): 0.4 CAPSULE ORAL at 21:26

## 2021-02-25 RX ADMIN — Medication 8: at 12:45

## 2021-02-25 RX ADMIN — ENOXAPARIN SODIUM 40 MILLIGRAM(S): 100 INJECTION SUBCUTANEOUS at 12:47

## 2021-02-25 RX ADMIN — LACTULOSE 20 GRAM(S): 10 SOLUTION ORAL at 17:28

## 2021-02-25 RX ADMIN — Medication 81 MILLIGRAM(S): at 12:48

## 2021-02-25 RX ADMIN — INSULIN GLARGINE 29 UNIT(S): 100 INJECTION, SOLUTION SUBCUTANEOUS at 21:47

## 2021-02-25 RX ADMIN — SENNA PLUS 2 TABLET(S): 8.6 TABLET ORAL at 21:27

## 2021-02-25 RX ADMIN — Medication 6: at 17:27

## 2021-02-25 RX ADMIN — Medication 50 MICROGRAM(S): at 06:34

## 2021-02-25 RX ADMIN — LACTULOSE 20 GRAM(S): 10 SOLUTION ORAL at 12:46

## 2021-02-25 RX ADMIN — POLYETHYLENE GLYCOL 3350 17 GRAM(S): 17 POWDER, FOR SOLUTION ORAL at 12:47

## 2021-02-25 RX ADMIN — LACTULOSE 20 GRAM(S): 10 SOLUTION ORAL at 17:29

## 2021-02-25 RX ADMIN — Medication 25 MILLIGRAM(S): at 06:33

## 2021-02-25 RX ADMIN — Medication 60 MILLIGRAM(S): at 06:33

## 2021-02-25 RX ADMIN — CHLORHEXIDINE GLUCONATE 1 APPLICATION(S): 213 SOLUTION TOPICAL at 12:48

## 2021-02-25 RX ADMIN — Medication 1 APPLICATION(S): at 17:28

## 2021-02-25 RX ADMIN — Medication 11 UNIT(S): at 12:45

## 2021-02-25 RX ADMIN — PANTOPRAZOLE SODIUM 40 MILLIGRAM(S): 20 TABLET, DELAYED RELEASE ORAL at 06:33

## 2021-02-25 RX ADMIN — PANTOPRAZOLE SODIUM 40 MILLIGRAM(S): 20 TABLET, DELAYED RELEASE ORAL at 17:28

## 2021-02-25 NOTE — PROGRESS NOTE ADULT - ASSESSMENT
71 yo M with PMH of CAD, MI (s/p 4 stents), DM, HTN, BPH, hypothyroid and GERD, recently received Pfizer vaccine (1/3 and 1/24) presented to ED c/o progressive SOB, fever, and weakness. Patient stated that his symptoms began on Wednesday 2/3. COVID PCR + on 2/4. Admitted to medicine with acute hypoxemic respiratory failure secondary to COVID-19 PNA. Upgraded to ICU from 3A due to increasing oxygen requirements on 2/6. ICU course complicated by epistaxis x 2 due to HFNC.     #Acute hypoxemic respiratory failure secondary to COVID-19 PNA   #likely superimposed bacterial pneumonia   - Patient s/p  2 doses of the Pfizer vaccine (last dose 1/24)  - CTA 02/04 showed extensive bilateral patchy ground-glass opacities involving all lobes most pronounced in the upper lung zones  - O2: venti mask, will try to wean to nasal cannula   - s/p 1 unit plasma  - s/p Remdesivir completed 2/9  - s/p 1 week of rocephin & Levaquin per ID  - inflammatory markers:  Ddimer: 1758 > 1751 > 986 > 855 > 973  Procal:  0.08 > 0.12 > 0.15 > 0.37 > 0.19 > 0.11  Ferritin: 404 > 625 > 229 > 132  CRP: 0.54 > 0.60 > 3.30 > 6.73 > 2.06 > 1.23  - LE Duplex negative 2/8, 2/14, 2/18   - PT/OT following  - decrease to solumedrol 40mg q12hrs IV   - f/u fungitell   - asked patient today that while O2 is stable, there is always risk for intubation, he wishes to remain full code with intubation as last resort     #epistaxis likely due to HFNC   #melena likely due to epistaxis vs. true GI bleed   - recurrent x 2 episodes; ENT following; packing removed 2/23; will resume lovenox 40mg daily  - monitor CBC daily   - melena: one time episode 2/21; patient has hx of endoscopy 2 years ago which was not significant   - will put on PPI po BID    #Constipation   - c/w miralax, senna; dulcolax suppository prn     #CAD/STEMI s/p 4 stents  - Hx of STEMI s/p  s/p PCI of distal RCA, mid/distal LAD (December 2017 and February 2018)  - Oct 2019: 100% occlusion of distal RCA s/p PCI with REUBEN to distal RCA, and POBA to RPL  - As per cardio (Dr. Murillo), will hold effient and start asa 81 daily   - C/w Toprol 25mg daily  - takes ezetimibe 10mg at home and is nonformulary, hold for now     #HTN   - hold enalapril 2.5mg daily due to soft BP in setting of severe covid     #BPH  - C/w tamsulosin 0.4mg PO QHS    #DM   - Monitor FS, c/w insulin regimen   - A1C 9.1-->only on glipizide as outpatient   - c/w counselling    #Hypothyroidism  - C/w Synthroid    #GERD  - c/w protonix    Diet: consistent carbs, DASH/TLC + Glucerna   DVT PPx: lovenox 40mg daily   GI ppx: protonix  Code Status: FULL CODE  Dispo: acute, micu monitoring; wean O2 to nasal cannula if possible     Family contact in case of emergency. Patient states we do not need to regularly call family everyday.   Ami Odonnell - Wife  [phone]  If wife does not answer: Francisca - daughter  [phone]

## 2021-02-25 NOTE — PROGRESS NOTE ADULT - SUBJECTIVE AND OBJECTIVE BOX
SUBJECTIVE:    Patient is a 70y old Male who presents with a chief complaint of sob (25 Feb 2021 08:05)    Currently admitted to medicine with the primary diagnosis of Shortness of breath       Today is hospital day 21d.     Overnight no events.     This morning patient denies bleeding. Reporting constipation and asking for more laxatives.     PAST MEDICAL & SURGICAL HISTORY  Chronic kidney disease (CKD)  III    Type 2 diabetes mellitus without complication, unspecified long term insulin use status    Hypertension, unspecified type    Dyspnea, unspecified type    ASHD (arteriosclerotic heart disease)  STEMI 12/31/17, PCI RCA    S/P cataract surgery    History of ligation of vein  2012    History of tonsillectomy  1957        ALLERGIES:  penicillin (Rash (Severe))    MEDICATIONS:  STANDING MEDICATIONS  aspirin enteric coated 81 milliGRAM(s) Oral daily  BACItracin   Ointment 1 Application(s) Topical two times a day  chlorhexidine 4% Liquid 1 Application(s) Topical daily  dextrose 40% Gel 15 Gram(s) Oral once  dextrose 5%. 1000 milliLiter(s) IV Continuous <Continuous>  dextrose 5%. 1000 milliLiter(s) IV Continuous <Continuous>  dextrose 50% Injectable 25 Gram(s) IV Push once  dextrose 50% Injectable 12.5 Gram(s) IV Push once  dextrose 50% Injectable 25 Gram(s) IV Push once  enoxaparin Injectable 40 milliGRAM(s) SubCutaneous daily  glucagon  Injectable 1 milliGRAM(s) IntraMuscular once  influenza   Vaccine 0.5 milliLiter(s) IntraMuscular once  insulin glargine Injectable (LANTUS) 29 Unit(s) SubCutaneous at bedtime  insulin lispro (ADMELOG) corrective regimen sliding scale   SubCutaneous three times a day before meals  insulin lispro Injectable (ADMELOG) 11 Unit(s) SubCutaneous three times a day before meals  lactulose Syrup 20 Gram(s) Oral every 8 hours  levothyroxine 50 MICROGram(s) Oral daily  methylPREDNISolone sodium succinate Injectable 40 milliGRAM(s) IV Push every 12 hours  metoprolol succinate ER 25 milliGRAM(s) Oral daily  pantoprazole    Tablet 40 milliGRAM(s) Oral every 12 hours  polyethylene glycol 3350 17 Gram(s) Oral daily  senna 2 Tablet(s) Oral at bedtime  tamsulosin 0.8 milliGRAM(s) Oral at bedtime    PRN MEDICATIONS  acetaminophen   Tablet .. 650 milliGRAM(s) Oral every 6 hours PRN  ALPRAZolam 0.25 milliGRAM(s) Oral daily PRN  bisacodyl Suppository 10 milliGRAM(s) Rectal daily PRN  guaifenesin/dextromethorphan  Syrup 5 milliLiter(s) Oral every 6 hours PRN  ondansetron    Tablet 4 milliGRAM(s) Oral two times a day PRN    VITALS:   T(F): 96.2  HR: 96  BP: 126/64  RR: 39  SpO2: 85%    LABS:                        11.7   11.20 )-----------( 147      ( 25 Feb 2021 04:16 )             36.3     02-25    138  |  94<L>  |  37<H>  ----------------------------<  158<H>  4.6   |  35<H>  |  0.9    Ca    8.2<L>      25 Feb 2021 04:16  Mg     2.1     02-25    TPro  5.3<L>  /  Alb  3.0<L>  /  TBili  0.3  /  DBili  x   /  AST  23  /  ALT  44<H>  /  AlkPhos  68  02-25          PHYSICAL EXAM:  GEN: No acute distress  PULM/CHEST: Clear to auscultation bilaterally, no rales, rhonchi or wheezes   CVS: Regular rate and rhythm, S1-S2, no murmurs  ABD: Soft, non-tender, non-distended, +BS  EXT: No edema  NEURO: AAOx3

## 2021-02-25 NOTE — PROGRESS NOTE ADULT - ASSESSMENT
IMPRESSION:    Acute hypoxemic respiratory failure on 50%  severe epistaxis sp packing now removed  severe Covid PNA  Probable superimposed bacterial infection sp abx  COPD exacerbation  HO CAD    PLAN:    CNS: avoid CNS depressant    HEENT: Oral care ENT f/u      PULMONARY:  HOB @ 45 degrees.  Aspiration precautions. incentive spirometry  trial of NC  Solumedrol 40mg q12h. wean O2      CARDIOVASCULAR: Goal Map >60, Avoid volume overload.     GI: GI prophylaxis. feeding.     RENAL:  Follow up lytes.  Correct as needed    INFECTIOUS DISEASE:  empiric ABX given packing      HEMATOLOGICAL:  DVT prophylaxis. lE doppler, SCD    ENDOCRINE:  Follow up FS.  Insulin protocol if needed.    MUSCULOSKELETAL: OOB to chair    PT/OT    Dispo: MICU

## 2021-02-26 LAB
ALBUMIN SERPL ELPH-MCNC: 2.8 G/DL — LOW (ref 3.5–5.2)
ALP SERPL-CCNC: 69 U/L — SIGNIFICANT CHANGE UP (ref 30–115)
ALT FLD-CCNC: 50 U/L — HIGH (ref 0–41)
ANION GAP SERPL CALC-SCNC: 7 MMOL/L — SIGNIFICANT CHANGE UP (ref 7–14)
AST SERPL-CCNC: 26 U/L — SIGNIFICANT CHANGE UP (ref 0–41)
BASOPHILS # BLD AUTO: 0.02 K/UL — SIGNIFICANT CHANGE UP (ref 0–0.2)
BASOPHILS NFR BLD AUTO: 0.2 % — SIGNIFICANT CHANGE UP (ref 0–1)
BILIRUB SERPL-MCNC: 0.3 MG/DL — SIGNIFICANT CHANGE UP (ref 0.2–1.2)
BUN SERPL-MCNC: 35 MG/DL — HIGH (ref 10–20)
CALCIUM SERPL-MCNC: 8.6 MG/DL — SIGNIFICANT CHANGE UP (ref 8.5–10.1)
CHLORIDE SERPL-SCNC: 92 MMOL/L — LOW (ref 98–110)
CO2 SERPL-SCNC: 36 MMOL/L — HIGH (ref 17–32)
CREAT SERPL-MCNC: 0.8 MG/DL — SIGNIFICANT CHANGE UP (ref 0.7–1.5)
D DIMER BLD IA.RAPID-MCNC: 833 NG/ML DDU — HIGH (ref 0–230)
EOSINOPHIL # BLD AUTO: 0.05 K/UL — SIGNIFICANT CHANGE UP (ref 0–0.7)
EOSINOPHIL NFR BLD AUTO: 0.5 % — SIGNIFICANT CHANGE UP (ref 0–8)
FUNGITELL: <31 PG/ML — SIGNIFICANT CHANGE UP
GLUCOSE BLDC GLUCOMTR-MCNC: 134 MG/DL — HIGH (ref 70–99)
GLUCOSE BLDC GLUCOMTR-MCNC: 145 MG/DL — HIGH (ref 70–99)
GLUCOSE BLDC GLUCOMTR-MCNC: 164 MG/DL — HIGH (ref 70–99)
GLUCOSE BLDC GLUCOMTR-MCNC: 270 MG/DL — HIGH (ref 70–99)
GLUCOSE BLDC GLUCOMTR-MCNC: 298 MG/DL — HIGH (ref 70–99)
GLUCOSE SERPL-MCNC: 168 MG/DL — HIGH (ref 70–99)
HCT VFR BLD CALC: 37.3 % — LOW (ref 42–52)
HGB BLD-MCNC: 11.8 G/DL — LOW (ref 14–18)
IMM GRANULOCYTES NFR BLD AUTO: 2.6 % — HIGH (ref 0.1–0.3)
LYMPHOCYTES # BLD AUTO: 1 K/UL — LOW (ref 1.2–3.4)
LYMPHOCYTES # BLD AUTO: 10.8 % — LOW (ref 20.5–51.1)
MAGNESIUM SERPL-MCNC: 2.2 MG/DL — SIGNIFICANT CHANGE UP (ref 1.8–2.4)
MCHC RBC-ENTMCNC: 25.6 PG — LOW (ref 27–31)
MCHC RBC-ENTMCNC: 31.6 G/DL — LOW (ref 32–37)
MCV RBC AUTO: 80.9 FL — SIGNIFICANT CHANGE UP (ref 80–94)
MONOCYTES # BLD AUTO: 0.36 K/UL — SIGNIFICANT CHANGE UP (ref 0.1–0.6)
MONOCYTES NFR BLD AUTO: 3.9 % — SIGNIFICANT CHANGE UP (ref 1.7–9.3)
NEUTROPHILS # BLD AUTO: 7.6 K/UL — HIGH (ref 1.4–6.5)
NEUTROPHILS NFR BLD AUTO: 82 % — HIGH (ref 42.2–75.2)
NRBC # BLD: 0 /100 WBCS — SIGNIFICANT CHANGE UP (ref 0–0)
PLATELET # BLD AUTO: 159 K/UL — SIGNIFICANT CHANGE UP (ref 130–400)
POTASSIUM SERPL-MCNC: 5.1 MMOL/L — HIGH (ref 3.5–5)
POTASSIUM SERPL-SCNC: 5.1 MMOL/L — HIGH (ref 3.5–5)
PROT SERPL-MCNC: 5.3 G/DL — LOW (ref 6–8)
RBC # BLD: 4.61 M/UL — LOW (ref 4.7–6.1)
RBC # FLD: 17.9 % — HIGH (ref 11.5–14.5)
SODIUM SERPL-SCNC: 135 MMOL/L — SIGNIFICANT CHANGE UP (ref 135–146)
WBC # BLD: 9.27 K/UL — SIGNIFICANT CHANGE UP (ref 4.8–10.8)
WBC # FLD AUTO: 9.27 K/UL — SIGNIFICANT CHANGE UP (ref 4.8–10.8)

## 2021-02-26 PROCEDURE — 71045 X-RAY EXAM CHEST 1 VIEW: CPT | Mod: 26

## 2021-02-26 RX ORDER — INSULIN LISPRO 100/ML
9 VIAL (ML) SUBCUTANEOUS
Refills: 0 | Status: DISCONTINUED | OUTPATIENT
Start: 2021-02-26 | End: 2021-03-13

## 2021-02-26 RX ORDER — LACTULOSE 10 G/15ML
20 SOLUTION ORAL EVERY 4 HOURS
Refills: 0 | Status: DISCONTINUED | OUTPATIENT
Start: 2021-02-26 | End: 2021-02-27

## 2021-02-26 RX ORDER — MINERAL OIL
133 OIL (ML) MISCELLANEOUS ONCE
Refills: 0 | Status: COMPLETED | OUTPATIENT
Start: 2021-02-26 | End: 2021-02-26

## 2021-02-26 RX ORDER — SODIUM ZIRCONIUM CYCLOSILICATE 10 G/10G
10 POWDER, FOR SUSPENSION ORAL EVERY 12 HOURS
Refills: 0 | Status: DISCONTINUED | OUTPATIENT
Start: 2021-02-26 | End: 2021-03-01

## 2021-02-26 RX ORDER — LANOLIN ALCOHOL/MO/W.PET/CERES
5 CREAM (GRAM) TOPICAL AT BEDTIME
Refills: 0 | Status: DISCONTINUED | OUTPATIENT
Start: 2021-02-26 | End: 2021-03-14

## 2021-02-26 RX ADMIN — TAMSULOSIN HYDROCHLORIDE 0.8 MILLIGRAM(S): 0.4 CAPSULE ORAL at 21:22

## 2021-02-26 RX ADMIN — Medication 6: at 11:58

## 2021-02-26 RX ADMIN — LACTULOSE 20 GRAM(S): 10 SOLUTION ORAL at 16:54

## 2021-02-26 RX ADMIN — PANTOPRAZOLE SODIUM 40 MILLIGRAM(S): 20 TABLET, DELAYED RELEASE ORAL at 16:53

## 2021-02-26 RX ADMIN — SODIUM ZIRCONIUM CYCLOSILICATE 10 GRAM(S): 10 POWDER, FOR SUSPENSION ORAL at 16:53

## 2021-02-26 RX ADMIN — LACTULOSE 20 GRAM(S): 10 SOLUTION ORAL at 21:23

## 2021-02-26 RX ADMIN — SENNA PLUS 2 TABLET(S): 8.6 TABLET ORAL at 21:22

## 2021-02-26 RX ADMIN — Medication 1 APPLICATION(S): at 16:54

## 2021-02-26 RX ADMIN — POLYETHYLENE GLYCOL 3350 17 GRAM(S): 17 POWDER, FOR SOLUTION ORAL at 11:12

## 2021-02-26 RX ADMIN — Medication 40 MILLIGRAM(S): at 04:58

## 2021-02-26 RX ADMIN — Medication 25 MILLIGRAM(S): at 04:58

## 2021-02-26 RX ADMIN — LACTULOSE 20 GRAM(S): 10 SOLUTION ORAL at 13:13

## 2021-02-26 RX ADMIN — Medication 81 MILLIGRAM(S): at 11:14

## 2021-02-26 RX ADMIN — LACTULOSE 20 GRAM(S): 10 SOLUTION ORAL at 11:11

## 2021-02-26 RX ADMIN — CHLORHEXIDINE GLUCONATE 1 APPLICATION(S): 213 SOLUTION TOPICAL at 11:59

## 2021-02-26 RX ADMIN — Medication 133 MILLILITER(S): at 13:12

## 2021-02-26 RX ADMIN — PANTOPRAZOLE SODIUM 40 MILLIGRAM(S): 20 TABLET, DELAYED RELEASE ORAL at 04:58

## 2021-02-26 RX ADMIN — Medication 50 MICROGRAM(S): at 04:58

## 2021-02-26 RX ADMIN — Medication 9 UNIT(S): at 17:57

## 2021-02-26 RX ADMIN — Medication 1 APPLICATION(S): at 04:58

## 2021-02-26 RX ADMIN — ENOXAPARIN SODIUM 40 MILLIGRAM(S): 100 INJECTION SUBCUTANEOUS at 11:12

## 2021-02-26 RX ADMIN — Medication 40 MILLIGRAM(S): at 16:53

## 2021-02-26 RX ADMIN — Medication 11 UNIT(S): at 08:55

## 2021-02-26 RX ADMIN — INSULIN GLARGINE 29 UNIT(S): 100 INJECTION, SOLUTION SUBCUTANEOUS at 21:49

## 2021-02-26 RX ADMIN — Medication 9 UNIT(S): at 11:59

## 2021-02-26 NOTE — PROGRESS NOTE ADULT - ASSESSMENT
69 yo M with PMH of CAD, MI (s/p 4 stents), DM, HTN, BPH, hypothyroid and GERD, recently received Pfizer vaccine (1/3 and 1/24) presented to ED c/o progressive SOB, fever, and weakness. Patient stated that his symptoms began on Wednesday 2/3. COVID PCR + on 2/4. Admitted to medicine with acute hypoxemic respiratory failure secondary to COVID-19 PNA. Upgraded to ICU from 3A due to increasing oxygen requirements on 2/6. ICU course complicated by epistaxis x 2 due to HFNC.     #Acute hypoxemic respiratory failure secondary to COVID-19 PNA   #likely superimposed bacterial pneumonia   - Patient s/p  2 doses of the Pfizer vaccine (last dose 1/24)  - CTA 02/04 showed extensive bilateral patchy ground-glass opacities involving all lobes most pronounced in the upper lung zones  - O2: venti mask, will try to wean to nasal cannula   - s/p 1 unit plasma  - s/p Remdesivir completed 2/9  - s/p 1 week of rocephin & Levaquin per ID  - inflammatory markers:  Ddimer: 1758 > 1751 > 986 > 855 > 973   Procal:  0.08 > 0.12 > 0.15 > 0.37 > 0.19 > 0.11   Ferritin: 404 > 625 > 229 > 132  CRP: 0.54 > 0.60 > 3.30 > 6.73 > 2.06 > 1.23  - LE Duplex negative 2/8, 2/14, 2/18   - PT/OT following  - c/w solumedrol 40mg q12hrs IV   - f/u fungitell   - asked patient today that while O2 is stable, there is always risk for intubation, he wishes to remain full code with intubation as last resort     #epistaxis likely due to HFNC   #melena likely due to epistaxis vs. true GI bleed   - recurrent x 2 episodes; ENT following; packing removed 2/23; will resume lovenox 40mg daily  - monitor CBC daily   - melena: one time episode 2/21; patient has hx of endoscopy 2 years ago which was not significant   - will put on PPI po BID    #Constipation   - c/w miralax, senna; dulcolax suppository prn   - add lactulose q4hrs and will do mineral oil enema     #CAD/STEMI s/p 4 stents  - Hx of STEMI s/p  s/p PCI of distal RCA, mid/distal LAD (December 2017 and February 2018)  - Oct 2019: 100% occlusion of distal RCA s/p PCI with REUBEN to distal RCA, and POBA to RPL  - As per cardio (Dr. Murillo), will hold effient and start asa 81 daily   - C/w Toprol 25mg daily  - takes ezetimibe 10mg at home and is nonformulary, hold for now     #HTN   - hold enalapril 2.5mg daily due to soft BP in setting of severe covid     #BPH  - C/w tamsulosin 0.4mg PO QHS    #DM   - Monitor FS, c/w insulin regimen   - A1C 9.1-->only on glipizide as outpatient   - c/w counselling    #Hypothyroidism  - C/w Synthroid    #GERD  - c/w protonix    Diet: consistent carbs, DASH/TLC + Glucerna   DVT PPx: lovenox 40mg daily   GI ppx: protonix  Code Status: FULL CODE  Dispo: acute, micu monitoring; on NC, continue to wean O2; may direct discharge to rehab vs. home     Family contact in case of emergency. Patient states we do not need to regularly call family everyday.   Ami Odonnell - Wife  [phone]  If wife does not answer: Francisca - daughter  [phone]

## 2021-02-26 NOTE — PROGRESS NOTE ADULT - ASSESSMENT
IMPRESSION:    Acute hypoxemic respiratory failure on NC  severe epistaxis sp packing now removed  severe Covid PNA  Probable superimposed bacterial infection sp abx  COPD exacerbation  HO CAD    PLAN:    CNS: avoid CNS depressant    HEENT: Oral care ENT f/u      PULMONARY:  HOB @ 45 degrees.  Aspiration precautions. incentive spirometry  trial of NC  Solumedrol 40mg q12h. wean O2      CARDIOVASCULAR: Goal Map >60, Avoid volume overload.     GI: GI prophylaxis. feeding.     RENAL:  Follow up lytes.  Correct as needed    INFECTIOUS DISEASE:  PER id      HEMATOLOGICAL:  DVT prophylaxis. lE doppler -, SCD    ENDOCRINE:  Follow up FS.  Insulin protocol if needed.    MUSCULOSKELETAL: OOB to chair    PT/OT    Dispo: MICU

## 2021-02-26 NOTE — PROGRESS NOTE ADULT - SUBJECTIVE AND OBJECTIVE BOX
OVERNIGHT EVENTS: events noted, on NC 6 l nc, feels better    Vital Signs Last 24 Hrs  T(C): 35.8 (26 Feb 2021 08:00), Max: 36 (25 Feb 2021 20:13)  T(F): 96.4 (26 Feb 2021 08:00), Max: 96.8 (25 Feb 2021 20:13)  HR: 82 (26 Feb 2021 08:00) (70 - 102)  BP: 131/62 (26 Feb 2021 08:00) (104/65 - 156/68)  BP(mean): 83 (26 Feb 2021 08:00) (75 - 112)  RR: 30 (26 Feb 2021 08:00) (12 - 46)  SpO2: 87% (26 Feb 2021 08:00) (82% - 100%)    PHYSICAL EXAMINATION:    GENERAL: The patient is awake and alert in no apparent distress.     HEENT: Head is normocephalic and atraumatic.     NECK: Supple.    LUNGS: bl crackles    HEART: Regular rate and rhythm without murmur.    ABDOMEN: Soft, nontender, and nondistended.      EXTREMITIES: Without any cyanosis, clubbing, rash, lesions or edema.    NEUROLOGIC: Grossly intact.    SKIN: No ulceration or induration present.      LABS:                        11.8   9.27  )-----------( 159      ( 26 Feb 2021 04:51 )             37.3     02-26    135  |  92<L>  |  35<H>  ----------------------------<  168<H>  5.1<H>   |  36<H>  |  0.8    Ca    8.6      26 Feb 2021 04:51  Mg     2.2     02-26    TPro  5.3<L>  /  Alb  2.8<L>  /  TBili  0.3  /  DBili  x   /  AST  26  /  ALT  50<H>  /  AlkPhos  69  02-26              D-Dimer Assay, Quantitative: 833 ng/mL DDU (02-26-21 @ 04:51)              02-25-21 @ 07:01  -  02-26-21 @ 07:00  --------------------------------------------------------  IN: 470 mL / OUT: 1200 mL / NET: -730 mL        MICROBIOLOGY:      MEDICATIONS  (STANDING):  aspirin enteric coated 81 milliGRAM(s) Oral daily  BACItracin   Ointment 1 Application(s) Topical two times a day  chlorhexidine 4% Liquid 1 Application(s) Topical daily  dextrose 40% Gel 15 Gram(s) Oral once  dextrose 5%. 1000 milliLiter(s) (50 mL/Hr) IV Continuous <Continuous>  dextrose 5%. 1000 milliLiter(s) (100 mL/Hr) IV Continuous <Continuous>  dextrose 50% Injectable 25 Gram(s) IV Push once  dextrose 50% Injectable 12.5 Gram(s) IV Push once  dextrose 50% Injectable 25 Gram(s) IV Push once  enoxaparin Injectable 40 milliGRAM(s) SubCutaneous daily  glucagon  Injectable 1 milliGRAM(s) IntraMuscular once  influenza   Vaccine 0.5 milliLiter(s) IntraMuscular once  insulin glargine Injectable (LANTUS) 29 Unit(s) SubCutaneous at bedtime  insulin lispro (ADMELOG) corrective regimen sliding scale   SubCutaneous three times a day before meals  insulin lispro Injectable (ADMELOG) 11 Unit(s) SubCutaneous three times a day before meals  levothyroxine 50 MICROGram(s) Oral daily  methylPREDNISolone sodium succinate Injectable 40 milliGRAM(s) IV Push every 12 hours  metoprolol succinate ER 25 milliGRAM(s) Oral daily  pantoprazole    Tablet 40 milliGRAM(s) Oral every 12 hours  polyethylene glycol 3350 17 Gram(s) Oral daily  senna 2 Tablet(s) Oral at bedtime  tamsulosin 0.8 milliGRAM(s) Oral at bedtime    MEDICATIONS  (PRN):  acetaminophen   Tablet .. 650 milliGRAM(s) Oral every 6 hours PRN Temp greater or equal to 38C (100.4F), Mild Pain (1 - 3)  ALPRAZolam 0.25 milliGRAM(s) Oral daily PRN anxiety  bisacodyl Suppository 10 milliGRAM(s) Rectal daily PRN Constipation  guaifenesin/dextromethorphan  Syrup 5 milliLiter(s) Oral every 6 hours PRN Cough  ondansetron    Tablet 4 milliGRAM(s) Oral two times a day PRN Nausea and/or Vomiting      RADIOLOGY & ADDITIONAL STUDIES:

## 2021-02-26 NOTE — PROGRESS NOTE ADULT - SUBJECTIVE AND OBJECTIVE BOX
SUBJECTIVE:    Patient is a 70y old Male who presents with a chief complaint of sob (26 Feb 2021 08:06)    Currently admitted to medicine with the primary diagnosis of Shortness of breath       Today is hospital day 22d.     Overnight no events.     This morning patient reports constipation but no associated abd pain; otherwise no complaints.     PAST MEDICAL & SURGICAL HISTORY  Chronic kidney disease (CKD)  III    Type 2 diabetes mellitus without complication, unspecified long term insulin use status    Hypertension, unspecified type    Dyspnea, unspecified type    ASHD (arteriosclerotic heart disease)  STEMI 12/31/17, PCI RCA    S/P cataract surgery    History of ligation of vein  2012    History of tonsillectomy  1957        ALLERGIES:  penicillin (Rash (Severe))    MEDICATIONS:  STANDING MEDICATIONS  aspirin enteric coated 81 milliGRAM(s) Oral daily  BACItracin   Ointment 1 Application(s) Topical two times a day  chlorhexidine 4% Liquid 1 Application(s) Topical daily  dextrose 40% Gel 15 Gram(s) Oral once  dextrose 5%. 1000 milliLiter(s) IV Continuous <Continuous>  dextrose 5%. 1000 milliLiter(s) IV Continuous <Continuous>  dextrose 50% Injectable 25 Gram(s) IV Push once  dextrose 50% Injectable 12.5 Gram(s) IV Push once  dextrose 50% Injectable 25 Gram(s) IV Push once  enoxaparin Injectable 40 milliGRAM(s) SubCutaneous daily  glucagon  Injectable 1 milliGRAM(s) IntraMuscular once  influenza   Vaccine 0.5 milliLiter(s) IntraMuscular once  insulin glargine Injectable (LANTUS) 29 Unit(s) SubCutaneous at bedtime  insulin lispro (ADMELOG) corrective regimen sliding scale   SubCutaneous three times a day before meals  insulin lispro Injectable (ADMELOG) 9 Unit(s) SubCutaneous three times a day before meals  lactulose Syrup 20 Gram(s) Oral every 4 hours  levothyroxine 50 MICROGram(s) Oral daily  methylPREDNISolone sodium succinate Injectable 40 milliGRAM(s) IV Push every 12 hours  metoprolol succinate ER 25 milliGRAM(s) Oral daily  mineral oil enema 133 milliLiter(s) Rectal once  pantoprazole    Tablet 40 milliGRAM(s) Oral every 12 hours  polyethylene glycol 3350 17 Gram(s) Oral daily  senna 2 Tablet(s) Oral at bedtime  tamsulosin 0.8 milliGRAM(s) Oral at bedtime    PRN MEDICATIONS  acetaminophen   Tablet .. 650 milliGRAM(s) Oral every 6 hours PRN  ALPRAZolam 0.25 milliGRAM(s) Oral daily PRN  bisacodyl Suppository 10 milliGRAM(s) Rectal daily PRN  guaifenesin/dextromethorphan  Syrup 5 milliLiter(s) Oral every 6 hours PRN  ondansetron    Tablet 4 milliGRAM(s) Oral two times a day PRN    VITALS:   T(F): 96.4  HR: 94  BP: 118/59  RR: 24  SpO2: 82%    LABS:                        11.8   9.27  )-----------( 159      ( 26 Feb 2021 04:51 )             37.3     02-26    135  |  92<L>  |  35<H>  ----------------------------<  168<H>  5.1<H>   |  36<H>  |  0.8    Ca    8.6      26 Feb 2021 04:51  Mg     2.2     02-26    TPro  5.3<L>  /  Alb  2.8<L>  /  TBili  0.3  /  DBili  x   /  AST  26  /  ALT  50<H>  /  AlkPhos  69  02-26            PHYSICAL EXAM:  GEN: No acute distress  PULM/CHEST: Clear to auscultation bilaterally, no rales, rhonchi or wheezes   CVS: Regular rate and rhythm, S1-S2, no murmurs  ABD: Soft, non-tender, non-distended, +BS  EXT: No edema  NEURO: AAOx3

## 2021-02-27 LAB
ALBUMIN SERPL ELPH-MCNC: 2.9 G/DL — LOW (ref 3.5–5.2)
ALP SERPL-CCNC: 60 U/L — SIGNIFICANT CHANGE UP (ref 30–115)
ALT FLD-CCNC: 43 U/L — HIGH (ref 0–41)
ANION GAP SERPL CALC-SCNC: 7 MMOL/L — SIGNIFICANT CHANGE UP (ref 7–14)
AST SERPL-CCNC: 19 U/L — SIGNIFICANT CHANGE UP (ref 0–41)
BASOPHILS # BLD AUTO: 0.01 K/UL — SIGNIFICANT CHANGE UP (ref 0–0.2)
BASOPHILS NFR BLD AUTO: 0.1 % — SIGNIFICANT CHANGE UP (ref 0–1)
BILIRUB SERPL-MCNC: 0.3 MG/DL — SIGNIFICANT CHANGE UP (ref 0.2–1.2)
BUN SERPL-MCNC: 30 MG/DL — HIGH (ref 10–20)
CALCIUM SERPL-MCNC: 7.9 MG/DL — LOW (ref 8.5–10.1)
CHLORIDE SERPL-SCNC: 96 MMOL/L — LOW (ref 98–110)
CO2 SERPL-SCNC: 33 MMOL/L — HIGH (ref 17–32)
CREAT SERPL-MCNC: 0.7 MG/DL — SIGNIFICANT CHANGE UP (ref 0.7–1.5)
CRP SERPL-MCNC: 0.5 MG/DL — HIGH (ref 0–0.4)
EOSINOPHIL # BLD AUTO: 0.06 K/UL — SIGNIFICANT CHANGE UP (ref 0–0.7)
EOSINOPHIL NFR BLD AUTO: 0.9 % — SIGNIFICANT CHANGE UP (ref 0–8)
GLUCOSE BLDC GLUCOMTR-MCNC: 176 MG/DL — HIGH (ref 70–99)
GLUCOSE BLDC GLUCOMTR-MCNC: 214 MG/DL — HIGH (ref 70–99)
GLUCOSE BLDC GLUCOMTR-MCNC: 217 MG/DL — HIGH (ref 70–99)
GLUCOSE BLDC GLUCOMTR-MCNC: 247 MG/DL — HIGH (ref 70–99)
GLUCOSE SERPL-MCNC: 191 MG/DL — HIGH (ref 70–99)
HCT VFR BLD CALC: 35.6 % — LOW (ref 42–52)
HGB BLD-MCNC: 11.6 G/DL — LOW (ref 14–18)
IMM GRANULOCYTES NFR BLD AUTO: 2.6 % — HIGH (ref 0.1–0.3)
LYMPHOCYTES # BLD AUTO: 0.74 K/UL — LOW (ref 1.2–3.4)
LYMPHOCYTES # BLD AUTO: 10.9 % — LOW (ref 20.5–51.1)
MAGNESIUM SERPL-MCNC: 2 MG/DL — SIGNIFICANT CHANGE UP (ref 1.8–2.4)
MCHC RBC-ENTMCNC: 26.2 PG — LOW (ref 27–31)
MCHC RBC-ENTMCNC: 32.6 G/DL — SIGNIFICANT CHANGE UP (ref 32–37)
MCV RBC AUTO: 80.4 FL — SIGNIFICANT CHANGE UP (ref 80–94)
MONOCYTES # BLD AUTO: 0.26 K/UL — SIGNIFICANT CHANGE UP (ref 0.1–0.6)
MONOCYTES NFR BLD AUTO: 3.8 % — SIGNIFICANT CHANGE UP (ref 1.7–9.3)
NEUTROPHILS # BLD AUTO: 5.56 K/UL — SIGNIFICANT CHANGE UP (ref 1.4–6.5)
NEUTROPHILS NFR BLD AUTO: 81.7 % — HIGH (ref 42.2–75.2)
NRBC # BLD: 0 /100 WBCS — SIGNIFICANT CHANGE UP (ref 0–0)
PLATELET # BLD AUTO: 139 K/UL — SIGNIFICANT CHANGE UP (ref 130–400)
POTASSIUM SERPL-MCNC: 4.7 MMOL/L — SIGNIFICANT CHANGE UP (ref 3.5–5)
POTASSIUM SERPL-SCNC: 4.7 MMOL/L — SIGNIFICANT CHANGE UP (ref 3.5–5)
PROCALCITONIN SERPL-MCNC: 0.06 NG/ML — SIGNIFICANT CHANGE UP (ref 0.02–0.1)
PROT SERPL-MCNC: 5.2 G/DL — LOW (ref 6–8)
RBC # BLD: 4.43 M/UL — LOW (ref 4.7–6.1)
RBC # FLD: 17.8 % — HIGH (ref 11.5–14.5)
SODIUM SERPL-SCNC: 136 MMOL/L — SIGNIFICANT CHANGE UP (ref 135–146)
WBC # BLD: 6.81 K/UL — SIGNIFICANT CHANGE UP (ref 4.8–10.8)
WBC # FLD AUTO: 6.81 K/UL — SIGNIFICANT CHANGE UP (ref 4.8–10.8)

## 2021-02-27 PROCEDURE — 71045 X-RAY EXAM CHEST 1 VIEW: CPT | Mod: 26

## 2021-02-27 RX ADMIN — INSULIN GLARGINE 29 UNIT(S): 100 INJECTION, SOLUTION SUBCUTANEOUS at 20:35

## 2021-02-27 RX ADMIN — Medication 25 MILLIGRAM(S): at 04:58

## 2021-02-27 RX ADMIN — TAMSULOSIN HYDROCHLORIDE 0.8 MILLIGRAM(S): 0.4 CAPSULE ORAL at 20:35

## 2021-02-27 RX ADMIN — Medication 9 UNIT(S): at 17:58

## 2021-02-27 RX ADMIN — SODIUM ZIRCONIUM CYCLOSILICATE 10 GRAM(S): 10 POWDER, FOR SUSPENSION ORAL at 18:22

## 2021-02-27 RX ADMIN — Medication 40 MILLIGRAM(S): at 04:58

## 2021-02-27 RX ADMIN — PANTOPRAZOLE SODIUM 40 MILLIGRAM(S): 20 TABLET, DELAYED RELEASE ORAL at 18:21

## 2021-02-27 RX ADMIN — SODIUM ZIRCONIUM CYCLOSILICATE 10 GRAM(S): 10 POWDER, FOR SUSPENSION ORAL at 04:59

## 2021-02-27 RX ADMIN — Medication 5 MILLIGRAM(S): at 20:35

## 2021-02-27 RX ADMIN — Medication 81 MILLIGRAM(S): at 12:30

## 2021-02-27 RX ADMIN — Medication 9 UNIT(S): at 06:00

## 2021-02-27 RX ADMIN — Medication 4: at 17:57

## 2021-02-27 RX ADMIN — Medication 2: at 06:00

## 2021-02-27 RX ADMIN — Medication 50 MICROGRAM(S): at 04:58

## 2021-02-27 RX ADMIN — Medication 4: at 12:26

## 2021-02-27 RX ADMIN — Medication 40 MILLIGRAM(S): at 18:21

## 2021-02-27 RX ADMIN — Medication 1 APPLICATION(S): at 18:20

## 2021-02-27 RX ADMIN — SENNA PLUS 2 TABLET(S): 8.6 TABLET ORAL at 20:35

## 2021-02-27 RX ADMIN — Medication 9 UNIT(S): at 12:27

## 2021-02-27 RX ADMIN — ENOXAPARIN SODIUM 40 MILLIGRAM(S): 100 INJECTION SUBCUTANEOUS at 12:30

## 2021-02-27 RX ADMIN — Medication 5 MILLIGRAM(S): at 00:06

## 2021-02-27 RX ADMIN — POLYETHYLENE GLYCOL 3350 17 GRAM(S): 17 POWDER, FOR SOLUTION ORAL at 12:30

## 2021-02-27 RX ADMIN — PANTOPRAZOLE SODIUM 40 MILLIGRAM(S): 20 TABLET, DELAYED RELEASE ORAL at 04:59

## 2021-02-27 NOTE — PROGRESS NOTE ADULT - SUBJECTIVE AND OBJECTIVE BOX
SUBJECTIVE:    Patient is a 70y old Male who presents with a chief complaint of COVID-19 PNA (26 Feb 2021 09:45)    Currently admitted to medicine with the primary diagnosis of Shortness of breath       Today is hospital day 23d.     Overnight no events.     This morning patient has no complaints.     PAST MEDICAL & SURGICAL HISTORY  Chronic kidney disease (CKD)  III    Type 2 diabetes mellitus without complication, unspecified long term insulin use status    Hypertension, unspecified type    Dyspnea, unspecified type    ASHD (arteriosclerotic heart disease)  STEMI 12/31/17, PCI RCA    S/P cataract surgery    History of ligation of vein  2012    History of tonsillectomy  1957        ALLERGIES:  penicillin (Rash (Severe))    MEDICATIONS:  STANDING MEDICATIONS  aspirin enteric coated 81 milliGRAM(s) Oral daily  BACItracin   Ointment 1 Application(s) Topical two times a day  chlorhexidine 4% Liquid 1 Application(s) Topical daily  dextrose 40% Gel 15 Gram(s) Oral once  dextrose 5%. 1000 milliLiter(s) IV Continuous <Continuous>  dextrose 5%. 1000 milliLiter(s) IV Continuous <Continuous>  dextrose 50% Injectable 25 Gram(s) IV Push once  dextrose 50% Injectable 12.5 Gram(s) IV Push once  dextrose 50% Injectable 25 Gram(s) IV Push once  enoxaparin Injectable 40 milliGRAM(s) SubCutaneous daily  glucagon  Injectable 1 milliGRAM(s) IntraMuscular once  influenza   Vaccine 0.5 milliLiter(s) IntraMuscular once  insulin glargine Injectable (LANTUS) 29 Unit(s) SubCutaneous at bedtime  insulin lispro (ADMELOG) corrective regimen sliding scale   SubCutaneous three times a day before meals  insulin lispro Injectable (ADMELOG) 9 Unit(s) SubCutaneous three times a day before meals  lactulose Syrup 20 Gram(s) Oral every 4 hours  levothyroxine 50 MICROGram(s) Oral daily  melatonin 5 milliGRAM(s) Oral at bedtime  methylPREDNISolone sodium succinate Injectable 40 milliGRAM(s) IV Push every 12 hours  metoprolol succinate ER 25 milliGRAM(s) Oral daily  pantoprazole    Tablet 40 milliGRAM(s) Oral every 12 hours  polyethylene glycol 3350 17 Gram(s) Oral daily  senna 2 Tablet(s) Oral at bedtime  sodium zirconium cyclosilicate 10 Gram(s) Oral every 12 hours  tamsulosin 0.8 milliGRAM(s) Oral at bedtime    PRN MEDICATIONS  acetaminophen   Tablet .. 650 milliGRAM(s) Oral every 6 hours PRN  ALPRAZolam 0.25 milliGRAM(s) Oral daily PRN  bisacodyl Suppository 10 milliGRAM(s) Rectal daily PRN  guaifenesin/dextromethorphan  Syrup 5 milliLiter(s) Oral every 6 hours PRN  ondansetron    Tablet 4 milliGRAM(s) Oral two times a day PRN    VITALS:   T(F): 97.8  HR: 84  BP: 122/66  RR: 17  SpO2: 97%    LABS:                        11.6   6.81  )-----------( 139      ( 27 Feb 2021 04:55 )             35.6     02-27    136  |  96<L>  |  30<H>  ----------------------------<  191<H>  4.7   |  33<H>  |  0.7    Ca    7.9<L>      27 Feb 2021 04:55  Mg     2.0     02-27    TPro  5.2<L>  /  Alb  2.9<L>  /  TBili  0.3  /  DBili  x   /  AST  19  /  ALT  43<H>  /  AlkPhos  60  02-27                  PHYSICAL EXAM:  GEN: No acute distress  PULM/CHEST: Clear to auscultation bilaterally, no rales, rhonchi or wheezes   CVS: Regular rate and rhythm, S1-S2, no murmurs  ABD: Soft, non-tender, non-distended, +BS  EXT: No edema  NEURO: AAOx3

## 2021-02-27 NOTE — PROGRESS NOTE ADULT - ASSESSMENT
69 yo M with PMH of CAD, MI (s/p 4 stents), DM, HTN, BPH, hypothyroid and GERD, recently received Pfizer vaccine (1/3 and 1/24) presented to ED c/o progressive SOB, fever, and weakness. Patient stated that his symptoms began on Wednesday 2/3. COVID PCR + on 2/4. Admitted to medicine with acute hypoxemic respiratory failure secondary to COVID-19 PNA. Upgraded to ICU from 3A due to increasing oxygen requirements on 2/6. ICU course complicated by epistaxis x 2 due to HFNC.     #Acute hypoxemic respiratory failure secondary to COVID-19 PNA   #likely superimposed bacterial pneumonia   - Patient s/p  2 doses of the Pfizer vaccine (last dose 1/24)  - CTA 02/04 showed extensive bilateral patchy ground-glass opacities involving all lobes most pronounced in the upper lung zones  - O2: venti mask, will try to wean to nasal cannula   - s/p 1 unit plasma  - s/p Remdesivir completed 2/9  - s/p 1 week of rocephin & Levaquin per ID  - inflammatory markers:  Ddimer: 1758 > 1751 > 986 > 855 > 973 > 833  Procal:  0.08 > 0.12 > 0.15 > 0.37 > 0.19 > 0.11 > 0.06  Ferritin: 404 > 625 > 229 > 132 >   CRP: 0.54 > 0.60 > 3.30 > 6.73 > 2.06 > 1.23 > 0.5  - LE Duplex negative 2/8, 2/14, 2/18, 2/24   - PT/OT following  - c/w solumedrol 40mg q12hrs IV   - f/u fungitell   - GOC: wishes for intubation as last resort    #epistaxis likely due to HFNC   #melena likely due to epistaxis vs. true GI bleed   - recurrent x 2 episodes; ENT following; packing removed 2/23; will resume lovenox 40mg daily  - monitor CBC daily   - melena: one time episode 2/21; patient has hx of endoscopy 2 years ago which was not significant   - will put on PPI po BID    #Constipation   - c/w miralax, senna; dulcolax suppository prn   - add lactulose q4hrs and will do mineral oil enema     #CAD/STEMI s/p 4 stents  - Hx of STEMI s/p  s/p PCI of distal RCA, mid/distal LAD (December 2017 and February 2018)  - Oct 2019: 100% occlusion of distal RCA s/p PCI with REUBEN to distal RCA, and POBA to RPL  - As per cardio (Dr. Murillo), will hold effient and start asa 81 daily   - C/w Toprol 25mg daily  - takes ezetimibe 10mg at home and is nonformulary, hold for now     #HTN   - hold enalapril 2.5mg daily due to soft BP in setting of severe covid     #BPH  - C/w tamsulosin 0.4mg PO QHS    #DM   - Monitor FS, c/w insulin regimen   - A1C 9.1-->only on glipizide as outpatient   - c/w counselling    #Hypothyroidism  - C/w Synthroid    #GERD  - c/w protonix    Diet: consistent carbs, DASH/TLC + Glucerna   DVT PPx: lovenox 40mg daily   GI ppx: protonix  Code Status: FULL CODE  Dispo: acute, micu monitoring; on NC, continue to wean O2; may direct discharge to rehab vs. home     Family contact in case of emergency. Patient states we do not need to regularly call family everyday.   Ami Odonnell - Wife  [phone]  If wife does not answer: Francisca - daughter  [phone]           69 yo M with PMH of CAD, MI (s/p 4 stents), DM, HTN, BPH, hypothyroid and GERD, recently received Pfizer vaccine (1/3 and 1/24) presented to ED c/o progressive SOB, fever, and weakness. Patient stated that his symptoms began on Wednesday 2/3. COVID PCR + on 2/4. Admitted to medicine with acute hypoxemic respiratory failure secondary to COVID-19 PNA. Upgraded to ICU from 3A due to increasing oxygen requirements on 2/6. ICU course complicated by epistaxis x 2 due to HFNC.     #Acute hypoxemic respiratory failure secondary to COVID-19 PNA   #likely superimposed bacterial pneumonia   - Patient s/p  2 doses of the Pfizer vaccine (last dose 1/24)  - CTA 02/04 showed extensive bilateral patchy ground-glass opacities involving all lobes most pronounced in the upper lung zones  - O2: venti mask, will try to wean to nasal cannula   - s/p 1 unit plasma  - s/p Remdesivir completed 2/9  - s/p 1 week of rocephin & Levaquin per ID  - inflammatory markers:  Ddimer: 1758 > 1751 > 986 > 855 > 973 > 833  Procal:  0.08 > 0.12 > 0.15 > 0.37 > 0.19 > 0.11 > 0.06  Ferritin: 404 > 625 > 229 > 132 >   CRP: 0.54 > 0.60 > 3.30 > 6.73 > 2.06 > 1.23 > 0.5  - LE Duplex negative 2/8, 2/14, 2/18, 2/24   - PT/OT following  - c/w solumedrol 40mg q12hrs IV   - f/u fungitell   - GOC: wishes for intubation as last resort    #epistaxis likely due to HFNC   #melena likely due to epistaxis vs. true GI bleed   - recurrent x 2 episodes; ENT following; packing removed 2/23; will resume lovenox 40mg daily  - monitor CBC daily   - melena: one time episode 2/21; patient has hx of endoscopy 2 years ago which was not significant   - will put on PPI po BID    #Constipation   - c/w miralax, senna; dulcolax suppository prn   - add lactulose q4hrs and will do mineral oil enema     #CAD/STEMI s/p 4 stents  - Hx of STEMI s/p  s/p PCI of distal RCA, mid/distal LAD (December 2017 and February 2018)  - Oct 2019: 100% occlusion of distal RCA s/p PCI with REUBEN to distal RCA, and POBA to RPL  - As per cardio (Dr. Murillo), will hold effient and start asa 81 daily   - C/w Toprol 25mg daily  - takes ezetimibe 10mg at home and is nonformulary, hold for now     #HTN   - hold enalapril 2.5mg daily due to soft BP in setting of severe covid     #BPH  - C/w tamsulosin 0.4mg PO QHS    #DM   - Monitor FS, c/w insulin regimen   - A1C 9.1-->only on glipizide as outpatient   - c/w counselling    #Hypothyroidism  - C/w Synthroid    #GERD  - c/w protonix    Diet: consistent carbs, DASH/TLC + Glucerna   DVT PPx: lovenox 40mg daily   GI ppx: protonix  Code Status: FULL CODE  Dispo: acute, micu monitoring; on NC, continue to wean O2; downgrade to stepdown when beds available tomorrow     Family contact in case of emergency. Patient states we do not need to regularly call family everyday.   Ami Odonnell - Wife  [phone]  If wife does not answer: Francisca - daughter  [phone]

## 2021-02-28 LAB
ALBUMIN SERPL ELPH-MCNC: 2.9 G/DL — LOW (ref 3.5–5.2)
ALP SERPL-CCNC: 65 U/L — SIGNIFICANT CHANGE UP (ref 30–115)
ALT FLD-CCNC: 45 U/L — HIGH (ref 0–41)
ANION GAP SERPL CALC-SCNC: 6 MMOL/L — LOW (ref 7–14)
AST SERPL-CCNC: 22 U/L — SIGNIFICANT CHANGE UP (ref 0–41)
BASOPHILS # BLD AUTO: 0.02 K/UL — SIGNIFICANT CHANGE UP (ref 0–0.2)
BASOPHILS NFR BLD AUTO: 0.3 % — SIGNIFICANT CHANGE UP (ref 0–1)
BILIRUB SERPL-MCNC: 0.3 MG/DL — SIGNIFICANT CHANGE UP (ref 0.2–1.2)
BUN SERPL-MCNC: 26 MG/DL — HIGH (ref 10–20)
CALCIUM SERPL-MCNC: 8.1 MG/DL — LOW (ref 8.5–10.1)
CHLORIDE SERPL-SCNC: 96 MMOL/L — LOW (ref 98–110)
CO2 SERPL-SCNC: 34 MMOL/L — HIGH (ref 17–32)
CREAT SERPL-MCNC: 0.8 MG/DL — SIGNIFICANT CHANGE UP (ref 0.7–1.5)
D DIMER BLD IA.RAPID-MCNC: 529 NG/ML DDU — HIGH (ref 0–230)
EOSINOPHIL # BLD AUTO: 0.02 K/UL — SIGNIFICANT CHANGE UP (ref 0–0.7)
EOSINOPHIL NFR BLD AUTO: 0.3 % — SIGNIFICANT CHANGE UP (ref 0–8)
GLUCOSE BLDC GLUCOMTR-MCNC: 145 MG/DL — HIGH (ref 70–99)
GLUCOSE BLDC GLUCOMTR-MCNC: 209 MG/DL — HIGH (ref 70–99)
GLUCOSE BLDC GLUCOMTR-MCNC: 214 MG/DL — HIGH (ref 70–99)
GLUCOSE BLDC GLUCOMTR-MCNC: 243 MG/DL — HIGH (ref 70–99)
GLUCOSE SERPL-MCNC: 253 MG/DL — HIGH (ref 70–99)
HCT VFR BLD CALC: 37.3 % — LOW (ref 42–52)
HGB BLD-MCNC: 11.6 G/DL — LOW (ref 14–18)
IMM GRANULOCYTES NFR BLD AUTO: 2.6 % — HIGH (ref 0.1–0.3)
LYMPHOCYTES # BLD AUTO: 0.79 K/UL — LOW (ref 1.2–3.4)
LYMPHOCYTES # BLD AUTO: 10.6 % — LOW (ref 20.5–51.1)
MAGNESIUM SERPL-MCNC: 2 MG/DL — SIGNIFICANT CHANGE UP (ref 1.8–2.4)
MCHC RBC-ENTMCNC: 25.7 PG — LOW (ref 27–31)
MCHC RBC-ENTMCNC: 31.1 G/DL — LOW (ref 32–37)
MCV RBC AUTO: 82.5 FL — SIGNIFICANT CHANGE UP (ref 80–94)
MONOCYTES # BLD AUTO: 0.34 K/UL — SIGNIFICANT CHANGE UP (ref 0.1–0.6)
MONOCYTES NFR BLD AUTO: 4.6 % — SIGNIFICANT CHANGE UP (ref 1.7–9.3)
NEUTROPHILS # BLD AUTO: 6.06 K/UL — SIGNIFICANT CHANGE UP (ref 1.4–6.5)
NEUTROPHILS NFR BLD AUTO: 81.6 % — HIGH (ref 42.2–75.2)
NRBC # BLD: 0 /100 WBCS — SIGNIFICANT CHANGE UP (ref 0–0)
PLATELET # BLD AUTO: 128 K/UL — LOW (ref 130–400)
POTASSIUM SERPL-MCNC: 5.1 MMOL/L — HIGH (ref 3.5–5)
POTASSIUM SERPL-SCNC: 5.1 MMOL/L — HIGH (ref 3.5–5)
PROT SERPL-MCNC: 5.3 G/DL — LOW (ref 6–8)
RBC # BLD: 4.52 M/UL — LOW (ref 4.7–6.1)
RBC # FLD: 18 % — HIGH (ref 11.5–14.5)
SODIUM SERPL-SCNC: 136 MMOL/L — SIGNIFICANT CHANGE UP (ref 135–146)
WBC # BLD: 7.42 K/UL — SIGNIFICANT CHANGE UP (ref 4.8–10.8)
WBC # FLD AUTO: 7.42 K/UL — SIGNIFICANT CHANGE UP (ref 4.8–10.8)

## 2021-02-28 PROCEDURE — 71045 X-RAY EXAM CHEST 1 VIEW: CPT | Mod: 26

## 2021-02-28 RX ADMIN — Medication 81 MILLIGRAM(S): at 12:55

## 2021-02-28 RX ADMIN — Medication 5 MILLIGRAM(S): at 21:11

## 2021-02-28 RX ADMIN — PANTOPRAZOLE SODIUM 40 MILLIGRAM(S): 20 TABLET, DELAYED RELEASE ORAL at 17:06

## 2021-02-28 RX ADMIN — Medication 9 UNIT(S): at 12:54

## 2021-02-28 RX ADMIN — Medication 4: at 12:54

## 2021-02-28 RX ADMIN — Medication 4: at 08:19

## 2021-02-28 RX ADMIN — INSULIN GLARGINE 29 UNIT(S): 100 INJECTION, SOLUTION SUBCUTANEOUS at 21:10

## 2021-02-28 RX ADMIN — Medication 1 APPLICATION(S): at 04:10

## 2021-02-28 RX ADMIN — Medication 1 APPLICATION(S): at 17:08

## 2021-02-28 RX ADMIN — Medication 9 UNIT(S): at 17:07

## 2021-02-28 RX ADMIN — TAMSULOSIN HYDROCHLORIDE 0.8 MILLIGRAM(S): 0.4 CAPSULE ORAL at 21:11

## 2021-02-28 RX ADMIN — SENNA PLUS 2 TABLET(S): 8.6 TABLET ORAL at 21:11

## 2021-02-28 RX ADMIN — Medication 50 MICROGRAM(S): at 04:10

## 2021-02-28 RX ADMIN — POLYETHYLENE GLYCOL 3350 17 GRAM(S): 17 POWDER, FOR SOLUTION ORAL at 13:07

## 2021-02-28 RX ADMIN — ENOXAPARIN SODIUM 40 MILLIGRAM(S): 100 INJECTION SUBCUTANEOUS at 12:55

## 2021-02-28 RX ADMIN — Medication 9 UNIT(S): at 08:19

## 2021-02-28 RX ADMIN — PANTOPRAZOLE SODIUM 40 MILLIGRAM(S): 20 TABLET, DELAYED RELEASE ORAL at 04:10

## 2021-02-28 RX ADMIN — Medication 25 MILLIGRAM(S): at 04:10

## 2021-02-28 RX ADMIN — Medication 40 MILLIGRAM(S): at 04:10

## 2021-02-28 RX ADMIN — SODIUM ZIRCONIUM CYCLOSILICATE 10 GRAM(S): 10 POWDER, FOR SUSPENSION ORAL at 16:12

## 2021-02-28 RX ADMIN — SODIUM ZIRCONIUM CYCLOSILICATE 10 GRAM(S): 10 POWDER, FOR SUSPENSION ORAL at 04:10

## 2021-02-28 NOTE — PROGRESS NOTE ADULT - SUBJECTIVE AND OBJECTIVE BOX
Patient is a 70y old  Male who presents with a chief complaint of COVID pna (27 Feb 2021 06:34)        Over Night Events: remains on NC O2.  6 liters.  Off pressors         ROS:     All ROS are negative except HPI         PHYSICAL EXAM    ICU Vital Signs Last 24 Hrs  T(C): 36.1 (28 Feb 2021 04:00), Max: 36.6 (27 Feb 2021 19:00)  T(F): 96.9 (28 Feb 2021 04:00), Max: 97.8 (27 Feb 2021 19:00)  HR: 68 (28 Feb 2021 06:32) (66 - 108)  BP: 127/71 (28 Feb 2021 06:00) (95/50 - 128/74)  BP(mean): 89 (28 Feb 2021 06:00) (66 - 97)  ABP: --  ABP(mean): --  RR: 11 (28 Feb 2021 06:32) (11 - 45)  SpO2: 99% (28 Feb 2021 06:32) (86% - 100%)      CONSTITUTIONAL:  Well nourished.  NAD    ENT:   Airway patent,   Mouth with normal mucosa.   No thrush    EYES:   Pupils equal,   Round and reactive to light.    CARDIAC:   Normal rate,   Regular rhythm.    No edema      Vascular:  Normal systolic impulse  No Carotid bruits    RESPIRATORY:   No wheezing  Bilateral BS  Normal chest expansion  Not tachypneic,  No use of accessory muscles    GASTROINTESTINAL:  Abdomen soft,   Non-tender,   No guarding,   + BS    MUSCULOSKELETAL:   Range of motion is not limited,  No clubbing, cyanosis    NEUROLOGICAL:   Alert and oriented   No motor  deficits.    SKIN:   Skin normal color for race,   Warm and dry and intact.   No evidence of rash.    PSYCHIATRIC:   Normal mood and affect.   No apparent risk to self or others.    HEMATOLOGICAL:  No cervical  lymphadenopathy.  no inguinal lymphadenopathy      02-27-21 @ 07:01  -  02-28-21 @ 07:00  --------------------------------------------------------  IN:    Oral Fluid: 200 mL  Total IN: 200 mL    OUT:    Voided (mL): 1580 mL  Total OUT: 1580 mL    Total NET: -1380 mL          LABS:                            11.6   7.42  )-----------( 128      ( 28 Feb 2021 04:38 )             37.3                                               02-28    136  |  96<L>  |  26<H>  ----------------------------<  253<H>  5.1<H>   |  34<H>  |  0.8    Ca    8.1<L>      28 Feb 2021 04:38  Mg     2.0     02-28    TPro  5.3<L>  /  Alb  2.9<L>  /  TBili  0.3  /  DBili  x   /  AST  22  /  ALT  45<H>  /  AlkPhos  65  02-28                                                                                           LIVER FUNCTIONS - ( 28 Feb 2021 04:38 )  Alb: 2.9 g/dL / Pro: 5.3 g/dL / ALK PHOS: 65 U/L / ALT: 45 U/L / AST: 22 U/L / GGT: x                                                                                                                                       MEDICATIONS  (STANDING):  aspirin enteric coated 81 milliGRAM(s) Oral daily  BACItracin   Ointment 1 Application(s) Topical two times a day  chlorhexidine 4% Liquid 1 Application(s) Topical daily  dextrose 40% Gel 15 Gram(s) Oral once  dextrose 5%. 1000 milliLiter(s) (50 mL/Hr) IV Continuous <Continuous>  dextrose 5%. 1000 milliLiter(s) (100 mL/Hr) IV Continuous <Continuous>  dextrose 50% Injectable 25 Gram(s) IV Push once  dextrose 50% Injectable 12.5 Gram(s) IV Push once  dextrose 50% Injectable 25 Gram(s) IV Push once  enoxaparin Injectable 40 milliGRAM(s) SubCutaneous daily  glucagon  Injectable 1 milliGRAM(s) IntraMuscular once  influenza   Vaccine 0.5 milliLiter(s) IntraMuscular once  insulin glargine Injectable (LANTUS) 29 Unit(s) SubCutaneous at bedtime  insulin lispro (ADMELOG) corrective regimen sliding scale   SubCutaneous three times a day before meals  insulin lispro Injectable (ADMELOG) 9 Unit(s) SubCutaneous three times a day before meals  levothyroxine 50 MICROGram(s) Oral daily  melatonin 5 milliGRAM(s) Oral at bedtime  methylPREDNISolone sodium succinate Injectable 40 milliGRAM(s) IV Push every 12 hours  metoprolol succinate ER 25 milliGRAM(s) Oral daily  pantoprazole    Tablet 40 milliGRAM(s) Oral every 12 hours  polyethylene glycol 3350 17 Gram(s) Oral daily  senna 2 Tablet(s) Oral at bedtime  sodium zirconium cyclosilicate 10 Gram(s) Oral every 12 hours  tamsulosin 0.8 milliGRAM(s) Oral at bedtime    MEDICATIONS  (PRN):  acetaminophen   Tablet .. 650 milliGRAM(s) Oral every 6 hours PRN Temp greater or equal to 38C (100.4F), Mild Pain (1 - 3)  ALPRAZolam 0.25 milliGRAM(s) Oral daily PRN anxiety  bisacodyl Suppository 10 milliGRAM(s) Rectal daily PRN Constipation  guaifenesin/dextromethorphan  Syrup 5 milliLiter(s) Oral every 6 hours PRN Cough  ondansetron    Tablet 4 milliGRAM(s) Oral two times a day PRN Nausea and/or Vomiting      New X-rays reviewed:                                                                                  ECHO    CXR interpreted by me:  Bilateral infiltrates

## 2021-02-28 NOTE — PROGRESS NOTE ADULT - ASSESSMENT
IMPRESSION:    Acute hypoxemic respiratory failure on NC  severe epistaxis sp packing now removed  severe Covid PNA  Probable superimposed bacterial infection sp abx  COPD exacerbation  HO CAD    PLAN:    CNS: avoid CNS depressant    HEENT: Oral care ENT f/u      PULMONARY:  HOB @ 45 degrees.  Aspiration precautions. incentive spirometry    Solumedrol 60mg q24h. wean O2    CARDIOVASCULAR: Goal Map >60, Avoid volume overload.     GI: GI prophylaxis. feeding.     RENAL:  Follow up lytes.  Correct as needed    INFECTIOUS DISEASE:  PER id    HEMATOLOGICAL:  DVT prophylaxis. l    ENDOCRINE:  Follow up FS.  Insulin protocol if needed.    MUSCULOSKELETAL: OOB to chair    PT/OT    Dispo: MICU

## 2021-03-01 LAB
ALBUMIN SERPL ELPH-MCNC: 2.5 G/DL — LOW (ref 3.5–5.2)
ALP SERPL-CCNC: 62 U/L — SIGNIFICANT CHANGE UP (ref 30–115)
ALT FLD-CCNC: 39 U/L — SIGNIFICANT CHANGE UP (ref 0–41)
ANION GAP SERPL CALC-SCNC: 10 MMOL/L — SIGNIFICANT CHANGE UP (ref 7–14)
ANION GAP SERPL CALC-SCNC: 7 MMOL/L — SIGNIFICANT CHANGE UP (ref 7–14)
AST SERPL-CCNC: 22 U/L — SIGNIFICANT CHANGE UP (ref 0–41)
BASOPHILS # BLD AUTO: 0.02 K/UL — SIGNIFICANT CHANGE UP (ref 0–0.2)
BASOPHILS NFR BLD AUTO: 0.3 % — SIGNIFICANT CHANGE UP (ref 0–1)
BILIRUB SERPL-MCNC: 0.3 MG/DL — SIGNIFICANT CHANGE UP (ref 0.2–1.2)
BUN SERPL-MCNC: 23 MG/DL — HIGH (ref 10–20)
BUN SERPL-MCNC: 29 MG/DL — HIGH (ref 10–20)
CALCIUM SERPL-MCNC: 7.4 MG/DL — LOW (ref 8.5–10.1)
CALCIUM SERPL-MCNC: 8 MG/DL — LOW (ref 8.5–10.1)
CHLORIDE SERPL-SCNC: 100 MMOL/L — SIGNIFICANT CHANGE UP (ref 98–110)
CHLORIDE SERPL-SCNC: 99 MMOL/L — SIGNIFICANT CHANGE UP (ref 98–110)
CO2 SERPL-SCNC: 25 MMOL/L — SIGNIFICANT CHANGE UP (ref 17–32)
CO2 SERPL-SCNC: 31 MMOL/L — SIGNIFICANT CHANGE UP (ref 17–32)
CREAT SERPL-MCNC: 0.7 MG/DL — SIGNIFICANT CHANGE UP (ref 0.7–1.5)
CREAT SERPL-MCNC: 0.8 MG/DL — SIGNIFICANT CHANGE UP (ref 0.7–1.5)
CRP SERPL-MCNC: 4 MG/L — SIGNIFICANT CHANGE UP
EOSINOPHIL # BLD AUTO: 0.02 K/UL — SIGNIFICANT CHANGE UP (ref 0–0.7)
EOSINOPHIL NFR BLD AUTO: 0.3 % — SIGNIFICANT CHANGE UP (ref 0–8)
GLUCOSE BLDC GLUCOMTR-MCNC: 175 MG/DL — HIGH (ref 70–99)
GLUCOSE BLDC GLUCOMTR-MCNC: 227 MG/DL — HIGH (ref 70–99)
GLUCOSE BLDC GLUCOMTR-MCNC: 255 MG/DL — HIGH (ref 70–99)
GLUCOSE BLDC GLUCOMTR-MCNC: 262 MG/DL — HIGH (ref 70–99)
GLUCOSE BLDC GLUCOMTR-MCNC: 99 MG/DL — SIGNIFICANT CHANGE UP (ref 70–99)
GLUCOSE SERPL-MCNC: 239 MG/DL — HIGH (ref 70–99)
GLUCOSE SERPL-MCNC: 94 MG/DL — SIGNIFICANT CHANGE UP (ref 70–99)
HCT VFR BLD CALC: 36 % — LOW (ref 42–52)
HGB BLD-MCNC: 11.7 G/DL — LOW (ref 14–18)
IMM GRANULOCYTES NFR BLD AUTO: 4 % — HIGH (ref 0.1–0.3)
LYMPHOCYTES # BLD AUTO: 0.72 K/UL — LOW (ref 1.2–3.4)
LYMPHOCYTES # BLD AUTO: 9.8 % — LOW (ref 20.5–51.1)
MAGNESIUM SERPL-MCNC: 1.8 MG/DL — SIGNIFICANT CHANGE UP (ref 1.8–2.4)
MCHC RBC-ENTMCNC: 26.1 PG — LOW (ref 27–31)
MCHC RBC-ENTMCNC: 32.5 G/DL — SIGNIFICANT CHANGE UP (ref 32–37)
MCV RBC AUTO: 80.4 FL — SIGNIFICANT CHANGE UP (ref 80–94)
MONOCYTES # BLD AUTO: 0.37 K/UL — SIGNIFICANT CHANGE UP (ref 0.1–0.6)
MONOCYTES NFR BLD AUTO: 5.1 % — SIGNIFICANT CHANGE UP (ref 1.7–9.3)
NEUTROPHILS # BLD AUTO: 5.89 K/UL — SIGNIFICANT CHANGE UP (ref 1.4–6.5)
NEUTROPHILS NFR BLD AUTO: 80.5 % — HIGH (ref 42.2–75.2)
NRBC # BLD: 0 /100 WBCS — SIGNIFICANT CHANGE UP (ref 0–0)
PLATELET # BLD AUTO: 128 K/UL — LOW (ref 130–400)
POTASSIUM SERPL-MCNC: 4.2 MMOL/L — SIGNIFICANT CHANGE UP (ref 3.5–5)
POTASSIUM SERPL-MCNC: 5.6 MMOL/L — HIGH (ref 3.5–5)
POTASSIUM SERPL-SCNC: 4.2 MMOL/L — SIGNIFICANT CHANGE UP (ref 3.5–5)
POTASSIUM SERPL-SCNC: 5.6 MMOL/L — HIGH (ref 3.5–5)
PROCALCITONIN SERPL-MCNC: 0.06 NG/ML — SIGNIFICANT CHANGE UP (ref 0.02–0.1)
PROT SERPL-MCNC: 4.8 G/DL — LOW (ref 6–8)
RBC # BLD: 4.48 M/UL — LOW (ref 4.7–6.1)
RBC # FLD: 18.2 % — HIGH (ref 11.5–14.5)
SODIUM SERPL-SCNC: 134 MMOL/L — LOW (ref 135–146)
SODIUM SERPL-SCNC: 138 MMOL/L — SIGNIFICANT CHANGE UP (ref 135–146)
WBC # BLD: 7.31 K/UL — SIGNIFICANT CHANGE UP (ref 4.8–10.8)
WBC # FLD AUTO: 7.31 K/UL — SIGNIFICANT CHANGE UP (ref 4.8–10.8)

## 2021-03-01 PROCEDURE — 71045 X-RAY EXAM CHEST 1 VIEW: CPT | Mod: 26

## 2021-03-01 PROCEDURE — 99232 SBSQ HOSP IP/OBS MODERATE 35: CPT | Mod: CS

## 2021-03-01 RX ADMIN — Medication 5 MILLIGRAM(S): at 21:20

## 2021-03-01 RX ADMIN — Medication 50 MICROGRAM(S): at 06:02

## 2021-03-01 RX ADMIN — Medication 9 UNIT(S): at 15:35

## 2021-03-01 RX ADMIN — Medication 60 MILLIGRAM(S): at 06:02

## 2021-03-01 RX ADMIN — ENOXAPARIN SODIUM 40 MILLIGRAM(S): 100 INJECTION SUBCUTANEOUS at 12:03

## 2021-03-01 RX ADMIN — PANTOPRAZOLE SODIUM 40 MILLIGRAM(S): 20 TABLET, DELAYED RELEASE ORAL at 06:02

## 2021-03-01 RX ADMIN — Medication 1 APPLICATION(S): at 06:03

## 2021-03-01 RX ADMIN — Medication 25 MILLIGRAM(S): at 08:31

## 2021-03-01 RX ADMIN — CHLORHEXIDINE GLUCONATE 1 APPLICATION(S): 213 SOLUTION TOPICAL at 06:02

## 2021-03-01 RX ADMIN — POLYETHYLENE GLYCOL 3350 17 GRAM(S): 17 POWDER, FOR SOLUTION ORAL at 12:03

## 2021-03-01 RX ADMIN — PANTOPRAZOLE SODIUM 40 MILLIGRAM(S): 20 TABLET, DELAYED RELEASE ORAL at 17:35

## 2021-03-01 RX ADMIN — Medication 81 MILLIGRAM(S): at 12:03

## 2021-03-01 RX ADMIN — Medication 9 UNIT(S): at 12:12

## 2021-03-01 RX ADMIN — INSULIN GLARGINE 29 UNIT(S): 100 INJECTION, SOLUTION SUBCUTANEOUS at 21:31

## 2021-03-01 RX ADMIN — Medication 1 APPLICATION(S): at 17:35

## 2021-03-01 RX ADMIN — TAMSULOSIN HYDROCHLORIDE 0.8 MILLIGRAM(S): 0.4 CAPSULE ORAL at 21:21

## 2021-03-01 RX ADMIN — SENNA PLUS 2 TABLET(S): 8.6 TABLET ORAL at 21:20

## 2021-03-01 RX ADMIN — Medication 4: at 12:12

## 2021-03-01 RX ADMIN — Medication 6: at 15:35

## 2021-03-01 RX ADMIN — Medication 9 UNIT(S): at 08:32

## 2021-03-01 NOTE — PROGRESS NOTE ADULT - ASSESSMENT
69 yo M with PMH of CAD, MI (s/p 4 stents), DM, HTN, BPH, hypothyroid and GERD, recently received Pfizer vaccine (1/3 and 1/24) presented to ED c/o progressive SOB, fever, and weakness. Patient stated that his symptoms began on Wednesday 2/3. COVID PCR + on 2/4. Admitted to medicine with acute hypoxemic respiratory failure secondary to COVID-19 PNA. Upgraded to ICU from 3A due to increasing oxygen requirements on 2/6. ICU course complicated by epistaxis x 2 due to HFNC.     #Acute hypoxemic respiratory failure secondary to COVID-19 PNA   #likely superimposed bacterial pneumonia   - Patient s/p 2 doses of the Pfizer vaccine (last dose 1/24)  - CTA 02/04 showed extensive bilateral patchy ground-glass opacities involving all lobes most pronounced in the upper lung zones  - s/p 1 unit plasma, Remdesivir completed 2/9, 1 week of rocephin & Levaquin per ID  - inflammatory markers:  Ddimer: 1751 > 986 > 855 > 973 > 833 > 529 (2/28)  Procal: 0.12 > 0.15 > 0.37 > 0.19 > 0.11 > 0.06 (2/26)  Ferritin: 404 > 625 > 229 > 132 (2/23)  CRP: 3.30 > 6.73 > 4.06 > 1.23 > 0.50 (2/26)  - LE Duplex negative 2/8, 2/14, 2/18, 2/24  - on solumedrol 60mg q24h, switch to prednisone 40mg PO tomorrow  - fungital 2/19 <19  - PT/OT    #Constipation   - c/w miralax, senna; dulcolax suppository prn   - pt had a BM today     #CAD/STEMI s/p 4 stents  - Hx of STEMI s/p  s/p PCI of distal RCA, mid/distal LAD (December 2017 and February 2018)  - Oct 2019: 100% occlusion of distal RCA s/p PCI with REUBEN to distal RCA, and POBA to RPL  - As per cardio (Dr. Murillo) 2/23, continue to hold effient and c/w asa 81 daily   - C/w Toprol 25mg daily  - takes ezetimibe 10mg at home and is nonformulary, hold for now     #epistaxis x2 likely due to HFNC s/p packing now removed -- resolved  #melena likely due to epistaxis  - recurrent x 2 episodes; ENT following; packing removed 2/23; c/w lovenox 40mg daily  - melena: one time episode 2/21; patient has hx of endoscopy 2 years ago which was not significant   - c/w PPI po BID    #HTN   - hold enalapril 2.5mg daily due to normal BP in setting of severe covid     #BPH  - C/w tamsulosin 0.4mg PO QHS    #DM   - Monitor FS, c/w insulin regimen   - A1C 9.1-->only on glipizide as outpatient   - c/w counselling    #Hypothyroidism  - C/w Synthroid    #GERD  - c/w protonix BID    Diet: consistent carbs, DASH/TLC + Glucerna   DVT PPx: lovenox 40mg daily   GI ppx: protonix  Code Status: FULL CODE  Dispo: downgrade to floor    Family contact in case of emergency. Patient states we do not need to regularly call family everyday.   Ami Odonnell - Wife  [phone]  If wife does not answer: Francisca - daughter  [phone] 71 yo M with PMH of CAD, MI (s/p 4 stents), DM, HTN, BPH, hypothyroid and GERD, recently received Pfizer vaccine (1/3 and 1/24) presented to ED c/o progressive SOB, fever, and weakness. Patient stated that his symptoms began on Wednesday 2/3. COVID PCR + on 2/4. Admitted to medicine with acute hypoxemic respiratory failure secondary to COVID-19 PNA. Upgraded to ICU from 3A due to increasing oxygen requirements on 2/6. ICU course complicated by epistaxis x 2 due to HFNC.     #Acute hypoxemic respiratory failure secondary to COVID-19 PNA   #likely superimposed bacterial pneumonia   - Patient s/p 2 doses of the Pfizer vaccine (last dose 1/24)  - CTA 02/04 showed extensive bilateral patchy ground-glass opacities involving all lobes most pronounced in the upper lung zones  - s/p 1 unit plasma, Remdesivir completed 2/9, 1 week of rocephin & Levaquin per ID  - inflammatory markers:  Ddimer: 1751 > 986 > 855 > 973 > 833 > 529 (2/28)  Procal: 0.12 > 0.15 > 0.37 > 0.19 > 0.11 > 0.06 (2/26)  Ferritin: 404 > 625 > 229 > 132 (2/23)  CRP: 3.30 > 6.73 > 4.06 > 1.23 > 0.50 (2/26)  - LE Duplex negative 2/8, 2/14, 2/18, 2/24  - on solumedrol 60mg q24h, switch to prednisone 40mg PO tomorrow  - fungital 2/19 <19  - PT/OT    #Constipation   - c/w miralax, senna; dulcolax suppository prn   - pt had a BM today     #CAD/STEMI s/p 4 stents  - Hx of STEMI s/p  s/p PCI of distal RCA, mid/distal LAD (December 2017 and February 2018)  - Oct 2019: 100% occlusion of distal RCA s/p PCI with REUBEN to distal RCA, and POBA to RPL  - As per cardio (Dr. Murillo) 2/23, continue to hold effient and c/w asa 81 daily   - C/w Toprol 25mg daily  - takes ezetimibe 10mg at home and is nonformulary, hold for now     #epistaxis x2 likely due to HFNC s/p packing now removed -- resolved  #melena likely due to epistaxis  - recurrent x 2 episodes; ENT following; packing removed 2/23; c/w lovenox 40mg daily  - melena: one time episode 2/21; patient has hx of endoscopy 2 years ago which was not significant   - c/w PPI po BID    #HTN   - hold enalapril 2.5mg daily due to normal BP in setting of severe covid     #BPH  - C/w tamsulosin 0.4mg PO QHS    #DM   - Monitor FS, c/w insulin regimen   - A1C 9.1-->only on glipizide as outpatient   - c/w counselling    #Hypothyroidism  - C/w Synthroid    #GERD  - c/w protonix BID    Diet: consistent carbs, DASH/TLC + Glucerna   DVT PPx: lovenox 40mg daily   GI ppx: protonix  Code Status: FULL CODE  Dispo: downgrade to floor  Family discussion: attempted to call wife today but was unable to reach anyone   Family contact in case of emergency. Patient states we do not need to regularly call family everyday.   Ami Odonnell - Wife  [phone]  If wife does not answer: Francisca - daughter  [phone]

## 2021-03-01 NOTE — PROGRESS NOTE ADULT - ASSESSMENT
IMPRESSION:    Acute hypoxemic respiratory failure on NC  severe epistaxis sp packing now removed  severe Covid PNA  Probable superimposed bacterial infection sp abx  COPD exacerbation, resolved  HO CAD s/p 4 stents      PLAN:    CNS: avoid CNS depressant    HEENT: Oral care    PULMONARY:  HOB @ 45 degrees.  Aspiration precautions.  Incentive spirometry.  Wean off O2.  Solumedrol 60 q24h, taper to Prednisone 40mg in AM.    CARDIOVASCULAR:  Goal Map >60,  Avoid volume overload.       GI: GI prophylaxis. Feeding.     RENAL:  BMP in afternoon.  Follow up lytes.  Correct as needed    INFECTIOUS DISEASE:  Off Abx.  ID FU.    HEMATOLOGICAL:  DVT prophylaxis LMWH    ENDOCRINE:  Follow up FS.  Insulin protocol if needed.    MUSCULOSKELETAL: OOB to chair    PT/OT  Dispo: Downgrade to floor

## 2021-03-01 NOTE — PROGRESS NOTE ADULT - SUBJECTIVE AND OBJECTIVE BOX
Patient is a 70y old  Male who presents with a chief complaint of COVID pna (27 Feb 2021 06:34)    Over Night Events:  No overnight events.  On 5L NC.    ROS:   All ROS are negative except HPI       PHYSICAL EXAM  ICU Vital Signs Last 24 Hrs  T(C): 36.2 (01 Mar 2021 08:00), Max: 36.4 (01 Mar 2021 04:00)  T(F): 97.2 (01 Mar 2021 08:00), Max: 97.5 (01 Mar 2021 04:00)  HR: 110 (01 Mar 2021 10:00) (70 - 110)  BP: 120/70 (01 Mar 2021 10:00) (87/60 - 123/55)  BP(mean): 78 (01 Mar 2021 10:00) (63 - 85)  RR: 29 (01 Mar 2021 10:00) (13 - 39)  SpO2: 93% (01 Mar 2021 10:00) (84% - 100%)      CONSTITUTIONAL:  Well nourished.  NAD    ENT:   Airway patent,   Mouth with normal mucosa.   No thrush    EYES:   Pupils equal,   Round and reactive to light.    CARDIAC:   Normal rate,   Regular rhythm.    No edema      Vascular:  Normal systolic impulse  No Carotid bruits    RESPIRATORY:   No wheezing  Bilateral BS  Normal chest expansion  Not tachypneic,  No use of accessory muscles    GASTROINTESTINAL:  Abdomen soft,   Non-tender,   No guarding,   + BS    MUSCULOSKELETAL:   Range of motion is not limited,  No clubbing, cyanosis    NEUROLOGICAL:   Alert and oriented   No motor  deficits.    SKIN:   Skin normal color for race,   Warm and dry and intact.   No evidence of rash.    PSYCHIATRIC:   Normal mood and affect.   No apparent risk to self or others.    HEMATOLOGICAL:  No cervical  lymphadenopathy.  no inguinal lymphadenopathy      02-28-21 @ 07:01  -  03-01-21 @ 07:00  --------------------------------------------------------  IN:    Oral Fluid: 1060 mL  Total IN: 1060 mL    OUT:    Voided (mL): 1595 mL  Total OUT: 1595 mL    Total NET: -535 mL      03-01-21 @ 07:01  -  03-01-21 @ 10:28  --------------------------------------------------------  IN:  Total IN: 0 mL    OUT:    Voided (mL): 270 mL  Total OUT: 270 mL    Total NET: -270 mL        LABS:                            11.7   7.31  )-----------( 128      ( 01 Mar 2021 04:15 )             36.0                                               03-01    138  |  100  |  23<H>  ----------------------------<  94  4.2   |  31  |  0.7    Ca    7.4<L>      01 Mar 2021 04:15  Mg     1.8     03-01    TPro  4.8<L>  /  Alb  2.5<L>  /  TBili  0.3  /  DBili  x   /  AST  22  /  ALT  39  /  AlkPhos  62  03-01    LIVER FUNCTIONS - ( 01 Mar 2021 04:15 )  Alb: 2.5 g/dL / Pro: 4.8 g/dL / ALK PHOS: 62 U/L / ALT: 39 U/L / AST: 22 U/L / GGT: x                                                MEDICATIONS  (STANDING):  aspirin enteric coated 81 milliGRAM(s) Oral daily  BACItracin   Ointment 1 Application(s) Topical two times a day  chlorhexidine 4% Liquid 1 Application(s) Topical daily  dextrose 40% Gel 15 Gram(s) Oral once  dextrose 5%. 1000 milliLiter(s) (50 mL/Hr) IV Continuous <Continuous>  dextrose 5%. 1000 milliLiter(s) (100 mL/Hr) IV Continuous <Continuous>  dextrose 50% Injectable 25 Gram(s) IV Push once  dextrose 50% Injectable 12.5 Gram(s) IV Push once  dextrose 50% Injectable 25 Gram(s) IV Push once  enoxaparin Injectable 40 milliGRAM(s) SubCutaneous daily  glucagon  Injectable 1 milliGRAM(s) IntraMuscular once  influenza   Vaccine 0.5 milliLiter(s) IntraMuscular once  insulin glargine Injectable (LANTUS) 29 Unit(s) SubCutaneous at bedtime  insulin lispro (ADMELOG) corrective regimen sliding scale   SubCutaneous three times a day before meals  insulin lispro Injectable (ADMELOG) 9 Unit(s) SubCutaneous three times a day before meals  levothyroxine 50 MICROGram(s) Oral daily  melatonin 5 milliGRAM(s) Oral at bedtime  methylPREDNISolone sodium succinate Injectable 60 milliGRAM(s) IV Push daily  metoprolol succinate ER 25 milliGRAM(s) Oral daily  pantoprazole    Tablet 40 milliGRAM(s) Oral every 12 hours  polyethylene glycol 3350 17 Gram(s) Oral daily  senna 2 Tablet(s) Oral at bedtime  sodium zirconium cyclosilicate 10 Gram(s) Oral every 12 hours  tamsulosin 0.8 milliGRAM(s) Oral at bedtime    MEDICATIONS  (PRN):  acetaminophen   Tablet .. 650 milliGRAM(s) Oral every 6 hours PRN Temp greater or equal to 38C (100.4F), Mild Pain (1 - 3)  ALPRAZolam 0.25 milliGRAM(s) Oral daily PRN anxiety  bisacodyl Suppository 10 milliGRAM(s) Rectal daily PRN Constipation  guaifenesin/dextromethorphan  Syrup 5 milliLiter(s) Oral every 6 hours PRN Cough  ondansetron    Tablet 4 milliGRAM(s) Oral two times a day PRN Nausea and/or Vomiting      New X-rays reviewed  CXR interpreted by me:   bilateral opacities improving from prior

## 2021-03-01 NOTE — PROGRESS NOTE ADULT - SUBJECTIVE AND OBJECTIVE BOX
Patient is a 70y old  Male who presents with a chief complaint of COVID pna (27 Feb 2021 06:34)      INTERVAL HPI/OVERNIGHT EVENTS:   No overnight events. desated to 92% on 4L NC, now on 5L NC sating well. Pt says he feels better, has no complaints however desats on minimal movement and while talking.   Afebrile, hemodynamically stable     ICU Vital Signs Last 24 Hrs  T(C): 36.2 (01 Mar 2021 08:00), Max: 36.4 (01 Mar 2021 04:00)  T(F): 97.2 (01 Mar 2021 08:00), Max: 97.5 (01 Mar 2021 04:00)  HR: 104 (01 Mar 2021 11:00) (70 - 110)  BP: 109/60 (01 Mar 2021 11:00) (87/60 - 123/55)  BP(mean): 77 (01 Mar 2021 11:00) (63 - 85)  ABP: --  ABP(mean): --  RR: 30 (01 Mar 2021 11:00) (13 - 39)  SpO2: 94% (01 Mar 2021 11:00) (84% - 99%)    I&O's Summary    28 Feb 2021 07:01  -  01 Mar 2021 07:00  --------------------------------------------------------  IN: 1060 mL / OUT: 1595 mL / NET: -535 mL    01 Mar 2021 07:01  -  01 Mar 2021 11:18  --------------------------------------------------------  IN: 0 mL / OUT: 270 mL / NET: -270 mL          LABS:                        11.7   7.31  )-----------( 128      ( 01 Mar 2021 04:15 )             36.0     03-01    138  |  100  |  23<H>  ----------------------------<  94  4.2   |  31  |  0.7    Ca    7.4<L>      01 Mar 2021 04:15  Mg     1.8     03-01    TPro  4.8<L>  /  Alb  2.5<L>  /  TBili  0.3  /  DBili  x   /  AST  22  /  ALT  39  /  AlkPhos  62  03-01        CAPILLARY BLOOD GLUCOSE  214 (28 Feb 2021 21:00)      POCT Blood Glucose.: 255 mg/dL (01 Mar 2021 08:29)  POCT Blood Glucose.: 99 mg/dL (01 Mar 2021 04:52)  POCT Blood Glucose.: 214 mg/dL (28 Feb 2021 21:03)  POCT Blood Glucose.: 145 mg/dL (28 Feb 2021 17:04)  POCT Blood Glucose.: 243 mg/dL (28 Feb 2021 12:12)      D-Dimer Assay, Quantitative: 529 ng/mL DDU (02-28-21 @ 04:38)  D-Dimer Assay, Quantitative: 833 ng/mL DDU (02-26-21 @ 04:51)  C-Reactive Protein, Serum: 0.50 mg/dL (02-26-21 @ 04:30)  Procalcitonin, Serum: 0.06 ng/mL (02-26-21 @ 04:30)  Ferritin, Serum: 132 ng/mL (02-23-21 @ 07:00)  Procalcitonin, Serum: 0.11 ng/mL (02-23-21 @ 05:02)  C-Reactive Protein, Serum: 1.23 mg/dL (02-23-21 @ 05:02)  D-Dimer Assay, Quantitative: 973 ng/mL DDU (02-23-21 @ 05:02)      RADIOLOGY & ADDITIONAL TESTS:    < from: Xray Chest 1 View- PORTABLE-Routine (Xray Chest 1 View- PORTABLE-Routine in AM.) (03.01.21 @ 06:20) >  Impression:  Decreasing bilateral parenchymal opacities.    < from: CT Angio Chest PE Protocol w/ IV Cont (02.04.21 @ 16:32) >  IMPRESSION:  1.  No acute central or lobar pulmonary embolus.  2.  Extensive bilateral patchy groundglass opacities involving all lobes most pronounced in the upper lung zones. Findings are suspicious for an atypical viral pneumonia such as COVID-19.    Consultant(s) Notes Reviewed:  [x ] YES  [ ] NO    MEDICATIONS  (STANDING):  aspirin enteric coated 81 milliGRAM(s) Oral daily  BACItracin   Ointment 1 Application(s) Topical two times a day  chlorhexidine 4% Liquid 1 Application(s) Topical daily  dextrose 40% Gel 15 Gram(s) Oral once  dextrose 5%. 1000 milliLiter(s) (50 mL/Hr) IV Continuous <Continuous>  dextrose 5%. 1000 milliLiter(s) (100 mL/Hr) IV Continuous <Continuous>  dextrose 50% Injectable 25 Gram(s) IV Push once  dextrose 50% Injectable 12.5 Gram(s) IV Push once  dextrose 50% Injectable 25 Gram(s) IV Push once  enoxaparin Injectable 40 milliGRAM(s) SubCutaneous daily  glucagon  Injectable 1 milliGRAM(s) IntraMuscular once  influenza   Vaccine 0.5 milliLiter(s) IntraMuscular once  insulin glargine Injectable (LANTUS) 29 Unit(s) SubCutaneous at bedtime  insulin lispro (ADMELOG) corrective regimen sliding scale   SubCutaneous three times a day before meals  insulin lispro Injectable (ADMELOG) 9 Unit(s) SubCutaneous three times a day before meals  levothyroxine 50 MICROGram(s) Oral daily  melatonin 5 milliGRAM(s) Oral at bedtime  methylPREDNISolone sodium succinate Injectable 60 milliGRAM(s) IV Push daily  metoprolol succinate ER 25 milliGRAM(s) Oral daily  pantoprazole    Tablet 40 milliGRAM(s) Oral every 12 hours  polyethylene glycol 3350 17 Gram(s) Oral daily  senna 2 Tablet(s) Oral at bedtime  tamsulosin 0.8 milliGRAM(s) Oral at bedtime    MEDICATIONS  (PRN):  acetaminophen   Tablet .. 650 milliGRAM(s) Oral every 6 hours PRN Temp greater or equal to 38C (100.4F), Mild Pain (1 - 3)  ALPRAZolam 0.25 milliGRAM(s) Oral daily PRN anxiety  bisacodyl Suppository 10 milliGRAM(s) Rectal daily PRN Constipation  guaifenesin/dextromethorphan  Syrup 5 milliLiter(s) Oral every 6 hours PRN Cough  ondansetron    Tablet 4 milliGRAM(s) Oral two times a day PRN Nausea and/or Vomiting      PHYSICAL EXAM:  GENERAL: lying in bed comfortably, no acute distress  HEAD:  Atraumatic, Normocephalic  EYES: EOMI, conjunctiva and sclera clear  NECK: Supple, No JVD, Normal thyroid, no enlarged nodes  NERVOUS SYSTEM:  Alert & Awake.   CHEST/LUNG: B/L good air entry; No rales, rhonchi, or wheezing  HEART: Regular rate and rhythm; No murmurs  ABDOMEN: Soft, Nontender, Nondistended; Bowel sounds present  EXTREMITIES: No clubbing, cyanosis, or edema  LYMPH: No lymphadenopathy noted  SKIN: No rashes or lesions

## 2021-03-02 LAB
ALBUMIN SERPL ELPH-MCNC: 2.7 G/DL — LOW (ref 3.5–5.2)
ALP SERPL-CCNC: 69 U/L — SIGNIFICANT CHANGE UP (ref 30–115)
ALT FLD-CCNC: 40 U/L — SIGNIFICANT CHANGE UP (ref 0–41)
ANION GAP SERPL CALC-SCNC: 11 MMOL/L — SIGNIFICANT CHANGE UP (ref 7–14)
ANION GAP SERPL CALC-SCNC: 11 MMOL/L — SIGNIFICANT CHANGE UP (ref 7–14)
AST SERPL-CCNC: 42 U/L — HIGH (ref 0–41)
BASOPHILS # BLD AUTO: 0.02 K/UL — SIGNIFICANT CHANGE UP (ref 0–0.2)
BASOPHILS NFR BLD AUTO: 0.2 % — SIGNIFICANT CHANGE UP (ref 0–1)
BILIRUB SERPL-MCNC: 0.2 MG/DL — SIGNIFICANT CHANGE UP (ref 0.2–1.2)
BUN SERPL-MCNC: 25 MG/DL — HIGH (ref 10–20)
BUN SERPL-MCNC: 32 MG/DL — HIGH (ref 10–20)
CALCIUM SERPL-MCNC: 8 MG/DL — LOW (ref 8.5–10.1)
CALCIUM SERPL-MCNC: 8.4 MG/DL — LOW (ref 8.5–10.1)
CHLORIDE SERPL-SCNC: 98 MMOL/L — SIGNIFICANT CHANGE UP (ref 98–110)
CHLORIDE SERPL-SCNC: 98 MMOL/L — SIGNIFICANT CHANGE UP (ref 98–110)
CO2 SERPL-SCNC: 28 MMOL/L — SIGNIFICANT CHANGE UP (ref 17–32)
CO2 SERPL-SCNC: 31 MMOL/L — SIGNIFICANT CHANGE UP (ref 17–32)
CREAT SERPL-MCNC: 0.9 MG/DL — SIGNIFICANT CHANGE UP (ref 0.7–1.5)
CREAT SERPL-MCNC: 1 MG/DL — SIGNIFICANT CHANGE UP (ref 0.7–1.5)
CRP SERPL-MCNC: 14 MG/L — HIGH
D DIMER BLD IA.RAPID-MCNC: 1629 NG/ML DDU — HIGH (ref 0–230)
EOSINOPHIL # BLD AUTO: 0.05 K/UL — SIGNIFICANT CHANGE UP (ref 0–0.7)
EOSINOPHIL NFR BLD AUTO: 0.6 % — SIGNIFICANT CHANGE UP (ref 0–8)
GLUCOSE BLDC GLUCOMTR-MCNC: 108 MG/DL — HIGH (ref 70–99)
GLUCOSE BLDC GLUCOMTR-MCNC: 215 MG/DL — HIGH (ref 70–99)
GLUCOSE BLDC GLUCOMTR-MCNC: 244 MG/DL — HIGH (ref 70–99)
GLUCOSE BLDC GLUCOMTR-MCNC: 317 MG/DL — HIGH (ref 70–99)
GLUCOSE SERPL-MCNC: 233 MG/DL — HIGH (ref 70–99)
GLUCOSE SERPL-MCNC: 81 MG/DL — SIGNIFICANT CHANGE UP (ref 70–99)
HCT VFR BLD CALC: 38.1 % — LOW (ref 42–52)
HGB BLD-MCNC: 12.6 G/DL — LOW (ref 14–18)
IMM GRANULOCYTES NFR BLD AUTO: 3.7 % — HIGH (ref 0.1–0.3)
LYMPHOCYTES # BLD AUTO: 0.97 K/UL — LOW (ref 1.2–3.4)
LYMPHOCYTES # BLD AUTO: 11.7 % — LOW (ref 20.5–51.1)
MAGNESIUM SERPL-MCNC: 2 MG/DL — SIGNIFICANT CHANGE UP (ref 1.8–2.4)
MCHC RBC-ENTMCNC: 26.5 PG — LOW (ref 27–31)
MCHC RBC-ENTMCNC: 33.1 G/DL — SIGNIFICANT CHANGE UP (ref 32–37)
MCV RBC AUTO: 80.2 FL — SIGNIFICANT CHANGE UP (ref 80–94)
MONOCYTES # BLD AUTO: 0.42 K/UL — SIGNIFICANT CHANGE UP (ref 0.1–0.6)
MONOCYTES NFR BLD AUTO: 5.1 % — SIGNIFICANT CHANGE UP (ref 1.7–9.3)
NEUTROPHILS # BLD AUTO: 6.51 K/UL — HIGH (ref 1.4–6.5)
NEUTROPHILS NFR BLD AUTO: 78.7 % — HIGH (ref 42.2–75.2)
NRBC # BLD: 0 /100 WBCS — SIGNIFICANT CHANGE UP (ref 0–0)
PLATELET # BLD AUTO: 154 K/UL — SIGNIFICANT CHANGE UP (ref 130–400)
POTASSIUM SERPL-MCNC: 5.3 MMOL/L — HIGH (ref 3.5–5)
POTASSIUM SERPL-MCNC: 5.4 MMOL/L — HIGH (ref 3.5–5)
POTASSIUM SERPL-SCNC: 5.3 MMOL/L — HIGH (ref 3.5–5)
POTASSIUM SERPL-SCNC: 5.4 MMOL/L — HIGH (ref 3.5–5)
PROCALCITONIN SERPL-MCNC: 0.09 NG/ML — SIGNIFICANT CHANGE UP (ref 0.02–0.1)
PROT SERPL-MCNC: 5.4 G/DL — LOW (ref 6–8)
RBC # BLD: 4.75 M/UL — SIGNIFICANT CHANGE UP (ref 4.7–6.1)
RBC # FLD: 18.6 % — HIGH (ref 11.5–14.5)
SODIUM SERPL-SCNC: 137 MMOL/L — SIGNIFICANT CHANGE UP (ref 135–146)
SODIUM SERPL-SCNC: 140 MMOL/L — SIGNIFICANT CHANGE UP (ref 135–146)
WBC # BLD: 8.28 K/UL — SIGNIFICANT CHANGE UP (ref 4.8–10.8)
WBC # FLD AUTO: 8.28 K/UL — SIGNIFICANT CHANGE UP (ref 4.8–10.8)

## 2021-03-02 PROCEDURE — 99232 SBSQ HOSP IP/OBS MODERATE 35: CPT | Mod: CS

## 2021-03-02 PROCEDURE — 93970 EXTREMITY STUDY: CPT | Mod: 26

## 2021-03-02 PROCEDURE — 71045 X-RAY EXAM CHEST 1 VIEW: CPT | Mod: 26

## 2021-03-02 RX ORDER — ENOXAPARIN SODIUM 100 MG/ML
40 INJECTION SUBCUTANEOUS ONCE
Refills: 0 | Status: COMPLETED | OUTPATIENT
Start: 2021-03-02 | End: 2021-03-02

## 2021-03-02 RX ORDER — ENOXAPARIN SODIUM 100 MG/ML
40 INJECTION SUBCUTANEOUS EVERY 12 HOURS
Refills: 0 | Status: DISCONTINUED | OUTPATIENT
Start: 2021-03-02 | End: 2021-03-05

## 2021-03-02 RX ORDER — SODIUM ZIRCONIUM CYCLOSILICATE 10 G/10G
10 POWDER, FOR SUSPENSION ORAL EVERY 12 HOURS
Refills: 0 | Status: DISCONTINUED | OUTPATIENT
Start: 2021-03-02 | End: 2021-03-03

## 2021-03-02 RX ADMIN — Medication 60 MILLIGRAM(S): at 04:32

## 2021-03-02 RX ADMIN — Medication 9 UNIT(S): at 12:25

## 2021-03-02 RX ADMIN — ENOXAPARIN SODIUM 40 MILLIGRAM(S): 100 INJECTION SUBCUTANEOUS at 12:26

## 2021-03-02 RX ADMIN — ENOXAPARIN SODIUM 40 MILLIGRAM(S): 100 INJECTION SUBCUTANEOUS at 17:03

## 2021-03-02 RX ADMIN — Medication 50 MICROGRAM(S): at 04:32

## 2021-03-02 RX ADMIN — TAMSULOSIN HYDROCHLORIDE 0.8 MILLIGRAM(S): 0.4 CAPSULE ORAL at 20:31

## 2021-03-02 RX ADMIN — SODIUM ZIRCONIUM CYCLOSILICATE 10 GRAM(S): 10 POWDER, FOR SUSPENSION ORAL at 12:09

## 2021-03-02 RX ADMIN — PANTOPRAZOLE SODIUM 40 MILLIGRAM(S): 20 TABLET, DELAYED RELEASE ORAL at 17:03

## 2021-03-02 RX ADMIN — Medication 5 MILLIGRAM(S): at 20:33

## 2021-03-02 RX ADMIN — INSULIN GLARGINE 29 UNIT(S): 100 INJECTION, SOLUTION SUBCUTANEOUS at 21:25

## 2021-03-02 RX ADMIN — PANTOPRAZOLE SODIUM 40 MILLIGRAM(S): 20 TABLET, DELAYED RELEASE ORAL at 04:32

## 2021-03-02 RX ADMIN — Medication 8: at 12:25

## 2021-03-02 RX ADMIN — Medication 4: at 17:03

## 2021-03-02 RX ADMIN — Medication 9 UNIT(S): at 17:03

## 2021-03-02 RX ADMIN — SENNA PLUS 2 TABLET(S): 8.6 TABLET ORAL at 20:33

## 2021-03-02 RX ADMIN — CHLORHEXIDINE GLUCONATE 1 APPLICATION(S): 213 SOLUTION TOPICAL at 04:31

## 2021-03-02 RX ADMIN — POLYETHYLENE GLYCOL 3350 17 GRAM(S): 17 POWDER, FOR SOLUTION ORAL at 12:27

## 2021-03-02 RX ADMIN — Medication 81 MILLIGRAM(S): at 12:26

## 2021-03-02 NOTE — PROGRESS NOTE ADULT - ASSESSMENT
IMPRESSION:    Acute hypoxemic respiratory failure on NC  severe epistaxis sp packing now removed & resolved  severe Covid PNA  Probable superimposed bacterial infection sp abx, resolved  COPD exacerbation, resolved  Hyperkalemia  HO CAD s/p 4 stents      PLAN:    CNS: avoid CNS depressant    HEENT: Oral care    PULMONARY:  HOB @ 45 degrees.  Aspiration precautions.  Incentive spirometry.  Wean off O2.  Prednisone 40mg taper over 5 days and 20mg over 3 days.    CARDIOVASCULAR:  Goal Map >60,  Avoid volume overload.       GI: GI prophylaxis. Feeding.  Lokelma 10 bid.    RENAL:  BMP in afternoon.  Follow up lytes.  Correct as needed    INFECTIOUS DISEASE:  Off Abx.  ID FU.    HEMATOLOGICAL:  DVT prophylaxis LMWH 40 bid.  VDUS lower extremities.    ENDOCRINE:  Follow up FS.  Insulin protocol if needed.    MUSCULOSKELETAL: OOB to chair    PT/OT  Dispo: Downgrade to floor IMPRESSION:    Acute hypoxemic respiratory failure improved   Severe Covid PNA  Probable superimposed bacterial infection sp abx  COPD exacerbation, resolved  HO CAD       PLAN:    CNS: avoid CNS depressant    HEENT: Oral care    PULMONARY:  HOB @ 45 degrees.  Aspiration precautions.  Incentive spirometry.  Wean off O2.  Prednisone 40mg taper over 5 days and 20mg over 3 days.    CARDIOVASCULAR:  Goal Map >60,  Avoid volume overload.       GI: GI prophylaxis. Feeding.     RENAL:  BMP in afternoon.  Follow up lytes.  Correct as needed.  Lokelma     INFECTIOUS DISEASE:  Off Abx.     HEMATOLOGICAL:  DVT prophylaxis LMWH 40 bid. Repeat Lower extremity venous duplex.    ENDOCRINE:  Follow up FS.  Insulin protocol if needed.    MUSCULOSKELETAL: OOB to chair    PT/OT  Dispo: Downgrade to floor

## 2021-03-02 NOTE — PHARMACOTHERAPY INTERVENTION NOTE - PROVIDER CONTACTED
Albert Sanders
Albert Sanders
Dr Bradly Olsen
Irene Medrano
Wade Bettencourt
Bradly Olsen
Alejandra Hernandez

## 2021-03-02 NOTE — CHART NOTE - NSCHARTNOTEFT_GEN_A_CORE
Downgrade to floor    Transfer from: MICU  Transfer to:  ( x ) Medicine    (  ) Telemetry    (  ) RCU    (  ) Palliative    (  ) Stroke Unit    (  ) _______________    HPI:  70 year old male with PMH of CAD, MI (s/p 4 stents), DM, HTN, BPH, hypothyroid and GERD presents to ED c/o progressive SOB, fever, and weakness x 4 days.  Pt also with recent positive COVID 19 swab + as outpatient. Pt had Pfizer COVID vaccine on 1/3 and 1/24. Pt reports chills, dry cough, and decreased po intake. He denies abd pain, N/V/D/C, lightheadedness CP.  In the ED, pt's T 37.8, . WBC 14, D-dimer 329, BUN/Cr 23/1.8, GFR 38, COVID positive.  CTA negative for PE but consistent with COVID 19 pneumonia.  Pt was given Decadron 6 mg x1 in the ED. pt saturating 99% on 3 L NC.   (04 Feb 2021 17:35)      MICU COURSE:    Patient was upgraded to ICU on 2/6 for worsening hypoxemic respiratory failure. Patient remained in the ICU and was managed for COVID-19 pneumonia with supplemental oxygen via HFNC and BIPAP. Pt was never intubated, over the course of his ICU stay his condition improved. He was given high dose solumedrol. Now he is on 4L O2 via NC, significant improvement, however he still desats on minimal movement. Daily CXRs show bilateral opacities consistent with viral pneumonia. Course was complicated by 2 episodes of epistaxis and 1 episode of dark stool, now resolved.       ASSESSMENT & PLAN:     #Acute hypoxemic respiratory failure secondary to COVID-19 PNA   #likely superimposed bacterial pneumonia   - Patient s/p 2 doses of the Pfizer vaccine (last dose 1/24)  - On 4L O2 via NC today, wean down on supplementary oxygen as tolerated   - CTA 02/04 showed extensive bilateral patchy ground-glass opacities involving all lobes most pronounced in the upper lung zones  - s/p 1 unit plasma, Remdesivir completed 2/9, 1 week of rocephin & Levaquin per ID  - inflammatory markers:  Ddimer: 1751 > 986 > 855 > 833 > 529 > 1629 (3/2)  Procal: 0.12 > 0.15 > 0.37 > 0.19 > 0.11 > 0.06 (2/26)  Ferritin: 404 > 625 > 229 > 132 (2/23)  CRP: 3.30 > 6.73 > 4.06 > 1.23 > 0.50 (2/26)  - LE Duplex negative 2/8, 2/14, 2/18, 2/24. will repeat duplex as ddimer tripled in two days, f/u results   - Prednisone 40mg taper over 5 days and 20mg over 3 days.  - fungital 2/19 <19  - AC: lovenox 40mg BID as BMI > 30  - PT/OT    #Constipation   - c/w miralax, senna; dulcolax suppository prn   - monitor BMs    #CAD/STEMI s/p 4 stents  - Hx of STEMI s/p  s/p PCI of distal RCA, mid/distal LAD (December 2017 and February 2018)  - Oct 2019: 100% occlusion of distal RCA s/p PCI with REUBEN to distal RCA, and POBA to RPL  - As per cardio (Dr. Murillo) 2/23, continue to hold effient and c/w asa 81 daily   - C/w Toprol 25mg daily  - takes ezetimibe 10mg at home and is nonformulary, hold for now     #epistaxis x2 likely due to HFNC s/p packing now removed -- resolved  #melena likely due to epistaxis  - recurrent x 2 episodes; ENT following; packing removed 2/23; c/w lovenox 40mg daily  - melena: one time episode 2/21; patient has hx of endoscopy 2 years ago which was not significant   - c/w PPI po BID    #HTN   - hold enalapril 2.5mg daily due to normal BP in setting of severe covid     #BPH  - C/w tamsulosin 0.4mg PO QHS    #DM   - Monitor FS, c/w insulin regimen   - A1C 9.1-->only on glipizide as outpatient     #Hypothyroidism  - C/w Synthroid    #GERD  - c/w protonix BID    Diet: consistent carbs, DASH/TLC + Glucerna   DVT PPx: lovenox 40mg daily   GI ppx: protonix  Code Status: FULL CODE  Dispo: downgrade to floor      For Follow-Up:  - f/u repeat LE duplex  - f/u repeat inflammatory markers   - Taper prednisone   - Aggressive physical therapy       Vital Signs Last 24 Hrs  T(C): 35.4 (02 Mar 2021 08:00), Max: 36.1 (01 Mar 2021 16:00)  T(F): 95.7 (02 Mar 2021 08:00), Max: 96.9 (01 Mar 2021 16:00)  HR: 100 (02 Mar 2021 13:00) (80 - 106)  BP: 123/58 (02 Mar 2021 13:00) (94/54 - 135/67)  BP(mean): 81 (02 Mar 2021 13:00) (67 - 98)  RR: 29 (02 Mar 2021 13:00) (13 - 37)  SpO2: 97% (02 Mar 2021 13:00) (85% - 98%)  I&O's Summary    01 Mar 2021 07:01  -  02 Mar 2021 07:00  --------------------------------------------------------  IN: 360 mL / OUT: 1220 mL / NET: -860 mL          MEDICATIONS  (STANDING):  aspirin enteric coated 81 milliGRAM(s) Oral daily  BACItracin   Ointment 1 Application(s) Topical two times a day  chlorhexidine 4% Liquid 1 Application(s) Topical daily  dextrose 40% Gel 15 Gram(s) Oral once  dextrose 5%. 1000 milliLiter(s) (50 mL/Hr) IV Continuous <Continuous>  dextrose 5%. 1000 milliLiter(s) (100 mL/Hr) IV Continuous <Continuous>  dextrose 50% Injectable 25 Gram(s) IV Push once  dextrose 50% Injectable 12.5 Gram(s) IV Push once  dextrose 50% Injectable 25 Gram(s) IV Push once  enoxaparin Injectable 40 milliGRAM(s) SubCutaneous every 12 hours  glucagon  Injectable 1 milliGRAM(s) IntraMuscular once  influenza   Vaccine 0.5 milliLiter(s) IntraMuscular once  insulin glargine Injectable (LANTUS) 29 Unit(s) SubCutaneous at bedtime  insulin lispro (ADMELOG) corrective regimen sliding scale   SubCutaneous three times a day before meals  insulin lispro Injectable (ADMELOG) 9 Unit(s) SubCutaneous three times a day before meals  levothyroxine 50 MICROGram(s) Oral daily  melatonin 5 milliGRAM(s) Oral at bedtime  metoprolol succinate ER 25 milliGRAM(s) Oral daily  pantoprazole    Tablet 40 milliGRAM(s) Oral every 12 hours  polyethylene glycol 3350 17 Gram(s) Oral daily  senna 2 Tablet(s) Oral at bedtime  sodium zirconium cyclosilicate 10 Gram(s) Oral every 12 hours  tamsulosin 0.8 milliGRAM(s) Oral at bedtime    MEDICATIONS  (PRN):  acetaminophen   Tablet .. 650 milliGRAM(s) Oral every 6 hours PRN Temp greater or equal to 38C (100.4F), Mild Pain (1 - 3)  bisacodyl Suppository 10 milliGRAM(s) Rectal daily PRN Constipation  guaifenesin/dextromethorphan  Syrup 5 milliLiter(s) Oral every 6 hours PRN Cough  ondansetron    Tablet 4 milliGRAM(s) Oral two times a day PRN Nausea and/or Vomiting        LABS                                            12.6                  Neurophils% (auto):   78.7   (03-02 @ 04:34):    8.28 )-----------(154          Lymphocytes% (auto):  11.7                                          38.1                   Eosinphils% (auto):   0.6      Manual%: Neutrophils x    ; Lymphocytes x    ; Eosinophils x    ; Bands%: x    ; Blasts x                                    137    |  98     |  25                  Calcium: 8.0   / iCa: x      (03-02 @ 04:34)    ----------------------------<  81        Magnesium: 2.0                              5.3     |  28     |  0.9              Phosphorous: x        TPro  5.4    /  Alb  2.7    /  TBili  0.2    /  DBili  x      /  AST  42     /  ALT  40     /  AlkPhos  69     02 Mar 2021 04:34          Signed out to Downgrade to floor    Transfer from: MICU  Transfer to:  ( x ) Medicine    (  ) Telemetry    (  ) RCU    (  ) Palliative    (  ) Stroke Unit    (  ) _______________    HPI:  70 year old male with PMH of CAD, MI (s/p 4 stents), DM, HTN, BPH, hypothyroid and GERD presents to ED c/o progressive SOB, fever, and weakness x 4 days.  Pt also with recent positive COVID 19 swab + as outpatient. Pt had Pfizer COVID vaccine on 1/3 and 1/24. Pt reports chills, dry cough, and decreased po intake. He denies abd pain, N/V/D/C, lightheadedness CP.  In the ED, pt's T 37.8, . WBC 14, D-dimer 329, BUN/Cr 23/1.8, GFR 38, COVID positive.  CTA negative for PE but consistent with COVID 19 pneumonia.  Pt was given Decadron 6 mg x1 in the ED. pt saturating 99% on 3 L NC.   (04 Feb 2021 17:35)      MICU COURSE:    Patient was upgraded to ICU on 2/6 for worsening hypoxemic respiratory failure. Patient remained in the ICU and was managed for COVID-19 pneumonia with supplemental oxygen via HFNC and BIPAP. Pt was never intubated, over the course of his ICU stay his condition improved. He was given high dose solumedrol. Now he is on 4L O2 via NC, significant improvement, however he still desats on minimal movement. Daily CXRs show bilateral opacities consistent with viral pneumonia. Course was complicated by 2 episodes of epistaxis and 1 episode of dark stool, now resolved.       ASSESSMENT & PLAN:     #Acute hypoxemic respiratory failure secondary to COVID-19 PNA   #likely superimposed bacterial pneumonia   - Patient s/p 2 doses of the Pfizer vaccine (last dose 1/24)  - On 4L O2 via NC today, wean down on supplementary oxygen as tolerated   - CTA 02/04 showed extensive bilateral patchy ground-glass opacities involving all lobes most pronounced in the upper lung zones  - s/p 1 unit plasma, Remdesivir completed 2/9, 1 week of rocephin & Levaquin per ID  - inflammatory markers:  Ddimer: 1751 > 986 > 855 > 833 > 529 > 1629 (3/2)  Procal: 0.12 > 0.15 > 0.37 > 0.19 > 0.11 > 0.06 (2/26)  Ferritin: 404 > 625 > 229 > 132 (2/23)  CRP: 3.30 > 6.73 > 4.06 > 1.23 > 0.50 (2/26)  - LE Duplex negative 2/8, 2/14, 2/18, 2/24. will repeat duplex as ddimer tripled in two days, f/u results   - Prednisone 40mg taper over 5 days and 20mg over 3 days.  - fungital 2/19 <19  - AC: lovenox 40mg BID as BMI > 30  - PT/OT    #Constipation   - c/w miralax, senna; dulcolax suppository prn   - monitor BMs    #CAD/STEMI s/p 4 stents  - Hx of STEMI s/p  s/p PCI of distal RCA, mid/distal LAD (December 2017 and February 2018)  - Oct 2019: 100% occlusion of distal RCA s/p PCI with REUBEN to distal RCA, and POBA to RPL  - As per cardio (Dr. Murillo) 2/23, continue to hold effient and c/w asa 81 daily   - C/w Toprol 25mg daily  - takes ezetimibe 10mg at home and is nonformulary, hold for now     #epistaxis x2 likely due to HFNC s/p packing now removed -- resolved  #melena likely due to epistaxis  - recurrent x 2 episodes; ENT following; packing removed 2/23; c/w lovenox 40mg daily  - melena: one time episode 2/21; patient has hx of endoscopy 2 years ago which was not significant   - c/w PPI po BID    #HTN   - hold enalapril 2.5mg daily due to normal BP in setting of severe covid     #BPH  - C/w tamsulosin 0.4mg PO QHS    #DM   - Monitor FS, c/w insulin regimen   - A1C 9.1-->only on glipizide as outpatient     #Hypothyroidism  - C/w Synthroid    #GERD  - c/w protonix BID    Diet: consistent carbs, DASH/TLC + Glucerna   DVT PPx: lovenox 40mg daily   GI ppx: protonix  Code Status: FULL CODE  Dispo: downgrade to floor      For Follow-Up:  - f/u repeat LE duplex  - f/u repeat inflammatory markers   - Taper prednisone   - Aggressive physical therapy       Vital Signs Last 24 Hrs  T(C): 35.4 (02 Mar 2021 08:00), Max: 36.1 (01 Mar 2021 16:00)  T(F): 95.7 (02 Mar 2021 08:00), Max: 96.9 (01 Mar 2021 16:00)  HR: 100 (02 Mar 2021 13:00) (80 - 106)  BP: 123/58 (02 Mar 2021 13:00) (94/54 - 135/67)  BP(mean): 81 (02 Mar 2021 13:00) (67 - 98)  RR: 29 (02 Mar 2021 13:00) (13 - 37)  SpO2: 97% (02 Mar 2021 13:00) (85% - 98%)  I&O's Summary    01 Mar 2021 07:01  -  02 Mar 2021 07:00  --------------------------------------------------------  IN: 360 mL / OUT: 1220 mL / NET: -860 mL          MEDICATIONS  (STANDING):  aspirin enteric coated 81 milliGRAM(s) Oral daily  BACItracin   Ointment 1 Application(s) Topical two times a day  chlorhexidine 4% Liquid 1 Application(s) Topical daily  dextrose 40% Gel 15 Gram(s) Oral once  dextrose 5%. 1000 milliLiter(s) (50 mL/Hr) IV Continuous <Continuous>  dextrose 5%. 1000 milliLiter(s) (100 mL/Hr) IV Continuous <Continuous>  dextrose 50% Injectable 25 Gram(s) IV Push once  dextrose 50% Injectable 12.5 Gram(s) IV Push once  dextrose 50% Injectable 25 Gram(s) IV Push once  enoxaparin Injectable 40 milliGRAM(s) SubCutaneous every 12 hours  glucagon  Injectable 1 milliGRAM(s) IntraMuscular once  influenza   Vaccine 0.5 milliLiter(s) IntraMuscular once  insulin glargine Injectable (LANTUS) 29 Unit(s) SubCutaneous at bedtime  insulin lispro (ADMELOG) corrective regimen sliding scale   SubCutaneous three times a day before meals  insulin lispro Injectable (ADMELOG) 9 Unit(s) SubCutaneous three times a day before meals  levothyroxine 50 MICROGram(s) Oral daily  melatonin 5 milliGRAM(s) Oral at bedtime  metoprolol succinate ER 25 milliGRAM(s) Oral daily  pantoprazole    Tablet 40 milliGRAM(s) Oral every 12 hours  polyethylene glycol 3350 17 Gram(s) Oral daily  senna 2 Tablet(s) Oral at bedtime  sodium zirconium cyclosilicate 10 Gram(s) Oral every 12 hours  tamsulosin 0.8 milliGRAM(s) Oral at bedtime    MEDICATIONS  (PRN):  acetaminophen   Tablet .. 650 milliGRAM(s) Oral every 6 hours PRN Temp greater or equal to 38C (100.4F), Mild Pain (1 - 3)  bisacodyl Suppository 10 milliGRAM(s) Rectal daily PRN Constipation  guaifenesin/dextromethorphan  Syrup 5 milliLiter(s) Oral every 6 hours PRN Cough  ondansetron    Tablet 4 milliGRAM(s) Oral two times a day PRN Nausea and/or Vomiting        LABS                                            12.6                  Neurophils% (auto):   78.7   (03-02 @ 04:34):    8.28 )-----------(154          Lymphocytes% (auto):  11.7                                          38.1                   Eosinphils% (auto):   0.6      Manual%: Neutrophils x    ; Lymphocytes x    ; Eosinophils x    ; Bands%: x    ; Blasts x                                    137    |  98     |  25                  Calcium: 8.0   / iCa: x      (03-02 @ 04:34)    ----------------------------<  81        Magnesium: 2.0                              5.3     |  28     |  0.9              Phosphorous: x        TPro  5.4    /  Alb  2.7    /  TBili  0.2    /  DBili  x      /  AST  42     /  ALT  40     /  AlkPhos  69     02 Mar 2021 04:34

## 2021-03-02 NOTE — PHARMACOTHERAPY INTERVENTION NOTE - COMMENTS
K 4.2, d/w team, recommended d/c Lokelma
Order for Ancef 2g iv q8.  Patient has PCN allergy.  MD's are aware of allergy & want to proceed with Ancef. Spoke with Dr Olsen. Per MD, he spoke with ID re allergy & it is ok to give.
d/w team, recommended changing pantoprazole to po
plan-increase enoxaparin 40mg sc q12h, recommended stat dose ~11am
-d/w team, recommended changing pantoprazole to 40mg tab q12h
-adjust methylprednisolone 40mg IV daily, recommended start date 2/16/21, pt received a dose earlier
plan-decrease solumedrol 60mg IV daily, clarified with team dose (prev on 40mg q12h), will start tomorrow

## 2021-03-02 NOTE — PHARMACOTHERAPY INTERVENTION NOTE - INTERVENTION TYPE RECOOMEND
Timing/Frequency of Administration Recommended
IV to PO
Therapy Discontinuation Recommended - No indication
Timing/Frequency of Administration Recommended
Timing/Frequency of Administration Recommended
IV to PO

## 2021-03-02 NOTE — PROGRESS NOTE ADULT - SUBJECTIVE AND OBJECTIVE BOX
Patient is a 70y old  Male who presents with a chief complaint of COVID pna (27 Feb 2021 06:34)      INTERVAL HPI/OVERNIGHT EVENTS:   No overnight events   Afebrile, hemodynamically stable     ICU Vital Signs Last 24 Hrs  T(C): 35.4 (02 Mar 2021 08:00), Max: 36.1 (01 Mar 2021 16:00)  T(F): 95.7 (02 Mar 2021 08:00), Max: 96.9 (01 Mar 2021 16:00)  HR: 96 (02 Mar 2021 12:00) (80 - 108)  BP: 108/66 (02 Mar 2021 12:00) (94/54 - 135/67)  BP(mean): 78 (02 Mar 2021 12:00) (67 - 106)  ABP: --  ABP(mean): --  RR: 23 (02 Mar 2021 12:00) (13 - 37)  SpO2: 97% (02 Mar 2021 12:00) (85% - 98%)    I&O's Summary    01 Mar 2021 07:01  -  02 Mar 2021 07:00  --------------------------------------------------------  IN: 360 mL / OUT: 1220 mL / NET: -860 mL          LABS:                        12.6   8.28  )-----------( 154      ( 02 Mar 2021 04:34 )             38.1     03-02    137  |  98  |  25<H>  ----------------------------<  81  5.3<H>   |  28  |  0.9    Ca    8.0<L>      02 Mar 2021 04:34  Mg     2.0     03-02    TPro  5.4<L>  /  Alb  2.7<L>  /  TBili  0.2  /  DBili  x   /  AST  42<H>  /  ALT  40  /  AlkPhos  69  03-02        CAPILLARY BLOOD GLUCOSE      POCT Blood Glucose.: 317 mg/dL (02 Mar 2021 12:13)  POCT Blood Glucose.: 108 mg/dL (02 Mar 2021 06:40)  POCT Blood Glucose.: 175 mg/dL (01 Mar 2021 21:22)  POCT Blood Glucose.: 262 mg/dL (01 Mar 2021 15:33)      D-Dimer Assay, Quantitative: 1629 ng/mL DDU (03-02-21 @ 04:34)  C-Reactive Protein, Serum: 4 mg/L (02-28-21 @ 04:38)  Procalcitonin, Serum: 0.06 ng/mL (02-28-21 @ 04:38)  D-Dimer Assay, Quantitative: 529 ng/mL DDU (02-28-21 @ 04:38)  D-Dimer Assay, Quantitative: 833 ng/mL DDU (02-26-21 @ 04:51)  C-Reactive Protein, Serum: 0.50 mg/dL (02-26-21 @ 04:30)  Procalcitonin, Serum: 0.06 ng/mL (02-26-21 @ 04:30)      RADIOLOGY & ADDITIONAL TESTS:    < from: Xray Chest 1 View- PORTABLE-Routine (Xray Chest 1 View- PORTABLE-Routine in AM.) (03.02.21 @ 06:48) >  Impression:  Diffuse bilateral pulmonary opacities similar to prior.      Consultant(s) Notes Reviewed:  [x ] YES  [ ] NO    MEDICATIONS  (STANDING):  aspirin enteric coated 81 milliGRAM(s) Oral daily  BACItracin   Ointment 1 Application(s) Topical two times a day  chlorhexidine 4% Liquid 1 Application(s) Topical daily  dextrose 40% Gel 15 Gram(s) Oral once  dextrose 5%. 1000 milliLiter(s) (50 mL/Hr) IV Continuous <Continuous>  dextrose 5%. 1000 milliLiter(s) (100 mL/Hr) IV Continuous <Continuous>  dextrose 50% Injectable 25 Gram(s) IV Push once  dextrose 50% Injectable 12.5 Gram(s) IV Push once  dextrose 50% Injectable 25 Gram(s) IV Push once  enoxaparin Injectable 40 milliGRAM(s) SubCutaneous every 12 hours  glucagon  Injectable 1 milliGRAM(s) IntraMuscular once  influenza   Vaccine 0.5 milliLiter(s) IntraMuscular once  insulin glargine Injectable (LANTUS) 29 Unit(s) SubCutaneous at bedtime  insulin lispro (ADMELOG) corrective regimen sliding scale   SubCutaneous three times a day before meals  insulin lispro Injectable (ADMELOG) 9 Unit(s) SubCutaneous three times a day before meals  levothyroxine 50 MICROGram(s) Oral daily  melatonin 5 milliGRAM(s) Oral at bedtime  metoprolol succinate ER 25 milliGRAM(s) Oral daily  pantoprazole    Tablet 40 milliGRAM(s) Oral every 12 hours  polyethylene glycol 3350 17 Gram(s) Oral daily  senna 2 Tablet(s) Oral at bedtime  sodium zirconium cyclosilicate 10 Gram(s) Oral every 12 hours  tamsulosin 0.8 milliGRAM(s) Oral at bedtime    MEDICATIONS  (PRN):  acetaminophen   Tablet .. 650 milliGRAM(s) Oral every 6 hours PRN Temp greater or equal to 38C (100.4F), Mild Pain (1 - 3)  bisacodyl Suppository 10 milliGRAM(s) Rectal daily PRN Constipation  guaifenesin/dextromethorphan  Syrup 5 milliLiter(s) Oral every 6 hours PRN Cough  ondansetron    Tablet 4 milliGRAM(s) Oral two times a day PRN Nausea and/or Vomiting    PHYSICAL EXAM:  GENERAL: lying in bed comfortably, no acute distress  HEAD:  Atraumatic, Normocephalic  EYES: EOMI, conjunctiva and sclera clear  NECK: Supple, No JVD, Normal thyroid, no enlarged nodes  NERVOUS SYSTEM:  Alert & Awake.   CHEST/LUNG: B/L good air entry; No rales, rhonchi, or wheezing  HEART: Regular rate and rhythm; No murmurs  ABDOMEN: Soft, Nontender, Nondistended; Bowel sounds present  EXTREMITIES: No clubbing, cyanosis, or edema  LYMPH: No lymphadenopathy noted  SKIN: No rashes or lesions

## 2021-03-02 NOTE — PROGRESS NOTE ADULT - SUBJECTIVE AND OBJECTIVE BOX
Patient is a 70y old  Male who presents with a chief complaint of COVID pna (27 Feb 2021 06:34)    Over Night Events:  No overnight events.  On 4L NC speaking in full sentences, working with PT.       ROS:   All ROS are negative except HPI       PHYSICAL EXAM    ICU Vital Signs Last 24 Hrs  T(C): 35.4 (02 Mar 2021 08:00), Max: 36.1 (01 Mar 2021 16:00)  T(F): 95.7 (02 Mar 2021 08:00), Max: 96.9 (01 Mar 2021 16:00)  HR: 106 (02 Mar 2021 09:00) (80 - 108)  BP: 108/66 (02 Mar 2021 09:00) (94/54 - 135/67)  BP(mean): 77 (02 Mar 2021 09:00) (67 - 106)  ABP: --  ABP(mean): --  RR: 32 (02 Mar 2021 09:00) (13 - 37)  SpO2: 91% (02 Mar 2021 09:00) (85% - 100%)      CONSTITUTIONAL:  Well nourished.  NAD    ENT:   Airway patent,   Mouth with normal mucosa.   No thrush    EYES:   Pupils equal,   Round and reactive to light.    CARDIAC:   Normal rate,   Regular rhythm.    No edema      Vascular:  Normal systolic impulse  No Carotid bruits    RESPIRATORY:   No wheezing  Bilateral BS  Normal chest expansion  Not tachypneic,  No use of accessory muscles    GASTROINTESTINAL:  Abdomen soft,   Non-tender,   No guarding,   + BS    MUSCULOSKELETAL:   Range of motion is not limited,  No clubbing, cyanosis    NEUROLOGICAL:   Alert and oriented   No motor  deficits.    SKIN:   Skin normal color for race,   Warm and dry and intact.   No evidence of rash.    PSYCHIATRIC:   Normal mood and affect.   No apparent risk to self or others.    HEMATOLOGICAL:  No cervical  lymphadenopathy.  no inguinal lymphadenopathy      03-01-21 @ 07:01  -  03-02-21 @ 07:00  --------------------------------------------------------  IN:    Oral Fluid: 360 mL  Total IN: 360 mL    OUT:    Voided (mL): 1220 mL  Total OUT: 1220 mL    Total NET: -860 mL        LABS:                            12.6   8.28  )-----------( 154      ( 02 Mar 2021 04:34 )             38.1       137  |  98  |  25<H>  ----------------------------<  81  5.3<H>   |  28  |  0.9    Ca    8.0<L>      02 Mar 2021 04:34  Mg     2.0     03-02    TPro  5.4<L>  /  Alb  2.7<L>  /  TBili  0.2  /  DBili  x   /  AST  42<H>  /  ALT  40  /  AlkPhos  69  03-02      LIVER FUNCTIONS - ( 02 Mar 2021 04:34 )  Alb: 2.7 g/dL / Pro: 5.4 g/dL / ALK PHOS: 69 U/L / ALT: 40 U/L / AST: 42 U/L / GGT: x                                                                       MEDICATIONS  (STANDING):  aspirin enteric coated 81 milliGRAM(s) Oral daily  BACItracin   Ointment 1 Application(s) Topical two times a day  chlorhexidine 4% Liquid 1 Application(s) Topical daily  dextrose 40% Gel 15 Gram(s) Oral once  dextrose 5%. 1000 milliLiter(s) (50 mL/Hr) IV Continuous <Continuous>  dextrose 5%. 1000 milliLiter(s) (100 mL/Hr) IV Continuous <Continuous>  dextrose 50% Injectable 25 Gram(s) IV Push once  dextrose 50% Injectable 12.5 Gram(s) IV Push once  dextrose 50% Injectable 25 Gram(s) IV Push once  enoxaparin Injectable 40 milliGRAM(s) SubCutaneous daily  glucagon  Injectable 1 milliGRAM(s) IntraMuscular once  influenza   Vaccine 0.5 milliLiter(s) IntraMuscular once  insulin glargine Injectable (LANTUS) 29 Unit(s) SubCutaneous at bedtime  insulin lispro (ADMELOG) corrective regimen sliding scale   SubCutaneous three times a day before meals  insulin lispro Injectable (ADMELOG) 9 Unit(s) SubCutaneous three times a day before meals  levothyroxine 50 MICROGram(s) Oral daily  melatonin 5 milliGRAM(s) Oral at bedtime  metoprolol succinate ER 25 milliGRAM(s) Oral daily  pantoprazole    Tablet 40 milliGRAM(s) Oral every 12 hours  polyethylene glycol 3350 17 Gram(s) Oral daily  senna 2 Tablet(s) Oral at bedtime  sodium zirconium cyclosilicate 10 Gram(s) Oral every 12 hours  tamsulosin 0.8 milliGRAM(s) Oral at bedtime    MEDICATIONS  (PRN):  acetaminophen   Tablet .. 650 milliGRAM(s) Oral every 6 hours PRN Temp greater or equal to 38C (100.4F), Mild Pain (1 - 3)  bisacodyl Suppository 10 milliGRAM(s) Rectal daily PRN Constipation  guaifenesin/dextromethorphan  Syrup 5 milliLiter(s) Oral every 6 hours PRN Cough  ondansetron    Tablet 4 milliGRAM(s) Oral two times a day PRN Nausea and/or Vomiting      New X-rays reviewed:                                                                                  ECHO  CXR interpreted by me:  bilateral infiltrates

## 2021-03-02 NOTE — PROGRESS NOTE ADULT - ASSESSMENT
71 yo M with PMH of CAD, MI (s/p 4 stents), DM, HTN, BPH, hypothyroid and GERD, recently received Pfizer vaccine (1/3 and 1/24) presented to ED c/o progressive SOB, fever, and weakness. Patient stated that his symptoms began on Wednesday 2/3. COVID PCR + on 2/4. Admitted to medicine with acute hypoxemic respiratory failure secondary to COVID-19 PNA. Upgraded to ICU from 3A due to increasing oxygen requirements on 2/6. ICU course complicated by epistaxis x 2 due to HFNC.     #Acute hypoxemic respiratory failure secondary to COVID-19 PNA   #likely superimposed bacterial pneumonia   - Patient s/p 2 doses of the Pfizer vaccine (last dose 1/24)  - On 4L O2 via NC today, wean down on supplementary oxygen as tolerated   - CTA 02/04 showed extensive bilateral patchy ground-glass opacities involving all lobes most pronounced in the upper lung zones  - s/p 1 unit plasma, Remdesivir completed 2/9, 1 week of rocephin & Levaquin per ID  - inflammatory markers:  Ddimer: 1751 > 986 > 855 > 833 > 529 > 1629 (3/2)  Procal: 0.12 > 0.15 > 0.37 > 0.19 > 0.11 > 0.06 (2/26)  Ferritin: 404 > 625 > 229 > 132 (2/23)  CRP: 3.30 > 6.73 > 4.06 > 1.23 > 0.50 (2/26)  - LE Duplex negative 2/8, 2/14, 2/18, 2/24. will repeat duplex as ddimer tripled in two days, f/u results   - Prednisone 40mg taper over 5 days and 20mg over 3 days.  - fungital 2/19 <19  - AC: lovenox 40mg BID as BMI > 30  - PT/OT    #Constipation   - c/w miralax, senna; dulcolax suppository prn   - monitor BMs    #CAD/STEMI s/p 4 stents  - Hx of STEMI s/p  s/p PCI of distal RCA, mid/distal LAD (December 2017 and February 2018)  - Oct 2019: 100% occlusion of distal RCA s/p PCI with REUBEN to distal RCA, and POBA to RPL  - As per cardio (Dr. Murillo) 2/23, continue to hold effient and c/w asa 81 daily   - C/w Toprol 25mg daily  - takes ezetimibe 10mg at home and is nonformulary, hold for now     #epistaxis x2 likely due to HFNC s/p packing now removed -- resolved  #melena likely due to epistaxis  - recurrent x 2 episodes; ENT following; packing removed 2/23; c/w lovenox 40mg daily  - melena: one time episode 2/21; patient has hx of endoscopy 2 years ago which was not significant   - c/w PPI po BID    #HTN   - hold enalapril 2.5mg daily due to normal BP in setting of severe covid     #BPH  - C/w tamsulosin 0.4mg PO QHS    #DM   - Monitor FS, c/w insulin regimen   - A1C 9.1-->only on glipizide as outpatient     #Hypothyroidism  - C/w Synthroid    #GERD  - c/w protonix BID    Diet: consistent carbs, DASH/TLC + Glucerna   DVT PPx: lovenox 40mg daily   GI ppx: protonix  Code Status: FULL CODE  Dispo: downgrade to floor    Family discussion: attempted to call wife today but was unable to reach anyone     Family contact in case of emergency. Patient states we do not need to regularly call family everyday.   Ami Odonnell - Wife  [phone]  If wife does not answer: Francisca - daughter  [phone]   69 yo M with PMH of CAD, MI (s/p 4 stents), DM, HTN, BPH, hypothyroid and GERD, recently received Pfizer vaccine (1/3 and 1/24) presented to ED c/o progressive SOB, fever, and weakness. Patient stated that his symptoms began on Wednesday 2/3. COVID PCR + on 2/4. Admitted to medicine with acute hypoxemic respiratory failure secondary to COVID-19 PNA. Upgraded to ICU from 3A due to increasing oxygen requirements on 2/6. ICU course complicated by epistaxis x 2 due to HFNC.     #Acute hypoxemic respiratory failure secondary to COVID-19 PNA   #likely superimposed bacterial pneumonia   - Patient s/p 2 doses of the Pfizer vaccine (last dose 1/24)  - On 4L O2 via NC today, wean down on supplementary oxygen as tolerated   - CTA 02/04 showed extensive bilateral patchy ground-glass opacities involving all lobes most pronounced in the upper lung zones  - s/p 1 unit plasma, Remdesivir completed 2/9, 1 week of rocephin & Levaquin per ID  - inflammatory markers:  Ddimer: 1751 > 986 > 855 > 833 > 529 > 1629 (3/2)  Procal: 0.12 > 0.15 > 0.37 > 0.19 > 0.11 > 0.06 (2/26)  Ferritin: 404 > 625 > 229 > 132 (2/23)  CRP: 3.30 > 6.73 > 4.06 > 1.23 > 0.50 (2/26)  - LE Duplex negative 2/8, 2/14, 2/18, 2/24. will repeat duplex as ddimer tripled in two days, f/u results   - Prednisone 40mg taper over 5 days and 20mg over 3 days.  - fungital 2/19 <19  - AC: lovenox 40mg BID as BMI > 30  - PT/OT    #Constipation   - c/w miralax, senna; dulcolax suppository prn   - monitor BMs    #CAD/STEMI s/p 4 stents  - Hx of STEMI s/p  s/p PCI of distal RCA, mid/distal LAD (December 2017 and February 2018)  - Oct 2019: 100% occlusion of distal RCA s/p PCI with REUBEN to distal RCA, and POBA to RPL  - As per cardio (Dr. Murillo) 2/23, continue to hold effient and c/w asa 81 daily   - C/w Toprol 25mg daily  - takes ezetimibe 10mg at home and is nonformulary, hold for now     #epistaxis x2 likely due to HFNC s/p packing now removed -- resolved  #melena likely due to epistaxis  - recurrent x 2 episodes; ENT following; packing removed 2/23; c/w lovenox 40mg daily  - melena: one time episode 2/21; patient has hx of endoscopy 2 years ago which was not significant   - c/w PPI po BID    #HTN   - hold enalapril 2.5mg daily due to normal BP in setting of severe covid     #BPH  - C/w tamsulosin 0.4mg PO QHS    #DM   - Monitor FS, c/w insulin regimen   - A1C 9.1-->only on glipizide as outpatient     #Hypothyroidism  - C/w Synthroid    #GERD  - c/w protonix BID    Diet: consistent carbs, DASH/TLC + Glucerna   DVT PPx: lovenox 40mg daily   GI ppx: protonix  Code Status: FULL CODE  Dispo: downgrade to floor    Family discussion: attempted to call the patient's wife today but was unable to reach anyone     Family contact in case of emergency. Patient states we do not need to regularly call family everyday.   Ami Odonnell - Wife  [phone]  If wife does not answer: Francisca - daughter  [phone]

## 2021-03-03 LAB
ALBUMIN SERPL ELPH-MCNC: 2.9 G/DL — LOW (ref 3.5–5.2)
ALP SERPL-CCNC: 65 U/L — SIGNIFICANT CHANGE UP (ref 30–115)
ALT FLD-CCNC: 35 U/L — SIGNIFICANT CHANGE UP (ref 0–41)
ANION GAP SERPL CALC-SCNC: 9 MMOL/L — SIGNIFICANT CHANGE UP (ref 7–14)
AST SERPL-CCNC: 23 U/L — SIGNIFICANT CHANGE UP (ref 0–41)
BILIRUB SERPL-MCNC: 0.3 MG/DL — SIGNIFICANT CHANGE UP (ref 0.2–1.2)
BUN SERPL-MCNC: 29 MG/DL — HIGH (ref 10–20)
CALCIUM SERPL-MCNC: 7.9 MG/DL — LOW (ref 8.5–10.1)
CHLORIDE SERPL-SCNC: 99 MMOL/L — SIGNIFICANT CHANGE UP (ref 98–110)
CO2 SERPL-SCNC: 32 MMOL/L — SIGNIFICANT CHANGE UP (ref 17–32)
CREAT SERPL-MCNC: 0.8 MG/DL — SIGNIFICANT CHANGE UP (ref 0.7–1.5)
GAS PNL BLDA: SIGNIFICANT CHANGE UP
GLUCOSE BLDC GLUCOMTR-MCNC: 108 MG/DL — HIGH (ref 70–99)
GLUCOSE BLDC GLUCOMTR-MCNC: 191 MG/DL — HIGH (ref 70–99)
GLUCOSE BLDC GLUCOMTR-MCNC: 227 MG/DL — HIGH (ref 70–99)
GLUCOSE SERPL-MCNC: 73 MG/DL — SIGNIFICANT CHANGE UP (ref 70–99)
HCT VFR BLD CALC: 36.6 % — LOW (ref 42–52)
HGB BLD-MCNC: 11.9 G/DL — LOW (ref 14–18)
MCHC RBC-ENTMCNC: 26.6 PG — LOW (ref 27–31)
MCHC RBC-ENTMCNC: 32.5 G/DL — SIGNIFICANT CHANGE UP (ref 32–37)
MCV RBC AUTO: 81.7 FL — SIGNIFICANT CHANGE UP (ref 80–94)
NRBC # BLD: 0 /100 WBCS — SIGNIFICANT CHANGE UP (ref 0–0)
PLATELET # BLD AUTO: 171 K/UL — SIGNIFICANT CHANGE UP (ref 130–400)
POTASSIUM SERPL-MCNC: 4.4 MMOL/L — SIGNIFICANT CHANGE UP (ref 3.5–5)
POTASSIUM SERPL-SCNC: 4.4 MMOL/L — SIGNIFICANT CHANGE UP (ref 3.5–5)
PROT SERPL-MCNC: 5.2 G/DL — LOW (ref 6–8)
RBC # BLD: 4.48 M/UL — LOW (ref 4.7–6.1)
RBC # FLD: 19.2 % — HIGH (ref 11.5–14.5)
SODIUM SERPL-SCNC: 140 MMOL/L — SIGNIFICANT CHANGE UP (ref 135–146)
WBC # BLD: 7.55 K/UL — SIGNIFICANT CHANGE UP (ref 4.8–10.8)
WBC # FLD AUTO: 7.55 K/UL — SIGNIFICANT CHANGE UP (ref 4.8–10.8)

## 2021-03-03 PROCEDURE — 99233 SBSQ HOSP IP/OBS HIGH 50: CPT | Mod: CS

## 2021-03-03 PROCEDURE — 71045 X-RAY EXAM CHEST 1 VIEW: CPT | Mod: 26

## 2021-03-03 RX ORDER — DEXAMETHASONE 0.5 MG/5ML
6 ELIXIR ORAL DAILY
Refills: 0 | Status: DISCONTINUED | OUTPATIENT
Start: 2021-03-03 | End: 2021-03-05

## 2021-03-03 RX ADMIN — Medication 50 MICROGRAM(S): at 06:27

## 2021-03-03 RX ADMIN — ENOXAPARIN SODIUM 40 MILLIGRAM(S): 100 INJECTION SUBCUTANEOUS at 17:11

## 2021-03-03 RX ADMIN — Medication 9 UNIT(S): at 17:07

## 2021-03-03 RX ADMIN — ENOXAPARIN SODIUM 40 MILLIGRAM(S): 100 INJECTION SUBCUTANEOUS at 06:05

## 2021-03-03 RX ADMIN — Medication 9 UNIT(S): at 08:47

## 2021-03-03 RX ADMIN — CHLORHEXIDINE GLUCONATE 1 APPLICATION(S): 213 SOLUTION TOPICAL at 06:05

## 2021-03-03 RX ADMIN — Medication 5 MILLIGRAM(S): at 21:23

## 2021-03-03 RX ADMIN — PANTOPRAZOLE SODIUM 40 MILLIGRAM(S): 20 TABLET, DELAYED RELEASE ORAL at 06:06

## 2021-03-03 RX ADMIN — POLYETHYLENE GLYCOL 3350 17 GRAM(S): 17 POWDER, FOR SOLUTION ORAL at 11:48

## 2021-03-03 RX ADMIN — Medication 9 UNIT(S): at 11:13

## 2021-03-03 RX ADMIN — INSULIN GLARGINE 29 UNIT(S): 100 INJECTION, SOLUTION SUBCUTANEOUS at 21:21

## 2021-03-03 RX ADMIN — Medication 6 MILLIGRAM(S): at 11:13

## 2021-03-03 RX ADMIN — SENNA PLUS 2 TABLET(S): 8.6 TABLET ORAL at 21:26

## 2021-03-03 RX ADMIN — Medication 2: at 11:13

## 2021-03-03 RX ADMIN — Medication 1 APPLICATION(S): at 17:11

## 2021-03-03 RX ADMIN — PANTOPRAZOLE SODIUM 40 MILLIGRAM(S): 20 TABLET, DELAYED RELEASE ORAL at 17:14

## 2021-03-03 RX ADMIN — Medication 4: at 17:07

## 2021-03-03 RX ADMIN — Medication 40 MILLIGRAM(S): at 06:05

## 2021-03-03 RX ADMIN — Medication 81 MILLIGRAM(S): at 11:47

## 2021-03-03 RX ADMIN — SODIUM ZIRCONIUM CYCLOSILICATE 10 GRAM(S): 10 POWDER, FOR SUSPENSION ORAL at 06:37

## 2021-03-03 RX ADMIN — Medication 25 MILLIGRAM(S): at 06:27

## 2021-03-03 RX ADMIN — SODIUM ZIRCONIUM CYCLOSILICATE 10 GRAM(S): 10 POWDER, FOR SUSPENSION ORAL at 06:06

## 2021-03-03 RX ADMIN — Medication 1 APPLICATION(S): at 06:05

## 2021-03-03 RX ADMIN — TAMSULOSIN HYDROCHLORIDE 0.8 MILLIGRAM(S): 0.4 CAPSULE ORAL at 21:26

## 2021-03-03 NOTE — PROGRESS NOTE ADULT - SUBJECTIVE AND OBJECTIVE BOX
SUBJECTIVE:    Patient is a 70y old Male who presents with a chief complaint of COVID pna (27 Feb 2021 06:34)      HPI:  70 year old male with PMH of CAD, MI (s/p 4 stents), DM, HTN, BPH, hypothyroid and GERD presents to ED c/o progressive SOB, fever, and weakness x 4 days.  Pt also with recent positive COVID 19 swab + as outpatient. Pt had Pfizer COVID vaccine on 1/3 and 1/24. Pt reports chills, dry cough, and decreased po intake. He denies abd pain, N/V/D/C, lightheadedness CP.  In the ED, pt's T 37.8, . WBC 14, D-dimer 329, BUN/Cr 23/1.8, GFR 38, COVID positive.  CTA negative for PE but consistent with COVID 19 pneumonia.  Pt was given Decadron 6 mg x1 in the ED. pt saturating 99% on 3 L NC.   (04 Feb 2021 17:35)      Currently admitted to medicine with the primary diagnosis of Shortness of breath         Besides the pertinent positives and negatives described above, the ROS was within normal limits.    PAST MEDICAL & SURGICAL HISTORY  Chronic kidney disease (CKD)  III    Type 2 diabetes mellitus without complication, unspecified long term insulin use status    Hypertension, unspecified type    Dyspnea, unspecified type    ASHD (arteriosclerotic heart disease)  STEMI 12/31/17, PCI RCA    S/P cataract surgery    History of ligation of vein  2012    History of tonsillectomy  1957      SOCIAL HISTORY:    ALLERGIES:  penicillin (Rash (Severe))    MEDICATIONS:  STANDING MEDICATIONS  aspirin enteric coated 81 milliGRAM(s) Oral daily  BACItracin   Ointment 1 Application(s) Topical two times a day  chlorhexidine 4% Liquid 1 Application(s) Topical daily  dexAMETHasone  Injectable 6 milliGRAM(s) IV Push daily  dextrose 40% Gel 15 Gram(s) Oral once  dextrose 5%. 1000 milliLiter(s) IV Continuous <Continuous>  dextrose 5%. 1000 milliLiter(s) IV Continuous <Continuous>  dextrose 50% Injectable 25 Gram(s) IV Push once  dextrose 50% Injectable 12.5 Gram(s) IV Push once  dextrose 50% Injectable 25 Gram(s) IV Push once  enoxaparin Injectable 40 milliGRAM(s) SubCutaneous every 12 hours  glucagon  Injectable 1 milliGRAM(s) IntraMuscular once  influenza   Vaccine 0.5 milliLiter(s) IntraMuscular once  insulin glargine Injectable (LANTUS) 29 Unit(s) SubCutaneous at bedtime  insulin lispro (ADMELOG) corrective regimen sliding scale   SubCutaneous three times a day before meals  insulin lispro Injectable (ADMELOG) 9 Unit(s) SubCutaneous three times a day before meals  levothyroxine 50 MICROGram(s) Oral daily  melatonin 5 milliGRAM(s) Oral at bedtime  metoprolol succinate ER 25 milliGRAM(s) Oral daily  pantoprazole    Tablet 40 milliGRAM(s) Oral every 12 hours  polyethylene glycol 3350 17 Gram(s) Oral daily  senna 2 Tablet(s) Oral at bedtime  sodium zirconium cyclosilicate 10 Gram(s) Oral every 12 hours  tamsulosin 0.8 milliGRAM(s) Oral at bedtime    PRN MEDICATIONS  acetaminophen   Tablet .. 650 milliGRAM(s) Oral every 6 hours PRN  bisacodyl Suppository 10 milliGRAM(s) Rectal daily PRN  guaifenesin/dextromethorphan  Syrup 5 milliLiter(s) Oral every 6 hours PRN  ondansetron    Tablet 4 milliGRAM(s) Oral two times a day PRN    VITALS:   T(F): 96.3  HR: 88  BP: 111/67  RR: 18  SpO2: 97%    LABS:                        11.9   7.55  )-----------( 171      ( 03 Mar 2021 05:40 )             36.6     03-03    140  |  99  |  29<H>  ----------------------------<  73  4.4   |  32  |  0.8    Ca    7.9<L>      03 Mar 2021 05:40  Mg     2.0     03-02    TPro  5.2<L>  /  Alb  2.9<L>  /  TBili  0.3  /  DBili  x   /  AST  23  /  ALT  35  /  AlkPhos  65  03-03    RADIOLOGY:< from: Xray Chest 1 View- PORTABLE-Routine (Xray Chest 1 View- PORTABLE-Routine in AM.) (03.02.21 @ 06:48) >  EXAM:  XR CHEST PORTABLE ROUTINE 1V            PROCEDURE DATE:  03/02/2021      INTERPRETATION:  Clinical History / Reason for exam: Respiratory failure    Comparison : Chest radiograph 3/1/2021.    Technique/Positioning: Single frontal chestradiograph.    Findings:    Support devices: None.    Cardiac/mediastinum/hilum: Unchanged    Lung parenchyma/Pleura: Diffuse bilateral pulmonary opacities similar to prior. No pneumothorax.    Skeleton/soft tissues: Unchanged    Impression:    Diffuse bilateral pulmonary opacities similar to prior.      PHYSICAL EXAM:  GEN: No acute distress 5L NC, desats to 88% with conversation  LUNGS: Decreased breath sounds to mid-lung b/l, crackles at lung bases b/l. No wheeze/rales/rhonchi. No use of accessory muscles of respiration  HEART: Regular  ABD: Soft, non-tender, non-distended.  EXT: NC/NC/NE/2+PP/TIJERINA/Skin Intact.   NEURO: AAOX3

## 2021-03-03 NOTE — CHART NOTE - NSCHARTNOTEFT_GEN_A_CORE
RD Limited Follow Up Note: Unable to conduct face to face interview or NFPE d/t isolation precautions r/t COVID-19    Pt continues on DASH/TLC, carb consistent, no concentrate potassium diet w/ glucerna shakes TID. Po intake remains adequate. Eating 75% of meals per EMR. Last BM 3/2. abdomen nondistended. 1+ edema b/l ankle (2/24) none documented since. Sacrum fissure noted. Labs and meds reviewed. K+ wnl today, but high yesterday, will keep diet modifier on. Pertinent medical information: Acute hypoxemic respiratory failure secondary to COVID-19 PNA. melena likely due to epistaxis - ENT following. Constipation on bowel regimen. PT/OT. Pt is not at risk f/u in 7 days

## 2021-03-03 NOTE — CHART NOTE - NSCHARTNOTEFT_GEN_A_CORE
I made rounds today with the treatment team including the hospitalist, residents,  nurses, and discussed the patient's current medical status and discharge  planning needs, and reviewed the chart.    T(C): 35.7 (03-03-21 @ 05:34), Max: 35.9 (03-02-21 @ 16:16)  HR: 88 (03-03-21 @ 05:34) (88 - 114)  BP: 111/67 (03-03-21 @ 05:34) (108/59 - 130/65)  RR: 18 (03-03-21 @ 05:34) (18 - 32)  SpO2: 97% (03-03-21 @ 05:34) (89% - 97%)          I reached out to the patient's health care proxy/ responsible family member-           [ x    ]  I reached   his spouse, Ami, [phone]                                  and discussed the patient's medical condition,                   family concerns, and discharge planning           [     ]  I left a message with family               [     ]  I personally participated in rounds with the medical team and my resident and discussed the case. My resident reached                   family member/ HCP                                under my direction and supervision  and we reviewed the conversation.          [     ]  My resident left a message with family under my direction and supervision           [     ]   My resident attended medical rounds and called the family                [   x   ]    The following was discussed:  The patient's medical status over the past 24 hrs was reviewed, as well as oxygen needs and medication changes and labs. On 5 liters NC O2. Desaturates with talking or movement. Getting PT. Able to transfer  and ambulate with CG % ft, limited by SOB and desaturation. Being restarted on Decadron for active inflammatory disease based on chest x-ray and O2 needs. Will f/u markers         [   x   ]   The following concerns were raised: none          [     ] I spent 5-10 minutes on the above discussing medical issues with team members and family and/ or my resident    [     ] I spent 11-20 minutes on the above discussing medical issues with team members and family and/ or my resident    [  x   ] I spent 21-30 minutes on the above discussing medical issues with team members and family and/ or my resident

## 2021-03-03 NOTE — PROGRESS NOTE ADULT - ASSESSMENT
#Acute hypoxemic respiratory failure secondary to COVID-19 PNA   #likely superimposed bacterial pneumonia   - Patient s/p 2 doses of the Pfizer vaccine (last dose 1/24)  - On 4L O2 via NC today, wean down on supplementary oxygen as tolerated   - CTA 02/04 showed extensive bilateral patchy ground-glass opacities involving all lobes most pronounced in the upper lung zones  - s/p 1 unit plasma, Remdesivir completed 2/9, 1 week of rocephin & Levaquin per ID  - inflammatory markers:  Ddimer: 1751 > 986 > 855 > 833 > 529 > 1629 (3/2)  Procal: 0.12 > 0.15 > 0.37 > 0.19 > 0.11 > 0.06 (2/26)  Ferritin: 404 > 625 > 229 > 132 (2/23)  CRP: 3.30 > 6.73 > 4.06 > 1.23 > 0.50 (2/26)  - LE Duplex negative 2/8, 2/14, 2/18, 2/24.  ddimer tripled in two days but LE duplex remains negative 3/02  - CXR worsening today--> ABG, ensure compensation mechanism intact  - Prednisone 40mg taper over 5 days and 20mg over 3 days.  - fungital 2/19 <19  - AC: lovenox 40mg BID as BMI > 30  - PT/OTd    #Constipation   - c/w miralax, senna; dulcolax suppository prn   - monitor BMs    #CAD/STEMI s/p 4 stents  - Hx of STEMI s/p  s/p PCI of distal RCA, mid/distal LAD (December 2017 and February 2018)  - Oct 2019: 100% occlusion of distal RCA s/p PCI with REUBEN to distal RCA, and POBA to RPL  - As per cardio (Dr. Murillo) 2/23, continue to hold effient and c/w asa 81 daily   - C/w Toprol 25mg daily  - takes ezetimibe 10mg at home and is nonformulary, hold for now     #epistaxis x2 likely due to HFNC s/p packing now removed -- resolved  #melena likely due to epistaxis  - recurrent x 2 episodes; ENT following; packing removed 2/23; c/w lovenox 40mg daily  - melena: one time episode 2/21; patient has hx of endoscopy 2 years ago which was not significant   - c/w PPI po BID    #HTN   - hold enalapril 2.5mg daily due to normal BP in setting of severe covid     #BPH  - C/w tamsulosin 0.4mg PO QHS    #DM   - Monitor FS, c/w insulin regimen   - A1C 9.1-->only on glipizide as outpatient     #Hypothyroidism  - C/w Synthroid    #GERD  - c/w protonix BID    Diet: consistent carbs, DASH/TLC + Glucerna   DVT PPx: lovenox 40mg daily   GI ppx: protonix  Code Status: FULL CODE  Dispo: acute      For Follow-Up:  - f/u repeat ABG, am CXR  - order inflammatory markers tomorrow  - Taper prednisone   - Aggressive physical therapy

## 2021-03-04 LAB
ALBUMIN SERPL ELPH-MCNC: 2.9 G/DL — LOW (ref 3.5–5.2)
ALP SERPL-CCNC: 62 U/L — SIGNIFICANT CHANGE UP (ref 30–115)
ALT FLD-CCNC: 35 U/L — SIGNIFICANT CHANGE UP (ref 0–41)
ANION GAP SERPL CALC-SCNC: 11 MMOL/L — SIGNIFICANT CHANGE UP (ref 7–14)
AST SERPL-CCNC: 25 U/L — SIGNIFICANT CHANGE UP (ref 0–41)
BASOPHILS # BLD AUTO: 0.02 K/UL — SIGNIFICANT CHANGE UP (ref 0–0.2)
BASOPHILS NFR BLD AUTO: 0.2 % — SIGNIFICANT CHANGE UP (ref 0–1)
BILIRUB SERPL-MCNC: 0.3 MG/DL — SIGNIFICANT CHANGE UP (ref 0.2–1.2)
BUN SERPL-MCNC: 27 MG/DL — HIGH (ref 10–20)
CALCIUM SERPL-MCNC: 8.1 MG/DL — LOW (ref 8.5–10.1)
CHLORIDE SERPL-SCNC: 102 MMOL/L — SIGNIFICANT CHANGE UP (ref 98–110)
CO2 SERPL-SCNC: 29 MMOL/L — SIGNIFICANT CHANGE UP (ref 17–32)
CREAT SERPL-MCNC: 0.9 MG/DL — SIGNIFICANT CHANGE UP (ref 0.7–1.5)
D DIMER BLD IA.RAPID-MCNC: 744 NG/ML DDU — HIGH (ref 0–230)
EOSINOPHIL # BLD AUTO: 0.02 K/UL — SIGNIFICANT CHANGE UP (ref 0–0.7)
EOSINOPHIL NFR BLD AUTO: 0.2 % — SIGNIFICANT CHANGE UP (ref 0–8)
ERYTHROCYTE [SEDIMENTATION RATE] IN BLOOD: 35 MM/HR — HIGH (ref 0–10)
GLUCOSE BLDC GLUCOMTR-MCNC: 203 MG/DL — HIGH (ref 70–99)
GLUCOSE BLDC GLUCOMTR-MCNC: 218 MG/DL — HIGH (ref 70–99)
GLUCOSE BLDC GLUCOMTR-MCNC: 257 MG/DL — HIGH (ref 70–99)
GLUCOSE BLDC GLUCOMTR-MCNC: 82 MG/DL — SIGNIFICANT CHANGE UP (ref 70–99)
GLUCOSE BLDC GLUCOMTR-MCNC: 98 MG/DL — SIGNIFICANT CHANGE UP (ref 70–99)
GLUCOSE SERPL-MCNC: 55 MG/DL — LOW (ref 70–99)
HCT VFR BLD CALC: 35.9 % — LOW (ref 42–52)
HGB BLD-MCNC: 11.6 G/DL — LOW (ref 14–18)
IMM GRANULOCYTES NFR BLD AUTO: 3.3 % — HIGH (ref 0.1–0.3)
LYMPHOCYTES # BLD AUTO: 1.03 K/UL — LOW (ref 1.2–3.4)
LYMPHOCYTES # BLD AUTO: 12.6 % — LOW (ref 20.5–51.1)
MAGNESIUM SERPL-MCNC: 1.9 MG/DL — SIGNIFICANT CHANGE UP (ref 1.8–2.4)
MCHC RBC-ENTMCNC: 26.5 PG — LOW (ref 27–31)
MCHC RBC-ENTMCNC: 32.3 G/DL — SIGNIFICANT CHANGE UP (ref 32–37)
MCV RBC AUTO: 82.2 FL — SIGNIFICANT CHANGE UP (ref 80–94)
MONOCYTES # BLD AUTO: 0.49 K/UL — SIGNIFICANT CHANGE UP (ref 0.1–0.6)
MONOCYTES NFR BLD AUTO: 6 % — SIGNIFICANT CHANGE UP (ref 1.7–9.3)
NEUTROPHILS # BLD AUTO: 6.37 K/UL — SIGNIFICANT CHANGE UP (ref 1.4–6.5)
NEUTROPHILS NFR BLD AUTO: 77.7 % — HIGH (ref 42.2–75.2)
NRBC # BLD: 0 /100 WBCS — SIGNIFICANT CHANGE UP (ref 0–0)
PLATELET # BLD AUTO: 140 K/UL — SIGNIFICANT CHANGE UP (ref 130–400)
POTASSIUM SERPL-MCNC: 4.6 MMOL/L — SIGNIFICANT CHANGE UP (ref 3.5–5)
POTASSIUM SERPL-SCNC: 4.6 MMOL/L — SIGNIFICANT CHANGE UP (ref 3.5–5)
PROT SERPL-MCNC: 5.1 G/DL — LOW (ref 6–8)
RBC # BLD: 4.37 M/UL — LOW (ref 4.7–6.1)
RBC # FLD: 19.1 % — HIGH (ref 11.5–14.5)
SARS-COV-2 RNA SPEC QL NAA+PROBE: DETECTED
SODIUM SERPL-SCNC: 142 MMOL/L — SIGNIFICANT CHANGE UP (ref 135–146)
WBC # BLD: 8.2 K/UL — SIGNIFICANT CHANGE UP (ref 4.8–10.8)
WBC # FLD AUTO: 8.2 K/UL — SIGNIFICANT CHANGE UP (ref 4.8–10.8)

## 2021-03-04 PROCEDURE — 99233 SBSQ HOSP IP/OBS HIGH 50: CPT | Mod: CS

## 2021-03-04 RX ADMIN — Medication 4: at 11:12

## 2021-03-04 RX ADMIN — Medication 9 UNIT(S): at 11:12

## 2021-03-04 RX ADMIN — INSULIN GLARGINE 29 UNIT(S): 100 INJECTION, SOLUTION SUBCUTANEOUS at 22:20

## 2021-03-04 RX ADMIN — Medication 5 MILLIGRAM(S): at 21:42

## 2021-03-04 RX ADMIN — Medication 81 MILLIGRAM(S): at 11:14

## 2021-03-04 RX ADMIN — Medication 6 MILLIGRAM(S): at 05:10

## 2021-03-04 RX ADMIN — TAMSULOSIN HYDROCHLORIDE 0.8 MILLIGRAM(S): 0.4 CAPSULE ORAL at 21:42

## 2021-03-04 RX ADMIN — PANTOPRAZOLE SODIUM 40 MILLIGRAM(S): 20 TABLET, DELAYED RELEASE ORAL at 05:10

## 2021-03-04 RX ADMIN — Medication 4: at 17:17

## 2021-03-04 RX ADMIN — Medication 50 MICROGRAM(S): at 05:10

## 2021-03-04 RX ADMIN — POLYETHYLENE GLYCOL 3350 17 GRAM(S): 17 POWDER, FOR SOLUTION ORAL at 11:15

## 2021-03-04 RX ADMIN — Medication 9 UNIT(S): at 07:50

## 2021-03-04 RX ADMIN — Medication 1 APPLICATION(S): at 17:03

## 2021-03-04 RX ADMIN — Medication 9 UNIT(S): at 17:17

## 2021-03-04 RX ADMIN — Medication 25 MILLIGRAM(S): at 05:10

## 2021-03-04 RX ADMIN — PANTOPRAZOLE SODIUM 40 MILLIGRAM(S): 20 TABLET, DELAYED RELEASE ORAL at 17:04

## 2021-03-04 RX ADMIN — Medication 1 APPLICATION(S): at 05:10

## 2021-03-04 RX ADMIN — SENNA PLUS 2 TABLET(S): 8.6 TABLET ORAL at 21:42

## 2021-03-04 RX ADMIN — ENOXAPARIN SODIUM 40 MILLIGRAM(S): 100 INJECTION SUBCUTANEOUS at 05:10

## 2021-03-04 RX ADMIN — ENOXAPARIN SODIUM 40 MILLIGRAM(S): 100 INJECTION SUBCUTANEOUS at 17:04

## 2021-03-04 NOTE — PROGRESS NOTE ADULT - SUBJECTIVE AND OBJECTIVE BOX
#Acute hypoxemic respiratory failure secondary to COVID-19 PNA   #likely superimposed bacterial pneumonia   - Patient s/p 2 doses of the Pfizer vaccine (last dose 1/24)  - On 4L O2 via NC today, wean down on supplementary oxygen as tolerated   - CTA 02/04 showed extensive bilateral patchy ground-glass opacities involving all lobes most pronounced in the upper lung zones  - s/p 1 unit plasma, Remdesivir completed 2/9, 1 week of rocephin & Levaquin per ID  - inflammatory markers:  Ddimer: 1751 > 986 > 855 > 833 > 529 > 1629 (3/2)  Procal: 0.12 > 0.15 > 0.37 > 0.19 > 0.11 > 0.06 (2/26)  Ferritin: 404 > 625 > 229 > 132 (2/23)  CRP: 3.30 > 6.73 > 4.06 > 1.23 > 0.50 (2/26)  - LE Duplex negative 2/8, 2/14, 2/18, 2/24.  ddimer tripled in two days but LE duplex remains negative 3/02  - CXR worsening today--> ABG, ensure compensation mechanism intact  - Prednisone 40mg taper over 5 days and 20mg over 3 days.  - fungital 2/19 <19  - AC: lovenox 40mg BID as BMI > 30  - PT/OTd    #Constipation   - c/w miralax, senna; dulcolax suppository prn   - monitor BMs    #CAD/STEMI s/p 4 stents  - Hx of STEMI s/p  s/p PCI of distal RCA, mid/distal LAD (December 2017 and February 2018)  - Oct 2019: 100% occlusion of distal RCA s/p PCI with REUBEN to distal RCA, and POBA to RPL  - As per cardio (Dr. Murillo) 2/23, continue to hold effient and c/w asa 81 daily   - C/w Toprol 25mg daily  - takes ezetimibe 10mg at home and is nonformulary, hold for now     #epistaxis x2 likely due to HFNC s/p packing now removed -- resolved  #melena likely due to epistaxis  - recurrent x 2 episodes; ENT following; packing removed 2/23; c/w lovenox 40mg daily  - melena: one time episode 2/21; patient has hx of endoscopy 2 years ago which was not significant   - c/w PPI po BID    #HTN   - hold enalapril 2.5mg daily due to normal BP in setting of severe covid     #BPH  - C/w tamsulosin 0.4mg PO QHS    #DM   - Monitor FS, c/w insulin regimen   - A1C 9.1-->only on glipizide as outpatient     #Hypothyroidism  - C/w Synthroid    #GERD  - c/w protonix BID    Diet: consistent carbs, DASH/TLC + Glucerna   DVT PPx: lovenox 40mg daily   GI ppx: protonix  Code Status: FULL CODE  Dispo: acute      For Follow-Up:  - f/u repeat ABG, am CXR  - order inflammatory markers tomorrow  - Taper prednisone   - Aggressive physical therapy      SUBJECTIVE:    Patient is a 70y old Male who presents with a chief complaint of covid pneumonia (03 Mar 2021 09:23)      PAST MEDICAL & SURGICAL HISTORY  Chronic kidney disease (CKD)  III    Type 2 diabetes mellitus without complication, unspecified long term insulin use status    Hypertension, unspecified type    Dyspnea, unspecified type    ASHD (arteriosclerotic heart disease)  STEMI 12/31/17, PCI RCA    S/P cataract surgery    History of ligation of vein  2012    History of tonsillectomy  1957    ALLERGIES:  penicillin (Rash (Severe))    MEDICATIONS:  STANDING MEDICATIONS  aspirin enteric coated 81 milliGRAM(s) Oral daily  BACItracin   Ointment 1 Application(s) Topical two times a day  chlorhexidine 4% Liquid 1 Application(s) Topical daily  dexAMETHasone  Injectable 6 milliGRAM(s) IV Push daily  dextrose 40% Gel 15 Gram(s) Oral once  dextrose 5%. 1000 milliLiter(s) IV Continuous <Continuous>  dextrose 5%. 1000 milliLiter(s) IV Continuous <Continuous>  dextrose 50% Injectable 25 Gram(s) IV Push once  dextrose 50% Injectable 12.5 Gram(s) IV Push once  dextrose 50% Injectable 25 Gram(s) IV Push once  enoxaparin Injectable 40 milliGRAM(s) SubCutaneous every 12 hours  glucagon  Injectable 1 milliGRAM(s) IntraMuscular once  influenza   Vaccine 0.5 milliLiter(s) IntraMuscular once  insulin glargine Injectable (LANTUS) 29 Unit(s) SubCutaneous at bedtime  insulin lispro (ADMELOG) corrective regimen sliding scale   SubCutaneous three times a day before meals  insulin lispro Injectable (ADMELOG) 9 Unit(s) SubCutaneous three times a day before meals  levothyroxine 50 MICROGram(s) Oral daily  melatonin 5 milliGRAM(s) Oral at bedtime  metoprolol succinate ER 25 milliGRAM(s) Oral daily  pantoprazole    Tablet 40 milliGRAM(s) Oral every 12 hours  polyethylene glycol 3350 17 Gram(s) Oral daily  senna 2 Tablet(s) Oral at bedtime  tamsulosin 0.8 milliGRAM(s) Oral at bedtime    PRN MEDICATIONS  acetaminophen   Tablet .. 650 milliGRAM(s) Oral every 6 hours PRN  bisacodyl Suppository 10 milliGRAM(s) Rectal daily PRN  guaifenesin/dextromethorphan  Syrup 5 milliLiter(s) Oral every 6 hours PRN  ondansetron    Tablet 4 milliGRAM(s) Oral two times a day PRN    VITALS:   T(F): 97.5  HR: 100  BP: 122/61  RR: 20  SpO2: 94%    LABS:                        11.6   8.20  )-----------( 140      ( 04 Mar 2021 06:39 )             35.9     03-04    142  |  102  |  27<H>  ----------------------------<  55<L>  4.6   |  29  |  0.9    Ca    8.1<L>      04 Mar 2021 06:39  Mg     1.9     03-04    TPro  5.1<L>  /  Alb  2.9<L>  /  TBili  0.3  /  DBili  x   /  AST  25  /  ALT  35  /  AlkPhos  62  03-04        ABG - ( 03 Mar 2021 14:42 )  pH, Arterial: 7.49  pH, Blood: x     /  pCO2: 41    /  pO2: 55    / HCO3: 31    / Base Excess: 7.1   /  SaO2: 90                Sedimentation Rate, Erythrocyte: 35 mm/Hr <H> (03-04-21 @ 06:39)          RADIOLOGY:< from: Xray Chest 1 View- PORTABLE-Routine (Xray Chest 1 View- PORTABLE-Routine in AM.) (03.02.21 @ 06:48) >  EXAM:  XR CHEST PORTABLE ROUTINE 1V            PROCEDURE DATE:  03/02/2021      INTERPRETATION:  Clinical History / Reason for exam: Respiratory failure    Comparison : Chest radiograph 3/1/2021.    Technique/Positioning: Single frontal chestradiograph.    Findings:    Support devices: None.    Cardiac/mediastinum/hilum: Unchanged    Lung parenchyma/Pleura: Diffuse bilateral pulmonary opacities similar to prior. No pneumothorax.    Skeleton/soft tissues: Unchanged    Impression:    Diffuse bilateral pulmonary opacities similar to prior.      PHYSICAL EXAM:  GEN: Some distress on 5L NC Sat 88% with minimal exertion (refuses 6L)  LUNGS: Decreased breath sounds to mid-lung b/l, crackles at lung bases b/l. No wheeze/rales/rhonchi. No use of accessory muscles of respiration  HEART: Regular  ABD: Soft, non-tender, non-distended.  EXT: NC/NC/NE/2+PP/TIJERINA/Skin Intact.   NEURO: AAOX3

## 2021-03-04 NOTE — PROGRESS NOTE ADULT - ASSESSMENT
#Acute hypoxemic respiratory failure secondary to COVID-19 PNA   #likely superimposed bacterial pneumonia   - Patient s/p 2 doses of the Pfizer vaccine (last dose 1/24)  - On 4L O2 via NC today, wean down on supplementary oxygen as tolerated   - CTA 02/04 showed extensive bilateral patchy ground-glass opacities involving all lobes most pronounced in the upper lung zones  - s/p 1 unit plasma, Remdesivir completed 2/9, 1 week of rocephin & Levaquin per ID  - inflammatory markers:  Ddimer: 1751 > 986 > 855 > 833 > 529 > 1629 (3/2)  Procal: 0.12 > 0.15 > 0.37 > 0.19 > 0.11 > 0.06 (2/26)  Ferritin: 404 > 625 > 229 > 132 (2/23)  CRP: 3.30 > 6.73 > 4.06 > 1.23 > 0.50 (2/26)  - LE Duplex negative 2/8, 2/14, 2/18, 2/24.  ddimer tripled in two days but LE duplex remains negative 3/02  - CXR worsening yesterday--> ABG showing decreased pO2-->ptn refusing to increase O2 to 6L  - continue encouraging ptn to increase O2 d/t borderline saturation, low pO2 on ABG  - Prednisone 40mg taper over 5 days and 20mg over 3 days.  - fungital 2/19 <19  - AC: lovenox 40mg BID as BMI > 30  - PT/OTd    #Constipation   - small bowel movement yesterday  - c/w miralax, senna; dulcolax suppository prn   - monitor BMs    #CAD/STEMI s/p 4 stents  - Hx of STEMI s/p  s/p PCI of distal RCA, mid/distal LAD (December 2017 and February 2018)  - Oct 2019: 100% occlusion of distal RCA s/p PCI with REUBEN to distal RCA, and POBA to RPL  - As per cardio (Dr. Murillo) 2/23, continue to hold effient and c/w asa 81 daily   - C/w Toprol 25mg daily  - takes ezetimibe 10mg at home and is nonformulary, hold for now     #epistaxis x2 likely due to HFNC s/p packing now removed -- resolved  #melena likely due to epistaxis  - recurrent x 2 episodes; ENT following; packing removed 2/23; c/w lovenox 40mg daily'  -small epistaxis again tonight, quickly resolved  - melena: one time episode 2/21; patient has hx of endoscopy 2 years ago which was not significant   - c/w PPI po BID  -c/w Vit A&D ointment PRN dry nares    #HTN   - hold enalapril 2.5mg daily due to normal BP in setting of severe covid     #BPH  - C/w tamsulosin 0.4mg PO QHS    #DM   - Monitor FS, c/w insulin regimen   - A1C 9.1-->only on glipizide as outpatient     #Hypothyroidism  - C/w Synthroid    #GERD  - c/w protonix BID    Diet: consistent carbs, DASH/TLC + Glucerna   DVT PPx: lovenox 40mg daily   GI ppx: protonix  Code Status: FULL CODE  Dispo: acute    For Follow-Up:  - inflammatory markers tonight  - Taper prednisone   - Aggressive physical therapy

## 2021-03-05 LAB
ALBUMIN SERPL ELPH-MCNC: 2.9 G/DL — LOW (ref 3.5–5.2)
ALP SERPL-CCNC: 68 U/L — SIGNIFICANT CHANGE UP (ref 30–115)
ALT FLD-CCNC: 39 U/L — SIGNIFICANT CHANGE UP (ref 0–41)
ANION GAP SERPL CALC-SCNC: 8 MMOL/L — SIGNIFICANT CHANGE UP (ref 7–14)
AST SERPL-CCNC: 32 U/L — SIGNIFICANT CHANGE UP (ref 0–41)
BASOPHILS # BLD AUTO: 0.03 K/UL — SIGNIFICANT CHANGE UP (ref 0–0.2)
BASOPHILS NFR BLD AUTO: 0.3 % — SIGNIFICANT CHANGE UP (ref 0–1)
BILIRUB SERPL-MCNC: 0.3 MG/DL — SIGNIFICANT CHANGE UP (ref 0.2–1.2)
BUN SERPL-MCNC: 23 MG/DL — HIGH (ref 10–20)
CALCIUM SERPL-MCNC: 8.2 MG/DL — LOW (ref 8.5–10.1)
CHLORIDE SERPL-SCNC: 98 MMOL/L — SIGNIFICANT CHANGE UP (ref 98–110)
CO2 SERPL-SCNC: 28 MMOL/L — SIGNIFICANT CHANGE UP (ref 17–32)
CREAT SERPL-MCNC: 0.8 MG/DL — SIGNIFICANT CHANGE UP (ref 0.7–1.5)
CRP SERPL-MCNC: 8 MG/L — HIGH
EOSINOPHIL # BLD AUTO: 0 K/UL — SIGNIFICANT CHANGE UP (ref 0–0.7)
EOSINOPHIL NFR BLD AUTO: 0 % — SIGNIFICANT CHANGE UP (ref 0–8)
GLUCOSE BLDC GLUCOMTR-MCNC: 150 MG/DL — HIGH (ref 70–99)
GLUCOSE BLDC GLUCOMTR-MCNC: 151 MG/DL — HIGH (ref 70–99)
GLUCOSE BLDC GLUCOMTR-MCNC: 165 MG/DL — HIGH (ref 70–99)
GLUCOSE BLDC GLUCOMTR-MCNC: 260 MG/DL — HIGH (ref 70–99)
GLUCOSE SERPL-MCNC: 196 MG/DL — HIGH (ref 70–99)
HCT VFR BLD CALC: 36.9 % — LOW (ref 42–52)
HGB BLD-MCNC: 11.8 G/DL — LOW (ref 14–18)
IMM GRANULOCYTES NFR BLD AUTO: 2.1 % — HIGH (ref 0.1–0.3)
LYMPHOCYTES # BLD AUTO: 0.71 K/UL — LOW (ref 1.2–3.4)
LYMPHOCYTES # BLD AUTO: 7.8 % — LOW (ref 20.5–51.1)
MAGNESIUM SERPL-MCNC: 1.9 MG/DL — SIGNIFICANT CHANGE UP (ref 1.8–2.4)
MCHC RBC-ENTMCNC: 26.5 PG — LOW (ref 27–31)
MCHC RBC-ENTMCNC: 32 G/DL — SIGNIFICANT CHANGE UP (ref 32–37)
MCV RBC AUTO: 82.7 FL — SIGNIFICANT CHANGE UP (ref 80–94)
MONOCYTES # BLD AUTO: 0.37 K/UL — SIGNIFICANT CHANGE UP (ref 0.1–0.6)
MONOCYTES NFR BLD AUTO: 4 % — SIGNIFICANT CHANGE UP (ref 1.7–9.3)
NEUTROPHILS # BLD AUTO: 7.86 K/UL — HIGH (ref 1.4–6.5)
NEUTROPHILS NFR BLD AUTO: 85.8 % — HIGH (ref 42.2–75.2)
NRBC # BLD: 0 /100 WBCS — SIGNIFICANT CHANGE UP (ref 0–0)
PLATELET # BLD AUTO: 169 K/UL — SIGNIFICANT CHANGE UP (ref 130–400)
POTASSIUM SERPL-MCNC: 4.6 MMOL/L — SIGNIFICANT CHANGE UP (ref 3.5–5)
POTASSIUM SERPL-SCNC: 4.6 MMOL/L — SIGNIFICANT CHANGE UP (ref 3.5–5)
PROCALCITONIN SERPL-MCNC: 0.08 NG/ML — SIGNIFICANT CHANGE UP (ref 0.02–0.1)
PROT SERPL-MCNC: 5.5 G/DL — LOW (ref 6–8)
RBC # BLD: 4.46 M/UL — LOW (ref 4.7–6.1)
RBC # FLD: 19.1 % — HIGH (ref 11.5–14.5)
SODIUM SERPL-SCNC: 134 MMOL/L — LOW (ref 135–146)
WBC # BLD: 9.16 K/UL — SIGNIFICANT CHANGE UP (ref 4.8–10.8)
WBC # FLD AUTO: 9.16 K/UL — SIGNIFICANT CHANGE UP (ref 4.8–10.8)

## 2021-03-05 PROCEDURE — 99233 SBSQ HOSP IP/OBS HIGH 50: CPT | Mod: CS

## 2021-03-05 RX ORDER — MAGNESIUM HYDROXIDE 400 MG/1
30 TABLET, CHEWABLE ORAL DAILY
Refills: 0 | Status: DISCONTINUED | OUTPATIENT
Start: 2021-03-05 | End: 2021-03-14

## 2021-03-05 RX ORDER — INSULIN GLARGINE 100 [IU]/ML
25 INJECTION, SOLUTION SUBCUTANEOUS AT BEDTIME
Refills: 0 | Status: DISCONTINUED | OUTPATIENT
Start: 2021-03-05 | End: 2021-03-13

## 2021-03-05 RX ORDER — BACITRACIN ZINC 500 UNIT/G
1 OINTMENT IN PACKET (EA) TOPICAL EVERY 12 HOURS
Refills: 0 | Status: DISCONTINUED | OUTPATIENT
Start: 2021-03-05 | End: 2021-03-14

## 2021-03-05 RX ORDER — IPRATROPIUM/ALBUTEROL SULFATE 18-103MCG
3 AEROSOL WITH ADAPTER (GRAM) INHALATION EVERY 6 HOURS
Refills: 0 | Status: DISCONTINUED | OUTPATIENT
Start: 2021-03-05 | End: 2021-03-10

## 2021-03-05 RX ORDER — ALPRAZOLAM 0.25 MG
0.25 TABLET ORAL DAILY
Refills: 0 | Status: DISCONTINUED | OUTPATIENT
Start: 2021-03-05 | End: 2021-03-05

## 2021-03-05 RX ORDER — ALPRAZOLAM 0.25 MG
0.5 TABLET ORAL DAILY
Refills: 0 | Status: DISCONTINUED | OUTPATIENT
Start: 2021-03-05 | End: 2021-03-05

## 2021-03-05 RX ORDER — PETROLATUM,WHITE
1 JELLY (GRAM) TOPICAL EVERY 6 HOURS
Refills: 0 | Status: DISCONTINUED | OUTPATIENT
Start: 2021-03-05 | End: 2021-03-14

## 2021-03-05 RX ORDER — ENOXAPARIN SODIUM 100 MG/ML
40 INJECTION SUBCUTANEOUS AT BEDTIME
Refills: 0 | Status: DISCONTINUED | OUTPATIENT
Start: 2021-03-06 | End: 2021-03-14

## 2021-03-05 RX ADMIN — TAMSULOSIN HYDROCHLORIDE 0.8 MILLIGRAM(S): 0.4 CAPSULE ORAL at 21:54

## 2021-03-05 RX ADMIN — SENNA PLUS 2 TABLET(S): 8.6 TABLET ORAL at 21:54

## 2021-03-05 RX ADMIN — Medication 9 UNIT(S): at 07:51

## 2021-03-05 RX ADMIN — PANTOPRAZOLE SODIUM 40 MILLIGRAM(S): 20 TABLET, DELAYED RELEASE ORAL at 17:02

## 2021-03-05 RX ADMIN — Medication 1 ENEMA: at 14:54

## 2021-03-05 RX ADMIN — Medication 6 MILLIGRAM(S): at 05:38

## 2021-03-05 RX ADMIN — POLYETHYLENE GLYCOL 3350 17 GRAM(S): 17 POWDER, FOR SOLUTION ORAL at 11:00

## 2021-03-05 RX ADMIN — Medication 6: at 11:32

## 2021-03-05 RX ADMIN — Medication 50 MICROGRAM(S): at 05:36

## 2021-03-05 RX ADMIN — ENOXAPARIN SODIUM 40 MILLIGRAM(S): 100 INJECTION SUBCUTANEOUS at 05:36

## 2021-03-05 RX ADMIN — Medication 9 UNIT(S): at 11:32

## 2021-03-05 RX ADMIN — INSULIN GLARGINE 25 UNIT(S): 100 INJECTION, SOLUTION SUBCUTANEOUS at 22:09

## 2021-03-05 RX ADMIN — Medication 1 APPLICATION(S): at 05:38

## 2021-03-05 RX ADMIN — Medication 5 MILLIGRAM(S): at 21:54

## 2021-03-05 RX ADMIN — Medication 81 MILLIGRAM(S): at 11:00

## 2021-03-05 RX ADMIN — Medication 2: at 16:50

## 2021-03-05 RX ADMIN — Medication 9 UNIT(S): at 16:49

## 2021-03-05 RX ADMIN — Medication 25 MILLIGRAM(S): at 05:35

## 2021-03-05 RX ADMIN — Medication 1 APPLICATION(S): at 17:02

## 2021-03-05 RX ADMIN — PANTOPRAZOLE SODIUM 40 MILLIGRAM(S): 20 TABLET, DELAYED RELEASE ORAL at 05:35

## 2021-03-05 NOTE — PROGRESS NOTE ADULT - SUBJECTIVE AND OBJECTIVE BOX
Hospital day 29. Ptn seen and examined at bedside. Two episodes of epistaxis over last 24 hrs, now resolved. Ptn tolerating bacitracin Vaseline ointment in nares well, endorses no breathing difficulty on venturi-mask despite dried blood and Vaseline in nares. Says he is feeling very anxious, would like Xanax as he was given Xanax in the ICU for his anxiety to good effect. Two small bowel movements over last 24 hrs, still constipated, requesting enema. ROS otherwise normal.    SUBJECTIVE:    Patient is a 70y old Male who presents with a chief complaint of covid hypoxia (05 Mar 2021 11:40)      HPI:  70 year old male with PMH of CAD, MI (s/p 4 stents), DM, HTN, BPH, hypothyroid and GERD presents to ED c/o progressive SOB, fever, and weakness x 4 days.  Pt also with recent positive COVID 19 swab + as outpatient. Pt had Pfizer COVID vaccine on 1/3 and 1/24. Pt reports chills, dry cough, and decreased po intake. He denies abd pain, N/V/D/C, lightheadedness CP.  In the ED, pt's T 37.8, . WBC 14, D-dimer 329, BUN/Cr 23/1.8, GFR 38, COVID positive.  CTA negative for PE but consistent with COVID 19 pneumonia.  Pt was given Decadron 6 mg x1 in the ED. pt saturating 99% on 3 L NC.   (04 Feb 2021 17:35)      Currently admitted to medicine with the primary diagnosis of Shortness of breath         Besides the pertinent positives and negatives described above, the ROS was within normal limits.    PAST MEDICAL & SURGICAL HISTORY  Chronic kidney disease (CKD)  III    Type 2 diabetes mellitus without complication, unspecified long term insulin use status    Hypertension, unspecified type    Dyspnea, unspecified type    ASHD (arteriosclerotic heart disease)  STEMI 12/31/17, PCI RCA    S/P cataract surgery    History of ligation of vein  2012    History of tonsillectomy  1957      SOCIAL HISTORY:    ALLERGIES:  penicillin (Rash (Severe))    MEDICATIONS:  STANDING MEDICATIONS  albuterol/ipratropium for Nebulization 3 milliLiter(s) Nebulizer every 6 hours  ALPRAZolam 0.5 milliGRAM(s) Oral daily  aspirin enteric coated 81 milliGRAM(s) Oral daily  BACItracin   Ointment 1 Application(s) Topical every 12 hours  chlorhexidine 4% Liquid 1 Application(s) Topical daily  dextrose 40% Gel 15 Gram(s) Oral once  dextrose 5%. 1000 milliLiter(s) IV Continuous <Continuous>  dextrose 5%. 1000 milliLiter(s) IV Continuous <Continuous>  dextrose 50% Injectable 25 Gram(s) IV Push once  dextrose 50% Injectable 12.5 Gram(s) IV Push once  dextrose 50% Injectable 25 Gram(s) IV Push once  glucagon  Injectable 1 milliGRAM(s) IntraMuscular once  influenza   Vaccine 0.5 milliLiter(s) IntraMuscular once  insulin glargine Injectable (LANTUS) 29 Unit(s) SubCutaneous at bedtime  insulin lispro (ADMELOG) corrective regimen sliding scale   SubCutaneous three times a day before meals  insulin lispro Injectable (ADMELOG) 9 Unit(s) SubCutaneous three times a day before meals  levothyroxine 50 MICROGram(s) Oral daily  melatonin 5 milliGRAM(s) Oral at bedtime  metoprolol succinate ER 25 milliGRAM(s) Oral daily  pantoprazole    Tablet 40 milliGRAM(s) Oral every 12 hours  polyethylene glycol 3350 17 Gram(s) Oral daily  saline laxative (FLEET) Rectal Enema 1 Enema Rectal once  senna 2 Tablet(s) Oral at bedtime  tamsulosin 0.8 milliGRAM(s) Oral at bedtime    PRN MEDICATIONS  acetaminophen   Tablet .. 650 milliGRAM(s) Oral every 6 hours PRN  bisacodyl Suppository 10 milliGRAM(s) Rectal daily PRN  guaifenesin/dextromethorphan  Syrup 5 milliLiter(s) Oral every 6 hours PRN  magnesium hydroxide Suspension 30 milliLiter(s) Oral daily PRN  ondansetron    Tablet 4 milliGRAM(s) Oral two times a day PRN  petrolatum white Ointment 1 Application(s) Topical every 6 hours PRN    VITALS:   T(F): 97.2  HR: 100  BP: 115/58  RR: 20  SpO2: 97%    LABS:                        11.8   9.16  )-----------( 169      ( 05 Mar 2021 08:39 )             36.9     03-05    134<L>  |  98  |  23<H>  ----------------------------<  196<H>  4.6   |  28  |  0.8    Ca    8.2<L>      05 Mar 2021 08:39  Mg     1.9     03-05    TPro  5.5<L>  /  Alb  2.9<L>  /  TBili  0.3  /  DBili  x   /  AST  32  /  ALT  39  /  AlkPhos  68  03-05    ABG - ( 03 Mar 2021 14:42 )  pH, Arterial: 7.49  pH, Blood: x     /  pCO2: 41    /  pO2: 55    / HCO3: 31    / Base Excess: 7.1   /  SaO2: 90          RADIOLOGY:< from: Xray Chest 1 View- PORTABLE-Routine (Xray Chest 1 View- PORTABLE-Routine in AM.) (03.03.21 @ 06:17) >  EXAM:  XR CHEST PORTABLE ROUTINE 1V            PROCEDURE DATE:  03/03/2021            INTERPRETATION:  STUDY INDICATION: covid    TECHNIQUE:  Portable frontal view of the chest obtained.    COMPARISON: XR  3/2/2021.    FINDINGS/  IMPRESSION:    Stable cardiomediastinal silhouette. Increased diffuse opacities. No pneumothorax. Unchanged osseous structures.      CHARO GOLD MD; Attending Radiologist  This document has been electronically signed. Mar  3 2021  2:02PM    < end of copied text >      PHYSICAL EXAM:  GEN: No acute distress on Ventimask 15L 50%  LUNGS: Unchanged decreased b/l breath sounds, crackles at both lung bases  HEART: Regular  ABD: Soft, non-tender, non-distended.  EXT: NC/NC/NE/2+PP/TIJERINA/Skin Intact.   NEURO: AAOX3

## 2021-03-05 NOTE — CHART NOTE - NSCHARTNOTEFT_GEN_A_CORE
I made rounds today with the treatment team including the hospitalist, residents,  nurses, and discussed the patient's current medical status and discharge  planning needs, and reviewed the chart.    T(C): 36.2 (03-05-21 @ 05:00), Max: 36.2 (03-05-21 @ 05:00)  HR: 90 (03-05-21 @ 05:00) (88 - 93)  BP: 115/58 (03-05-21 @ 05:00) (115/58 - 126/66)  RR: 20 (03-05-21 @ 05:00) (18 - 20)  SpO2: 92% (03-05-21 @ 05:00) (92% - 100%)          I reached out to the patient's health care proxy/ responsible family member-           [ x    ]  I reached     wife, Ami                                and discussed the patient's medical condition,                   family concerns, and discharge planning           [     ]  I left a message with family               [     ]  I personally participated in rounds with the medical team and my resident and discussed the case. My resident reached                   family member/ HCP                                under my direction and supervision  and we reviewed the conversation.          [     ]  My resident left a message with family under my direction and supervision           [     ]   My resident attended medical rounds and called the family                [    x  ]    The following was discussed:  The patient's medical status over the past 24 hrs was reviewed, as well as oxygen needs and medication changes and labs. Patient had epistaxis overnight, resolved w/ou packing. No on 50 VM and stable. ENT asked to f/u/          [   x   ]   The following concerns were raised: none          [     ] I spent 5-10 minutes on the above discussing medical issues with team members and family and/ or my resident    [   x  ] I spent 11-20 minutes on the above discussing medical issues with team members and family and/ or my resident    [     ] I spent 21-30 minutes on the above discussing medical issues with team members and family and/ or my resident

## 2021-03-05 NOTE — PROGRESS NOTE ADULT - SUBJECTIVE AND OBJECTIVE BOX
ANDREE BUSH  [facility] T2-3A 014 A ([facility] T2-3A)            Patient was evaluated and examined  by bedside, c/o 1 episode of nose bleed, constipation, no dyspnea at rest        REVIEW OF SYSTEMS:  please see pertinent positives mentioned above, all other 12 ROS negative      T(C): , Max: 36.2 (03-05-21 @ 05:00)  HR: 90 (03-05-21 @ 05:00)  BP: 115/58 (03-05-21 @ 05:00)  RR: 20 (03-05-21 @ 05:00)  SpO2: 92% (03-05-21 @ 05:00)  CAPILLARY BLOOD GLUCOSE      POCT Blood Glucose.: 260 mg/dL (05 Mar 2021 11:12)  POCT Blood Glucose.: 150 mg/dL (05 Mar 2021 07:49)  POCT Blood Glucose.: 98 mg/dL (04 Mar 2021 21:48)  POCT Blood Glucose.: 203 mg/dL (04 Mar 2021 17:14)      PHYSICAL EXAM:  General: NAD, AAOX3, patient is laying comfortably in bed  HEENT: AT, NC, Supple, NO JVD, NO CB  Lungs: improved air entry  B/L, mild b/l expiratory  wheezing present, no rhonchi  CVS: normal S1, S2, RRR, NO M/G/R  Abdomen: soft, bowel sounds present, non-tender, non-distended  Extremities: no edema, no clubbing, no cyanosis, positive peripheral pulses b/l  Neuro: no acute focal neurological deficits, generalized body weakness  Skin: no rash, no ecchymosis      LAB  CBC  Date: 03-05-21 @ 08:39  Mean cell Dgzkbwzbzb20.5  Mean cell Hemoglobin Conc32.0  Mean cell Volum 82.7  Platelet count-Automate 169  RBC Count 4.46  Red Cell Distrib Width19.1  WBC Count9.16  % Albumin, Urine--  Hematocrit 36.9  Hemoglobin 11.8  CBC  Date: 03-04-21 @ 06:39  Mean cell Bfmdjgynwa07.5  Mean cell Hemoglobin Conc32.3  Mean cell Volum 82.2  Platelet count-Automate 140  RBC Count 4.37  Red Cell Distrib Width19.1  WBC Count8.20  % Albumin, Urine--  Hematocrit 35.9  Hemoglobin 11.6  CBC  Date: 03-03-21 @ 05:40  Mean cell Bfyhlksdnr53.6  Mean cell Hemoglobin Conc32.5  Mean cell Volum 81.7  Platelet count-Automate 171  RBC Count 4.48  Red Cell Distrib Width19.2  WBC Count7.55  % Albumin, Urine--  Hematocrit 36.6  Hemoglobin 11.9  CBC  Date: 03-02-21 @ 04:34  Mean cell Xedhmfprrr28.5  Mean cell Hemoglobin Conc33.1  Mean cell Volum 80.2  Platelet count-Automate 154  RBC Count 4.75  Red Cell Distrib Width18.6  WBC Count8.28  % Albumin, Urine--  Hematocrit 38.1  Hemoglobin 12.6  CBC  Date: 03-01-21 @ 04:15  Mean cell Tjgyafpdqp10.1  Mean cell Hemoglobin Conc32.5  Mean cell Volum 80.4  Platelet count-Automate 128  RBC Count 4.48  Red Cell Distrib Width18.2  WBC Count7.31  % Albumin, Urine--  Hematocrit 36.0  Hemoglobin 11.7  CBC  Date: 02-28-21 @ 04:38  Mean cell Qczouvxbjk80.7  Mean cell Hemoglobin Conc31.1  Mean cell Volum 82.5  Platelet count-Automate 128  RBC Count 4.52  Red Cell Distrib Width18.0  WBC Count7.42  % Albumin, Urine--  Hematocrit 37.3  Hemoglobin 11.6  CBC  Date: 02-27-21 @ 04:55  Mean cell Vuxpogutxi63.2  Mean cell Hemoglobin Conc32.6  Mean cell Volum 80.4  Platelet count-Automate 139  RBC Count 4.43  Red Cell Distrib Width17.8  WBC Count6.81  % Albumin, Urine--  Hematocrit 35.6  Hemoglobin 11.6    [facility]  03-05-21 @ 08:39  Blood Gas Arterial-Calcium,Ionized--  Blood Urea Nitrogen, Serum 23 mg/dL<H> [10 - 20]  Carbon Dioxide, Serum28 mmol/L [17 - 32]  Chloride, Serum98 mmol/L [98 - 110]  Creatinie, Serum0.8 mg/dL [0.7 - 1.5]  Glucose, Kucwz552 mg/dL<H> [70 - 99]  Potassium, Serum4.6 mmol/L [3.5 - 5.0]  Sodium, Serum 134 mmol/L<L> [135 - 146]  [facility]  03-04-21 @ 06:39  Blood Gas Arterial-Calcium,Ionized--  Blood Urea Nitrogen, Serum 27 mg/dL<H> [10 - 20]  Carbon Dioxide, Serum29 mmol/L [17 - 32]  Chloride, Xpqht331 mmol/L [98 - 110]  Creatinie, Serum0.9 mg/dL [0.7 - 1.5]  Glucose, Serum55 mg/dL<L> [70 - 99] [Critical value:]  Potassium, Serum4.6 mmol/L [3.5 - 5.0]  Sodium, Serum 142 mmol/L [135 - 146]  [facility]  03-03-21 @ 14:42  Blood Gas Arterial-Calcium,Ionized1.08 mmoL/L<L> [1.12 - 1.30] [Patient is on 5L NC]  Blood Urea Nitrogen, Serum --  Carbon Dioxide, Serum--  Chloride, Serum--  Creatinie, Serum--  Glucose, Serum--  Potassium, Serum--  Sodium, Serum --  [facility]  03-03-21 @ 05:40  Blood Gas Arterial-Calcium,Ionized--  Blood Urea Nitrogen, Serum 29 mg/dL<H> [10 - 20]  Carbon Dioxide, Serum32 mmol/L [17 - 32]  Chloride, Serum99 mmol/L [98 - 110]  Creatinie, Serum0.8 mg/dL [0.7 - 1.5]  Glucose, Serum73 mg/dL [70 - 99]  Potassium, Serum4.4 mmol/L [3.5 - 5.0]  Sodium, Serum 140 mmol/L [135 - 146]  [facility]  03-02-21 @ 16:54  Blood Gas Arterial-Calcium,Ionized--  Blood Urea Nitrogen, Serum 32 mg/dL<H> [10 - 20]  Carbon Dioxide, Serum31 mmol/L [17 - 32]  Chloride, Serum98 mmol/L [98 - 110]  Creatinie, Serum1.0 mg/dL [0.7 - 1.5]  Glucose, Icajm419 mg/dL<H> [70 - 99]  Potassium, Serum5.4 mmol/L<H> [3.5 - 5.0]  Sodium, Serum 140 mmol/L [135 - 146]  [facility]  03-02-21 @ 04:34  Blood Gas Arterial-Calcium,Ionized--  Blood Urea Nitrogen, Serum 25 mg/dL<H> [10 - 20]  Carbon Dioxide, Serum28 mmol/L [17 - 32]  Chloride, Serum98 mmol/L [98 - 110]  Creatinie, Serum0.9 mg/dL [0.7 - 1.5]  Glucose, Serum81 mg/dL [70 - 99]  Potassium, Serum5.3 mmol/L<H> [3.5 - 5.0] [Hemolyzed. Interpret with caution]  Sodium, Serum 137 mmol/L [135 - 146]  [facility]  03-01-21 @ 16:00  Blood Gas Arterial-Calcium,Ionized--  Blood Urea Nitrogen, Serum 29 mg/dL<H> [10 - 20]  Carbon Dioxide, Serum25 mmol/L [17 - 32]  Chloride, Serum99 mmol/L [98 - 110]  Creatinie, Serum0.8 mg/dL [0.7 - 1.5]  Glucose, Adwav071 mg/dL<H> [70 - 99]  Potassium, Serum5.6 mmol/L<H> [3.5 - 5.0] [Hemolyzed. Interpret with caution]  Sodium, Serum 134 mmol/L<L> [135 - 146]  [facility]  03-01-21 @ 04:15  Blood Gas Arterial-Calcium,Ionized--  Blood Urea Nitrogen, Serum 23 mg/dL<H> [10 - 20]  Carbon Dioxide, Serum31 mmol/L [17 - 32]  Chloride, Astlq310 mmol/L [98 - 110]  Creatinie, Serum0.7 mg/dL [0.7 - 1.5]  Glucose, Serum94 mg/dL [70 - 99]  Potassium, Serum4.2 mmol/L [3.5 - 5.0]  Sodium, Serum 138 mmol/L [135 - 146]              Microbiology:    Culture - Blood (collected 02-09-21 @ 04:45)  Source: .Blood Blood  Final Report (02-14-21 @ 13:01):    No Growth Final    Culture - Blood (collected 02-06-21 @ 07:25)  Source: .Blood Blood-Venous  Final Report (02-11-21 @ 18:00):    No Growth Final        Medications:  acetaminophen   Tablet .. 650 milliGRAM(s) Oral every 6 hours PRN  albuterol/ipratropium for Nebulization 3 milliLiter(s) Nebulizer every 6 hours  aspirin enteric coated 81 milliGRAM(s) Oral daily  bisacodyl Suppository 10 milliGRAM(s) Rectal daily PRN  chlorhexidine 4% Liquid 1 Application(s) Topical daily  dextrose 40% Gel 15 Gram(s) Oral once  dextrose 5%. 1000 milliLiter(s) IV Continuous <Continuous>  dextrose 5%. 1000 milliLiter(s) IV Continuous <Continuous>  dextrose 50% Injectable 25 Gram(s) IV Push once  dextrose 50% Injectable 12.5 Gram(s) IV Push once  dextrose 50% Injectable 25 Gram(s) IV Push once  enoxaparin Injectable 40 milliGRAM(s) SubCutaneous every 12 hours  glucagon  Injectable 1 milliGRAM(s) IntraMuscular once  guaifenesin/dextromethorphan  Syrup 5 milliLiter(s) Oral every 6 hours PRN  influenza   Vaccine 0.5 milliLiter(s) IntraMuscular once  insulin glargine Injectable (LANTUS) 29 Unit(s) SubCutaneous at bedtime  insulin lispro (ADMELOG) corrective regimen sliding scale   SubCutaneous three times a day before meals  insulin lispro Injectable (ADMELOG) 9 Unit(s) SubCutaneous three times a day before meals  levothyroxine 50 MICROGram(s) Oral daily  melatonin 5 milliGRAM(s) Oral at bedtime  metoprolol succinate ER 25 milliGRAM(s) Oral daily  ondansetron    Tablet 4 milliGRAM(s) Oral two times a day PRN  pantoprazole    Tablet 40 milliGRAM(s) Oral every 12 hours  petrolatum white Ointment 1 Application(s) Topical every 6 hours PRN  polyethylene glycol 3350 17 Gram(s) Oral daily  saline laxative (FLEET) Rectal Enema 1 Enema Rectal once  senna 2 Tablet(s) Oral at bedtime  tamsulosin 0.8 milliGRAM(s) Oral at bedtime        Assessment and Plan:  #Acute hypoxemic respiratory failure secondary to COVID-19 PNA   #likely superimposed bacterial pneumonia   - Patient s/p 2 doses of the Pfizer vaccine (last dose 1/24)  - On 3L O2 via NC today, at rest O 2 - 100%  - CTA 02/04 showed extensive bilateral patchy ground-glass opacities involving all lobes most pronounced in the upper lung zones  - s/p 1 unit plasma, Remdesivir completed 2/9, completed 1 week of rocephin & Levaquin per ID  - LE Duplex negative on 3/2  - with worsened pulmonary infiltrates on  CXR - switched back to IV dexa from 3/3, will transition to PO Prednisone on 3/6  - fungital 2/19 <19  -patient is wheezing- started on duoneb every 6 hours  - PT/OT    #Constipation   - c/w miralax, senna; dulcolax suppository prn   - enema prn.  - monitor BMs    #CAD/STEMI s/p 4 stents  - Hx of STEMI s/p  s/p PCI of distal RCA, mid/distal LAD (December 2017 and February 2018)  - Oct 2019: 100% occlusion of distal RCA s/p PCI with REUBEN to distal RCA, and POBA to RPL  - As per cardio (Dr. Murillo) 2/23, continue to hold effient and c/w asa 81 daily   - C/w Toprol 25mg daily  - takes ezetimibe 10mg at home and is nonformulary, hold for now     #epistaxis x2 likely due to HFNC s/p packing now removed -- 3/5- additional episode of epistaxis today.  - recurrent x 2 episodes; ENT following; packing removed 2/23; c/w lovenox 40mg daily  - melena: one time episode 2/21; patient has hx of endoscopy 2 years ago which was not significant   - apply vaseline to dry nostrils mucosa prn.    # Hyperkalemia- corrected, d/c lokelma     #HTN   - hold enalapril 2.5mg daily due to normal BP in setting of severe covid   -continue Metoprolol tx.    #BPH  - C/w tamsulosin 0.4mg PO QHS    #DM   - Monitor FS, c/w insulin regimen   - A1C 9.1-->only on glipizide as outpatient     #Hypothyroidism  - C/w Synthroid    #GERD  - c/w protonix BID    Diet: consistent carbs, DASH/TLC + Glucerna   DVT PPx: lovenox 40mg once daily( hold tonight's dose due to nose bleed)  GI ppx: protonix  Code Status: FULL CODE    #Progress Note Handoff: continue close pulse ox monitoring , PT at bedside as tolerated. monitor for nose bleeds  Family discussion: yes, communication team Disposition: Home_vs STR__once medically stable .

## 2021-03-05 NOTE — PROGRESS NOTE ADULT - ASSESSMENT
Pt is a 70y M currently a/w COVID+ PNA; ENT recalled today to assess pt for 1 episode of epistaxis this am from L nare this am- resolved    ·	Cont with bacitracin to b/l nares q 12hrs   ·	Cont with humidified O2 prn  ·	Would recommend mask over NC to prevent irritation too nares  ·	Cont to trend h/h; transfuse prn   ·	Will d/w attng

## 2021-03-05 NOTE — PROGRESS NOTE ADULT - ASSESSMENT
#Acute hypoxemic respiratory failure secondary to COVID-19 PNA   #likely superimposed bacterial pneumonia   - Patient s/p 2 doses of the Pfizer vaccine (last dose 1/24)  - On 4L O2 via NC today, wean down on supplementary oxygen as tolerated   - CTA 02/04 showed extensive bilateral patchy ground-glass opacities involving all lobes most pronounced in the upper lung zones  - s/p 1 unit plasma, Remdesivir completed 2/9, 1 week of Rocephin & Levaquin per ID  - inflammatory markers:  Ddimer: 1751 > 986 > 855 > 833 > 529 > 1629 (3/2)-->744 (3/4)  Procal: 0.12 > 0.15 > 0.37 > 0.19 > 0.11 > 0.06 (2/26)-->0.08 (3/4)  Ferritin: 404 > 625 > 229 > 132 (2/23)  CRP: 3.30 > 6.73 > 4.06 > 1.23 > 0.50 (2/26)  - LE Duplex negative 2/8, 2/14, 2/18, 2/24.  d-dimer tripled in two days but LE duplex remains negative 3/02  - CXR worsening 03/03  - Saturating well on 5L NC SpO2 96%  PLAN:  -d/c Decadron IV after today  Tomorrow: Prednisone 40mg taper over 5 days and 20mg over 3 days  -Taper down oxygen today-->ptn tolerating 2L NC well-->reassess if epistaxis reoccurs, ENT today recommending to avoid NC d/t less air humidification than Venturimask. Current low flow rate of 2NC less likely to cause rebleed.  - AC: lovenox 40mg BID as BMI > 30    #Constipation   - two small bowel movements over last 24hrs  -remains constipated, requesting Milk of Magnesium, rectal enema  - c/w miralax, senna; dulcolax suppository prn   -start 30mg Milk of Magnesium today, 1x mineral oil enema  - monitor BMs    #CAD/STEMI s/p 4 stents  - Hx of STEMI s/p  s/p PCI of distal RCA, mid/distal LAD (December 2017 and February 2018)  - Oct 2019: 100% occlusion of distal RCA s/p PCI with REUBEN to distal RCA, and POBA to RPL  - As per cardio (Dr. Murillo) 2/23, continue to hold effient and c/w asa 81 daily   - C/w Toprol 25mg daily  - takes ezetimibe 10mg at home and is nonformulary, hold for now     #epistaxis x2 likely due to HFNC s/p packing now removed -- resolved  #melena likely due to epistaxis  - recurrent x 2 episodes; ENT following; packing removed 2/23; c/w lovenox 40mg daily'  -2 episodes epistaxis over last 24 hrs while ptn was on 5L NC, first one lasting a few hrs and soaking 2 tissues  - ENT following, recommended Venturi-mask d/t less nasal mucosa irritation, better humidification  - Weighing needs for ptn oxygen taper vs epistaxis, going down on NC to 2L for now--> decreased flow rate to reduce chance of rebleed. Ptn saturating well now on 2L  - If oxygen requirements increase, escalate straight to Venti-mask to prevent likely re-bleed on 5L NC  - c/w PPI po BID  -c/w Bacitracin / Vit A&D ointment PRN dry nares    #DM2  - Currently on Lantus 29 units, Lispro 9 before meals  - Blood sugar well controlled  - decrease insulin dose when steroids d/c'd.   -HbA1c 9.1 on admission--> diabetes poorly controlled as outpatient, non-compliance   - Ptn only on glipizide at home  - Diabetes counseling prior to discharge   -Optimize home meds prior to discharge    #HTN   - hold enalapril 2.5mg daily due to normal BP in setting of severe covid     #BPH  - C/w tamsulosin 0.4mg PO QHS      #Hypothyroidism  - C/w Synthroid    #GERD  - c/w protonix BID    Diet: consistent carbs, DASH/TLC + Glucerna   DVT PPx: lovenox 40mg daily   GI ppx: protonix  Code Status: FULL CODE  Dispo: acute    For Follow-Up:  - am inflammatory markers  - Taper prednisone   - Aggressive physical therapy

## 2021-03-05 NOTE — PROGRESS NOTE ADULT - SUBJECTIVE AND OBJECTIVE BOX
ENT DAILY PROGRESS NOTE    Pt is a 70y M currently a/w COVID+ PNA ; ENT recalled today to assess pt for 1 episode of epistaxis this am. Pt reports he was bleeding from L nare, held pressure with guaze and bleeding resolved.     REVIEW OF SYSTEMS   [x] A ten-point review of systems was otherwise negative except as noted.    Allergies  penicillin (Rash (Severe))    MEDICATIONS:  acetaminophen   Tablet .. 650 milliGRAM(s) Oral every 6 hours PRN  albuterol/ipratropium for Nebulization 3 milliLiter(s) Nebulizer every 6 hours  aspirin enteric coated 81 milliGRAM(s) Oral daily  bisacodyl Suppository 10 milliGRAM(s) Rectal daily PRN  chlorhexidine 4% Liquid 1 Application(s) Topical daily  dextrose 40% Gel 15 Gram(s) Oral once  dextrose 5%. 1000 milliLiter(s) IV Continuous <Continuous>  dextrose 5%. 1000 milliLiter(s) IV Continuous <Continuous>  dextrose 50% Injectable 25 Gram(s) IV Push once  dextrose 50% Injectable 12.5 Gram(s) IV Push once  dextrose 50% Injectable 25 Gram(s) IV Push once  glucagon  Injectable 1 milliGRAM(s) IntraMuscular once  guaifenesin/dextromethorphan  Syrup 5 milliLiter(s) Oral every 6 hours PRN  influenza   Vaccine 0.5 milliLiter(s) IntraMuscular once  insulin glargine Injectable (LANTUS) 29 Unit(s) SubCutaneous at bedtime  insulin lispro (ADMELOG) corrective regimen sliding scale   SubCutaneous three times a day before meals  insulin lispro Injectable (ADMELOG) 9 Unit(s) SubCutaneous three times a day before meals  levothyroxine 50 MICROGram(s) Oral daily  melatonin 5 milliGRAM(s) Oral at bedtime  metoprolol succinate ER 25 milliGRAM(s) Oral daily  ondansetron    Tablet 4 milliGRAM(s) Oral two times a day PRN  pantoprazole    Tablet 40 milliGRAM(s) Oral every 12 hours  petrolatum white Ointment 1 Application(s) Topical every 6 hours PRN  polyethylene glycol 3350 17 Gram(s) Oral daily  saline laxative (FLEET) Rectal Enema 1 Enema Rectal once  senna 2 Tablet(s) Oral at bedtime  tamsulosin 0.8 milliGRAM(s) Oral at bedtime      Vital Signs Last 24 Hrs  T(C): 36.2 (05 Mar 2021 05:00), Max: 36.2 (05 Mar 2021 05:00)  T(F): 97.2 (05 Mar 2021 05:00), Max: 97.2 (05 Mar 2021 05:00)  HR: 90 (05 Mar 2021 05:00) (88 - 93)  BP: 115/58 (05 Mar 2021 05:00) (115/58 - 126/66)  RR: 20 (05 Mar 2021 05:00) (18 - 20)  SpO2: 92% (05 Mar 2021 05:00) (92% - 100%)    03-04 @ 07:01  -  03-05 @ 07:00  --------------------------------------------------------  IN:  Total IN: 0 mL    OUT:    Voided (mL): 750 mL  Total OUT: 750 mL    Total NET: -750 mL      PHYSICAL EXAM:  GEN: NAD   NEURO: Awake and alert  HEAD: NC/AT  ENT: +dried blood noted to L nare; no active bleeding/oozing noted to b/l nares; +slight blood-tinged mucus streak noted to posterior OP, no active bleeding or clots noted to posterior OP   CARDIO: +S1/S2  RESP: +NC in place on 2L humidified O2  EXT: TIJERINA x 4       LABS:  CBC-                        11.8   9.16  )-----------( 169      ( 05 Mar 2021 08:39 )             36.9     BMP/CMP-  05 Mar 2021 08:39    134    |  98     |  23     ----------------------------<  196    4.6     |  28     |  0.8      Ca    8.2        05 Mar 2021 08:39  Mg     1.9       05 Mar 2021 08:39    TPro  5.5    /  Alb  2.9    /  TBili  0.3    /  DBili  x      /  AST  32     /  ALT  39     /  AlkPhos  68     05 Mar 2021 08:39

## 2021-03-06 LAB
ALBUMIN SERPL ELPH-MCNC: 3 G/DL — LOW (ref 3.5–5.2)
ALP SERPL-CCNC: 62 U/L — SIGNIFICANT CHANGE UP (ref 30–115)
ALT FLD-CCNC: 34 U/L — SIGNIFICANT CHANGE UP (ref 0–41)
ANION GAP SERPL CALC-SCNC: 9 MMOL/L — SIGNIFICANT CHANGE UP (ref 7–14)
APTT BLD: 29.9 SEC — SIGNIFICANT CHANGE UP (ref 27–39.2)
AST SERPL-CCNC: 24 U/L — SIGNIFICANT CHANGE UP (ref 0–41)
BASOPHILS # BLD AUTO: 0.03 K/UL — SIGNIFICANT CHANGE UP (ref 0–0.2)
BASOPHILS NFR BLD AUTO: 0.4 % — SIGNIFICANT CHANGE UP (ref 0–1)
BILIRUB SERPL-MCNC: 0.3 MG/DL — SIGNIFICANT CHANGE UP (ref 0.2–1.2)
BLD GP AB SCN SERPL QL: SIGNIFICANT CHANGE UP
BUN SERPL-MCNC: 24 MG/DL — HIGH (ref 10–20)
CALCIUM SERPL-MCNC: 8.1 MG/DL — LOW (ref 8.5–10.1)
CHLORIDE SERPL-SCNC: 101 MMOL/L — SIGNIFICANT CHANGE UP (ref 98–110)
CO2 SERPL-SCNC: 30 MMOL/L — SIGNIFICANT CHANGE UP (ref 17–32)
CREAT SERPL-MCNC: 0.7 MG/DL — SIGNIFICANT CHANGE UP (ref 0.7–1.5)
CRP SERPL-MCNC: 9 MG/L — HIGH
D DIMER BLD IA.RAPID-MCNC: 517 NG/ML DDU — HIGH (ref 0–230)
EOSINOPHIL # BLD AUTO: 0.01 K/UL — SIGNIFICANT CHANGE UP (ref 0–0.7)
EOSINOPHIL NFR BLD AUTO: 0.1 % — SIGNIFICANT CHANGE UP (ref 0–8)
GLUCOSE BLDC GLUCOMTR-MCNC: 114 MG/DL — HIGH (ref 70–99)
GLUCOSE BLDC GLUCOMTR-MCNC: 133 MG/DL — HIGH (ref 70–99)
GLUCOSE BLDC GLUCOMTR-MCNC: 201 MG/DL — HIGH (ref 70–99)
GLUCOSE BLDC GLUCOMTR-MCNC: 202 MG/DL — HIGH (ref 70–99)
GLUCOSE SERPL-MCNC: 125 MG/DL — HIGH (ref 70–99)
HCT VFR BLD CALC: 35.7 % — LOW (ref 42–52)
HGB BLD-MCNC: 11.2 G/DL — LOW (ref 14–18)
IMM GRANULOCYTES NFR BLD AUTO: 2.9 % — HIGH (ref 0.1–0.3)
INR BLD: 1.14 RATIO — SIGNIFICANT CHANGE UP (ref 0.65–1.3)
LYMPHOCYTES # BLD AUTO: 0.9 K/UL — LOW (ref 1.2–3.4)
LYMPHOCYTES # BLD AUTO: 13.2 % — LOW (ref 20.5–51.1)
MAGNESIUM SERPL-MCNC: 1.9 MG/DL — SIGNIFICANT CHANGE UP (ref 1.8–2.4)
MCHC RBC-ENTMCNC: 25.7 PG — LOW (ref 27–31)
MCHC RBC-ENTMCNC: 31.4 G/DL — LOW (ref 32–37)
MCV RBC AUTO: 82.1 FL — SIGNIFICANT CHANGE UP (ref 80–94)
MONOCYTES # BLD AUTO: 0.52 K/UL — SIGNIFICANT CHANGE UP (ref 0.1–0.6)
MONOCYTES NFR BLD AUTO: 7.6 % — SIGNIFICANT CHANGE UP (ref 1.7–9.3)
NEUTROPHILS # BLD AUTO: 5.14 K/UL — SIGNIFICANT CHANGE UP (ref 1.4–6.5)
NEUTROPHILS NFR BLD AUTO: 75.8 % — HIGH (ref 42.2–75.2)
NRBC # BLD: 0 /100 WBCS — SIGNIFICANT CHANGE UP (ref 0–0)
PLATELET # BLD AUTO: 163 K/UL — SIGNIFICANT CHANGE UP (ref 130–400)
POTASSIUM SERPL-MCNC: 4.3 MMOL/L — SIGNIFICANT CHANGE UP (ref 3.5–5)
POTASSIUM SERPL-SCNC: 4.3 MMOL/L — SIGNIFICANT CHANGE UP (ref 3.5–5)
PROCALCITONIN SERPL-MCNC: 0.1 NG/ML — SIGNIFICANT CHANGE UP (ref 0.02–0.1)
PROT SERPL-MCNC: 5.2 G/DL — LOW (ref 6–8)
PROTHROM AB SERPL-ACNC: 13.1 SEC — HIGH (ref 9.95–12.87)
RBC # BLD: 4.35 M/UL — LOW (ref 4.7–6.1)
RBC # FLD: 19 % — HIGH (ref 11.5–14.5)
SODIUM SERPL-SCNC: 140 MMOL/L — SIGNIFICANT CHANGE UP (ref 135–146)
WBC # BLD: 6.8 K/UL — SIGNIFICANT CHANGE UP (ref 4.8–10.8)
WBC # FLD AUTO: 6.8 K/UL — SIGNIFICANT CHANGE UP (ref 4.8–10.8)

## 2021-03-06 PROCEDURE — 99233 SBSQ HOSP IP/OBS HIGH 50: CPT | Mod: CS

## 2021-03-06 RX ADMIN — Medication 9 UNIT(S): at 07:48

## 2021-03-06 RX ADMIN — Medication 81 MILLIGRAM(S): at 11:03

## 2021-03-06 RX ADMIN — Medication 50 MICROGRAM(S): at 05:15

## 2021-03-06 RX ADMIN — PANTOPRAZOLE SODIUM 40 MILLIGRAM(S): 20 TABLET, DELAYED RELEASE ORAL at 05:14

## 2021-03-06 RX ADMIN — Medication 1 APPLICATION(S): at 17:28

## 2021-03-06 RX ADMIN — Medication 25 MILLIGRAM(S): at 05:14

## 2021-03-06 RX ADMIN — Medication 4: at 11:20

## 2021-03-06 RX ADMIN — ENOXAPARIN SODIUM 40 MILLIGRAM(S): 100 INJECTION SUBCUTANEOUS at 21:20

## 2021-03-06 RX ADMIN — Medication 1 APPLICATION(S): at 05:14

## 2021-03-06 RX ADMIN — Medication 3 MILLILITER(S): at 20:28

## 2021-03-06 RX ADMIN — SENNA PLUS 2 TABLET(S): 8.6 TABLET ORAL at 21:20

## 2021-03-06 RX ADMIN — TAMSULOSIN HYDROCHLORIDE 0.8 MILLIGRAM(S): 0.4 CAPSULE ORAL at 21:19

## 2021-03-06 RX ADMIN — PANTOPRAZOLE SODIUM 40 MILLIGRAM(S): 20 TABLET, DELAYED RELEASE ORAL at 17:28

## 2021-03-06 RX ADMIN — Medication 5 MILLIGRAM(S): at 21:20

## 2021-03-06 RX ADMIN — Medication 4: at 17:27

## 2021-03-06 RX ADMIN — INSULIN GLARGINE 25 UNIT(S): 100 INJECTION, SOLUTION SUBCUTANEOUS at 21:43

## 2021-03-06 RX ADMIN — Medication 9 UNIT(S): at 17:20

## 2021-03-06 RX ADMIN — Medication 9 UNIT(S): at 11:20

## 2021-03-06 RX ADMIN — POLYETHYLENE GLYCOL 3350 17 GRAM(S): 17 POWDER, FOR SOLUTION ORAL at 11:03

## 2021-03-06 RX ADMIN — Medication 40 MILLIGRAM(S): at 05:14

## 2021-03-06 NOTE — PROGRESS NOTE ADULT - ASSESSMENT
70y M currently a/w COVID+ PNA; patient seen and examined at bedside today, epistaxis resolved    ·	Case d/w Dr. Pastor, plan is as follows  ·	No acute ENT intervention indicated at this time - epistaxis resolved  ·	Cont with bacitracin to b/l nares q 12hrs   ·	Cont with humidified O2 prn  ·	Would recommend mask over NC to prevent irritation too nares  ·	Cont to trend h/h; transfuse prn      70y M currently a/w COVID+ PNA; patient seen and examined at bedside today, epistaxis resolved    ·	Case d/w Dr. Pastor, plan is as follows  ·	No acute ENT intervention indicated at this time - epistaxis resolved no evidence of ooze or active bleeding   ·	Cont with bacitracin to b/l nares q 12hrs   ·	Cont with humidified O2 prn  ·	Would recommend mask over NC to prevent irritation too nares  ·	Cont to trend h/h; transfuse prn

## 2021-03-06 NOTE — PROGRESS NOTE ADULT - SUBJECTIVE AND OBJECTIVE BOX
ANDREE BUSH  [facility] T2-3A 014 A ([facility] T[facility])            Patient was evaluated and examined  by bedside, reports 2 episodes of nose bleeds yesterday, no bleeding today, remains on 2 L at rest, patient gets very dyspneic and using venti-mask during minimal activity.       REVIEW OF SYSTEMS:  please see pertinent positives mentioned above, all other 12 ROS negative      T(C): , Max: 36.5 (03-06-21 @ 05:00)  HR: 94 (03-06-21 @ 05:00)  BP: 116/60 (03-06-21 @ 05:00)  RR: 18 (03-06-21 @ 05:00)  SpO2: 95% (03-06-21 @ 08:35)  CAPILLARY BLOOD GLUCOSE      POCT Blood Glucose.: 202 mg/dL (06 Mar 2021 11:03)  POCT Blood Glucose.: 133 mg/dL (06 Mar 2021 07:34)  POCT Blood Glucose.: 165 mg/dL (05 Mar 2021 21:19)  POCT Blood Glucose.: 151 mg/dL (05 Mar 2021 16:33)      PHYSICAL EXAM:  General: NAD, AAOX3, patient is laying comfortably in bed  HEENT: AT, NC, Supple, NO JVD, NO CB  Lungs: mild decreased breath sounds  B/L, no wheezing, no rhonchi  CVS: normal S1, S2, RRR, NO M/G/R  Abdomen: soft, bowel sounds present, non-tender, non-distended  Extremities: no edema, no clubbing, no cyanosis, positive peripheral pulses b/l  Neuro: no acute focal neurological deficits, generalized body weakness  Skin: no rash, no ecchymosis      LAB  CBC  Date: 03-06-21 @ 06:55  Mean cell Gukwghdkgo80.7  Mean cell Hemoglobin Conc31.4  Mean cell Volum 82.1  Platelet count-Automate 163  RBC Count 4.35  Red Cell Distrib Width19.0  WBC Count6.80  % Albumin, Urine--  Hematocrit 35.7  Hemoglobin 11.2  CBC  Date: 03-05-21 @ 08:39  Mean cell Shxabmupfz91.5  Mean cell Hemoglobin Conc32.0  Mean cell Volum 82.7  Platelet count-Automate 169  RBC Count 4.46  Red Cell Distrib Width19.1  WBC Count9.16  % Albumin, Urine--  Hematocrit 36.9  Hemoglobin 11.8  CBC  Date: 03-04-21 @ 06:39  Mean cell Ahdxewldlf99.5  Mean cell Hemoglobin Conc32.3  Mean cell Volum 82.2  Platelet count-Automate 140  RBC Count 4.37  Red Cell Distrib Width19.1  WBC Count8.20  % Albumin, Urine--  Hematocrit 35.9  Hemoglobin 11.6  CBC  Date: 03-03-21 @ 05:40  Mean cell Ljlflthclv68.6  Mean cell Hemoglobin Conc32.5  Mean cell Volum 81.7  Platelet count-Automate 171  RBC Count 4.48  Red Cell Distrib Width19.2  WBC Count7.55  % Albumin, Urine--  Hematocrit 36.6  Hemoglobin 11.9  CBC  Date: 03-02-21 @ 04:34  Mean cell Fysfnypyhm94.5  Mean cell Hemoglobin Conc33.1  Mean cell Volum 80.2  Platelet count-Automate 154  RBC Count 4.75  Red Cell Distrib Width18.6  WBC Count8.28  % Albumin, Urine--  Hematocrit 38.1  Hemoglobin 12.6  CBC  Date: 03-01-21 @ 04:15  Mean cell Cylgtkgdbb87.1  Mean cell Hemoglobin Conc32.5  Mean cell Volum 80.4  Platelet count-Automate 128  RBC Count 4.48  Red Cell Distrib Width18.2  WBC Count7.31  % Albumin, Urine--  Hematocrit 36.0  Hemoglobin 11.7  CBC  Date: 02-28-21 @ 04:38  Mean cell Hprlzcainz59.7  Mean cell Hemoglobin Conc31.1  Mean cell Volum 82.5  Platelet count-Automate 128  RBC Count 4.52  Red Cell Distrib Width18.0  WBC Count7.42  % Albumin, Urine--  Hematocrit 37.3  Hemoglobin 11.6    [facility]  03-06-21 @ 06:55  Blood Gas Arterial-Calcium,Ionized--  Blood Urea Nitrogen, Serum 24 mg/dL<H> [10 - 20]  Carbon Dioxide, Serum30 mmol/L [17 - 32]  Chloride, Bknrr025 mmol/L [98 - 110]  Creatinie, Serum0.7 mg/dL [0.7 - 1.5]  Glucose, Mfysc495 mg/dL<H> [70 - 99]  Potassium, Serum4.3 mmol/L [3.5 - 5.0]  Sodium, Serum 140 mmol/L [135 - 146]  [facility]  03-05-21 @ 08:39  Blood Gas Arterial-Calcium,Ionized--  Blood Urea Nitrogen, Serum 23 mg/dL<H> [10 - 20]  Carbon Dioxide, Serum28 mmol/L [17 - 32]  Chloride, Serum98 mmol/L [98 - 110]  Creatinie, Serum0.8 mg/dL [0.7 - 1.5]  Glucose, Nzppc303 mg/dL<H> [70 - 99]  Potassium, Serum4.6 mmol/L [3.5 - 5.0]  Sodium, Serum 134 mmol/L<L> [135 - 146]  [facility]  03-04-21 @ 06:39  Blood Gas Arterial-Calcium,Ionized--  Blood Urea Nitrogen, Serum 27 mg/dL<H> [10 - 20]  Carbon Dioxide, Serum29 mmol/L [17 - 32]  Chloride, Uwesu444 mmol/L [98 - 110]  Creatinie, Serum0.9 mg/dL [0.7 - 1.5]  Glucose, Serum55 mg/dL<L> [70 - 99] [Critical value:]  Potassium, Serum4.6 mmol/L [3.5 - 5.0]  Sodium, Serum 142 mmol/L [135 - 146]  [facility]  03-03-21 @ 14:42  Blood Gas Arterial-Calcium,Ionized1.08 mmoL/L<L> [1.12 - 1.30] [Patient is on 5L NC]  Blood Urea Nitrogen, Serum --  Carbon Dioxide, Serum--  Chloride, Serum--  Creatinie, Serum--  Glucose, Serum--  Potassium, Serum--  Sodium, Serum --  [facility]  03-03-21 @ 05:40  Blood Gas Arterial-Calcium,Ionized--  Blood Urea Nitrogen, Serum 29 mg/dL<H> [10 - 20]  Carbon Dioxide, Serum32 mmol/L [17 - 32]  Chloride, Serum99 mmol/L [98 - 110]  Creatinie, Serum0.8 mg/dL [0.7 - 1.5]  Glucose, Serum73 mg/dL [70 - 99]  Potassium, Serum4.4 mmol/L [3.5 - 5.0]  Sodium, Serum 140 mmol/L [135 - 146]  [facility]  03-02-21 @ 16:54  Blood Gas Arterial-Calcium,Ionized--  Blood Urea Nitrogen, Serum 32 mg/dL<H> [10 - 20]  Carbon Dioxide, Serum31 mmol/L [17 - 32]  Chloride, Serum98 mmol/L [98 - 110]  Creatinie, Serum1.0 mg/dL [0.7 - 1.5]  Glucose, Qgmrg473 mg/dL<H> [70 - 99]  Potassium, Serum5.4 mmol/L<H> [3.5 - 5.0]  Sodium, Serum 140 mmol/L [135 - 146]  [facility]  03-02-21 @ 04:34  Blood Gas Arterial-Calcium,Ionized--  Blood Urea Nitrogen, Serum 25 mg/dL<H> [10 - 20]  Carbon Dioxide, Serum28 mmol/L [17 - 32]  Chloride, Serum98 mmol/L [98 - 110]  Creatinie, Serum0.9 mg/dL [0.7 - 1.5]  Glucose, Serum81 mg/dL [70 - 99]  Potassium, Serum5.3 mmol/L<H> [3.5 - 5.0] [Hemolyzed. Interpret with caution]  Sodium, Serum 137 mmol/L [135 - 146]  [facility]  03-01-21 @ 16:00  Blood Gas Arterial-Calcium,Ionized--  Blood Urea Nitrogen, Serum 29 mg/dL<H> [10 - 20]  Carbon Dioxide, Serum25 mmol/L [17 - 32]  Chloride, Serum99 mmol/L [98 - 110]  Creatinie, Serum0.8 mg/dL [0.7 - 1.5]  Glucose, Ikqcd256 mg/dL<H> [70 - 99]  Potassium, Serum5.6 mmol/L<H> [3.5 - 5.0] [Hemolyzed. Interpret with caution]  Sodium, Serum 134 mmol/L<L> [135 - 146]        PT/INR - ( 06 Mar 2021 06:55 )   PT: 13.10 sec;   INR: 1.14 ratio         PTT - ( 06 Mar 2021 06:55 )  PTT:29.9 sec      Microbiology:    Culture - Blood (collected 02-09-21 @ 04:45)  Source: .Blood Blood  Final Report (02-14-21 @ 13:01):    No Growth Final    Culture - Blood (collected 02-06-21 @ 07:25)  Source: .Blood Blood-Venous  Final Report (02-11-21 @ 18:00):    No Growth Final        Medications:  acetaminophen   Tablet .. 650 milliGRAM(s) Oral every 6 hours PRN  albuterol/ipratropium for Nebulization 3 milliLiter(s) Nebulizer every 6 hours  ALPRAZolam 0.25 milliGRAM(s) Oral daily PRN  aspirin enteric coated 81 milliGRAM(s) Oral daily  BACItracin   Ointment 1 Application(s) Topical every 12 hours  bisacodyl Suppository 10 milliGRAM(s) Rectal daily PRN  chlorhexidine 4% Liquid 1 Application(s) Topical daily  dextrose 40% Gel 15 Gram(s) Oral once  dextrose 5%. 1000 milliLiter(s) IV Continuous <Continuous>  dextrose 5%. 1000 milliLiter(s) IV Continuous <Continuous>  dextrose 50% Injectable 25 Gram(s) IV Push once  dextrose 50% Injectable 12.5 Gram(s) IV Push once  dextrose 50% Injectable 25 Gram(s) IV Push once  enoxaparin Injectable 40 milliGRAM(s) SubCutaneous at bedtime  glucagon  Injectable 1 milliGRAM(s) IntraMuscular once  guaifenesin/dextromethorphan  Syrup 5 milliLiter(s) Oral every 6 hours PRN  influenza   Vaccine 0.5 milliLiter(s) IntraMuscular once  insulin glargine Injectable (LANTUS) 25 Unit(s) SubCutaneous at bedtime  insulin lispro (ADMELOG) corrective regimen sliding scale   SubCutaneous three times a day before meals  insulin lispro Injectable (ADMELOG) 9 Unit(s) SubCutaneous three times a day before meals  levothyroxine 50 MICROGram(s) Oral daily  magnesium hydroxide Suspension 30 milliLiter(s) Oral daily PRN  melatonin 5 milliGRAM(s) Oral at bedtime  metoprolol succinate ER 25 milliGRAM(s) Oral daily  ondansetron    Tablet 4 milliGRAM(s) Oral two times a day PRN  pantoprazole    Tablet 40 milliGRAM(s) Oral every 12 hours  petrolatum white Ointment 1 Application(s) Topical every 6 hours PRN  polyethylene glycol 3350 17 Gram(s) Oral daily  predniSONE   Tablet 40 milliGRAM(s) Oral daily  senna 2 Tablet(s) Oral at bedtime  tamsulosin 0.8 milliGRAM(s) Oral at bedtime        Assessment and Plan:  #Acute hypoxemic respiratory failure secondary to COVID-19 PNA   #likely superimposed bacterial pneumonia   - Patient s/p 2 doses of the Pfizer vaccine (last dose 1/24)  - On 2L O2 via NC today, at rest O 2 - 95%, patient desaturating to low 80's with minimal activities, requiring using ventimask oxygenation.     - CTA 02/04 showed extensive bilateral patchy ground-glass opacities involving all lobes most pronounced in the upper lung zones  - s/p 1 unit plasma, Remdesivir completed 2/9, completed 1 week of rocephin & Levaquin per ID  - LE Duplex negative on 3/2  - with worsened pulmonary infiltrates on  CXR - switched back to IV dexa from 3/3,  transitioned to PO Prednisone on 3/6  - fungital 2/19 <19  - PT/OT as tolerated    #Constipation   - c/w miralax, senna; dulcolax suppository prn   - monitor BMs    #CAD/STEMI s/p 4 stents  - Hx of STEMI s/p  s/p PCI of distal RCA, mid/distal LAD (December 2017 and February 2018)  - Oct 2019: 100% occlusion of distal RCA s/p PCI with REUBEN to distal RCA, and POBA to RPL  - As per cardio (Dr. Murillo) 2/23, continue to hold effient and c/w asa 81 daily   - C/w Toprol 25mg daily  - takes ezetimibe 10mg at home and is nonformulary, hold for now     #epistaxis x2 likely due to HFNC s/p packing now removed -- 3/5- additional episode of epistaxis .  - recurrent x 2 episodes; ENT following; packing removed 2/23;   - melena: one time episode 2/21; patient has hx of endoscopy 2 years ago which was not significant   - apply vaseline to dry nostrils mucosa prn.    # Hyperkalemia- corrected, d/c lokelma     #HTN   - hold enalapril 2.5mg daily due to normal BP in setting of severe covid   -continue Metoprolol tx.    #BPH  - C/w tamsulosin 0.4mg PO QHS    #DM   - Monitor FS, c/w insulin regimen   - A1C 9.1-->only on glipizide as outpatient     #Hypothyroidism  - C/w Synthroid    #GERD  - c/w protonix BID    Diet: consistent carbs, DASH/TLC + Glucerna   DVT PPx: lovenox 40mg once daily  GI ppx: protonix  Code Status: FULL CODE    #Progress Note Handoff: continue close pulse ox monitoring , PT at bedside as tolerated. monitor for nose bleeds  Family discussion: yes, communication team Disposition: Home_vs STR__once medically stable

## 2021-03-06 NOTE — PROGRESS NOTE ADULT - SUBJECTIVE AND OBJECTIVE BOX
ENT Progress Note: 70y M currently a/w COVID+ PNA ; ENT recalled yesterday to assess pt for 1 episode of epistaxis. Patient seen and examined at bedside this AM - pt reports no bleeding since he held pressure with guaze and bleeding resolved. Patient with  bacitracin at bedside and humidified air via NC. No other complaints at this time.    [ x ] A 10 Point Review of Systems was negative except where noted.    Allergies: penicillin (Rash (Severe))    MEDICATIONS  (STANDING):  albuterol/ipratropium for Nebulization 3 milliLiter(s) Nebulizer every 6 hours  aspirin enteric coated 81 milliGRAM(s) Oral daily  BACItracin   Ointment 1 Application(s) Topical every 12 hours  chlorhexidine 4% Liquid 1 Application(s) Topical daily  dextrose 40% Gel 15 Gram(s) Oral once  dextrose 5%. 1000 milliLiter(s) (50 mL/Hr) IV Continuous <Continuous>  dextrose 5%. 1000 milliLiter(s) (100 mL/Hr) IV Continuous <Continuous>  dextrose 50% Injectable 25 Gram(s) IV Push once  dextrose 50% Injectable 12.5 Gram(s) IV Push once  dextrose 50% Injectable 25 Gram(s) IV Push once  enoxaparin Injectable 40 milliGRAM(s) SubCutaneous at bedtime  glucagon  Injectable 1 milliGRAM(s) IntraMuscular once  influenza   Vaccine 0.5 milliLiter(s) IntraMuscular once  insulin glargine Injectable (LANTUS) 25 Unit(s) SubCutaneous at bedtime  insulin lispro (ADMELOG) corrective regimen sliding scale   SubCutaneous three times a day before meals  insulin lispro Injectable (ADMELOG) 9 Unit(s) SubCutaneous three times a day before meals  levothyroxine 50 MICROGram(s) Oral daily  melatonin 5 milliGRAM(s) Oral at bedtime  metoprolol succinate ER 25 milliGRAM(s) Oral daily  pantoprazole    Tablet 40 milliGRAM(s) Oral every 12 hours  polyethylene glycol 3350 17 Gram(s) Oral daily  predniSONE   Tablet 40 milliGRAM(s) Oral daily  senna 2 Tablet(s) Oral at bedtime  tamsulosin 0.8 milliGRAM(s) Oral at bedtime    MEDICATIONS  (PRN):  acetaminophen   Tablet .. 650 milliGRAM(s) Oral every 6 hours PRN Temp greater or equal to 38C (100.4F), Mild Pain (1 - 3)  ALPRAZolam 0.25 milliGRAM(s) Oral daily PRN Anxiety  bisacodyl Suppository 10 milliGRAM(s) Rectal daily PRN Constipation  guaifenesin/dextromethorphan  Syrup 5 milliLiter(s) Oral every 6 hours PRN Cough  magnesium hydroxide Suspension 30 milliLiter(s) Oral daily PRN Constipation  ondansetron    Tablet 4 milliGRAM(s) Oral two times a day PRN Nausea and/or Vomiting  petrolatum white Ointment 1 Application(s) Topical every 6 hours PRN dry nostrils    Vital Signs Last 24 Hrs  T(C): 35.9 (06 Mar 2021 13:53), Max: 36.5 (06 Mar 2021 05:00)  T(F): 96.7 (06 Mar 2021 13:53), Max: 97.7 (06 Mar 2021 05:00)  HR: 103 (06 Mar 2021 13:53) (94 - 103)  BP: 121/58 (06 Mar 2021 13:53) (116/60 - 123/61)  RR: 18 (06 Mar 2021 13:53) (18 - 18)  SpO2: 96% (06 Mar 2021 13:53) (93% - 96%)    PHYSICAL EXAM:  Gen: Well-developed, well-nourished, NAD.  No drooling or pooling of secretions.  Good vocal quality, no hoarseness  Skin: No rashes, bruises, or lesions  HEENT: Head NC/AT. Nares bilaterally patent, no blood or discharge noted. +dried blood noted to L nare; no active bleeding/oozing noted to b/l nares; +slight blood-tinged mucus streak noted to posterior OP, no active bleeding or clots noted to posterior OP    Neck: Trachea midline, supple  Resp: +NC in place on 2L humidified O2, no accessory muscle use, no stridor or stertor.    Cardio: +S1/S2  Neuro: Awake and alert  Psych: Normal mood, normal affect    LABS:                        11.2   6.80  )-----------( 163      ( 06 Mar 2021 06:55 )             35.7     03-06    140  |  101  |  24<H>  ----------------------------<  125<H>  4.3   |  30  |  0.7    Ca    8.1<L>      06 Mar 2021 06:55  Mg     1.9     03-06    TPro  5.2<L>  /  Alb  3.0<L>  /  TBili  0.3  /  DBili  x   /  AST  24  /  ALT  34  /  AlkPhos  62  03-06    PT/INR - ( 06 Mar 2021 06:55 )   PT: 13.10 sec;   INR: 1.14 ratio         PTT - ( 06 Mar 2021 06:55 )  PTT:29.9 sec

## 2021-03-07 LAB
24R-OH-CALCIDIOL SERPL-MCNC: 18 NG/ML — LOW (ref 30–80)
ALBUMIN SERPL ELPH-MCNC: 2.9 G/DL — LOW (ref 3.5–5.2)
ALP SERPL-CCNC: 68 U/L — SIGNIFICANT CHANGE UP (ref 30–115)
ALT FLD-CCNC: 43 U/L — HIGH (ref 0–41)
ANION GAP SERPL CALC-SCNC: 7 MMOL/L — SIGNIFICANT CHANGE UP (ref 7–14)
AST SERPL-CCNC: 32 U/L — SIGNIFICANT CHANGE UP (ref 0–41)
BASOPHILS # BLD AUTO: 0.02 K/UL — SIGNIFICANT CHANGE UP (ref 0–0.2)
BASOPHILS NFR BLD AUTO: 0.2 % — SIGNIFICANT CHANGE UP (ref 0–1)
BILIRUB SERPL-MCNC: 0.3 MG/DL — SIGNIFICANT CHANGE UP (ref 0.2–1.2)
BUN SERPL-MCNC: 21 MG/DL — HIGH (ref 10–20)
CALCIUM SERPL-MCNC: 8 MG/DL — LOW (ref 8.5–10.1)
CHLORIDE SERPL-SCNC: 100 MMOL/L — SIGNIFICANT CHANGE UP (ref 98–110)
CO2 SERPL-SCNC: 28 MMOL/L — SIGNIFICANT CHANGE UP (ref 17–32)
CREAT SERPL-MCNC: 0.8 MG/DL — SIGNIFICANT CHANGE UP (ref 0.7–1.5)
D DIMER BLD IA.RAPID-MCNC: 417 NG/ML DDU — HIGH (ref 0–230)
EOSINOPHIL # BLD AUTO: 0.01 K/UL — SIGNIFICANT CHANGE UP (ref 0–0.7)
EOSINOPHIL NFR BLD AUTO: 0.1 % — SIGNIFICANT CHANGE UP (ref 0–8)
ERYTHROCYTE [SEDIMENTATION RATE] IN BLOOD: 46 MM/HR — HIGH (ref 0–10)
GLUCOSE BLDC GLUCOMTR-MCNC: 160 MG/DL — HIGH (ref 70–99)
GLUCOSE BLDC GLUCOMTR-MCNC: 176 MG/DL — HIGH (ref 70–99)
GLUCOSE BLDC GLUCOMTR-MCNC: 243 MG/DL — HIGH (ref 70–99)
GLUCOSE BLDC GLUCOMTR-MCNC: 243 MG/DL — HIGH (ref 70–99)
GLUCOSE SERPL-MCNC: 137 MG/DL — HIGH (ref 70–99)
HCT VFR BLD CALC: 33.8 % — LOW (ref 42–52)
HGB BLD-MCNC: 10.9 G/DL — LOW (ref 14–18)
IMM GRANULOCYTES NFR BLD AUTO: 2.1 % — HIGH (ref 0.1–0.3)
IRON SATN MFR SERPL: 10 % — LOW (ref 15–50)
IRON SATN MFR SERPL: 32 UG/DL — LOW (ref 35–150)
LDH SERPL L TO P-CCNC: 436 U/L — HIGH (ref 50–242)
LYMPHOCYTES # BLD AUTO: 0.65 K/UL — LOW (ref 1.2–3.4)
LYMPHOCYTES # BLD AUTO: 7.9 % — LOW (ref 20.5–51.1)
MAGNESIUM SERPL-MCNC: 1.8 MG/DL — SIGNIFICANT CHANGE UP (ref 1.8–2.4)
MCHC RBC-ENTMCNC: 26.5 PG — LOW (ref 27–31)
MCHC RBC-ENTMCNC: 32.2 G/DL — SIGNIFICANT CHANGE UP (ref 32–37)
MCV RBC AUTO: 82 FL — SIGNIFICANT CHANGE UP (ref 80–94)
MONOCYTES # BLD AUTO: 0.34 K/UL — SIGNIFICANT CHANGE UP (ref 0.1–0.6)
MONOCYTES NFR BLD AUTO: 4.1 % — SIGNIFICANT CHANGE UP (ref 1.7–9.3)
NEUTROPHILS # BLD AUTO: 7.02 K/UL — HIGH (ref 1.4–6.5)
NEUTROPHILS NFR BLD AUTO: 85.6 % — HIGH (ref 42.2–75.2)
NRBC # BLD: 0 /100 WBCS — SIGNIFICANT CHANGE UP (ref 0–0)
NT-PROBNP SERPL-SCNC: 211 PG/ML — SIGNIFICANT CHANGE UP (ref 0–300)
PHOSPHATE SERPL-MCNC: 3.7 MG/DL — SIGNIFICANT CHANGE UP (ref 2.1–4.9)
PLATELET # BLD AUTO: 160 K/UL — SIGNIFICANT CHANGE UP (ref 130–400)
POTASSIUM SERPL-MCNC: 4.5 MMOL/L — SIGNIFICANT CHANGE UP (ref 3.5–5)
POTASSIUM SERPL-SCNC: 4.5 MMOL/L — SIGNIFICANT CHANGE UP (ref 3.5–5)
PROT SERPL-MCNC: 5 G/DL — LOW (ref 6–8)
RBC # BLD: 4.12 M/UL — LOW (ref 4.7–6.1)
RBC # FLD: 19.5 % — HIGH (ref 11.5–14.5)
SODIUM SERPL-SCNC: 135 MMOL/L — SIGNIFICANT CHANGE UP (ref 135–146)
TIBC SERPL-MCNC: 318 UG/DL — SIGNIFICANT CHANGE UP (ref 220–430)
UIBC SERPL-MCNC: 286 UG/DL — SIGNIFICANT CHANGE UP (ref 110–370)
WBC # BLD: 8.21 K/UL — SIGNIFICANT CHANGE UP (ref 4.8–10.8)
WBC # FLD AUTO: 8.21 K/UL — SIGNIFICANT CHANGE UP (ref 4.8–10.8)

## 2021-03-07 PROCEDURE — 93010 ELECTROCARDIOGRAM REPORT: CPT

## 2021-03-07 PROCEDURE — 71045 X-RAY EXAM CHEST 1 VIEW: CPT | Mod: 26

## 2021-03-07 PROCEDURE — 99233 SBSQ HOSP IP/OBS HIGH 50: CPT | Mod: CS

## 2021-03-07 RX ORDER — DEXAMETHASONE 0.5 MG/5ML
6 ELIXIR ORAL ONCE
Refills: 0 | Status: COMPLETED | OUTPATIENT
Start: 2021-03-07 | End: 2021-03-07

## 2021-03-07 RX ORDER — ZINC SULFATE TAB 220 MG (50 MG ZINC EQUIVALENT) 220 (50 ZN) MG
220 TAB ORAL DAILY
Refills: 0 | Status: DISCONTINUED | OUTPATIENT
Start: 2021-03-07 | End: 2021-03-14

## 2021-03-07 RX ORDER — PRASUGREL 5 MG/1
10 TABLET, FILM COATED ORAL DAILY
Refills: 0 | Status: DISCONTINUED | OUTPATIENT
Start: 2021-03-08 | End: 2021-03-11

## 2021-03-07 RX ORDER — ASCORBIC ACID 60 MG
500 TABLET,CHEWABLE ORAL DAILY
Refills: 0 | Status: DISCONTINUED | OUTPATIENT
Start: 2021-03-07 | End: 2021-03-14

## 2021-03-07 RX ORDER — ACETAMINOPHEN 500 MG
650 TABLET ORAL EVERY 6 HOURS
Refills: 0 | Status: DISCONTINUED | OUTPATIENT
Start: 2021-03-07 | End: 2021-03-14

## 2021-03-07 RX ORDER — GUAIFENESIN/DEXTROMETHORPHAN 600MG-30MG
5 TABLET, EXTENDED RELEASE 12 HR ORAL EVERY 6 HOURS
Refills: 0 | Status: DISCONTINUED | OUTPATIENT
Start: 2021-03-07 | End: 2021-03-14

## 2021-03-07 RX ORDER — ONDANSETRON 8 MG/1
4 TABLET, FILM COATED ORAL
Refills: 0 | Status: DISCONTINUED | OUTPATIENT
Start: 2021-03-07 | End: 2021-03-14

## 2021-03-07 RX ORDER — DEXAMETHASONE 0.5 MG/5ML
6 ELIXIR ORAL DAILY
Refills: 0 | Status: DISCONTINUED | OUTPATIENT
Start: 2021-03-08 | End: 2021-03-14

## 2021-03-07 RX ORDER — FUROSEMIDE 40 MG
20 TABLET ORAL ONCE
Refills: 0 | Status: COMPLETED | OUTPATIENT
Start: 2021-03-07 | End: 2021-03-07

## 2021-03-07 RX ORDER — METOPROLOL TARTRATE 50 MG
25 TABLET ORAL DAILY
Refills: 0 | Status: DISCONTINUED | OUTPATIENT
Start: 2021-03-07 | End: 2021-03-14

## 2021-03-07 RX ORDER — PANTOPRAZOLE SODIUM 20 MG/1
40 TABLET, DELAYED RELEASE ORAL
Refills: 0 | Status: DISCONTINUED | OUTPATIENT
Start: 2021-03-08 | End: 2021-03-14

## 2021-03-07 RX ORDER — GLUCAGON INJECTION, SOLUTION 0.5 MG/.1ML
1 INJECTION, SOLUTION SUBCUTANEOUS ONCE
Refills: 0 | Status: DISCONTINUED | OUTPATIENT
Start: 2021-03-07 | End: 2021-03-14

## 2021-03-07 RX ORDER — LEVOTHYROXINE SODIUM 125 MCG
50 TABLET ORAL DAILY
Refills: 0 | Status: DISCONTINUED | OUTPATIENT
Start: 2021-03-07 | End: 2021-03-14

## 2021-03-07 RX ADMIN — Medication 4: at 11:28

## 2021-03-07 RX ADMIN — Medication 50 MICROGRAM(S): at 05:17

## 2021-03-07 RX ADMIN — Medication 5 MILLIGRAM(S): at 21:34

## 2021-03-07 RX ADMIN — Medication 1 APPLICATION(S): at 05:17

## 2021-03-07 RX ADMIN — TAMSULOSIN HYDROCHLORIDE 0.8 MILLIGRAM(S): 0.4 CAPSULE ORAL at 21:32

## 2021-03-07 RX ADMIN — Medication 25 MILLIGRAM(S): at 05:17

## 2021-03-07 RX ADMIN — POLYETHYLENE GLYCOL 3350 17 GRAM(S): 17 POWDER, FOR SOLUTION ORAL at 11:05

## 2021-03-07 RX ADMIN — Medication 1 TABLET(S): at 11:20

## 2021-03-07 RX ADMIN — ZINC SULFATE TAB 220 MG (50 MG ZINC EQUIVALENT) 220 MILLIGRAM(S): 220 (50 ZN) TAB at 13:06

## 2021-03-07 RX ADMIN — Medication 20 MILLIGRAM(S): at 17:21

## 2021-03-07 RX ADMIN — PANTOPRAZOLE SODIUM 40 MILLIGRAM(S): 20 TABLET, DELAYED RELEASE ORAL at 05:17

## 2021-03-07 RX ADMIN — Medication 9 UNIT(S): at 08:11

## 2021-03-07 RX ADMIN — Medication 81 MILLIGRAM(S): at 11:05

## 2021-03-07 RX ADMIN — Medication 4: at 17:15

## 2021-03-07 RX ADMIN — INSULIN GLARGINE 25 UNIT(S): 100 INJECTION, SOLUTION SUBCUTANEOUS at 21:33

## 2021-03-07 RX ADMIN — Medication 40 MILLIGRAM(S): at 05:17

## 2021-03-07 RX ADMIN — SENNA PLUS 2 TABLET(S): 8.6 TABLET ORAL at 21:32

## 2021-03-07 RX ADMIN — Medication 1 APPLICATION(S): at 17:13

## 2021-03-07 RX ADMIN — Medication 6 MILLIGRAM(S): at 11:20

## 2021-03-07 RX ADMIN — Medication 9 UNIT(S): at 17:15

## 2021-03-07 RX ADMIN — Medication 500 MILLIGRAM(S): at 13:06

## 2021-03-07 RX ADMIN — CHLORHEXIDINE GLUCONATE 1 APPLICATION(S): 213 SOLUTION TOPICAL at 05:17

## 2021-03-07 RX ADMIN — Medication 2: at 08:11

## 2021-03-07 RX ADMIN — ENOXAPARIN SODIUM 40 MILLIGRAM(S): 100 INJECTION SUBCUTANEOUS at 21:32

## 2021-03-07 RX ADMIN — Medication 9 UNIT(S): at 11:27

## 2021-03-07 NOTE — PROGRESS NOTE ADULT - SUBJECTIVE AND OBJECTIVE BOX
ANDREE BUSH  [facility] T2-3A 014 A ([facility] T2-3A)            Patient was evaluated and examined  by bedside, c/o dyspnea, desaturation, tachycardia and lightheadedness with minimal activity, had bm today, good sleep, no fever. patient using 2 L via NC with ventimask 50% during desaturation events. patient remains very deconditioned. no nose bleeds for past 24 hours.      REVIEW OF SYSTEMS:  please see pertinent positives mentioned above, all other 12 ROS negative      T(C): , Max: 36.6 (03-07-21 @ 06:00)  HR: 86 (03-07-21 @ 06:00)  BP: 111/58 (03-07-21 @ 06:00)  RR: 18 (03-07-21 @ 06:00)  SpO2: 97% (03-07-21 @ 08:23)  CAPILLARY BLOOD GLUCOSE      POCT Blood Glucose.: 160 mg/dL (07 Mar 2021 07:40)  POCT Blood Glucose.: 114 mg/dL (06 Mar 2021 21:33)  POCT Blood Glucose.: 201 mg/dL (06 Mar 2021 16:26)  POCT Blood Glucose.: 202 mg/dL (06 Mar 2021 11:03)      PHYSICAL EXAM:  General: NAD, AAOX3, patient is laying comfortably in bed  HEENT: AT, NC, Supple, NO JVD, NO CB  Lungs: mild decreased breath sounds  B/L, no wheezing, no rhonchi  CVS: loud S1, S2, RRR, tachycardic at rest,  NO M/G/R  Abdomen: soft, bowel sounds present, non-tender, non-distended  Extremities: no edema, no clubbing, no cyanosis, positive peripheral pulses b/l  Neuro: no acute focal neurological deficits,  diffuse generalized body weakness  Skin: no rash, no ecchymosis        LAB  CBC  Date: 03-07-21 @ 07:11  Mean cell Viwcbamskt83.5  Mean cell Hemoglobin Conc32.2  Mean cell Volum 82.0  Platelet count-Automate 160  RBC Count 4.12  Red Cell Distrib Width19.5  WBC Count8.21  % Albumin, Urine--  Hematocrit 33.8  Hemoglobin 10.9  CBC  Date: 03-06-21 @ 06:55  Mean cell Lhwskluyua24.7  Mean cell Hemoglobin Conc31.4  Mean cell Volum 82.1  Platelet count-Automate 163  RBC Count 4.35  Red Cell Distrib Width19.0  WBC Count6.80  % Albumin, Urine--  Hematocrit 35.7  Hemoglobin 11.2  CBC  Date: 03-05-21 @ 08:39  Mean cell Kfdkwqmviu21.5  Mean cell Hemoglobin Conc32.0  Mean cell Volum 82.7  Platelet count-Automate 169  RBC Count 4.46  Red Cell Distrib Width19.1  WBC Count9.16  % Albumin, Urine--  Hematocrit 36.9  Hemoglobin 11.8  CBC  Date: 03-04-21 @ 06:39  Mean cell Ejiqknjsrp81.5  Mean cell Hemoglobin Conc32.3  Mean cell Volum 82.2  Platelet count-Automate 140  RBC Count 4.37  Red Cell Distrib Width19.1  WBC Count8.20  % Albumin, Urine--  Hematocrit 35.9  Hemoglobin 11.6  CBC  Date: 03-03-21 @ 05:40  Mean cell Vstcltvqql57.6  Mean cell Hemoglobin Conc32.5  Mean cell Volum 81.7  Platelet count-Automate 171  RBC Count 4.48  Red Cell Distrib Width19.2  WBC Count7.55  % Albumin, Urine--  Hematocrit 36.6  Hemoglobin 11.9  CBC  Date: 03-02-21 @ 04:34  Mean cell Cutjdsyphy74.5  Mean cell Hemoglobin Conc33.1  Mean cell Volum 80.2  Platelet count-Automate 154  RBC Count 4.75  Red Cell Distrib Width18.6  WBC Count8.28  % Albumin, Urine--  Hematocrit 38.1  Hemoglobin 12.6  CBC  Date: 03-01-21 @ 04:15  Mean cell Hjwnwlikzc48.1  Mean cell Hemoglobin Conc32.5  Mean cell Volum 80.4  Platelet count-Automate 128  RBC Count 4.48  Red Cell Distrib Width18.2  WBC Count7.31  % Albumin, Urine--  Hematocrit 36.0  Hemoglobin 11.7    [facility]  03-07-21 @ 07:11  Blood Gas Arterial-Calcium,Ionized--  Blood Urea Nitrogen, Serum 21 mg/dL<H> [10 - 20]  Carbon Dioxide, Serum28 mmol/L [17 - 32]  Chloride, Kjmmg624 mmol/L [98 - 110]  Creatinie, Serum0.8 mg/dL [0.7 - 1.5]  Glucose, Zxugs561 mg/dL<H> [70 - 99]  Potassium, Serum4.5 mmol/L [3.5 - 5.0]  Sodium, Serum 135 mmol/L [135 - 146]  BMP  03-06-21 @ 06:55  Blood Gas Arterial-Calcium,Ionized--  Blood Urea Nitrogen, Serum 24 mg/dL<H> [10 - 20]  Carbon Dioxide, Serum30 mmol/L [17 - 32]  Chloride, Osmob926 mmol/L [98 - 110]  Creatinie, Serum0.7 mg/dL [0.7 - 1.5]  Glucose, Jhpat605 mg/dL<H> [70 - 99]  Potassium, Serum4.3 mmol/L [3.5 - 5.0]  Sodium, Serum 140 mmol/L [135 - 146]  [facility]  03-05-21 @ 08:39  Blood Gas Arterial-Calcium,Ionized--  Blood Urea Nitrogen, Serum 23 mg/dL<H> [10 - 20]  Carbon Dioxide, Serum28 mmol/L [17 - 32]  Chloride, Serum98 mmol/L [98 - 110]  Creatinie, Serum0.8 mg/dL [0.7 - 1.5]  Glucose, Omopq464 mg/dL<H> [70 - 99]  Potassium, Serum4.6 mmol/L [3.5 - 5.0]  Sodium, Serum 134 mmol/L<L> [135 - 146]  [facility]  03-04-21 @ 06:39  Blood Gas Arterial-Calcium,Ionized--  Blood Urea Nitrogen, Serum 27 mg/dL<H> [10 - 20]  Carbon Dioxide, Serum29 mmol/L [17 - 32]  Chloride, Hfiwb382 mmol/L [98 - 110]  Creatinie, Serum0.9 mg/dL [0.7 - 1.5]  Glucose, Serum55 mg/dL<L> [70 - 99] [Critical value:]  Potassium, Serum4.6 mmol/L [3.5 - 5.0]  Sodium, Serum 142 mmol/L [135 - 146]  [facility]  03-03-21 @ 14:42  Blood Gas Arterial-Calcium,Ionized1.08 mmoL/L<L> [1.12 - 1.30] [Patient is on 5L NC]  Blood Urea Nitrogen, Serum --  Carbon Dioxide, Serum--  Chloride, Serum--  Creatinie, Serum--  Glucose, Serum--  Potassium, Serum--  Sodium, Serum --  [facility]  03-03-21 @ 05:40  Blood Gas Arterial-Calcium,Ionized--  Blood Urea Nitrogen, Serum 29 mg/dL<H> [10 - 20]  Carbon Dioxide, Serum32 mmol/L [17 - 32]  Chloride, Serum99 mmol/L [98 - 110]  Creatinie, Serum0.8 mg/dL [0.7 - 1.5]  Glucose, Serum73 mg/dL [70 - 99]  Potassium, Serum4.4 mmol/L [3.5 - 5.0]  Sodium, Serum 140 mmol/L [135 - 146]  BMP  03-02-21 @ 16:54  Blood Gas Arterial-Calcium,Ionized--  Blood Urea Nitrogen, Serum 32 mg/dL<H> [10 - 20]  Carbon Dioxide, Serum31 mmol/L [17 - 32]  Chloride, Serum98 mmol/L [98 - 110]  Creatinie, Serum1.0 mg/dL [0.7 - 1.5]  Glucose, Kbcbx925 mg/dL<H> [70 - 99]  Potassium, Serum5.4 mmol/L<H> [3.5 - 5.0]  Sodium, Serum 140 mmol/L [135 - 146]        PT/INR - ( 06 Mar 2021 06:55 )   PT: 13.10 sec;   INR: 1.14 ratio         PTT - ( 06 Mar 2021 06:55 )  PTT:29.9 sec      Microbiology:    Culture - Blood (collected 02-09-21 @ 04:45)  Source: .Blood Blood  Final Report (02-14-21 @ 13:01):    No Growth Final    Culture - Blood (collected 02-06-21 @ 07:25)  Source: .Blood Blood-Venous  Final Report (02-11-21 @ 18:00):    No Growth Final      Medications:  acetaminophen   Tablet .. 650 milliGRAM(s) Oral every 6 hours PRN  albuterol/ipratropium for Nebulization 3 milliLiter(s) Nebulizer every 6 hours  ALPRAZolam 0.25 milliGRAM(s) Oral daily PRN  ascorbic acid 500 milliGRAM(s) Oral daily  aspirin enteric coated 81 milliGRAM(s) Oral daily  BACItracin   Ointment 1 Application(s) Topical every 12 hours  bisacodyl Suppository 10 milliGRAM(s) Rectal daily PRN  chlorhexidine 4% Liquid 1 Application(s) Topical daily  dexAMETHasone  Injectable 6 milliGRAM(s) IV Push once  dextrose 40% Gel 15 Gram(s) Oral once  dextrose 5%. 1000 milliLiter(s) IV Continuous <Continuous>  dextrose 5%. 1000 milliLiter(s) IV Continuous <Continuous>  dextrose 50% Injectable 25 Gram(s) IV Push once  dextrose 50% Injectable 12.5 Gram(s) IV Push once  dextrose 50% Injectable 25 Gram(s) IV Push once  enoxaparin Injectable 40 milliGRAM(s) SubCutaneous at bedtime  glucagon  Injectable 1 milliGRAM(s) IntraMuscular once  guaifenesin/dextromethorphan  Syrup 5 milliLiter(s) Oral every 6 hours PRN  influenza   Vaccine 0.5 milliLiter(s) IntraMuscular once  insulin glargine Injectable (LANTUS) 25 Unit(s) SubCutaneous at bedtime  insulin lispro (ADMELOG) corrective regimen sliding scale   SubCutaneous three times a day before meals  insulin lispro Injectable (ADMELOG) 9 Unit(s) SubCutaneous three times a day before meals  levothyroxine 50 MICROGram(s) Oral daily  magnesium hydroxide Suspension 30 milliLiter(s) Oral daily PRN  melatonin 5 milliGRAM(s) Oral at bedtime  metoprolol succinate ER 25 milliGRAM(s) Oral daily  multivitamin 1 Tablet(s) Oral daily  ondansetron    Tablet 4 milliGRAM(s) Oral two times a day PRN  petrolatum white Ointment 1 Application(s) Topical every 6 hours PRN  polyethylene glycol 3350 17 Gram(s) Oral daily  senna 2 Tablet(s) Oral at bedtime  tamsulosin 0.8 milliGRAM(s) Oral at bedtime  zinc sulfate 220 milliGRAM(s) Oral daily        Assessment and Plan:  #Acute hypoxemic respiratory failure secondary to COVID-19 PNA   #likely superimposed bacterial pneumonia   - Patient s/p 2 doses of the Pfizer vaccine (last dose 1/24)  -patient is failing to respond to prolong medical tx. with persistent hypoxia,  inability to have minimal activity.   - On 2L O2 via NC today, at rest O 2 - range 92- 96%, patient desaturating to low 80's with minimal activities, requiring using ventimask tx.     - CTA 02/04 showed extensive bilateral patchy ground-glass opacities involving all lobes most pronounced in the upper lung zones  - s/p 1 unit plasma, Remdesivir completed 2/9, completed 1 week of rocephin & Levaquin per ID, - fungital 2/19 <19  - LE Duplex negative on 3/2  - post multiple day of IV Dexa patient was   transitioned to PO Prednisone on 3/6, with worsening dyspnea and hypoxia, will switch back to IV dexa from 3/7, repeat CXR today   -procal 0.10 on 3/6  -patient unable to tolerate PT due to severe dyspnea and hypoxia during minimal activity.    #Constipation   - c/w miralax, senna; dulcolax suppository prn   - monitor BMs    #CAD/STEMI s/p 4 stents  - Hx of STEMI s/p  s/p PCI of distal RCA, mid/distal LAD (December 2017 and February 2018)  - Oct 2019: 100% occlusion of distal RCA s/p PCI with REUBEN to distal RCA, and POBA to RPL  - As per cardio (Dr. Murillo) 2/23,  effient was held temporarily, will resume Effient 10 mg po once daily from 3/8   -  c/w asa 81 daily   - C/w Toprol xl  25mg orally daily  - takes ezetimibe 10mg at home and is nonformulary, hold for now     #epistaxis x2 likely due to HFNC s/p packing now removed -- 3/5- additional episode of epistaxis .  - recurrent x 2 episodes; ENT following; packing removed 2/23;   - melena: one time episode 2/21; patient has hx of endoscopy 2 years ago which was not significant   - apply vaseline to dry nostrils mucosa prn.    # Hyperkalemia- corrected, d/c lokelma     #HTN   - d/c enalapril 2.5mg daily due to normal BP in setting of severe covid   -continue Metoprolol tx.    #BPH  - C/w tamsulosin 0.4mg PO QHS    #DM   - Monitor FS, c/w insulin regimen   - A1C 9.1-->only on glipizide as outpatient     #Hypothyroidism  - C/w Synthroid    #GERD  - c/w protonix daily    # Supportive tx. - daily MVI, Zinc, Vitamin C. obtain iron /tibc , vitamin D levels    Diet: consistent carbs, DASH/TLC + Glucerna   DVT PPx: lovenox 40mg once daily  GI ppx: protonix  Code Status: FULL CODE    #Progress Note Handoff: continue close pulse ox monitoring , repeat inflammatory markers today, CXR today, restart IV dexa due to patient's worsening hypoxia and failure of clinical improvement, repeat 12 lead EKG   Family discussion: medical plan of tx. was d/w patient by bedside  Disposition: Home_vs STR__once medically stable

## 2021-03-07 NOTE — PROGRESS NOTE ADULT - SUBJECTIVE AND OBJECTIVE BOX
SUBJECTIVE:    Patient seen and examined at bedside. Complains of mild insomnia, but does not request any intervention at this time. Denies SOB on current 2L NC. ROS otherwise wnl    PAST MEDICAL & SURGICAL HISTORY  Chronic kidney disease (CKD)  III    Type 2 diabetes mellitus without complication, unspecified long term insulin use status    Hypertension, unspecified type    Dyspnea, unspecified type    ASHD (arteriosclerotic heart disease)  STEMI 12/31/17, PCI RCA    S/P cataract surgery    History of ligation of vein  2012    History of tonsillectomy  1957      ALLERGIES:  penicillin (Rash (Severe))    MEDICATIONS:  STANDING MEDICATIONS  albuterol/ipratropium for Nebulization 3 milliLiter(s) Nebulizer every 6 hours  aspirin enteric coated 81 milliGRAM(s) Oral daily  BACItracin   Ointment 1 Application(s) Topical every 12 hours  chlorhexidine 4% Liquid 1 Application(s) Topical daily  dextrose 40% Gel 15 Gram(s) Oral once  dextrose 5%. 1000 milliLiter(s) IV Continuous <Continuous>  dextrose 5%. 1000 milliLiter(s) IV Continuous <Continuous>  dextrose 50% Injectable 25 Gram(s) IV Push once  dextrose 50% Injectable 12.5 Gram(s) IV Push once  dextrose 50% Injectable 25 Gram(s) IV Push once  enoxaparin Injectable 40 milliGRAM(s) SubCutaneous at bedtime  glucagon  Injectable 1 milliGRAM(s) IntraMuscular once  influenza   Vaccine 0.5 milliLiter(s) IntraMuscular once  insulin glargine Injectable (LANTUS) 25 Unit(s) SubCutaneous at bedtime  insulin lispro (ADMELOG) corrective regimen sliding scale   SubCutaneous three times a day before meals  insulin lispro Injectable (ADMELOG) 9 Unit(s) SubCutaneous three times a day before meals  levothyroxine 50 MICROGram(s) Oral daily  melatonin 5 milliGRAM(s) Oral at bedtime  metoprolol succinate ER 25 milliGRAM(s) Oral daily  pantoprazole    Tablet 40 milliGRAM(s) Oral every 12 hours  polyethylene glycol 3350 17 Gram(s) Oral daily  predniSONE   Tablet 40 milliGRAM(s) Oral daily  senna 2 Tablet(s) Oral at bedtime  tamsulosin 0.8 milliGRAM(s) Oral at bedtime    PRN MEDICATIONS  acetaminophen   Tablet .. 650 milliGRAM(s) Oral every 6 hours PRN  ALPRAZolam 0.25 milliGRAM(s) Oral daily PRN  bisacodyl Suppository 10 milliGRAM(s) Rectal daily PRN  guaifenesin/dextromethorphan  Syrup 5 milliLiter(s) Oral every 6 hours PRN  magnesium hydroxide Suspension 30 milliLiter(s) Oral daily PRN  ondansetron    Tablet 4 milliGRAM(s) Oral two times a day PRN  petrolatum white Ointment 1 Application(s) Topical every 6 hours PRN    VITALS:   T(F): 97.6  HR: 82  BP: 109/67  RR: 20  SpO2: 92%    LABS:                        11.2   6.80  )-----------( 163      ( 06 Mar 2021 06:55 )             35.7     03-06    140  |  101  |  24<H>  ----------------------------<  125<H>  4.3   |  30  |  0.7    Ca    8.1<L>      06 Mar 2021 06:55  Mg     1.9     03-06    TPro  5.2<L>  /  Alb  3.0<L>  /  TBili  0.3  /  DBili  x   /  AST  24  /  ALT  34  /  AlkPhos  62  03-06    PT/INR - ( 06 Mar 2021 06:55 )   PT: 13.10 sec;   INR: 1.14 ratio         PTT - ( 06 Mar 2021 06:55 )  PTT:29.9 sec              RADIOLOGY:    PHYSICAL EXAM:  GEN: No acute distress on 2L NC  LUNGS: decrease breath sounds b/l  HEART: Regular  ABD: Soft, non-tender, non-distended.  EXT: NC/NC/NE/2+PP/TIJERINA/Skin Intact.   NEURO: AAOX3

## 2021-03-07 NOTE — PROGRESS NOTE ADULT - ASSESSMENT
#Acute hypoxemic respiratory failure secondary to COVID-19 PNA   #likely superimposed bacterial pneumonia   - Patient s/p 2 doses of the Pfizer vaccine (last dose 1/24)  - On 2L O2 via NC today, at rest O 2 - 95%, humidified air to prevent recurrent epistaxis, patient desaturating to low 80's with minimal activities, requiring using ventimask oxygenation during ambulation    - CTA 02/04 showed extensive bilateral patchy ground-glass opacities involving all lobes most pronounced in the upper lung zones  - s/p 1 unit plasma, Remdesivir completed 2/9, completed 1 week of rocephin & Levaquin per ID  - LE Duplex negative on 3/2  - with worsened pulmonary infiltrates on  CXR - switched back to IV dexa from 3/3,  transitioned to PO Prednisone on 3/6  - fungital 2/19 <19  - PT/OT as tolerated  -c/w PO prednisone 60 mg today    #Constipation   -resolved with enema 3/05  -c/w miralax, senna; dulcolax suppository prn     - monitor BMs    #CAD/STEMI s/p 4 stents  - Hx of STEMI s/p  s/p PCI of distal RCA, mid/distal LAD (December 2017 and February 2018)  - Oct 2019: 100% occlusion of distal RCA s/p PCI with REUBEN to distal RCA, and POBA to RPL  - As per cardio (Dr. Murillo) 2/23, continue to hold effient and c/w asa 81 daily   - C/w Toprol 25mg daily  - takes ezetimibe 10mg at home and is nonformulary, hold for now     #epistaxis x2 likely due to HFNC s/p packing now removed -- 3/5- additional episode of epistaxis .  - recurrent x 2 episodes; ENT following, note yesterday appreciated. no new bleed since 3/05  - apply vaseline to dry nostrils mucosa prn.  - avoid increased NC flow rates; switch to venturi mask if O2 requirements do increase    # Hyperkalemia- corrected, d/c lokelma     #HTN   - hold enalapril 2.5mg daily due to normal BP in setting of severe covid   -continue Metoprolol tx.    #BPH  - C/w tamsulosin 0.4mg PO QHS    #DM   - Monitor FS, c/w insulin regimen   - A1C 9.1-->only on glipizide as outpatient     #Hypothyroidism  - C/w Synthroid    #GERD  - c/w protonix BID    Diet: consistent carbs, DASH/TLC + Glucerna   DVT PPx: lovenox 40mg once daily  GI ppx: protonix  Code Status: FULL CODE    #Progress Note Handoff: continue close pulse ox monitoring , PT at bedside as tolerated. monitor for nose bleeds

## 2021-03-08 LAB
ALBUMIN SERPL ELPH-MCNC: 3.1 G/DL — LOW (ref 3.5–5.2)
ALP SERPL-CCNC: 64 U/L — SIGNIFICANT CHANGE UP (ref 30–115)
ALT FLD-CCNC: 41 U/L — SIGNIFICANT CHANGE UP (ref 0–41)
ANION GAP SERPL CALC-SCNC: 10 MMOL/L — SIGNIFICANT CHANGE UP (ref 7–14)
AST SERPL-CCNC: 32 U/L — SIGNIFICANT CHANGE UP (ref 0–41)
BASOPHILS # BLD AUTO: 0.03 K/UL — SIGNIFICANT CHANGE UP (ref 0–0.2)
BASOPHILS NFR BLD AUTO: 0.4 % — SIGNIFICANT CHANGE UP (ref 0–1)
BILIRUB SERPL-MCNC: 0.3 MG/DL — SIGNIFICANT CHANGE UP (ref 0.2–1.2)
BUN SERPL-MCNC: 27 MG/DL — HIGH (ref 10–20)
CALCIUM SERPL-MCNC: 8.2 MG/DL — LOW (ref 8.5–10.1)
CHLORIDE SERPL-SCNC: 100 MMOL/L — SIGNIFICANT CHANGE UP (ref 98–110)
CO2 SERPL-SCNC: 30 MMOL/L — SIGNIFICANT CHANGE UP (ref 17–32)
CREAT SERPL-MCNC: 0.9 MG/DL — SIGNIFICANT CHANGE UP (ref 0.7–1.5)
CRP SERPL-MCNC: 11 MG/L — HIGH
CRP SERPL-MCNC: 9 MG/L — HIGH
D DIMER BLD IA.RAPID-MCNC: 286 NG/ML DDU — HIGH (ref 0–230)
EOSINOPHIL # BLD AUTO: 0 K/UL — SIGNIFICANT CHANGE UP (ref 0–0.7)
EOSINOPHIL NFR BLD AUTO: 0 % — SIGNIFICANT CHANGE UP (ref 0–8)
FERRITIN SERPL-MCNC: 63 NG/ML — SIGNIFICANT CHANGE UP (ref 30–400)
GLUCOSE BLDC GLUCOMTR-MCNC: 122 MG/DL — HIGH (ref 70–99)
GLUCOSE BLDC GLUCOMTR-MCNC: 167 MG/DL — HIGH (ref 70–99)
GLUCOSE BLDC GLUCOMTR-MCNC: 181 MG/DL — HIGH (ref 70–99)
GLUCOSE BLDC GLUCOMTR-MCNC: 200 MG/DL — HIGH (ref 70–99)
GLUCOSE BLDC GLUCOMTR-MCNC: 227 MG/DL — HIGH (ref 70–99)
GLUCOSE SERPL-MCNC: 132 MG/DL — HIGH (ref 70–99)
HCT VFR BLD CALC: 35.8 % — LOW (ref 42–52)
HGB BLD-MCNC: 11.2 G/DL — LOW (ref 14–18)
IMM GRANULOCYTES NFR BLD AUTO: 2.6 % — HIGH (ref 0.1–0.3)
LYMPHOCYTES # BLD AUTO: 1.13 K/UL — LOW (ref 1.2–3.4)
LYMPHOCYTES # BLD AUTO: 14.2 % — LOW (ref 20.5–51.1)
MAGNESIUM SERPL-MCNC: 2 MG/DL — SIGNIFICANT CHANGE UP (ref 1.8–2.4)
MCHC RBC-ENTMCNC: 25.8 PG — LOW (ref 27–31)
MCHC RBC-ENTMCNC: 31.3 G/DL — LOW (ref 32–37)
MCV RBC AUTO: 82.5 FL — SIGNIFICANT CHANGE UP (ref 80–94)
MONOCYTES # BLD AUTO: 0.57 K/UL — SIGNIFICANT CHANGE UP (ref 0.1–0.6)
MONOCYTES NFR BLD AUTO: 7.2 % — SIGNIFICANT CHANGE UP (ref 1.7–9.3)
NEUTROPHILS # BLD AUTO: 6.02 K/UL — SIGNIFICANT CHANGE UP (ref 1.4–6.5)
NEUTROPHILS NFR BLD AUTO: 75.6 % — HIGH (ref 42.2–75.2)
NRBC # BLD: 0 /100 WBCS — SIGNIFICANT CHANGE UP (ref 0–0)
PLATELET # BLD AUTO: 155 K/UL — SIGNIFICANT CHANGE UP (ref 130–400)
POTASSIUM SERPL-MCNC: 4.4 MMOL/L — SIGNIFICANT CHANGE UP (ref 3.5–5)
POTASSIUM SERPL-SCNC: 4.4 MMOL/L — SIGNIFICANT CHANGE UP (ref 3.5–5)
PROCALCITONIN SERPL-MCNC: 0.08 NG/ML — SIGNIFICANT CHANGE UP (ref 0.02–0.1)
PROT SERPL-MCNC: 5.4 G/DL — LOW (ref 6–8)
RBC # BLD: 4.34 M/UL — LOW (ref 4.7–6.1)
RBC # FLD: 18.9 % — HIGH (ref 11.5–14.5)
SODIUM SERPL-SCNC: 140 MMOL/L — SIGNIFICANT CHANGE UP (ref 135–146)
WBC # BLD: 7.96 K/UL — SIGNIFICANT CHANGE UP (ref 4.8–10.8)
WBC # FLD AUTO: 7.96 K/UL — SIGNIFICANT CHANGE UP (ref 4.8–10.8)

## 2021-03-08 PROCEDURE — 99233 SBSQ HOSP IP/OBS HIGH 50: CPT | Mod: CS

## 2021-03-08 RX ORDER — CHOLECALCIFEROL (VITAMIN D3) 125 MCG
5000 CAPSULE ORAL DAILY
Refills: 0 | Status: DISCONTINUED | OUTPATIENT
Start: 2021-03-08 | End: 2021-03-14

## 2021-03-08 RX ORDER — FERROUS SULFATE 325(65) MG
325 TABLET ORAL DAILY
Refills: 0 | Status: DISCONTINUED | OUTPATIENT
Start: 2021-03-08 | End: 2021-03-14

## 2021-03-08 RX ADMIN — Medication 6 MILLIGRAM(S): at 05:24

## 2021-03-08 RX ADMIN — Medication 2: at 07:49

## 2021-03-08 RX ADMIN — Medication 9 UNIT(S): at 11:42

## 2021-03-08 RX ADMIN — PANTOPRAZOLE SODIUM 40 MILLIGRAM(S): 20 TABLET, DELAYED RELEASE ORAL at 05:25

## 2021-03-08 RX ADMIN — Medication 9 UNIT(S): at 16:49

## 2021-03-08 RX ADMIN — INSULIN GLARGINE 25 UNIT(S): 100 INJECTION, SOLUTION SUBCUTANEOUS at 21:10

## 2021-03-08 RX ADMIN — Medication 500 MILLIGRAM(S): at 11:40

## 2021-03-08 RX ADMIN — Medication 5000 UNIT(S): at 13:41

## 2021-03-08 RX ADMIN — Medication 1 TABLET(S): at 11:40

## 2021-03-08 RX ADMIN — Medication 81 MILLIGRAM(S): at 11:40

## 2021-03-08 RX ADMIN — CHLORHEXIDINE GLUCONATE 1 APPLICATION(S): 213 SOLUTION TOPICAL at 05:25

## 2021-03-08 RX ADMIN — PRASUGREL 10 MILLIGRAM(S): 5 TABLET, FILM COATED ORAL at 11:40

## 2021-03-08 RX ADMIN — Medication 50 MICROGRAM(S): at 05:25

## 2021-03-08 RX ADMIN — Medication 2: at 16:49

## 2021-03-08 RX ADMIN — Medication 5 MILLIGRAM(S): at 21:10

## 2021-03-08 RX ADMIN — ZINC SULFATE TAB 220 MG (50 MG ZINC EQUIVALENT) 220 MILLIGRAM(S): 220 (50 ZN) TAB at 11:40

## 2021-03-08 RX ADMIN — TAMSULOSIN HYDROCHLORIDE 0.8 MILLIGRAM(S): 0.4 CAPSULE ORAL at 21:11

## 2021-03-08 RX ADMIN — Medication 9 UNIT(S): at 07:49

## 2021-03-08 RX ADMIN — Medication 4: at 11:43

## 2021-03-08 RX ADMIN — Medication 325 MILLIGRAM(S): at 13:41

## 2021-03-08 RX ADMIN — ENOXAPARIN SODIUM 40 MILLIGRAM(S): 100 INJECTION SUBCUTANEOUS at 21:12

## 2021-03-08 RX ADMIN — Medication 25 MILLIGRAM(S): at 05:25

## 2021-03-08 RX ADMIN — Medication 1 APPLICATION(S): at 05:25

## 2021-03-08 RX ADMIN — POLYETHYLENE GLYCOL 3350 17 GRAM(S): 17 POWDER, FOR SOLUTION ORAL at 11:46

## 2021-03-08 RX ADMIN — SENNA PLUS 2 TABLET(S): 8.6 TABLET ORAL at 21:13

## 2021-03-08 NOTE — PROGRESS NOTE ADULT - ASSESSMENT
69 yo M with PMH of CAD, MI (s/p 4 stents), DM, HTN, BPH, hypothyroid and GERD, recently received Pfizer vaccine (1/3 and 1/24) presented to ED c/o progressive SOB, fever, and weakness. Patient stated that his symptoms began on Wednesday 2/3. COVID PCR + on 2/4. Admitted to medicine with acute hypoxemic respiratory failure secondary to COVID-19 PNA. Upgraded to ICU from 3A due to increasing oxygen requirements on 2/6. ICU course complicated by epistaxis x 2 due to HFNC.    #Acute hypoxemic respiratory failure secondary to COVID-19 PNA   #likely superimposed bacterial pneumonia   - Patient s/p 2 doses of the Pfizer vaccine (last dose 1/24)  -patient is failing to respond to prolong medical tx. with persistent hypoxia,  inability to have minimal activity.   - On 2L O2 via NC today, at rest O 2 - range 92- 96%, patient desaturating to low 80's in bed during taking or eating, requiring using ventimask tx.     - CTA 02/04 showed extensive bilateral patchy ground-glass opacities involving all lobes most pronounced in the upper lung zones  - s/p 1 unit plasma, Remdesivir completed 2/9, completed 1 week of rocephin & Levaquin per ID, - fungital 2/19 <19  - LE Duplex negative on 3/2  - post multiple day of IV Dexa patient was   transitioned to PO Prednisone on 3/6, with worsening dyspnea and hypoxia,  switched back to IV dexa from 3/7  -most recent CXR- slightly decreased extensive b/l pulmonary infiltrates  -procal 0.10 on 3/6  -patient unable to tolerate PT due to severe dyspnea and hypoxia during minimal activity.    #Constipation   - c/w miralax, senna; dulcolax suppository prn   - monitor BMs    #CAD/STEMI s/p 4 stents  - Hx of STEMI s/p  s/p PCI of distal RCA, mid/distal LAD (December 2017 and February 2018)  - Oct 2019: 100% occlusion of distal RCA s/p PCI with REUBEN to distal RCA, and POBA to RPL  - As per cardio (Dr. Murillo) 2/23,  effient was held temporarily, will resume Effient 10 mg po once daily from 3/8   -  c/w asa 81 daily   - C/w Toprol xl  25mg orally daily  - takes ezetimibe 10mg at home and is nonformulary, hold for now     #epistaxis x2 likely due to HFNC s/p packing now removed -- 3/5- additional episode of epistaxis .  - recurrent x 2 episodes; ENT following; packing removed 2/23;   - melena: one time episode 2/21; patient has hx of endoscopy 2 years ago which was not significant   - apply vaseline to dry nostrils mucosa prn.    # Iron deficiency anemia- started on PO iron tx.    # Vitamin D deficiency -started on vitamin D tx.    # Hyperkalemia- corrected, d/c lokelma     #HTN   - d/c enalapril 2.5mg daily due to normal BP in setting of severe covid   -continue Metoprolol tx.    #BPH  - C/w tamsulosin 0.4mg PO QHS    #DM   - Monitor FS, c/w insulin regimen   - A1C 9.1-->only on glipizide as outpatient     #Hypothyroidism  - C/w Synthroid    #GERD  - c/w protonix daily    # Supportive tx. - daily MVI, Zinc, Vitamin C.     Diet: consistent carbs, DASH/TLC + Glucerna   DVT PPx: lovenox 40mg once daily  GI ppx: protonix  Code Status: FULL CODE    #Progress Note Handoff: continue IV dexa, monitor closely for nose bleeds if recurrs d/c effient, PT to be started once patient's oxygenation is improved. patient is not a candidate for [facility] unit due to venti mask tx.   Family discussion: medical plan of tx. was d/w pt. by bedside Disposition: Home_vs. STR once medically stable  69 yo M with PMH of CAD, MI (s/p 4 stents), DM, HTN, BPH, hypothyroid and GERD, recently received Pfizer vaccine (1/3 and 1/24) presented to ED c/o progressive SOB, fever, and weakness. Patient stated that his symptoms began on Wednesday 2/3. COVID PCR + on 2/4. Admitted to medicine with acute hypoxemic respiratory failure secondary to COVID-19 PNA. Upgraded to ICU from 3A due to increasing oxygen requirements on 2/6. ICU course complicated by epistaxis x 2 due to HFNC.    #Acute hypoxemic respiratory failure secondary to COVID-19 PNA   #likely superimposed bacterial pneumonia   - Patient s/p 2 doses of the Pfizer vaccine (last dose 1/24)  -patient is failing to respond to prolong medical tx. with persistent hypoxia,  inability to have minimal activity.   - On 2L O2 via NC today, at rest O 2 - range 92- 96%, patient desaturating to low 80's in bed during taking or eating, requiring using ventimask tx.     - CTA 02/04 showed extensive bilateral patchy ground-glass opacities involving all lobes most pronounced in the upper lung zones  - s/p 1 unit plasma, Remdesivir completed 2/9, completed 1 week of rocephin & Levaquin per ID, - fungital 2/19 <19  - LE Duplex negative on 3/2  - post multiple day of IV Dexa patient was   transitioned to PO Prednisone on 3/6, with worsening dyspnea and hypoxia, switched back to IV dexa from 3/7  - most recent CXR- slightly decreased extensive b/l pulmonary infiltrates  - procal 0.10 on 3/6  - patient unable to tolerate PT due to severe dyspnea and hypoxia during minimal activity.    # Constipation   - c/w miralax, senna; dulcolax suppository prn   - monitor BMs    #CAD/STEMI s/p 4 stents  - Hx of STEMI s/p  s/p PCI of distal RCA, mid/distal LAD (December 2017 and February 2018)  - Oct 2019: 100% occlusion of distal RCA s/p PCI with REUBEN to distal RCA, and POBA to RPL  - As per cardio (Dr. Murillo) 2/23,  effient was held temporarily, will resume Effient 10 mg po once daily from 3/8   -  c/w asa 81 daily   - C/w Toprol xl  25mg orally daily  - takes ezetimibe 10mg at home and is nonformulary, hold for now     #epistaxis x2 likely due to HFNC s/p packing now removed -- 3/5- additional episode of epistaxis .  - recurrent x 2 episodes; ENT following; packing removed 2/23;   - melena: one time episode 2/21; patient has hx of endoscopy 2 years ago which was not significant   - apply vaseline to dry nostrils mucosa prn.    # Iron deficiency anemia- started on PO iron tx.    # Vitamin D deficiency -started on vitamin D tx.    # Hyperkalemia- corrected, d/c lokelma     #HTN   - d/c enalapril 2.5mg daily due to normal BP in setting of severe covid   -continue Metoprolol tx.    #BPH  - C/w tamsulosin 0.4mg PO QHS    #DM   - Monitor FS, c/w insulin regimen   - A1C 9.1-->only on glipizide as outpatient     #Hypothyroidism  - C/w Synthroid    #GERD  - c/w protonix daily    # Supportive tx. - daily MVI, Zinc, Vitamin C.     Diet: consistent carbs, DASH/TLC + Glucerna   DVT PPx: lovenox 40mg once daily  GI ppx: protonix  Code Status: FULL CODE  Dispo: Acute. From home. pending clinical improvement, PT rehab.

## 2021-03-08 NOTE — CHART NOTE - NSCHARTNOTEFT_GEN_A_CORE
I made rounds today with the treatment team including the hospitalist, residents,  nurses, and discussed the patient's current medical status and discharge  planning needs, and reviewed the chart.    T(C): 36.3 (03-08-21 @ 05:00), Max: 36.3 (03-08-21 @ 05:00)  HR: 90 (03-08-21 @ 05:00) (90 - 99)  BP: 119/78 (03-08-21 @ 05:00) (118/69 - 119/78)  RR: 18 (03-08-21 @ 05:00) (18 - 18)  SpO2: 98% (03-08-21 @ 07:30) (93% - 98%)          I reached out to the patient's health care proxy/ responsible family member-           [     ]  I reached                                     and discussed the patient's medical condition,                   family concerns, and discharge planning           [  x   ]  I left a message with family Ami Odonnell               [     ]  I personally participated in rounds with the medical team and my resident and discussed the case. My resident reached                   family member/ HCP                                under my direction and supervision  and we reviewed the conversation.          [     ]  My resident left a message with family under my direction and supervision           [     ]   My resident attended medical rounds and called the family                [      ]    The following was discussed:  The patient's medical status over the past 24 hrs was reviewed, as well as oxygen needs and medication changes and labs.          [      ]   The following concerns were raised:           [     ] I spent 5-10 minutes on the above discussing medical issues with team members and family and/ or my resident    [     ] I spent 11-20 minutes on the above discussing medical issues with team members and family and/ or my resident    [     ] I spent 21-30 minutes on the above discussing medical issues with team members and family and/ or my resident

## 2021-03-08 NOTE — PROGRESS NOTE ADULT - SUBJECTIVE AND OBJECTIVE BOX
ANDREE BUSH  [facility] T2-3A 014 A ([facility] T2-3A)            Patient was evaluated and examined  by bedside, remains very deconditioned, desaturating with minimal movement in bed and if starts talking or eating, requiring intermittent ventimask tx, unable to increase oxygenation via  NC due to risk of nose bleeds.        REVIEW OF SYSTEMS:  please see pertinent positives mentioned above, all other 12 ROS negative      T(C): , Max: 36.3 (03-08-21 @ 05:00)  HR: 90 (03-08-21 @ 05:00)  BP: 119/78 (03-08-21 @ 05:00)  RR: 18 (03-08-21 @ 05:00)  SpO2: 98% (03-08-21 @ 07:30)  CAPILLARY BLOOD GLUCOSE      POCT Blood Glucose.: 181 mg/dL (08 Mar 2021 07:44)  POCT Blood Glucose.: 167 mg/dL (08 Mar 2021 07:29)  POCT Blood Glucose.: 176 mg/dL (07 Mar 2021 21:24)  POCT Blood Glucose.: 243 mg/dL (07 Mar 2021 16:41)  POCT Blood Glucose.: 243 mg/dL (07 Mar 2021 11:25)      PHYSICAL EXAM:  General: NAD, AAOX3, patient is laying comfortably in bed  HEENT: AT, NC, Supple, NO JVD, NO CB  Lungs: mild decreased breath sounds  B/L, no wheezing, no rhonchi  CVS: loud S1, S2, RRR,  NO M/G/R  Abdomen: soft, bowel sounds present, non-tender, non-distended  Extremities: no edema, no clubbing, no cyanosis, positive peripheral pulses b/l  Neuro: no acute focal neurological deficits,  diffuse generalized body weakness  Skin: no rash, no ecchymosis      LAB  CBC  Date: 03-08-21 @ 06:28  Mean cell Uewexnmajt52.8  Mean cell Hemoglobin Conc31.3  Mean cell Volum 82.5  Platelet count-Automate 155  RBC Count 4.34  Red Cell Distrib Width18.9  WBC Count7.96  % Albumin, Urine--  Hematocrit 35.8  Hemoglobin 11.2  CBC  Date: 03-07-21 @ 07:11  Mean cell Zoyydngymx71.5  Mean cell Hemoglobin Conc32.2  Mean cell Volum 82.0  Platelet count-Automate 160  RBC Count 4.12  Red Cell Distrib Width19.5  WBC Count8.21  % Albumin, Urine--  Hematocrit 33.8  Hemoglobin 10.9  CBC  Date: 03-06-21 @ 06:55  Mean cell Ulxvmxsmok16.7  Mean cell Hemoglobin Conc31.4  Mean cell Volum 82.1  Platelet count-Automate 163  RBC Count 4.35  Red Cell Distrib Width19.0  WBC Count6.80  % Albumin, Urine--  Hematocrit 35.7  Hemoglobin 11.2  CBC  Date: 03-05-21 @ 08:39  Mean cell Rnciufvunx95.5  Mean cell Hemoglobin Conc32.0  Mean cell Volum 82.7  Platelet count-Automate 169  RBC Count 4.46  Red Cell Distrib Width19.1  WBC Count9.16  % Albumin, Urine--  Hematocrit 36.9  Hemoglobin 11.8  CBC  Date: 03-04-21 @ 06:39  Mean cell Wwhrmravmi34.5  Mean cell Hemoglobin Conc32.3  Mean cell Volum 82.2  Platelet count-Automate 140  RBC Count 4.37  Red Cell Distrib Width19.1  WBC Count8.20  % Albumin, Urine--  Hematocrit 35.9  Hemoglobin 11.6  CBC  Date: 03-03-21 @ 05:40  Mean cell Lxeikoweec86.6  Mean cell Hemoglobin Conc32.5  Mean cell Volum 81.7  Platelet count-Automate 171  RBC Count 4.48  Red Cell Distrib Width19.2  WBC Count7.55  % Albumin, Urine--  Hematocrit 36.6  Hemoglobin 11.9  CBC  Date: 03-02-21 @ 04:34  Mean cell Aqboxrqxun49.5  Mean cell Hemoglobin Conc33.1  Mean cell Volum 80.2  Platelet count-Automate 154  RBC Count 4.75  Red Cell Distrib Width18.6  WBC Count8.28  % Albumin, Urine--  Hematocrit 38.1  Hemoglobin 12.6    [facility]  03-08-21 @ 06:28  Blood Gas Arterial-Calcium,Ionized--  Blood Urea Nitrogen, Serum 27 mg/dL<H> [10 - 20]  Carbon Dioxide, Serum30 mmol/L [17 - 32]  Chloride, Izrko060 mmol/L [98 - 110]  Creatinie, Serum0.9 mg/dL [0.7 - 1.5]  Glucose, Ckwpy873 mg/dL<H> [70 - 99]  Potassium, Serum4.4 mmol/L [3.5 - 5.0] [Slighty Hemolyzed use with Caution]  Sodium, Serum 140 mmol/L [135 - 146]  BMP  03-07-21 @ 07:11  Blood Gas Arterial-Calcium,Ionized--  Blood Urea Nitrogen, Serum 21 mg/dL<H> [10 - 20]  Carbon Dioxide, Serum28 mmol/L [17 - 32]  Chloride, Vfnsz762 mmol/L [98 - 110]  Creatinie, Serum0.8 mg/dL [0.7 - 1.5]  Glucose, Eovsv169 mg/dL<H> [70 - 99]  Potassium, Serum4.5 mmol/L [3.5 - 5.0]  Sodium, Serum 135 mmol/L [135 - 146]  [facility]  03-06-21 @ 06:55  Blood Gas Arterial-Calcium,Ionized--  Blood Urea Nitrogen, Serum 24 mg/dL<H> [10 - 20]  Carbon Dioxide, Serum30 mmol/L [17 - 32]  Chloride, Qxwsp454 mmol/L [98 - 110]  Creatinie, Serum0.7 mg/dL [0.7 - 1.5]  Glucose, Nimvw673 mg/dL<H> [70 - 99]  Potassium, Serum4.3 mmol/L [3.5 - 5.0]  Sodium, Serum 140 mmol/L [135 - 146]  [facility]  03-05-21 @ 08:39  Blood Gas Arterial-Calcium,Ionized--  Blood Urea Nitrogen, Serum 23 mg/dL<H> [10 - 20]  Carbon Dioxide, Serum28 mmol/L [17 - 32]  Chloride, Serum98 mmol/L [98 - 110]  Creatinie, Serum0.8 mg/dL [0.7 - 1.5]  Glucose, Tgogs263 mg/dL<H> [70 - 99]  Potassium, Serum4.6 mmol/L [3.5 - 5.0]  Sodium, Serum 134 mmol/L<L> [135 - 146]  [facility]  03-04-21 @ 06:39  Blood Gas Arterial-Calcium,Ionized--  Blood Urea Nitrogen, Serum 27 mg/dL<H> [10 - 20]  Carbon Dioxide, Serum29 mmol/L [17 - 32]  Chloride, Cwtcx596 mmol/L [98 - 110]  Creatinie, Serum0.9 mg/dL [0.7 - 1.5]  Glucose, Serum55 mg/dL<L> [70 - 99] [Critical value:]  Potassium, Serum4.6 mmol/L [3.5 - 5.0]  Sodium, Serum 142 mmol/L [135 - 146]  [facility]  03-03-21 @ 14:42  Blood Gas Arterial-Calcium,Ionized1.08 mmoL/L<L> [1.12 - 1.30] [Patient is on 5L NC]  Blood Urea Nitrogen, Serum --  Carbon Dioxide, Serum--  Chloride, Serum--  Creatinie, Serum--  Glucose, Serum--  Potassium, Serum--  Sodium, Serum --              Microbiology:    Culture - Blood (collected 02-09-21 @ 04:45)  Source: .Blood Blood  Final Report (02-14-21 @ 13:01):    No Growth Final        Medications:  acetaminophen   Tablet .. 650 milliGRAM(s) Oral every 6 hours PRN  albuterol/ipratropium for Nebulization 3 milliLiter(s) Nebulizer every 6 hours  ALPRAZolam 0.25 milliGRAM(s) Oral daily PRN  ascorbic acid 500 milliGRAM(s) Oral daily  aspirin enteric coated 81 milliGRAM(s) Oral daily  BACItracin   Ointment 1 Application(s) Topical every 12 hours  bisacodyl Suppository 10 milliGRAM(s) Rectal daily PRN  chlorhexidine 4% Liquid 1 Application(s) Topical daily  cholecalciferol 5000 Unit(s) Oral daily  dexAMETHasone  Injectable 6 milliGRAM(s) IV Push daily  dextrose 40% Gel 15 Gram(s) Oral once  dextrose 5%. 1000 milliLiter(s) IV Continuous <Continuous>  dextrose 5%. 1000 milliLiter(s) IV Continuous <Continuous>  dextrose 50% Injectable 25 Gram(s) IV Push once  dextrose 50% Injectable 12.5 Gram(s) IV Push once  dextrose 50% Injectable 25 Gram(s) IV Push once  enoxaparin Injectable 40 milliGRAM(s) SubCutaneous at bedtime  ferrous    sulfate 325 milliGRAM(s) Oral daily  glucagon  Injectable 1 milliGRAM(s) IntraMuscular once  guaifenesin/dextromethorphan  Syrup 5 milliLiter(s) Oral every 6 hours PRN  influenza   Vaccine 0.5 milliLiter(s) IntraMuscular once  insulin glargine Injectable (LANTUS) 25 Unit(s) SubCutaneous at bedtime  insulin lispro (ADMELOG) corrective regimen sliding scale   SubCutaneous three times a day before meals  insulin lispro Injectable (ADMELOG) 9 Unit(s) SubCutaneous three times a day before meals  levothyroxine 50 MICROGram(s) Oral daily  magnesium hydroxide Suspension 30 milliLiter(s) Oral daily PRN  melatonin 5 milliGRAM(s) Oral at bedtime  metoprolol succinate ER 25 milliGRAM(s) Oral daily  multivitamin 1 Tablet(s) Oral daily  ondansetron    Tablet 4 milliGRAM(s) Oral two times a day PRN  pantoprazole    Tablet 40 milliGRAM(s) Oral before breakfast  petrolatum white Ointment 1 Application(s) Topical every 6 hours PRN  polyethylene glycol 3350 17 Gram(s) Oral daily  prasugrel 10 milliGRAM(s) Oral daily  senna 2 Tablet(s) Oral at bedtime  tamsulosin 0.8 milliGRAM(s) Oral at bedtime  zinc sulfate 220 milliGRAM(s) Oral daily        Assessment and Plan:  #Acute hypoxemic respiratory failure secondary to COVID-19 PNA   #likely superimposed bacterial pneumonia   - Patient s/p 2 doses of the Pfizer vaccine (last dose 1/24)  -patient is failing to respond to prolong medical tx. with persistent hypoxia,  inability to have minimal activity.   - On 2L O2 via NC today, at rest O 2 - range 92- 96%, patient desaturating to low 80's in bed during taking or eating, requiring using ventimask tx.     - CTA 02/04 showed extensive bilateral patchy ground-glass opacities involving all lobes most pronounced in the upper lung zones  - s/p 1 unit plasma, Remdesivir completed 2/9, completed 1 week of rocephin & Levaquin per ID, - fungital 2/19 <19  - LE Duplex negative on 3/2  - post multiple day of IV Dexa patient was   transitioned to PO Prednisone on 3/6, with worsening dyspnea and hypoxia,  switched back to IV dexa from 3/7  -most recent CXR- slightly decreased extensive b/l pulmonary infiltrates  -procal 0.10 on 3/6  -patient unable to tolerate PT due to severe dyspnea and hypoxia during minimal activity.    #Constipation   - c/w miralax, senna; dulcolax suppository prn   - monitor BMs    #CAD/STEMI s/p 4 stents  - Hx of STEMI s/p  s/p PCI of distal RCA, mid/distal LAD (December 2017 and February 2018)  - Oct 2019: 100% occlusion of distal RCA s/p PCI with REUBEN to distal RCA, and POBA to RPL  - As per cardio (Dr. Murillo) 2/23,  effient was held temporarily, will resume Effient 10 mg po once daily from 3/8   -  c/w asa 81 daily   - C/w Toprol xl  25mg orally daily  - takes ezetimibe 10mg at home and is nonformulary, hold for now     #epistaxis x2 likely due to HFNC s/p packing now removed -- 3/5- additional episode of epistaxis .  - recurrent x 2 episodes; ENT following; packing removed 2/23;   - melena: one time episode 2/21; patient has hx of endoscopy 2 years ago which was not significant   - apply vaseline to dry nostrils mucosa prn.    # Iron deficiency anemia- started on PO iron tx.    # Vitamin D deficiency -started on vitamin D tx.    # Hyperkalemia- corrected, d/c lokelma     #HTN   - d/c enalapril 2.5mg daily due to normal BP in setting of severe covid   -continue Metoprolol tx.    #BPH  - C/w tamsulosin 0.4mg PO QHS    #DM   - Monitor FS, c/w insulin regimen   - A1C 9.1-->only on glipizide as outpatient     #Hypothyroidism  - C/w Synthroid    #GERD  - c/w protonix daily    # Supportive tx. - daily MVI, Zinc, Vitamin C.     Diet: consistent carbs, DASH/TLC + Glucerna   DVT PPx: lovenox 40mg once daily  GI ppx: protonix  Code Status: FULL CODE    #Progress Note Handoff: continue IV dexa, monitor closely for nose bleeds if recurrs d/c effient, PT to be started once patient's oxygenation is improved. patient is not a candidate for [facility] unit due to venti mask tx.   Family discussion: medical plan of tx. was d/w pt. by bedside Disposition: Home_vs. STR once medically stable

## 2021-03-09 LAB
ALBUMIN SERPL ELPH-MCNC: 3 G/DL — LOW (ref 3.5–5.2)
ALP SERPL-CCNC: 72 U/L — SIGNIFICANT CHANGE UP (ref 30–115)
ALT FLD-CCNC: 40 U/L — SIGNIFICANT CHANGE UP (ref 0–41)
ANION GAP SERPL CALC-SCNC: 12 MMOL/L — SIGNIFICANT CHANGE UP (ref 7–14)
AST SERPL-CCNC: 25 U/L — SIGNIFICANT CHANGE UP (ref 0–41)
BASOPHILS # BLD AUTO: 0.03 K/UL — SIGNIFICANT CHANGE UP (ref 0–0.2)
BASOPHILS NFR BLD AUTO: 0.4 % — SIGNIFICANT CHANGE UP (ref 0–1)
BILIRUB SERPL-MCNC: 0.3 MG/DL — SIGNIFICANT CHANGE UP (ref 0.2–1.2)
BUN SERPL-MCNC: 28 MG/DL — HIGH (ref 10–20)
CALCIUM SERPL-MCNC: 7.9 MG/DL — LOW (ref 8.5–10.1)
CHLORIDE SERPL-SCNC: 103 MMOL/L — SIGNIFICANT CHANGE UP (ref 98–110)
CO2 SERPL-SCNC: 28 MMOL/L — SIGNIFICANT CHANGE UP (ref 17–32)
CREAT SERPL-MCNC: 0.8 MG/DL — SIGNIFICANT CHANGE UP (ref 0.7–1.5)
EOSINOPHIL # BLD AUTO: 0 K/UL — SIGNIFICANT CHANGE UP (ref 0–0.7)
EOSINOPHIL NFR BLD AUTO: 0 % — SIGNIFICANT CHANGE UP (ref 0–8)
GLUCOSE BLDC GLUCOMTR-MCNC: 126 MG/DL — HIGH (ref 70–99)
GLUCOSE BLDC GLUCOMTR-MCNC: 168 MG/DL — HIGH (ref 70–99)
GLUCOSE BLDC GLUCOMTR-MCNC: 215 MG/DL — HIGH (ref 70–99)
GLUCOSE BLDC GLUCOMTR-MCNC: 224 MG/DL — HIGH (ref 70–99)
GLUCOSE SERPL-MCNC: 151 MG/DL — HIGH (ref 70–99)
HCT VFR BLD CALC: 34 % — LOW (ref 42–52)
HGB BLD-MCNC: 10.9 G/DL — LOW (ref 14–18)
IMM GRANULOCYTES NFR BLD AUTO: 2.2 % — HIGH (ref 0.1–0.3)
LYMPHOCYTES # BLD AUTO: 0.71 K/UL — LOW (ref 1.2–3.4)
LYMPHOCYTES # BLD AUTO: 9.1 % — LOW (ref 20.5–51.1)
MAGNESIUM SERPL-MCNC: 1.9 MG/DL — SIGNIFICANT CHANGE UP (ref 1.8–2.4)
MCHC RBC-ENTMCNC: 26.3 PG — LOW (ref 27–31)
MCHC RBC-ENTMCNC: 32.1 G/DL — SIGNIFICANT CHANGE UP (ref 32–37)
MCV RBC AUTO: 82.1 FL — SIGNIFICANT CHANGE UP (ref 80–94)
MONOCYTES # BLD AUTO: 0.27 K/UL — SIGNIFICANT CHANGE UP (ref 0.1–0.6)
MONOCYTES NFR BLD AUTO: 3.4 % — SIGNIFICANT CHANGE UP (ref 1.7–9.3)
NEUTROPHILS # BLD AUTO: 6.65 K/UL — HIGH (ref 1.4–6.5)
NEUTROPHILS NFR BLD AUTO: 84.9 % — HIGH (ref 42.2–75.2)
NRBC # BLD: 0 /100 WBCS — SIGNIFICANT CHANGE UP (ref 0–0)
PLATELET # BLD AUTO: 143 K/UL — SIGNIFICANT CHANGE UP (ref 130–400)
POTASSIUM SERPL-MCNC: 4 MMOL/L — SIGNIFICANT CHANGE UP (ref 3.5–5)
POTASSIUM SERPL-SCNC: 4 MMOL/L — SIGNIFICANT CHANGE UP (ref 3.5–5)
PROT SERPL-MCNC: 5.3 G/DL — LOW (ref 6–8)
RBC # BLD: 4.14 M/UL — LOW (ref 4.7–6.1)
RBC # FLD: 19.2 % — HIGH (ref 11.5–14.5)
SODIUM SERPL-SCNC: 143 MMOL/L — SIGNIFICANT CHANGE UP (ref 135–146)
WBC # BLD: 7.83 K/UL — SIGNIFICANT CHANGE UP (ref 4.8–10.8)
WBC # FLD AUTO: 7.83 K/UL — SIGNIFICANT CHANGE UP (ref 4.8–10.8)

## 2021-03-09 PROCEDURE — 99233 SBSQ HOSP IP/OBS HIGH 50: CPT | Mod: CS

## 2021-03-09 RX ADMIN — Medication 325 MILLIGRAM(S): at 11:36

## 2021-03-09 RX ADMIN — Medication 9 UNIT(S): at 08:10

## 2021-03-09 RX ADMIN — Medication 6 MILLIGRAM(S): at 05:20

## 2021-03-09 RX ADMIN — Medication 25 MILLIGRAM(S): at 05:21

## 2021-03-09 RX ADMIN — ZINC SULFATE TAB 220 MG (50 MG ZINC EQUIVALENT) 220 MILLIGRAM(S): 220 (50 ZN) TAB at 11:37

## 2021-03-09 RX ADMIN — Medication 50 MICROGRAM(S): at 05:21

## 2021-03-09 RX ADMIN — Medication 1 APPLICATION(S): at 05:22

## 2021-03-09 RX ADMIN — CHLORHEXIDINE GLUCONATE 1 APPLICATION(S): 213 SOLUTION TOPICAL at 05:21

## 2021-03-09 RX ADMIN — SENNA PLUS 2 TABLET(S): 8.6 TABLET ORAL at 21:10

## 2021-03-09 RX ADMIN — PANTOPRAZOLE SODIUM 40 MILLIGRAM(S): 20 TABLET, DELAYED RELEASE ORAL at 05:21

## 2021-03-09 RX ADMIN — Medication 4: at 11:38

## 2021-03-09 RX ADMIN — INSULIN GLARGINE 25 UNIT(S): 100 INJECTION, SOLUTION SUBCUTANEOUS at 21:14

## 2021-03-09 RX ADMIN — Medication 5000 UNIT(S): at 12:40

## 2021-03-09 RX ADMIN — Medication 9 UNIT(S): at 16:41

## 2021-03-09 RX ADMIN — Medication 2: at 08:10

## 2021-03-09 RX ADMIN — Medication 500 MILLIGRAM(S): at 11:36

## 2021-03-09 RX ADMIN — POLYETHYLENE GLYCOL 3350 17 GRAM(S): 17 POWDER, FOR SOLUTION ORAL at 12:41

## 2021-03-09 RX ADMIN — PRASUGREL 10 MILLIGRAM(S): 5 TABLET, FILM COATED ORAL at 11:37

## 2021-03-09 RX ADMIN — Medication 9 UNIT(S): at 11:37

## 2021-03-09 RX ADMIN — Medication 5 MILLIGRAM(S): at 21:10

## 2021-03-09 RX ADMIN — Medication 1 TABLET(S): at 11:37

## 2021-03-09 RX ADMIN — Medication 81 MILLIGRAM(S): at 11:36

## 2021-03-09 RX ADMIN — Medication 1 APPLICATION(S): at 17:04

## 2021-03-09 RX ADMIN — TAMSULOSIN HYDROCHLORIDE 0.8 MILLIGRAM(S): 0.4 CAPSULE ORAL at 21:10

## 2021-03-09 NOTE — PROGRESS NOTE ADULT - SUBJECTIVE AND OBJECTIVE BOX
SUBJECTIVE:    Patient is a 70y old Male who presents with a chief complaint of covid pneumonia with hypoxia (09 Mar 2021 11:03)    Currently admitted to medicine with the primary diagnosis of Shortness of breath       Today is hospital day 33d. This morning he is resting comfortably in bed and reports no new issues or overnight events.     Admit Diagnosis:  SHORTNESS OF BREATH SUSPECTED COVID 19 VIRUS INFECTION,,,,,        PAST MEDICAL & SURGICAL HISTORY  Chronic kidney disease (CKD)  III    Type 2 diabetes mellitus without complication, unspecified long term insulin use status    Hypertension, unspecified type    Dyspnea, unspecified type    ASHD (arteriosclerotic heart disease)  STEMI 12/31/17, PCI RCA    S/P cataract surgery    History of ligation of vein  2012    History of tonsillectomy  1957    Chronic kidney disease (CKD)    Type 2 diabetes mellitus without complication, unspecified long term insulin use status    Hypertension, unspecified type    Dyspnea, unspecified type    ASHD (arteriosclerotic heart disease)    S/P cataract surgery    History of ligation of vein    History of tonsillectomy        SOCIAL HISTORY:  Negative for smoking/alcohol/drug use.     ALLERGIES:  penicillin (Rash (Severe))    MEDICATIONS:  STANDING MEDICATIONS  albuterol/ipratropium for Nebulization 3 milliLiter(s) Nebulizer every 6 hours  ascorbic acid 500 milliGRAM(s) Oral daily  aspirin enteric coated 81 milliGRAM(s) Oral daily  BACItracin   Ointment 1 Application(s) Topical every 12 hours  chlorhexidine 4% Liquid 1 Application(s) Topical daily  cholecalciferol 5000 Unit(s) Oral daily  dexAMETHasone  Injectable 6 milliGRAM(s) IV Push daily  dextrose 40% Gel 15 Gram(s) Oral once  dextrose 5%. 1000 milliLiter(s) IV Continuous <Continuous>  dextrose 5%. 1000 milliLiter(s) IV Continuous <Continuous>  dextrose 50% Injectable 25 Gram(s) IV Push once  dextrose 50% Injectable 12.5 Gram(s) IV Push once  dextrose 50% Injectable 25 Gram(s) IV Push once  enoxaparin Injectable 40 milliGRAM(s) SubCutaneous at bedtime  ferrous    sulfate 325 milliGRAM(s) Oral daily  glucagon  Injectable 1 milliGRAM(s) IntraMuscular once  influenza   Vaccine 0.5 milliLiter(s) IntraMuscular once  insulin glargine Injectable (LANTUS) 25 Unit(s) SubCutaneous at bedtime  insulin lispro (ADMELOG) corrective regimen sliding scale   SubCutaneous three times a day before meals  insulin lispro Injectable (ADMELOG) 9 Unit(s) SubCutaneous three times a day before meals  levothyroxine 50 MICROGram(s) Oral daily  melatonin 5 milliGRAM(s) Oral at bedtime  metoprolol succinate ER 25 milliGRAM(s) Oral daily  multivitamin 1 Tablet(s) Oral daily  pantoprazole    Tablet 40 milliGRAM(s) Oral before breakfast  polyethylene glycol 3350 17 Gram(s) Oral daily  prasugrel 10 milliGRAM(s) Oral daily  senna 2 Tablet(s) Oral at bedtime  tamsulosin 0.8 milliGRAM(s) Oral at bedtime  zinc sulfate 220 milliGRAM(s) Oral daily    PRN MEDICATIONS  acetaminophen   Tablet .. 650 milliGRAM(s) Oral every 6 hours PRN  ALPRAZolam 0.25 milliGRAM(s) Oral daily PRN  bisacodyl Suppository 10 milliGRAM(s) Rectal daily PRN  guaifenesin/dextromethorphan  Syrup 5 milliLiter(s) Oral every 6 hours PRN  magnesium hydroxide Suspension 30 milliLiter(s) Oral daily PRN  ondansetron    Tablet 4 milliGRAM(s) Oral two times a day PRN  petrolatum white Ointment 1 Application(s) Topical every 6 hours PRN    VITALS:   T(F): 96.8  HR: 103  BP: 115/58  RR: 18  SpO2: 93%    I&Os:  03-09-21 @ 07:01  -  03-09-21 @ 15:10  --------------------------------------------------------  IN: 0 mL / OUT: 325 mL / NET: -325 mL        PHYSICAL EXAM:  GEN: No acute distress  LUNGS: Clear to auscultation bilaterally   HEART: S1/S2  ABD: Soft, NT/ND. BS +  EXT: no cyanosis/edema  NEURO: AAOX3    LABS:                        10.9   7.83  )-----------( 143      ( 09 Mar 2021 07:36 )             34.0     03-09    143  |  103  |  28<H>  ----------------------------<  151<H>  4.0   |  28  |  0.8    Ca    7.9<L>      09 Mar 2021 07:36  Phos  3.7     03-07  Mg     1.9     03-09    TPro  5.3<L>  /  Alb  3.0<L>  /  TBili  0.3  /  DBili  x   /  AST  25  /  ALT  40  /  AlkPhos  72  03-09    Creatinine trend: 0.8<--, 0.9<--, 0.8<--, 0.7<--, 0.8<--, 0.9<--, 0.8<--      COVID-19 PCR: Detected (03-04-21 @ 05:00)    Ferritin, Serum: 63 ng/mL (03-07-21 @ 11:40)  Ferritin, Serum: 132 ng/mL (02-23-21 @ 07:00)    D-Dimer Assay, Quantitative: 286 ng/mL DDU (03-08-21 @ 06:28)  D-Dimer Assay, Quantitative: 417 ng/mL DDU (03-07-21 @ 11:40)    C-Reactive Protein, Serum: 9 mg/L (03-08-21 @ 06:28)  C-Reactive Protein, Serum: 11 mg/L (03-07-21 @ 11:40)    Procalcitonin, Serum: 0.08 ng/mL (03-08-21 @ 06:28)  Procalcitonin, Serum: 0.10 ng/mL (03-06-21 @ 06:55)    Sedimentation Rate, Erythrocyte: 46 mm/Hr (03-07-21 @ 11:40)  Sedimentation Rate, Erythrocyte: 35 mm/Hr (03-04-21 @ 06:39)      RADIOLOGY:

## 2021-03-09 NOTE — PROGRESS NOTE ADULT - SUBJECTIVE AND OBJECTIVE BOX
ANDREE BUSH  [facility] T2-3A 014 A ([facility] T2-3A)            Patient was evaluated and examined  by bedside, remains very weak, in bed with minimal movement or during talking desaturating with dyspnea ,requiring ventimask oxygen tx. mild tachycardia at rest. patient is unable to ambulate due to dyspnea, hypoxia        REVIEW OF SYSTEMS:  please see pertinent positives mentioned above, all other 12 ROS negative      T(C): , Max: 36 (03-08-21 @ 13:56)  HR: 92 (03-09-21 @ 06:00)  BP: 111/60 (03-09-21 @ 06:00)  RR: 18 (03-09-21 @ 06:00)  SpO2: 93% (03-09-21 @ 06:00)  CAPILLARY BLOOD GLUCOSE      POCT Blood Glucose.: 168 mg/dL (09 Mar 2021 08:06)  POCT Blood Glucose.: 122 mg/dL (08 Mar 2021 21:06)  POCT Blood Glucose.: 200 mg/dL (08 Mar 2021 16:25)  POCT Blood Glucose.: 227 mg/dL (08 Mar 2021 11:16)      PHYSICAL EXAM:  General: NAD, AAOX3, patient is laying comfortably in bed  HEENT: AT, NC, Supple, NO JVD, NO CB  Lungs: mild decreased breath sounds B/L, no wheezing, no rhonchi  CVS: normal S1, S2, RRR, tachy, NO M/G/R  Abdomen: soft, bowel sounds present, non-tender, non-distended  Extremities: no edema, no clubbing, no cyanosis, positive peripheral pulses b/l  Neuro: no acute focal neurological deficits, generalized body weakness  Skin: no rash, no ecchymosis      LAB  CBC  Date: 03-09-21 @ 07:36  Mean cell Vgvhckwbix49.3  Mean cell Hemoglobin Conc32.1  Mean cell Volum 82.1  Platelet count-Automate 143  RBC Count 4.14  Red Cell Distrib Width19.2  WBC Count7.83  % Albumin, Urine--  Hematocrit 34.0  Hemoglobin 10.9  CBC  Date: 03-08-21 @ 06:28  Mean cell Qsdgclkemj76.8  Mean cell Hemoglobin Conc31.3  Mean cell Volum 82.5  Platelet count-Automate 155  RBC Count 4.34  Red Cell Distrib Width18.9  WBC Count7.96  % Albumin, Urine--  Hematocrit 35.8  Hemoglobin 11.2  CBC  Date: 03-07-21 @ 07:11  Mean cell Dzqizgktir22.5  Mean cell Hemoglobin Conc32.2  Mean cell Volum 82.0  Platelet count-Automate 160  RBC Count 4.12  Red Cell Distrib Width19.5  WBC Count8.21  % Albumin, Urine--  Hematocrit 33.8  Hemoglobin 10.9  CBC  Date: 03-06-21 @ 06:55  Mean cell Qimluiqodw79.7  Mean cell Hemoglobin Conc31.4  Mean cell Volum 82.1  Platelet count-Automate 163  RBC Count 4.35  Red Cell Distrib Width19.0  WBC Count6.80  % Albumin, Urine--  Hematocrit 35.7  Hemoglobin 11.2  CBC  Date: 03-05-21 @ 08:39  Mean cell Gssarplnbs74.5  Mean cell Hemoglobin Conc32.0  Mean cell Volum 82.7  Platelet count-Automate 169  RBC Count 4.46  Red Cell Distrib Width19.1  WBC Count9.16  % Albumin, Urine--  Hematocrit 36.9  Hemoglobin 11.8  CBC  Date: 03-04-21 @ 06:39  Mean cell Neyealbxzf64.5  Mean cell Hemoglobin Conc32.3  Mean cell Volum 82.2  Platelet count-Automate 140  RBC Count 4.37  Red Cell Distrib Width19.1  WBC Count8.20  % Albumin, Urine--  Hematocrit 35.9  Hemoglobin 11.6  CBC  Date: 03-03-21 @ 05:40  Mean cell Xpsdmlxayc76.6  Mean cell Hemoglobin Conc32.5  Mean cell Volum 81.7  Platelet count-Automate 171  RBC Count 4.48  Red Cell Distrib Width19.2  WBC Count7.55  % Albumin, Urine--  Hematocrit 36.6  Hemoglobin 11.9    [facility]  03-09-21 @ 07:36  Blood Gas Arterial-Calcium,Ionized--  Blood Urea Nitrogen, Serum 28 mg/dL<H> [10 - 20]  Carbon Dioxide, Serum28 mmol/L [17 - 32]  Chloride, Aourp904 mmol/L [98 - 110]  Creatinie, Serum0.8 mg/dL [0.7 - 1.5]  Glucose, Ptrct698 mg/dL<H> [70 - 99]  Potassium, Serum4.0 mmol/L [3.5 - 5.0]  Sodium, Serum 143 mmol/L [135 - 146]  [facility]  03-08-21 @ 06:28  Blood Gas Arterial-Calcium,Ionized--  Blood Urea Nitrogen, Serum 27 mg/dL<H> [10 - 20]  Carbon Dioxide, Serum30 mmol/L [17 - 32]  Chloride, Sxpkq919 mmol/L [98 - 110]  Creatinie, Serum0.9 mg/dL [0.7 - 1.5]  Glucose, Zkblb407 mg/dL<H> [70 - 99]  Potassium, Serum4.4 mmol/L [3.5 - 5.0] [Slighty Hemolyzed use with Caution]  Sodium, Serum 140 mmol/L [135 - 146]  [facility]  03-07-21 @ 07:11  Blood Gas Arterial-Calcium,Ionized--  Blood Urea Nitrogen, Serum 21 mg/dL<H> [10 - 20]  Carbon Dioxide, Serum28 mmol/L [17 - 32]  Chloride, Njhdt628 mmol/L [98 - 110]  Creatinie, Serum0.8 mg/dL [0.7 - 1.5]  Glucose, Grkuv548 mg/dL<H> [70 - 99]  Potassium, Serum4.5 mmol/L [3.5 - 5.0]  Sodium, Serum 135 mmol/L [135 - 146]  [facility]  03-06-21 @ 06:55  Blood Gas Arterial-Calcium,Ionized--  Blood Urea Nitrogen, Serum 24 mg/dL<H> [10 - 20]  Carbon Dioxide, Serum30 mmol/L [17 - 32]  Chloride, Vgjds048 mmol/L [98 - 110]  Creatinie, Serum0.7 mg/dL [0.7 - 1.5]  Glucose, Ivecy006 mg/dL<H> [70 - 99]  Potassium, Serum4.3 mmol/L [3.5 - 5.0]  Sodium, Serum 140 mmol/L [135 - 146]  [facility]  03-05-21 @ 08:39  Blood Gas Arterial-Calcium,Ionized--  Blood Urea Nitrogen, Serum 23 mg/dL<H> [10 - 20]  Carbon Dioxide, Serum28 mmol/L [17 - 32]  Chloride, Serum98 mmol/L [98 - 110]  Creatinie, Serum0.8 mg/dL [0.7 - 1.5]  Glucose, Iffvc814 mg/dL<H> [70 - 99]  Potassium, Serum4.6 mmol/L [3.5 - 5.0]  Sodium, Serum 134 mmol/L<L> [135 - 146]              Microbiology:    Culture - Blood (collected 02-09-21 @ 04:45)  Source: .Blood Blood  Final Report (02-14-21 @ 13:01):    No Growth Final        Medications:  acetaminophen   Tablet .. 650 milliGRAM(s) Oral every 6 hours PRN  albuterol/ipratropium for Nebulization 3 milliLiter(s) Nebulizer every 6 hours  ALPRAZolam 0.25 milliGRAM(s) Oral daily PRN  ascorbic acid 500 milliGRAM(s) Oral daily  aspirin enteric coated 81 milliGRAM(s) Oral daily  BACItracin   Ointment 1 Application(s) Topical every 12 hours  bisacodyl Suppository 10 milliGRAM(s) Rectal daily PRN  chlorhexidine 4% Liquid 1 Application(s) Topical daily  cholecalciferol 5000 Unit(s) Oral daily  dexAMETHasone  Injectable 6 milliGRAM(s) IV Push daily  dextrose 40% Gel 15 Gram(s) Oral once  dextrose 5%. 1000 milliLiter(s) IV Continuous <Continuous>  dextrose 5%. 1000 milliLiter(s) IV Continuous <Continuous>  dextrose 50% Injectable 25 Gram(s) IV Push once  dextrose 50% Injectable 12.5 Gram(s) IV Push once  dextrose 50% Injectable 25 Gram(s) IV Push once  enoxaparin Injectable 40 milliGRAM(s) SubCutaneous at bedtime  ferrous    sulfate 325 milliGRAM(s) Oral daily  glucagon  Injectable 1 milliGRAM(s) IntraMuscular once  guaifenesin/dextromethorphan  Syrup 5 milliLiter(s) Oral every 6 hours PRN  influenza   Vaccine 0.5 milliLiter(s) IntraMuscular once  insulin glargine Injectable (LANTUS) 25 Unit(s) SubCutaneous at bedtime  insulin lispro (ADMELOG) corrective regimen sliding scale   SubCutaneous three times a day before meals  insulin lispro Injectable (ADMELOG) 9 Unit(s) SubCutaneous three times a day before meals  levothyroxine 50 MICROGram(s) Oral daily  magnesium hydroxide Suspension 30 milliLiter(s) Oral daily PRN  melatonin 5 milliGRAM(s) Oral at bedtime  metoprolol succinate ER 25 milliGRAM(s) Oral daily  multivitamin 1 Tablet(s) Oral daily  ondansetron    Tablet 4 milliGRAM(s) Oral two times a day PRN  pantoprazole    Tablet 40 milliGRAM(s) Oral before breakfast  petrolatum white Ointment 1 Application(s) Topical every 6 hours PRN  polyethylene glycol 3350 17 Gram(s) Oral daily  prasugrel 10 milliGRAM(s) Oral daily  senna 2 Tablet(s) Oral at bedtime  tamsulosin 0.8 milliGRAM(s) Oral at bedtime  zinc sulfate 220 milliGRAM(s) Oral daily        Assessment and Plan:  #Acute hypoxemic respiratory failure secondary to COVID-19 PNA   #likely superimposed bacterial pneumonia   - Patient s/p 2 doses of the Pfizer vaccine (last dose 1/24)  -patient is failing to respond to prolong medical tx. with persistent hypoxia,  inability to have minimal activity.   - On 2L O2 via NC today, at rest O 2 - range 92- 96%, patient desaturating to low 80's in bed during taking or eating, requiring using ventimask tx.     - CTA 02/04 showed extensive bilateral patchy ground-glass opacities involving all lobes most pronounced in the upper lung zones  - s/p 1 unit plasma, Remdesivir completed 2/9, completed 1 week of rocephin & Levaquin per ID, - fungital 2/19 <19  - LE Duplex negative on 3/2  - post multiple day of IV Dexa patient was   transitioned to PO Prednisone on 3/6, with worsening dyspnea and hypoxia,  switched back to IV dexa from 3/7  -most recent CXR- slightly decreased extensive b/l pulmonary infiltrates  -procal 0.10 on 3/6  -patient unable to tolerate PT due to severe dyspnea and hypoxia during minimal activity.    #Constipation   - c/w miralax, senna; dulcolax suppository prn   - monitor BMs    #CAD/STEMI s/p 4 stents  - Hx of STEMI s/p  s/p PCI of distal RCA, mid/distal LAD (December 2017 and February 2018)  - Oct 2019: 100% occlusion of distal RCA s/p PCI with REUBEN to distal RCA, and POBA to RPL  - As per cardio (Dr. Murillo) 2/23,  effient was held temporarily, will resume Effient 10 mg po once daily from 3/8   -  c/w asa 81 daily   - C/w Toprol xl  25mg orally daily  - takes ezetimibe 10mg at home and is nonformulary, hold for now     #epistaxis x2 likely due to HFNC s/p packing now removed -- 3/5- additional episode of epistaxis .  - recurrent x 2 episodes; ENT following; packing removed 2/23;   - melena: one time episode 2/21; patient has hx of endoscopy 2 years ago which was not significant   - apply vaseline to dry nostrils mucosa prn.    # Iron deficiency anemia- started on PO iron tx.    # Vitamin D deficiency -started on vitamin D tx.    # Hyperkalemia- corrected, d/c lokelma     #HTN   - d/c enalapril 2.5mg daily due to normal BP in setting of severe covid   -continue Metoprolol tx.    #BPH  - C/w tamsulosin 0.4mg PO QHS    #DM   - Monitor FS, c/w insulin regimen   - A1C 9.1-->only on glipizide as outpatient     #Hypothyroidism  - C/w Synthroid    #GERD  - c/w protonix daily    # Supportive tx. - daily MVI, Zinc, Vitamin C.     Diet: consistent carbs, DASH/TLC + Glucerna   DVT PPx: lovenox 40mg once daily  GI ppx: protonix  Code Status: FULL CODE    #Progress Note Handoff: assess patient if we can start slowly taper down IV dexa to PO prednisone, monitor closely for nose bleeds if recurrs d/c effient, PT to be started once patient's oxygenation is improved. patient is not a candidate for [facility] unit due to venti mask tx.   Family discussion: medical plan of tx. was d/w pt. by bedside Disposition: Home_vs. STR once medically stable

## 2021-03-09 NOTE — PROGRESS NOTE ADULT - ASSESSMENT
71 yo male admitted with COVID pneumonia; ENT recalled today for LEFT epistaxis - 7.5 rhinorocket packed onto left nares hemostasis achieved      Plan:  ·	keep packing in place for 48-72 hours (Friday)  ·	hold anticoagulation if possible  ·	continue venturi mask with humidified air O2   ·	Monitor H/H, transfuse prn   ·	strict BP control   ·	ppx abx  ·	avoid nose blowing, trauma, or digital manipulation   ·	discussed with pt that the blood clot at his posterior OP will eventually fall by either consumption of food or spitting out  ·	will discuss with attending

## 2021-03-09 NOTE — CHART NOTE - NSCHARTNOTEFT_GEN_A_CORE
I made rounds today with the treatment team including the hospitalist, residents,  nurses, and discussed the patient's current medical status and discharge  planning needs, and reviewed the chart.    T(C): 36 (03-09-21 @ 06:00), Max: 36 (03-08-21 @ 13:56)  HR: 92 (03-09-21 @ 06:00) (92 - 99)  BP: 111/60 (03-09-21 @ 06:00) (107/63 - 111/60)  RR: 18 (03-09-21 @ 06:00) (18 - 18)  SpO2: 93% (03-09-21 @ 06:00) (93% - 94%)          I reached out to the patient's health care proxy/ responsible family member-           [   x  ]  I reached         his wife, Ami                            and discussed the patient's medical condition,                   family concerns, and discharge planning           [     ]  I left a message with family               [     ]  I personally participated in rounds with the medical team and my resident and discussed the case. My resident reached                   family member/ HCP                                under my direction and supervision  and we reviewed the conversation.          [     ]  My resident left a message with family under my direction and supervision           [     ]   My resident attended medical rounds and called the family                [  x    ]    The following was discussed:  The patient's medical status over the past 24 hrs was reviewed, as well as oxygen needs and medication changes and labs. Stable on 2 liters NC O2 at rest. Desats with speaking. Slowly weaning steroids. Being treated by PT. CG transfer, desats when attempting to ambulate.         [   x   ]   The following concerns were raised: none          [     ] I spent 5-10 minutes on the above discussing medical issues with team members and family and/ or my resident    [     ] I spent 11-20 minutes on the above discussing medical issues with team members and family and/ or my resident    [   x  ] I spent 21-30 minutes on the above discussing medical issues with team members and family and/ or my resident

## 2021-03-09 NOTE — PROGRESS NOTE ADULT - SUBJECTIVE AND OBJECTIVE BOX
ENT PROGRESS NOTE:    Patient is a 69 yo male admitted with COVID pneumonia; ENT recalled today for epistaxis. Pt seen and examined at bedside. Pt holding basin spitting up blood clots & 4x4 gauze onto left nares. RN reports pt satting well on venti mask, but when removed desaturates to 80s. Pt states he feels something in the back of his throat, tries to cough it out however unable to. Pt reports on effient, lovenox & ASA for CAD/STEMI.         REVIEW OF SYSTEMS:  [ x ] a 10 point review of systems was negative except where noted      PAST MEDICAL & SURGICAL HISTORY:  Chronic kidney disease (CKD)  III    Type 2 diabetes mellitus without complication, unspecified long term insulin use status    Hypertension, unspecified type    Dyspnea, unspecified type    ASHD (arteriosclerotic heart disease)  STEMI 12/31/17, PCI RCA    S/P cataract surgery    History of ligation of vein  2012    History of tonsillectomy  1957        MEDICATIONS  (STANDING):  albuterol/ipratropium for Nebulization 3 milliLiter(s) Nebulizer every 6 hours  ascorbic acid 500 milliGRAM(s) Oral daily  aspirin enteric coated 81 milliGRAM(s) Oral daily  BACItracin   Ointment 1 Application(s) Topical every 12 hours  chlorhexidine 4% Liquid 1 Application(s) Topical daily  cholecalciferol 5000 Unit(s) Oral daily  dexAMETHasone  Injectable 6 milliGRAM(s) IV Push daily  dextrose 40% Gel 15 Gram(s) Oral once  dextrose 5%. 1000 milliLiter(s) (50 mL/Hr) IV Continuous <Continuous>  dextrose 5%. 1000 milliLiter(s) (100 mL/Hr) IV Continuous <Continuous>  dextrose 50% Injectable 25 Gram(s) IV Push once  dextrose 50% Injectable 12.5 Gram(s) IV Push once  dextrose 50% Injectable 25 Gram(s) IV Push once  enoxaparin Injectable 40 milliGRAM(s) SubCutaneous at bedtime  ferrous    sulfate 325 milliGRAM(s) Oral daily  glucagon  Injectable 1 milliGRAM(s) IntraMuscular once  influenza   Vaccine 0.5 milliLiter(s) IntraMuscular once  insulin glargine Injectable (LANTUS) 25 Unit(s) SubCutaneous at bedtime  insulin lispro (ADMELOG) corrective regimen sliding scale   SubCutaneous three times a day before meals  insulin lispro Injectable (ADMELOG) 9 Unit(s) SubCutaneous three times a day before meals  levothyroxine 50 MICROGram(s) Oral daily  melatonin 5 milliGRAM(s) Oral at bedtime  metoprolol succinate ER 25 milliGRAM(s) Oral daily  multivitamin 1 Tablet(s) Oral daily  pantoprazole    Tablet 40 milliGRAM(s) Oral before breakfast  polyethylene glycol 3350 17 Gram(s) Oral daily  prasugrel 10 milliGRAM(s) Oral daily  senna 2 Tablet(s) Oral at bedtime  tamsulosin 0.8 milliGRAM(s) Oral at bedtime  zinc sulfate 220 milliGRAM(s) Oral daily    MEDICATIONS  (PRN):  acetaminophen   Tablet .. 650 milliGRAM(s) Oral every 6 hours PRN Temp greater or equal to 38C (100.4F), Mild Pain (1 - 3)  ALPRAZolam 0.25 milliGRAM(s) Oral daily PRN Anxiety  bisacodyl Suppository 10 milliGRAM(s) Rectal daily PRN Constipation  guaifenesin/dextromethorphan  Syrup 5 milliLiter(s) Oral every 6 hours PRN Cough  magnesium hydroxide Suspension 30 milliLiter(s) Oral daily PRN Constipation  ondansetron    Tablet 4 milliGRAM(s) Oral two times a day PRN Nausea and/or Vomiting  petrolatum white Ointment 1 Application(s) Topical every 6 hours PRN dry nostrils    Allergies    penicillin (Rash (Severe))        Vital Signs Last 24 Hrs  T(C): 36 (09 Mar 2021 06:00), Max: 36 (09 Mar 2021 06:00)  T(F): 96.8 (09 Mar 2021 06:00), Max: 96.8 (09 Mar 2021 06:00)  HR: 103 (09 Mar 2021 13:18) (92 - 103)  BP: 115/58 (09 Mar 2021 13:18) (107/63 - 115/58)  RR: 18 (09 Mar 2021 13:18) (18 - 18)  SpO2: 93% (09 Mar 2021 13:18) (93% - 94%)    PHYSICAL EXAM:  Gen: awake&alert. holding pink basin, spitting out clots. increased work of breathing currently on venturi mask    Skin: Non diaphoretic  Head: Normocephalic, Atraumatic  Face: No edema, erythema, or fluctuance  Eyes: no scleral injection  Nose: + saturated 4x4 gauze packed onto left nares, dried mucous/bloody secretions noted to right nares  Mouth: oral mucosa moist/pink, + large blood clot hanging towards the left side of the posterior oropharynx. Dentition intact.   Neck: Flat, supple, no lymphadenopathy, trachea midline  Resp: + accessory muscle use, on venturi mask satting 94%   CV: extremities without edema  GI: Soft, nondistended     4x4 gauze removed from left nares, 7.5 rhino rocket packed onto pt's left nares easily, inflated balloon with 7cc of air. bleeding stopped.  no active bleeding noted to posterior OP, however blood clot still visualized after multiple times to suction it. applied mustache dressing. RN at bedside provided new venturi mask, pt tolerated procedure well.    LABS:                        10.9   7.83  )-----------( 143      ( 09 Mar 2021 07:36 )             34.0    03-09    143  |  103  |  28<H>  ----------------------------<  151<H>  4.0   |  28  |  0.8    Ca    7.9<L>      09 Mar 2021 07:36  Mg     1.9     03-09    TPro  5.3<L>  /  Alb  3.0<L>  /  TBili  0.3  /  DBili  x   /  AST  25  /  ALT  40  /  AlkPhos  72  03-09

## 2021-03-09 NOTE — PROGRESS NOTE ADULT - NSHPATTENDINGPLANDISCUSS_GEN_ALL_CORE
TEAM
house staff
team
house staff
house staff
resident
team
house staff
team
house staff
team
team
house staff
house staff

## 2021-03-09 NOTE — PROGRESS NOTE ADULT - ASSESSMENT
71 yo M with PMH of CAD, MI (s/p 4 stents), DM, HTN, BPH, hypothyroid and GERD, recently received Pfizer vaccine (1/3 and 1/24) presented to ED c/o progressive SOB, fever, and weakness. Patient stated that his symptoms began on Wednesday 2/3. COVID PCR + on 2/4. Admitted to medicine with acute hypoxemic respiratory failure secondary to COVID-19 PNA. Upgraded to ICU from 3A due to increasing oxygen requirements on 2/6. ICU course complicated by epistaxis x 2 due to HFNC.    #Acute hypoxemic respiratory failure secondary to COVID-19 PNA   #likely superimposed bacterial pneumonia   - Patient s/p 2 doses of the Pfizer vaccine (last dose 1/24)  -patient is failing to respond to prolong medical tx. with persistent hypoxia,  inability to have minimal activity.   - On 2L O2 via NC today, at rest O 2 - range 92- 96%, patient desaturating to low 80's in bed during taking or eating, requiring using ventimask tx.     - CTA 02/04 showed extensive bilateral patchy ground-glass opacities involving all lobes most pronounced in the upper lung zones  - s/p 1 unit plasma, Remdesivir completed 2/9, completed 1 week of rocephin & Levaquin per ID, - fungital 2/19 <19  - LE Duplex negative on 3/2  - post multiple day of IV Dexa patient was   transitioned to PO Prednisone on 3/6, with worsening dyspnea and hypoxia, switched back to IV dexa from 3/7  - most recent CXR- slightly decreased extensive b/l pulmonary infiltrates  - procal 0.10 on 3/6  - patient unable to tolerate PT due to severe dyspnea and hypoxia during minimal activity.    # Constipation   - c/w miralax, senna; dulcolax suppository prn   - monitor BMs    #CAD/STEMI s/p 4 stents  - Hx of STEMI s/p  s/p PCI of distal RCA, mid/distal LAD (December 2017 and February 2018)  - Oct 2019: 100% occlusion of distal RCA s/p PCI with REUBEN to distal RCA, and POBA to RPL  - As per cardio (Dr. Murillo) 2/23,  effient was held temporarily, will resume Effient 10 mg po once daily from 3/8   -  c/w asa 81 daily   - C/w Toprol xl  25mg orally daily  - takes ezetimibe 10mg at home and is nonformulary, hold for now     #epistaxis x2 likely due to HFNC s/p packing now removed -- 3/5- additional episode of epistaxis .  - recurrent x 2 episodes; ENT following; packing removed 2/23;   - melena: one time episode 2/21; patient has hx of endoscopy 2 years ago which was not significant   - apply vaseline to dry nostrils mucosa prn.    # Iron deficiency anemia- started on PO iron tx.    # Vitamin D deficiency -started on vitamin D tx.    # Hyperkalemia- corrected, d/c lokelma     #HTN   - d/c enalapril 2.5mg daily due to normal BP in setting of severe covid   -continue Metoprolol tx.    #BPH  - C/w tamsulosin 0.4mg PO QHS    #DM   - Monitor FS, c/w insulin regimen   - A1C 9.1-->only on glipizide as outpatient     #Hypothyroidism  - C/w Synthroid    #GERD  - c/w protonix daily    # Supportive tx. - daily MVI, Zinc, Vitamin C.     Diet: consistent carbs, DASH/TLC + Glucerna   DVT PPx: lovenox 40mg once daily  GI ppx: protonix  Code Status: FULL CODE  Dispo: Acute. From home. pending clinical improvement, PT rehab.

## 2021-03-09 NOTE — CHART NOTE - NSCHARTNOTEFT_GEN_A_CORE
At ~1700, called to bedside by RN for patient with extensive epistaxis.  Patient was on NRB satting 97%. Patient was oozing blood extensively from b/l nostrils and choking on his own blood.  Instructed patient to lean forward and pinch nose, but this did not resolve the epistaxis.  ENT was called whereupon they came to the room and packed the patient's nose.

## 2021-03-10 LAB
ALBUMIN SERPL ELPH-MCNC: 3 G/DL — LOW (ref 3.5–5.2)
ALP SERPL-CCNC: 63 U/L — SIGNIFICANT CHANGE UP (ref 30–115)
ALT FLD-CCNC: 35 U/L — SIGNIFICANT CHANGE UP (ref 0–41)
ANION GAP SERPL CALC-SCNC: 7 MMOL/L — SIGNIFICANT CHANGE UP (ref 7–14)
AST SERPL-CCNC: 21 U/L — SIGNIFICANT CHANGE UP (ref 0–41)
BASOPHILS # BLD AUTO: 0.03 K/UL — SIGNIFICANT CHANGE UP (ref 0–0.2)
BASOPHILS NFR BLD AUTO: 0.4 % — SIGNIFICANT CHANGE UP (ref 0–1)
BILIRUB SERPL-MCNC: 0.3 MG/DL — SIGNIFICANT CHANGE UP (ref 0.2–1.2)
BUN SERPL-MCNC: 31 MG/DL — HIGH (ref 10–20)
CALCIUM SERPL-MCNC: 7.9 MG/DL — LOW (ref 8.5–10.1)
CHLORIDE SERPL-SCNC: 103 MMOL/L — SIGNIFICANT CHANGE UP (ref 98–110)
CO2 SERPL-SCNC: 29 MMOL/L — SIGNIFICANT CHANGE UP (ref 17–32)
CREAT SERPL-MCNC: 0.8 MG/DL — SIGNIFICANT CHANGE UP (ref 0.7–1.5)
D DIMER BLD IA.RAPID-MCNC: 307 NG/ML DDU — HIGH (ref 0–230)
EOSINOPHIL # BLD AUTO: 0.01 K/UL — SIGNIFICANT CHANGE UP (ref 0–0.7)
EOSINOPHIL NFR BLD AUTO: 0.1 % — SIGNIFICANT CHANGE UP (ref 0–8)
GLUCOSE BLDC GLUCOMTR-MCNC: 185 MG/DL — HIGH (ref 70–99)
GLUCOSE BLDC GLUCOMTR-MCNC: 196 MG/DL — HIGH (ref 70–99)
GLUCOSE BLDC GLUCOMTR-MCNC: 221 MG/DL — HIGH (ref 70–99)
GLUCOSE BLDC GLUCOMTR-MCNC: 244 MG/DL — HIGH (ref 70–99)
GLUCOSE SERPL-MCNC: 190 MG/DL — HIGH (ref 70–99)
HCT VFR BLD CALC: 33.5 % — LOW (ref 42–52)
HGB BLD-MCNC: 10.6 G/DL — LOW (ref 14–18)
IMM GRANULOCYTES NFR BLD AUTO: 2.4 % — HIGH (ref 0.1–0.3)
LYMPHOCYTES # BLD AUTO: 1.15 K/UL — LOW (ref 1.2–3.4)
LYMPHOCYTES # BLD AUTO: 15.5 % — LOW (ref 20.5–51.1)
MAGNESIUM SERPL-MCNC: 1.9 MG/DL — SIGNIFICANT CHANGE UP (ref 1.8–2.4)
MCHC RBC-ENTMCNC: 25.9 PG — LOW (ref 27–31)
MCHC RBC-ENTMCNC: 31.6 G/DL — LOW (ref 32–37)
MCV RBC AUTO: 81.9 FL — SIGNIFICANT CHANGE UP (ref 80–94)
MONOCYTES # BLD AUTO: 0.41 K/UL — SIGNIFICANT CHANGE UP (ref 0.1–0.6)
MONOCYTES NFR BLD AUTO: 5.5 % — SIGNIFICANT CHANGE UP (ref 1.7–9.3)
NEUTROPHILS # BLD AUTO: 5.65 K/UL — SIGNIFICANT CHANGE UP (ref 1.4–6.5)
NEUTROPHILS NFR BLD AUTO: 76.1 % — HIGH (ref 42.2–75.2)
NRBC # BLD: 0 /100 WBCS — SIGNIFICANT CHANGE UP (ref 0–0)
PLATELET # BLD AUTO: 137 K/UL — SIGNIFICANT CHANGE UP (ref 130–400)
POTASSIUM SERPL-MCNC: 4.4 MMOL/L — SIGNIFICANT CHANGE UP (ref 3.5–5)
POTASSIUM SERPL-SCNC: 4.4 MMOL/L — SIGNIFICANT CHANGE UP (ref 3.5–5)
PROT SERPL-MCNC: 5.1 G/DL — LOW (ref 6–8)
RBC # BLD: 4.09 M/UL — LOW (ref 4.7–6.1)
RBC # FLD: 18.8 % — HIGH (ref 11.5–14.5)
SARS-COV-2 RNA SPEC QL NAA+PROBE: SIGNIFICANT CHANGE UP
SODIUM SERPL-SCNC: 139 MMOL/L — SIGNIFICANT CHANGE UP (ref 135–146)
WBC # BLD: 7.43 K/UL — SIGNIFICANT CHANGE UP (ref 4.8–10.8)
WBC # FLD AUTO: 7.43 K/UL — SIGNIFICANT CHANGE UP (ref 4.8–10.8)

## 2021-03-10 PROCEDURE — 99233 SBSQ HOSP IP/OBS HIGH 50: CPT | Mod: CS

## 2021-03-10 PROCEDURE — 71045 X-RAY EXAM CHEST 1 VIEW: CPT | Mod: 26

## 2021-03-10 RX ORDER — IPRATROPIUM BROMIDE 0.2 MG/ML
1 SOLUTION, NON-ORAL INHALATION EVERY 6 HOURS
Refills: 0 | Status: DISCONTINUED | OUTPATIENT
Start: 2021-03-10 | End: 2021-03-14

## 2021-03-10 RX ORDER — ALBUTEROL 90 UG/1
2 AEROSOL, METERED ORAL EVERY 6 HOURS
Refills: 0 | Status: DISCONTINUED | OUTPATIENT
Start: 2021-03-10 | End: 2021-03-14

## 2021-03-10 RX ADMIN — ZINC SULFATE TAB 220 MG (50 MG ZINC EQUIVALENT) 220 MILLIGRAM(S): 220 (50 ZN) TAB at 11:16

## 2021-03-10 RX ADMIN — Medication 9 UNIT(S): at 17:25

## 2021-03-10 RX ADMIN — Medication 650 MILLIGRAM(S): at 08:06

## 2021-03-10 RX ADMIN — Medication 81 MILLIGRAM(S): at 11:15

## 2021-03-10 RX ADMIN — Medication 5 MILLIGRAM(S): at 21:46

## 2021-03-10 RX ADMIN — PRASUGREL 10 MILLIGRAM(S): 5 TABLET, FILM COATED ORAL at 13:57

## 2021-03-10 RX ADMIN — Medication 1 TABLET(S): at 11:16

## 2021-03-10 RX ADMIN — Medication 9 UNIT(S): at 08:05

## 2021-03-10 RX ADMIN — ENOXAPARIN SODIUM 40 MILLIGRAM(S): 100 INJECTION SUBCUTANEOUS at 21:46

## 2021-03-10 RX ADMIN — INSULIN GLARGINE 25 UNIT(S): 100 INJECTION, SOLUTION SUBCUTANEOUS at 21:46

## 2021-03-10 RX ADMIN — SENNA PLUS 2 TABLET(S): 8.6 TABLET ORAL at 21:46

## 2021-03-10 RX ADMIN — POLYETHYLENE GLYCOL 3350 17 GRAM(S): 17 POWDER, FOR SOLUTION ORAL at 11:17

## 2021-03-10 RX ADMIN — Medication 500 MILLIGRAM(S): at 11:15

## 2021-03-10 RX ADMIN — Medication 5000 UNIT(S): at 11:15

## 2021-03-10 RX ADMIN — TAMSULOSIN HYDROCHLORIDE 0.8 MILLIGRAM(S): 0.4 CAPSULE ORAL at 21:46

## 2021-03-10 RX ADMIN — PANTOPRAZOLE SODIUM 40 MILLIGRAM(S): 20 TABLET, DELAYED RELEASE ORAL at 06:02

## 2021-03-10 RX ADMIN — Medication 4: at 11:16

## 2021-03-10 RX ADMIN — Medication 2: at 17:26

## 2021-03-10 RX ADMIN — Medication 4: at 08:06

## 2021-03-10 RX ADMIN — Medication 25 MILLIGRAM(S): at 05:48

## 2021-03-10 RX ADMIN — Medication 325 MILLIGRAM(S): at 11:16

## 2021-03-10 RX ADMIN — Medication 50 MICROGRAM(S): at 05:48

## 2021-03-10 RX ADMIN — Medication 650 MILLIGRAM(S): at 08:36

## 2021-03-10 RX ADMIN — Medication 1 APPLICATION(S): at 05:49

## 2021-03-10 RX ADMIN — Medication 9 UNIT(S): at 11:16

## 2021-03-10 RX ADMIN — Medication 6 MILLIGRAM(S): at 05:48

## 2021-03-10 NOTE — PROGRESS NOTE ADULT - SUBJECTIVE AND OBJECTIVE BOX
Subjective:   Pt. seen and examined at bedside in NAD, no evidence of active bleeding, no acute events overnight. on face mask. Denies fever, chills.     ROS:   [ x ] A 10 Point Review of Systems was negative except where noted     acetaminophen   Tablet .. 650 milliGRAM(s) Oral every 6 hours PRN  albuterol/ipratropium for Nebulization 3 milliLiter(s) Nebulizer every 6 hours  ALPRAZolam 0.25 milliGRAM(s) Oral daily PRN  ascorbic acid 500 milliGRAM(s) Oral daily  aspirin enteric coated 81 milliGRAM(s) Oral daily  BACItracin   Ointment 1 Application(s) Topical every 12 hours  bisacodyl Suppository 10 milliGRAM(s) Rectal daily PRN  chlorhexidine 4% Liquid 1 Application(s) Topical daily  cholecalciferol 5000 Unit(s) Oral daily  dexAMETHasone  Injectable 6 milliGRAM(s) IV Push daily  dextrose 40% Gel 15 Gram(s) Oral once  dextrose 5%. 1000 milliLiter(s) IV Continuous <Continuous>  dextrose 5%. 1000 milliLiter(s) IV Continuous <Continuous>  dextrose 50% Injectable 25 Gram(s) IV Push once  dextrose 50% Injectable 12.5 Gram(s) IV Push once  dextrose 50% Injectable 25 Gram(s) IV Push once  enoxaparin Injectable 40 milliGRAM(s) SubCutaneous at bedtime  ferrous    sulfate 325 milliGRAM(s) Oral daily  glucagon  Injectable 1 milliGRAM(s) IntraMuscular once  guaifenesin/dextromethorphan  Syrup 5 milliLiter(s) Oral every 6 hours PRN  influenza   Vaccine 0.5 milliLiter(s) IntraMuscular once  insulin glargine Injectable (LANTUS) 25 Unit(s) SubCutaneous at bedtime  insulin lispro (ADMELOG) corrective regimen sliding scale   SubCutaneous three times a day before meals  insulin lispro Injectable (ADMELOG) 9 Unit(s) SubCutaneous three times a day before meals  levothyroxine 50 MICROGram(s) Oral daily  magnesium hydroxide Suspension 30 milliLiter(s) Oral daily PRN  melatonin 5 milliGRAM(s) Oral at bedtime  metoprolol succinate ER 25 milliGRAM(s) Oral daily  multivitamin 1 Tablet(s) Oral daily  ondansetron    Tablet 4 milliGRAM(s) Oral two times a day PRN  pantoprazole    Tablet 40 milliGRAM(s) Oral before breakfast  petrolatum white Ointment 1 Application(s) Topical every 6 hours PRN  polyethylene glycol 3350 17 Gram(s) Oral daily  prasugrel 10 milliGRAM(s) Oral daily  senna 2 Tablet(s) Oral at bedtime  tamsulosin 0.8 milliGRAM(s) Oral at bedtime  zinc sulfate 220 milliGRAM(s) Oral daily        Vital Signs Last 24 Hrs  T(C): 36.1 (10 Mar 2021 05:00), Max: 36.3 (09 Mar 2021 20:51)  T(F): 96.9 (10 Mar 2021 05:00), Max: 97.4 (09 Mar 2021 20:51)  HR: 94 (10 Mar 2021 05:00) (94 - 107)  BP: 118/68 (10 Mar 2021 05:00) (115/58 - 123/60)  RR: 18 (10 Mar 2021 05:00) (18 - 18)  SpO2: 100% (10 Mar 2021 05:00) (90% - 100%)      PE  Constitutional: NAD, well-developed, well nourished   HEENT: NC/AT, EOMI  Nose: left nare packed with 7.5 Rhino Rocket, no evidence of active bleeding. Right nare wnl.   Mouth: mucosa moist and pink , uvula midline, no evidence of blood clot or active bleeding over posterior oropharynx.   Neck: no pain  Card: S1/S2  Respiratory: + accessory respiratory muscle use  Abd: Soft, NT/ND    Extremities: TIJERINA x 4, no edema  Neurological: A/O x 3  Psychiatric: Normal mood, normal affect       LABS:                        10.6   7.43  )-----------( 137      ( 10 Mar 2021 06:12 )             33.5     03-10    139  |  103  |  31<H>  ----------------------------<  190<H>  4.4   |  29  |  0.8    Ca    7.9<L>      10 Mar 2021 06:12  Mg     1.9     03-10    TPro  5.1<L>  /  Alb  3.0<L>  /  TBili  0.3  /  DBili  x   /  AST  21  /  ALT  35  /  AlkPhos  63  03-10

## 2021-03-10 NOTE — CHART NOTE - NSCHARTNOTEFT_GEN_A_CORE
Limited reassessment     MEDICATIONS  (STANDING):  albuterol/ipratropium for Nebulization 3 milliLiter(s) Nebulizer every 6 hours  ascorbic acid 500 milliGRAM(s) Oral daily  aspirin enteric coated 81 milliGRAM(s) Oral daily  BACItracin   Ointment 1 Application(s) Topical every 12 hours  chlorhexidine 4% Liquid 1 Application(s) Topical daily  cholecalciferol 5000 Unit(s) Oral daily  dexAMETHasone  Injectable 6 milliGRAM(s) IV Push daily  dextrose 40% Gel 15 Gram(s) Oral once  dextrose 5%. 1000 milliLiter(s) (50 mL/Hr) IV Continuous <Continuous>  dextrose 5%. 1000 milliLiter(s) (100 mL/Hr) IV Continuous <Continuous>  dextrose 50% Injectable 25 Gram(s) IV Push once  dextrose 50% Injectable 12.5 Gram(s) IV Push once  dextrose 50% Injectable 25 Gram(s) IV Push once  enoxaparin Injectable 40 milliGRAM(s) SubCutaneous at bedtime  ferrous    sulfate 325 milliGRAM(s) Oral daily  glucagon  Injectable 1 milliGRAM(s) IntraMuscular once  influenza   Vaccine 0.5 milliLiter(s) IntraMuscular once  insulin glargine Injectable (LANTUS) 25 Unit(s) SubCutaneous at bedtime  insulin lispro (ADMELOG) corrective regimen sliding scale   SubCutaneous three times a day before meals  insulin lispro Injectable (ADMELOG) 9 Unit(s) SubCutaneous three times a day before meals  levothyroxine 50 MICROGram(s) Oral daily  melatonin 5 milliGRAM(s) Oral at bedtime  metoprolol succinate ER 25 milliGRAM(s) Oral daily  multivitamin 1 Tablet(s) Oral daily  pantoprazole    Tablet 40 milliGRAM(s) Oral before breakfast  polyethylene glycol 3350 17 Gram(s) Oral daily  prasugrel 10 milliGRAM(s) Oral daily  senna 2 Tablet(s) Oral at bedtime  tamsulosin 0.8 milliGRAM(s) Oral at bedtime  zinc sulfate 220 milliGRAM(s) Oral daily    MEDICATIONS  (PRN):  acetaminophen   Tablet .. 650 milliGRAM(s) Oral every 6 hours PRN Temp greater or equal to 38C (100.4F), Mild Pain (1 - 3)  ALPRAZolam 0.25 milliGRAM(s) Oral daily PRN Anxiety  bisacodyl Suppository 10 milliGRAM(s) Rectal daily PRN Constipation  guaifenesin/dextromethorphan  Syrup 5 milliLiter(s) Oral every 6 hours PRN Cough  magnesium hydroxide Suspension 30 milliLiter(s) Oral daily PRN Constipation  ondansetron    Tablet 4 milliGRAM(s) Oral two times a day PRN Nausea and/or Vomiting  petrolatum white Ointment 1 Application(s) Topical every 6 hours PRN dry nostrils    Labs (3/10) RBC 4.09, H/H 10.6/33.5, POC glucose 221, BUN 31, glucose 190    (2/24) +1 edema (L/R ankles)- remains active per nursing flow sheets  Skin assessment shows sacral fissure.    Wt trends: dosing (2/23) 100 kg; (2/28, 3/1) 95.4 kg; (2/27) 96 kg--- dosing wt possibly an error.     Diet order: Diet, Regular: Consistent Carbohydrate {No Snacks}  DASH/TLC {Sodium & Cholesterol Restricted}  No Concentrated Potassium  Supplement Feeding Modality:  Oral  Glucerna Shake Cans or Servings Per Day:  3       Frequency:  Three Times a day (03-02-21 @ 10:56) [Active]    Slightly decreased extensive b/l pulmonary infiltrates on most recent CXR per progress notes. Episodes of epistaxis r/t HFNC, sp packing. Followed by ENT.   Pt continues to eat well with a good appetite. Po intake varies % since previously assessed; mostly >75%. Last BM on 3/9 per EMR- on bowel regimen 2/2 constipation. No noted GI s/s at this time. There are no further nutrition interventions at this time. Pt remains not at risk. RD to f/u within the next 7 days.

## 2021-03-10 NOTE — PROGRESS NOTE ADULT - ASSESSMENT
69 yo M with PMH of CAD, MI (s/p 4 stents), DM, HTN, BPH, hypothyroid and GERD, recently received Pfizer vaccine (1/3 and 1/24) presented to ED c/o progressive SOB, fever, and weakness. Patient stated that his symptoms began on Wednesday 2/3. COVID PCR + on 2/4. Admitted to medicine with acute hypoxemic respiratory failure secondary to COVID-19 PNA. Upgraded to ICU from 3A due to increasing oxygen requirements on 2/6. ICU course complicated by epistaxis x 2 due to HFNC.    # Epistaxis  - Seen by ENT on 3/9, nasal packing placed no 3/9. ENT f/u  - will call Dr. Gan's office for recommendations for Effient and ASA    # Acute hypoxemic respiratory failure secondary to COVID-19 PNA   # likely superimposed bacterial pneumonia   - Patient s/p 2 doses of the Pfizer vaccine (last dose 1/24)  -patient is failing to respond to prolong medical tx. with persistent hypoxia,  inability to have minimal activity.   - patient on venti mask now due to nasal packing due to epistaxis   - CTA 02/04 showed extensive bilateral patchy ground-glass opacities involving all lobes most pronounced in the upper lung zones  - s/p 1 unit plasma, Remdesivir completed 2/9, completed 1 week of rocephin & Levaquin per ID, - fungital 2/19 <19  - LE Duplex negative on 3/2  - post multiple day of IV Dexa patient was   transitioned to PO Prednisone on 3/6, with worsening dyspnea and hypoxia, switched back to IV dexa from 3/7  - most recent CXR- slightly decreased extensive b/l pulmonary infiltrates. repeat CXR ordered.  - procal 0.10 on 3/6  - patient unable to tolerate PT due to severe dyspnea and hypoxia during minimal activity.    # Constipation   - c/w miralax, senna; dulcolax suppository prn   - monitor BMs    #CAD/STEMI s/p 4 stents  - Hx of STEMI s/p  s/p PCI of distal RCA, mid/distal LAD (December 2017 and February 2018)  - Oct 2019: 100% occlusion of distal RCA s/p PCI with REUBEN to distal RCA, and POBA to RPL  - As per cardio (Dr. Murillo) 2/23,  effient was held temporarily, will resume Effient 10 mg po once daily from 3/8   -  c/w asa 81 daily   - C/w Toprol xl  25mg orally daily  - takes ezetimibe 10mg at home and is nonformulary, hold for now     #epistaxis x2 likely due to HFNC s/p packing now removed -- 3/5- additional episode of epistaxis .  - recurrent x 2 episodes; ENT following; packing removed 2/23;   - melena: one time episode 2/21; patient has hx of endoscopy 2 years ago which was not significant   - apply vaseline to dry nostrils mucosa prn.    # Iron deficiency anemia- started on PO iron tx.    # Vitamin D deficiency -started on vitamin D tx.    # Hyperkalemia- corrected, d/c lokelma     #HTN   - d/c enalapril 2.5mg daily due to normal BP in setting of severe covid   -continue Metoprolol tx.    #BPH  - C/w tamsulosin 0.4mg PO QHS    #DM   - Monitor FS, c/w insulin regimen   - A1C 9.1-->only on glipizide as outpatient     #Hypothyroidism  - C/w Synthroid    #GERD  - c/w protonix daily    # Supportive tx. - daily MVI, Zinc, Vitamin C.     Diet: consistent carbs, DASH/TLC + Glucerna   DVT PPx: lovenox 40mg once daily  GI ppx: protonix  Code Status: FULL CODE  Dispo: Acute. From home. pending clinical improvement, PT rehab.

## 2021-03-10 NOTE — PROGRESS NOTE ADULT - SUBJECTIVE AND OBJECTIVE BOX
SUBJECTIVE:    Patient is a 70y old Male who presents with a chief complaint of covid pneumonia with hypoxia (09 Mar 2021 11:03)    Currently admitted to medicine with the primary diagnosis of Shortness of breath    Today is hospital day 34d. Last night patient had episode of epistaxis, was seen by ENT and nasal packing placed. This morning he is resting comfortably.     Admit Diagnosis:  SHORTNESS OF BREATH SUSPECTED COVID 19 VIRUS INFECTION,,,,,        PAST MEDICAL & SURGICAL HISTORY  Chronic kidney disease (CKD)  III    Type 2 diabetes mellitus without complication, unspecified long term insulin use status    Hypertension, unspecified type    Dyspnea, unspecified type    ASHD (arteriosclerotic heart disease)  STEMI 12/31/17, PCI RCA    S/P cataract surgery    History of ligation of vein  2012    History of tonsillectomy  1957    Chronic kidney disease (CKD)    Type 2 diabetes mellitus without complication, unspecified long term insulin use status    Hypertension, unspecified type    Dyspnea, unspecified type    ASHD (arteriosclerotic heart disease)    S/P cataract surgery    History of ligation of vein    History of tonsillectomy        SOCIAL HISTORY:  Negative for smoking/alcohol/drug use.     ALLERGIES:  penicillin (Rash (Severe))    MEDICATIONS:  STANDING MEDICATIONS  albuterol/ipratropium for Nebulization 3 milliLiter(s) Nebulizer every 6 hours  ascorbic acid 500 milliGRAM(s) Oral daily  aspirin enteric coated 81 milliGRAM(s) Oral daily  BACItracin   Ointment 1 Application(s) Topical every 12 hours  chlorhexidine 4% Liquid 1 Application(s) Topical daily  cholecalciferol 5000 Unit(s) Oral daily  dexAMETHasone  Injectable 6 milliGRAM(s) IV Push daily  enoxaparin Injectable 40 milliGRAM(s) SubCutaneous at bedtime  ferrous    sulfate 325 milliGRAM(s) Oral daily  glucagon  Injectable 1 milliGRAM(s) IntraMuscular once  influenza   Vaccine 0.5 milliLiter(s) IntraMuscular once  insulin glargine Injectable (LANTUS) 25 Unit(s) SubCutaneous at bedtime  insulin lispro (ADMELOG) corrective regimen sliding scale   SubCutaneous three times a day before meals  insulin lispro Injectable (ADMELOG) 9 Unit(s) SubCutaneous three times a day before meals  levothyroxine 50 MICROGram(s) Oral daily  melatonin 5 milliGRAM(s) Oral at bedtime  metoprolol succinate ER 25 milliGRAM(s) Oral daily  multivitamin 1 Tablet(s) Oral daily  pantoprazole    Tablet 40 milliGRAM(s) Oral before breakfast  polyethylene glycol 3350 17 Gram(s) Oral daily  prasugrel 10 milliGRAM(s) Oral daily  senna 2 Tablet(s) Oral at bedtime  tamsulosin 0.8 milliGRAM(s) Oral at bedtime  zinc sulfate 220 milliGRAM(s) Oral daily    PRN MEDICATIONS  acetaminophen   Tablet .. 650 milliGRAM(s) Oral every 6 hours PRN  ALPRAZolam 0.25 milliGRAM(s) Oral daily PRN  bisacodyl Suppository 10 milliGRAM(s) Rectal daily PRN  guaifenesin/dextromethorphan  Syrup 5 milliLiter(s) Oral every 6 hours PRN  magnesium hydroxide Suspension 30 milliLiter(s) Oral daily PRN  ondansetron    Tablet 4 milliGRAM(s) Oral two times a day PRN  petrolatum white Ointment 1 Application(s) Topical every 6 hours PRN    VITALS:   T(F): 97.2  HR: 99  BP: 109/83  RR: 18  SpO2: 99%    I&Os:  03-09-21 @ 07:01  -  03-10-21 @ 07:00  --------------------------------------------------------  IN: 0 mL / OUT: 325 mL / NET: -325 mL        PHYSICAL EXAM:  GEN: No acute distress  LUNGS: Clear to auscultation bilaterally   HEART: S1/S2  ABD: Soft, NT/ND. BS +  EXT: no cyanosis/edema  NEURO: AAOX3    LABS:                        10.6   7.43  )-----------( 137      ( 10 Mar 2021 06:12 )             33.5     03-10    139  |  103  |  31<H>  ----------------------------<  190<H>  4.4   |  29  |  0.8    Ca    7.9<L>      10 Mar 2021 06:12  Mg     1.9     03-10    TPro  5.1<L>  /  Alb  3.0<L>  /  TBili  0.3  /  DBili  x   /  AST  21  /  ALT  35  /  AlkPhos  63  03-10    Creatinine trend: 0.8<--, 0.8<--, 0.9<--, 0.8<--, 0.7<--, 0.8<--, 0.9<--      COVID-19 PCR: Detected (03-04-21 @ 05:00)    Ferritin, Serum: 63 ng/mL (03-07-21 @ 11:40)  Ferritin, Serum: 132 ng/mL (02-23-21 @ 07:00)    D-Dimer Assay, Quantitative: 307 ng/mL DDU (03-10-21 @ 06:12)  D-Dimer Assay, Quantitative: 286 ng/mL DDU (03-08-21 @ 06:28)    C-Reactive Protein, Serum: 9 mg/L (03-08-21 @ 06:28)  C-Reactive Protein, Serum: 11 mg/L (03-07-21 @ 11:40)    Procalcitonin, Serum: 0.08 ng/mL (03-08-21 @ 06:28)  Procalcitonin, Serum: 0.10 ng/mL (03-06-21 @ 06:55)    Sedimentation Rate, Erythrocyte: 46 mm/Hr (03-07-21 @ 11:40)  Sedimentation Rate, Erythrocyte: 35 mm/Hr (03-04-21 @ 06:39)      RADIOLOGY:  < from: Xray Chest 1 View- PORTABLE-Routine (Xray Chest 1 View- PORTABLE-Routine .) (03.07.21 @ 11:38) >  Impression:    Unchanged  bilateral airspace opacities. No pneumothorax.    < end of copied text >

## 2021-03-10 NOTE — PROGRESS NOTE ADULT - ASSESSMENT
69 yo M Left epistaxis - 7.5 rhino rocket packed onto left nare 3/9/21, no evidence of active bleeding today.      Plan:  Abx   analgesia   Cont. Left nare packing  Monitor X rebleeding  F/U H/H transfuse prn   ENT will F/U

## 2021-03-11 LAB
ALBUMIN SERPL ELPH-MCNC: 3.1 G/DL — LOW (ref 3.5–5.2)
ALP SERPL-CCNC: 66 U/L — SIGNIFICANT CHANGE UP (ref 30–115)
ALT FLD-CCNC: 34 U/L — SIGNIFICANT CHANGE UP (ref 0–41)
ANION GAP SERPL CALC-SCNC: 11 MMOL/L — SIGNIFICANT CHANGE UP (ref 7–14)
AST SERPL-CCNC: 22 U/L — SIGNIFICANT CHANGE UP (ref 0–41)
BASOPHILS # BLD AUTO: 0.03 K/UL — SIGNIFICANT CHANGE UP (ref 0–0.2)
BASOPHILS NFR BLD AUTO: 0.3 % — SIGNIFICANT CHANGE UP (ref 0–1)
BILIRUB SERPL-MCNC: 0.3 MG/DL — SIGNIFICANT CHANGE UP (ref 0.2–1.2)
BUN SERPL-MCNC: 24 MG/DL — HIGH (ref 10–20)
CALCIUM SERPL-MCNC: 8 MG/DL — LOW (ref 8.5–10.1)
CHLORIDE SERPL-SCNC: 105 MMOL/L — SIGNIFICANT CHANGE UP (ref 98–110)
CO2 SERPL-SCNC: 29 MMOL/L — SIGNIFICANT CHANGE UP (ref 17–32)
CREAT SERPL-MCNC: 0.8 MG/DL — SIGNIFICANT CHANGE UP (ref 0.7–1.5)
CRP SERPL-MCNC: 9 MG/L — HIGH
EOSINOPHIL # BLD AUTO: 0.01 K/UL — SIGNIFICANT CHANGE UP (ref 0–0.7)
EOSINOPHIL NFR BLD AUTO: 0.1 % — SIGNIFICANT CHANGE UP (ref 0–8)
GLUCOSE BLDC GLUCOMTR-MCNC: 127 MG/DL — HIGH (ref 70–99)
GLUCOSE BLDC GLUCOMTR-MCNC: 150 MG/DL — HIGH (ref 70–99)
GLUCOSE BLDC GLUCOMTR-MCNC: 192 MG/DL — HIGH (ref 70–99)
GLUCOSE BLDC GLUCOMTR-MCNC: 365 MG/DL — HIGH (ref 70–99)
GLUCOSE SERPL-MCNC: 114 MG/DL — HIGH (ref 70–99)
HCT VFR BLD CALC: 32.9 % — LOW (ref 42–52)
HGB BLD-MCNC: 10.5 G/DL — LOW (ref 14–18)
IMM GRANULOCYTES NFR BLD AUTO: 2.4 % — HIGH (ref 0.1–0.3)
LYMPHOCYTES # BLD AUTO: 1.19 K/UL — LOW (ref 1.2–3.4)
LYMPHOCYTES # BLD AUTO: 12.4 % — LOW (ref 20.5–51.1)
MAGNESIUM SERPL-MCNC: 1.8 MG/DL — SIGNIFICANT CHANGE UP (ref 1.8–2.4)
MCHC RBC-ENTMCNC: 25.7 PG — LOW (ref 27–31)
MCHC RBC-ENTMCNC: 31.9 G/DL — LOW (ref 32–37)
MCV RBC AUTO: 80.6 FL — SIGNIFICANT CHANGE UP (ref 80–94)
MONOCYTES # BLD AUTO: 0.33 K/UL — SIGNIFICANT CHANGE UP (ref 0.1–0.6)
MONOCYTES NFR BLD AUTO: 3.4 % — SIGNIFICANT CHANGE UP (ref 1.7–9.3)
NEUTROPHILS # BLD AUTO: 7.79 K/UL — HIGH (ref 1.4–6.5)
NEUTROPHILS NFR BLD AUTO: 81.4 % — HIGH (ref 42.2–75.2)
NRBC # BLD: 0 /100 WBCS — SIGNIFICANT CHANGE UP (ref 0–0)
PLATELET # BLD AUTO: 135 K/UL — SIGNIFICANT CHANGE UP (ref 130–400)
POTASSIUM SERPL-MCNC: 4.5 MMOL/L — SIGNIFICANT CHANGE UP (ref 3.5–5)
POTASSIUM SERPL-SCNC: 4.5 MMOL/L — SIGNIFICANT CHANGE UP (ref 3.5–5)
PROCALCITONIN SERPL-MCNC: 0.1 NG/ML — SIGNIFICANT CHANGE UP (ref 0.02–0.1)
PROT SERPL-MCNC: 5.2 G/DL — LOW (ref 6–8)
RBC # BLD: 4.08 M/UL — LOW (ref 4.7–6.1)
RBC # FLD: 18.9 % — HIGH (ref 11.5–14.5)
SODIUM SERPL-SCNC: 145 MMOL/L — SIGNIFICANT CHANGE UP (ref 135–146)
WBC # BLD: 9.58 K/UL — SIGNIFICANT CHANGE UP (ref 4.8–10.8)
WBC # FLD AUTO: 9.58 K/UL — SIGNIFICANT CHANGE UP (ref 4.8–10.8)

## 2021-03-11 PROCEDURE — 30901 CONTROL OF NOSEBLEED: CPT

## 2021-03-11 PROCEDURE — 99233 SBSQ HOSP IP/OBS HIGH 50: CPT | Mod: CS

## 2021-03-11 RX ADMIN — Medication 81 MILLIGRAM(S): at 11:19

## 2021-03-11 RX ADMIN — Medication 1 APPLICATION(S): at 17:04

## 2021-03-11 RX ADMIN — Medication 9 UNIT(S): at 16:54

## 2021-03-11 RX ADMIN — Medication 1 PUFF(S): at 21:19

## 2021-03-11 RX ADMIN — PANTOPRAZOLE SODIUM 40 MILLIGRAM(S): 20 TABLET, DELAYED RELEASE ORAL at 05:45

## 2021-03-11 RX ADMIN — ALBUTEROL 2 PUFF(S): 90 AEROSOL, METERED ORAL at 21:19

## 2021-03-11 RX ADMIN — Medication 50 MICROGRAM(S): at 05:45

## 2021-03-11 RX ADMIN — Medication 5 MILLIGRAM(S): at 21:29

## 2021-03-11 RX ADMIN — SENNA PLUS 2 TABLET(S): 8.6 TABLET ORAL at 21:30

## 2021-03-11 RX ADMIN — Medication 10: at 11:30

## 2021-03-11 RX ADMIN — ENOXAPARIN SODIUM 40 MILLIGRAM(S): 100 INJECTION SUBCUTANEOUS at 21:29

## 2021-03-11 RX ADMIN — Medication 325 MILLIGRAM(S): at 11:19

## 2021-03-11 RX ADMIN — Medication 9 UNIT(S): at 11:30

## 2021-03-11 RX ADMIN — Medication 1 PUFF(S): at 08:37

## 2021-03-11 RX ADMIN — INSULIN GLARGINE 25 UNIT(S): 100 INJECTION, SOLUTION SUBCUTANEOUS at 21:28

## 2021-03-11 RX ADMIN — POLYETHYLENE GLYCOL 3350 17 GRAM(S): 17 POWDER, FOR SOLUTION ORAL at 11:19

## 2021-03-11 RX ADMIN — Medication 5000 UNIT(S): at 11:18

## 2021-03-11 RX ADMIN — Medication 500 MILLIGRAM(S): at 11:19

## 2021-03-11 RX ADMIN — Medication 9 UNIT(S): at 08:37

## 2021-03-11 RX ADMIN — ZINC SULFATE TAB 220 MG (50 MG ZINC EQUIVALENT) 220 MILLIGRAM(S): 220 (50 ZN) TAB at 11:19

## 2021-03-11 RX ADMIN — Medication 2: at 16:53

## 2021-03-11 RX ADMIN — Medication 25 MILLIGRAM(S): at 05:45

## 2021-03-11 RX ADMIN — Medication 6 MILLIGRAM(S): at 05:45

## 2021-03-11 RX ADMIN — TAMSULOSIN HYDROCHLORIDE 0.8 MILLIGRAM(S): 0.4 CAPSULE ORAL at 21:29

## 2021-03-11 RX ADMIN — Medication 1 TABLET(S): at 11:19

## 2021-03-11 NOTE — PROGRESS NOTE ADULT - ASSESSMENT
A) 70y M currently a/w COVID+ PNA ; Episodes of epistaxis.   P)   ·	Deflated balloon on rhino rocket at bedside, will observe bleeding for a few hours  ·	Continue monitoring vitals  ·	Obtain CT Maxillofacial with IV contrast   ·	   A) 70y M currently a/w COVID+ PNA ; Episodes of epistaxis.   P)   ·	Deflated balloon on rhino rocket at bedside, will monitor for recurrent bleeding  ·	Continue monitoring vitals  ·	Obtain CT Maxillofacial with IV contrast  ·	If remains stable with no rebleeds will remove packing this evening; if patient continues to bleed, given that this is his third packing placement over the span of one month, an IR consult for embolization should be considered.  ·	Plan discussed with Dr. Barba.

## 2021-03-11 NOTE — PROGRESS NOTE ADULT - SUBJECTIVE AND OBJECTIVE BOX
ENT:   70y M currently a/w COVID+ PNA ; Pt assess pt for episodes of epistaxis. Pt seen and examined at beside. No acute events over night and pt sitting up, NAD, with an oxygen mask on face at bedside. Pt denies fever, chills, nausea, vomiting, taste of blood in the mouth, dizziness, SOB, dyspnea, chest pain    [ x ] A 10 Point Review of Systems was negative except where noted.    Vital Signs Last 24 Hrs  T(C): 36.2 (11 Mar 2021 05:00), Max: 36.2 (10 Mar 2021 14:15)  T(F): 97.2 (11 Mar 2021 05:00), Max: 97.2 (10 Mar 2021 14:15)  HR: 86 (11 Mar 2021 05:00) (86 - 99)  BP: 116/55 (11 Mar 2021 05:00) (109/83 - 117/60)  RR: 18 (11 Mar 2021 05:00) (18 - 18)  SpO2: 94% (11 Mar 2021 08:39) (93% - 99%)    PHYSICAL EXAM:    Gen: NAD, well-developed, awake and alert,   Skin: Good color  HEENT: Head NC/AT. Left nares packed with 5cm rhino, right nares patent with blood noticed in the nostrils,   Neck: Flat, supple, no lymphadenopathy, trachea midline  Resp: breathing easily, no stridor    LABS:                        10.5   9.58  )-----------( 135      ( 11 Mar 2021 07:23 )             32.9   Comprehensive Metabolic Panel in AM (03.11.21 @ 07:23)   Sodium, Serum: 145 mmol/L   Potassium, Serum: 4.5 mmol/L   Chloride, Serum: 105 mmol/L   Carbon Dioxide, Serum: 29 mmol/L   Anion Gap, Serum: 11 mmol/L   Blood Urea Nitrogen, Serum: 24 mg/dL   Creatinine, Serum: 0.8 mg/dL   Glucose, Serum: 114 mg/dL   Calcium, Total Serum: 8.0 mg/dL   Protein Total, Serum: 5.2 g/dL   Albumin, Serum: 3.1 g/dL   Bilirubin Total, Serum: 0.3 mg/dL   Alkaline Phosphatase, Serum: 66 U/L   Aspartate Aminotransferase (AST/SGOT): 22 U/L   Alanine Aminotransferase (ALT/SGPT): 34 U/L    ENT:   70y M currently a/w COVID+ PNA ; Pt assess pt for episodes of epistaxis. Pt seen and examined at beside. No acute events over night and pt sitting up, NAD, with an oxygen mask on face at bedside. Pt denies fever, chills, nausea, vomiting, taste of blood in the mouth, dizziness, SOB, dyspnea, chest pain    [ x ] A 10 Point Review of Systems was negative except where noted.    Vital Signs Last 24 Hrs  T(C): 36.2 (11 Mar 2021 05:00), Max: 36.2 (10 Mar 2021 14:15)  T(F): 97.2 (11 Mar 2021 05:00), Max: 97.2 (10 Mar 2021 14:15)  HR: 86 (11 Mar 2021 05:00) (86 - 99)  BP: 116/55 (11 Mar 2021 05:00) (109/83 - 117/60)  RR: 18 (11 Mar 2021 05:00) (18 - 18)  SpO2: 94% (11 Mar 2021 08:39) (93% - 99%)    PHYSICAL EXAM:  Gen: NAD, well-developed, awake and alert,   Skin: Good color  HEENT: Head NC/AT. Left nares packed with 7.5cm rhino, right nare patent with small amt dried blood  Neck: Flat, supple, no lymphadenopathy, trachea midline  Resp: breathing easily, no stridor    LABS:             10.5   9.58  )-----------( 135      ( 11 Mar 2021 07:23 )             32.9   Comprehensive Metabolic Panel in AM (03.11.21 @ 07:23)   Sodium, Serum: 145 mmol/L   Potassium, Serum: 4.5 mmol/L   Chloride, Serum: 105 mmol/L   Carbon Dioxide, Serum: 29 mmol/L   Anion Gap, Serum: 11 mmol/L   Blood Urea Nitrogen, Serum: 24 mg/dL   Creatinine, Serum: 0.8 mg/dL   Glucose, Serum: 114 mg/dL   Calcium, Total Serum: 8.0 mg/dL   Protein Total, Serum: 5.2 g/dL   Albumin, Serum: 3.1 g/dL   Bilirubin Total, Serum: 0.3 mg/dL   Alkaline Phosphatase, Serum: 66 U/L   Aspartate Aminotransferase (AST/SGOT): 22 U/L   Alanine Aminotransferase (ALT/SGPT): 34 U/L

## 2021-03-11 NOTE — PROGRESS NOTE ADULT - SUBJECTIVE AND OBJECTIVE BOX
SUBJECTIVE:    Patient is a 70y old Male who presents with a chief complaint of Epistaxis (11 Mar 2021 11:16)    Currently admitted to medicine with the primary diagnosis of Shortness of breath    Today is hospital day 35d. This morning he is resting comfortably in bed and reports no new issues or overnight events.    Admit Diagnosis:  SHORTNESS OF BREATH SUSPECTED COVID 19 VIRUS INFECTION,,,,,        PAST MEDICAL & SURGICAL HISTORY  Chronic kidney disease (CKD)  III    Type 2 diabetes mellitus without complication, unspecified long term insulin use status    Hypertension, unspecified type    Dyspnea, unspecified type    ASHD (arteriosclerotic heart disease)  STEMI 12/31/17, PCI RCA    S/P cataract surgery    History of ligation of vein  2012    History of tonsillectomy  1957    Chronic kidney disease (CKD)    Type 2 diabetes mellitus without complication, unspecified long term insulin use status    Hypertension, unspecified type    Dyspnea, unspecified type    ASHD (arteriosclerotic heart disease)    S/P cataract surgery    History of ligation of vein    History of tonsillectomy        SOCIAL HISTORY:  Negative for smoking/alcohol/drug use.     ALLERGIES:  penicillin (Rash (Severe))    MEDICATIONS:  STANDING MEDICATIONS  ascorbic acid 500 milliGRAM(s) Oral daily  aspirin enteric coated 81 milliGRAM(s) Oral daily  BACItracin   Ointment 1 Application(s) Topical every 12 hours  chlorhexidine 4% Liquid 1 Application(s) Topical daily  cholecalciferol 5000 Unit(s) Oral daily  dexAMETHasone  Injectable 6 milliGRAM(s) IV Push daily  enoxaparin Injectable 40 milliGRAM(s) SubCutaneous at bedtime  ferrous    sulfate 325 milliGRAM(s) Oral daily  glucagon  Injectable 1 milliGRAM(s) IntraMuscular once  influenza   Vaccine 0.5 milliLiter(s) IntraMuscular once  insulin glargine Injectable (LANTUS) 25 Unit(s) SubCutaneous at bedtime  insulin lispro (ADMELOG) corrective regimen sliding scale   SubCutaneous three times a day before meals  insulin lispro Injectable (ADMELOG) 9 Unit(s) SubCutaneous three times a day before meals  ipratropium 17 MICROgram(s) HFA Inhaler 1 Puff(s) Inhalation every 6 hours  levothyroxine 50 MICROGram(s) Oral daily  melatonin 5 milliGRAM(s) Oral at bedtime  metoprolol succinate ER 25 milliGRAM(s) Oral daily  multivitamin 1 Tablet(s) Oral daily  pantoprazole    Tablet 40 milliGRAM(s) Oral before breakfast  polyethylene glycol 3350 17 Gram(s) Oral daily  senna 2 Tablet(s) Oral at bedtime  tamsulosin 0.8 milliGRAM(s) Oral at bedtime  zinc sulfate 220 milliGRAM(s) Oral daily    PRN MEDICATIONS  acetaminophen   Tablet .. 650 milliGRAM(s) Oral every 6 hours PRN  ALBUTerol    90 MICROgram(s) HFA Inhaler 2 Puff(s) Inhalation every 6 hours PRN  ALPRAZolam 0.25 milliGRAM(s) Oral daily PRN  bisacodyl Suppository 10 milliGRAM(s) Rectal daily PRN  guaifenesin/dextromethorphan  Syrup 5 milliLiter(s) Oral every 6 hours PRN  magnesium hydroxide Suspension 30 milliLiter(s) Oral daily PRN  ondansetron    Tablet 4 milliGRAM(s) Oral two times a day PRN  petrolatum white Ointment 1 Application(s) Topical every 6 hours PRN    VITALS:   T(F): 97.2  HR: 86  BP: 116/55  RR: 18  SpO2: 94%    I&Os:    PHYSICAL EXAM:  GEN: No acute distress  LUNGS: Clear to auscultation bilaterally   HEART: S1/S2  ABD: Soft, NT/ND. BS +  EXT: no cyanosis/edema  NEURO: AAOX3    LABS:                        10.5   9.58  )-----------( 135      ( 11 Mar 2021 07:23 )             32.9     03-11    145  |  105  |  24<H>  ----------------------------<  114<H>  4.5   |  29  |  0.8    Ca    8.0<L>      11 Mar 2021 07:23  Mg     1.8     03-11    TPro  5.2<L>  /  Alb  3.1<L>  /  TBili  0.3  /  DBili  x   /  AST  22  /  ALT  34  /  AlkPhos  66  03-11    Creatinine trend: 0.8<--, 0.8<--, 0.8<--, 0.9<--, 0.8<--, 0.7<--, 0.8<--    COVID-19 PCR: NotDetec (03-09-21 @ 17:19)  COVID-19 PCR: Detected (03-04-21 @ 05:00)    Ferritin, Serum: 63 ng/mL (03-07-21 @ 11:40)  Ferritin, Serum: 132 ng/mL (02-23-21 @ 07:00)    D-Dimer Assay, Quantitative: 307 ng/mL DDU (03-10-21 @ 06:12)  D-Dimer Assay, Quantitative: 286 ng/mL DDU (03-08-21 @ 06:28)    C-Reactive Protein, Serum: 9 mg/L (03-10-21 @ 06:12)  C-Reactive Protein, Serum: 9 mg/L (03-08-21 @ 06:28)    Procalcitonin, Serum: 0.10 ng/mL (03-10-21 @ 06:12)  Procalcitonin, Serum: 0.08 ng/mL (03-08-21 @ 06:28)    Sedimentation Rate, Erythrocyte: 46 mm/Hr (03-07-21 @ 11:40)  Sedimentation Rate, Erythrocyte: 35 mm/Hr (03-04-21 @ 06:39)      RADIOLOGY:  < from: Xray Chest 1 View-PORTABLE IMMEDIATE (Xray Chest 1 View-PORTABLE IMMEDIATE .) (03.10.21 @ 16:53) >    Impression:    Findings consistent with viral pneumonia without difference.    < end of copied text >

## 2021-03-11 NOTE — PROGRESS NOTE ADULT - ASSESSMENT
69 yo M with PMH of CAD, MI (s/p 4 stents), DM, HTN, BPH, hypothyroid and GERD, recently received Pfizer vaccine (1/3 and 1/24) presented to ED c/o progressive SOB, fever, and weakness. Patient stated that his symptoms began on Wednesday 2/3. COVID PCR + on 2/4. Admitted to medicine with acute hypoxemic respiratory failure secondary to COVID-19 PNA. Upgraded to ICU from 3A due to increasing oxygen requirements on 2/6. ICU course complicated by epistaxis x 2 due to HFNC.    # Epistaxis  - Seen by ENT on 3/9, nasal packing placed no 3/9.   - ENT f/u today, balloon deflated. recommendations for CT maxillofacial with IV contrast  - no active bleed from nose.  - Discussed with Dr. Murillo. Effient d/maria t.    # Acute hypoxemic respiratory failure secondary to COVID-19 PNA     # likely superimposed bacterial pneumonia   - Patient s/p 2 doses of the Pfizer vaccine (last dose 1/24)  -patient is failing to respond to prolong medical tx. with persistent hypoxia,  inability to have minimal activity.   - patient on venti mask now. titrated down to 8 LPM today. saturating 94%  - CTA 02/04 showed extensive bilateral patchy ground-glass opacities involving all lobes most pronounced in the upper lung zones  - s/p 1 unit plasma, Remdesivir completed 2/9, completed 1 week of rocephin & Levaquin per ID, - fungital 2/19 <19  - LE Duplex negative on 3/2  - post multiple day of IV Dexa patient was   transitioned to PO Prednisone on 3/6, with worsening dyspnea and hypoxia, switched back to IV dexa from 3/7  - repeat CXR 3/10: no change.  - procal 0.10 on 3/10, CRP 9 on 3/10, d-dimer 307 on 3/10  - patient unable to tolerate PT due to severe dyspnea and hypoxia during minimal activity.    # Constipation   - c/w miralax, senna; dulcolax suppository prn   - monitor BMs    #CAD/STEMI s/p 4 stents  - Hx of STEMI s/p  s/p PCI of distal RCA, mid/distal LAD (December 2017 and February 2018)  - Oct 2019: 100% occlusion of distal RCA s/p PCI with REUBEN to distal RCA, and POBA to RPL  - As per cardio (Dr. Murillo) 2/23,  effient was held temporarily, will resume Effient 10 mg po once daily from 3/8   -  c/w asa 81 daily   - C/w Toprol xl  25mg orally daily  - takes ezetimibe 10mg at home and is nonformulary, hold for now     # Iron deficiency anemia- started on PO iron tx.    # Vitamin D deficiency -started on vitamin D tx.    #HTN   - d/maria t enalapril 2.5mg daily due to normal BP in setting of severe covid   - continue Metoprolol tx.    #BPH  - C/w tamsulosin 0.4mg PO QHS    #DM   - Monitor FS, c/w insulin regimen   - A1C 9.1-->only on glipizide as outpatient     #Hypothyroidism  - C/w Synthroid    #GERD  - c/w protonix daily    # Supportive tx. - daily MVI, Zinc, Vitamin C.     Diet: consistent carbs, DASH/TLC + Glucerna   DVT PPx: lovenox 40mg once daily  GI ppx: protonix  Code Status: FULL CODE  Dispo: Acute. From home. pending clinical improvement, PT rehab.

## 2021-03-12 LAB
ALBUMIN SERPL ELPH-MCNC: 2.8 G/DL — LOW (ref 3.5–5.2)
ALP SERPL-CCNC: 59 U/L — SIGNIFICANT CHANGE UP (ref 30–115)
ALT FLD-CCNC: 32 U/L — SIGNIFICANT CHANGE UP (ref 0–41)
ANION GAP SERPL CALC-SCNC: 11 MMOL/L — SIGNIFICANT CHANGE UP (ref 7–14)
AST SERPL-CCNC: 23 U/L — SIGNIFICANT CHANGE UP (ref 0–41)
BASOPHILS # BLD AUTO: 0.04 K/UL — SIGNIFICANT CHANGE UP (ref 0–0.2)
BASOPHILS NFR BLD AUTO: 0.6 % — SIGNIFICANT CHANGE UP (ref 0–1)
BILIRUB SERPL-MCNC: 0.2 MG/DL — SIGNIFICANT CHANGE UP (ref 0.2–1.2)
BUN SERPL-MCNC: 24 MG/DL — HIGH (ref 10–20)
CALCIUM SERPL-MCNC: 8.2 MG/DL — LOW (ref 8.5–10.1)
CHLORIDE SERPL-SCNC: 104 MMOL/L — SIGNIFICANT CHANGE UP (ref 98–110)
CO2 SERPL-SCNC: 27 MMOL/L — SIGNIFICANT CHANGE UP (ref 17–32)
CREAT SERPL-MCNC: 0.9 MG/DL — SIGNIFICANT CHANGE UP (ref 0.7–1.5)
EOSINOPHIL # BLD AUTO: 0.01 K/UL — SIGNIFICANT CHANGE UP (ref 0–0.7)
EOSINOPHIL NFR BLD AUTO: 0.1 % — SIGNIFICANT CHANGE UP (ref 0–8)
GLUCOSE BLDC GLUCOMTR-MCNC: 114 MG/DL — HIGH (ref 70–99)
GLUCOSE BLDC GLUCOMTR-MCNC: 188 MG/DL — HIGH (ref 70–99)
GLUCOSE BLDC GLUCOMTR-MCNC: 197 MG/DL — HIGH (ref 70–99)
GLUCOSE BLDC GLUCOMTR-MCNC: 212 MG/DL — HIGH (ref 70–99)
GLUCOSE SERPL-MCNC: 71 MG/DL — SIGNIFICANT CHANGE UP (ref 70–99)
HCT VFR BLD CALC: 32.1 % — LOW (ref 42–52)
HGB BLD-MCNC: 10 G/DL — LOW (ref 14–18)
IMM GRANULOCYTES NFR BLD AUTO: 3.5 % — HIGH (ref 0.1–0.3)
LYMPHOCYTES # BLD AUTO: 1.12 K/UL — LOW (ref 1.2–3.4)
LYMPHOCYTES # BLD AUTO: 15.8 % — LOW (ref 20.5–51.1)
MAGNESIUM SERPL-MCNC: 2 MG/DL — SIGNIFICANT CHANGE UP (ref 1.8–2.4)
MCHC RBC-ENTMCNC: 26 PG — LOW (ref 27–31)
MCHC RBC-ENTMCNC: 31.2 G/DL — LOW (ref 32–37)
MCV RBC AUTO: 83.6 FL — SIGNIFICANT CHANGE UP (ref 80–94)
MONOCYTES # BLD AUTO: 0.39 K/UL — SIGNIFICANT CHANGE UP (ref 0.1–0.6)
MONOCYTES NFR BLD AUTO: 5.5 % — SIGNIFICANT CHANGE UP (ref 1.7–9.3)
NEUTROPHILS # BLD AUTO: 5.27 K/UL — SIGNIFICANT CHANGE UP (ref 1.4–6.5)
NEUTROPHILS NFR BLD AUTO: 74.5 % — SIGNIFICANT CHANGE UP (ref 42.2–75.2)
NRBC # BLD: 0 /100 WBCS — SIGNIFICANT CHANGE UP (ref 0–0)
PLATELET # BLD AUTO: 134 K/UL — SIGNIFICANT CHANGE UP (ref 130–400)
POTASSIUM SERPL-MCNC: 3.9 MMOL/L — SIGNIFICANT CHANGE UP (ref 3.5–5)
POTASSIUM SERPL-SCNC: 3.9 MMOL/L — SIGNIFICANT CHANGE UP (ref 3.5–5)
PROT SERPL-MCNC: 5 G/DL — LOW (ref 6–8)
RBC # BLD: 3.84 M/UL — LOW (ref 4.7–6.1)
RBC # FLD: 19.1 % — HIGH (ref 11.5–14.5)
SODIUM SERPL-SCNC: 142 MMOL/L — SIGNIFICANT CHANGE UP (ref 135–146)
WBC # BLD: 7.08 K/UL — SIGNIFICANT CHANGE UP (ref 4.8–10.8)
WBC # FLD AUTO: 7.08 K/UL — SIGNIFICANT CHANGE UP (ref 4.8–10.8)

## 2021-03-12 PROCEDURE — 70487 CT MAXILLOFACIAL W/DYE: CPT | Mod: 26

## 2021-03-12 PROCEDURE — 99233 SBSQ HOSP IP/OBS HIGH 50: CPT | Mod: CS

## 2021-03-12 RX ADMIN — POLYETHYLENE GLYCOL 3350 17 GRAM(S): 17 POWDER, FOR SOLUTION ORAL at 12:05

## 2021-03-12 RX ADMIN — Medication 500 MILLIGRAM(S): at 12:04

## 2021-03-12 RX ADMIN — Medication 1 TABLET(S): at 12:04

## 2021-03-12 RX ADMIN — Medication 50 MICROGRAM(S): at 05:09

## 2021-03-12 RX ADMIN — PANTOPRAZOLE SODIUM 40 MILLIGRAM(S): 20 TABLET, DELAYED RELEASE ORAL at 05:09

## 2021-03-12 RX ADMIN — Medication 1 PUFF(S): at 17:07

## 2021-03-12 RX ADMIN — Medication 5 MILLIGRAM(S): at 21:30

## 2021-03-12 RX ADMIN — Medication 25 MILLIGRAM(S): at 05:09

## 2021-03-12 RX ADMIN — INSULIN GLARGINE 25 UNIT(S): 100 INJECTION, SOLUTION SUBCUTANEOUS at 22:02

## 2021-03-12 RX ADMIN — ENOXAPARIN SODIUM 40 MILLIGRAM(S): 100 INJECTION SUBCUTANEOUS at 21:30

## 2021-03-12 RX ADMIN — Medication 4: at 17:03

## 2021-03-12 RX ADMIN — TAMSULOSIN HYDROCHLORIDE 0.8 MILLIGRAM(S): 0.4 CAPSULE ORAL at 21:30

## 2021-03-12 RX ADMIN — SENNA PLUS 2 TABLET(S): 8.6 TABLET ORAL at 21:30

## 2021-03-12 RX ADMIN — ZINC SULFATE TAB 220 MG (50 MG ZINC EQUIVALENT) 220 MILLIGRAM(S): 220 (50 ZN) TAB at 12:04

## 2021-03-12 RX ADMIN — Medication 325 MILLIGRAM(S): at 12:04

## 2021-03-12 RX ADMIN — Medication 5000 UNIT(S): at 12:04

## 2021-03-12 RX ADMIN — Medication 9 UNIT(S): at 17:03

## 2021-03-12 RX ADMIN — Medication 1 PUFF(S): at 08:38

## 2021-03-12 RX ADMIN — Medication 81 MILLIGRAM(S): at 12:04

## 2021-03-12 RX ADMIN — Medication 9 UNIT(S): at 12:17

## 2021-03-12 RX ADMIN — Medication 6 MILLIGRAM(S): at 05:09

## 2021-03-12 RX ADMIN — Medication 9 UNIT(S): at 07:30

## 2021-03-12 RX ADMIN — Medication 1 APPLICATION(S): at 17:08

## 2021-03-12 RX ADMIN — Medication 1 PUFF(S): at 20:40

## 2021-03-12 RX ADMIN — CHLORHEXIDINE GLUCONATE 1 APPLICATION(S): 213 SOLUTION TOPICAL at 05:09

## 2021-03-12 RX ADMIN — Medication 2: at 12:17

## 2021-03-12 RX ADMIN — Medication 1 APPLICATION(S): at 05:09

## 2021-03-12 NOTE — CHART NOTE - NSCHARTNOTEFT_GEN_A_CORE
I made rounds today with the treatment team including the hospitalist, residents,  nurses, and discussed the patient's current medical status and discharge  planning needs, and reviewed the chart.    T(C): 35.6 (03-12-21 @ 05:02), Max: 36.4 (03-11-21 @ 13:00)  HR: 87 (03-12-21 @ 05:02) (87 - 101)  BP: 110/55 (03-12-21 @ 05:02) (109/57 - 122/57)  RR: 18 (03-12-21 @ 05:02) (18 - 18)  SpO2: 94% (03-12-21 @ 05:02) (90% - 97%)          I reached out to the patient's health care proxy/ responsible family member-           [  x   ]  I reached       his wife, Ami                              and discussed the patient's medical condition,                   family concerns, and discharge planning           [     ]  I left a message with family               [     ]  I personally participated in rounds with the medical team and my resident and discussed the case. My resident reached                   family member/ HCP                                under my direction and supervision  and we reviewed the conversation.          [     ]  My resident left a message with family under my direction and supervision           [     ]   My resident attended medical rounds and called the family                [  x    ]    The following was discussed:  The patient's medical status over the past 24 hrs was reviewed, as well as oxygen needs and medication changes and labs. Doing well on 2 liters NC. Desats into the 80s with ambulation. To get PT. ENT requested maxilo-facial CT to eval recurrent epistaxis.          [   x   ]   The following concerns were raised: none          [     ] I spent 5-10 minutes on the above discussing medical issues with team members and family and/ or my resident    [   x  ] I spent 11-20 minutes on the above discussing medical issues with team members and family and/ or my resident    [     ] I spent 21-30 minutes on the above discussing medical issues with team members and family and/ or my resident

## 2021-03-12 NOTE — PROGRESS NOTE ADULT - SUBJECTIVE AND OBJECTIVE BOX
SUBJECTIVE:    Patient is a 70y old Male who presents with a chief complaint of Epistaxis (12 Mar 2021 12:36)    Currently admitted to medicine with the primary diagnosis of Shortness of breath    Today is hospital day 36d. This morning he is resting comfortably in bed and reports no new issues or overnight events.     Admit Diagnosis:  SHORTNESS OF BREATH SUSPECTED COVID 19 VIRUS INFECTION,,,,,        PAST MEDICAL & SURGICAL HISTORY  Chronic kidney disease (CKD)  III    Type 2 diabetes mellitus without complication, unspecified long term insulin use status    Hypertension, unspecified type    Dyspnea, unspecified type    ASHD (arteriosclerotic heart disease)  STEMI 12/31/17, PCI RCA    S/P cataract surgery    History of ligation of vein  2012    History of tonsillectomy  1957    Chronic kidney disease (CKD)    Type 2 diabetes mellitus without complication, unspecified long term insulin use status    Hypertension, unspecified type    Dyspnea, unspecified type    ASHD (arteriosclerotic heart disease)    S/P cataract surgery    History of ligation of vein    History of tonsillectomy        SOCIAL HISTORY:  Negative for smoking/alcohol/drug use.     ALLERGIES:  penicillin (Rash (Severe))    MEDICATIONS:  STANDING MEDICATIONS  ascorbic acid 500 milliGRAM(s) Oral daily  aspirin enteric coated 81 milliGRAM(s) Oral daily  BACItracin   Ointment 1 Application(s) Topical every 12 hours  chlorhexidine 4% Liquid 1 Application(s) Topical daily  cholecalciferol 5000 Unit(s) Oral daily  dexAMETHasone  Injectable 6 milliGRAM(s) IV Push daily  enoxaparin Injectable 40 milliGRAM(s) SubCutaneous at bedtime  ferrous    sulfate 325 milliGRAM(s) Oral daily  glucagon  Injectable 1 milliGRAM(s) IntraMuscular once  influenza   Vaccine 0.5 milliLiter(s) IntraMuscular once  insulin glargine Injectable (LANTUS) 25 Unit(s) SubCutaneous at bedtime  insulin lispro (ADMELOG) corrective regimen sliding scale   SubCutaneous three times a day before meals  insulin lispro Injectable (ADMELOG) 9 Unit(s) SubCutaneous three times a day before meals  ipratropium 17 MICROgram(s) HFA Inhaler 1 Puff(s) Inhalation every 6 hours  levothyroxine 50 MICROGram(s) Oral daily  melatonin 5 milliGRAM(s) Oral at bedtime  metoprolol succinate ER 25 milliGRAM(s) Oral daily  multivitamin 1 Tablet(s) Oral daily  pantoprazole    Tablet 40 milliGRAM(s) Oral before breakfast  polyethylene glycol 3350 17 Gram(s) Oral daily  senna 2 Tablet(s) Oral at bedtime  tamsulosin 0.8 milliGRAM(s) Oral at bedtime  zinc sulfate 220 milliGRAM(s) Oral daily    PRN MEDICATIONS  acetaminophen   Tablet .. 650 milliGRAM(s) Oral every 6 hours PRN  ALBUTerol    90 MICROgram(s) HFA Inhaler 2 Puff(s) Inhalation every 6 hours PRN  ALPRAZolam 0.25 milliGRAM(s) Oral daily PRN  bisacodyl Suppository 10 milliGRAM(s) Rectal daily PRN  guaifenesin/dextromethorphan  Syrup 5 milliLiter(s) Oral every 6 hours PRN  magnesium hydroxide Suspension 30 milliLiter(s) Oral daily PRN  ondansetron    Tablet 4 milliGRAM(s) Oral two times a day PRN  petrolatum white Ointment 1 Application(s) Topical every 6 hours PRN    VITALS:   T(F): 97.3  HR: 105  BP: 110/55  RR: 18  SpO2: 97%    I&Os:    PHYSICAL EXAM:  GEN: No acute distress  LUNGS: Clear to auscultation bilaterally   HEART: S1/S2  ABD: Soft, NT/ND. BS +  EXT: no cyanosis/edema  NEURO: AAOX3    LABS:                        10.0   7.08  )-----------( 134      ( 12 Mar 2021 05:27 )             32.1     03-12    142  |  104  |  24<H>  ----------------------------<  71  3.9   |  27  |  0.9    Ca    8.2<L>      12 Mar 2021 05:27  Mg     2.0     03-12    TPro  5.0<L>  /  Alb  2.8<L>  /  TBili  0.2  /  DBili  x   /  AST  23  /  ALT  32  /  AlkPhos  59  03-12    Creatinine trend: 0.9<--, 0.8<--, 0.8<--, 0.8<--, 0.9<--, 0.8<--, 0.7<--      COVID-19 PCR: NotDetec (03-09-21 @ 17:19)  COVID-19 PCR: Detected (03-04-21 @ 05:00)    Ferritin, Serum: 63 ng/mL (03-07-21 @ 11:40)  Ferritin, Serum: 132 ng/mL (02-23-21 @ 07:00)    D-Dimer Assay, Quantitative: 307 ng/mL DDU (03-10-21 @ 06:12)  D-Dimer Assay, Quantitative: 286 ng/mL DDU (03-08-21 @ 06:28)    C-Reactive Protein, Serum: 9 mg/L (03-10-21 @ 06:12)  C-Reactive Protein, Serum: 9 mg/L (03-08-21 @ 06:28)    Procalcitonin, Serum: 0.10 ng/mL (03-10-21 @ 06:12)  Procalcitonin, Serum: 0.08 ng/mL (03-08-21 @ 06:28)    Sedimentation Rate, Erythrocyte: 46 mm/Hr (03-07-21 @ 11:40)  Sedimentation Rate, Erythrocyte: 35 mm/Hr (03-04-21 @ 06:39)      RADIOLOGY:  < from: CT Maxillofacial w/ IV Cont (03.12.21 @ 11:17) >  IMPRESSION:    1.  No CT evidence of a sinonasal mass.    2.  Mild scattered paranasal sinus mucosal thickening.    3.  Periapical lucencies of the residual maxillary molar teeth projecting into the maxillary sinus floors with dehiscence on the right. Mandibular alveolar ridge lucencies measuring up to 6 mm. Recommend dental follow-up.    < end of copied text >

## 2021-03-12 NOTE — CHART NOTE - NSCHARTNOTESELECT_GEN_ALL_CORE
COVID Communication Team/Event Note
COVID Communication Team/Event Note
Transfer Note
COVID Communication Team/Event Note
ENT/Event Note
Epistaxis/Event Note
Event Note
RD Limited/Event Note
RD follow up/Event Note
Transfer Note
communication/Event Note

## 2021-03-12 NOTE — PROGRESS NOTE ADULT - ASSESSMENT
69 yo M with PMH of CAD, MI (s/p 4 stents), DM, HTN, BPH, hypothyroid and GERD, recently received Pfizer vaccine (1/3 and 1/24) presented to ED c/o progressive SOB, fever, and weakness. Patient stated that his symptoms began on Wednesday 2/3. COVID PCR + on 2/4. Admitted to medicine with acute hypoxemic respiratory failure secondary to COVID-19 PNA. Upgraded to ICU from 3A due to increasing oxygen requirements on 2/6. ICU course complicated by epistaxis x 2 due to HFNC.    # Epistaxis  - Seen by ENT on 3/9, nasal packing placed no 3/9.   - ENT f/u today, nasal packing removed yesterday. no repeat episodes of epistaxis. CT maxillofacial with IV contrast noted  - Discussed with Dr. Murillo. Effient d/maria t.    # Acute hypoxemic respiratory failure secondary to COVID-19 PNA   # likely superimposed bacterial pneumonia   - Patient s/p 2 doses of the Pfizer vaccine (last dose 1/24)  - hypoxia improving. now on 2L NC, stable at rest, but still desaturates quickly on exertion or talking.  - CTA 02/04 showed extensive bilateral patchy ground-glass opacities involving all lobes most pronounced in the upper lung zones  - s/p 1 unit plasma, Remdesivir completed 2/9, completed 1 week of rocephin & Levaquin per ID, - fungital 2/19 <19  - LE Duplex negative on 3/2  - post multiple day of IV Dexa patient was   transitioned to PO Prednisone on 3/6, with worsening dyspnea and hypoxia, switched back to IV dexa from 3/7  - repeat CXR 3/10: no change.  - procal 0.10 on 3/10, CRP 9 on 3/10, d-dimer 307 on 3/10  - PT f/u    # Constipation   - c/w miralax, senna; dulcolax suppository prn   - monitor BMs    #CAD/STEMI s/p 4 stents  - Hx of STEMI s/p  s/p PCI of distal RCA, mid/distal LAD (December 2017 and February 2018)  - Oct 2019: 100% occlusion of distal RCA s/p PCI with REUBEN to distal RCA, and POBA to RPL  - As per cardio (Dr. Murillo) 2/23,  effient was held temporarily, will resume Effient 10 mg po once daily from 3/8  - c/w asa 81 daily   - C/w Toprol xl  25mg orally daily  - takes ezetimibe 10mg at home and is nonformulary, hold for now     # Iron deficiency anemia- started on PO iron tx.    # Vitamin D deficiency -started on vitamin D tx.    #HTN   - d/maria t enalapril 2.5mg daily due to normal BP in setting of severe covid   - continue Metoprolol tx.    #BPH  - C/w tamsulosin 0.4mg PO QHS    #DM   - Monitor FS, c/w insulin regimen   - A1C 9.1-->only on glipizide as outpatient     #Hypothyroidism  - C/w Synthroid    #GERD  - c/w protonix daily    # Supportive tx. - daily MVI, Zinc, Vitamin C.     Diet: consistent carbs, DASH/TLC + Glucerna   DVT PPx: lovenox 40mg once daily  GI ppx: protonix  Code Status: FULL CODE  Dispo: Acute. From home. pending clinical improvement, PT rehab.

## 2021-03-12 NOTE — PROGRESS NOTE ADULT - REASON FOR ADMISSION
COVID-19
COVID-19 PNA
SOB
covid hypoxia
covid pna
covid pneumonia with hypoxia
sob
sob
COVID
COVID pna
COVID-19 PNA
Epistaxis
SOB
covid pneumonia with hypoxia
sob
COVID
COVID PNA
COVID-19
covid hypoxia
sob
Epistaxis
shortness of breath
sob
covid pneumonia
covid pneumonia
sob
sob
covid hypoxia

## 2021-03-12 NOTE — PROGRESS NOTE ADULT - ATTENDING COMMENTS
Attending Statement: I have personally performed a face to face diagnostic evaluation on this patient and have arrived at the suggestions for care. I have written all aspects of the above note.
Attending Statement: I have personally performed a face to face diagnostic evaluation on this patient and have arrived at the suggestions for care. I have written all aspects of the above note.
69 yo M with PMH of CAD, MI (s/p 4 stents), DM, HTN, BPH, hypothyroid and GERD, recently received Pfizer vaccine (1/3 and 1/24) presented to ED c/o progressive SOB, fever, and weakness. Patient stated that his symptoms began on Wednesday 2/3. COVID PCR + on 2/4. Admitted to medicine with acute hypoxemic respiratory failure secondary to COVID-19 PNA. Upgraded to ICU from 3A due to increasing oxygen requirements on 2/6. ICU course complicated by epistaxis x 2 due to HFNC.    # Epistaxis  - Seen by ENT on 3/9, nasal packing placed on 3/9.   - Rhino rocket balloon deflated 3/11. monitor for bleeding. Get CT Maxillofacial w/ IV contrast.  - Hold effient (Dr. Eddy agrees)     # Acute hypoxemic respiratory failure secondary to COVID-19 PNA   # likely superimposed bacterial pneumonia   - Patient s/p 2 doses of the Pfizer vaccine (last dose 1/24)  - CTA 02/04 showed extensive bilateral patchy ground-glass opacities involving all lobes most pronounced in the upper lung zones (Viral PNA/ARDS like picture)  - s/p 1 unit plasma, Remdesivir completed 2/9, completed 1 week of rocephin & Levaquin per ID, - fungital 2/19 <19  - LE Duplex negative on 3/2  - post multiple day of IV Dexa patient was   transitioned to PO Prednisone on 3/6, with worsening dyspnea and hypoxia, switched back to IV dexa from 3/7  - most recent CXR- slightly decreased extensive b/l pulmonary infiltrates.  - procal 0.10 on 3/6  - patient unable to tolerate PT due to severe dyspnea and hypoxia during minimal activity.    3/10: VEntimask 8L. desats with movement. c/w IV dexa.   3/11: Ventimask 8L. Little more comfortable than yest. c/w IV dexa.     # Constipation   - c/w miralax, senna; dulcolax suppository prn   - monitor BMs    #CAD/STEMI s/p 4 stents  - Hx of STEMI s/p  s/p PCI of distal RCA, mid/distal LAD (December 2017 and February 2018)  - Oct 2019: 100% occlusion of distal RCA s/p PCI with REUBEN to distal RCA, and POBA to RPL  - As per cardio (Dr. Murillo) 2/23,  effient was held temporarily, will resume Effient 10 mg po once daily after epistaxis resolves and cleared by ENT.    -  c/w asa 81 daily   - C/w Toprol xl  25mg orally daily  - takes ezetimibe 10mg at home and is nonformulary, hold for now     # Iron deficiency anemia- started on PO iron tx.    # Vitamin D deficiency -started on vitamin D tx.    # Hyperkalemia- corrected, d/c lokelma     #HTN   - d/c enalapril 2.5mg daily due to normal BP in setting of severe covid   -continue Metoprolol tx.    #BPH  - C/w tamsulosin 0.4mg PO QHS    #DM   - Monitor FS, c/w insulin regimen   - A1C 9.1-->only on glipizide as outpatient     #Hypothyroidism  - C/w Synthroid    #GERD  - c/w protonix daily    # Supportive tx. - daily MVI, Zinc, Vitamin C.     Diet: consistent carbs, DASH/TLC + Glucerna   DVT PPx: lovenox 40mg once daily  GI ppx: protonix  Code Status: FULL CODE  Dispo: Acute. From home. pending clinical improvement, PT rehab. Eventual Rehab in [place]
I saw and evaluated patient  by bedside, c/o diffuse body weakness, worsening hypoxia with minimal activity, cough , no fever,  tolerating diet well.    All labs, radiology studies, VS was reviewed  I have reviewed the resident's note and agree with documented findings and  plan of care.  a/p:  #Acute hypoxemic respiratory failure secondary to COVID-19 PNA   #likely superimposed bacterial pneumonia   - Patient s/p 2 doses of the Pfizer vaccine (last dose 1/24)  - On 5L O2 via NC today, at rest O 2 ranging 90-93%  - CTA 02/04 showed extensive bilateral patchy ground-glass opacities involving all lobes most pronounced in the upper lung zones  - s/p 1 unit plasma, Remdesivir completed 2/9, completed 1 week of rocephin & Levaquin per ID  - will repeat  inflammatory markers  - LE Duplex negative on 3/2  - with worsening pulmonary infiltrates on  CXR - switch back to IV dexa.  - fungital 2/19 <19  - AC: lovenox 40mg BID as BMI > 30  - PT/OT    #Constipation   - c/w miralax, senna; dulcolax suppository prn   - monitor BMs    #CAD/STEMI s/p 4 stents  - Hx of STEMI s/p  s/p PCI of distal RCA, mid/distal LAD (December 2017 and February 2018)  - Oct 2019: 100% occlusion of distal RCA s/p PCI with REUBEN to distal RCA, and POBA to RPL  - As per cardio (Dr. Murillo) 2/23, continue to hold effient and c/w asa 81 daily   - C/w Toprol 25mg daily  - takes ezetimibe 10mg at home and is nonformulary, hold for now     #epistaxis x2 likely due to HFNC s/p packing now removed -- resolved  #melena likely due to epistaxis  - recurrent x 2 episodes; ENT following; packing removed 2/23; c/w lovenox 40mg daily  - melena: one time episode 2/21; patient has hx of endoscopy 2 years ago which was not significant   - c/w PPI po BID    # Hyperkalemia- corrected, d/c lokelma     #HTN   - hold enalapril 2.5mg daily due to normal BP in setting of severe covid   -continue Metoprolol tx.    #BPH  - C/w tamsulosin 0.4mg PO QHS    #DM   - Monitor FS, c/w insulin regimen   - A1C 9.1-->only on glipizide as outpatient     #Hypothyroidism  - C/w Synthroid    #GERD  - c/w protonix BID    Diet: consistent carbs, DASH/TLC + Glucerna   DVT PPx: lovenox 40mg twice daily  GI ppx: protonix  Code Status: FULL CODE    #Progress Note Handoff: Pending f/up inflammatory markers, PT as tolerated  Family discussion: yes, communication team Disposition: Home_vs STR__once medically stable
69 yo M with PMH of CAD, MI (s/p 4 stents), DM, HTN, BPH, hypothyroid and GERD, recently received Pfizer vaccine (1/3 and 1/24) presented to ED c/o progressive SOB, fever, and weakness. Patient stated that his symptoms began on Wednesday 2/3. COVID PCR + on 2/4. Admitted to medicine with acute hypoxemic respiratory failure secondary to COVID-19 PNA. Upgraded to ICU from 3A due to increasing oxygen requirements on 2/6. ICU course complicated by epistaxis x 2 due to HFNC.    # Epistaxis  - Seen by ENT on 3/9, nasal packing placed on 3/9. ENT f/u  - call Dr. Gan's office for recommendations for Effient and ASA. Hold effient if cardio agrees.    # Acute hypoxemic respiratory failure secondary to COVID-19 PNA   # likely superimposed bacterial pneumonia   - Patient s/p 2 doses of the Pfizer vaccine (last dose 1/24)  -patient is failing to respond to prolong medical tx. with persistent hypoxia,  inability to have minimal activity.   - patient on venti mask now due to nasal packing due to epistaxis   - CTA 02/04 showed extensive bilateral patchy ground-glass opacities involving all lobes most pronounced in the upper lung zones  - s/p 1 unit plasma, Remdesivir completed 2/9, completed 1 week of rocephin & Levaquin per ID, - fungital 2/19 <19  - LE Duplex negative on 3/2  - post multiple day of IV Dexa patient was   transitioned to PO Prednisone on 3/6, with worsening dyspnea and hypoxia, switched back to IV dexa from 3/7  - most recent CXR- slightly decreased extensive b/l pulmonary infiltrates. repeat CXR ordered.  - procal 0.10 on 3/6  - patient unable to tolerate PT due to severe dyspnea and hypoxia during minimal activity.    3/10: VEntimask 8L. desats with movement. c/w IV dexa.     # Constipation   - c/w miralax, senna; dulcolax suppository prn   - monitor BMs    #CAD/STEMI s/p 4 stents  - Hx of STEMI s/p  s/p PCI of distal RCA, mid/distal LAD (December 2017 and February 2018)  - Oct 2019: 100% occlusion of distal RCA s/p PCI with REUBEN to distal RCA, and POBA to RPL  - As per cardio (Dr. Murillo) 2/23,  effient was held temporarily, will resume Effient 10 mg po once daily from 3/8   -  c/w asa 81 daily   - C/w Toprol xl  25mg orally daily  - takes ezetimibe 10mg at home and is nonformulary, hold for now     #epistaxis x2 likely due to HFNC s/p packing now removed -- 3/5- additional episode of epistaxis .  - recurrent x 2 episodes; ENT following; packing removed 2/23;   - melena: one time episode 2/21; patient has hx of endoscopy 2 years ago which was not significant   - apply vaseline to dry nostrils mucosa prn.    # Iron deficiency anemia- started on PO iron tx.    # Vitamin D deficiency -started on vitamin D tx.    # Hyperkalemia- corrected, d/c lokelma     #HTN   - d/c enalapril 2.5mg daily due to normal BP in setting of severe covid   -continue Metoprolol tx.    #BPH  - C/w tamsulosin 0.4mg PO QHS    #DM   - Monitor FS, c/w insulin regimen   - A1C 9.1-->only on glipizide as outpatient     #Hypothyroidism  - C/w Synthroid    #GERD  - c/w protonix daily    # Supportive tx. - daily MVI, Zinc, Vitamin C.     Diet: consistent carbs, DASH/TLC + Glucerna   DVT PPx: lovenox 40mg once daily  GI ppx: protonix  Code Status: FULL CODE  Dispo: Acute. From home. pending clinical improvement, PT rehab.
71 yo M with PMH of CAD, MI (s/p 4 stents), DM, HTN, BPH, hypothyroid and GERD, recently received Pfizer vaccine (1/3 and 1/24) presented to ED c/o progressive SOB, fever, and weakness. Patient stated that his symptoms began on Wednesday 2/3. COVID PCR + on 2/4. Admitted to medicine with acute hypoxemic respiratory failure secondary to COVID-19 PNA. Upgraded to ICU from 3A due to increasing oxygen requirements on 2/6. ICU course complicated by epistaxis x 2 due to HFNC.    # Epistaxis  - Seen by ENT on 3/9, nasal packing placed on 3/9.   - Rhino rocket balloon deflated 3/11. No bleeding since. CT Maxillofacial w/ IV contrast WNL. ENT signed off.   - Hold effient (Dr. Eddy agrees) for now - check with ENT when we can reintroduce.    # Acute hypoxemic respiratory failure secondary to COVID-19 PNA   # likely superimposed bacterial pneumonia   - Patient s/p 2 doses of the Pfizer vaccine (last dose 1/24)  - CTA 02/04 showed extensive bilateral patchy ground-glass opacities involving all lobes most pronounced in the upper lung zones (Viral PNA/ARDS like picture)  - s/p 1 unit plasma, Remdesivir completed 2/9, completed 1 week of rocephin & Levaquin per ID, - fungital 2/19 <19  - LE Duplex negative on 3/2  - post multiple day of IV Dexa patient was   transitioned to PO Prednisone on 3/6, with worsening dyspnea and hypoxia, switched back to IV dexa from 3/7  - most recent CXR- slightly decreased extensive b/l pulmonary infiltrates.  - procal 0.10 on 3/6  - patient unable to tolerate PT due to severe dyspnea and hypoxia during minimal activity.    3/10: VEntimask 8L. desats with movement. c/w IV dexa.   3/11: Ventimask 8L. Little more comfortable than yest. c/w IV dexa.   3/12: 2L NC comfortable at rest, but with any movement requires ventimask. Keep doing PT. Very deconditioned, desats immediately.     # Constipation   - c/w miralax, senna; dulcolax suppository prn   - monitor BMs    #CAD/STEMI s/p 4 stents  - Hx of STEMI s/p  s/p PCI of distal RCA, mid/distal LAD (December 2017 and February 2018)  - Oct 2019: 100% occlusion of distal RCA s/p PCI with REUBEN to distal RCA, and POBA to RPL  - As per cardio (Dr. Murillo) 2/23,  effient was held temporarily, may resume Effient 10 mg po once daily 1-2 weeks after epistaxis resolves and cleared by ENT. Discuss this with cardio on f/u OP.   -  c/w asa 81 daily   - C/w Toprol xl  25mg orally daily  - takes ezetimibe 10mg at home and is nonformulary, hold for now     # Iron deficiency anemia- started on PO iron tx.    # Vitamin D deficiency -started on vitamin D tx.    # Hyperkalemia- corrected, d/c lokelma     #HTN   - d/c enalapril 2.5mg daily due to normal BP in setting of severe covid   -continue Metoprolol tx.    #BPH  - C/w tamsulosin 0.4mg PO QHS    #DM   - Monitor FS, c/w insulin regimen   - A1C 9.1-->only on glipizide as outpatient     #Hypothyroidism  - C/w Synthroid    #GERD  - c/w protonix daily    # Supportive tx. - daily MVI, Zinc, Vitamin C.     Diet: consistent carbs, DASH/TLC + Glucerna   DVT PPx: lovenox 40mg once daily  GI ppx: protonix  Code Status: FULL CODE  Dispo: Acute. From home. pending clinical improvement, PT rehab. Eventual Rehab in [place] .
Seen and examined with the pulmonary fellow at the bed side.  Impression and plan as outlined above.
I saw and evaluated patient  by bedside, no dyspnea at rest, tolerating diet well, remains very weak and deconditioned, gets very hypoxic with minimal activity.    All labs, radiology studies, VS was reviewed  I have reviewed the resident's note and agree with documented findings and  plan of care.  a/p:  #Acute hypoxemic respiratory failure secondary to COVID-19 PNA   #likely superimposed bacterial pneumonia   - Patient s/p 2 doses of the Pfizer vaccine (last dose 1/24)  - On 5L O2 via NC today, at rest O 2 ranging %  - CTA 02/04 showed extensive bilateral patchy ground-glass opacities involving all lobes most pronounced in the upper lung zones  - s/p 1 unit plasma, Remdesivir completed 2/9, completed 1 week of rocephin & Levaquin per ID  - LE Duplex negative on 3/2  - with worsening pulmonary infiltrates on  CXR - switched back to IV dexa from 3/3  - fungital 2/19 <19  - AC: lovenox 40mg BID as BMI > 30  - PT/OT    #Constipation   - c/w miralax, senna; dulcolax suppository prn   - monitor BMs    #CAD/STEMI s/p 4 stents  - Hx of STEMI s/p  s/p PCI of distal RCA, mid/distal LAD (December 2017 and February 2018)  - Oct 2019: 100% occlusion of distal RCA s/p PCI with REUBEN to distal RCA, and POBA to RPL  - As per cardio (Dr. Murillo) 2/23, continue to hold effient and c/w asa 81 daily   - C/w Toprol 25mg daily  - takes ezetimibe 10mg at home and is nonformulary, hold for now     #epistaxis x2 likely due to HFNC s/p packing now removed -- resolved  #melena likely due to epistaxis  - recurrent x 2 episodes; ENT following; packing removed 2/23; c/w lovenox 40mg daily  - melena: one time episode 2/21; patient has hx of endoscopy 2 years ago which was not significant   - c/w PPI po BID    # Hyperkalemia- corrected, d/c lokelma     #HTN   - hold enalapril 2.5mg daily due to normal BP in setting of severe covid   -continue Metoprolol tx.    #BPH  - C/w tamsulosin 0.4mg PO QHS    #DM   - Monitor FS, c/w insulin regimen   - A1C 9.1-->only on glipizide as outpatient     #Hypothyroidism  - C/w Synthroid    #GERD  - c/w protonix BID    Diet: consistent carbs, DASH/TLC + Glucerna   DVT PPx: lovenox 40mg twice daily  GI ppx: protonix  Code Status: FULL CODE    #Progress Note Handoff: continue close pulse ox monitoring , PT at bedside as tolerated.   Family discussion: yes, communication team Disposition: Home_vs STR__once medically stable .
Seen and examined with the pulmonary fellow at the bed side.  Impression and plan as outlined above.

## 2021-03-12 NOTE — PROGRESS NOTE ADULT - ASSESSMENT
A)   P)   ·	No acute ent intervention at this time  ·	Observe patient for rebleed   ·	Monitor vitals  ·	Follow up CT maxillofacial   ·	Will discuss with attending A) Epistaxis  P)   ·	No acute ent intervention at this time  ·	Observe patient for rebleed   ·	Monitor vitals  ·	Follow up CT maxillofacial   ·	Will discuss with attending A) Epistaxis  P)   ·	No acute ent intervention at this time  ·	Observe patient for rebleed   ·	Monitor vitals  ·	Recall ENT PRN  ·	Will discuss with attending A) 71y/o M with COVID+ pneumonia and epistaxis, resolved     P)   ·	No acute ent intervention at this time  ·	Observe patient for rebleed   ·	Monitor vitals  ·	Recall ENT PRN  ·	If further episodes of rebleed may need IR c/s for embolization if cannot take off blood thinners as this is the third packing that was placed within a month since pt was admitted.   ·	Please continue to humidify O2, bacitracin to nares BID   ·	Will discuss with attending

## 2021-03-12 NOTE — PROGRESS NOTE ADULT - SUBJECTIVE AND OBJECTIVE BOX
ENT:   70y M currently a/w COVID+ PNA ; Pt assess pt for episodes of epistaxis. Pt seen and examined at beside. No acute events over night and pt laying down, NAD, with an nasal cannula on face at bedside. Pt denies fever, chills, nausea, vomiting, taste of blood in the mouth, dizziness, SOB, dyspnea, chest pain    [ x ] A 10 Point Review of Systems was negative except where noted.    Vital Signs Last 24 Hrs  T(C): 35.6 (12 Mar 2021 05:02), Max: 36.4 (11 Mar 2021 13:00)  T(F): 96 (12 Mar 2021 05:02), Max: 97.6 (11 Mar 2021 13:00)  HR: 87 (12 Mar 2021 05:02) (87 - 101)  BP: 110/55 (12 Mar 2021 05:02) (109/57 - 122/57)  RR: 18 (12 Mar 2021 05:02) (18 - 18)  SpO2: 94% (12 Mar 2021 05:02) (90% - 97%)    PHYSICAL EXAM:  Gen: NAD, well-developed, awake and alert, laying down   Skin: Good color  HEENT: Head NC/AT. B/l patent nares with small amt dried blood  Neck: Flat, supple, no lymphadenopathy, trachea midline  Resp: breathing easily, no stridor    LABS:             10.0   7.08  )-----------( 134      ( 12 Mar 2021 05:27 )             32.1   IMAGING/ADDITIONAL STUDIES:    EXAM:  CT MAXILLOFACIAL IC        PROCEDURE DATE:  03/12/2021    INTERPRETATION:  Clinical History / Reason for exam: Multiple episodes of epistaxis.  Technique: CT of the paranasal sinuses with contrast. Contiguous CT axial images of the paranasal sinuses were obtained following the intravenous administration of 100 cc of Optiray with coronal and sagittal reformats.  Comparison: None available    Findings:  The sino-cranial and sino-orbital junctions are intact including the lamina papyracea and cribriform plates.  Nasal cavity demonstrates no dominant masses. There is mild deviation of the osseous nasal septum to the right.  Frontal sinuses: Well-developed with mild mucosal thickening bilaterally extending into the outflow tracts.  Ethmoid sinuses: Mild mucosal thickening bilaterally.  Sphenoid sinuses: Mildly hypoplastic bilaterally. Trace mucosal thickening on the left with clear sphenoethmoidal recesses. Left-sided Onodi cell noted, normal variant.  Maxillary sinuses: Well-developed with mild mucosal thickening bilaterally greater on the left. The ostiomeatal complexes are patent. There are maxillary molar teeth roots projecting into the maxillary sinus floors with large periapical lucencies, with dehiscence of the right sinus floor noted.  Lucencies along the mandibular alveolar ridge bilaterally noted measuring up to 6 mm.  Incidentally noted partially empty sella turcica.    IMPRESSION:  1.  No CT evidence of a sinonasal mass.  2.  Mild scattered paranasal sinus mucosal thickening.  3.  Periapical lucencies of the residual maxillary molar teeth projecting into the maxillary sinus floors with dehiscence on the right. Mandibular alveolar ridge lucencies measuring up to 6 mm. Recommend dental follow-up.    ETTA TRUONG MD; Attending Radiologist  This document has been electronically signed. Mar 12 2021 12:53PM     ENT:   70y M currently a/w COVID+ PNA ; Pt assess pt for episodes of epistaxis. Pt seen and examined at beside. As per nurse, no acute events over night and pt laying down, NAD, with an nasal cannula on face at bedside. Pt denies fever, chills, nausea, vomiting, taste of blood in the mouth, dizziness, SOB, dyspnea, chest pain.     [ x ] A 10 Point Review of Systems was negative except where noted.    Vital Signs Last 24 Hrs  T(C): 35.6 (12 Mar 2021 05:02), Max: 36.4 (11 Mar 2021 13:00)  T(F): 96 (12 Mar 2021 05:02), Max: 97.6 (11 Mar 2021 13:00)  HR: 87 (12 Mar 2021 05:02) (87 - 101)  BP: 110/55 (12 Mar 2021 05:02) (109/57 - 122/57)  RR: 18 (12 Mar 2021 05:02) (18 - 18)  SpO2: 94% (12 Mar 2021 05:02) (90% - 97%)    PHYSICAL EXAM:  Gen: NAD, well-developed, awake and alert, laying down   Skin: Good color  HEENT: Head NC/AT. B/l patent nares with small amt dried blood  Neck: Flat, supple, no lymphadenopathy, trachea midline  Resp: breathing easily, no stridor    LABS:             10.0   7.08  )-----------( 134      ( 12 Mar 2021 05:27 )             32.1   IMAGING/ADDITIONAL STUDIES:    EXAM:  CT MAXILLOFACIAL IC        PROCEDURE DATE:  03/12/2021    INTERPRETATION:  Clinical History / Reason for exam: Multiple episodes of epistaxis.  Technique: CT of the paranasal sinuses with contrast. Contiguous CT axial images of the paranasal sinuses were obtained following the intravenous administration of 100 cc of Optiray with coronal and sagittal reformats.  Comparison: None available    Findings:  The sino-cranial and sino-orbital junctions are intact including the lamina papyracea and cribriform plates.  Nasal cavity demonstrates no dominant masses. There is mild deviation of the osseous nasal septum to the right.  Frontal sinuses: Well-developed with mild mucosal thickening bilaterally extending into the outflow tracts.  Ethmoid sinuses: Mild mucosal thickening bilaterally.  Sphenoid sinuses: Mildly hypoplastic bilaterally. Trace mucosal thickening on the left with clear sphenoethmoidal recesses. Left-sided Onodi cell noted, normal variant.  Maxillary sinuses: Well-developed with mild mucosal thickening bilaterally greater on the left. The ostiomeatal complexes are patent. There are maxillary molar teeth roots projecting into the maxillary sinus floors with large periapical lucencies, with dehiscence of the right sinus floor noted.  Lucencies along the mandibular alveolar ridge bilaterally noted measuring up to 6 mm.  Incidentally noted partially empty sella turcica.    IMPRESSION:  1.  No CT evidence of a sinonasal mass.  2.  Mild scattered paranasal sinus mucosal thickening.  3.  Periapical lucencies of the residual maxillary molar teeth projecting into the maxillary sinus floors with dehiscence on the right. Mandibular alveolar ridge lucencies measuring up to 6 mm. Recommend dental follow-up.    ETTA TRUONG MD; Attending Radiologist  This document has been electronically signed. Mar 12 2021 12:53PM     ENT:   70y M currently a/w COVID+ PNA ; Pt assess pt for episodes of epistaxis. Pt seen and examined at beside. As per nurse, no acute events over night and pt laying down, NAD, with an nasal cannula on face at bedside. Pt denies fever, chills, nausea, vomiting, taste of blood in the mouth, dizziness, SOB, dyspnea, chest pain.     [ x ] A 10 Point Review of Systems was negative except where noted.    Vital Signs Last 24 Hrs  T(C): 35.6 (12 Mar 2021 05:02), Max: 36.4 (11 Mar 2021 13:00)  T(F): 96 (12 Mar 2021 05:02), Max: 97.6 (11 Mar 2021 13:00)  HR: 87 (12 Mar 2021 05:02) (87 - 101)  BP: 110/55 (12 Mar 2021 05:02) (109/57 - 122/57)  RR: 18 (12 Mar 2021 05:02) (18 - 18)  SpO2: 94% (12 Mar 2021 05:02) (90% - 97%)    PHYSICAL EXAM:  Gen: NAD, well-developed, awake and alert, laying down   Skin: Good color  HEENT: Head NC/AT. B/l patent nares with small amt dried blood, no active bleed noted. Oral cavity no erythema/edema, uvula midline, posterior oropharynx no active bleed or discharge noted.   Neck: Flat, supple, trachea midline  Resp: breathing easily, no stridor, on NC    LABS:             10.0   7.08  )-----------( 134      ( 12 Mar 2021 05:27 )             32.1   IMAGING/ADDITIONAL STUDIES:    EXAM:  CT MAXILLOFACIAL IC        PROCEDURE DATE:  03/12/2021    INTERPRETATION:  Clinical History / Reason for exam: Multiple episodes of epistaxis.  Technique: CT of the paranasal sinuses with contrast. Contiguous CT axial images of the paranasal sinuses were obtained following the intravenous administration of 100 cc of Optiray with coronal and sagittal reformats.  Comparison: None available    Findings:  The sino-cranial and sino-orbital junctions are intact including the lamina papyracea and cribriform plates.  Nasal cavity demonstrates no dominant masses. There is mild deviation of the osseous nasal septum to the right.  Frontal sinuses: Well-developed with mild mucosal thickening bilaterally extending into the outflow tracts.  Ethmoid sinuses: Mild mucosal thickening bilaterally.  Sphenoid sinuses: Mildly hypoplastic bilaterally. Trace mucosal thickening on the left with clear sphenoethmoidal recesses. Left-sided Onodi cell noted, normal variant.  Maxillary sinuses: Well-developed with mild mucosal thickening bilaterally greater on the left. The ostiomeatal complexes are patent. There are maxillary molar teeth roots projecting into the maxillary sinus floors with large periapical lucencies, with dehiscence of the right sinus floor noted.  Lucencies along the mandibular alveolar ridge bilaterally noted measuring up to 6 mm.  Incidentally noted partially empty sella turcica.    IMPRESSION:  1.  No CT evidence of a sinonasal mass.  2.  Mild scattered paranasal sinus mucosal thickening.  3.  Periapical lucencies of the residual maxillary molar teeth projecting into the maxillary sinus floors with dehiscence on the right. Mandibular alveolar ridge lucencies measuring up to 6 mm. Recommend dental follow-up.    ETTA TRUONG MD; Attending Radiologist  This document has been electronically signed. Mar 12 2021 12:53PM

## 2021-03-13 LAB
ALBUMIN SERPL ELPH-MCNC: 3 G/DL — LOW (ref 3.5–5.2)
ALP SERPL-CCNC: 60 U/L — SIGNIFICANT CHANGE UP (ref 30–115)
ALT FLD-CCNC: 30 U/L — SIGNIFICANT CHANGE UP (ref 0–41)
ANION GAP SERPL CALC-SCNC: 10 MMOL/L — SIGNIFICANT CHANGE UP (ref 7–14)
AST SERPL-CCNC: 23 U/L — SIGNIFICANT CHANGE UP (ref 0–41)
BASOPHILS # BLD AUTO: 0.04 K/UL — SIGNIFICANT CHANGE UP (ref 0–0.2)
BASOPHILS NFR BLD AUTO: 0.6 % — SIGNIFICANT CHANGE UP (ref 0–1)
BILIRUB SERPL-MCNC: 0.3 MG/DL — SIGNIFICANT CHANGE UP (ref 0.2–1.2)
BUN SERPL-MCNC: 24 MG/DL — HIGH (ref 10–20)
CALCIUM SERPL-MCNC: 8.2 MG/DL — LOW (ref 8.5–10.1)
CHLORIDE SERPL-SCNC: 105 MMOL/L — SIGNIFICANT CHANGE UP (ref 98–110)
CO2 SERPL-SCNC: 28 MMOL/L — SIGNIFICANT CHANGE UP (ref 17–32)
CREAT SERPL-MCNC: 0.8 MG/DL — SIGNIFICANT CHANGE UP (ref 0.7–1.5)
EOSINOPHIL # BLD AUTO: 0.01 K/UL — SIGNIFICANT CHANGE UP (ref 0–0.7)
EOSINOPHIL NFR BLD AUTO: 0.2 % — SIGNIFICANT CHANGE UP (ref 0–8)
GLUCOSE BLDC GLUCOMTR-MCNC: 107 MG/DL — HIGH (ref 70–99)
GLUCOSE BLDC GLUCOMTR-MCNC: 147 MG/DL — HIGH (ref 70–99)
GLUCOSE BLDC GLUCOMTR-MCNC: 152 MG/DL — HIGH (ref 70–99)
GLUCOSE BLDC GLUCOMTR-MCNC: 177 MG/DL — HIGH (ref 70–99)
GLUCOSE SERPL-MCNC: 55 MG/DL — LOW (ref 70–99)
HCT VFR BLD CALC: 32.6 % — LOW (ref 42–52)
HGB BLD-MCNC: 10.2 G/DL — LOW (ref 14–18)
IMM GRANULOCYTES NFR BLD AUTO: 2.7 % — HIGH (ref 0.1–0.3)
LYMPHOCYTES # BLD AUTO: 1.23 K/UL — SIGNIFICANT CHANGE UP (ref 1.2–3.4)
LYMPHOCYTES # BLD AUTO: 19.4 % — LOW (ref 20.5–51.1)
MAGNESIUM SERPL-MCNC: 1.9 MG/DL — SIGNIFICANT CHANGE UP (ref 1.8–2.4)
MCHC RBC-ENTMCNC: 25.9 PG — LOW (ref 27–31)
MCHC RBC-ENTMCNC: 31.3 G/DL — LOW (ref 32–37)
MCV RBC AUTO: 82.7 FL — SIGNIFICANT CHANGE UP (ref 80–94)
MONOCYTES # BLD AUTO: 0.39 K/UL — SIGNIFICANT CHANGE UP (ref 0.1–0.6)
MONOCYTES NFR BLD AUTO: 6.2 % — SIGNIFICANT CHANGE UP (ref 1.7–9.3)
NEUTROPHILS # BLD AUTO: 4.5 K/UL — SIGNIFICANT CHANGE UP (ref 1.4–6.5)
NEUTROPHILS NFR BLD AUTO: 70.9 % — SIGNIFICANT CHANGE UP (ref 42.2–75.2)
NRBC # BLD: 0 /100 WBCS — SIGNIFICANT CHANGE UP (ref 0–0)
PLATELET # BLD AUTO: 136 K/UL — SIGNIFICANT CHANGE UP (ref 130–400)
POTASSIUM SERPL-MCNC: 3.8 MMOL/L — SIGNIFICANT CHANGE UP (ref 3.5–5)
POTASSIUM SERPL-SCNC: 3.8 MMOL/L — SIGNIFICANT CHANGE UP (ref 3.5–5)
PROT SERPL-MCNC: 5.1 G/DL — LOW (ref 6–8)
RBC # BLD: 3.94 M/UL — LOW (ref 4.7–6.1)
RBC # FLD: 18.8 % — HIGH (ref 11.5–14.5)
SARS-COV-2 RNA SPEC QL NAA+PROBE: SIGNIFICANT CHANGE UP
SODIUM SERPL-SCNC: 143 MMOL/L — SIGNIFICANT CHANGE UP (ref 135–146)
WBC # BLD: 6.34 K/UL — SIGNIFICANT CHANGE UP (ref 4.8–10.8)
WBC # FLD AUTO: 6.34 K/UL — SIGNIFICANT CHANGE UP (ref 4.8–10.8)

## 2021-03-13 PROCEDURE — 99233 SBSQ HOSP IP/OBS HIGH 50: CPT | Mod: CS

## 2021-03-13 RX ORDER — INSULIN GLARGINE 100 [IU]/ML
22 INJECTION, SOLUTION SUBCUTANEOUS AT BEDTIME
Refills: 0 | Status: DISCONTINUED | OUTPATIENT
Start: 2021-03-13 | End: 2021-03-14

## 2021-03-13 RX ORDER — INSULIN LISPRO 100/ML
8 VIAL (ML) SUBCUTANEOUS
Refills: 0 | Status: DISCONTINUED | OUTPATIENT
Start: 2021-03-13 | End: 2021-03-14

## 2021-03-13 RX ADMIN — CHLORHEXIDINE GLUCONATE 1 APPLICATION(S): 213 SOLUTION TOPICAL at 05:11

## 2021-03-13 RX ADMIN — Medication 25 MILLIGRAM(S): at 05:11

## 2021-03-13 RX ADMIN — Medication 9 UNIT(S): at 11:39

## 2021-03-13 RX ADMIN — Medication 1 APPLICATION(S): at 17:12

## 2021-03-13 RX ADMIN — Medication 9 UNIT(S): at 07:55

## 2021-03-13 RX ADMIN — SENNA PLUS 2 TABLET(S): 8.6 TABLET ORAL at 21:54

## 2021-03-13 RX ADMIN — POLYETHYLENE GLYCOL 3350 17 GRAM(S): 17 POWDER, FOR SOLUTION ORAL at 11:41

## 2021-03-13 RX ADMIN — Medication 5 MILLIGRAM(S): at 21:55

## 2021-03-13 RX ADMIN — Medication 1 TABLET(S): at 11:40

## 2021-03-13 RX ADMIN — Medication 2: at 17:11

## 2021-03-13 RX ADMIN — PANTOPRAZOLE SODIUM 40 MILLIGRAM(S): 20 TABLET, DELAYED RELEASE ORAL at 05:11

## 2021-03-13 RX ADMIN — ENOXAPARIN SODIUM 40 MILLIGRAM(S): 100 INJECTION SUBCUTANEOUS at 21:55

## 2021-03-13 RX ADMIN — Medication 1 PUFF(S): at 13:55

## 2021-03-13 RX ADMIN — Medication 1 APPLICATION(S): at 05:11

## 2021-03-13 RX ADMIN — Medication 50 MICROGRAM(S): at 05:11

## 2021-03-13 RX ADMIN — ZINC SULFATE TAB 220 MG (50 MG ZINC EQUIVALENT) 220 MILLIGRAM(S): 220 (50 ZN) TAB at 11:40

## 2021-03-13 RX ADMIN — Medication 325 MILLIGRAM(S): at 11:41

## 2021-03-13 RX ADMIN — Medication 1 PUFF(S): at 08:53

## 2021-03-13 RX ADMIN — Medication 2: at 11:39

## 2021-03-13 RX ADMIN — TAMSULOSIN HYDROCHLORIDE 0.8 MILLIGRAM(S): 0.4 CAPSULE ORAL at 21:55

## 2021-03-13 RX ADMIN — Medication 9 UNIT(S): at 17:11

## 2021-03-13 RX ADMIN — Medication 500 MILLIGRAM(S): at 11:40

## 2021-03-13 RX ADMIN — Medication 1 PUFF(S): at 20:33

## 2021-03-13 RX ADMIN — Medication 81 MILLIGRAM(S): at 11:40

## 2021-03-13 RX ADMIN — Medication 5000 UNIT(S): at 11:40

## 2021-03-13 RX ADMIN — INSULIN GLARGINE 22 UNIT(S): 100 INJECTION, SOLUTION SUBCUTANEOUS at 21:55

## 2021-03-13 RX ADMIN — Medication 6 MILLIGRAM(S): at 05:11

## 2021-03-13 NOTE — PROGRESS NOTE ADULT - ASSESSMENT
69 yo M with PMH of CAD, MI (s/p 4 stents), DM, HTN, BPH, hypothyroid and GERD, recently received Pfizer vaccine (1/3 and 1/24) presented to ED c/o progressive SOB, fever, and weakness. Patient stated that his symptoms began on Wednesday 2/3. COVID PCR + on 2/4. Admitted to medicine with acute hypoxemic respiratory failure secondary to COVID-19 PNA. Upgraded to ICU from 3A due to increasing oxygen requirements on 2/6. ICU course complicated by epistaxis x 2 due to HFNC.    # Epistaxis  - Seen by ENT on 3/9, nasal packing placed on 3/9.   - Rhino rocket balloon deflated 3/11. No bleeding since. CT Maxillofacial w/ IV contrast WNL. ENT signed off.   - Hold effient (Dr. Eddy agrees) for now - check with ENT when we can reintroduce.    # Acute hypoxemic respiratory failure secondary to COVID-19 PNA   # likely superimposed bacterial pneumonia   - Patient s/p 2 doses of the Pfizer vaccine (last dose 1/24)  - CTA 02/04 showed extensive bilateral patchy ground-glass opacities involving all lobes most pronounced in the upper lung zones (Viral PNA/ARDS like picture)  - s/p 1 unit plasma, Remdesivir completed 2/9, completed 1 week of rocephin & Levaquin per ID, - fungital 2/19 <19  - LE Duplex negative on 3/2  - post multiple day of IV Dexa patient was   transitioned to PO Prednisone on 3/6, with worsening dyspnea and hypoxia, switched back to IV dexa from 3/7  - most recent CXR- slightly decreased extensive b/l pulmonary infiltrates.  - procal 0.10 on 3/6  - patient unable to tolerate PT due to severe dyspnea and hypoxia during minimal activity.    3/10: VEntimask 8L. desats with movement. c/w IV dexa.   3/11: Ventimask 8L. Little more comfortable than yest. c/w IV dexa.   3/12: 2L NC comfortable at rest, but with any movement requires ventimask. Keep doing PT. Very deconditioned, desats immediately.   3/13: 2L OK at rest. uses ventimask with PT. agrees to go to SNF tomorrow. anticipate.    # Constipation   - c/w miralax, senna; dulcolax suppository prn   - monitor BMs    #CAD/STEMI s/p 4 stents  - Hx of STEMI s/p  s/p PCI of distal RCA, mid/distal LAD (December 2017 and February 2018)  - Oct 2019: 100% occlusion of distal RCA s/p PCI with REUBEN to distal RCA, and POBA to RPL  - As per cardio (Dr. Murillo) 2/23,  effient was held temporarily, may resume Effient 10 mg po once daily 1-2 weeks after epistaxis resolves and cleared by ENT. Discuss this with cardio on f/u OP.   -  c/w asa 81 daily   - C/w Toprol xl  25mg orally daily  - takes ezetimibe 10mg at home and is nonformulary, hold for now     # Iron deficiency anemia- started on PO iron tx.    # Vitamin D deficiency -started on vitamin D tx.    # Hyperkalemia- corrected, d/c lokelma     #HTN   - d/c enalapril 2.5mg daily due to normal BP in setting of severe covid   -continue Metoprolol tx.    #BPH  - C/w tamsulosin 0.4mg PO QHS    #DM   - Monitor FS, c/w insulin regimen   - A1C 9.1-->only on glipizide as outpatient     #Hypothyroidism  - C/w Synthroid    #GERD  - c/w protonix daily    # Supportive tx. - daily MVI, Zinc, Vitamin C.     Diet: consistent carbs, DASH/TLC + Glucerna   DVT PPx: lovenox 40mg once daily  GI ppx: protonix  Code Status: FULL CODE  Dispo: Acute. From home.  Rehab in [place] . anticipate for tomorrow.       71 yo M with PMH of CAD, MI (s/p 4 stents), DM, HTN, BPH, hypothyroid and GERD, recently received Pfizer vaccine (1/3 and 1/24) presented to ED c/o progressive SOB, fever, and weakness. Patient stated that his symptoms began on Wednesday 2/3. COVID PCR + on 2/4. Admitted to medicine with acute hypoxemic respiratory failure secondary to COVID-19 PNA. Upgraded to ICU from 3A due to increasing oxygen requirements on 2/6. ICU course complicated by epistaxis x 2 due to HFNC.    # Epistaxis  - Seen by ENT on 3/9, nasal packing placed on 3/9.   - Rhino rocket balloon deflated 3/11. No bleeding since. CT Maxillofacial w/ IV contrast WNL. ENT signed off.   - Hold effient (Dr. Eddy agrees) for now - check with ENT when we can reintroduce.    # Acute hypoxemic respiratory failure secondary to COVID-19 PNA   # likely superimposed bacterial pneumonia   - Patient s/p 2 doses of the Pfizer vaccine (last dose 1/24)  - CTA 02/04 showed extensive bilateral patchy ground-glass opacities involving all lobes most pronounced in the upper lung zones (Viral PNA/ARDS like picture)  - s/p 1 unit plasma, Remdesivir completed 2/9, completed 1 week of rocephin & Levaquin per ID, - fungital 2/19 <19  - LE Duplex negative on 3/2  - post multiple day of IV Dexa patient was   transitioned to PO Prednisone on 3/6, with worsening dyspnea and hypoxia, switched back to IV dexa from 3/7  - most recent CXR- slightly decreased extensive b/l pulmonary infiltrates.  - procal 0.10 on 3/6  - patient unable to tolerate PT due to severe dyspnea and hypoxia during minimal activity.    3/10: VEntimask 8L. desats with movement. c/w IV dexa.   3/11: Ventimask 8L. Little more comfortable than yest. c/w IV dexa.   3/12: 2L NC comfortable at rest, but with any movement requires ventimask. Keep doing PT. Very deconditioned, desats immediately.   3/13: 2L OK at rest. uses ventimask with PT. agrees to go to SNF tomorrow. anticipate. may give steroids for 1-2 days after tomorrow    # Constipation   - c/w miralax, senna; dulcolax suppository prn   - monitor BMs    #CAD/STEMI s/p 4 stents  - Hx of STEMI s/p  s/p PCI of distal RCA, mid/distal LAD (December 2017 and February 2018)  - Oct 2019: 100% occlusion of distal RCA s/p PCI with REUBEN to distal RCA, and POBA to RPL  - As per cardio (Dr. Murillo) 2/23,  effient was held temporarily, may resume Effient 10 mg po once daily 1-2 weeks after epistaxis resolves and cleared by ENT. Discuss this with cardio on f/u OP.   -  c/w asa 81 daily   - C/w Toprol xl  25mg orally daily  - takes ezetimibe 10mg at home and is nonformulary, hold for now     # Iron deficiency anemia- started on PO iron tx.    # Vitamin D deficiency -started on vitamin D tx.    # Hyperkalemia- corrected, d/c lokelma     #HTN   - d/c enalapril 2.5mg daily due to normal BP in setting of severe covid   -continue Metoprolol tx.    #BPH  - C/w tamsulosin 0.4mg PO QHS    #DM   - Monitor FS, c/w insulin regimen   - A1C 9.1-->only on glipizide as outpatient   decreased to lantus 22, lispro 8    #Hypothyroidism  - C/w Synthroid    #GERD  - c/w protonix daily    # Supportive tx. - daily MVI, Zinc, Vitamin C.     Diet: consistent carbs, DASH/TLC + Glucerna   DVT PPx: lovenox 40mg once daily  GI ppx: protonix  Code Status: FULL CODE  Dispo: Acute. From home.  Rehab in [place] . anticipate for tomorrow.

## 2021-03-13 NOTE — PROGRESS NOTE ADULT - SUBJECTIVE AND OBJECTIVE BOX
S: No new evnets/complaints  worked with PT today    All other pertinent ROS negative.      03-13-21 @ 06:01  -  03-13-21 @ 18:34  --------------------------------------------------------  IN: 660 mL / OUT: 200 mL / NET: 460 mL      Vital Signs Last 24 Hrs  T(C): 35.3 (13 Mar 2021 13:35), Max: 36.4 (12 Mar 2021 21:00)  T(F): 95.6 (13 Mar 2021 13:35), Max: 97.6 (12 Mar 2021 21:00)  HR: 94 (13 Mar 2021 13:35) (88 - 96)  BP: 123/63 (13 Mar 2021 13:35) (110/56 - 123/63)  BP(mean): --  RR: 18 (13 Mar 2021 13:35) (18 - 18)  SpO2: 94% (13 Mar 2021 13:35) (93% - 95%)  PHYSICAL EXAM:    Constitutional: NAD, awake and alert, well-developed  HEENT: PERR, EOMI, Normal Hearing, MMM  Neck: Soft and supple, No LAD, No JVD  Respiratory: Breath sounds are clear bilaterally, No wheezing, rales or rhonchi  Cardiovascular: S1 and S2, regular rate and rhythm, no Murmurs, gallops or rubs  Gastrointestinal: Bowel Sounds present, soft, nontender, nondistended, no guarding, no rebound  Extremities: No peripheral edema      MEDICATIONS:  MEDICATIONS  (STANDING):  ascorbic acid 500 milliGRAM(s) Oral daily  aspirin enteric coated 81 milliGRAM(s) Oral daily  BACItracin   Ointment 1 Application(s) Topical every 12 hours  chlorhexidine 4% Liquid 1 Application(s) Topical daily  cholecalciferol 5000 Unit(s) Oral daily  dexAMETHasone  Injectable 6 milliGRAM(s) IV Push daily  dextrose 40% Gel 15 Gram(s) Oral once  dextrose 5%. 1000 milliLiter(s) (50 mL/Hr) IV Continuous <Continuous>  dextrose 5%. 1000 milliLiter(s) (100 mL/Hr) IV Continuous <Continuous>  dextrose 50% Injectable 25 Gram(s) IV Push once  dextrose 50% Injectable 12.5 Gram(s) IV Push once  dextrose 50% Injectable 25 Gram(s) IV Push once  enoxaparin Injectable 40 milliGRAM(s) SubCutaneous at bedtime  ferrous    sulfate 325 milliGRAM(s) Oral daily  glucagon  Injectable 1 milliGRAM(s) IntraMuscular once  influenza   Vaccine 0.5 milliLiter(s) IntraMuscular once  insulin glargine Injectable (LANTUS) 25 Unit(s) SubCutaneous at bedtime  insulin lispro (ADMELOG) corrective regimen sliding scale   SubCutaneous three times a day before meals  insulin lispro Injectable (ADMELOG) 9 Unit(s) SubCutaneous three times a day before meals  ipratropium 17 MICROgram(s) HFA Inhaler 1 Puff(s) Inhalation every 6 hours  levothyroxine 50 MICROGram(s) Oral daily  melatonin 5 milliGRAM(s) Oral at bedtime  metoprolol succinate ER 25 milliGRAM(s) Oral daily  multivitamin 1 Tablet(s) Oral daily  pantoprazole    Tablet 40 milliGRAM(s) Oral before breakfast  polyethylene glycol 3350 17 Gram(s) Oral daily  senna 2 Tablet(s) Oral at bedtime  tamsulosin 0.8 milliGRAM(s) Oral at bedtime  zinc sulfate 220 milliGRAM(s) Oral daily      LABS: All Labs Reviewed:                        10.2   6.34  )-----------( 136      ( 13 Mar 2021 05:19 )             32.6     03-13    143  |  105  |  24<H>  ----------------------------<  55<L>  3.8   |  28  |  0.8    Ca    8.2<L>      13 Mar 2021 05:19  Mg     1.9     03-13    TPro  5.1<L>  /  Alb  3.0<L>  /  TBili  0.3  /  DBili  x   /  AST  23  /  ALT  30  /  AlkPhos  60  03-13          Blood Culture:     Radiology: reviewed

## 2021-03-14 ENCOUNTER — TRANSCRIPTION ENCOUNTER (OUTPATIENT)
Age: 71
End: 2021-03-14

## 2021-03-14 VITALS
SYSTOLIC BLOOD PRESSURE: 119 MMHG | OXYGEN SATURATION: 98 % | RESPIRATION RATE: 18 BRPM | TEMPERATURE: 98 F | DIASTOLIC BLOOD PRESSURE: 60 MMHG | HEART RATE: 97 BPM

## 2021-03-14 LAB
ALBUMIN SERPL ELPH-MCNC: 3.1 G/DL — LOW (ref 3.5–5.2)
ALP SERPL-CCNC: 61 U/L — SIGNIFICANT CHANGE UP (ref 30–115)
ALT FLD-CCNC: 39 U/L — SIGNIFICANT CHANGE UP (ref 0–41)
ANION GAP SERPL CALC-SCNC: 7 MMOL/L — SIGNIFICANT CHANGE UP (ref 7–14)
AST SERPL-CCNC: 28 U/L — SIGNIFICANT CHANGE UP (ref 0–41)
BASOPHILS # BLD AUTO: 0.04 K/UL — SIGNIFICANT CHANGE UP (ref 0–0.2)
BASOPHILS NFR BLD AUTO: 0.6 % — SIGNIFICANT CHANGE UP (ref 0–1)
BILIRUB SERPL-MCNC: 0.2 MG/DL — SIGNIFICANT CHANGE UP (ref 0.2–1.2)
BUN SERPL-MCNC: 22 MG/DL — HIGH (ref 10–20)
CALCIUM SERPL-MCNC: 8.6 MG/DL — SIGNIFICANT CHANGE UP (ref 8.5–10.1)
CHLORIDE SERPL-SCNC: 106 MMOL/L — SIGNIFICANT CHANGE UP (ref 98–110)
CO2 SERPL-SCNC: 30 MMOL/L — SIGNIFICANT CHANGE UP (ref 17–32)
CREAT SERPL-MCNC: 0.8 MG/DL — SIGNIFICANT CHANGE UP (ref 0.7–1.5)
EOSINOPHIL # BLD AUTO: 0.01 K/UL — SIGNIFICANT CHANGE UP (ref 0–0.7)
EOSINOPHIL NFR BLD AUTO: 0.2 % — SIGNIFICANT CHANGE UP (ref 0–8)
GLUCOSE BLDC GLUCOMTR-MCNC: 142 MG/DL — HIGH (ref 70–99)
GLUCOSE BLDC GLUCOMTR-MCNC: 220 MG/DL — HIGH (ref 70–99)
GLUCOSE SERPL-MCNC: 108 MG/DL — HIGH (ref 70–99)
HCT VFR BLD CALC: 32.5 % — LOW (ref 42–52)
HGB BLD-MCNC: 10.1 G/DL — LOW (ref 14–18)
IMM GRANULOCYTES NFR BLD AUTO: 3.4 % — HIGH (ref 0.1–0.3)
LYMPHOCYTES # BLD AUTO: 1.4 K/UL — SIGNIFICANT CHANGE UP (ref 1.2–3.4)
LYMPHOCYTES # BLD AUTO: 21.5 % — SIGNIFICANT CHANGE UP (ref 20.5–51.1)
MAGNESIUM SERPL-MCNC: 2.1 MG/DL — SIGNIFICANT CHANGE UP (ref 1.8–2.4)
MCHC RBC-ENTMCNC: 25.8 PG — LOW (ref 27–31)
MCHC RBC-ENTMCNC: 31.1 G/DL — LOW (ref 32–37)
MCV RBC AUTO: 83.1 FL — SIGNIFICANT CHANGE UP (ref 80–94)
MONOCYTES # BLD AUTO: 0.44 K/UL — SIGNIFICANT CHANGE UP (ref 0.1–0.6)
MONOCYTES NFR BLD AUTO: 6.8 % — SIGNIFICANT CHANGE UP (ref 1.7–9.3)
NEUTROPHILS # BLD AUTO: 4.39 K/UL — SIGNIFICANT CHANGE UP (ref 1.4–6.5)
NEUTROPHILS NFR BLD AUTO: 67.5 % — SIGNIFICANT CHANGE UP (ref 42.2–75.2)
NRBC # BLD: 0 /100 WBCS — SIGNIFICANT CHANGE UP (ref 0–0)
PLATELET # BLD AUTO: 147 K/UL — SIGNIFICANT CHANGE UP (ref 130–400)
POTASSIUM SERPL-MCNC: 4.7 MMOL/L — SIGNIFICANT CHANGE UP (ref 3.5–5)
POTASSIUM SERPL-SCNC: 4.7 MMOL/L — SIGNIFICANT CHANGE UP (ref 3.5–5)
PROT SERPL-MCNC: 5.2 G/DL — LOW (ref 6–8)
RBC # BLD: 3.91 M/UL — LOW (ref 4.7–6.1)
RBC # FLD: 18.6 % — HIGH (ref 11.5–14.5)
SODIUM SERPL-SCNC: 143 MMOL/L — SIGNIFICANT CHANGE UP (ref 135–146)
WBC # BLD: 6.5 K/UL — SIGNIFICANT CHANGE UP (ref 4.8–10.8)
WBC # FLD AUTO: 6.5 K/UL — SIGNIFICANT CHANGE UP (ref 4.8–10.8)

## 2021-03-14 PROCEDURE — 99239 HOSP IP/OBS DSCHRG MGMT >30: CPT | Mod: CS

## 2021-03-14 RX ORDER — GUAIFENESIN/DEXTROMETHORPHAN 600MG-30MG
5 TABLET, EXTENDED RELEASE 12 HR ORAL
Qty: 0 | Refills: 0 | DISCHARGE
Start: 2021-03-14

## 2021-03-14 RX ORDER — ENOXAPARIN SODIUM 100 MG/ML
40 INJECTION SUBCUTANEOUS
Qty: 0 | Refills: 0 | DISCHARGE
Start: 2021-03-14 | End: 2021-04-13

## 2021-03-14 RX ORDER — POLYETHYLENE GLYCOL 3350 17 G/17G
17 POWDER, FOR SOLUTION ORAL
Qty: 0 | Refills: 0 | DISCHARGE
Start: 2021-03-14

## 2021-03-14 RX ORDER — ASPIRIN/CALCIUM CARB/MAGNESIUM 324 MG
1 TABLET ORAL
Qty: 0 | Refills: 0 | DISCHARGE
Start: 2021-03-14

## 2021-03-14 RX ORDER — FERROUS SULFATE 325(65) MG
1 TABLET ORAL
Qty: 0 | Refills: 0 | DISCHARGE
Start: 2021-03-14

## 2021-03-14 RX ORDER — PANTOPRAZOLE SODIUM 20 MG/1
1 TABLET, DELAYED RELEASE ORAL
Qty: 0 | Refills: 0 | DISCHARGE
Start: 2021-03-14

## 2021-03-14 RX ORDER — INSULIN LISPRO 100/ML
2 VIAL (ML) SUBCUTANEOUS
Qty: 0 | Refills: 0 | DISCHARGE
Start: 2021-03-14

## 2021-03-14 RX ADMIN — POLYETHYLENE GLYCOL 3350 17 GRAM(S): 17 POWDER, FOR SOLUTION ORAL at 11:13

## 2021-03-14 RX ADMIN — CHLORHEXIDINE GLUCONATE 1 APPLICATION(S): 213 SOLUTION TOPICAL at 05:33

## 2021-03-14 RX ADMIN — Medication 25 MILLIGRAM(S): at 05:31

## 2021-03-14 RX ADMIN — Medication 1 APPLICATION(S): at 05:32

## 2021-03-14 RX ADMIN — Medication 6 MILLIGRAM(S): at 05:32

## 2021-03-14 RX ADMIN — Medication 8 UNIT(S): at 07:43

## 2021-03-14 RX ADMIN — Medication 1 PUFF(S): at 13:24

## 2021-03-14 RX ADMIN — Medication 4: at 11:11

## 2021-03-14 RX ADMIN — Medication 5000 UNIT(S): at 11:13

## 2021-03-14 RX ADMIN — Medication 50 MICROGRAM(S): at 05:31

## 2021-03-14 RX ADMIN — Medication 81 MILLIGRAM(S): at 11:12

## 2021-03-14 RX ADMIN — Medication 8 UNIT(S): at 11:11

## 2021-03-14 RX ADMIN — Medication 325 MILLIGRAM(S): at 11:12

## 2021-03-14 RX ADMIN — ZINC SULFATE TAB 220 MG (50 MG ZINC EQUIVALENT) 220 MILLIGRAM(S): 220 (50 ZN) TAB at 11:12

## 2021-03-14 RX ADMIN — Medication 1 PUFF(S): at 10:01

## 2021-03-14 RX ADMIN — ALBUTEROL 2 PUFF(S): 90 AEROSOL, METERED ORAL at 10:01

## 2021-03-14 RX ADMIN — Medication 500 MILLIGRAM(S): at 11:12

## 2021-03-14 RX ADMIN — Medication 1 TABLET(S): at 11:12

## 2021-03-14 RX ADMIN — PANTOPRAZOLE SODIUM 40 MILLIGRAM(S): 20 TABLET, DELAYED RELEASE ORAL at 05:31

## 2021-03-14 NOTE — PROGRESS NOTE ADULT - SUBJECTIVE AND OBJECTIVE BOX
SUBJECTIVE:    Patient is a 70y old Male who presents with a chief complaint of Epistaxis (12 Mar 2021 12:36)    Currently admitted to medicine with the primary diagnosis of Shortness of breath       Today is hospital day 38d. This morning he is resting comfortably in bed and reports no new issues or overnight events.     Admit Diagnosis:  SHORTNESS OF BREATH SUSPECTED COVID 19 VIRUS INFECTION,,,,,        PAST MEDICAL & SURGICAL HISTORY  Chronic kidney disease (CKD)  III    Type 2 diabetes mellitus without complication, unspecified long term insulin use status    Hypertension, unspecified type    Dyspnea, unspecified type    ASHD (arteriosclerotic heart disease)  STEMI 12/31/17, PCI RCA    S/P cataract surgery    History of ligation of vein  2012    History of tonsillectomy  1957    Chronic kidney disease (CKD)    Type 2 diabetes mellitus without complication, unspecified long term insulin use status    Hypertension, unspecified type    Dyspnea, unspecified type    ASHD (arteriosclerotic heart disease)    S/P cataract surgery    History of ligation of vein    History of tonsillectomy        SOCIAL HISTORY:  Negative for smoking/alcohol/drug use.     ALLERGIES:  penicillin (Rash (Severe))    MEDICATIONS:  STANDING MEDICATIONS  ascorbic acid 500 milliGRAM(s) Oral daily  aspirin enteric coated 81 milliGRAM(s) Oral daily  BACItracin   Ointment 1 Application(s) Topical every 12 hours  chlorhexidine 4% Liquid 1 Application(s) Topical daily  cholecalciferol 5000 Unit(s) Oral daily  dexAMETHasone  Injectable 6 milliGRAM(s) IV Push daily  dextrose 40% Gel 15 Gram(s) Oral once  dextrose 5%. 1000 milliLiter(s) IV Continuous <Continuous>  dextrose 5%. 1000 milliLiter(s) IV Continuous <Continuous>  dextrose 50% Injectable 25 Gram(s) IV Push once  dextrose 50% Injectable 12.5 Gram(s) IV Push once  dextrose 50% Injectable 25 Gram(s) IV Push once  enoxaparin Injectable 40 milliGRAM(s) SubCutaneous at bedtime  ferrous    sulfate 325 milliGRAM(s) Oral daily  glucagon  Injectable 1 milliGRAM(s) IntraMuscular once  influenza   Vaccine 0.5 milliLiter(s) IntraMuscular once  insulin glargine Injectable (LANTUS) 22 Unit(s) SubCutaneous at bedtime  insulin lispro (ADMELOG) corrective regimen sliding scale   SubCutaneous three times a day before meals  insulin lispro Injectable (ADMELOG) 8 Unit(s) SubCutaneous three times a day before meals  ipratropium 17 MICROgram(s) HFA Inhaler 1 Puff(s) Inhalation every 6 hours  levothyroxine 50 MICROGram(s) Oral daily  melatonin 5 milliGRAM(s) Oral at bedtime  metoprolol succinate ER 25 milliGRAM(s) Oral daily  multivitamin 1 Tablet(s) Oral daily  pantoprazole    Tablet 40 milliGRAM(s) Oral before breakfast  polyethylene glycol 3350 17 Gram(s) Oral daily  senna 2 Tablet(s) Oral at bedtime  tamsulosin 0.8 milliGRAM(s) Oral at bedtime  zinc sulfate 220 milliGRAM(s) Oral daily    PRN MEDICATIONS  acetaminophen   Tablet .. 650 milliGRAM(s) Oral every 6 hours PRN  ALBUTerol    90 MICROgram(s) HFA Inhaler 2 Puff(s) Inhalation every 6 hours PRN  bisacodyl Suppository 10 milliGRAM(s) Rectal daily PRN  guaifenesin/dextromethorphan  Syrup 5 milliLiter(s) Oral every 6 hours PRN  magnesium hydroxide Suspension 30 milliLiter(s) Oral daily PRN  ondansetron    Tablet 4 milliGRAM(s) Oral two times a day PRN  petrolatum white Ointment 1 Application(s) Topical every 6 hours PRN    VITALS:   T(F): 96.8  HR: 88  BP: 125/57  RR: 18  SpO2: 95%    I&Os:  03-13-21 @ 06:01  -  03-14-21 @ 07:00  --------------------------------------------------------  IN: 660 mL / OUT: 800 mL / NET: -140 mL        PHYSICAL EXAM:  GEN: No acute distress  LUNGS: Clear to auscultation bilaterally   HEART: S1/S2  ABD: Soft, NT/ND. BS +  EXT: no cyanosis/edema  NEURO: AAOX3    LABS:                        10.1   6.50  )-----------( 147      ( 14 Mar 2021 05:17 )             32.5     03-13    143  |  105  |  24<H>  ----------------------------<  55<L>  3.8   |  28  |  0.8    Ca    8.2<L>      13 Mar 2021 05:19  Mg     1.9     03-13    TPro  5.1<L>  /  Alb  3.0<L>  /  TBili  0.3  /  DBili  x   /  AST  23  /  ALT  30  /  AlkPhos  60  03-13    Creatinine trend: 0.8<--, 0.9<--, 0.8<--, 0.8<--, 0.8<--, 0.9<--, 0.8<--      COVID-19 PCR: NotDetec (03-13-21 @ 21:24)  COVID-19 PCR: NotDetec (03-09-21 @ 17:19)  COVID-19 PCR: Detected (03-04-21 @ 05:00)    Ferritin, Serum: 63 ng/mL (03-07-21 @ 11:40)  Ferritin, Serum: 132 ng/mL (02-23-21 @ 07:00)    D-Dimer Assay, Quantitative: 307 ng/mL DDU (03-10-21 @ 06:12)  D-Dimer Assay, Quantitative: 286 ng/mL DDU (03-08-21 @ 06:28)    C-Reactive Protein, Serum: 9 mg/L (03-10-21 @ 06:12)  C-Reactive Protein, Serum: 9 mg/L (03-08-21 @ 06:28)    Procalcitonin, Serum: 0.10 ng/mL (03-10-21 @ 06:12)  Procalcitonin, Serum: 0.08 ng/mL (03-08-21 @ 06:28)    Sedimentation Rate, Erythrocyte: 46 mm/Hr (03-07-21 @ 11:40)  Sedimentation Rate, Erythrocyte: 35 mm/Hr (03-04-21 @ 06:39)      RADIOLOGY:  < from: CT Maxillofacial w/ IV Cont (03.12.21 @ 11:17) >  IMPRESSION:    1.  No CT evidence of a sinonasal mass.    2.  Mild scattered paranasal sinus mucosal thickening.    3.  Periapical lucencies of the residual maxillary molar teeth projecting into the maxillary sinus floors with dehiscence on the right. Mandibular alveolar ridge lucencies measuring up to 6 mm. Recommend dental follow-up.    < end of copied text >

## 2021-03-14 NOTE — DISCHARGE NOTE PROVIDER - HOSPITAL COURSE
71 yo M with PMH of CAD, MI (s/p 4 stents), DM, HTN, BPH, hypothyroid and GERD, recently received Pfizer vaccine (1/3 and 1/24) presented to ED c/o progressive SOB, fever, and weakness. Patient stated that his symptoms began on Wednesday 2/3. COVID PCR + on 2/4. Admitted to medicine with acute hypoxemic respiratory failure secondary to COVID-19 PNA. Upgraded to ICU from 3A due to increasing oxygen requirements on 2/6. ICU course complicated by epistaxis x 2 due to HFNC.    # Epistaxis, resolved  - Seen by ENT on 3/9, nasal packing placed no 3/9 and removed on 3/11.  - Effient d/maria t.    # Acute hypoxemic respiratory failure secondary to COVID-19 PNA   # likely superimposed bacterial pneumonia   - Patient s/p 2 doses of the Pfizer vaccine (last dose 1/24)  - hypoxia improving. Patient saturates ~ 95% on 2L NC at rest, but still desaturates quickly requiring venti mask on exertion.  - s/p 1 unit plasma, Remdesivir completed 2/9, completed 1 week of rocephin & Levaquin per ID, - fungital 2/19 <19  - LE Duplex negative on 3/2  - post multiple day of IV Dexa patient was   transitioned to PO Prednisone on 3/6, with worsening dyspnea and hypoxia, switched back to IV dexa from 3/7  - repeat COVID PCR negative on 03/09, 03/13    # CAD/STEMI s/p 4 stents  - Hx of STEMI s/p  s/p PCI of distal RCA, mid/distal LAD (December 2017 and February 2018)  - Oct 2019: 100% occlusion of distal RCA s/p PCI with REUBEN to distal RCA, and POBA to RPL  - Patient was started on Aspirin 81 mg  - Effient resumed on 3/8 but stopped again 3/10 due to repeat Epistaxis.  - will effient on hold on discharge.   69 yo M with PMH of CAD, MI (s/p 4 stents), DM, HTN, BPH, hypothyroid and GERD, recently received Pfizer vaccine (1/3 and 1/24) presented to ED c/o progressive SOB, fever, and weakness. Patient stated that his symptoms began on Wednesday 2/3. COVID PCR + on 2/4. Admitted to medicine with acute hypoxemic respiratory failure secondary to COVID-19 PNA. Upgraded to ICU from 3A due to increasing oxygen requirements on 2/6. ICU course complicated by epistaxis x 2 due to HFNC.    # Epistaxis, resolved  - Seen by ENT on 3/9, nasal packing placed no 3/9 and removed on 3/11.  - Effient d/maria t.    # Acute hypoxemic respiratory failure secondary to COVID-19 PNA   # likely superimposed bacterial pneumonia   - Patient s/p 2 doses of the Pfizer vaccine (last dose 1/24)  - hypoxia improving. Patient saturates ~ 95% on 2L NC at rest, but still desaturates quickly requiring venti mask on exertion.  - s/p 1 unit plasma, Remdesivir completed 2/9, completed 1 week of rocephin & Levaquin per ID, - fungital 2/19 <19  - LE Duplex negative on 3/2  - post multiple day of IV Dexa patient was   transitioned to PO Prednisone on 3/6, with worsening dyspnea and hypoxia, switched back to IV dexa from 3/7.  PO PRednisone taper over the next 9 days. PLEASE MANAGE SUGARS AS NECESSARY WITH SLIDING SCALE.  - repeat COVID PCR negative on 03/09, 03/13    # CAD/STEMI s/p 4 stents  - Hx of STEMI s/p  s/p PCI of distal RCA, mid/distal LAD (December 2017 and February 2018)  - Oct 2019: 100% occlusion of distal RCA s/p PCI with REUBEN to distal RCA, and POBA to RPL  - Patient was started on Aspirin 81 mg  - Effient resumed on 3/8 but stopped again 3/10 due to repeat Epistaxis.  - will effient on hold on discharge.      Attending Note:  Patient seen and examined independently. I personally had a face-to-face encounter with the patient, examined the patient myself and reviewed the plan of care with the housestaff. Agree with resident's note but my note supersedes that of the resident in the matters hereby listed.   D/c to SNF for STR.   Use O2 PRN to maintain >92% sats.   PO Prednisone taper.

## 2021-03-14 NOTE — DISCHARGE NOTE NURSING/CASE MANAGEMENT/SOCIAL WORK - PATIENT PORTAL LINK FT
You can access the FollowMyHealth Patient Portal offered by [facility] by registering at the following website: [email or web address]. By joining [employer]’s FollowMyHealth portal, you will also be able to view your health information using other applications (apps) compatible with our system.

## 2021-03-14 NOTE — DISCHARGE NOTE PROVIDER - CARE PROVIDER_API CALL
Brandon Murillo)  Cardiovascular Disease; Interventional Cardiology  [address] [place] [address]  Phone: [phone]  Fax: [phone]  Follow Up Time:

## 2021-03-14 NOTE — DISCHARGE NOTE PROVIDER - NSDCMRMEDTOKEN_GEN_ALL_CORE_FT
Admelog 100 units/mL injectable solution: 2 Unit(s) if Glucose 151 - 200  4 Unit(s) if Glucose 201 - 250  6 Unit(s) if Glucose 251 - 300  8 Unit(s) if Glucose 301 - 350  10 Unit(s) if Glucose 351 - 400  12 Unit(s) if Glucose Greater Than 400  aspirin 81 mg oral delayed release tablet: 1 tab(s) orally once a day  enalapril 2.5 mg oral tablet: 1 tab(s) orally once a day  enoxaparin: 40 unit(s) subcutaneous once a day  ezetimibe 10 mg oral tablet: 1 tab(s) orally once a day  ferrous sulfate 325 mg (65 mg elemental iron) oral tablet: 1 tab(s) orally once a day  Flomax 0.4 mg oral capsule: 1 cap(s) orally once a day  glipiZIDE 5 mg oral tablet: 1 tab(s) orally once a day  guaifenesin-dextromethorphan 100 mg-10 mg/5 mL oral liquid: 5 milliliter(s) orally every 6 hours, As needed, Cough  metoprolol succinate 25 mg oral tablet, extended release: 1 tab(s) orally once a day  Multiple Vitamins oral tablet: 1 tab(s) orally once a day  pantoprazole 40 mg oral delayed release tablet: 1 tab(s) orally once a day (before a meal)  polyethylene glycol 3350 oral powder for reconstitution: 17 gram(s) orally once a day  predniSONE 10 mg oral tablet: 4 tab(s) orally once a day for 3 days, then  2 tab(s) orally once a day for 3 days, then  1 tab(s) orally once a day for 3 days, then stop the medicaion.  Synthroid 50 mcg (0.05 mg) oral tablet: 1 tab(s) orally once a day

## 2021-03-14 NOTE — DISCHARGE NOTE PROVIDER - NSDCCPCAREPLAN_GEN_ALL_CORE_FT
PRINCIPAL DISCHARGE DIAGNOSIS  Diagnosis: COVID-19  Assessment and Plan of Treatment: Coronavirus disease 2019 (COVID-19) is a respiratory illness  that can spread from person to person. The virus that causes  COVID-19 is a novel coronavirus that was first identified during  an investigation into an outbreak in [place], [place].  The virus that causes COVID-19 probably emerged from an  animal source, but is now spreading from person to person.  The virus is thought to spread mainly between people who  are in close contact with one another (within about 6 feet)  through respiratory droplets produced when an infected  person coughs or sneezes. It also may be possible that a person  can get COVID-19 by touching a surface or object that has  the virus on it and then touching their own mouth, nose, or  possibly their eyes, but this is not thought to be the main  way the virus spreads.  Please stay home and avoid contact with others for at least a week after symptoms resolve and follow government guidelines.   Patients with COVID-19 have had mild to severe respiratory  illness with symptoms of  • fever  • cough  • shortness of breath  People can help protect themselves from respiratory illness with  everyday preventive actions.    • Avoid close contact with people who are sick.  • Avoid touching your eyes, nose, and mouth with  unwashed hands.  • Wash your hands often with soap and water for at least 20   seconds. Use an alcohol-based hand  that contains at  least 60% alcohol if soap and water are not available.  You were treated with IV steroids, Remdesivir and convalescent plasma. You oxygen saturation has improved and saturating ~ 95% on 2L. Patient was able work with PT somewhat on venti mask. Please take medications as prescribed and follow up with your PMD 2-3 wees.  Please seek immediate help if you notice worsening cough, shortness of breath, chest pain, diarhea, dizziness.      SECONDARY DISCHARGE DIAGNOSES  Diagnosis: CAD (coronary artery disease)  Assessment and Plan of Treatment: Hx of STEMI s/p  s/p PCI of distal RCA, mid/distal LAD (December 2017 and February 2018)  - Oct 2019: 100% occlusion of distal RCA s/p PCI with REUBEN to distal RCA, and POBA to RPL  - Patient was started on Aspirin 81 mg  - Effient resumed on 3/8 but stopped again 3/10 due to repeat Epistaxis.  - will effient on hold on discharge.  Please follow up with your cardiologist in 2-3 weeks after discharge for further management.  Please sek immediate help if you notice any chest pain, palpitations, shortness of breath, worsening fatigue or Orthopnea.    Diagnosis: Epistaxis  Assessment and Plan of Treatment: Patient had multiple episodes of epistaxis with last nasal packing placed by ENT on 3/9 and removed on 3/11. Patient had CT maxillofacial with IV contrast: no sinonasal mass.   Patient was on Effient for h/o CAD with stents. Effient was d/maria t after discussion with Dr. Murillo (his cardiologist).  No further episodes of voming noted.

## 2021-03-14 NOTE — PROGRESS NOTE ADULT - PROVIDER SPECIALTY LIST ADULT
Critical Care
ENT
Hospitalist
Hospitalist
Infectious Disease
Internal Medicine
Pulmonology
Pulmonology
Critical Care
ENT
Hospitalist
Infectious Disease
Internal Medicine
Critical Care
ENT
Hospitalist
Hospitalist
Infectious Disease
Internal Medicine
Internal Medicine
MICU
MICU
Pulmonology
Critical Care
Infectious Disease
Pulmonology
Infectious Disease

## 2021-03-14 NOTE — PROGRESS NOTE ADULT - ASSESSMENT
69 yo M with PMH of CAD, MI (s/p 4 stents), DM, HTN, BPH, hypothyroid and GERD, recently received Pfizer vaccine (1/3 and 1/24) presented to ED c/o progressive SOB, fever, and weakness. Patient stated that his symptoms began on Wednesday 2/3. COVID PCR + on 2/4. Admitted to medicine with acute hypoxemic respiratory failure secondary to COVID-19 PNA. Upgraded to ICU from 3A due to increasing oxygen requirements on 2/6. ICU course complicated by epistaxis x 2 due to HFNC.    # Epistaxis  - Seen by ENT on 3/9, nasal packing placed no 3/9 and removed on 3/11.  - no repeat episodes of epistaxis. CT maxillofacial with IV contrast noted  - Discussed with Dr. Murillo. Effient d/maria t.    # Acute hypoxemic respiratory failure secondary to COVID-19 PNA   # likely superimposed bacterial pneumonia   - Patient s/p 2 doses of the Pfizer vaccine (last dose 1/24)  - hypoxia improving. Patient saturates ~ 95% on 2L NC at rest, but still desaturates quickly requiring venti mask on exertion or talking.  - CTA 02/04 showed extensive bilateral patchy ground-glass opacities involving all lobes most pronounced in the upper lung zones  - s/p 1 unit plasma, Remdesivir completed 2/9, completed 1 week of rocephin & Levaquin per ID, - fungital 2/19 <19  - LE Duplex negative on 3/2  - post multiple day of IV Dexa patient was   transitioned to PO Prednisone on 3/6, with worsening dyspnea and hypoxia, switched back to IV dexa from 3/7  - repeat CXR 3/10: no change.  - procal 0.10 on 3/10, CRP 9 on 3/10, d-dimer 307 on 3/10  - PT f/u  - repeat COVID PCR negative on 03/09, 03/13    # Constipation   - c/w miralax, senna; dulcolax suppository prn   - monitor BMs    # CAD/STEMI s/p 4 stents  - Hx of STEMI s/p  s/p PCI of distal RCA, mid/distal LAD (December 2017 and February 2018)  - Oct 2019: 100% occlusion of distal RCA s/p PCI with REUBEN to distal RCA, and POBA to RPL  - As per cardio (Dr. Murillo) 2/23,  effient was held temporarily, will resume Effient 10 mg po once daily from 3/8  - c/w asa 81 daily   - C/w Toprol xl  25mg orally daily  - takes ezetimibe 10mg at home and is nonformulary, hold for now     # Iron deficiency anemia- started on PO iron tx.    # Vitamin D deficiency -started on vitamin D tx.    # HTN   - d/maria t enalapril 2.5mg daily due to normal BP in setting of severe covid   - continue Metoprolol tx.    # BPH  - C/w tamsulosin 0.4mg PO QHS    # DM   - Monitor FS, c/w insulin regimen   - A1C 9.1-->only on glipizide as outpatient     # Hypothyroidism  - C/w Synthroid    # GERD  - c/w protonix daily    # Supportive tx. - daily MVI, Zinc, Vitamin C.     Diet: consistent carbs, DASH/TLC + Glucerna   DVT PPx: lovenox 40mg once daily  GI ppx: protonix  Code Status: FULL CODE  Dispo: Acute. From home. pending clinical improvement, PT rehab.

## 2021-03-17 DIAGNOSIS — J96.01 ACUTE RESPIRATORY FAILURE WITH HYPOXIA: ICD-10-CM

## 2021-03-17 DIAGNOSIS — N18.30 CHRONIC KIDNEY DISEASE, STAGE 3 UNSPECIFIED: ICD-10-CM

## 2021-03-17 DIAGNOSIS — E03.9 HYPOTHYROIDISM, UNSPECIFIED: ICD-10-CM

## 2021-03-17 DIAGNOSIS — I25.10 ATHEROSCLEROTIC HEART DISEASE OF NATIVE CORONARY ARTERY WITHOUT ANGINA PECTORIS: ICD-10-CM

## 2021-03-17 DIAGNOSIS — Z98.49 CATARACT EXTRACTION STATUS, UNSPECIFIED EYE: ICD-10-CM

## 2021-03-17 DIAGNOSIS — K92.1 MELENA: ICD-10-CM

## 2021-03-17 DIAGNOSIS — E87.5 HYPERKALEMIA: ICD-10-CM

## 2021-03-17 DIAGNOSIS — K21.9 GASTRO-ESOPHAGEAL REFLUX DISEASE WITHOUT ESOPHAGITIS: ICD-10-CM

## 2021-03-17 DIAGNOSIS — N17.9 ACUTE KIDNEY FAILURE, UNSPECIFIED: ICD-10-CM

## 2021-03-17 DIAGNOSIS — Z95.5 PRESENCE OF CORONARY ANGIOPLASTY IMPLANT AND GRAFT: ICD-10-CM

## 2021-03-17 DIAGNOSIS — R04.0 EPISTAXIS: ICD-10-CM

## 2021-03-17 DIAGNOSIS — J12.82 PNEUMONIA DUE TO CORONAVIRUS DISEASE 2019: ICD-10-CM

## 2021-03-17 DIAGNOSIS — Z88.0 ALLERGY STATUS TO PENICILLIN: ICD-10-CM

## 2021-03-17 DIAGNOSIS — B37.0 CANDIDAL STOMATITIS: ICD-10-CM

## 2021-03-17 DIAGNOSIS — Z87.891 PERSONAL HISTORY OF NICOTINE DEPENDENCE: ICD-10-CM

## 2021-03-17 DIAGNOSIS — Z79.84 LONG TERM (CURRENT) USE OF ORAL HYPOGLYCEMIC DRUGS: ICD-10-CM

## 2021-03-17 DIAGNOSIS — K59.00 CONSTIPATION, UNSPECIFIED: ICD-10-CM

## 2021-03-17 DIAGNOSIS — I25.2 OLD MYOCARDIAL INFARCTION: ICD-10-CM

## 2021-03-17 DIAGNOSIS — J44.0 CHRONIC OBSTRUCTIVE PULMONARY DISEASE WITH (ACUTE) LOWER RESPIRATORY INFECTION: ICD-10-CM

## 2021-03-17 DIAGNOSIS — J15.9 UNSPECIFIED BACTERIAL PNEUMONIA: ICD-10-CM

## 2021-03-17 DIAGNOSIS — D50.9 IRON DEFICIENCY ANEMIA, UNSPECIFIED: ICD-10-CM

## 2021-03-17 DIAGNOSIS — N40.0 BENIGN PROSTATIC HYPERPLASIA WITHOUT LOWER URINARY TRACT SYMPTOMS: ICD-10-CM

## 2021-03-17 DIAGNOSIS — E11.22 TYPE 2 DIABETES MELLITUS WITH DIABETIC CHRONIC KIDNEY DISEASE: ICD-10-CM

## 2021-03-17 DIAGNOSIS — R06.02 SHORTNESS OF BREATH: ICD-10-CM

## 2021-03-17 DIAGNOSIS — U07.1 COVID-19: ICD-10-CM

## 2021-03-17 DIAGNOSIS — E55.9 VITAMIN D DEFICIENCY, UNSPECIFIED: ICD-10-CM

## 2021-03-17 DIAGNOSIS — I12.9 HYPERTENSIVE CHRONIC KIDNEY DISEASE WITH STAGE 1 THROUGH STAGE 4 CHRONIC KIDNEY DISEASE, OR UNSPECIFIED CHRONIC KIDNEY DISEASE: ICD-10-CM

## 2021-03-17 DIAGNOSIS — J44.1 CHRONIC OBSTRUCTIVE PULMONARY DISEASE WITH (ACUTE) EXACERBATION: ICD-10-CM

## 2021-12-13 NOTE — ED ADULT NURSE NOTE - TEMPLATE LIST FOR HEAD TO TOE ASSESSMENT
"Chief Complaint  Follow up for sugars    Subjective          Dorinda Sherwood presents to [facility]  History of Present Illness    Patient understanding that this is a telehealth visit.  I cannot perform a full physical exam, therefore something might be missed, or patient may need to come into the office for further evaluation.  Patient is understanding and consents to this type of visit.  doximity    States that she started having throat pain last night  Thinks that she has a \"nasty cold\"  Has been doing some over the counter meds right now; no fevers, but does have chills right now    DM II-  Sugars have been up and down  She has been able to get her device attached and feels like it is working now  It has been around 400   No chest pain  Is feeling tired  Metformin kept her in the bathroom  SGLT-2's cause yeast infections  She isn't able to get a ride to go see alo paige    Objective   Vital Signs:   There were no vitals taken for this visit.    Physical Exam   Result Review :     Gen: well-nourished, no acute distress  HENT: atraumatic, normocephalic  Eyes: extraocular movements intact, no scleral icterus  Lung: breathing comfortably, no cough  Skin: no visible rash, no lesions  Neuro: grossly oriented to person, place, and time. no facial droop   Psych: normal mood and affect      Common labs    Common Labsle 7/12/21 8/6/21 8/6/21 10/28/21 10/28/21 10/28/21 10/28/21     1300 1300 [id number] 0934   Glucose 245 (A)  272 (A)    302 (A)   BUN 6  9    8   Creatinine 0.50 (A)  0.49 (A)    0.49 (A)   eGFR Non African Am 149  >150    >150   Sodium 135 (A)  134 (A)    133 (A)   Potassium 4.1  4.3    4.3   Chloride 99  98    95 (A)   Calcium 9.3  9.6    9.1   Albumin   4.20    4.60   Total Bilirubin   <0.2    0.3   Alkaline Phosphatase   97    83   AST (SGOT)   14    19   ALT (SGPT)   12    20   WBC  9.33  7.11      Hemoglobin  13.3  14.5      Hematocrit  41.7  " 42.9      Platelets  332  302      Total Cholesterol      187    Triglycerides      287 (A)    HDL Cholesterol      30 (A)    LDL Cholesterol       107 (A)    Hemoglobin A1C     10.42 (A)     (A) Abnormal value               Procedures      Assessment and Plan    Diagnoses and all orders for this visit:    1. Type 2 diabetes mellitus with hyperglycemia, with long-term current use of insulin ([facility]) (Primary)  Comments:  will increase insulin as listed in meds; will have Bonnie call to check on her and see how she is doing and we can adjust insulin further if needed    Other orders  -     insulin detemir (Levemir) 100 UNIT/ML injection; Inject 30 Units under the skin into the appropriate area as directed Every Night.  Dispense: 10 mL; Refill: 2  -     Insulin Lispro (HumaLOG KwikPen) 200 UNIT/ML solution pen-injector; Inject 5 Units under the skin into the appropriate area as directed 3 (Three) Times a Day With Meals.  Dispense: 3 mL; Refill: 3  -     fexofenadine (Allegra Allergy) 180 MG tablet; Take 1 tablet by mouth Daily.  Dispense: 90 tablet; Refill: 1              Follow Up   Return in about 6 weeks (around 1/24/2022).  Patient was given instructions and counseling regarding her condition or for health maintenance advice. Please see specific information pulled into the AVS if appropriate.        Respiratory

## 2021-12-26 NOTE — OCCUPATIONAL THERAPY INITIAL EVALUATION ADULT - TRANSFER SAFETY CONCERNS NOTED: SIT/STAND, REHAB EVAL
decreased step length/decreased weight-shifting ability normal... Z Plasty Text: The lesion was extirpated to the level of the fat with a #15 scalpel blade.  Given the location of the defect, shape of the defect and the proximity to free margins a Z-plasty was deemed most appropriate for repair.  Using a sterile surgical marker, the appropriate transposition arms of the Z-plasty were drawn incorporating the defect and placing the expected incisions within the relaxed skin tension lines where possible.    The area thus outlined was incised deep to adipose tissue with a #15 scalpel blade.  The skin margins were undermined to an appropriate distance in all directions utilizing iris scissors.  The opposing transposition arms were then transposed into place in opposite direction and anchored with interrupted buried subcutaneous sutures.

## 2022-08-01 NOTE — PATIENT PROFILE ADULT - VISION (WITH CORRECTIVE LENSES IF THE PATIENT USUALLY WEARS THEM):
Normal vision: sees adequately in most situations; can see medication labels, newsprint no pain, swelling or deformity of joints

## 2023-05-25 NOTE — PATIENT PROFILE ADULT. - SOCIAL CONCERNS
-he had previously said it was mainly his left hip, but I put in order for both.
-the orders are in there from March, which are still good---under imaging.
From: Alexus Vu  To: Dr. Alissa Olivera Brothers  Sent: 5/24/2023 3:01 PM EDT  Subject: hip Xrays    Hi, Dr. Manas Funez,   I called the Radiology place and asked for x-rays on both hips (I thought you thought that would be a good idea while I'm at it.). They asked you to send them an order for bilateral hips xray and then they'll see me.     Thank you,   Ginny Pereira
None

## 2024-02-03 ENCOUNTER — INPATIENT (INPATIENT)
Facility: HOSPITAL | Age: 74
LOS: 8 days | Discharge: HOME | DRG: 871 | End: 2024-02-12
Attending: INTERNAL MEDICINE | Admitting: INTERNAL MEDICINE
Payer: MEDICARE

## 2024-02-03 VITALS
DIASTOLIC BLOOD PRESSURE: 58 MMHG | WEIGHT: 149.91 LBS | TEMPERATURE: 99 F | OXYGEN SATURATION: 84 % | HEART RATE: 124 BPM | HEIGHT: 65 IN | SYSTOLIC BLOOD PRESSURE: 122 MMHG | RESPIRATION RATE: 22 BRPM

## 2024-02-03 DIAGNOSIS — Z98.49 CATARACT EXTRACTION STATUS, UNSPECIFIED EYE: Chronic | ICD-10-CM

## 2024-02-03 DIAGNOSIS — Z98.890 OTHER SPECIFIED POSTPROCEDURAL STATES: Chronic | ICD-10-CM

## 2024-02-03 PROCEDURE — 99285 EMERGENCY DEPT VISIT HI MDM: CPT

## 2024-02-03 PROCEDURE — 93010 ELECTROCARDIOGRAM REPORT: CPT

## 2024-02-04 DIAGNOSIS — R06.02 SHORTNESS OF BREATH: ICD-10-CM

## 2024-02-04 LAB
ALBUMIN SERPL ELPH-MCNC: 3.9 G/DL — SIGNIFICANT CHANGE UP (ref 3.5–5.2)
ALP SERPL-CCNC: 59 U/L — SIGNIFICANT CHANGE UP (ref 30–115)
ALT FLD-CCNC: 15 U/L — SIGNIFICANT CHANGE UP (ref 0–41)
ANION GAP SERPL CALC-SCNC: 10 MMOL/L — SIGNIFICANT CHANGE UP (ref 7–14)
ANION GAP SERPL CALC-SCNC: 12 MMOL/L — SIGNIFICANT CHANGE UP (ref 7–14)
ANION GAP SERPL CALC-SCNC: 12 MMOL/L — SIGNIFICANT CHANGE UP (ref 7–14)
APPEARANCE UR: ABNORMAL
AST SERPL-CCNC: 22 U/L — SIGNIFICANT CHANGE UP (ref 0–41)
B-OH-BUTYR SERPL-SCNC: <0.2 MMOL/L — SIGNIFICANT CHANGE UP
BACTERIA # UR AUTO: ABNORMAL /HPF
BASOPHILS # BLD AUTO: 0.04 K/UL — SIGNIFICANT CHANGE UP (ref 0–0.2)
BASOPHILS # BLD AUTO: 0.04 K/UL — SIGNIFICANT CHANGE UP (ref 0–0.2)
BASOPHILS NFR BLD AUTO: 0.3 % — SIGNIFICANT CHANGE UP (ref 0–1)
BASOPHILS NFR BLD AUTO: 0.3 % — SIGNIFICANT CHANGE UP (ref 0–1)
BILIRUB SERPL-MCNC: 1.6 MG/DL — HIGH (ref 0.2–1.2)
BILIRUB UR-MCNC: NEGATIVE — SIGNIFICANT CHANGE UP
BUN SERPL-MCNC: 21 MG/DL — HIGH (ref 10–20)
BUN SERPL-MCNC: 21 MG/DL — HIGH (ref 10–20)
BUN SERPL-MCNC: 26 MG/DL — HIGH (ref 10–20)
CALCIUM SERPL-MCNC: 8 MG/DL — LOW (ref 8.4–10.5)
CALCIUM SERPL-MCNC: 8.5 MG/DL — SIGNIFICANT CHANGE UP (ref 8.4–10.5)
CALCIUM SERPL-MCNC: 8.6 MG/DL — SIGNIFICANT CHANGE UP (ref 8.4–10.5)
CHLORIDE SERPL-SCNC: 95 MMOL/L — LOW (ref 98–110)
CHLORIDE SERPL-SCNC: 97 MMOL/L — LOW (ref 98–110)
CHLORIDE SERPL-SCNC: 97 MMOL/L — LOW (ref 98–110)
CO2 SERPL-SCNC: 23 MMOL/L — SIGNIFICANT CHANGE UP (ref 17–32)
CO2 SERPL-SCNC: 26 MMOL/L — SIGNIFICANT CHANGE UP (ref 17–32)
CO2 SERPL-SCNC: 27 MMOL/L — SIGNIFICANT CHANGE UP (ref 17–32)
COLOR SPEC: YELLOW — SIGNIFICANT CHANGE UP
CREAT SERPL-MCNC: 0.8 MG/DL — SIGNIFICANT CHANGE UP (ref 0.7–1.5)
CREAT SERPL-MCNC: 1 MG/DL — SIGNIFICANT CHANGE UP (ref 0.7–1.5)
CREAT SERPL-MCNC: 1.2 MG/DL — SIGNIFICANT CHANGE UP (ref 0.7–1.5)
D DIMER BLD IA.RAPID-MCNC: 265 NG/ML DDU — HIGH
DIFF PNL FLD: NEGATIVE — SIGNIFICANT CHANGE UP
EGFR: 65 ML/MIN/1.73M2 — SIGNIFICANT CHANGE UP
EGFR: 80 ML/MIN/1.73M2 — SIGNIFICANT CHANGE UP
EGFR: 95 ML/MIN/1.73M2 — SIGNIFICANT CHANGE UP
EOSINOPHIL # BLD AUTO: 0 K/UL — SIGNIFICANT CHANGE UP (ref 0–0.7)
EOSINOPHIL # BLD AUTO: 0 K/UL — SIGNIFICANT CHANGE UP (ref 0–0.7)
EOSINOPHIL NFR BLD AUTO: 0 % — SIGNIFICANT CHANGE UP (ref 0–8)
EOSINOPHIL NFR BLD AUTO: 0 % — SIGNIFICANT CHANGE UP (ref 0–8)
EPI CELLS # UR: SIGNIFICANT CHANGE UP
FLUAV AG NPH QL: SIGNIFICANT CHANGE UP
FLUBV AG NPH QL: SIGNIFICANT CHANGE UP
GLUCOSE BLDC GLUCOMTR-MCNC: 212 MG/DL — HIGH (ref 70–99)
GLUCOSE BLDC GLUCOMTR-MCNC: 220 MG/DL — HIGH (ref 70–99)
GLUCOSE BLDC GLUCOMTR-MCNC: 246 MG/DL — HIGH (ref 70–99)
GLUCOSE BLDC GLUCOMTR-MCNC: 342 MG/DL — HIGH (ref 70–99)
GLUCOSE BLDC GLUCOMTR-MCNC: 377 MG/DL — HIGH (ref 70–99)
GLUCOSE BLDC GLUCOMTR-MCNC: 446 MG/DL — HIGH (ref 70–99)
GLUCOSE BLDC GLUCOMTR-MCNC: 487 MG/DL — CRITICAL HIGH (ref 70–99)
GLUCOSE BLDC GLUCOMTR-MCNC: 489 MG/DL — CRITICAL HIGH (ref 70–99)
GLUCOSE SERPL-MCNC: 230 MG/DL — HIGH (ref 70–99)
GLUCOSE SERPL-MCNC: 331 MG/DL — HIGH (ref 70–99)
GLUCOSE SERPL-MCNC: 352 MG/DL — HIGH (ref 70–99)
GLUCOSE UR QL: 100 MG/DL
HCT VFR BLD CALC: 29.3 % — LOW (ref 42–52)
HCT VFR BLD CALC: 33 % — LOW (ref 42–52)
HCT VFR BLD CALC: 33.7 % — LOW (ref 42–52)
HGB BLD-MCNC: 10.7 G/DL — LOW (ref 14–18)
HGB BLD-MCNC: 11.9 G/DL — LOW (ref 14–18)
HGB BLD-MCNC: 12.3 G/DL — LOW (ref 14–18)
IMM GRANULOCYTES NFR BLD AUTO: 1.7 % — HIGH (ref 0.1–0.3)
IMM GRANULOCYTES NFR BLD AUTO: 2.4 % — HIGH (ref 0.1–0.3)
KETONES UR-MCNC: ABNORMAL MG/DL
LACTATE SERPL-SCNC: 1.1 MMOL/L — SIGNIFICANT CHANGE UP (ref 0.7–2)
LEUKOCYTE ESTERASE UR-ACNC: NEGATIVE — SIGNIFICANT CHANGE UP
LYMPHOCYTES # BLD AUTO: 0.31 K/UL — LOW (ref 1.2–3.4)
LYMPHOCYTES # BLD AUTO: 0.94 K/UL — LOW (ref 1.2–3.4)
LYMPHOCYTES # BLD AUTO: 2 % — LOW (ref 20.5–51.1)
LYMPHOCYTES # BLD AUTO: 7.1 % — LOW (ref 20.5–51.1)
MCHC RBC-ENTMCNC: 36.1 G/DL — SIGNIFICANT CHANGE UP (ref 32–37)
MCHC RBC-ENTMCNC: 36.5 G/DL — SIGNIFICANT CHANGE UP (ref 32–37)
MCHC RBC-ENTMCNC: 36.5 G/DL — SIGNIFICANT CHANGE UP (ref 32–37)
MCHC RBC-ENTMCNC: 37 PG — HIGH (ref 27–31)
MCHC RBC-ENTMCNC: 37.4 PG — HIGH (ref 27–31)
MCHC RBC-ENTMCNC: 37.6 PG — HIGH (ref 27–31)
MCV RBC AUTO: 102.4 FL — HIGH (ref 80–94)
MCV RBC AUTO: 102.5 FL — HIGH (ref 80–94)
MCV RBC AUTO: 103.1 FL — HIGH (ref 80–94)
MONOCYTES # BLD AUTO: 0.2 K/UL — SIGNIFICANT CHANGE UP (ref 0.1–0.6)
MONOCYTES # BLD AUTO: 0.48 K/UL — SIGNIFICANT CHANGE UP (ref 0.1–0.6)
MONOCYTES NFR BLD AUTO: 1.3 % — LOW (ref 1.7–9.3)
MONOCYTES NFR BLD AUTO: 3.6 % — SIGNIFICANT CHANGE UP (ref 1.7–9.3)
MRSA PCR RESULT.: NEGATIVE — SIGNIFICANT CHANGE UP
NEUTROPHILS # BLD AUTO: 11.42 K/UL — HIGH (ref 1.4–6.5)
NEUTROPHILS # BLD AUTO: 14.94 K/UL — HIGH (ref 1.4–6.5)
NEUTROPHILS NFR BLD AUTO: 86.6 % — HIGH (ref 42.2–75.2)
NEUTROPHILS NFR BLD AUTO: 94.7 % — HIGH (ref 42.2–75.2)
NITRITE UR-MCNC: NEGATIVE — SIGNIFICANT CHANGE UP
NRBC # BLD: 0 /100 WBCS — SIGNIFICANT CHANGE UP (ref 0–0)
NT-PROBNP SERPL-SCNC: 387 PG/ML — HIGH (ref 0–300)
PH UR: 8.5 (ref 5–8)
PLATELET # BLD AUTO: 127 K/UL — LOW (ref 130–400)
PLATELET # BLD AUTO: 130 K/UL — SIGNIFICANT CHANGE UP (ref 130–400)
PLATELET # BLD AUTO: 146 K/UL — SIGNIFICANT CHANGE UP (ref 130–400)
PMV BLD: 10.5 FL — HIGH (ref 7.4–10.4)
PMV BLD: 11 FL — HIGH (ref 7.4–10.4)
PMV BLD: 11.1 FL — HIGH (ref 7.4–10.4)
POTASSIUM SERPL-MCNC: 4.1 MMOL/L — SIGNIFICANT CHANGE UP (ref 3.5–5)
POTASSIUM SERPL-MCNC: 4.3 MMOL/L — SIGNIFICANT CHANGE UP (ref 3.5–5)
POTASSIUM SERPL-MCNC: 4.4 MMOL/L — SIGNIFICANT CHANGE UP (ref 3.5–5)
POTASSIUM SERPL-SCNC: 4.1 MMOL/L — SIGNIFICANT CHANGE UP (ref 3.5–5)
POTASSIUM SERPL-SCNC: 4.3 MMOL/L — SIGNIFICANT CHANGE UP (ref 3.5–5)
POTASSIUM SERPL-SCNC: 4.4 MMOL/L — SIGNIFICANT CHANGE UP (ref 3.5–5)
PROT SERPL-MCNC: 5.6 G/DL — LOW (ref 6–8)
PROT UR-MCNC: 30 MG/DL
RAPID RVP RESULT: SIGNIFICANT CHANGE UP
RBC # BLD: 2.86 M/UL — LOW (ref 4.7–6.1)
RBC # BLD: 3.22 M/UL — LOW (ref 4.7–6.1)
RBC # BLD: 3.27 M/UL — LOW (ref 4.7–6.1)
RBC # FLD: 15.6 % — HIGH (ref 11.5–14.5)
RBC # FLD: 15.8 % — HIGH (ref 11.5–14.5)
RBC # FLD: 16 % — HIGH (ref 11.5–14.5)
RBC CASTS # UR COMP ASSIST: 2 /HPF — SIGNIFICANT CHANGE UP (ref 0–4)
RSV RNA NPH QL NAA+NON-PROBE: SIGNIFICANT CHANGE UP
SARS-COV-2 RNA SPEC QL NAA+PROBE: SIGNIFICANT CHANGE UP
SARS-COV-2 RNA SPEC QL NAA+PROBE: SIGNIFICANT CHANGE UP
SODIUM SERPL-SCNC: 132 MMOL/L — LOW (ref 135–146)
SODIUM SERPL-SCNC: 133 MMOL/L — LOW (ref 135–146)
SODIUM SERPL-SCNC: 134 MMOL/L — LOW (ref 135–146)
SP GR SPEC: >1.03 — HIGH (ref 1–1.03)
TROPONIN T, HIGH SENSITIVITY RESULT: 64 NG/L — CRITICAL HIGH (ref 6–21)
TROPONIN T, HIGH SENSITIVITY RESULT: 75 NG/L — CRITICAL HIGH (ref 6–21)
UROBILINOGEN FLD QL: 0.2 MG/DL — SIGNIFICANT CHANGE UP (ref 0.2–1)
WBC # BLD: 12.21 K/UL — HIGH (ref 4.8–10.8)
WBC # BLD: 13.19 K/UL — HIGH (ref 4.8–10.8)
WBC # BLD: 15.75 K/UL — HIGH (ref 4.8–10.8)
WBC # FLD AUTO: 12.21 K/UL — HIGH (ref 4.8–10.8)
WBC # FLD AUTO: 13.19 K/UL — HIGH (ref 4.8–10.8)
WBC # FLD AUTO: 15.75 K/UL — HIGH (ref 4.8–10.8)
WBC UR QL: 2 /HPF — SIGNIFICANT CHANGE UP (ref 0–5)

## 2024-02-04 PROCEDURE — 36415 COLL VENOUS BLD VENIPUNCTURE: CPT

## 2024-02-04 PROCEDURE — 87086 URINE CULTURE/COLONY COUNT: CPT

## 2024-02-04 PROCEDURE — 99222 1ST HOSP IP/OBS MODERATE 55: CPT

## 2024-02-04 PROCEDURE — 87640 STAPH A DNA AMP PROBE: CPT

## 2024-02-04 PROCEDURE — 97116 GAIT TRAINING THERAPY: CPT | Mod: GP

## 2024-02-04 PROCEDURE — 87015 SPECIMEN INFECT AGNT CONCNTJ: CPT

## 2024-02-04 PROCEDURE — 84484 ASSAY OF TROPONIN QUANT: CPT

## 2024-02-04 PROCEDURE — 85379 FIBRIN DEGRADATION QUANT: CPT

## 2024-02-04 PROCEDURE — 87799 DETECT AGENT NOS DNA QUANT: CPT

## 2024-02-04 PROCEDURE — 80053 COMPREHEN METABOLIC PANEL: CPT

## 2024-02-04 PROCEDURE — 87641 MR-STAPH DNA AMP PROBE: CPT

## 2024-02-04 PROCEDURE — 87040 BLOOD CULTURE FOR BACTERIA: CPT

## 2024-02-04 PROCEDURE — 71275 CT ANGIOGRAPHY CHEST: CPT | Mod: 26,MA

## 2024-02-04 PROCEDURE — 87389 HIV-1 AG W/HIV-1&-2 AB AG IA: CPT

## 2024-02-04 PROCEDURE — 51703 INSERT BLADDER CATH COMPLEX: CPT

## 2024-02-04 PROCEDURE — 97110 THERAPEUTIC EXERCISES: CPT | Mod: GP

## 2024-02-04 PROCEDURE — 87449 NOS EACH ORGANISM AG IA: CPT

## 2024-02-04 PROCEDURE — 86769 SARS-COV-2 COVID-19 ANTIBODY: CPT

## 2024-02-04 PROCEDURE — 93306 TTE W/DOPPLER COMPLETE: CPT

## 2024-02-04 PROCEDURE — 86645 CMV ANTIBODY IGM: CPT

## 2024-02-04 PROCEDURE — 83735 ASSAY OF MAGNESIUM: CPT

## 2024-02-04 PROCEDURE — 94760 N-INVAS EAR/PLS OXIMETRY 1: CPT

## 2024-02-04 PROCEDURE — 94640 AIRWAY INHALATION TREATMENT: CPT

## 2024-02-04 PROCEDURE — 71045 X-RAY EXAM CHEST 1 VIEW: CPT

## 2024-02-04 PROCEDURE — 87070 CULTURE OTHR SPECIMN AEROBIC: CPT

## 2024-02-04 PROCEDURE — 87206 SMEAR FLUORESCENT/ACID STAI: CPT

## 2024-02-04 PROCEDURE — 84443 ASSAY THYROID STIM HORMONE: CPT

## 2024-02-04 PROCEDURE — 85025 COMPLETE CBC W/AUTO DIFF WBC: CPT

## 2024-02-04 PROCEDURE — 84145 PROCALCITONIN (PCT): CPT

## 2024-02-04 PROCEDURE — 93005 ELECTROCARDIOGRAM TRACING: CPT

## 2024-02-04 PROCEDURE — 81001 URINALYSIS AUTO W/SCOPE: CPT

## 2024-02-04 PROCEDURE — 86403 PARTICLE AGGLUT ANTBDY SCRN: CPT

## 2024-02-04 PROCEDURE — 85652 RBC SED RATE AUTOMATED: CPT

## 2024-02-04 PROCEDURE — 80048 BASIC METABOLIC PNL TOTAL CA: CPT

## 2024-02-04 PROCEDURE — 83615 LACTATE (LD) (LDH) ENZYME: CPT

## 2024-02-04 PROCEDURE — 97162 PT EVAL MOD COMPLEX 30 MIN: CPT | Mod: GP

## 2024-02-04 PROCEDURE — 87305 ASPERGILLUS AG IA: CPT

## 2024-02-04 PROCEDURE — 71045 X-RAY EXAM CHEST 1 VIEW: CPT | Mod: 26

## 2024-02-04 PROCEDURE — 86644 CMV ANTIBODY: CPT

## 2024-02-04 PROCEDURE — 87077 CULTURE AEROBIC IDENTIFY: CPT

## 2024-02-04 PROCEDURE — 83036 HEMOGLOBIN GLYCOSYLATED A1C: CPT

## 2024-02-04 PROCEDURE — 0225U NFCT DS DNA&RNA 21 SARSCOV2: CPT

## 2024-02-04 PROCEDURE — 83605 ASSAY OF LACTIC ACID: CPT

## 2024-02-04 PROCEDURE — 93010 ELECTROCARDIOGRAM REPORT: CPT

## 2024-02-04 PROCEDURE — 87116 MYCOBACTERIA CULTURE: CPT

## 2024-02-04 PROCEDURE — 87186 SC STD MICRODIL/AGAR DIL: CPT

## 2024-02-04 PROCEDURE — 85027 COMPLETE CBC AUTOMATED: CPT

## 2024-02-04 PROCEDURE — 82010 KETONE BODYS QUAN: CPT

## 2024-02-04 PROCEDURE — 86140 C-REACTIVE PROTEIN: CPT

## 2024-02-04 PROCEDURE — 87798 DETECT AGENT NOS DNA AMP: CPT

## 2024-02-04 PROCEDURE — 82962 GLUCOSE BLOOD TEST: CPT

## 2024-02-04 RX ORDER — TAMSULOSIN HYDROCHLORIDE 0.4 MG/1
0.8 CAPSULE ORAL DAILY
Refills: 0 | Status: DISCONTINUED | OUTPATIENT
Start: 2024-02-04 | End: 2024-02-12

## 2024-02-04 RX ORDER — DEXTROSE 50 % IN WATER 50 %
25 SYRINGE (ML) INTRAVENOUS ONCE
Refills: 0 | Status: DISCONTINUED | OUTPATIENT
Start: 2024-02-04 | End: 2024-02-12

## 2024-02-04 RX ORDER — SODIUM CHLORIDE 9 MG/ML
500 INJECTION, SOLUTION INTRAVENOUS ONCE
Refills: 0 | Status: COMPLETED | OUTPATIENT
Start: 2024-02-04 | End: 2024-02-04

## 2024-02-04 RX ORDER — DEXTROSE 50 % IN WATER 50 %
15 SYRINGE (ML) INTRAVENOUS ONCE
Refills: 0 | Status: DISCONTINUED | OUTPATIENT
Start: 2024-02-04 | End: 2024-02-12

## 2024-02-04 RX ORDER — ENOXAPARIN SODIUM 100 MG/ML
40 INJECTION SUBCUTANEOUS EVERY 24 HOURS
Refills: 0 | Status: DISCONTINUED | OUTPATIENT
Start: 2024-02-04 | End: 2024-02-06

## 2024-02-04 RX ORDER — CEFEPIME 1 G/1
2000 INJECTION, POWDER, FOR SOLUTION INTRAMUSCULAR; INTRAVENOUS EVERY 8 HOURS
Refills: 0 | Status: DISCONTINUED | OUTPATIENT
Start: 2024-02-04 | End: 2024-02-08

## 2024-02-04 RX ORDER — SODIUM CHLORIDE 9 MG/ML
1000 INJECTION, SOLUTION INTRAVENOUS
Refills: 0 | Status: DISCONTINUED | OUTPATIENT
Start: 2024-02-04 | End: 2024-02-12

## 2024-02-04 RX ORDER — FERROUS SULFATE 325(65) MG
325 TABLET ORAL DAILY
Refills: 0 | Status: DISCONTINUED | OUTPATIENT
Start: 2024-02-04 | End: 2024-02-12

## 2024-02-04 RX ORDER — PANTOPRAZOLE SODIUM 20 MG/1
40 TABLET, DELAYED RELEASE ORAL
Refills: 0 | Status: DISCONTINUED | OUTPATIENT
Start: 2024-02-04 | End: 2024-02-12

## 2024-02-04 RX ORDER — SODIUM CHLORIDE 9 MG/ML
500 INJECTION INTRAMUSCULAR; INTRAVENOUS; SUBCUTANEOUS ONCE
Refills: 0 | Status: COMPLETED | OUTPATIENT
Start: 2024-02-04 | End: 2024-02-04

## 2024-02-04 RX ORDER — IPRATROPIUM/ALBUTEROL SULFATE 18-103MCG
3 AEROSOL WITH ADAPTER (GRAM) INHALATION EVERY 6 HOURS
Refills: 0 | Status: DISCONTINUED | OUTPATIENT
Start: 2024-02-04 | End: 2024-02-12

## 2024-02-04 RX ORDER — ACYCLOVIR SODIUM 500 MG
400 VIAL (EA) INTRAVENOUS DAILY
Refills: 0 | Status: DISCONTINUED | OUTPATIENT
Start: 2024-02-04 | End: 2024-02-06

## 2024-02-04 RX ORDER — INSULIN LISPRO 100/ML
VIAL (ML) SUBCUTANEOUS
Refills: 0 | Status: DISCONTINUED | OUTPATIENT
Start: 2024-02-04 | End: 2024-02-12

## 2024-02-04 RX ORDER — INSULIN LISPRO 100/ML
10 VIAL (ML) SUBCUTANEOUS ONCE
Refills: 0 | Status: DISCONTINUED | OUTPATIENT
Start: 2024-02-04 | End: 2024-02-04

## 2024-02-04 RX ORDER — GLUCAGON INJECTION, SOLUTION 0.5 MG/.1ML
1 INJECTION, SOLUTION SUBCUTANEOUS ONCE
Refills: 0 | Status: DISCONTINUED | OUTPATIENT
Start: 2024-02-04 | End: 2024-02-12

## 2024-02-04 RX ORDER — TAMSULOSIN HYDROCHLORIDE 0.4 MG/1
0.8 CAPSULE ORAL AT BEDTIME
Refills: 0 | Status: DISCONTINUED | OUTPATIENT
Start: 2024-02-04 | End: 2024-02-04

## 2024-02-04 RX ORDER — ACETAMINOPHEN 500 MG
650 TABLET ORAL EVERY 6 HOURS
Refills: 0 | Status: DISCONTINUED | OUTPATIENT
Start: 2024-02-04 | End: 2024-02-12

## 2024-02-04 RX ORDER — INSULIN HUMAN 100 [IU]/ML
10 INJECTION, SOLUTION SUBCUTANEOUS ONCE
Refills: 0 | Status: COMPLETED | OUTPATIENT
Start: 2024-02-04 | End: 2024-02-04

## 2024-02-04 RX ORDER — METOPROLOL TARTRATE 50 MG
25 TABLET ORAL DAILY
Refills: 0 | Status: DISCONTINUED | OUTPATIENT
Start: 2024-02-04 | End: 2024-02-12

## 2024-02-04 RX ORDER — LANOLIN ALCOHOL/MO/W.PET/CERES
3 CREAM (GRAM) TOPICAL AT BEDTIME
Refills: 0 | Status: DISCONTINUED | OUTPATIENT
Start: 2024-02-04 | End: 2024-02-12

## 2024-02-04 RX ORDER — INSULIN LISPRO 100/ML
VIAL (ML) SUBCUTANEOUS AT BEDTIME
Refills: 0 | Status: DISCONTINUED | OUTPATIENT
Start: 2024-02-04 | End: 2024-02-04

## 2024-02-04 RX ORDER — SODIUM CHLORIDE 9 MG/ML
1000 INJECTION, SOLUTION INTRAVENOUS
Refills: 0 | Status: DISCONTINUED | OUTPATIENT
Start: 2024-02-04 | End: 2024-02-06

## 2024-02-04 RX ORDER — EZETIMIBE 10 MG/1
1 TABLET ORAL
Qty: 0 | Refills: 0 | DISCHARGE

## 2024-02-04 RX ORDER — DEXTROSE 50 % IN WATER 50 %
12.5 SYRINGE (ML) INTRAVENOUS ONCE
Refills: 0 | Status: DISCONTINUED | OUTPATIENT
Start: 2024-02-04 | End: 2024-02-12

## 2024-02-04 RX ORDER — ATORVASTATIN CALCIUM 80 MG/1
10 TABLET, FILM COATED ORAL AT BEDTIME
Refills: 0 | Status: DISCONTINUED | OUTPATIENT
Start: 2024-02-04 | End: 2024-02-12

## 2024-02-04 RX ORDER — CEFEPIME 1 G/1
2000 INJECTION, POWDER, FOR SOLUTION INTRAMUSCULAR; INTRAVENOUS ONCE
Refills: 0 | Status: COMPLETED | OUTPATIENT
Start: 2024-02-04 | End: 2024-02-04

## 2024-02-04 RX ORDER — CEFEPIME 1 G/1
INJECTION, POWDER, FOR SOLUTION INTRAMUSCULAR; INTRAVENOUS
Refills: 0 | Status: DISCONTINUED | OUTPATIENT
Start: 2024-02-04 | End: 2024-02-08

## 2024-02-04 RX ORDER — INSULIN GLARGINE 100 [IU]/ML
12 INJECTION, SOLUTION SUBCUTANEOUS AT BEDTIME
Refills: 0 | Status: DISCONTINUED | OUTPATIENT
Start: 2024-02-04 | End: 2024-02-04

## 2024-02-04 RX ORDER — INSULIN LISPRO 100/ML
3 VIAL (ML) SUBCUTANEOUS
Refills: 0 | Status: DISCONTINUED | OUTPATIENT
Start: 2024-02-04 | End: 2024-02-08

## 2024-02-04 RX ORDER — LEVOTHYROXINE SODIUM 125 MCG
50 TABLET ORAL DAILY
Refills: 0 | Status: DISCONTINUED | OUTPATIENT
Start: 2024-02-04 | End: 2024-02-12

## 2024-02-04 RX ORDER — INSULIN GLARGINE 100 [IU]/ML
15 INJECTION, SOLUTION SUBCUTANEOUS AT BEDTIME
Refills: 0 | Status: DISCONTINUED | OUTPATIENT
Start: 2024-02-04 | End: 2024-02-07

## 2024-02-04 RX ORDER — ASPIRIN/CALCIUM CARB/MAGNESIUM 324 MG
81 TABLET ORAL DAILY
Refills: 0 | Status: DISCONTINUED | OUTPATIENT
Start: 2024-02-04 | End: 2024-02-12

## 2024-02-04 RX ADMIN — SODIUM CHLORIDE 75 MILLILITER(S): 9 INJECTION, SOLUTION INTRAVENOUS at 22:20

## 2024-02-04 RX ADMIN — ENOXAPARIN SODIUM 40 MILLIGRAM(S): 100 INJECTION SUBCUTANEOUS at 14:14

## 2024-02-04 RX ADMIN — Medication 4: at 08:53

## 2024-02-04 RX ADMIN — Medication 60 MILLIGRAM(S): at 07:40

## 2024-02-04 RX ADMIN — SODIUM CHLORIDE 500 MILLILITER(S): 9 INJECTION, SOLUTION INTRAVENOUS at 21:18

## 2024-02-04 RX ADMIN — Medication 650 MILLIGRAM(S): at 21:17

## 2024-02-04 RX ADMIN — CEFEPIME 100 MILLIGRAM(S): 1 INJECTION, POWDER, FOR SOLUTION INTRAMUSCULAR; INTRAVENOUS at 10:09

## 2024-02-04 RX ADMIN — SODIUM CHLORIDE 500 MILLILITER(S): 9 INJECTION, SOLUTION INTRAVENOUS at 18:30

## 2024-02-04 RX ADMIN — PANTOPRAZOLE SODIUM 40 MILLIGRAM(S): 20 TABLET, DELAYED RELEASE ORAL at 06:45

## 2024-02-04 RX ADMIN — ATORVASTATIN CALCIUM 10 MILLIGRAM(S): 80 TABLET, FILM COATED ORAL at 21:18

## 2024-02-04 RX ADMIN — TAMSULOSIN HYDROCHLORIDE 0.8 MILLIGRAM(S): 0.4 CAPSULE ORAL at 15:50

## 2024-02-04 RX ADMIN — Medication 650 MILLIGRAM(S): at 22:55

## 2024-02-04 RX ADMIN — Medication 1 TABLET(S): at 14:09

## 2024-02-04 RX ADMIN — INSULIN GLARGINE 15 UNIT(S): 100 INJECTION, SOLUTION SUBCUTANEOUS at 21:17

## 2024-02-04 RX ADMIN — Medication 400 MILLIGRAM(S): at 14:09

## 2024-02-04 RX ADMIN — SODIUM CHLORIDE 500 MILLILITER(S): 9 INJECTION, SOLUTION INTRAVENOUS at 02:13

## 2024-02-04 RX ADMIN — Medication 8: at 12:28

## 2024-02-04 RX ADMIN — SODIUM CHLORIDE 500 MILLILITER(S): 9 INJECTION, SOLUTION INTRAVENOUS at 09:29

## 2024-02-04 RX ADMIN — Medication 3 MILLILITER(S): at 06:46

## 2024-02-04 RX ADMIN — Medication 12: at 16:36

## 2024-02-04 RX ADMIN — Medication 25 MILLIGRAM(S): at 06:45

## 2024-02-04 RX ADMIN — Medication 325 MILLIGRAM(S): at 15:51

## 2024-02-04 RX ADMIN — Medication 3 MILLILITER(S): at 21:02

## 2024-02-04 RX ADMIN — Medication 50 MICROGRAM(S): at 06:46

## 2024-02-04 RX ADMIN — CEFEPIME 100 MILLIGRAM(S): 1 INJECTION, POWDER, FOR SOLUTION INTRAMUSCULAR; INTRAVENOUS at 17:51

## 2024-02-04 RX ADMIN — SODIUM CHLORIDE 1000 MILLILITER(S): 9 INJECTION INTRAMUSCULAR; INTRAVENOUS; SUBCUTANEOUS at 12:29

## 2024-02-04 RX ADMIN — INSULIN HUMAN 10 UNIT(S): 100 INJECTION, SOLUTION SUBCUTANEOUS at 18:42

## 2024-02-04 RX ADMIN — Medication 3 MILLILITER(S): at 11:44

## 2024-02-04 RX ADMIN — Medication 650 MILLIGRAM(S): at 07:40

## 2024-02-04 RX ADMIN — Medication 81 MILLIGRAM(S): at 14:14

## 2024-02-04 RX ADMIN — Medication 3 UNIT(S): at 16:35

## 2024-02-04 NOTE — CONSULT NOTE ADULT - SUBJECTIVE AND OBJECTIVE BOX
73-year-old male with a past medical history of CAD x 4 stents followed by Dr. Murillo (last seen as routine 2 weeks ago), history of MI, diabetes, hypertension, hyperlipidemia, hypothyroidism,and hemolytic anemia followed by hematologist at [facility] on prednisone 80 mg as been tapered down to 60 mg and weekly Rituxan chemotherapy,  ALSO WITH  H/O BPH ON FLOMAX  NON COMPLIANT  -DID NOT  TAKE  FOR A FEW  DAYS,  presented  for evaluation of shortness of breath, unable  to  void on his  own, s/p placement of coude tip jeter catheter.       Review of Systems:  Other Review of Systems: All other review of systems negative, except as noted in HPI      Allergies and Intolerances:        Allergies:  	penicillin: Drug, Rash (Severe)    Home Medications:   * Patient Currently Takes Medications as of 04-Feb-2024 05:54 documented in Structured Notes  · 	Multiple Vitamins oral tablet: Last Dose Taken:  , 1 tab(s) orally once a day  · 	pantoprazole 40 mg oral delayed release tablet: Last Dose Taken:  , 1 tab(s) orally once a day (before a meal)  · 	polyethylene glycol 3350 oral powder for reconstitution: Last Dose Taken:  , 17 gram(s) orally once a day  · 	ferrous sulfate 325 mg (65 mg elemental iron) oral tablet: Last Dose Taken:  , 1 tab(s) orally once a day  · 	aspirin 81 mg oral delayed release tablet: Last Dose Taken:  , 1 tab(s) orally once a day  · 	metoprolol succinate 25 mg oral tablet, extended release: Last Dose Taken:  , 1 tab(s) orally once a day  · 	glipiZIDE 5 mg oral tablet: Last Dose Taken:  , 1 tab(s) orally once a day  · 	Synthroid 50 mcg (0.05 mg) oral tablet: Last Dose Taken:  , 1 tab(s) orally once a day  · 	Pravachol 40 mg oral tablet: Last Dose Taken:  , 1 tab(s) orally once a day (at bedtime)  · 	predniSONE 20 mg oral tablet: Last Dose Taken:  , 3 tab(s) orally once a day  · 	metFORMIN 500 mg oral tablet: Last Dose Taken:  , 1 tab(s) orally once a day  · 	Flomax 0.4 mg oral capsule: Last Dose Taken:  , 2 cap(s) orally once a day    Patient History:    Past Medical, Past Surgical, and Family History:  PAST MEDICAL HISTORY:  ASHD (arteriosclerotic heart disease) STEMI 12/31/17, PCI RCA    Chronic kidney disease (CKD) III    Dyspnea, unspecified type     H/O hemolytic anemia     Hypertension, unspecified type     Type 2 diabetes mellitus without complication, unspecified long term insulin use status.     PAST SURGICAL HISTORY:  History of ligation of vein 2012    History of tonsillectomy 1957    S/P cataract surgery.     FAMILY HISTORY:  Mother  Still living? Unknown  Family history of myocardial infarction, Age at diagnosis: Age Unknown.     Social History:  · Substance use	No  · Social History (marital status, living situation, occupation, and sexual history)	former smoker 1ppd x 30 years     Tobacco Screening:  · Core Measure Site	No    Risk Assessment:    Present on Admission:  Deep Venous Thrombosis	no  Pulmonary Embolus	no     HIV Screening:  · In accordance with [place] State law, we offer every patient who comes to our ED an HIV test. Would you like to be tested today?	Opt out    Physical Exam:   Physical Exam: VITALS:   T(C): 37.3 (02-04-24 @ 00:02), Max: 37.3 (02-03-24 @ 23:54)  HR: 110 (02-04-24 @ 05:39) (110 - 124)  BP: 128/62 (02-04-24 @ 05:39) (122/58 - 128/62)  RR: 18 (02-04-24 @ 05:39) (18 - 22)  SpO2: 94% (02-04-24 @ 05:39) (84% - 96%)    GENERAL: NAD, lying in bed comfortably  HEAD:  Atraumatic, normocephalic  EYES: EOMI, PERRLA, conjunctiva and sclera clear  ENT: Moist mucous membranes  HEART: Regular rate and rhythm, no murmurs, rubs, or gallops  LUNGS: Unlabored respirations.  Clear to auscultation bilaterally  ABDOMEN: Soft, nontender, nondistended,  EXTREMITIES: normal strength   NERVOUS SYSTEM:  A&Ox3, no focal deficits   SKIN: No rashes or lesions       Labs and Results:  Labs, Radiology, Cardiology, and Other Results: 12.3   13.19 )-----------( 146      ( 04 Feb 2024 00:18 )             33.7       02-04    134<L>  |  95<L>  |  21<H>  ----------------------------<  230<H>  4.1   |  27  |  1.0    Ca    8.6      04 Feb 2024 00:18    TPro  5.6<L>  /  Alb  3.9  /  TBili  1.6<H>  /  DBili  x   /  AST  22  /  ALT  15  /  AlkPhos  59  02-04         Urinalysis Basic - ( 04 Feb 2024 00:18 )    Color: x / Appearance: x / SG: x / pH: x  Gluc: 230 mg/dL / Ketone: x  / Bili: x / Urobili: x   Blood: x / Protein: x / Nitrite: x   Leuk Esterase: x / RBC: x / WBC x   Sq Epi: x / Non Sq Epi: x / Bacteria: x      < from: CT Angio Chest PE Protocol w/ IV Cont (02.04.24 @ 01:54) >        IMPRESSION:    No pulmonary embolism.    Patchy bilateral groundglass opacities with areas of scarring which are   likely chronic secondary to prior COVID infection as seen on CT chest   dated 2/4/2021, however acute infectious process cannot be excluded.        ******PRELIMINARY REPORT****    < end of copied text >    Assessment and Plan:   · VTE Risk Assessment	VTE Assessment already completed for this visit  · Completed VTE Risk Assessment(s)	Medical Assessment Completed on: 04-Feb-2024 06:35  · Completed VTE Risk Assessment(s)	Refer to the Assessment tab to view/cancel completed assessment.     Assessment:  · Assessment	  73-year-old male with a past medical history of CAD x 4 stents followed by Dr. Murillo (last seen as routine 2 weeks ago), history of MI, diabetes, hypertension, hyperlipidemia, hypothyroidism, BPH, and hemolytic anemia followed by hematologist at [facility] on prednisone 80 mg as been tapered down to 60 mg and weekly Rituxan chemotherapy presents for evaluation of shortness of breath that began yesterday associated with weakness in the proximal legs.  #bph  - cont tamsulosin   continue  jeter catheter  will follow    #acute hypoxic respiratory failure   COPD, former smoker x 30 pack years   - nebs q6  - supplemental o2 keep sat >90%  - CTA negative for PE   - f/u procal     #sinus tachycardia   - continue metoprolol   - telemetry monitoring   - trop 75->64    #hld cad x 4 stents   - cont statin, asa    #DM   - hold PO meds  - ISS/FS        #hemolytic anemia   - weekly chemo outpatient   - prednisone 60mg/day     #hypothyroidism   - cont synthroid      dvt ppx lovenox   diet dash CC    73-year-old male with a past medical history of CAD x 4 stents followed by Dr. Murillo (last seen as routine 2 weeks ago), history of MI, diabetes, hypertension, hyperlipidemia, hypothyroidism,and hemolytic anemia followed by hematologist at [facility] on prednisone 80 mg as been tapered down to 60 mg and weekly Rituxan chemotherapy,  ALSO WITH  H/O BPH ON FLOMAX  NON COMPLIANT  -DID NOT  TAKE  FOR A FEW  DAYS,  presented  for evaluation of shortness of breath, unable  to  void on his  own, s/p placement of coude tip jeter catheter.       Review of Systems:  Other Review of Systems: All other review of systems negative, except as noted in HPI      Allergies and Intolerances:        Allergies:  	penicillin: Drug, Rash (Severe)    Home Medications:   * Patient Currently Takes Medications as of 04-Feb-2024 05:54 documented in Structured Notes  · 	Multiple Vitamins oral tablet: Last Dose Taken:  , 1 tab(s) orally once a day  · 	pantoprazole 40 mg oral delayed release tablet: Last Dose Taken:  , 1 tab(s) orally once a day (before a meal)  · 	polyethylene glycol 3350 oral powder for reconstitution: Last Dose Taken:  , 17 gram(s) orally once a day  · 	ferrous sulfate 325 mg (65 mg elemental iron) oral tablet: Last Dose Taken:  , 1 tab(s) orally once a day  · 	aspirin 81 mg oral delayed release tablet: Last Dose Taken:  , 1 tab(s) orally once a day  · 	metoprolol succinate 25 mg oral tablet, extended release: Last Dose Taken:  , 1 tab(s) orally once a day  · 	glipiZIDE 5 mg oral tablet: Last Dose Taken:  , 1 tab(s) orally once a day  · 	Synthroid 50 mcg (0.05 mg) oral tablet: Last Dose Taken:  , 1 tab(s) orally once a day  · 	Pravachol 40 mg oral tablet: Last Dose Taken:  , 1 tab(s) orally once a day (at bedtime)  · 	predniSONE 20 mg oral tablet: Last Dose Taken:  , 3 tab(s) orally once a day  · 	metFORMIN 500 mg oral tablet: Last Dose Taken:  , 1 tab(s) orally once a day  · 	Flomax 0.4 mg oral capsule: Last Dose Taken:  , 2 cap(s) orally once a day    Patient History:    Past Medical, Past Surgical, and Family History:  PAST MEDICAL HISTORY:  ASHD (arteriosclerotic heart disease) STEMI 12/31/17, PCI RCA    Chronic kidney disease (CKD) III    Dyspnea, unspecified type     H/O hemolytic anemia     Hypertension, unspecified type     Type 2 diabetes mellitus without complication, unspecified long term insulin use status.     PAST SURGICAL HISTORY:  History of ligation of vein 2012    History of tonsillectomy 1957    S/P cataract surgery.     FAMILY HISTORY:  Mother  Still living? Unknown  Family history of myocardial infarction, Age at diagnosis: Age Unknown.     Social History:  · Substance use	No  · Social History (marital status, living situation, occupation, and sexual history)	former smoker 1ppd x 30 years     Tobacco Screening:  · Core Measure Site	No    Risk Assessment:    Present on Admission:  Deep Venous Thrombosis	no  Pulmonary Embolus	no     HIV Screening:  · In accordance with [place] State law, we offer every patient who comes to our ED an HIV test. Would you like to be tested today?	Opt out    Physical Exam:   Physical Exam: VITALS:   T(C): 37.3 (02-04-24 @ 00:02), Max: 37.3 (02-03-24 @ 23:54)  HR: 110 (02-04-24 @ 05:39) (110 - 124)  BP: 128/62 (02-04-24 @ 05:39) (122/58 - 128/62)  RR: 18 (02-04-24 @ 05:39) (18 - 22)  SpO2: 94% (02-04-24 @ 05:39) (84% - 96%)    GENERAL: NAD, lying in bed comfortably  HEAD:  Atraumatic, normocephalic  EYES: EOMI, PERRLA, conjunctiva and sclera clear  ENT: Moist mucous membranes  HEART: Regular rate and rhythm, no murmurs, rubs, or gallops  LUNGS: Unlabored respirations.  Clear to auscultation bilaterally  ABDOMEN: Soft, nontender, nondistended,  EXTREMITIES: normal strength   NERVOUS SYSTEM:  A&Ox3, no focal deficits   SKIN: No rashes or lesions       Labs and Results:  Labs, Radiology, Cardiology, and Other Results: 12.3   13.19 )-----------( 146      ( 04 Feb 2024 00:18 )             33.7       02-04    134<L>  |  95<L>  |  21<H>  ----------------------------<  230<H>  4.1   |  27  |  1.0    Ca    8.6      04 Feb 2024 00:18    TPro  5.6<L>  /  Alb  3.9  /  TBili  1.6<H>  /  DBili  x   /  AST  22  /  ALT  15  /  AlkPhos  59  02-04         Urinalysis Basic - ( 04 Feb 2024 00:18 )    Color: x / Appearance: x / SG: x / pH: x  Gluc: 230 mg/dL / Ketone: x  / Bili: x / Urobili: x   Blood: x / Protein: x / Nitrite: x   Leuk Esterase: x / RBC: x / WBC x   Sq Epi: x / Non Sq Epi: x / Bacteria: x      < from: CT Angio Chest PE Protocol w/ IV Cont (02.04.24 @ 01:54) >        IMPRESSION:    No pulmonary embolism.    Patchy bilateral groundglass opacities with areas of scarring which are   likely chronic secondary to prior COVID infection as seen on CT chest   dated 2/4/2021, however acute infectious process cannot be excluded.        ******PRELIMINARY REPORT****    < end of copied text >    Assessment and Plan:   · VTE Risk Assessment	VTE Assessment already completed for this visit  · Completed VTE Risk Assessment(s)	Medical Assessment Completed on: 04-Feb-2024 06:35  · Completed VTE Risk Assessment(s)	Refer to the Assessment tab to view/cancel completed assessment.     Assessment:  · Assessment	  73-year-old male with a past medical history of CAD x 4 stents followed by Dr. Murillo (last seen as routine 2 weeks ago), history of MI, diabetes, hypertension, hyperlipidemia, hypothyroidism, BPH, and hemolytic anemia followed by hematologist at [facility] on prednisone 80 mg as been tapered down to 60 mg and weekly Rituxan chemotherapy presents for evaluation of shortness of breath that began yesterday associated with weakness in the proximal legs.ALSO WITH  H/O BPH ON FLOMAX  NON COMPLIANT  -DID NOT  TAKE  FOR A FEW  DAYS,  presented  for evaluation of shortness of breath, unable  to  void on his  own, s/p placement of coude tip jeter catheter.    #bph  - cont tamsulosin   continue  jeter catheter  will follow    #acute hypoxic respiratory failure   COPD, former smoker x 30 pack years   - nebs q6  - supplemental o2 keep sat >90%  - CTA negative for PE   - f/u procal     #sinus tachycardia   - continue metoprolol   - telemetry monitoring   - trop 75->64    #hld cad x 4 stents   - cont statin, asa    #DM   - hold PO meds  - ISS/FS        #hemolytic anemia   - weekly chemo outpatient   - prednisone 60mg/day     #hypothyroidism   - cont synthroid      dvt ppx lovenox   diet dash CC

## 2024-02-04 NOTE — PROGRESS NOTE ADULT - SUBJECTIVE AND OBJECTIVE BOX
SUBJECTIVE:    Patient is a 73y old Male who presents with a chief complaint of hypoxia (04 Feb 2024 06:17)      HPI:  73-year-old male with a past medical history of CAD x 4 stents followed by Dr. Murillo (last seen as routine 2 weeks ago), history of MI, diabetes, hypertension, hyperlipidemia, hypothyroidism, BPH, and hemolytic anemia followed by hematologist at [facility] on prednisone 80 mg as been tapered down to 60 mg and weekly Rituxan chemotherapy presents for evaluation of shortness of breath that began yesterday associated with weakness in the proximal legs. Patient reports increased work of breathing and lower O2 sats at home prompting presentation. Patient reports developing emphysema following COVID infection 2 years ago and has had follow up CTs which have shown the same chronic scarring and opacities. Patient does ot use any inhalers. He has a significant smoking history 1ppd x 30 years. Patient denies any ill symptoms, no cough fever chills nausea vomiting or chest pain. Patient reports that his shortness of breath has imrpoved (on 4Lnc) and he is motivated for discharge, if hypoxia resolves.   Patietn also complained of lower extremity weakness which has since completely resolved.  (04 Feb 2024 06:17)      Currently admitted to medicine with the primary diagnosis of SOB (shortness of breath)    needing oxygen but not feeling short of breath with it, no pains anywhere    Besides the pertinent positives and negatives described above, the ROS was within normal limits.    PAST MEDICAL & SURGICAL HISTORY  ASHD (arteriosclerotic heart disease)  STEMI 12/31/17, PCI RCA    Dyspnea, unspecified type    Hypertension, unspecified type    Type 2 diabetes mellitus without complication, unspecified long term insulin use status    Chronic kidney disease (CKD)  III    H/O hemolytic anemia    History of tonsillectomy  1957    History of ligation of vein  2012    S/P cataract surgery      SOCIAL HISTORY:    ALLERGIES:  penicillin (Rash (Severe))    MEDICATIONS:  STANDING MEDICATIONS  acyclovir   Oral Tab/Cap 400 milliGRAM(s) Oral daily  albuterol/ipratropium for Nebulization 3 milliLiter(s) Nebulizer every 6 hours  aspirin enteric coated 81 milliGRAM(s) Oral daily  atorvastatin 10 milliGRAM(s) Oral at bedtime  cefepime   IVPB 2000 milliGRAM(s) IV Intermittent every 8 hours  cefepime   IVPB      dextrose 5%. 1000 milliLiter(s) IV Continuous <Continuous>  dextrose 5%. 1000 milliLiter(s) IV Continuous <Continuous>  dextrose 50% Injectable 12.5 Gram(s) IV Push once  dextrose 50% Injectable 25 Gram(s) IV Push once  dextrose 50% Injectable 25 Gram(s) IV Push once  enoxaparin Injectable 40 milliGRAM(s) SubCutaneous every 24 hours  ferrous    sulfate 325 milliGRAM(s) Oral daily  glucagon  Injectable 1 milliGRAM(s) IntraMuscular once  insulin glargine Injectable (LANTUS) 15 Unit(s) SubCutaneous at bedtime  insulin lispro (ADMELOG) corrective regimen sliding scale   SubCutaneous three times a day before meals  insulin lispro Injectable (ADMELOG) 3 Unit(s) SubCutaneous three times a day before meals  lactated ringers Bolus 500 milliLiter(s) IV Bolus once  lactated ringers Bolus 500 milliLiter(s) IV Bolus once  lactated ringers. 1000 milliLiter(s) IV Continuous <Continuous>  levothyroxine 50 MICROGram(s) Oral daily  metoprolol succinate ER 25 milliGRAM(s) Oral daily  multivitamin 1 Tablet(s) Oral daily  pantoprazole    Tablet 40 milliGRAM(s) Oral before breakfast  predniSONE   Tablet 60 milliGRAM(s) Oral daily  tamsulosin 0.8 milliGRAM(s) Oral daily    PRN MEDICATIONS  acetaminophen     Tablet .. 650 milliGRAM(s) Oral every 6 hours PRN  dextrose Oral Gel 15 Gram(s) Oral once PRN  melatonin 3 milliGRAM(s) Oral at bedtime PRN    VITALS:   T(F): 97.4  HR: 99  BP: 150/58  RR: 20  SpO2: 91%    LABS:                        11.9   15.75 )-----------( 130      ( 04 Feb 2024 11:16 )             33.0     02-04    133<L>  |  97<L>  |  21<H>  ----------------------------<  331<H>  4.4   |  26  |  0.8    Ca    8.5      04 Feb 2024 11:16    TPro  5.6<L>  /  Alb  3.9  /  TBili  1.6<H>  /  DBili  x   /  AST  22  /  ALT  15  /  AlkPhos  59  02-04      Urinalysis Basic - ( 04 Feb 2024 11:16 )    Color: x / Appearance: x / SG: x / pH: x  Gluc: 331 mg/dL / Ketone: x  / Bili: x / Urobili: x   Blood: x / Protein: x / Nitrite: x   Leuk Esterase: x / RBC: x / WBC x   Sq Epi: x / Non Sq Epi: x / Bacteria: x        Lactate, Blood: 1.1 mmol/L (02-04-24 @ 09:30)          SOB (shortness of breath)      RADIOLOGY:    PHYSICAL EXAM:  General: WN/WD NAD  Neurology: A&Ox3, nonfocal, TIJERINA x 4  Head:  Normocephalic, atraumatic  ENT:  Mucosa moist, no ulcerations  Neck:  Supple, no sinuses or palpable masses  Lymphatic:  No palpable cervical, supraclavicular, axillary or inguinal adenopathy  Respiratory: CTA B/L  CV: RRR, S1S2, no murmur  Abdominal: Soft, NT, ND no palpable mass  MSK: No edema, + peripheral pulses  Incisions: intact, no erythema or drainage    Intravenous access: yes  NG tube: no  Penaloza Catheter:   Indwelling Urethral Catheter:     Connect To:  Straight Drainage/[place]    Indication:  Urinary Retention / Obstruction (02-04-24 @ 16:06) (not performed) [Active]
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
## 2024-02-04 NOTE — PATIENT PROFILE ADULT - FUNCTIONAL ASSESSMENT - BASIC MOBILITY 6.
3-calculated by average/Not able to assess (calculate score using [facility] averaging method)

## 2024-02-04 NOTE — ED ADULT NURSE NOTE - NSFALLUNIVINTERV_ED_ALL_ED
Bed/Stretcher in lowest position, wheels locked, appropriate side rails in place/Call bell, personal items and telephone in reach/Instruct patient to call for assistance before getting out of bed/chair/stretcher/Non-slip footwear applied when patient is off stretcher/[place] to call system/Physically safe environment - no spills, clutter or unnecessary equipment/Purposeful proactive rounding/Room/bathroom lighting operational, light cord in reach

## 2024-02-04 NOTE — CHART NOTE - NSCHARTNOTEFT_GEN_A_CORE
blood sugar in 400's despite giving 15 unit lispro 2 hours ago.  Pt started on prednisone 60mg today  Will give the bedtime lantus now.  Will give additional 10unit lispro blood sugar in 400's despite giving 15 unit lispro 2 hours ago.  Pt started on prednisone 60mg today  Will give the bedtime lantus now.  Will give additional 10unit insulin IV push  d/w Dr Ortiz

## 2024-02-04 NOTE — ED PROVIDER NOTE - PHYSICAL EXAMINATION
VITAL SIGNS: I have reviewed nursing notes and confirm.  CONSTITUTIONAL: Well-developed; well-nourished; in no acute distress.  SKIN: Skin exam is warm and dry, no acute rash.  HEAD: Normocephalic; atraumatic.  EYES: PERRL, EOM intact; conjunctiva and sclera clear.  ENT: No nasal discharge; airway clear. Throat clear.  NECK: Supple; non tender.  No lymphadenopathy.  CARD: S1, S2 normal; no murmurs, gallops, or rubs. Regular rate and rhythm.  RESP: No wheezes, rales or rhonchi.  ABD: Normal bowel sounds; soft; non-distended; non-tender; no hepatosplenomegaly.  EXT: Normal ROM. No clubbing, cyanosis or edema mild tenderness bilateral posterior shoulder, left hip and posterior pelvis  NEURO: Alert, oriented. Grossly unremarkable. No focal deficits. GCS 15 Physical Exam    Vital Signs: I have reviewed the initial vital signs.  Constitutional: well-nourished, appears stated age, no acute distress  Eyes: Conjunctiva pink, Sclera clear  Cardiovascular: S1 and S2, regular rate, regular rhythm, well-perfused extremities, radial pulses equal and 2+ b/l.   Respiratory: unlabored respiratory effort, clear to auscultation bilaterally no wheezing, rales and rhonchi. pt is speaking full sentences. no accessory muscle use.   Gastrointestinal: soft, non-tender, nondistended abdomen, no pulsatile mass, no rebound, no guarding  Musculoskeletal: no lower extremity edema, no calf tenderness  Integumentary: warm, dry, no rash  Neurologic: awake, alert  Psychiatric: appropriate mood, appropriate affect

## 2024-02-04 NOTE — H&P ADULT - ASSESSMENT
73-year-old male with a past medical history of CAD x 4 stents followed by Dr. Murillo (last seen as routine 2 weeks ago), history of MI, diabetes, hypertension, hyperlipidemia, hypothyroidism, BPH, and hemolytic anemia followed by hematologist at [facility] on prednisone 80 mg as been tapered down to 60 mg and weekly Rituxan chemotherapy presents for evaluation of shortness of breath that began yesterday associated with weakness in the proximal legs.    #acute hypoxic respiratory failure   #?COPD, former smoker x 30 pack years   - nebs q6  - supplemental o2 keep sat >90%  - CTA negative for PE     #sinus tachycardia   - continue metoprolol   - telemetry monitoring   - trop 75->64    #hld cad x 4 stents   - cont statin, asa    #DM   - hold PO meds  - ISS/FS    #bph  - cont tamsulosin     #hemolytic anemia   - weekly chemo outpatient   - prednisone 60mg/day     #hypothyroidism   - cont synthroid      dvt ppx lovenox   diet dash CC          73-year-old male with a past medical history of CAD x 4 stents followed by Dr. Murillo (last seen as routine 2 weeks ago), history of MI, diabetes, hypertension, hyperlipidemia, hypothyroidism, BPH, and hemolytic anemia followed by hematologist at [facility] on prednisone 80 mg as been tapered down to 60 mg and weekly Rituxan chemotherapy presents for evaluation of shortness of breath that began yesterday associated with weakness in the proximal legs.    #acute hypoxic respiratory failure   #?COPD, former smoker x 30 pack years   - nebs q6  - supplemental o2 keep sat >90%  - CTA negative for PE   - f/u procal     #sinus tachycardia   - continue metoprolol   - telemetry monitoring   - trop 75->64    #hld cad x 4 stents   - cont statin, asa    #DM   - hold PO meds  - ISS/FS    #bph  - cont tamsulosin     #hemolytic anemia   - weekly chemo outpatient   - prednisone 60mg/day     #hypothyroidism   - cont synthroid      dvt ppx lovenox   diet dash CC

## 2024-02-04 NOTE — PROGRESS NOTE ADULT - ASSESSMENT
73-year-old male with a past medical history of CAD x 4 stents followed by Dr. Murillo (last seen as routine 2 weeks ago), history of MI, diabetes, hypertension, hyperlipidemia, hypothyroidism, BPH, and hemolytic anemia followed by hematologist at [facility] on prednisone 80 mg as been tapered down to 60 mg and weekly Rituxan chemotherapy presents for evaluation of shortness of breath that began yesterday associated with weakness in the proximal legs, weakness in legs has since improved but needing oxygen    Assessment    ·	Sepsis and acute hypoxic respiratory failure likely secondary to viral pneumonia in the setting of lung fibrosis in an immunocompromised patient  ·	CAD sp 4 stents on aspirin  ·	Hemolytic anemia on daily prednisone with weekly rituxan therapy  ·	DM  ·	HTN  ·	HLD  ·	Hypothyroidism  ·	BPH    Plan    - c/w fluids, f/u blood cultures, CT shows possible new areas of inflammation, neg for PE, c/w cefepime for now, MRSA nares neg, lactate negative  - c/w aspirin, will do echo as patient needing large amount of fluids and CAD hx / toprol 25  - c/w prednisone for now  - not on insulin at home but needing it here, likely from prednisone and infection  - c/w synthroid 50, f/u TSH with reflex  - was retaining urine more than 500 after jeter placed, patient states he has a hx of severe BPH and requesting urology follow up    Pending: cultures, clinical improvement with hypoxia, controlling sugars, ID, pulm, echo, uro    # DVT PPX: lovenox     73-year-old male with a past medical history of CAD x 4 stents followed by Dr. Murillo (last seen as routine 2 weeks ago), history of MI, diabetes, hypertension, hyperlipidemia, hypothyroidism, BPH, and hemolytic anemia followed by hematologist at [facility] on prednisone 80 mg as been tapered down to 60 mg and weekly Rituxan chemotherapy presents for evaluation of shortness of breath that began yesterday associated with weakness in the proximal legs, weakness in legs has since improved but needing oxygen    Assessment    ·	Sepsis and acute hypoxic respiratory failure likely secondary to viral pneumonia in the setting of lung fibrosis in an immunocompromised patient  ·	CAD sp 4 stents on aspirin  ·	Hemolytic anemia on daily prednisone with weekly rituxan therapy  ·	DM  ·	HTN  ·	HLD  ·	Hypothyroidism  ·	BPH    Plan    - c/w fluids, f/u blood cultures, CT shows possible new areas of inflammation, neg for PE, c/w cefepime for now, MRSA nares neg, lactate negative  - c/w aspirin, will do echo as patient needing large amount of fluids and CAD hx / toprol 25  - c/w prednisone for now, on acyclovir ppx  - not on insulin at home but needing it here, likely from prednisone and infection  - c/w synthroid 50, f/u TSH with reflex  - was retaining urine more than 500 after jeter placed, patient states he has a hx of severe BPH and requesting urology follow up    Pending: cultures, clinical improvement with hypoxia, controlling sugars, ID, pulm, echo, uro    # DVT PPX: lovenox

## 2024-02-04 NOTE — H&P ADULT - NSHPPHYSICALEXAM_GEN_ALL_CORE
VITALS:   T(C): 37.3 (02-04-24 @ 00:02), Max: 37.3 (02-03-24 @ 23:54)  HR: 110 (02-04-24 @ 05:39) (110 - 124)  BP: 128/62 (02-04-24 @ 05:39) (122/58 - 128/62)  RR: 18 (02-04-24 @ 05:39) (18 - 22)  SpO2: 94% (02-04-24 @ 05:39) (84% - 96%)    GENERAL: NAD, lying in bed comfortably  HEAD:  Atraumatic, normocephalic  EYES: EOMI, PERRLA, conjunctiva and sclera clear  ENT: Moist mucous membranes  HEART: Regular rate and rhythm, no murmurs, rubs, or gallops  LUNGS: Unlabored respirations.  Clear to auscultation bilaterally  ABDOMEN: Soft, nontender, nondistended,  EXTREMITIES: normal strength   NERVOUS SYSTEM:  A&Ox3, no focal deficits   SKIN: No rashes or lesions

## 2024-02-04 NOTE — H&P ADULT - NSHPLABSRESULTS_GEN_ALL_CORE
12.3   13.19 )-----------( 146      ( 04 Feb 2024 00:18 )             33.7       02-04    134<L>  |  95<L>  |  21<H>  ----------------------------<  230<H>  4.1   |  27  |  1.0    Ca    8.6      04 Feb 2024 00:18    TPro  5.6<L>  /  Alb  3.9  /  TBili  1.6<H>  /  DBili  x   /  AST  22  /  ALT  15  /  AlkPhos  59  02-04         Urinalysis Basic - ( 04 Feb 2024 00:18 )    Color: x / Appearance: x / SG: x / pH: x  Gluc: 230 mg/dL / Ketone: x  / Bili: x / Urobili: x   Blood: x / Protein: x / Nitrite: x   Leuk Esterase: x / RBC: x / WBC x   Sq Epi: x / Non Sq Epi: x / Bacteria: x      < from: CT Angio Chest PE Protocol w/ IV Cont (02.04.24 @ 01:54) >        IMPRESSION:    No pulmonary embolism.    Patchy bilateral groundglass opacities with areas of scarring which are   likely chronic secondary to prior COVID infection as seen on CT chest   dated 2/4/2021, however acute infectious process cannot be excluded.        ******PRELIMINARY REPORT****    < end of copied text >

## 2024-02-04 NOTE — ED ADULT NURSE NOTE - NSICDXPASTMEDICALHX_GEN_ALL_CORE_FT
PAST MEDICAL HISTORY:  ASHD (arteriosclerotic heart disease) STEMI 12/31/17, PCI RCA    Chronic kidney disease (CKD) III    Dyspnea, unspecified type     H/O hemolytic anemia     Hypertension, unspecified type     Type 2 diabetes mellitus without complication, unspecified long term insulin use status

## 2024-02-04 NOTE — ED PROVIDER NOTE - ATTENDING APP SHARED VISIT CONTRIBUTION OF CARE
73-year-old male, history of hemolytic anemia, DM, HTN, CAD, cardiac stent, presents to ED with shortness of breath on exertion and proximal leg weakness for the past 2 days.  No fever or cough.  No chest pain.  Exam shows alert patient in no distress, HEENT NCAT PERRL, neck supple, lungs clear, RR S1S2, abdomen soft NT +BS, no CCE, skin no rash, neuro A&OX3 GCS 15 no deficits.

## 2024-02-04 NOTE — ED PROVIDER NOTE - CLINICAL SUMMARY MEDICAL DECISION MAKING FREE TEXT BOX
73-year-old male, history of hemolytic anemia, DM, HTN, CAD, cardiac stent, presents with shortness of breath on exertion and proximal leg weakness for the past 2 days.  Labs noted for WBC 13, hemoglobin 12, 21, creatinine 1.0, troponin 75, 64, .  Nasal swab negative.  CTA chest no PE, bilateral groundglass opacities likely due to prior COVID.  Given IV fluids and O2.  Will admit.

## 2024-02-04 NOTE — H&P ADULT - HISTORY OF PRESENT ILLNESS
73-year-old male with a past medical history of CAD x 4 stents followed by Dr. Murillo (last seen as routine 2 weeks ago), history of MI, diabetes, hypertension, hyperlipidemia, hypothyroidism, BPH, and hemolytic anemia followed by hematologist at [facility] on prednisone 80 mg as been tapered down to 60 mg and weekly Rituxan chemotherapy presents for evaluation of shortness of breath that began yesterday associated with weakness in the proximal legs. Patient reports increased work of breathing and lower O2 sats at home prompting presentation. Patient reports developing emphysema following COVID infection 2 years ago and has had follow up CTs which have shown the same chronic scarring and opacities. Patient does ot use any inhalers. He has a significant smoking history 1ppd x 30 years. Patient denies any ill symptoms, no cough fever chills nausea vomiting or chest pain. Patient reports that his shortness of breath has imrpoved (on 4Lnc) and he is motivated for discharge, if hypoxia resolves.   Patietn also complained of lower extremity weakness which has since completely resolved.

## 2024-02-05 LAB
A1C WITH ESTIMATED AVERAGE GLUCOSE RESULT: 4.7 % — SIGNIFICANT CHANGE UP (ref 4–5.6)
ALBUMIN SERPL ELPH-MCNC: 3.3 G/DL — LOW (ref 3.5–5.2)
ALP SERPL-CCNC: 57 U/L — SIGNIFICANT CHANGE UP (ref 30–115)
ALT FLD-CCNC: 15 U/L — SIGNIFICANT CHANGE UP (ref 0–41)
ANION GAP SERPL CALC-SCNC: 9 MMOL/L — SIGNIFICANT CHANGE UP (ref 7–14)
AST SERPL-CCNC: 21 U/L — SIGNIFICANT CHANGE UP (ref 0–41)
BASOPHILS # BLD AUTO: 0.01 K/UL — SIGNIFICANT CHANGE UP (ref 0–0.2)
BASOPHILS NFR BLD AUTO: 0.1 % — SIGNIFICANT CHANGE UP (ref 0–1)
BILIRUB SERPL-MCNC: 1.1 MG/DL — SIGNIFICANT CHANGE UP (ref 0.2–1.2)
BUN SERPL-MCNC: 16 MG/DL — SIGNIFICANT CHANGE UP (ref 10–20)
CALCIUM SERPL-MCNC: 8.1 MG/DL — LOW (ref 8.4–10.5)
CHLORIDE SERPL-SCNC: 99 MMOL/L — SIGNIFICANT CHANGE UP (ref 98–110)
CO2 SERPL-SCNC: 28 MMOL/L — SIGNIFICANT CHANGE UP (ref 17–32)
CREAT SERPL-MCNC: 0.8 MG/DL — SIGNIFICANT CHANGE UP (ref 0.7–1.5)
EGFR: 95 ML/MIN/1.73M2 — SIGNIFICANT CHANGE UP
EOSINOPHIL # BLD AUTO: 0 K/UL — SIGNIFICANT CHANGE UP (ref 0–0.7)
EOSINOPHIL NFR BLD AUTO: 0 % — SIGNIFICANT CHANGE UP (ref 0–8)
ERYTHROCYTE [SEDIMENTATION RATE] IN BLOOD: 93 MM/HR — SIGNIFICANT CHANGE UP (ref 0–10)
ESTIMATED AVERAGE GLUCOSE: 88 MG/DL — SIGNIFICANT CHANGE UP (ref 68–114)
GLUCOSE BLDC GLUCOMTR-MCNC: 169 MG/DL — HIGH (ref 70–99)
GLUCOSE BLDC GLUCOMTR-MCNC: 234 MG/DL — HIGH (ref 70–99)
GLUCOSE BLDC GLUCOMTR-MCNC: 331 MG/DL — HIGH (ref 70–99)
GLUCOSE BLDC GLUCOMTR-MCNC: 368 MG/DL — HIGH (ref 70–99)
GLUCOSE BLDC GLUCOMTR-MCNC: 393 MG/DL — HIGH (ref 70–99)
GLUCOSE SERPL-MCNC: 144 MG/DL — HIGH (ref 70–99)
HCT VFR BLD CALC: 29.8 % — LOW (ref 42–52)
HGB BLD-MCNC: 10.9 G/DL — LOW (ref 14–18)
IMM GRANULOCYTES NFR BLD AUTO: 1.2 % — HIGH (ref 0.1–0.3)
LYMPHOCYTES # BLD AUTO: 0.4 K/UL — LOW (ref 1.2–3.4)
LYMPHOCYTES # BLD AUTO: 2.7 % — LOW (ref 20.5–51.1)
MAGNESIUM SERPL-MCNC: 1.9 MG/DL — SIGNIFICANT CHANGE UP (ref 1.8–2.4)
MCHC RBC-ENTMCNC: 36.6 G/DL — SIGNIFICANT CHANGE UP (ref 32–37)
MCHC RBC-ENTMCNC: 36.8 PG — HIGH (ref 27–31)
MCV RBC AUTO: 100.7 FL — HIGH (ref 80–94)
MONOCYTES # BLD AUTO: 0.35 K/UL — SIGNIFICANT CHANGE UP (ref 0.1–0.6)
MONOCYTES NFR BLD AUTO: 2.4 % — SIGNIFICANT CHANGE UP (ref 1.7–9.3)
NEUTROPHILS # BLD AUTO: 13.62 K/UL — HIGH (ref 1.4–6.5)
NEUTROPHILS NFR BLD AUTO: 93.6 % — HIGH (ref 42.2–75.2)
NRBC # BLD: 0 /100 WBCS — SIGNIFICANT CHANGE UP (ref 0–0)
PLATELET # BLD AUTO: 115 K/UL — LOW (ref 130–400)
PMV BLD: 10.9 FL — HIGH (ref 7.4–10.4)
POTASSIUM SERPL-MCNC: 3.9 MMOL/L — SIGNIFICANT CHANGE UP (ref 3.5–5)
POTASSIUM SERPL-SCNC: 3.9 MMOL/L — SIGNIFICANT CHANGE UP (ref 3.5–5)
PROCALCITONIN SERPL-MCNC: 0.46 NG/ML — HIGH (ref 0.02–0.1)
PROT SERPL-MCNC: 5.4 G/DL — LOW (ref 6–8)
RBC # BLD: 2.96 M/UL — LOW (ref 4.7–6.1)
RBC # FLD: 14.9 % — HIGH (ref 11.5–14.5)
SODIUM SERPL-SCNC: 136 MMOL/L — SIGNIFICANT CHANGE UP (ref 135–146)
T4 FREE+ TSH PNL SERPL: 2.58 UIU/ML — SIGNIFICANT CHANGE UP (ref 0.27–4.2)
WBC # BLD: 14.56 K/UL — HIGH (ref 4.8–10.8)
WBC # FLD AUTO: 14.56 K/UL — HIGH (ref 4.8–10.8)

## 2024-02-05 PROCEDURE — 99232 SBSQ HOSP IP/OBS MODERATE 35: CPT

## 2024-02-05 PROCEDURE — 99222 1ST HOSP IP/OBS MODERATE 55: CPT

## 2024-02-05 PROCEDURE — 93306 TTE W/DOPPLER COMPLETE: CPT | Mod: 26

## 2024-02-05 PROCEDURE — 99221 1ST HOSP IP/OBS SF/LOW 40: CPT

## 2024-02-05 RX ORDER — SODIUM CHLORIDE 9 MG/ML
4 INJECTION INTRAMUSCULAR; INTRAVENOUS; SUBCUTANEOUS EVERY 12 HOURS
Refills: 0 | Status: DISCONTINUED | OUTPATIENT
Start: 2024-02-05 | End: 2024-02-12

## 2024-02-05 RX ORDER — BUDESONIDE AND FORMOTEROL FUMARATE DIHYDRATE 160; 4.5 UG/1; UG/1
2 AEROSOL RESPIRATORY (INHALATION)
Refills: 0 | Status: DISCONTINUED | OUTPATIENT
Start: 2024-02-05 | End: 2024-02-12

## 2024-02-05 RX ORDER — ATOVAQUONE 750 MG/5ML
750 SUSPENSION ORAL EVERY 12 HOURS
Refills: 0 | Status: DISCONTINUED | OUTPATIENT
Start: 2024-02-05 | End: 2024-02-06

## 2024-02-05 RX ORDER — SODIUM CHLORIDE 0.65 %
1 AEROSOL, SPRAY (ML) NASAL THREE TIMES A DAY
Refills: 0 | Status: DISCONTINUED | OUTPATIENT
Start: 2024-02-05 | End: 2024-02-12

## 2024-02-05 RX ADMIN — Medication 3 MILLILITER(S): at 20:36

## 2024-02-05 RX ADMIN — Medication 50 MICROGRAM(S): at 05:53

## 2024-02-05 RX ADMIN — CEFEPIME 100 MILLIGRAM(S): 1 INJECTION, POWDER, FOR SOLUTION INTRAMUSCULAR; INTRAVENOUS at 21:21

## 2024-02-05 RX ADMIN — Medication 3 UNIT(S): at 17:26

## 2024-02-05 RX ADMIN — ATORVASTATIN CALCIUM 10 MILLIGRAM(S): 80 TABLET, FILM COATED ORAL at 21:21

## 2024-02-05 RX ADMIN — BUDESONIDE AND FORMOTEROL FUMARATE DIHYDRATE 2 PUFF(S): 160; 4.5 AEROSOL RESPIRATORY (INHALATION) at 12:42

## 2024-02-05 RX ADMIN — Medication 3 MILLILITER(S): at 15:03

## 2024-02-05 RX ADMIN — Medication 3 MILLILITER(S): at 08:25

## 2024-02-05 RX ADMIN — CEFEPIME 100 MILLIGRAM(S): 1 INJECTION, POWDER, FOR SOLUTION INTRAMUSCULAR; INTRAVENOUS at 00:22

## 2024-02-05 RX ADMIN — TAMSULOSIN HYDROCHLORIDE 0.8 MILLIGRAM(S): 0.4 CAPSULE ORAL at 12:42

## 2024-02-05 RX ADMIN — SODIUM CHLORIDE 75 MILLILITER(S): 9 INJECTION, SOLUTION INTRAVENOUS at 04:00

## 2024-02-05 RX ADMIN — Medication 60 MILLIGRAM(S): at 05:53

## 2024-02-05 RX ADMIN — Medication 8: at 12:41

## 2024-02-05 RX ADMIN — Medication 1 SPRAY(S): at 21:32

## 2024-02-05 RX ADMIN — INSULIN GLARGINE 15 UNIT(S): 100 INJECTION, SOLUTION SUBCUTANEOUS at 21:17

## 2024-02-05 RX ADMIN — SODIUM CHLORIDE 4 MILLILITER(S): 9 INJECTION INTRAMUSCULAR; INTRAVENOUS; SUBCUTANEOUS at 20:53

## 2024-02-05 RX ADMIN — CEFEPIME 100 MILLIGRAM(S): 1 INJECTION, POWDER, FOR SOLUTION INTRAMUSCULAR; INTRAVENOUS at 16:03

## 2024-02-05 RX ADMIN — Medication 81 MILLIGRAM(S): at 12:42

## 2024-02-05 RX ADMIN — Medication 10: at 17:26

## 2024-02-05 RX ADMIN — Medication 325 MILLIGRAM(S): at 12:42

## 2024-02-05 RX ADMIN — Medication 4: at 09:15

## 2024-02-05 RX ADMIN — Medication 3 UNIT(S): at 09:15

## 2024-02-05 RX ADMIN — Medication 1 SPRAY(S): at 16:04

## 2024-02-05 RX ADMIN — Medication 1 TABLET(S): at 12:42

## 2024-02-05 RX ADMIN — ENOXAPARIN SODIUM 40 MILLIGRAM(S): 100 INJECTION SUBCUTANEOUS at 16:03

## 2024-02-05 RX ADMIN — PANTOPRAZOLE SODIUM 40 MILLIGRAM(S): 20 TABLET, DELAYED RELEASE ORAL at 05:53

## 2024-02-05 RX ADMIN — Medication 25 MILLIGRAM(S): at 05:53

## 2024-02-05 RX ADMIN — Medication 1 SPRAY(S): at 12:41

## 2024-02-05 RX ADMIN — Medication 3 UNIT(S): at 12:41

## 2024-02-05 RX ADMIN — Medication 400 MILLIGRAM(S): at 12:42

## 2024-02-05 NOTE — CONSULT NOTE ADULT - SUBJECTIVE AND OBJECTIVE BOX
HPI:  73-year-old male with a past medical history of CAD x 4 stents followed by Dr. Murillo (last seen as routine 2 weeks ago), history of MI, diabetes, hypertension, hyperlipidemia, hypothyroidism, BPH, and hemolytic anemia followed by hematologist at [facility] on prednisone 80 mg as been tapered down to 60 mg and weekly Rituxan chemotherapy presents for evaluation of shortness of breath that began yesterday associated with weakness in the proximal legs. Patient reports increased work of breathing and lower O2 sats at home prompting presentation. Patient reports developing emphysema following COVID infection 2 years ago and has had follow up CTs which have shown the same chronic scarring and opacities. Patient does ot use any inhalers. He has a significant smoking history 1ppd x 30 years. Patient denies any ill symptoms, no cough fever chills nausea vomiting or chest pain. Patient reports that his shortness of breath has imrpoved (on 4Lnc) and he is motivated for discharge, if hypoxia resolves.   Patietn also complained of lower extremity weakness which has since completely resolved.  (04 Feb 2024 06:17)        HPI-Cardiology   Pt with the above Hx and HPI, evaluated at bedside. Currently admitted in........for........ Radiology tests and hospital records, were reviewed, as well as previous notes on this patient.      PAST MEDICAL & SURGICAL HISTORY  ASHD (arteriosclerotic heart disease)  STEMI 12/31/17, PCI RCA    Dyspnea, unspecified type    Hypertension, unspecified type    Type 2 diabetes mellitus without complication, unspecified long term insulin use status    Chronic kidney disease (CKD)  III    H/O hemolytic anemia    History of tonsillectomy  1957    History of ligation of vein  2012    S/P cataract surgery        FAMILY HISTORY:  FAMILY HISTORY:  Family history of myocardial infarction (Mother)        SOCIAL HISTORY:  []smoker  []Alcohol  []Drug    ALLERGIES:  penicillin (Rash (Severe))      MEDICATIONS:  MEDICATIONS  (STANDING):  acyclovir   Oral Tab/Cap 400 milliGRAM(s) Oral daily  albuterol/ipratropium for Nebulization 3 milliLiter(s) Nebulizer every 6 hours  aspirin enteric coated 81 milliGRAM(s) Oral daily  atorvastatin 10 milliGRAM(s) Oral at bedtime  budesonide 160 MICROgram(s)/formoterol 4.5 MICROgram(s) Inhaler 2 Puff(s) Inhalation two times a day  cefepime   IVPB 2000 milliGRAM(s) IV Intermittent every 8 hours  cefepime   IVPB      dextrose 5%. 1000 milliLiter(s) (50 mL/Hr) IV Continuous <Continuous>  dextrose 5%. 1000 milliLiter(s) (100 mL/Hr) IV Continuous <Continuous>  dextrose 50% Injectable 25 Gram(s) IV Push once  dextrose 50% Injectable 25 Gram(s) IV Push once  dextrose 50% Injectable 12.5 Gram(s) IV Push once  enoxaparin Injectable 40 milliGRAM(s) SubCutaneous every 24 hours  ferrous    sulfate 325 milliGRAM(s) Oral daily  glucagon  Injectable 1 milliGRAM(s) IntraMuscular once  insulin glargine Injectable (LANTUS) 15 Unit(s) SubCutaneous at bedtime  insulin lispro (ADMELOG) corrective regimen sliding scale   SubCutaneous three times a day before meals  insulin lispro Injectable (ADMELOG) 3 Unit(s) SubCutaneous three times a day before meals  lactated ringers. 1000 milliLiter(s) (75 mL/Hr) IV Continuous <Continuous>  levothyroxine 50 MICROGram(s) Oral daily  metoprolol succinate ER 25 milliGRAM(s) Oral daily  multivitamin 1 Tablet(s) Oral daily  pantoprazole    Tablet 40 milliGRAM(s) Oral before breakfast  predniSONE   Tablet 60 milliGRAM(s) Oral daily  sodium chloride 0.65% Nasal 1 Spray(s) Both Nostrils three times a day  sodium chloride 7% Inhalation 4 milliLiter(s) Inhalation every 12 hours  tamsulosin 0.8 milliGRAM(s) Oral daily    MEDICATIONS  (PRN):  acetaminophen     Tablet .. 650 milliGRAM(s) Oral every 6 hours PRN Temp greater or equal to 38C (100.4F), Mild Pain (1 - 3)  dextrose Oral Gel 15 Gram(s) Oral once PRN Blood Glucose LESS THAN 70 milliGRAM(s)/deciliter  melatonin 3 milliGRAM(s) Oral at bedtime PRN Insomnia      HOME MEDICATIONS:  Home Medications:  aspirin 81 mg oral delayed release tablet: 1 tab(s) orally once a day (04 Feb 2024 00:53)  ferrous sulfate 325 mg (65 mg elemental iron) oral tablet: 1 tab(s) orally once a day (04 Feb 2024 00:53)  Flomax 0.4 mg oral capsule: 2 cap(s) orally once a day (04 Feb 2024 05:58)  glipiZIDE 5 mg oral tablet: 1 tab(s) orally once a day (04 Feb 2024 00:53)  metFORMIN 500 mg oral tablet: 1 tab(s) orally once a day (04 Feb 2024 06:01)  metoprolol succinate 25 mg oral tablet, extended release: 1 tab(s) orally once a day (04 Feb 2024 00:53)  Multiple Vitamins oral tablet: 1 tab(s) orally once a day (04 Feb 2024 00:53)  pantoprazole 40 mg oral delayed release tablet: 1 tab(s) orally once a day (before a meal) (04 Feb 2024 00:53)  polyethylene glycol 3350 oral powder for reconstitution: 17 gram(s) orally once a day (04 Feb 2024 00:53)  Pravachol 40 mg oral tablet: 1 tab(s) orally once a day (at bedtime) (04 Feb 2024 06:02)  predniSONE 20 mg oral tablet: 3 tab(s) orally once a day (04 Feb 2024 05:54)  Synthroid 50 mcg (0.05 mg) oral tablet: 1 tab(s) orally once a day (04 Feb 2024 00:53)      VITALS:   T(F): 96.6 (02-05 @ 05:00), Max: 101.3 (02-04 @ 07:35)  HR: 91 (02-05 @ 05:00) (91 - 124)  BP: 115/60 (02-05 @ 05:00) (99/57 - 150/58)  BP(mean): --  RR: 18 (02-05 @ 05:00) (18 - 22)  SpO2: 95% (02-05 @ 05:00) (84% - 97%)    I&O's Summary    04 Feb 2024 07:01  -  05 Feb 2024 07:00  --------------------------------------------------------  IN: 0 mL / OUT: 2600 mL / NET: -2600 mL    05 Feb 2024 07:01  -  05 Feb 2024 14:20  --------------------------------------------------------  IN: 0 mL / OUT: 900 mL / NET: -900 mL        REVIEW OF SYSTEMS:  CONSTITUTIONAL: No weakness, fevers or chills  EYES: No visual changes  ENT: No vertigo or throat pain   NECK: No pain or stiffness  RESPIRATORY: No cough, wheezing, hemoptysis; No shortness of breath  CARDIOVASCULAR: No chest pain or palpitations  GASTROINTESTINAL: No abdominal or epigastric pain. No nausea, vomiting, or hematemesis; No diarrhea or constipation. No melena or hematochezia.  GENITOURINARY: No dysuria, frequency or hematuria  NEUROLOGICAL: No numbness or weakness  SKIN: No itching, no rashes  MSK: no    PHYSICAL EXAM:  NEURO: patient is awake , alert and oriented  GEN: Not in acute distress  NECK: no thyroid enlargement, no JVD  LUNGS: Clear to auscultation bilaterally   CARDIOVASCULAR: S1/S2 present, RRR , no murmurs or rubs, no carotid bruits,  + PP bilaterally  ABD: Soft, non-tender, non-distended, +BS  EXT: No DIXIE  SKIN: Intact    LABS:                        10.9   14.56 )-----------( 115      ( 05 Feb 2024 08:12 )             29.8     02-05    136  |  99  |  16  ----------------------------<  144<H>  3.9   |  28  |  0.8    Ca    8.1<L>      05 Feb 2024 08:12  Mg     1.9     02-05    TPro  5.4<L>  /  Alb  3.3<L>  /  TBili  1.1  /  DBili  x   /  AST  21  /  ALT  15  /  AlkPhos  57  02-05              Troponin trend:            RADIOLOGY:  -CXR:  -TTE:  -CCTA:  -STRESS TEST:  -CATHETERIZATION:    ECG:    TELEMETRY EVENTS:   HPI:  73-year-old male with a past medical history of CAD x 4 stents followed by Dr. Murillo (last seen as routine 2 weeks ago), history of MI, diabetes, hypertension, hyperlipidemia, hypothyroidism, BPH, and hemolytic anemia followed by hematologist at [facility] on prednisone 80 mg as been tapered down to 60 mg and weekly Rituxan chemotherapy presents for evaluation of shortness of breath that began yesterday associated with weakness in the proximal legs. Patient reports increased work of breathing and lower O2 sats at home prompting presentation. Patient reports developing emphysema following COVID infection 2 years ago and has had follow up CTs which have shown the same chronic scarring and opacities. Patient does ot use any inhalers. He has a significant smoking history 1ppd x 30 years. Patient denies any ill symptoms, no cough fever chills nausea vomiting or chest pain. Patient reports that his shortness of breath has imrpoved (on 4Lnc) and he is motivated for discharge, if hypoxia resolves.   Patietn also complained of lower extremity weakness which has since completely resolved.  (04 Feb 2024 06:17)        HPI-Cardiology   Pt with the above Hx and HPI, evaluated at bedside. Pt presented for eval of progressively worsening SOB, and LE weakness. Pt endorses low SPO2 measurements at home and that prompted gim to come to the ED. Pt denies CO, palpitations, dizziness/lightheadedness or nausea/vomiting. Pt endorses recent cardiologist visit and states "everything was OK". Radiology tests and hospital records, were reviewed, as well as previous notes on this patient.        PAST MEDICAL & SURGICAL HISTORY  ASHD (arteriosclerotic heart disease)  STEMI 12/31/17, PCI RCA    Dyspnea, unspecified type    Hypertension, unspecified type    Type 2 diabetes mellitus without complication, unspecified long term insulin use status    Chronic kidney disease (CKD)  III    H/O hemolytic anemia    History of tonsillectomy  1957    History of ligation of vein  2012    S/P cataract surgery        FAMILY HISTORY:  FAMILY HISTORY:  Family history of myocardial infarction (Mother)          ALLERGIES:  penicillin (Rash (Severe))      MEDICATIONS:  MEDICATIONS  (STANDING):  acyclovir   Oral Tab/Cap 400 milliGRAM(s) Oral daily  albuterol/ipratropium for Nebulization 3 milliLiter(s) Nebulizer every 6 hours  aspirin enteric coated 81 milliGRAM(s) Oral daily  atorvastatin 10 milliGRAM(s) Oral at bedtime  budesonide 160 MICROgram(s)/formoterol 4.5 MICROgram(s) Inhaler 2 Puff(s) Inhalation two times a day  cefepime   IVPB 2000 milliGRAM(s) IV Intermittent every 8 hours  cefepime   IVPB      dextrose 5%. 1000 milliLiter(s) (50 mL/Hr) IV Continuous <Continuous>  dextrose 5%. 1000 milliLiter(s) (100 mL/Hr) IV Continuous <Continuous>  dextrose 50% Injectable 25 Gram(s) IV Push once  dextrose 50% Injectable 25 Gram(s) IV Push once  dextrose 50% Injectable 12.5 Gram(s) IV Push once  enoxaparin Injectable 40 milliGRAM(s) SubCutaneous every 24 hours  ferrous    sulfate 325 milliGRAM(s) Oral daily  glucagon  Injectable 1 milliGRAM(s) IntraMuscular once  insulin glargine Injectable (LANTUS) 15 Unit(s) SubCutaneous at bedtime  insulin lispro (ADMELOG) corrective regimen sliding scale   SubCutaneous three times a day before meals  insulin lispro Injectable (ADMELOG) 3 Unit(s) SubCutaneous three times a day before meals  lactated ringers. 1000 milliLiter(s) (75 mL/Hr) IV Continuous <Continuous>  levothyroxine 50 MICROGram(s) Oral daily  metoprolol succinate ER 25 milliGRAM(s) Oral daily  multivitamin 1 Tablet(s) Oral daily  pantoprazole    Tablet 40 milliGRAM(s) Oral before breakfast  predniSONE   Tablet 60 milliGRAM(s) Oral daily  sodium chloride 0.65% Nasal 1 Spray(s) Both Nostrils three times a day  sodium chloride 7% Inhalation 4 milliLiter(s) Inhalation every 12 hours  tamsulosin 0.8 milliGRAM(s) Oral daily    MEDICATIONS  (PRN):  acetaminophen     Tablet .. 650 milliGRAM(s) Oral every 6 hours PRN Temp greater or equal to 38C (100.4F), Mild Pain (1 - 3)  dextrose Oral Gel 15 Gram(s) Oral once PRN Blood Glucose LESS THAN 70 milliGRAM(s)/deciliter  melatonin 3 milliGRAM(s) Oral at bedtime PRN Insomnia      HOME MEDICATIONS:  Home Medications:  aspirin 81 mg oral delayed release tablet: 1 tab(s) orally once a day (04 Feb 2024 00:53)  ferrous sulfate 325 mg (65 mg elemental iron) oral tablet: 1 tab(s) orally once a day (04 Feb 2024 00:53)  Flomax 0.4 mg oral capsule: 2 cap(s) orally once a day (04 Feb 2024 05:58)  glipiZIDE 5 mg oral tablet: 1 tab(s) orally once a day (04 Feb 2024 00:53)  metFORMIN 500 mg oral tablet: 1 tab(s) orally once a day (04 Feb 2024 06:01)  metoprolol succinate 25 mg oral tablet, extended release: 1 tab(s) orally once a day (04 Feb 2024 00:53)  Multiple Vitamins oral tablet: 1 tab(s) orally once a day (04 Feb 2024 00:53)  pantoprazole 40 mg oral delayed release tablet: 1 tab(s) orally once a day (before a meal) (04 Feb 2024 00:53)  polyethylene glycol 3350 oral powder for reconstitution: 17 gram(s) orally once a day (04 Feb 2024 00:53)  Pravachol 40 mg oral tablet: 1 tab(s) orally once a day (at bedtime) (04 Feb 2024 06:02)  predniSONE 20 mg oral tablet: 3 tab(s) orally once a day (04 Feb 2024 05:54)  Synthroid 50 mcg (0.05 mg) oral tablet: 1 tab(s) orally once a day (04 Feb 2024 00:53)      VITALS:   T(F): 96.6 (02-05 @ 05:00), Max: 101.3 (02-04 @ 07:35)  HR: 91 (02-05 @ 05:00) (91 - 124)  BP: 115/60 (02-05 @ 05:00) (99/57 - 150/58)  BP(mean): --  RR: 18 (02-05 @ 05:00) (18 - 22)  SpO2: 95% (02-05 @ 05:00) (84% - 97%)    I&O's Summary    04 Feb 2024 07:01  -  05 Feb 2024 07:00  --------------------------------------------------------  IN: 0 mL / OUT: 2600 mL / NET: -2600 mL    05 Feb 2024 07:01  -  05 Feb 2024 14:20  --------------------------------------------------------  IN: 0 mL / OUT: 900 mL / NET: -900 mL        REVIEW OF SYSTEMS:  See HPI      PHYSICAL EXAM:  NEURO: patient is awake , alert and oriented  GEN: Appears in mild distress  NECK: no thyroid enlargement, no JVD  LUNGS: Decreased at bases to asc B/L  CARDIOVASCULAR: S1/S2 present, RRR , no murmurs or rubs, no carotid bruits,  + PP bilaterally  ABD: Soft, non-tender, non-distended, +BS  EXT: No DIXIE  SKIN: Intact        LABS:                        10.9   14.56 )-----------( 115      ( 05 Feb 2024 08:12 )             29.8     02-05    136  |  99  |  16  ----------------------------<  144<H>  3.9   |  28  |  0.8    Ca    8.1<L>      05 Feb 2024 08:12  Mg     1.9     02-05    TPro  5.4<L>  /  Alb  3.3<L>  /  TBili  1.1  /  DBili  x   /  AST  21  /  ALT  15  /  AlkPhos  57  02-05            RADIOLOGY:  -CXR:  < from: Xray Chest 1 View-PORTABLE IMMEDIATE (02.04.24 @ 01:07) >    Impression:  1.  Interval decrease in previously reported bilateral pulmonary   opacities.  2.  Questionable left basilar pleural effusion.        --- End of Report ---    < end of copied text >              < from: TTE Echo Complete w/o Contrast w/ Doppler (02.05.24 @ 09:33) >      Summary:   1. Patient is tachycardic with ectopy during study.   2. Normal global left ventricular systolic function.   3. LV Ejection Fraction by Cash's Method with a biplane EF of 52 %.   4. Basal inferior segment and mid inferior segment are abnormal as   described above.   5. The left ventricular diastolic function could not be assessed in this   study.   6. Normal right ventricular size and function.   7. Normal left atrial size.   8. Normal right atrial size.   9. There is mild aortic root calcification.  10. Mild mitral annular calcification.  11. Mild thickening and calcification of the anterior and posterior   mitral valve leaflets.  12. Trace mitral valve regurgitation.  13. Dilatation of the aortic root, measures 3.9 cm.  14. Normal pulmonary artery pressure.  15. There is no evidence of pericardial effusion.    < end of copied text >                < from: CT Angio Chest PE Protocol w/ IV Cont (02.04.24 @ 01:54) >    IMPRESSION:    No pulmonary embolism.    Patchy bilateral groundglass opacities with areas of scarring which are   likely chronic secondary to prior COVID infection as seen on CT chest   dated 2/4/2021, however acute infectious process cannot be excluded.    --- End of Report ---    < end of copied text >      ECG:  < from: 12 Lead ECG (02.04.24 @ 02:32) >  Sinus tachycardiawith Premature atrial complexes  Possible Inferior infarct , age undetermined  Abnormal ECG    Confirmed by RIGO CLEMENT MD ([id number]) on 2/4/2024 8:54:44 AM    < end of copied text >

## 2024-02-05 NOTE — CONSULT NOTE ADULT - NS ATTEND AMEND GEN_ALL_CORE FT
Patient seen and examined. Pertinent labs, imaging and telemetry reviewed. I agree with the above:     Patient feeling better now. He had episode of substernal CP this AM with some radiation across his chest. He was given lasix and he started to feel better. Denies ongoing CP and no CP prior to arrival. EKG unchanged without ischemic changes.   Patient still with SOB and fevers. Diaphoretic on exam which could be due to defervescence.   Patient with tachycardia on monitor, appears to be ST with PACs vs MAT.   Patient follows with Dr Murillo and is scheduled for repeat TTE and stress soon.   -BNP mildly elevated, trops were minimally elevated and downtrended.   -CXR this AM with possible flash pulmonary edema.   -Can check troponins.   -Continue to treat for infectious cause and primary lung disease.   -CAn continue with diuretics to keep pt net even to slightly negative.   -Will discuss with Dr. Murillo.

## 2024-02-05 NOTE — CONSULT NOTE ADULT - SUBJECTIVE AND OBJECTIVE BOX
Patient is a 73y old  Male who presents with a chief complaint of hypoxia (05 Feb 2024 09:26)      HPI:  73-year-old male with a past medical history of CAD x 4 stents followed by Dr. Murillo (last seen as routine 2 weeks ago), history of MI, diabetes, hypertension, hyperlipidemia, hypothyroidism, BPH, and hemolytic anemia followed by hematologist at [facility] on prednisone 80 mg as been tapered down to 60 mg and weekly Rituxan chemotherapy presents for evaluation of shortness of breath that began yesterday associated with weakness in the proximal legs. Patient reports increased work of breathing and lower O2 sats at home prompting presentation. Patient reports developing emphysema following COVID infection 2 years ago and has had follow up CTs which have shown the same chronic scarring and opacities. Patient does ot use any inhalers. He has a significant smoking history 1ppd x 30 years. Patient denies any ill symptoms, no cough fever chills nausea vomiting or chest pain. Patient reports that his shortness of breath has imrpoved (on 4Lnc) and he is motivated for discharge, if hypoxia resolves.   Patietn also complained of lower extremity weakness which has since completely resolved.  (04 Feb 2024 06:17)      PAST MEDICAL & SURGICAL HISTORY:  ASHD (arteriosclerotic heart disease)  STEMI 12/31/17, PCI RCA      Dyspnea, unspecified type      Hypertension, unspecified type      Type 2 diabetes mellitus without complication, unspecified long term insulin use status      Chronic kidney disease (CKD)  III      H/O hemolytic anemia      History of tonsillectomy  1957      History of ligation of vein  2012      S/P cataract surgery          FAMILY HISTORY:  Family history of myocardial infarction (Mother)      Family history: No family cardiovascular system   Occupation:  Alochol: Denied  Smoking: Denied  Drug Use: Denied  Marital Status:           Allergies    penicillin (Rash (Severe))    Intolerances        Home Medications:  aspirin 81 mg oral delayed release tablet: 1 tab(s) orally once a day (04 Feb 2024 00:53)  ferrous sulfate 325 mg (65 mg elemental iron) oral tablet: 1 tab(s) orally once a day (04 Feb 2024 00:53)  Flomax 0.4 mg oral capsule: 2 cap(s) orally once a day (04 Feb 2024 05:58)  glipiZIDE 5 mg oral tablet: 1 tab(s) orally once a day (04 Feb 2024 00:53)  metFORMIN 500 mg oral tablet: 1 tab(s) orally once a day (04 Feb 2024 06:01)  metoprolol succinate 25 mg oral tablet, extended release: 1 tab(s) orally once a day (04 Feb 2024 00:53)  Multiple Vitamins oral tablet: 1 tab(s) orally once a day (04 Feb 2024 00:53)  pantoprazole 40 mg oral delayed release tablet: 1 tab(s) orally once a day (before a meal) (04 Feb 2024 00:53)  polyethylene glycol 3350 oral powder for reconstitution: 17 gram(s) orally once a day (04 Feb 2024 00:53)  Pravachol 40 mg oral tablet: 1 tab(s) orally once a day (at bedtime) (04 Feb 2024 06:02)  predniSONE 20 mg oral tablet: 3 tab(s) orally once a day (04 Feb 2024 05:54)  Synthroid 50 mcg (0.05 mg) oral tablet: 1 tab(s) orally once a day (04 Feb 2024 00:53)      ROS: as in HPI; All other systems reviewed are negative        PHYSICAL EXAM:  Vital Signs Last 24 Hrs  T(C): 35.9 (05 Feb 2024 05:00), Max: 36.8 (04 Feb 2024 11:42)  T(F): 96.6 (05 Feb 2024 05:00), Max: 98.2 (04 Feb 2024 11:42)  HR: 91 (05 Feb 2024 05:00) (91 - 105)  BP: 115/60 (05 Feb 2024 05:00) (103/54 - 150/58)  BP(mean): --  RR: 18 (05 Feb 2024 05:00) (18 - 20)  SpO2: 95% (05 Feb 2024 05:00) (91% - 97%)    Parameters below as of 05 Feb 2024 05:00  Patient On (Oxygen Delivery Method): nasal cannula          GENERAL: NAD, well-groomed, well-developed  HEAD:  Atraumatic, Normocephalic  EYES: EOMI, PERRLA, conjunctiva and sclera clear  ENMT: No tonsillar erythema, exudates, or enlargement; Moist mucous membranes, Good dentition, No lesions  NECK: Supple, No JVD, Normal thyroid  NERVOUS SYSTEM:  Alert & Oriented X3, Good concentration; Motor Strength 5/5 B/L upper and lower extremities; DTRs 2+ intact and symmetric  CHEST/LUNG: Clear to percussion bilaterally; No rales, rhonchi, wheezing, or rubs  HEART: Regular rate and rhythm; No murmurs, rubs, or gallops  ABDOMEN: Soft, Nontender, Nondistended; Bowel sounds present  EXTREMITIES:  2+ Peripheral Pulses, No clubbing, cyanosis, or edema  LYMPH: No lymphadenopathy noted  SKIN: No rashes or lesions    HOSPITAL MEDICATIONS:  MEDICATIONS  (STANDING):  acyclovir   Oral Tab/Cap 400 milliGRAM(s) Oral daily  albuterol/ipratropium for Nebulization 3 milliLiter(s) Nebulizer every 6 hours  aspirin enteric coated 81 milliGRAM(s) Oral daily  atorvastatin 10 milliGRAM(s) Oral at bedtime  cefepime   IVPB 2000 milliGRAM(s) IV Intermittent every 8 hours  cefepime   IVPB      dextrose 5%. 1000 milliLiter(s) (50 mL/Hr) IV Continuous <Continuous>  dextrose 5%. 1000 milliLiter(s) (100 mL/Hr) IV Continuous <Continuous>  dextrose 50% Injectable 12.5 Gram(s) IV Push once  dextrose 50% Injectable 25 Gram(s) IV Push once  dextrose 50% Injectable 25 Gram(s) IV Push once  enoxaparin Injectable 40 milliGRAM(s) SubCutaneous every 24 hours  ferrous    sulfate 325 milliGRAM(s) Oral daily  glucagon  Injectable 1 milliGRAM(s) IntraMuscular once  insulin glargine Injectable (LANTUS) 15 Unit(s) SubCutaneous at bedtime  insulin lispro (ADMELOG) corrective regimen sliding scale   SubCutaneous three times a day before meals  insulin lispro Injectable (ADMELOG) 3 Unit(s) SubCutaneous three times a day before meals  lactated ringers. 1000 milliLiter(s) (75 mL/Hr) IV Continuous <Continuous>  levothyroxine 50 MICROGram(s) Oral daily  metoprolol succinate ER 25 milliGRAM(s) Oral daily  multivitamin 1 Tablet(s) Oral daily  pantoprazole    Tablet 40 milliGRAM(s) Oral before breakfast  predniSONE   Tablet 60 milliGRAM(s) Oral daily  sodium chloride 0.65% Nasal 1 Spray(s) Both Nostrils three times a day  tamsulosin 0.8 milliGRAM(s) Oral daily    MEDICATIONS  (PRN):  acetaminophen     Tablet .. 650 milliGRAM(s) Oral every 6 hours PRN Temp greater or equal to 38C (100.4F), Mild Pain (1 - 3)  dextrose Oral Gel 15 Gram(s) Oral once PRN Blood Glucose LESS THAN 70 milliGRAM(s)/deciliter  melatonin 3 milliGRAM(s) Oral at bedtime PRN Insomnia      LABS:                        10.9   14.56 )-----------( 115      ( 05 Feb 2024 08:12 )             29.8     02-05    136  |  99  |  16  ----------------------------<  144<H>  3.9   |  28  |  0.8    Ca    8.1<L>      05 Feb 2024 08:12  Mg     1.9     02-05    TPro  5.4<L>  /  Alb  3.3<L>  /  TBili  1.1  /  DBili  x   /  AST  21  /  ALT  15  /  AlkPhos  57  02-05      Urinalysis Basic - ( 05 Feb 2024 08:12 )    Color: x / Appearance: x / SG: x / pH: x  Gluc: 144 mg/dL / Ketone: x  / Bili: x / Urobili: x   Blood: x / Protein: x / Nitrite: x   Leuk Esterase: x / RBC: x / WBC x   Sq Epi: x / Non Sq Epi: x / Bacteria: x                RADIOLOGY: < from: CT Angio Chest PE Protocol w/ IV Cont (02.04.24 @ 01:54) >  ON:    No pulmonary embolism.    Patchy bilateral groundglass opacities with areas of scarring which are   likely chronic secondary to prior COVID infection as seen on CT chest   dated 2/4/2021, however acute infectious process cannot be excluded.    < end of copied text >    [ ] Reviewed and interpreted by me    ECHO:    Point of Care Ultrasound Findings;    PFT:

## 2024-02-05 NOTE — PROGRESS NOTE ADULT - SUBJECTIVE AND OBJECTIVE BOX
SUBJECTIVE:    Patient is a 73y old Male who presents with a chief complaint of hypoxia (05 Feb 2024 10:33)      HPI:  73-year-old male with a past medical history of CAD x 4 stents followed by Dr. Murillo (last seen as routine 2 weeks ago), history of MI, diabetes, hypertension, hyperlipidemia, hypothyroidism, BPH, and hemolytic anemia followed by hematologist at [facility] on prednisone 80 mg as been tapered down to 60 mg and weekly Rituxan chemotherapy presents for evaluation of shortness of breath that began yesterday associated with weakness in the proximal legs. Patient reports increased work of breathing and lower O2 sats at home prompting presentation. Patient reports developing emphysema following COVID infection 2 years ago and has had follow up CTs which have shown the same chronic scarring and opacities. Patient does ot use any inhalers. He has a significant smoking history 1ppd x 30 years. Patient denies any ill symptoms, no cough fever chills nausea vomiting or chest pain. Patient reports that his shortness of breath has imrpoved (on 4Lnc) and he is motivated for discharge, if hypoxia resolves.   Patietn also complained of lower extremity weakness which has since completely resolved.  (04 Feb 2024 06:17)      Currently admitted to medicine with the primary diagnosis of SOB (shortness of breath)     feels the same as yesterday, comfortable on oxygen    Besides the pertinent positives and negatives described above, the ROS was within normal limits.    PAST MEDICAL & SURGICAL HISTORY  ASHD (arteriosclerotic heart disease)  STEMI 12/31/17, PCI RCA    Dyspnea, unspecified type    Hypertension, unspecified type    Type 2 diabetes mellitus without complication, unspecified long term insulin use status    Chronic kidney disease (CKD)  III    H/O hemolytic anemia    History of tonsillectomy  1957    History of ligation of vein  2012    S/P cataract surgery      SOCIAL HISTORY:    ALLERGIES:  penicillin (Rash (Severe))    MEDICATIONS:  STANDING MEDICATIONS  acyclovir   Oral Tab/Cap 400 milliGRAM(s) Oral daily  albuterol/ipratropium for Nebulization 3 milliLiter(s) Nebulizer every 6 hours  aspirin enteric coated 81 milliGRAM(s) Oral daily  atorvastatin 10 milliGRAM(s) Oral at bedtime  budesonide 160 MICROgram(s)/formoterol 4.5 MICROgram(s) Inhaler 2 Puff(s) Inhalation two times a day  cefepime   IVPB      cefepime   IVPB 2000 milliGRAM(s) IV Intermittent every 8 hours  dextrose 5%. 1000 milliLiter(s) IV Continuous <Continuous>  dextrose 5%. 1000 milliLiter(s) IV Continuous <Continuous>  dextrose 50% Injectable 12.5 Gram(s) IV Push once  dextrose 50% Injectable 25 Gram(s) IV Push once  dextrose 50% Injectable 25 Gram(s) IV Push once  enoxaparin Injectable 40 milliGRAM(s) SubCutaneous every 24 hours  ferrous    sulfate 325 milliGRAM(s) Oral daily  glucagon  Injectable 1 milliGRAM(s) IntraMuscular once  insulin glargine Injectable (LANTUS) 15 Unit(s) SubCutaneous at bedtime  insulin lispro (ADMELOG) corrective regimen sliding scale   SubCutaneous three times a day before meals  insulin lispro Injectable (ADMELOG) 3 Unit(s) SubCutaneous three times a day before meals  lactated ringers. 1000 milliLiter(s) IV Continuous <Continuous>  levothyroxine 50 MICROGram(s) Oral daily  metoprolol succinate ER 25 milliGRAM(s) Oral daily  multivitamin 1 Tablet(s) Oral daily  pantoprazole    Tablet 40 milliGRAM(s) Oral before breakfast  predniSONE   Tablet 60 milliGRAM(s) Oral daily  sodium chloride 0.65% Nasal 1 Spray(s) Both Nostrils three times a day  tamsulosin 0.8 milliGRAM(s) Oral daily    PRN MEDICATIONS  acetaminophen     Tablet .. 650 milliGRAM(s) Oral every 6 hours PRN  dextrose Oral Gel 15 Gram(s) Oral once PRN  melatonin 3 milliGRAM(s) Oral at bedtime PRN    VITALS:   T(F): 96.6  HR: 91  BP: 115/60  RR: 18  SpO2: 95%    LABS:                        10.9   14.56 )-----------( 115      ( 05 Feb 2024 08:12 )             29.8     02-05    136  |  99  |  16  ----------------------------<  144<H>  3.9   |  28  |  0.8    Ca    8.1<L>      05 Feb 2024 08:12  Mg     1.9     02-05    TPro  5.4<L>  /  Alb  3.3<L>  /  TBili  1.1  /  DBili  x   /  AST  21  /  ALT  15  /  AlkPhos  57  02-05      Urinalysis Basic - ( 05 Feb 2024 08:12 )    Color: x / Appearance: x / SG: x / pH: x  Gluc: 144 mg/dL / Ketone: x  / Bili: x / Urobili: x   Blood: x / Protein: x / Nitrite: x   Leuk Esterase: x / RBC: x / WBC x   Sq Epi: x / Non Sq Epi: x / Bacteria: x                SOB (shortness of breath)      RADIOLOGY:    PHYSICAL EXAM:  General: WN/WD NAD  Neurology: A&Ox3, nonfocal, TIJERINA x 4  Head:  Normocephalic, atraumatic  ENT:  Mucosa moist, no ulcerations  Neck:  Supple, no sinuses or palpable masses  Lymphatic:  No palpable cervical, supraclavicular, axillary or inguinal adenopathy  Respiratory: CTA B/L  CV: RRR, S1S2, no murmur  Abdominal: Soft, NT, ND no palpable mass  MSK: No edema, + peripheral pulses  Incisions: intact, no erythema or drainage    Intravenous access: yes  NG tube: no  Penaloza Catheter:   Indwelling Urethral Catheter:     Connect To:  Straight Drainage/[place]    Indication:  Urinary Retention / Obstruction (02-04-24 @ 16:06) (not performed) [Active]

## 2024-02-05 NOTE — PROGRESS NOTE ADULT - ASSESSMENT
.  Impression:  73-year-old male with a past medical history of CAD x 4 stents followed by Dr. Murillo (last seen as routine 2 weeks ago), history of MI, diabetes, hypertension, hyperlipidemia, hypothyroidism, BPH, and hemolytic anemia followed by hematologist at [facility] on prednisone 80 mg as been tapered down to 60 mg and weekly Rituxan chemotherapy presents for evaluation of shortness of breath that began yesterday associated with weakness in the proximal legs.   Urology consulted for history of BPH with first episode of Urinary retention.          Plan:  # History of BPH with first episode of Urinary retention:  - 18 [place] Coude was placed last night and currently draining well.  - Resume his Flomax 0.8 mg at bedtime.  (Pt was non-compliant for a few days prior to admission).   - Monitor Bun/Creatinine.  (Currently stable with Cr= 1.2 today and 1.2 on admission).  - Continue Penaloza catheter and monitor output.  - Patient requests a new urologist on [place] to follow and treat his BPH.  Patient may need medication adjustment or Uro intervention.    - Recommend to TOV once patient stable and ready for discharge.   - Patient verbalizes understanding of recommendations and all questions answered to patient's satisfaction.   - Case d/w Dr. Lima.

## 2024-02-05 NOTE — CONSULT NOTE ADULT - ASSESSMENT
ASSESSMENT  73y M admitted with Shortness of breath      ASHD (arteriosclerotic heart disease)    Dyspnea, unspecified type    Hypertension, unspecified type    Type 2 diabetes mellitus without complication, unspecified long term insulin use status    Chronic kidney disease (CKD)    H/O hemolytic anemia        IMPRESSION  #  #Severe Sepsis on admission  #Lactic acidosis  #Hyponatremia   #Obesity BMI (kg/m2): 28  #DM     RECOMMENDATIONS  This is an incomplete consult note. All final recommendations to follow after interview and examination of the patient. Please follow recommendations noted below.    If any questions, please send a message or call on [employer] Teams  Please continue to update ID with any pertinent new laboratory or radiographic findings.    Neo Martin M.D  Infectious Diseases Attending/   [employer] at [facility]   ASSESSMENT  This is a 73-year-old male with a past medical history of CAD x 4, history of MI, diabetes, hypertension, hyperlipidemia, hypothyroidism, BPH, and hemolytic anemia- unclear cause on prednisone and weekly Rituxan chemotherapy presents for evaluation of shortness of breath.    IMPRESSION  #Acute hypoxic respiratory failure- Unclear cause. ILD flare?  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 Was not using baseline oxygen recently  #Hemolytic Anemia- Unclear cause. Has been initiated on Prednisone and Rituximab weekly by Hem-oncologist.  (Could be from Covid Vaccine?)  #Immunocompromised.  #CAD, HTN, HLD, Hear failure,DM, Hypothyroidism, BPH  #Acute urinary retention S/p jeter placement.  #Obesity BMI (kg/m2): 28  #RVP negative, MRSA nares negative  Broad differentials- Bacterial, NTM, Viral or fungal. (Less likely parasitic)  Could be ILD flare?    RECOMMENDATIONS  -baseline Quatiferon negative from OSH records.  -Check sputum cultures. Check AFB cultures for NTM (low suspicion for TB, no isolation required)  -Follow up with blood cultures.   -Check HIV screen. (Hep B and C screen negative OSH)  -Check CMV viral load. Check Adenovirus PCR (although RVP is negative)  -Check fungitell and galactomannan serum. LDH elevated perhaps due to hemolysis.  -Check PJP PCR in the sputum if possible. Check Cryptococcal antigen serum.  -Collect sputum cultures- for routine bacterial and fungal cultures.  -For now continue with IV cefepime 2 gram q 8 hours. Add atovaquone 750mg Q 12 hours for PJP. (avoiding bactrim due to concerns of susu on admisison)  -Offloading, aspiration precautions. D/C jeter when able.      If any questions, please send a message or call on [employer] Teams  Please continue to update ID with any pertinent new laboratory or radiographic findings.    Neo Martin M.D  Infectious Diseases Attending/   [employer] at [facility]

## 2024-02-05 NOTE — CONSULT NOTE ADULT - ASSESSMENT
73-year-old male with a past medical history of CAD x 4 stents followed by Dr. Murillo (last seen as routine 2 weeks ago), history of MI, diabetes, hypertension, hyperlipidemia, hypothyroidism, BPH, and hemolytic anemia followed by hematologist at [facility] on prednisone 80 mg as been tapered down to 60 mg and weekly Rituxan chemotherapy presents for evaluation of shortness of breath, associated with weakness in the proximal legs      Impression:  #Tachycardia: likely driven by underlying cause  #SOB: Appears pulmonary in nature  #CAD/STEMI s/p PCI x4 stents   #ASHD/DM/HTN      Pt appears in mild distress with increased work of breathing. Denies CP, palpitations  ECG: Sinus tachy with PAC's. No acute ischemic changes compared to prior  On tele appears in sinus tachycardia, with PAC's and -120   Troponin trending down and stable 75-->64. Possibly demand in the setting of pulm status   CTA Chest: No PE, atchy bilateral ground-glass opacities with areas of scarring which are likely chronic secondary to prior COVID infection. Acute infectious process cannot be excluded.  ECHO: Normal global LVSF, EF 52%, WMA (not new), normal RV size/function  Euvolemic on exam  pBNP 387        Plan:  Cont w/ current home cardiac meds  May need ischemic workup when stable  Pulm f/u  ID f/u   73-year-old male with a past medical history of CAD x 4 stents followed by Dr. Murillo (last seen as routine 2 weeks ago), history of MI, diabetes, hypertension, hyperlipidemia, hypothyroidism, BPH, and hemolytic anemia followed by hematologist at [facility] on prednisone 80 mg as been tapered down to 60 mg and weekly Rituxan chemotherapy presents for evaluation of shortness of breath, associated with weakness in the proximal legs      Impression:  #Tachycardia: likely driven by underlying cause  #SOB: Appears pulmonary in nature  #CAD/STEMI s/p PCI x4 stents   #ASHD/DM/HTN      Pt appears in mild distress with increased work of breathing. Denies CP, palpitations  ECG: Sinus tachy with PAC's. No acute ischemic changes compared to prior  On tele appears in sinus tachycardia, with PAC's and -120   Troponin trending down and stable 75-->64. Possibly demand in the setting of pulm status   CTA Chest: No PE, atchy bilateral ground-glass opacities with areas of scarring which are likely chronic secondary to prior COVID infection. Acute infectious process cannot be excluded.  ECHO: Normal global LVSF, EF 52%, WMA (not new), normal RV size/function  Euvolemic on exam  pBNP 387          Plan:  Cont w/ current home cardiac meds  May need ischemic workup when stable  Pulm f/u  ID f/u

## 2024-02-05 NOTE — CONSULT NOTE ADULT - SUBJECTIVE AND OBJECTIVE BOX
ANDREE BUSH  73y, Male  Allergies    penicillin (Rash (Severe))    Intolerances        LOS  1d    HPI  HPI:  73-year-old male with a past medical history of CAD x 4 stents followed by Dr. Murillo (last seen as routine 2 weeks ago), history of MI, diabetes, hypertension, hyperlipidemia, hypothyroidism, BPH, and hemolytic anemia followed by hematologist at [facility] on prednisone 80 mg as been tapered down to 60 mg and weekly Rituxan chemotherapy presents for evaluation of shortness of breath that began yesterday associated with weakness in the proximal legs. Patient reports increased work of breathing and lower O2 sats at home prompting presentation. Patient reports developing emphysema following COVID infection 2 years ago and has had follow up CTs which have shown the same chronic scarring and opacities. Patient does ot use any inhalers. He has a significant smoking history 1ppd x 30 years. Patient denies any ill symptoms, no cough fever chills nausea vomiting or chest pain. Patient reports that his shortness of breath has imrpoved (on 4Lnc) and he is motivated for discharge, if hypoxia resolves.   Patietn also complained of lower extremity weakness which has since completely resolved.  (04 Feb 2024 06:17)      INFECTIOUS DISEASE HISTORY:  Hospital course-  ID consulted for antimicrobial recommendations.     Prior hospital charts reviewed [Yes]  Primary team notes reviewed [Yes]  Other consultant notes reviewed [Yes]    REVIEW OF SYSTEMS:  CONSTITUTIONAL: No fever or chills  HEENT: No sore throat  RESPIRATORY: No cough, no shortness of breath  CARDIOVASCULAR: No chest pain or palpitations  GASTROINTESTINAL: No abdominal or epigastric pain  GENITOURINARY: No dysuria  NEUROLOGICAL: No headache/dizziness  MSK: No joint pain, erythema, or swelling; no back pain  SKIN: No itching, rashes  All other ROS negative except noted above    PAST MEDICAL & SURGICAL HISTORY:  ASHD (arteriosclerotic heart disease)  STEMI 12/31/17, PCI RCA      Dyspnea, unspecified type      Hypertension, unspecified type      Type 2 diabetes mellitus without complication, unspecified long term insulin use status      Chronic kidney disease (CKD)  III      H/O hemolytic anemia      History of tonsillectomy  1957      History of ligation of vein  2012      S/P cataract surgery    SOCIAL HISTORY:  - No recent travel    FAMILY HISTORY:  Family history of myocardial infarction (Mother)      ANTIMICROBIALS:  acyclovir   Oral Tab/Cap 400 daily  cefepime   IVPB 2000 every 8 hours  cefepime   IVPB        ANTIMICROBIALS (past 90 days):  MEDICATIONS  (STANDING):  acyclovir   Oral Tab/Cap   400 milliGRAM(s) Oral (02-04-24 @ 14:09)    cefepime   IVPB   100 mL/Hr IV Intermittent (02-04-24 @ 10:09)    cefepime   IVPB   100 mL/Hr IV Intermittent (02-05-24 @ 00:22)   100 mL/Hr IV Intermittent (02-04-24 @ 17:51)        OTHER MEDS:   MEDICATIONS  (STANDING):  acetaminophen     Tablet .. 650 every 6 hours PRN  albuterol/ipratropium for Nebulization 3 every 6 hours  aspirin enteric coated 81 daily  atorvastatin 10 at bedtime  dextrose 50% Injectable 12.5 once  dextrose 50% Injectable 25 once  dextrose 50% Injectable 25 once  dextrose Oral Gel 15 once PRN  enoxaparin Injectable 40 every 24 hours  glucagon  Injectable 1 once  insulin glargine Injectable (LANTUS) 15 at bedtime  insulin lispro (ADMELOG) corrective regimen sliding scale  three times a day before meals  insulin lispro Injectable (ADMELOG) 3 three times a day before meals  levothyroxine 50 daily  melatonin 3 at bedtime PRN  metoprolol succinate ER 25 daily  pantoprazole    Tablet 40 before breakfast  predniSONE   Tablet 60 daily  tamsulosin 0.8 daily      VITALS:  Vital Signs Last 24 Hrs  T(F): 96.6 (02-05-24 @ 05:00), Max: 101.3 (02-04-24 @ 07:35)    Vital Signs Last 24 Hrs  HR: 91 (02-05-24 @ 05:00) (91 - 105)  BP: 115/60 (02-05-24 @ 05:00) (103/54 - 150/58)  RR: 18 (02-05-24 @ 05:00)  SpO2: 95% (02-05-24 @ 05:00) (85% - 97%)  Wt(kg): --    EXAM:  GENERAL: NAD, lying in bed  HEAD: No head lesions  NECK: Supple, nontender to palpation; no JVD  CHEST/LUNG: Clear to auscultation bilaterally  HEART: S1 S2  ABDOMEN: Soft, nontender, nondistended; normoactive bowel sounds  EXTREMITIES: No clubbing, cyanosis, or petal edema  NERVOUS SYSTEM: Alert and oriented to person, time, place and situation, speech clear. No focal deficits   MSK: No joint erythema, swelling or pain  SKIN: No rashes or lesions, no superficial thrombophlebitis    Labs:                        10.7   12.21 )-----------( 127      ( 04 Feb 2024 19:21 )             29.3     02-04    132<L>  |  97<L>  |  26<H>  ----------------------------<  352<H>  4.3   |  23  |  1.2    Ca    8.0<L>      04 Feb 2024 19:21    TPro  5.6<L>  /  Alb  3.9  /  TBili  1.6<H>  /  DBili  x   /  AST  22  /  ALT  15  /  AlkPhos  59  02-04      WBC Trend:  WBC Count: 12.21 (02-04-24 @ 19:21)  WBC Count: 15.75 (02-04-24 @ 11:16)  WBC Count: 13.19 (02-04-24 @ 00:18)      Auto Neutrophil #: 14.94 K/uL (02-04-24 @ 11:16)  Auto Neutrophil #: 11.42 K/uL (02-04-24 @ 00:18)      Creatine Trend:  Creatinine: 1.2 (02-04)  Creatinine: 0.8 (02-04)  Creatinine: 1.0 (02-04)      Liver Biochemical Testing Trend:  Alanine Aminotransferase (ALT/SGPT): 15 (02-04)  Aspartate Aminotransferase (AST/SGOT): 22 (02-04-24 @ 00:18)  Bilirubin Total: 1.6 (02-04)      Trend LDH  03-07-21 @ 11:40  436<H>  02-06-21 @ 08:12  639<H>      Auto Eosinophil %: 0.0 % (02-04-24 @ 11:16)  Auto Eosinophil %: 0.0 % (02-04-24 @ 00:18)      Urinalysis Basic - ( 04 Feb 2024 19:21 )    Color: x / Appearance: x / SG: x / pH: x  Gluc: 352 mg/dL / Ketone: x  / Bili: x / Urobili: x   Blood: x / Protein: x / Nitrite: x   Leuk Esterase: x / RBC: x / WBC x   Sq Epi: x / Non Sq Epi: x / Bacteria: x        MICROBIOLOGY:    Male    Rapid RVP Result: NotDetec (02-04 @ 19:30)    Procalcitonin, Serum: 0.46 (02-04)    D-Dimer Assay, Quantitative: 265 (02-04)    Troponin T, High Sensitivity Result: 64 (02-04)  Troponin T, High Sensitivity Result: 75 (02-04)    Lactate, Blood: 1.1 (02-04 @ 09:30)        INFLAMMATORY MARKERS      RADIOLOGY & ADDITIONAL TESTS:  I have personally reviewed the imagings.  CXR      CT  CT Angio Chest PE Protocol w/ IV Cont:   ACC: [id number] EXAM:  CT ANGIO CHEST PULM ART [facility]   ORDERED BY:   MONICA SAUER     PROCEDURE DATE:  02/04/2024          INTERPRETATION:  CLINICAL INDICATION: sob, tachycardia    TECHNIQUE:  CTA of the thorax was performed after administration of contrast per the   PE protocol. Sagittal and coronal reformats were performed as well as MIP   reconstructions.  Intravenous contrast: 85 cc Omnipaque 350    COMPARISON: CTA chest dated 2/4/2021    INTERPRETATION:    PULMONARY ARTERIES: There are no pulmonary emboli.    AIRWAYS/LUNGS/PLEURA: The central tracheobronchial tree is patent.    Patchy bilateral groundglass opacities with areas of scarring likely   chronic secondary to prior COVID infection as seen on CT chest dated   2/4/2021. There is no pleural effusion. There is no pneumothorax.    MEDIASTINUM: There are no enlarged mediastinal, hilar or axillary lymph   nodes. The visualized portion of the thyroid gland is unremarkable.    HEART AND VESSELS: The heart is normal in size. Thereis no evidence of   right heart strain.  There is no pericardial effusion. Aortic and   coronary artery calcifications.    UPPER ABDOMEN: Cholelithiasis. Right renal cysts.    BONES AND SOFT TISSUES: No acute osseous abnormalities.  Degenerative   changes of the spine.      IMPRESSION:    No pulmonary embolism.    Patchy bilateral groundglass opacities with areas of scarring which are   likely chronic secondary to prior COVID infection as seen on CT chest   dated 2/4/2021, however acute infectious process cannot be excluded.    --- End of Report ---          EDMUND ASCENCIO MD; Resident Radiologist  This document has been electronically signed.  AALIYAH LINDO MD; Attending Radiologist  This document has been electronically signed. Feb 4 2024 7:35AM (02-04-24 @ 01:54)      CARDIOLOGY TESTING  12 Lead ECG:   Ventricular Rate 113 BPM    Atrial Rate 113 BPM    P-R Interval 140 ms    QRS Duration 88 ms    Q-T Interval 312 ms    QTC Calculation(Bazett) 427 ms    P Axis 58 degrees    R Axis 13 degrees    T Axis 62 degrees    Diagnosis Line Sinus tachycardiawith Premature atrial complexes  Possible Inferior infarct , age undetermined  Abnormal ECG    Confirmed by RIGO CLEMENT MD ([id number]) on 2/4/2024 8:54:44 AM (02-04-24 @ 02:32)  12 Lead ECG:   Systolic  mmHg    Diastolic BP 69 mmHg    Ventricular Rate 124 BPM    Atrial Rate 124 BPM    P-R Interval 132 ms    QRS Duration 84 ms    Q-T Interval 332 ms    QTC Calculation(Bazett) 476 ms    P Axis 53 degrees    R Axis 3 degrees    T Axis65 degrees    Diagnosis Line Sinus tachycardia with Premature atrial complexes  Nonspecific ST-T changes    Confirmed by RIGO CLEMENT MD ([id number]) on 2/4/2024 8:54:11 AM (02-03-24 @ 23:56)             ANDREE BUSH  73y, Male  Allergies    penicillin (Rash (Severe))    Intolerances    LOS  1d    HPI  HPI:  73-year-old male with a past medical history of CAD x 4 stents followed by Dr. Murillo (last seen as routine 2 weeks ago), history of MI, diabetes, hypertension, hyperlipidemia, hypothyroidism, BPH, and hemolytic anemia followed by hematologist at [facility] on prednisone 80 mg as been tapered down to 60 mg and weekly Rituxan chemotherapy presents for evaluation of shortness of breath that began yesterday associated with weakness in the proximal legs. Patient reports increased work of breathing and lower O2 sats at home prompting presentation. Patient reports developing emphysema following COVID infection 2 years ago and has had follow up CTs which have shown the same chronic scarring and opacities. Patient does ot use any inhalers. He has a significant smoking history 1ppd x 30 years. Patient denies any ill symptoms, no cough fever chills nausea vomiting or chest pain. Patient reports that his shortness of breath has imrpoved (on 4Lnc) and he is motivated for discharge, if hypoxia resolves.   Patietn also complained of lower extremity weakness which has since completely resolved.  (04 Feb 2024 06:17)      INFECTIOUS DISEASE HISTORY:  Hospital course-  ID consulted for antimicrobial recommendations.     Prior hospital charts reviewed [Yes]  Primary team notes reviewed [Yes]  Other consultant notes reviewed [Yes]    REVIEW OF SYSTEMS:  CONSTITUTIONAL: No fever or chills  HEENT: No sore throat  RESPIRATORY: No cough, no shortness of breath  CARDIOVASCULAR: No chest pain or palpitations  GASTROINTESTINAL: No abdominal or epigastric pain  GENITOURINARY: No dysuria  NEUROLOGICAL: No headache/dizziness  MSK: No joint pain, erythema, or swelling; no back pain  SKIN: No itching, rashes  All other ROS negative except noted above    PAST MEDICAL & SURGICAL HISTORY:  ASHD (arteriosclerotic heart disease)  STEMI 12/31/17, PCI RCA      Dyspnea, unspecified type      Hypertension, unspecified type      Type 2 diabetes mellitus without complication, unspecified long term insulin use status      Chronic kidney disease (CKD)  III      H/O hemolytic anemia      History of tonsillectomy  1957      History of ligation of vein  2012      S/P cataract surgery    SOCIAL HISTORY:  - No recent travel    FAMILY HISTORY:  Family history of myocardial infarction (Mother)      ANTIMICROBIALS:  acyclovir   Oral Tab/Cap 400 daily  cefepime   IVPB 2000 every 8 hours  cefepime   IVPB        ANTIMICROBIALS (past 90 days):  MEDICATIONS  (STANDING):  acyclovir   Oral Tab/Cap   400 milliGRAM(s) Oral (02-04-24 @ 14:09)    cefepime   IVPB   100 mL/Hr IV Intermittent (02-04-24 @ 10:09)    cefepime   IVPB   100 mL/Hr IV Intermittent (02-05-24 @ 00:22)   100 mL/Hr IV Intermittent (02-04-24 @ 17:51)        OTHER MEDS:   MEDICATIONS  (STANDING):  acetaminophen     Tablet .. 650 every 6 hours PRN  albuterol/ipratropium for Nebulization 3 every 6 hours  aspirin enteric coated 81 daily  atorvastatin 10 at bedtime  dextrose 50% Injectable 12.5 once  dextrose 50% Injectable 25 once  dextrose 50% Injectable 25 once  dextrose Oral Gel 15 once PRN  enoxaparin Injectable 40 every 24 hours  glucagon  Injectable 1 once  insulin glargine Injectable (LANTUS) 15 at bedtime  insulin lispro (ADMELOG) corrective regimen sliding scale  three times a day before meals  insulin lispro Injectable (ADMELOG) 3 three times a day before meals  levothyroxine 50 daily  melatonin 3 at bedtime PRN  metoprolol succinate ER 25 daily  pantoprazole    Tablet 40 before breakfast  predniSONE   Tablet 60 daily  tamsulosin 0.8 daily      VITALS:  Vital Signs Last 24 Hrs  T(F): 96.6 (02-05-24 @ 05:00), Max: 101.3 (02-04-24 @ 07:35)    Vital Signs Last 24 Hrs  HR: 91 (02-05-24 @ 05:00) (91 - 105)  BP: 115/60 (02-05-24 @ 05:00) (103/54 - 150/58)  RR: 18 (02-05-24 @ 05:00)  SpO2: 95% (02-05-24 @ 05:00) (85% - 97%)  Wt(kg): --    EXAM:  GENERAL: NAD, on NC  HEAD: No head lesions  NECK: Supple, nontender to palpation  CHEST/LUNG: Shallow breath sounds.   HEART: S1 S2  ABDOMEN: Soft, nontender, nondistended. + jeter  EXTREMITIES: No clubbing, cyanosis, or petal edema  NERVOUS SYSTEM: Alert and oriented to person, time, place and situation, speech clear. No focal deficits   MSK: No joint erythema, swelling or pain  SKIN: No rashes or lesions, no superficial thrombophlebitis    Labs:                        10.7   12.21 )-----------( 127      ( 04 Feb 2024 19:21 )             29.3     02-04    132<L>  |  97<L>  |  26<H>  ----------------------------<  352<H>  4.3   |  23  |  1.2    Ca    8.0<L>      04 Feb 2024 19:21    TPro  5.6<L>  /  Alb  3.9  /  TBili  1.6<H>  /  DBili  x   /  AST  22  /  ALT  15  /  AlkPhos  59  02-04      WBC Trend:  WBC Count: 12.21 (02-04-24 @ 19:21)  WBC Count: 15.75 (02-04-24 @ 11:16)  WBC Count: 13.19 (02-04-24 @ 00:18)      Auto Neutrophil #: 14.94 K/uL (02-04-24 @ 11:16)  Auto Neutrophil #: 11.42 K/uL (02-04-24 @ 00:18)      Creatine Trend:  Creatinine: 1.2 (02-04)  Creatinine: 0.8 (02-04)  Creatinine: 1.0 (02-04)      Liver Biochemical Testing Trend:  Alanine Aminotransferase (ALT/SGPT): 15 (02-04)  Aspartate Aminotransferase (AST/SGOT): 22 (02-04-24 @ 00:18)  Bilirubin Total: 1.6 (02-04)      Trend LDH  03-07-21 @ 11:40  436<H>  02-06-21 @ 08:12  639<H>      Auto Eosinophil %: 0.0 % (02-04-24 @ 11:16)  Auto Eosinophil %: 0.0 % (02-04-24 @ 00:18)      Urinalysis Basic - ( 04 Feb 2024 19:21 )    Color: x / Appearance: x / SG: x / pH: x  Gluc: 352 mg/dL / Ketone: x  / Bili: x / Urobili: x   Blood: x / Protein: x / Nitrite: x   Leuk Esterase: x / RBC: x / WBC x   Sq Epi: x / Non Sq Epi: x / Bacteria: x        MICROBIOLOGY:    Male    Rapid RVP Result: NotDetec (02-04 @ 19:30)    Procalcitonin, Serum: 0.46 (02-04)    D-Dimer Assay, Quantitative: 265 (02-04)    Troponin T, High Sensitivity Result: 64 (02-04)  Troponin T, High Sensitivity Result: 75 (02-04)    Lactate, Blood: 1.1 (02-04 @ 09:30)        INFLAMMATORY MARKERS      RADIOLOGY & ADDITIONAL TESTS:  I have personally reviewed the imagings.  CXR      CT  CT Angio Chest PE Protocol w/ IV Cont:   ACC: [id number] EXAM:  CT ANGIO CHEST PULM ART [facility]   ORDERED BY:   MONICA SAUER     PROCEDURE DATE:  02/04/2024          INTERPRETATION:  CLINICAL INDICATION: sob, tachycardia    TECHNIQUE:  CTA of the thorax was performed after administration of contrast per the   PE protocol. Sagittal and coronal reformats were performed as well as MIP   reconstructions.  Intravenous contrast: 85 cc Omnipaque 350    COMPARISON: CTA chest dated 2/4/2021    INTERPRETATION:    PULMONARY ARTERIES: There are no pulmonary emboli.    AIRWAYS/LUNGS/PLEURA: The central tracheobronchial tree is patent.    Patchy bilateral groundglass opacities with areas of scarring likely   chronic secondary to prior COVID infection as seen on CT chest dated   2/4/2021. There is no pleural effusion. There is no pneumothorax.    MEDIASTINUM: There are no enlarged mediastinal, hilar or axillary lymph   nodes. The visualized portion of the thyroid gland is unremarkable.    HEART AND VESSELS: The heart is normal in size. Thereis no evidence of   right heart strain.  There is no pericardial effusion. Aortic and   coronary artery calcifications.    UPPER ABDOMEN: Cholelithiasis. Right renal cysts.    BONES AND SOFT TISSUES: No acute osseous abnormalities.  Degenerative   changes of the spine.      IMPRESSION:    No pulmonary embolism.    Patchy bilateral groundglass opacities with areas of scarring which are   likely chronic secondary to prior COVID infection as seen on CT chest   dated 2/4/2021, however acute infectious process cannot be excluded.    --- End of Report ---          EDMUND ASCENCIO MD; Resident Radiologist  This document has been electronically signed.  AALIYAH LINDO MD; Attending Radiologist  This document has been electronically signed. Feb 4 2024 7:35AM (02-04-24 @ 01:54)      CARDIOLOGY TESTING  12 Lead ECG:   Ventricular Rate 113 BPM    Atrial Rate 113 BPM    P-R Interval 140 ms    QRS Duration 88 ms    Q-T Interval 312 ms    QTC Calculation(Bazett) 427 ms    P Axis 58 degrees    R Axis 13 degrees    T Axis 62 degrees    Diagnosis Line Sinus tachycardiawith Premature atrial complexes  Possible Inferior infarct , age undetermined  Abnormal ECG    Confirmed by RIGO CLEMENT MD ([id number]) on 2/4/2024 8:54:44 AM (02-04-24 @ 02:32)  12 Lead ECG:   Systolic  mmHg    Diastolic BP 69 mmHg    Ventricular Rate 124 BPM    Atrial Rate 124 BPM    P-R Interval 132 ms    QRS Duration 84 ms    Q-T Interval 332 ms    QTC Calculation(Bazett) 476 ms    P Axis 53 degrees    R Axis 3 degrees    T Axis65 degrees    Diagnosis Line Sinus tachycardia with Premature atrial complexes  Nonspecific ST-T changes    Confirmed by RIGO CLEMENT MD ([id number]) on 2/4/2024 8:54:11 AM (02-03-24 @ 23:56)

## 2024-02-05 NOTE — CONSULT NOTE ADULT - ASSESSMENT
Impression:  hypoxia   SOB   abnormal ct scan ?? acute infection pneumonitis VS scarring fibrotic changes from old covid         Plan:  check procal , ESR, crp   continue abx for now   nasal mrsa   bld cx   send fungetill   start symbicort Q12 hrs   keep pox >92%   continue prednisone as per hematology on 60mg daily   need outpatient follow up with Dr cano need PFT   cardiology eval

## 2024-02-05 NOTE — PROGRESS NOTE ADULT - ASSESSMENT
73-year-old male with a past medical history of CAD x 4 stents followed by Dr. Murillo (last seen as routine 2 weeks ago), history of MI, diabetes, hypertension, hyperlipidemia, hypothyroidism, BPH, and hemolytic anemia followed by hematologist at [facility] on prednisone 80 mg as been tapered down to 60 mg and weekly Rituxan chemotherapy presents for evaluation of shortness of breath that began yesterday associated with weakness in the proximal legs, weakness in legs has since improved but needing oxygen    Assessment    ·	Sepsis and acute hypoxic respiratory failure possibly secondary to pneumonia / pneumonitis in the setting of prior fibrosis (RVP/COVID negative)  ·	CAD sp 4 stents on aspirin  ·	Hemolytic anemia on daily prednisone with weekly rituxan therapy  ·	DM  ·	HTN  ·	HLD  ·	Hypothyroidism  ·	BPH    Plan    - c/w fluids, f/u blood cultures, CT shows possible new areas of inflammation, neg for PE, c/w cefepime for now, MRSA nares neg, lactate negative / RVP and covid neg  - c/w aspirin, will do echo as patient needing large amount of fluids and CAD hx / toprol 25 / still tachycardic f/u cardio  - c/w prednisone for now, on acyclovir ppx  - not on insulin at home but needing it here, likely from prednisone and infection, now downtrending with fluids  - c/w synthroid 50, f/u TSH with reflex  - uro follow up appreciated    Pending: cultures, clinical improvement with hypoxia, controlling sugars, ID, pulm, cardio, echo    # DVT PPX: lovenox

## 2024-02-05 NOTE — PROGRESS NOTE ADULT - SUBJECTIVE AND OBJECTIVE BOX
The patient is a Patient is a 73y old  Male who presents with a chief complaint of hypoxia (05 Feb 2024 09:02)      Patient seen & examined.    Patient reports suprapubic pain now resolved after the Penaloza placed last night.  Patient reports he has BPH and was followed by a doctor in [place] but reports he has not seen his in over 3 years after he moved to [place].  Reports he usually take Flomax 0.8 mg at night but has been non-compliant for a few days.  Patient states this was his first episode of urinary retention.    Penaloza with: 2300 mL since placed last night.    Patient reports he would like to be followed by a Urologist on [place] because now very difficult to get to [place] to see his Urologist and that is why he has not seen him in over 3 years.            MEDICATIONS  (STANDING):  acyclovir   Oral Tab/Cap 400 milliGRAM(s) Oral daily  albuterol/ipratropium for Nebulization 3 milliLiter(s) Nebulizer every 6 hours  aspirin enteric coated 81 milliGRAM(s) Oral daily  atorvastatin 10 milliGRAM(s) Oral at bedtime  cefepime   IVPB      cefepime   IVPB 2000 milliGRAM(s) IV Intermittent every 8 hours  dextrose 5%. 1000 milliLiter(s) (100 mL/Hr) IV Continuous <Continuous>  dextrose 5%. 1000 milliLiter(s) (50 mL/Hr) IV Continuous <Continuous>  dextrose 50% Injectable 12.5 Gram(s) IV Push once  dextrose 50% Injectable 25 Gram(s) IV Push once  dextrose 50% Injectable 25 Gram(s) IV Push once  enoxaparin Injectable 40 milliGRAM(s) SubCutaneous every 24 hours  ferrous    sulfate 325 milliGRAM(s) Oral daily  glucagon  Injectable 1 milliGRAM(s) IntraMuscular once  insulin glargine Injectable (LANTUS) 15 Unit(s) SubCutaneous at bedtime  insulin lispro (ADMELOG) corrective regimen sliding scale   SubCutaneous three times a day before meals  insulin lispro Injectable (ADMELOG) 3 Unit(s) SubCutaneous three times a day before meals  lactated ringers. 1000 milliLiter(s) (75 mL/Hr) IV Continuous <Continuous>  levothyroxine 50 MICROGram(s) Oral daily  metoprolol succinate ER 25 milliGRAM(s) Oral daily  multivitamin 1 Tablet(s) Oral daily  pantoprazole    Tablet 40 milliGRAM(s) Oral before breakfast  predniSONE   Tablet 60 milliGRAM(s) Oral daily  tamsulosin 0.8 milliGRAM(s) Oral daily    MEDICATIONS  (PRN):  acetaminophen     Tablet .. 650 milliGRAM(s) Oral every 6 hours PRN Temp greater or equal to 38C (100.4F), Mild Pain (1 - 3)  dextrose Oral Gel 15 Gram(s) Oral once PRN Blood Glucose LESS THAN 70 milliGRAM(s)/deciliter  melatonin 3 milliGRAM(s) Oral at bedtime PRN Insomnia            I&O's Detail    04 Feb 2024 07:01  -  05 Feb 2024 07:00  --------------------------------------------------------  IN:  Total IN: 0 mL    OUT:    Blood Loss (mL): 300 mL    Indwelling Catheter - Urethral (mL): 2300 mL  Total OUT: 2600 mL    Total NET: -2600 mL          Vital Signs Last 24 Hrs  T(C): 35.9 (05 Feb 2024 05:00), Max: 36.8 (04 Feb 2024 11:42)  T(F): 96.6 (05 Feb 2024 05:00), Max: 98.2 (04 Feb 2024 11:42)  HR: 91 (05 Feb 2024 05:00) (91 - 105)  BP: 115/60 (05 Feb 2024 05:00) (103/54 - 150/58)  RR: 18 (05 Feb 2024 05:00) (18 - 20)  SpO2: 95% (05 Feb 2024 05:00) (91% - 97%)    Parameters below as of 05 Feb 2024 05:00  Patient On (Oxygen Delivery Method): nasal cannula          Physical exam  General: Wd, Wn, male conversant with Nasal cannula in place.   Abdomen:  + BS, soft, no distention, non-tender, No rebound, guarding or peritoneal signs.  Genitourinary:  (+) Penaloza catheter in place, draining cloudy light pink urine with some small clots noted in tubing.  No suprapubic tenderness, No CVAT.           LABS:                        10.9   14.56 )-----------( 115      ( 05 Feb 2024 08:12 )             29.8     02-04    132<L>  |  97<L>  |  26<H>  ----------------------------<  352<H>  4.3   |  23  |  1.2    Ca    8.0<L>      04 Feb 2024 19:21    TPro  5.6<L>  /  Alb  3.9  /  TBili  1.6<H>  /  DBili  x   /  AST  22  /  ALT  15  /  AlkPhos  59  02-04      Urinalysis (02.04.24 @ 08:20)   pH Urine: 8.5  Blood, Urine: Negative  Glucose Qualitative, Urine: 100 mg/dL  Color: Yellow  Urine Appearance: Cloudy  Bilirubin: Negative  Ketone - Urine: Trace mg/dL  Specific Gravity: >1.030  Protein, Urine: 30 mg/dL  Urobilinogen: 0.2 mg/dL  Nitrite: Negative  Leukocyte Esterase Concentration: Negative    .

## 2024-02-06 LAB
ALBUMIN SERPL ELPH-MCNC: 3.4 G/DL — LOW (ref 3.5–5.2)
ALP SERPL-CCNC: 54 U/L — SIGNIFICANT CHANGE UP (ref 30–115)
ALT FLD-CCNC: 17 U/L — SIGNIFICANT CHANGE UP (ref 0–41)
ANION GAP SERPL CALC-SCNC: 10 MMOL/L — SIGNIFICANT CHANGE UP (ref 7–14)
AST SERPL-CCNC: 20 U/L — SIGNIFICANT CHANGE UP (ref 0–41)
BASOPHILS # BLD AUTO: 0.02 K/UL — SIGNIFICANT CHANGE UP (ref 0–0.2)
BASOPHILS NFR BLD AUTO: 0.2 % — SIGNIFICANT CHANGE UP (ref 0–1)
BILIRUB SERPL-MCNC: 0.9 MG/DL — SIGNIFICANT CHANGE UP (ref 0.2–1.2)
BUN SERPL-MCNC: 19 MG/DL — SIGNIFICANT CHANGE UP (ref 10–20)
CALCIUM SERPL-MCNC: 8.1 MG/DL — LOW (ref 8.4–10.5)
CHLORIDE SERPL-SCNC: 100 MMOL/L — SIGNIFICANT CHANGE UP (ref 98–110)
CO2 SERPL-SCNC: 28 MMOL/L — SIGNIFICANT CHANGE UP (ref 17–32)
CREAT SERPL-MCNC: 0.8 MG/DL — SIGNIFICANT CHANGE UP (ref 0.7–1.5)
CRP SERPL-MCNC: 157.9 MG/L — HIGH
D DIMER BLD IA.RAPID-MCNC: 669 NG/ML DDU — HIGH
EGFR: 95 ML/MIN/1.73M2 — SIGNIFICANT CHANGE UP
EOSINOPHIL # BLD AUTO: 0 K/UL — SIGNIFICANT CHANGE UP (ref 0–0.7)
EOSINOPHIL NFR BLD AUTO: 0 % — SIGNIFICANT CHANGE UP (ref 0–8)
GLUCOSE BLDC GLUCOMTR-MCNC: 165 MG/DL — HIGH (ref 70–99)
GLUCOSE BLDC GLUCOMTR-MCNC: 344 MG/DL — HIGH (ref 70–99)
GLUCOSE BLDC GLUCOMTR-MCNC: 408 MG/DL — HIGH (ref 70–99)
GLUCOSE BLDC GLUCOMTR-MCNC: 434 MG/DL — HIGH (ref 70–99)
GLUCOSE SERPL-MCNC: 127 MG/DL — HIGH (ref 70–99)
HCT VFR BLD CALC: 28.9 % — LOW (ref 42–52)
HGB BLD-MCNC: 10.6 G/DL — LOW (ref 14–18)
IMM GRANULOCYTES NFR BLD AUTO: 1 % — HIGH (ref 0.1–0.3)
LDH SERPL L TO P-CCNC: 564 U/L — HIGH (ref 50–242)
LYMPHOCYTES # BLD AUTO: 0.63 K/UL — LOW (ref 1.2–3.4)
LYMPHOCYTES # BLD AUTO: 5.1 % — LOW (ref 20.5–51.1)
MAGNESIUM SERPL-MCNC: 1.9 MG/DL — SIGNIFICANT CHANGE UP (ref 1.8–2.4)
MCHC RBC-ENTMCNC: 36.7 G/DL — SIGNIFICANT CHANGE UP (ref 32–37)
MCHC RBC-ENTMCNC: 36.9 PG — HIGH (ref 27–31)
MCV RBC AUTO: 100.7 FL — HIGH (ref 80–94)
MONOCYTES # BLD AUTO: 0.45 K/UL — SIGNIFICANT CHANGE UP (ref 0.1–0.6)
MONOCYTES NFR BLD AUTO: 3.6 % — SIGNIFICANT CHANGE UP (ref 1.7–9.3)
NEUTROPHILS # BLD AUTO: 11.24 K/UL — HIGH (ref 1.4–6.5)
NEUTROPHILS NFR BLD AUTO: 90.1 % — HIGH (ref 42.2–75.2)
NRBC # BLD: 0 /100 WBCS — SIGNIFICANT CHANGE UP (ref 0–0)
PLATELET # BLD AUTO: 123 K/UL — LOW (ref 130–400)
PMV BLD: 11.3 FL — HIGH (ref 7.4–10.4)
POTASSIUM SERPL-MCNC: 4.1 MMOL/L — SIGNIFICANT CHANGE UP (ref 3.5–5)
POTASSIUM SERPL-SCNC: 4.1 MMOL/L — SIGNIFICANT CHANGE UP (ref 3.5–5)
PROT SERPL-MCNC: 5.2 G/DL — LOW (ref 6–8)
RBC # BLD: 2.87 M/UL — LOW (ref 4.7–6.1)
RBC # FLD: 14.7 % — HIGH (ref 11.5–14.5)
SODIUM SERPL-SCNC: 138 MMOL/L — SIGNIFICANT CHANGE UP (ref 135–146)
TROPONIN T, HIGH SENSITIVITY RESULT: 53 NG/L — CRITICAL HIGH (ref 6–21)
WBC # BLD: 12.46 K/UL — HIGH (ref 4.8–10.8)
WBC # FLD AUTO: 12.46 K/UL — HIGH (ref 4.8–10.8)

## 2024-02-06 PROCEDURE — 71045 X-RAY EXAM CHEST 1 VIEW: CPT | Mod: 26

## 2024-02-06 PROCEDURE — 99232 SBSQ HOSP IP/OBS MODERATE 35: CPT

## 2024-02-06 PROCEDURE — 99223 1ST HOSP IP/OBS HIGH 75: CPT

## 2024-02-06 PROCEDURE — 93010 ELECTROCARDIOGRAM REPORT: CPT

## 2024-02-06 RX ORDER — FUROSEMIDE 40 MG
40 TABLET ORAL ONCE
Refills: 0 | Status: COMPLETED | OUTPATIENT
Start: 2024-02-06 | End: 2024-02-06

## 2024-02-06 RX ORDER — OXYBUTYNIN CHLORIDE 5 MG
5 TABLET ORAL EVERY 12 HOURS
Refills: 0 | Status: DISCONTINUED | OUTPATIENT
Start: 2024-02-06 | End: 2024-02-08

## 2024-02-06 RX ORDER — ACYCLOVIR SODIUM 500 MG
400 VIAL (EA) INTRAVENOUS EVERY 12 HOURS
Refills: 0 | Status: DISCONTINUED | OUTPATIENT
Start: 2024-02-06 | End: 2024-02-12

## 2024-02-06 RX ORDER — NITROGLYCERIN 6.5 MG
0.4 CAPSULE, EXTENDED RELEASE ORAL ONCE
Refills: 0 | Status: DISCONTINUED | OUTPATIENT
Start: 2024-02-06 | End: 2024-02-06

## 2024-02-06 RX ADMIN — Medication 400 MILLIGRAM(S): at 11:42

## 2024-02-06 RX ADMIN — CEFEPIME 100 MILLIGRAM(S): 1 INJECTION, POWDER, FOR SOLUTION INTRAMUSCULAR; INTRAVENOUS at 14:39

## 2024-02-06 RX ADMIN — Medication 1 SPRAY(S): at 14:39

## 2024-02-06 RX ADMIN — Medication 25 MILLIGRAM(S): at 05:49

## 2024-02-06 RX ADMIN — Medication 520 MILLIGRAM(S): at 17:53

## 2024-02-06 RX ADMIN — SODIUM CHLORIDE 4 MILLILITER(S): 9 INJECTION INTRAMUSCULAR; INTRAVENOUS; SUBCUTANEOUS at 08:10

## 2024-02-06 RX ADMIN — Medication 5 MILLIGRAM(S): at 08:40

## 2024-02-06 RX ADMIN — Medication 2: at 08:14

## 2024-02-06 RX ADMIN — Medication 3 UNIT(S): at 11:41

## 2024-02-06 RX ADMIN — PANTOPRAZOLE SODIUM 40 MILLIGRAM(S): 20 TABLET, DELAYED RELEASE ORAL at 05:47

## 2024-02-06 RX ADMIN — Medication 3 MILLILITER(S): at 14:48

## 2024-02-06 RX ADMIN — BUDESONIDE AND FORMOTEROL FUMARATE DIHYDRATE 2 PUFF(S): 160; 4.5 AEROSOL RESPIRATORY (INHALATION) at 08:09

## 2024-02-06 RX ADMIN — Medication 12: at 17:36

## 2024-02-06 RX ADMIN — ATORVASTATIN CALCIUM 10 MILLIGRAM(S): 80 TABLET, FILM COATED ORAL at 22:08

## 2024-02-06 RX ADMIN — Medication 400 MILLIGRAM(S): at 22:08

## 2024-02-06 RX ADMIN — CEFEPIME 100 MILLIGRAM(S): 1 INJECTION, POWDER, FOR SOLUTION INTRAMUSCULAR; INTRAVENOUS at 05:45

## 2024-02-06 RX ADMIN — BUDESONIDE AND FORMOTEROL FUMARATE DIHYDRATE 2 PUFF(S): 160; 4.5 AEROSOL RESPIRATORY (INHALATION) at 22:08

## 2024-02-06 RX ADMIN — CEFEPIME 100 MILLIGRAM(S): 1 INJECTION, POWDER, FOR SOLUTION INTRAMUSCULAR; INTRAVENOUS at 22:07

## 2024-02-06 RX ADMIN — Medication 325 MILLIGRAM(S): at 11:42

## 2024-02-06 RX ADMIN — Medication 40 MILLIGRAM(S): at 08:09

## 2024-02-06 RX ADMIN — Medication 3 UNIT(S): at 17:36

## 2024-02-06 RX ADMIN — Medication 60 MILLIGRAM(S): at 05:48

## 2024-02-06 RX ADMIN — Medication 50 MICROGRAM(S): at 05:49

## 2024-02-06 RX ADMIN — Medication 1 SPRAY(S): at 05:56

## 2024-02-06 RX ADMIN — Medication 3 MILLILITER(S): at 08:04

## 2024-02-06 RX ADMIN — ATOVAQUONE 750 MILLIGRAM(S): 750 SUSPENSION ORAL at 08:15

## 2024-02-06 RX ADMIN — INSULIN GLARGINE 15 UNIT(S): 100 INJECTION, SOLUTION SUBCUTANEOUS at 22:07

## 2024-02-06 RX ADMIN — TAMSULOSIN HYDROCHLORIDE 0.8 MILLIGRAM(S): 0.4 CAPSULE ORAL at 11:42

## 2024-02-06 RX ADMIN — Medication 5 MILLIGRAM(S): at 22:07

## 2024-02-06 RX ADMIN — Medication 1 SPRAY(S): at 22:07

## 2024-02-06 RX ADMIN — Medication 1 TABLET(S): at 11:42

## 2024-02-06 RX ADMIN — Medication 12: at 11:41

## 2024-02-06 NOTE — PROGRESS NOTE ADULT - ASSESSMENT
This is a 73-year-old male with a past medical history of CAD x 4, history of MI, diabetes, hypertension, hyperlipidemia, hypothyroidism, BPH, and hemolytic anemia- unclear cause on prednisone and weekly Rituxan chemotherapy presents for evaluation of shortness of breath.    IMPRESSION  #Acute hypoxic respiratory failure- Unclear cause. ILD flare?  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 Was not using baseline oxygen recently  #Hemolytic Anemia- Unclear cause. Has been initiated on Prednisone and Rituximab weekly by Hem-oncologist.  (Could be from Covid Vaccine?)  #Immunocompromised.  #CAD, HTN, HLD, Hear failure,DM, Hypothyroidism, BPH  #Acute urinary retention S/p jeter placement.  #Obesity BMI (kg/m2): 28  #RVP negative, MRSA nares negative  Broad differentials- Bacterial, NTM, Viral or fungal. (Less likely parasitic)  Could be ILD flare?    RECOMMENDATIONS  -baseline Quatiferon negative from OSH records.  -Check sputum cultures. Check AFB cultures for NTM (low suspicion for TB, no isolation required)  -Follow up with blood cultures.   -Follow HIV screen. (Hep B and C screen negative OSH)  -Follow CMV viral load and Adenovirus PCR (although RVP is negative)  -Follow up fungitell and galactomannan serum. LDH elevated perhaps due to hemolysis.  -Check PJP PCR in the sputum if possible. Follow up Cryptococcal antigen serum.  -Collect sputum cultures- for routine bacterial and fungal cultures.  -For now continue with IV cefepime 2 gram q 8 hours and atovaquone 750mg Q 12 hours for PJP. (avoiding bactrim due to concerns of susu on admisison)  -Offloading, aspiration precautions. D/C jeter when able.    If any questions, please send a message or call on [employer] Teams  Please continue to update ID with any pertinent new laboratory or radiographic findings.    Neo Martin M.D  Infectious Diseases Attending/   [employer] at \[facility]\""/[facility]     This is a 73-year-old male with a past medical history of CAD x 4, history of MI, diabetes, hypertension, hyperlipidemia, hypothyroidism, BPH, and hemolytic anemia- unclear cause on prednisone and weekly Rituxan chemotherapy presents for evaluation of shortness of breath.    IMPRESSION  #Acute hypoxic respiratory failure- Unclear cause. ILD flare?  #Pneumonia- Possible PJP pneumonia.   #Baseline ILD from Covid 2021 (S/p Plasma and RDV). Was not using baseline oxygen recently  #Hemolytic Anemia- Unclear cause. Has been initiated on Prednisone and Rituximab weekly by Hem-oncologist.  (Could be from Covid Vaccine?)  #Immunocompromised.  #CAD, HTN, HLD, Hear failure,DM, Hypothyroidism, BPH  #Acute urinary retention S/p jeter placement.  #Obesity BMI (kg/m2): 28  #RVP negative, MRSA nares negative  Broad differentials- Bacterial, NTM, Viral or fungal. (Less likely parasitic)  Could be ILD flare?    RECOMMENDATIONS  -baseline Quatiferon negative from OSH records.  -Check sputum cultures. Check AFB cultures for NTM (low suspicion for TB, no isolation required)  -Follow up with blood cultures.   -Follow HIV screen. (Hep B and C screen negative OSH)  -Follow CMV viral load and Adenovirus PCR (although RVP is negative)  -Follow up fungitell and galactomannan serum. LDH elevated perhaps due to hemolysis (baseline in 400s)  -Check PJP PCR in the sputum if possible. Follow up Cryptococcal antigen serum.  -Collect sputum cultures- for routine bacterial and fungal cultures.  -For now continue with IV cefepime 2 gram q 8 hours.  -Will transition him to IV Bactrim, appreciate pharmacy assistance in dosing. D/C atovaquone.  -Change acyclovir to BID dosing.   -Offloading, aspiration precautions. D/C jeter when able.  -Discussed care with the outpatient Oncologist Dr. Edwards ([phone])    If any questions, please send a message or call on [email or web address] Teams  Please continue to update ID with any pertinent new laboratory or radiographic findings.    Neo Martin M.D  Infectious Diseases Attending/   [employer] at [facility]

## 2024-02-06 NOTE — PROGRESS NOTE ADULT - SUBJECTIVE AND OBJECTIVE BOX
ANDREE BUSH  73y, Male    LOS  2d    INTERVAL EVENTS/HPI  - No acute events overnight  - T(F): , Max: 98.2 (02-06-24 @ 04:42)  - Denies any worsening symptoms  - Tolerating medication  - WBC Count: 14.56 (02-05-24 @ 08:12)  WBC Count: 12.21 (02-04-24 @ 19:21)  - Creatinine: 0.8 (02-05-24 @ 08:12)  Creatinine: 1.2 (02-04-24 @ 19:21)    REVIEW OF SYSTEMS:  CONSTITUTIONAL: No fever or chills  HEENT: No sore throat  RESPIRATORY: No cough, no shortness of breath  CARDIOVASCULAR: No chest pain or palpitations  GASTROINTESTINAL: No abdominal or epigastric pain  GENITOURINARY: No dysuria  NEUROLOGICAL: No headache/dizziness  MSK: No joint pain, erythema, or swelling; no back pain  SKIN: No itching, rashes  All other ROS negative except noted above    Prior hospital charts reviewed [Yes]  Primary team notes reviewed [Yes]  Other consultant notes reviewed [Yes]    ANTIMICROBIALS:   acyclovir   Oral Tab/Cap 400 daily  atovaquone  Suspension 750 every 12 hours  cefepime   IVPB    cefepime   IVPB 2000 every 8 hours    OTHER MEDS: MEDICATIONS  (STANDING):  acetaminophen     Tablet .. 650 every 6 hours PRN  albuterol/ipratropium for Nebulization 3 every 6 hours  aspirin enteric coated 81 daily  atorvastatin 10 at bedtime  budesonide 160 MICROgram(s)/formoterol 4.5 MICROgram(s) Inhaler 2 two times a day  dextrose 50% Injectable 12.5 once  dextrose 50% Injectable 25 once  dextrose 50% Injectable 25 once  dextrose Oral Gel 15 once PRN  enoxaparin Injectable 40 every 24 hours  glucagon  Injectable 1 once  insulin glargine Injectable (LANTUS) 15 at bedtime  insulin lispro (ADMELOG) corrective regimen sliding scale  three times a day before meals  insulin lispro Injectable (ADMELOG) 3 three times a day before meals  levothyroxine 50 daily  melatonin 3 at bedtime PRN  metoprolol succinate ER 25 daily  pantoprazole    Tablet 40 before breakfast  predniSONE   Tablet 60 daily  sodium chloride 7% Inhalation 4 every 12 hours  tamsulosin 0.8 daily      Vital Signs Last 24 Hrs  T(F): 98.2 (02-06-24 @ 04:42), Max: 101.3 (02-04-24 @ 07:35)    Vital Signs Last 24 Hrs  HR: 100 (02-06-24 @ 04:42) (100 - 110)  BP: 140/57 (02-06-24 @ 04:42) (117/55 - 140/57)  RR: 18 (02-06-24 @ 04:42)  SpO2: 98% (02-06-24 @ 08:06) (90% - 98%)  Wt(kg): --    EXAM:  GENERAL: NAD, on NC  HEAD: No head lesions  NECK: Supple, nontender to palpation  CHEST/LUNG: Shallow breath sounds.   HEART: S1 S2  ABDOMEN: Soft, nontender, nondistended. + jeter  EXTREMITIES: No clubbing, cyanosis, or petal edema  NERVOUS SYSTEM: Alert and oriented to person, time, place and situation, speech clear. No focal deficits   MSK: No joint erythema, swelling or pain  SKIN: No rashes or lesions, no superficial thrombophlebitis  Labs:                        10.9   14.56 )-----------( 115      ( 05 Feb 2024 08:12 )             29.8     02-05    136  |  99  |  16  ----------------------------<  144<H>  3.9   |  28  |  0.8    Ca    8.1<L>      05 Feb 2024 08:12  Mg     1.9     02-05    TPro  5.4<L>  /  Alb  3.3<L>  /  TBili  1.1  /  DBili  x   /  AST  21  /  ALT  15  /  AlkPhos  57  02-05      WBC Trend:  WBC Count: 14.56 (02-05-24 @ 08:12)  WBC Count: 12.21 (02-04-24 @ 19:21)  WBC Count: 15.75 (02-04-24 @ 11:16)  WBC Count: 13.19 (02-04-24 @ 00:18)      Creatine Trend:  Creatinine: 0.8 (02-05)  Creatinine: 1.2 (02-04)  Creatinine: 0.8 (02-04)  Creatinine: 1.0 (02-04)      Liver Biochemical Testing Trend:  Alanine Aminotransferase (ALT/SGPT): 15 (02-05)  Alanine Aminotransferase (ALT/SGPT): 15 (02-04)  Aspartate Aminotransferase (AST/SGOT): 21 (02-05-24 @ 08:12)  Aspartate Aminotransferase (AST/SGOT): 22 (02-04-24 @ 00:18)  Bilirubin Total: 1.1 (02-05)  Bilirubin Total: 1.6 (02-04)      Trend LDH  03-07-21 @ 11:40  436<H>      Urinalysis Basic - ( 05 Feb 2024 08:12 )    Color: x / Appearance: x / SG: x / pH: x  Gluc: 144 mg/dL / Ketone: x  / Bili: x / Urobili: x   Blood: x / Protein: x / Nitrite: x   Leuk Esterase: x / RBC: x / WBC x   Sq Epi: x / Non Sq Epi: x / Bacteria: x        MICROBIOLOGY:    Male    Culture - Blood (collected 04 Feb 2024 11:16)  Source: .Blood None  Preliminary Report:    No growth at 24 hours    Culture - Urine (collected 04 Feb 2024 08:20)  Source: Clean Catch Clean Catch (Midstream)  Preliminary Report:    50,000 - 99,000 CFU/mL Gram Negative Rods    Culture - Blood (collected 04 Feb 2024 06:00)  Source: .Blood Blood-Peripheral  Preliminary Report:    No growth at 24 hours    Culture - Blood (collected 04 Feb 2024 06:00)  Source: .Blood Blood  Preliminary Report:    No growth at 24 hours    Rapid RVP Result: NotDetec (02-04 @ 19:30)    Procalcitonin, Serum: 0.46 (02-04)    C-Reactive Protein, Serum: 157.9 (02-05)      D-Dimer Assay, Quantitative: 265 (02-04)        Troponin T, High Sensitivity Result: 64 (02-04)  Troponin T, High Sensitivity Result: 75 (02-04)    Lactate, Blood: 1.1 (02-04 @ 09:30)    RADIOLOGY & ADDITIONAL TESTS:  I have personally reviewed the relevant images.   CXR      CT  CT Angio Chest PE Protocol w/ IV Cont:   ACC: [id number] EXAM:  CT ANGIO CHEST PULM ART [facility]   ORDERED BY:   MONICA SAUER     PROCEDURE DATE:  02/04/2024          INTERPRETATION:  CLINICAL INDICATION: sob, tachycardia    TECHNIQUE:  CTA of the thorax was performed after administration of contrast per the   PE protocol. Sagittal and coronal reformats were performed as well as MIP   reconstructions.  Intravenous contrast: 85 cc Omnipaque 350    COMPARISON: CTA chest dated 2/4/2021    INTERPRETATION:    PULMONARY ARTERIES: There are no pulmonary emboli.    AIRWAYS/LUNGS/PLEURA: The central tracheobronchial tree is patent.    Patchy bilateral groundglass opacities with areas of scarring likely   chronic secondary to prior COVID infection as seen on CT chest dated   2/4/2021. There is no pleural effusion. There is no pneumothorax.    MEDIASTINUM: There are no enlarged mediastinal, hilar or axillary lymph   nodes. The visualized portion of the thyroid gland is unremarkable.    HEART AND VESSELS: The heart is normal in size. Thereis no evidence of   right heart strain.  There is no pericardial effusion. Aortic and   coronary artery calcifications.    UPPER ABDOMEN: Cholelithiasis. Right renal cysts.    BONES AND SOFT TISSUES: No acute osseous abnormalities.  Degenerative   changes of the spine.      IMPRESSION:    No pulmonary embolism.    Patchy bilateral groundglass opacities with areas of scarring which are   likely chronic secondary to prior COVID infection as seen on CT chest   dated 2/4/2021, however acute infectious process cannot be excluded.    --- End of Report ---          EDMUND ASCENCIO MD; Resident Radiologist  This document has been electronically signed.  AALIYAH LINDO MD; Attending Radiologist  This document has been electronically signed. Feb 4 2024 7:35AM (02-04-24 @ 01:54)        WEIGHT  Weight (kg): 86 (02-04-24 @ 15:23)      All available historical records have been reviewed       ANDREE BUSH  73y, Male    LOS  2d    INTERVAL EVENTS/HPI  - No acute events overnight  - T(F): , Max: 98.2 (02-06-24 @ 04:42)  - Denies any worsening symptoms  - Tolerating medication  - WBC Count: 14.56 (02-05-24 @ 08:12)  WBC Count: 12.21 (02-04-24 @ 19:21)  - Creatinine: 0.8 (02-05-24 @ 08:12)  Creatinine: 1.2 (02-04-24 @ 19:21)    REVIEW OF SYSTEMS:  CONSTITUTIONAL: No fever or chills  HEENT: No sore throat  RESPIRATORY: No cough, no shortness of breath  CARDIOVASCULAR: No chest pain or palpitations  GASTROINTESTINAL: No abdominal or epigastric pain  GENITOURINARY: No dysuria  NEUROLOGICAL: No headache/dizziness  MSK: No joint pain, erythema, or swelling; no back pain  SKIN: No itching, rashes  All other ROS negative except noted above    Prior hospital charts reviewed [Yes]  Primary team notes reviewed [Yes]  Other consultant notes reviewed [Yes]    ANTIMICROBIALS:   acyclovir   Oral Tab/Cap 400 daily  atovaquone  Suspension 750 every 12 hours  cefepime   IVPB    cefepime   IVPB 2000 every 8 hours    OTHER MEDS: MEDICATIONS  (STANDING):  acetaminophen     Tablet .. 650 every 6 hours PRN  albuterol/ipratropium for Nebulization 3 every 6 hours  aspirin enteric coated 81 daily  atorvastatin 10 at bedtime  budesonide 160 MICROgram(s)/formoterol 4.5 MICROgram(s) Inhaler 2 two times a day  dextrose 50% Injectable 12.5 once  dextrose 50% Injectable 25 once  dextrose 50% Injectable 25 once  dextrose Oral Gel 15 once PRN  enoxaparin Injectable 40 every 24 hours  glucagon  Injectable 1 once  insulin glargine Injectable (LANTUS) 15 at bedtime  insulin lispro (ADMELOG) corrective regimen sliding scale  three times a day before meals  insulin lispro Injectable (ADMELOG) 3 three times a day before meals  levothyroxine 50 daily  melatonin 3 at bedtime PRN  metoprolol succinate ER 25 daily  pantoprazole    Tablet 40 before breakfast  predniSONE   Tablet 60 daily  sodium chloride 7% Inhalation 4 every 12 hours  tamsulosin 0.8 daily      Vital Signs Last 24 Hrs  T(F): 98.2 (02-06-24 @ 04:42), Max: 101.3 (02-04-24 @ 07:35)    Vital Signs Last 24 Hrs  HR: 100 (02-06-24 @ 04:42) (100 - 110)  BP: 140/57 (02-06-24 @ 04:42) (117/55 - 140/57)  RR: 18 (02-06-24 @ 04:42)  SpO2: 98% (02-06-24 @ 08:06) (90% - 98%)  Wt(kg): --    EXAM:  GENERAL: NAD, on Venti Mask.   HEAD: No head lesions  NECK: Supple, nontender to palpation  CHEST/LUNG: Shallow breath sounds.   HEART: S1 S2  ABDOMEN: Soft, nontender, nondistended. + jeter  EXTREMITIES: No clubbing, cyanosis, or petal edema  NERVOUS SYSTEM: Alert and oriented to person, time, place and situation, speech clear. No focal deficits   MSK: No joint erythema, swelling or pain  SKIN: No rashes or lesions, no superficial thrombophlebitis  Labs:                        10.9   14.56 )-----------( 115      ( 05 Feb 2024 08:12 )             29.8     02-05    136  |  99  |  16  ----------------------------<  144<H>  3.9   |  28  |  0.8    Ca    8.1<L>      05 Feb 2024 08:12  Mg     1.9     02-05    TPro  5.4<L>  /  Alb  3.3<L>  /  TBili  1.1  /  DBili  x   /  AST  21  /  ALT  15  /  AlkPhos  57  02-05      WBC Trend:  WBC Count: 14.56 (02-05-24 @ 08:12)  WBC Count: 12.21 (02-04-24 @ 19:21)  WBC Count: 15.75 (02-04-24 @ 11:16)  WBC Count: 13.19 (02-04-24 @ 00:18)      Creatine Trend:  Creatinine: 0.8 (02-05)  Creatinine: 1.2 (02-04)  Creatinine: 0.8 (02-04)  Creatinine: 1.0 (02-04)      Liver Biochemical Testing Trend:  Alanine Aminotransferase (ALT/SGPT): 15 (02-05)  Alanine Aminotransferase (ALT/SGPT): 15 (02-04)  Aspartate Aminotransferase (AST/SGOT): 21 (02-05-24 @ 08:12)  Aspartate Aminotransferase (AST/SGOT): 22 (02-04-24 @ 00:18)  Bilirubin Total: 1.1 (02-05)  Bilirubin Total: 1.6 (02-04)      Trend LDH  03-07-21 @ 11:40  436<H>      Urinalysis Basic - ( 05 Feb 2024 08:12 )    Color: x / Appearance: x / SG: x / pH: x  Gluc: 144 mg/dL / Ketone: x  / Bili: x / Urobili: x   Blood: x / Protein: x / Nitrite: x   Leuk Esterase: x / RBC: x / WBC x   Sq Epi: x / Non Sq Epi: x / Bacteria: x        MICROBIOLOGY:    Male    Culture - Blood (collected 04 Feb 2024 11:16)  Source: .Blood None  Preliminary Report:    No growth at 24 hours    Culture - Urine (collected 04 Feb 2024 08:20)  Source: Clean Catch Clean Catch (Midstream)  Preliminary Report:    50,000 - 99,000 CFU/mL Gram Negative Rods    Culture - Blood (collected 04 Feb 2024 06:00)  Source: .Blood Blood-Peripheral  Preliminary Report:    No growth at 24 hours    Culture - Blood (collected 04 Feb 2024 06:00)  Source: .Blood Blood  Preliminary Report:    No growth at 24 hours    Rapid RVP Result: NotDetec (02-04 @ 19:30)    Procalcitonin, Serum: 0.46 (02-04)    C-Reactive Protein, Serum: 157.9 (02-05)      D-Dimer Assay, Quantitative: 265 (02-04)        Troponin T, High Sensitivity Result: 64 (02-04)  Troponin T, High Sensitivity Result: 75 (02-04)    Lactate, Blood: 1.1 (02-04 @ 09:30)    RADIOLOGY & ADDITIONAL TESTS:  I have personally reviewed the relevant images.   CXR      CT  CT Angio Chest PE Protocol w/ IV Cont:   ACC: [id number] EXAM:  CT ANGIO CHEST PULM ART [facility]   ORDERED BY:   MONICA SAUER     PROCEDURE DATE:  02/04/2024          INTERPRETATION:  CLINICAL INDICATION: sob, tachycardia    TECHNIQUE:  CTA of the thorax was performed after administration of contrast per the   PE protocol. Sagittal and coronal reformats were performed as well as MIP   reconstructions.  Intravenous contrast: 85 cc Omnipaque 350    COMPARISON: CTA chest dated 2/4/2021    INTERPRETATION:    PULMONARY ARTERIES: There are no pulmonary emboli.    AIRWAYS/LUNGS/PLEURA: The central tracheobronchial tree is patent.    Patchy bilateral groundglass opacities with areas of scarring likely   chronic secondary to prior COVID infection as seen on CT chest dated   2/4/2021. There is no pleural effusion. There is no pneumothorax.    MEDIASTINUM: There are no enlarged mediastinal, hilar or axillary lymph   nodes. The visualized portion of the thyroid gland is unremarkable.    HEART AND VESSELS: The heart is normal in size. Thereis no evidence of   right heart strain.  There is no pericardial effusion. Aortic and   coronary artery calcifications.    UPPER ABDOMEN: Cholelithiasis. Right renal cysts.    BONES AND SOFT TISSUES: No acute osseous abnormalities.  Degenerative   changes of the spine.      IMPRESSION:    No pulmonary embolism.    Patchy bilateral groundglass opacities with areas of scarring which are   likely chronic secondary to prior COVID infection as seen on CT chest   dated 2/4/2021, however acute infectious process cannot be excluded.    --- End of Report ---          EDMUND ASCENCIO MD; Resident Radiologist  This document has been electronically signed.  AALIYAH LINDO MD; Attending Radiologist  This document has been electronically signed. Feb 4 2024 7:35AM (02-04-24 @ 01:54)        WEIGHT  Weight (kg): 86 (02-04-24 @ 15:23)      All available historical records have been reviewed

## 2024-02-06 NOTE — PROGRESS NOTE ADULT - ASSESSMENT
73-year-old male with a past medical history of CAD x 4 stents followed by Dr. Murillo (last seen as routine 2 weeks ago), history of MI, diabetes, hypertension, hyperlipidemia, hypothyroidism, BPH, and hemolytic anemia followed by hematologist at [facility] on prednisone 80 mg as been tapered down to 60 mg and weekly Rituxan chemotherapy presents for evaluation of shortness of breath that began yesterday associated with weakness in the proximal legs, weakness in legs has since improved but needing oxygen    Assessment    ·	Sepsis and acute hypoxic respiratory failure possibly secondary to pneumonia / pneumonitis in the setting of prior fibrosis (RVP/COVID negative)  ·	CAD sp 4 stents on aspirin  ·	Likely flash pulm edema (now resolved)  ·	Bladder spasms improvement with oxybutynin  ·	Hemolytic anemia on daily prednisone with weekly rituxan therapy  ·	DM  ·	HTN  ·	HLD  ·	Hypothyroidism  ·	BPH    Plan    - f/u blood cultures, CT shows possible new areas of inflammation, neg for PE, c/w cefepime for now as per ID and now bactrim, MRSA nares neg, lactate negative / RVP and covid neg / ID recs appreciated f/u HIV / aspergillosis / CMV  - c/w aspirin, echo showing EF 52 and areas of hypokinesis / toprol 25 / cardio recs appreciated  - c/w prednisone for now, on acyclovir ppx  - not on insulin at home but needing it here, likely from prednisone and infection, very labile  - c/w synthroid 50, TSH wnl  - uro follow up appreciated / patient having bladder spasms post jeter, started oxybutynin with improvement    Pending: cultures, clinical improvement with hypoxia, controlling sugars, ID, pulm, cardio    # DVT PPX: holding lovenox as patient is having some bleeding around urethral site and doesn't want to take it, refusing SCDs, discussed with him to keep his legs moving and he's agreeable

## 2024-02-06 NOTE — CHART NOTE - NSCHARTNOTEFT_GEN_A_CORE
Pt c/o substernal CP "squeezing" in nature. Denies any radiation of pain. Pt admits to SOB (no change in baseline)    HR 110s    EKG - unchanged    D/W Dr Ortiz - wants CXR. Will follow

## 2024-02-06 NOTE — PROGRESS NOTE ADULT - SUBJECTIVE AND OBJECTIVE BOX
SUBJECTIVE:    Patient is a 73y old Male who presents with a chief complaint of hypoxia (06 Feb 2024 08:13)      HPI:  73-year-old male with a past medical history of CAD x 4 stents followed by Dr. Murillo (last seen as routine 2 weeks ago), history of MI, diabetes, hypertension, hyperlipidemia, hypothyroidism, BPH, and hemolytic anemia followed by hematologist at [facility] on prednisone 80 mg as been tapered down to 60 mg and weekly Rituxan chemotherapy presents for evaluation of shortness of breath that began yesterday associated with weakness in the proximal legs. Patient reports increased work of breathing and lower O2 sats at home prompting presentation. Patient reports developing emphysema following COVID infection 2 years ago and has had follow up CTs which have shown the same chronic scarring and opacities. Patient does ot use any inhalers. He has a significant smoking history 1ppd x 30 years. Patient denies any ill symptoms, no cough fever chills nausea vomiting or chest pain. Patient reports that his shortness of breath has imrpoved (on 4Lnc) and he is motivated for discharge, if hypoxia resolves.   Patietn also complained of lower extremity weakness which has since completely resolved.  (04 Feb 2024 06:17)      Currently admitted to medicine with the primary diagnosis of SOB (shortness of breath)     was short of breath this AM, chest xray showed some worsening and he improved with lasix, had bladder spasms which improved with oxybutynin    Besides the pertinent positives and negatives described above, the ROS was within normal limits.    PAST MEDICAL & SURGICAL HISTORY  ASHD (arteriosclerotic heart disease)  STEMI 12/31/17, PCI RCA    Dyspnea, unspecified type    Hypertension, unspecified type    Type 2 diabetes mellitus without complication, unspecified long term insulin use status    Chronic kidney disease (CKD)  III    H/O hemolytic anemia    History of tonsillectomy  1957    History of ligation of vein  2012    S/P cataract surgery      SOCIAL HISTORY:    ALLERGIES:  penicillin (Rash (Severe))    MEDICATIONS:  STANDING MEDICATIONS  acyclovir   Oral Tab/Cap 400 milliGRAM(s) Oral every 12 hours  albuterol/ipratropium for Nebulization 3 milliLiter(s) Nebulizer every 6 hours  aspirin enteric coated 81 milliGRAM(s) Oral daily  atorvastatin 10 milliGRAM(s) Oral at bedtime  budesonide 160 MICROgram(s)/formoterol 4.5 MICROgram(s) Inhaler 2 Puff(s) Inhalation two times a day  cefepime   IVPB 2000 milliGRAM(s) IV Intermittent every 8 hours  cefepime   IVPB      dextrose 5%. 1000 milliLiter(s) IV Continuous <Continuous>  dextrose 5%. 1000 milliLiter(s) IV Continuous <Continuous>  dextrose 50% Injectable 12.5 Gram(s) IV Push once  dextrose 50% Injectable 25 Gram(s) IV Push once  dextrose 50% Injectable 25 Gram(s) IV Push once  ferrous    sulfate 325 milliGRAM(s) Oral daily  glucagon  Injectable 1 milliGRAM(s) IntraMuscular once  insulin glargine Injectable (LANTUS) 15 Unit(s) SubCutaneous at bedtime  insulin lispro (ADMELOG) corrective regimen sliding scale   SubCutaneous three times a day before meals  insulin lispro Injectable (ADMELOG) 3 Unit(s) SubCutaneous three times a day before meals  levothyroxine 50 MICROGram(s) Oral daily  metoprolol succinate ER 25 milliGRAM(s) Oral daily  multivitamin 1 Tablet(s) Oral daily  oxybutynin 5 milliGRAM(s) Oral every 12 hours  pantoprazole    Tablet 40 milliGRAM(s) Oral before breakfast  predniSONE   Tablet 60 milliGRAM(s) Oral daily  sodium chloride 0.65% Nasal 1 Spray(s) Both Nostrils three times a day  sodium chloride 7% Inhalation 4 milliLiter(s) Inhalation every 12 hours  tamsulosin 0.8 milliGRAM(s) Oral daily  trimethoprim / sulfamethoxazole IVPB 320 milliGRAM(s) IV Intermittent every 8 hours    PRN MEDICATIONS  acetaminophen     Tablet .. 650 milliGRAM(s) Oral every 6 hours PRN  dextrose Oral Gel 15 Gram(s) Oral once PRN  melatonin 3 milliGRAM(s) Oral at bedtime PRN    VITALS:   T(F): 96.8  HR: 120  BP: 124/59  RR: 20  SpO2: 92%    LABS:                        10.6   12.46 )-----------( 123      ( 06 Feb 2024 07:30 )             28.9     02-06    138  |  100  |  19  ----------------------------<  127<H>  4.1   |  28  |  0.8    Ca    8.1<L>      06 Feb 2024 07:30  Mg     1.9     02-06    TPro  5.2<L>  /  Alb  3.4<L>  /  TBili  0.9  /  DBili  x   /  AST  20  /  ALT  17  /  AlkPhos  54  02-06      Urinalysis Basic - ( 06 Feb 2024 07:30 )    Color: x / Appearance: x / SG: x / pH: x  Gluc: 127 mg/dL / Ketone: x  / Bili: x / Urobili: x   Blood: x / Protein: x / Nitrite: x   Leuk Esterase: x / RBC: x / WBC x   Sq Epi: x / Non Sq Epi: x / Bacteria: x            Culture - Blood (collected 04 Feb 2024 11:16)  Source: .Blood None  Preliminary Report (05 Feb 2024 20:02):    No growth at 24 hours    Culture - Urine (collected 04 Feb 2024 08:20)  Source: Clean Catch Clean Catch (Midstream)  Preliminary Report (06 Feb 2024 11:44):    50,000 - 99,000 CFU/mL Proteus mirabilis    Culture - Blood (collected 04 Feb 2024 06:00)  Source: .Blood Blood-Peripheral  Preliminary Report (06 Feb 2024 01:02):    No growth at 24 hours    Culture - Blood (collected 04 Feb 2024 06:00)  Source: .Blood Blood  Preliminary Report (06 Feb 2024 01:02):    No growth at 24 hours          SOB (shortness of breath)      RADIOLOGY:    PHYSICAL EXAM:  General: WN/WD NAD  Neurology: A&Ox3, nonfocal, TIJERINA x 4  Head:  Normocephalic, atraumatic  ENT:  Mucosa moist, no ulcerations  Neck:  Supple, no sinuses or palpable masses  Lymphatic:  No palpable cervical, supraclavicular, axillary or inguinal adenopathy  Respiratory: CTA B/L  CV: RRR, S1S2, no murmur  Abdominal: Soft, NT, ND no palpable mass  MSK: No edema, + peripheral pulses  Incisions: intact, no erythema or drainage    Intravenous access: yes  NG tube: no  Penaloza Catheter:   Indwelling Urethral Catheter:     Connect To:  Straight Drainage/[place]    Indication:  Urinary Retention / Obstruction (02-06-24 @ 10:05) (not performed) [Active]  Indwelling Urethral Catheter:     Connect To:  Straight Drainage/[place]    Indication:  Urinary Retention / Obstruction (02-04-24 @ 16:06) (not performed) [Active]

## 2024-02-07 LAB
-  AMOXICILLIN/CLAVULANIC ACID: SIGNIFICANT CHANGE UP
-  AMPICILLIN/SULBACTAM: SIGNIFICANT CHANGE UP
-  AMPICILLIN: SIGNIFICANT CHANGE UP
-  AZTREONAM: SIGNIFICANT CHANGE UP
-  CEFAZOLIN: SIGNIFICANT CHANGE UP
-  CEFEPIME: SIGNIFICANT CHANGE UP
-  CEFOXITIN: SIGNIFICANT CHANGE UP
-  CEFTRIAXONE: SIGNIFICANT CHANGE UP
-  CEFUROXIME: SIGNIFICANT CHANGE UP
-  CIPROFLOXACIN: SIGNIFICANT CHANGE UP
-  ERTAPENEM: SIGNIFICANT CHANGE UP
-  GENTAMICIN: SIGNIFICANT CHANGE UP
-  LEVOFLOXACIN: SIGNIFICANT CHANGE UP
-  MEROPENEM: SIGNIFICANT CHANGE UP
-  NITROFURANTOIN: SIGNIFICANT CHANGE UP
-  PIPERACILLIN/TAZOBACTAM: SIGNIFICANT CHANGE UP
-  TOBRAMYCIN: SIGNIFICANT CHANGE UP
-  TRIMETHOPRIM/SULFAMETHOXAZOLE: SIGNIFICANT CHANGE UP
ALBUMIN SERPL ELPH-MCNC: 3.5 G/DL — SIGNIFICANT CHANGE UP (ref 3.5–5.2)
ALP SERPL-CCNC: 59 U/L — SIGNIFICANT CHANGE UP (ref 30–115)
ALT FLD-CCNC: 22 U/L — SIGNIFICANT CHANGE UP (ref 0–41)
ANION GAP SERPL CALC-SCNC: 10 MMOL/L — SIGNIFICANT CHANGE UP (ref 7–14)
AST SERPL-CCNC: 19 U/L — SIGNIFICANT CHANGE UP (ref 0–41)
BASOPHILS # BLD AUTO: 0.02 K/UL — SIGNIFICANT CHANGE UP (ref 0–0.2)
BASOPHILS NFR BLD AUTO: 0.1 % — SIGNIFICANT CHANGE UP (ref 0–1)
BILIRUB SERPL-MCNC: 0.7 MG/DL — SIGNIFICANT CHANGE UP (ref 0.2–1.2)
BUN SERPL-MCNC: 25 MG/DL — HIGH (ref 10–20)
CALCIUM SERPL-MCNC: 8.2 MG/DL — LOW (ref 8.4–10.5)
CHLORIDE SERPL-SCNC: 98 MMOL/L — SIGNIFICANT CHANGE UP (ref 98–110)
CMV IGG FLD QL: 8 U/ML — HIGH
CMV IGG SERPL-IMP: POSITIVE
CMV IGM FLD-ACNC: <8 AU/ML — SIGNIFICANT CHANGE UP
CMV IGM SERPL QL: NEGATIVE — SIGNIFICANT CHANGE UP
CO2 SERPL-SCNC: 30 MMOL/L — SIGNIFICANT CHANGE UP (ref 17–32)
COVID-19 NUCLEOCAPSID GAM AB INTERP: POSITIVE
COVID-19 NUCLEOCAPSID TOTAL GAM ANTIBODY RESULT: 106 INDEX — HIGH
COVID-19 SPIKE DOMAIN AB INTERP: POSITIVE
COVID-19 SPIKE DOMAIN ANTIBODY RESULT: >250 U/ML — HIGH
CREAT SERPL-MCNC: 0.9 MG/DL — SIGNIFICANT CHANGE UP (ref 0.7–1.5)
CRYPTOC AG FLD QL: NEGATIVE — SIGNIFICANT CHANGE UP
CULTURE RESULTS: ABNORMAL
EGFR: 91 ML/MIN/1.73M2 — SIGNIFICANT CHANGE UP
EOSINOPHIL # BLD AUTO: 0 K/UL — SIGNIFICANT CHANGE UP (ref 0–0.7)
EOSINOPHIL NFR BLD AUTO: 0 % — SIGNIFICANT CHANGE UP (ref 0–8)
GLUCOSE BLDC GLUCOMTR-MCNC: 123 MG/DL — HIGH (ref 70–99)
GLUCOSE BLDC GLUCOMTR-MCNC: 221 MG/DL — HIGH (ref 70–99)
GLUCOSE BLDC GLUCOMTR-MCNC: 305 MG/DL — HIGH (ref 70–99)
GLUCOSE BLDC GLUCOMTR-MCNC: 407 MG/DL — HIGH (ref 70–99)
GLUCOSE BLDC GLUCOMTR-MCNC: 424 MG/DL — HIGH (ref 70–99)
GLUCOSE BLDC GLUCOMTR-MCNC: 520 MG/DL — CRITICAL HIGH (ref 70–99)
GLUCOSE BLDC GLUCOMTR-MCNC: 548 MG/DL — CRITICAL HIGH (ref 70–99)
GLUCOSE BLDC GLUCOMTR-MCNC: 553 MG/DL — CRITICAL HIGH (ref 70–99)
GLUCOSE BLDC GLUCOMTR-MCNC: 583 MG/DL — CRITICAL HIGH (ref 70–99)
GLUCOSE SERPL-MCNC: 182 MG/DL — HIGH (ref 70–99)
GRAM STN FLD: ABNORMAL
HCT VFR BLD CALC: 29.7 % — LOW (ref 42–52)
HGB BLD-MCNC: 10.9 G/DL — LOW (ref 14–18)
HIV 1+2 AB+HIV1 P24 AG SERPL QL IA: SIGNIFICANT CHANGE UP
IMM GRANULOCYTES NFR BLD AUTO: 1.1 % — HIGH (ref 0.1–0.3)
LYMPHOCYTES # BLD AUTO: 0.56 K/UL — LOW (ref 1.2–3.4)
LYMPHOCYTES # BLD AUTO: 4 % — LOW (ref 20.5–51.1)
MAGNESIUM SERPL-MCNC: 1.9 MG/DL — SIGNIFICANT CHANGE UP (ref 1.8–2.4)
MCHC RBC-ENTMCNC: 36.2 PG — HIGH (ref 27–31)
MCHC RBC-ENTMCNC: 36.7 G/DL — SIGNIFICANT CHANGE UP (ref 32–37)
MCV RBC AUTO: 98.7 FL — HIGH (ref 80–94)
METHOD TYPE: SIGNIFICANT CHANGE UP
MONOCYTES # BLD AUTO: 0.51 K/UL — SIGNIFICANT CHANGE UP (ref 0.1–0.6)
MONOCYTES NFR BLD AUTO: 3.6 % — SIGNIFICANT CHANGE UP (ref 1.7–9.3)
NEUTROPHILS # BLD AUTO: 12.84 K/UL — HIGH (ref 1.4–6.5)
NEUTROPHILS NFR BLD AUTO: 91.2 % — HIGH (ref 42.2–75.2)
NIGHT BLUE STAIN TISS: SIGNIFICANT CHANGE UP
NRBC # BLD: 0 /100 WBCS — SIGNIFICANT CHANGE UP (ref 0–0)
ORGANISM # SPEC MICROSCOPIC CNT: ABNORMAL
ORGANISM # SPEC MICROSCOPIC CNT: SIGNIFICANT CHANGE UP
PLATELET # BLD AUTO: 157 K/UL — SIGNIFICANT CHANGE UP (ref 130–400)
PMV BLD: 10.6 FL — HIGH (ref 7.4–10.4)
POTASSIUM SERPL-MCNC: 4.3 MMOL/L — SIGNIFICANT CHANGE UP (ref 3.5–5)
POTASSIUM SERPL-SCNC: 4.3 MMOL/L — SIGNIFICANT CHANGE UP (ref 3.5–5)
PROT SERPL-MCNC: 5.2 G/DL — LOW (ref 6–8)
RBC # BLD: 3.01 M/UL — LOW (ref 4.7–6.1)
RBC # FLD: 13.9 % — SIGNIFICANT CHANGE UP (ref 11.5–14.5)
SARS-COV-2 IGG+IGM SERPL QL IA: 106 INDEX — HIGH
SARS-COV-2 IGG+IGM SERPL QL IA: >250 U/ML — HIGH
SARS-COV-2 IGG+IGM SERPL QL IA: POSITIVE
SARS-COV-2 IGG+IGM SERPL QL IA: POSITIVE
SODIUM SERPL-SCNC: 138 MMOL/L — SIGNIFICANT CHANGE UP (ref 135–146)
SPECIMEN SOURCE: SIGNIFICANT CHANGE UP
WBC # BLD: 14.09 K/UL — HIGH (ref 4.8–10.8)
WBC # FLD AUTO: 14.09 K/UL — HIGH (ref 4.8–10.8)

## 2024-02-07 PROCEDURE — 99232 SBSQ HOSP IP/OBS MODERATE 35: CPT

## 2024-02-07 RX ORDER — INSULIN LISPRO 100/ML
7 VIAL (ML) SUBCUTANEOUS ONCE
Refills: 0 | Status: COMPLETED | OUTPATIENT
Start: 2024-02-07 | End: 2024-02-07

## 2024-02-07 RX ORDER — FUROSEMIDE 40 MG
40 TABLET ORAL ONCE
Refills: 0 | Status: COMPLETED | OUTPATIENT
Start: 2024-02-07 | End: 2024-02-07

## 2024-02-07 RX ORDER — INSULIN GLARGINE 100 [IU]/ML
20 INJECTION, SOLUTION SUBCUTANEOUS AT BEDTIME
Refills: 0 | Status: DISCONTINUED | OUTPATIENT
Start: 2024-02-07 | End: 2024-02-12

## 2024-02-07 RX ADMIN — Medication 5 MILLIGRAM(S): at 16:55

## 2024-02-07 RX ADMIN — Medication 1 TABLET(S): at 11:46

## 2024-02-07 RX ADMIN — Medication 5 MILLIGRAM(S): at 05:26

## 2024-02-07 RX ADMIN — TAMSULOSIN HYDROCHLORIDE 0.8 MILLIGRAM(S): 0.4 CAPSULE ORAL at 11:46

## 2024-02-07 RX ADMIN — Medication 1 SPRAY(S): at 22:06

## 2024-02-07 RX ADMIN — Medication 8: at 16:54

## 2024-02-07 RX ADMIN — BUDESONIDE AND FORMOTEROL FUMARATE DIHYDRATE 2 PUFF(S): 160; 4.5 AEROSOL RESPIRATORY (INHALATION) at 22:06

## 2024-02-07 RX ADMIN — Medication 4: at 08:24

## 2024-02-07 RX ADMIN — INSULIN GLARGINE 20 UNIT(S): 100 INJECTION, SOLUTION SUBCUTANEOUS at 22:01

## 2024-02-07 RX ADMIN — Medication 3 UNIT(S): at 16:54

## 2024-02-07 RX ADMIN — Medication 25 MILLIGRAM(S): at 05:26

## 2024-02-07 RX ADMIN — Medication 400 MILLIGRAM(S): at 11:46

## 2024-02-07 RX ADMIN — ATORVASTATIN CALCIUM 10 MILLIGRAM(S): 80 TABLET, FILM COATED ORAL at 21:50

## 2024-02-07 RX ADMIN — Medication 12: at 11:44

## 2024-02-07 RX ADMIN — CEFEPIME 100 MILLIGRAM(S): 1 INJECTION, POWDER, FOR SOLUTION INTRAMUSCULAR; INTRAVENOUS at 05:25

## 2024-02-07 RX ADMIN — Medication 520 MILLIGRAM(S): at 05:25

## 2024-02-07 RX ADMIN — Medication 60 MILLIGRAM(S): at 05:26

## 2024-02-07 RX ADMIN — PANTOPRAZOLE SODIUM 40 MILLIGRAM(S): 20 TABLET, DELAYED RELEASE ORAL at 05:26

## 2024-02-07 RX ADMIN — CEFEPIME 100 MILLIGRAM(S): 1 INJECTION, POWDER, FOR SOLUTION INTRAMUSCULAR; INTRAVENOUS at 13:24

## 2024-02-07 RX ADMIN — Medication 3 MILLILITER(S): at 08:57

## 2024-02-07 RX ADMIN — Medication 7 UNIT(S): at 13:30

## 2024-02-07 RX ADMIN — Medication 1 SPRAY(S): at 14:50

## 2024-02-07 RX ADMIN — Medication 325 MILLIGRAM(S): at 11:46

## 2024-02-07 RX ADMIN — Medication 81 MILLIGRAM(S): at 11:46

## 2024-02-07 RX ADMIN — Medication 1 SPRAY(S): at 05:27

## 2024-02-07 RX ADMIN — Medication 520 MILLIGRAM(S): at 21:43

## 2024-02-07 RX ADMIN — SODIUM CHLORIDE 4 MILLILITER(S): 9 INJECTION INTRAMUSCULAR; INTRAVENOUS; SUBCUTANEOUS at 09:37

## 2024-02-07 RX ADMIN — Medication 50 MICROGRAM(S): at 05:26

## 2024-02-07 RX ADMIN — Medication 3 UNIT(S): at 08:25

## 2024-02-07 RX ADMIN — Medication 3 UNIT(S): at 11:43

## 2024-02-07 RX ADMIN — CEFEPIME 100 MILLIGRAM(S): 1 INJECTION, POWDER, FOR SOLUTION INTRAMUSCULAR; INTRAVENOUS at 21:44

## 2024-02-07 RX ADMIN — Medication 400 MILLIGRAM(S): at 22:01

## 2024-02-07 RX ADMIN — Medication 520 MILLIGRAM(S): at 13:59

## 2024-02-07 NOTE — PROGRESS NOTE ADULT - SUBJECTIVE AND OBJECTIVE BOX
Progress note    INTERVAL HPI/OVERNIGHT EVENTS:    Patient seen and examined at bedside. He states he feels some shortness of breath.      REVIEW OF SYSTEMS:  All other 13 Review of systems were reviewed and are negative    FAMILY HISTORY:  Family history of myocardial infarction (Mother)      T(C): 35.6 (02-07-24 @ 14:15), Max: 36.7 (02-07-24 @ 04:20)  HR: 87 (02-07-24 @ 14:15) (86 - 99)  BP: 114/56 (02-07-24 @ 14:15) (105/53 - 141/60)  RR: 18 (02-07-24 @ 14:15) (18 - 20)  SpO2: 97% (02-07-24 @ 14:15) (93% - 97%)  Wt(kg): --Vital Signs Last 24 Hrs  T(C): 35.6 (07 Feb 2024 14:15), Max: 36.7 (07 Feb 2024 04:20)  T(F): 96 (07 Feb 2024 14:15), Max: 98 (07 Feb 2024 04:20)  HR: 87 (07 Feb 2024 14:15) (86 - 99)  BP: 114/56 (07 Feb 2024 14:15) (105/53 - 141/60)  BP(mean): --  RR: 18 (07 Feb 2024 14:15) (18 - 20)  SpO2: 97% (07 Feb 2024 14:15) (93% - 97%)    Parameters below as of 07 Feb 2024 14:15  Patient On (Oxygen Delivery Method): nasal cannula  O2 Flow (L/min): 5    penicillin (Rash (Severe))      PHYSICAL EXAM:    General: WN/WD NAD  Neurology: A&Ox3, nonfocal, TIJERINA x 4  Head:  Normocephalic, atraumatic  ENT:  Mucosa moist, no ulcerations  Neck:  Supple, no sinuses or palpable masses  Lymphatic:  No palpable cervical, supraclavicular, axillary or inguinal adenopathy  Respiratory: CTA B/L  CV: RRR, S1S2, no murmur  Abdominal: Soft, NT, ND no palpable mass  MSK: No edema, + peripheral pulses  Incisions: intact, no erythema or drainage    Consultant(s) Notes Reviewed:  [x ] YES  [ ] NO  Care Discussed with Consultants/Other Providers [ x] YES  [ ] NO    LABS:      RADIOLOGY & ADDITIONAL TESTS:    Imaging Personally Reviewed:  [ ] YES  [ ] NO  acetaminophen     Tablet .. 650 milliGRAM(s) Oral every 6 hours PRN  acyclovir   Oral Tab/Cap 400 milliGRAM(s) Oral every 12 hours  albuterol/ipratropium for Nebulization 3 milliLiter(s) Nebulizer every 6 hours  aspirin enteric coated 81 milliGRAM(s) Oral daily  atorvastatin 10 milliGRAM(s) Oral at bedtime  budesonide 160 MICROgram(s)/formoterol 4.5 MICROgram(s) Inhaler 2 Puff(s) Inhalation two times a day  cefepime   IVPB 2000 milliGRAM(s) IV Intermittent every 8 hours  cefepime   IVPB      dextrose 5%. 1000 milliLiter(s) IV Continuous <Continuous>  dextrose 5%. 1000 milliLiter(s) IV Continuous <Continuous>  dextrose 50% Injectable 12.5 Gram(s) IV Push once  dextrose 50% Injectable 25 Gram(s) IV Push once  dextrose 50% Injectable 25 Gram(s) IV Push once  dextrose Oral Gel 15 Gram(s) Oral once PRN  ferrous    sulfate 325 milliGRAM(s) Oral daily  glucagon  Injectable 1 milliGRAM(s) IntraMuscular once  insulin glargine Injectable (LANTUS) 15 Unit(s) SubCutaneous at bedtime  insulin lispro (ADMELOG) corrective regimen sliding scale   SubCutaneous three times a day before meals  insulin lispro Injectable (ADMELOG) 3 Unit(s) SubCutaneous three times a day before meals  levothyroxine 50 MICROGram(s) Oral daily  melatonin 3 milliGRAM(s) Oral at bedtime PRN  metoprolol succinate ER 25 milliGRAM(s) Oral daily  multivitamin 1 Tablet(s) Oral daily  oxybutynin 5 milliGRAM(s) Oral every 12 hours  pantoprazole    Tablet 40 milliGRAM(s) Oral before breakfast  predniSONE   Tablet 60 milliGRAM(s) Oral daily  sodium chloride 0.65% Nasal 1 Spray(s) Both Nostrils three times a day  sodium chloride 7% Inhalation 4 milliLiter(s) Inhalation every 12 hours  tamsulosin 0.8 milliGRAM(s) Oral daily  trimethoprim / sulfamethoxazole IVPB 320 milliGRAM(s) IV Intermittent every 8 hours      HEALTH ISSUES - PROBLEM Dx:    73-year-old male with a past medical history of CAD x 4 stents followed by Dr. Murillo (last seen as routine 2 weeks ago), history of MI, diabetes, hypertension, hyperlipidemia, hypothyroidism, BPH, and hemolytic anemia followed by hematologist at [facility] on prednisone 80 mg as been tapered down to 60 mg and weekly Rituxan chemotherapy presents for evaluation of shortness of breath that began yesterday associated with weakness in the proximal legs, weakness in legs has since improved but needing oxygen    Assessment    ·	Sepsis and acute hypoxic respiratory failure possibly secondary to pneumonia / pneumonitis in the setting of prior fibrosis (RVP/COVID negative)  ·	CAD sp 4 stents on aspirin  ·	Likely flash pulm edema (now resolved)  ·	Bladder spasms improvement with oxybutynin  ·	Hemolytic anemia on daily prednisone with weekly rituxan therapy  ·	DM  ·	HTN  ·	HLD  ·	Hypothyroidism  ·	BPH    Plan    - f/u blood cultures, CT shows possible new areas of inflammation, neg for PE, c/w cefepime for now as per ID and now bactrim, MRSA nares neg, lactate negative / RVP and covid neg / ID recs appreciated HIV (-) / Cryptococcal (-) / CMV IgG (+) but IgM (-) / Acid fast (-) / Aspergillosis, PJP pending   - c/w aspirin, echo showing EF 52% and areas of hypokinesis / toprol 25 / cardio recs appreciated  - c/w prednisone for now, on acyclovir ppx and cefepime  - not on insulin at home but needing it here, likely from prednisone and infection, increase lantus 20U this evening  - c/w synthroid 50, TSH wnl  - Given dose of furosemide for crackles but refused  - uro follow up appreciated / patient having bladder spasms post jeter, started oxybutynin with improvement      # DVT PPX: holding lovenox as patient is having some bleeding around urethral site and doesn't want to take it, refusing SCDs, discussed with him to keep his legs moving and he's agreeable Progress note    INTERVAL HPI/OVERNIGHT EVENTS:    Patient seen and examined at bedside. He states he feels some shortness of breath.      REVIEW OF SYSTEMS:  All other 13 Review of systems were reviewed and are negative    FAMILY HISTORY:  Family history of myocardial infarction (Mother)      T(C): 35.6 (02-07-24 @ 14:15), Max: 36.7 (02-07-24 @ 04:20)  HR: 87 (02-07-24 @ 14:15) (86 - 99)  BP: 114/56 (02-07-24 @ 14:15) (105/53 - 141/60)  RR: 18 (02-07-24 @ 14:15) (18 - 20)  SpO2: 97% (02-07-24 @ 14:15) (93% - 97%)  Wt(kg): --Vital Signs Last 24 Hrs  T(C): 35.6 (07 Feb 2024 14:15), Max: 36.7 (07 Feb 2024 04:20)  T(F): 96 (07 Feb 2024 14:15), Max: 98 (07 Feb 2024 04:20)  HR: 87 (07 Feb 2024 14:15) (86 - 99)  BP: 114/56 (07 Feb 2024 14:15) (105/53 - 141/60)  BP(mean): --  RR: 18 (07 Feb 2024 14:15) (18 - 20)  SpO2: 97% (07 Feb 2024 14:15) (93% - 97%)    Parameters below as of 07 Feb 2024 14:15  Patient On (Oxygen Delivery Method): nasal cannula  O2 Flow (L/min): 5    penicillin (Rash (Severe))      PHYSICAL EXAM:    General: WN/WD NAD  Neurology: A&Ox3, nonfocal, TIJERINA x 4  Head:  Normocephalic, atraumatic  ENT:  Mucosa moist, no ulcerations  Neck:  Supple, no sinuses or palpable masses  Lymphatic:  No palpable cervical, supraclavicular, axillary or inguinal adenopathy  Respiratory: bilateral bibasilar mild crackles  CV: RRR, S1S2, no murmur  Abdominal: Soft, NT, ND no palpable mass  MSK: No edema, + peripheral pulses  Incisions: intact, no erythema or drainage    Consultant(s) Notes Reviewed:  [x ] YES  [ ] NO  Care Discussed with Consultants/Other Providers [ x] YES  [ ] NO    LABS:      RADIOLOGY & ADDITIONAL TESTS:    Imaging Personally Reviewed:  [ ] YES  [ ] NO  acetaminophen     Tablet .. 650 milliGRAM(s) Oral every 6 hours PRN  acyclovir   Oral Tab/Cap 400 milliGRAM(s) Oral every 12 hours  albuterol/ipratropium for Nebulization 3 milliLiter(s) Nebulizer every 6 hours  aspirin enteric coated 81 milliGRAM(s) Oral daily  atorvastatin 10 milliGRAM(s) Oral at bedtime  budesonide 160 MICROgram(s)/formoterol 4.5 MICROgram(s) Inhaler 2 Puff(s) Inhalation two times a day  cefepime   IVPB 2000 milliGRAM(s) IV Intermittent every 8 hours  cefepime   IVPB      dextrose 5%. 1000 milliLiter(s) IV Continuous <Continuous>  dextrose 5%. 1000 milliLiter(s) IV Continuous <Continuous>  dextrose 50% Injectable 12.5 Gram(s) IV Push once  dextrose 50% Injectable 25 Gram(s) IV Push once  dextrose 50% Injectable 25 Gram(s) IV Push once  dextrose Oral Gel 15 Gram(s) Oral once PRN  ferrous    sulfate 325 milliGRAM(s) Oral daily  glucagon  Injectable 1 milliGRAM(s) IntraMuscular once  insulin glargine Injectable (LANTUS) 15 Unit(s) SubCutaneous at bedtime  insulin lispro (ADMELOG) corrective regimen sliding scale   SubCutaneous three times a day before meals  insulin lispro Injectable (ADMELOG) 3 Unit(s) SubCutaneous three times a day before meals  levothyroxine 50 MICROGram(s) Oral daily  melatonin 3 milliGRAM(s) Oral at bedtime PRN  metoprolol succinate ER 25 milliGRAM(s) Oral daily  multivitamin 1 Tablet(s) Oral daily  oxybutynin 5 milliGRAM(s) Oral every 12 hours  pantoprazole    Tablet 40 milliGRAM(s) Oral before breakfast  predniSONE   Tablet 60 milliGRAM(s) Oral daily  sodium chloride 0.65% Nasal 1 Spray(s) Both Nostrils three times a day  sodium chloride 7% Inhalation 4 milliLiter(s) Inhalation every 12 hours  tamsulosin 0.8 milliGRAM(s) Oral daily  trimethoprim / sulfamethoxazole IVPB 320 milliGRAM(s) IV Intermittent every 8 hours      HEALTH ISSUES - PROBLEM Dx:    73-year-old male with a past medical history of CAD x 4 stents followed by Dr. Murillo (last seen as routine 2 weeks ago), history of MI, diabetes, hypertension, hyperlipidemia, hypothyroidism, BPH, and hemolytic anemia followed by hematologist at [facility] on prednisone 80 mg as been tapered down to 60 mg and weekly Rituxan chemotherapy presents for evaluation of shortness of breath that began yesterday associated with weakness in the proximal legs, weakness in legs has since improved but needing oxygen    Assessment    ·	Sepsis and acute hypoxic respiratory failure possibly secondary to pneumonia / pneumonitis in the setting of prior fibrosis (RVP/COVID negative)  ·	CAD sp 4 stents on aspirin  ·	Likely flash pulm edema (now resolved)  ·	Bladder spasms improvement with oxybutynin  ·	Hemolytic anemia on daily prednisone with weekly rituxan therapy  ·	DM  ·	HTN  ·	HLD  ·	Hypothyroidism  ·	BPH    Plan    - f/u blood cultures, CT shows possible new areas of inflammation, neg for PE, c/w cefepime for now as per ID and now bactrim, MRSA nares neg, lactate negative / RVP and covid neg / ID recs appreciated HIV (-) / Cryptococcal (-) / CMV IgG (+) but IgM (-) / Acid fast (-) / Aspergillosis, PJP pending   - c/w aspirin, echo showing EF 52% and areas of hypokinesis / toprol 25 / cardio recs appreciated  - c/w prednisone for now, on acyclovir ppx and cefepime  - not on insulin at home but needing it here, likely from prednisone and infection, increase lantus 20U this evening  - c/w synthroid 50, TSH wnl  - Given dose of furosemide for crackles but refused  - uro follow up appreciated / patient having bladder spasms post jeter, started oxybutynin with improvement      # DVT PPX: holding lovenox as patient is having some bleeding around urethral site and doesn't want to take it, refusing SCDs, discussed with him to keep his legs moving and he's agreeable

## 2024-02-08 LAB
ANION GAP SERPL CALC-SCNC: 8 MMOL/L — SIGNIFICANT CHANGE UP (ref 7–14)
BUN SERPL-MCNC: 25 MG/DL — HIGH (ref 10–20)
CALCIUM SERPL-MCNC: 8.1 MG/DL — LOW (ref 8.4–10.5)
CHLORIDE SERPL-SCNC: 95 MMOL/L — LOW (ref 98–110)
CO2 SERPL-SCNC: 30 MMOL/L — SIGNIFICANT CHANGE UP (ref 17–32)
CREAT SERPL-MCNC: 1.2 MG/DL — SIGNIFICANT CHANGE UP (ref 0.7–1.5)
CULTURE RESULTS: ABNORMAL
EGFR: 65 ML/MIN/1.73M2 — SIGNIFICANT CHANGE UP
GLUCOSE BLDC GLUCOMTR-MCNC: 156 MG/DL — HIGH (ref 70–99)
GLUCOSE BLDC GLUCOMTR-MCNC: 331 MG/DL — HIGH (ref 70–99)
GLUCOSE BLDC GLUCOMTR-MCNC: 341 MG/DL — HIGH (ref 70–99)
GLUCOSE BLDC GLUCOMTR-MCNC: 383 MG/DL — HIGH (ref 70–99)
GLUCOSE SERPL-MCNC: 235 MG/DL — HIGH (ref 70–99)
HADV DNA FLD NAA+PROBE-LOG#: SIGNIFICANT CHANGE UP COPIES/ML
HCT VFR BLD CALC: 29.2 % — LOW (ref 42–52)
HGB BLD-MCNC: 10.6 G/DL — LOW (ref 14–18)
MCHC RBC-ENTMCNC: 35.9 PG — HIGH (ref 27–31)
MCHC RBC-ENTMCNC: 36.3 G/DL — SIGNIFICANT CHANGE UP (ref 32–37)
MCV RBC AUTO: 99 FL — HIGH (ref 80–94)
NRBC # BLD: 0 /100 WBCS — SIGNIFICANT CHANGE UP (ref 0–0)
PLATELET # BLD AUTO: 170 K/UL — SIGNIFICANT CHANGE UP (ref 130–400)
PMV BLD: 10.3 FL — SIGNIFICANT CHANGE UP (ref 7.4–10.4)
POTASSIUM SERPL-MCNC: 4.1 MMOL/L — SIGNIFICANT CHANGE UP (ref 3.5–5)
POTASSIUM SERPL-SCNC: 4.1 MMOL/L — SIGNIFICANT CHANGE UP (ref 3.5–5)
RBC # BLD: 2.95 M/UL — LOW (ref 4.7–6.1)
RBC # FLD: 14.1 % — SIGNIFICANT CHANGE UP (ref 11.5–14.5)
SODIUM SERPL-SCNC: 133 MMOL/L — LOW (ref 135–146)
SPECIMEN SOURCE: SIGNIFICANT CHANGE UP
WBC # BLD: 10.69 K/UL — SIGNIFICANT CHANGE UP (ref 4.8–10.8)
WBC # FLD AUTO: 10.69 K/UL — SIGNIFICANT CHANGE UP (ref 4.8–10.8)

## 2024-02-08 PROCEDURE — 99232 SBSQ HOSP IP/OBS MODERATE 35: CPT

## 2024-02-08 PROCEDURE — 51703 INSERT BLADDER CATH COMPLEX: CPT

## 2024-02-08 RX ORDER — SENNA PLUS 8.6 MG/1
2 TABLET ORAL AT BEDTIME
Refills: 0 | Status: DISCONTINUED | OUTPATIENT
Start: 2024-02-08 | End: 2024-02-12

## 2024-02-08 RX ORDER — CEFEPIME 1 G/1
2000 INJECTION, POWDER, FOR SOLUTION INTRAMUSCULAR; INTRAVENOUS EVERY 12 HOURS
Refills: 0 | Status: DISCONTINUED | OUTPATIENT
Start: 2024-02-09 | End: 2024-02-10

## 2024-02-08 RX ORDER — INSULIN LISPRO 100/ML
5 VIAL (ML) SUBCUTANEOUS
Refills: 0 | Status: DISCONTINUED | OUTPATIENT
Start: 2024-02-08 | End: 2024-02-12

## 2024-02-08 RX ADMIN — Medication 5 MILLIGRAM(S): at 14:45

## 2024-02-08 RX ADMIN — BUDESONIDE AND FORMOTEROL FUMARATE DIHYDRATE 2 PUFF(S): 160; 4.5 AEROSOL RESPIRATORY (INHALATION) at 21:47

## 2024-02-08 RX ADMIN — Medication 5 MILLIGRAM(S): at 17:11

## 2024-02-08 RX ADMIN — PANTOPRAZOLE SODIUM 40 MILLIGRAM(S): 20 TABLET, DELAYED RELEASE ORAL at 06:32

## 2024-02-08 RX ADMIN — ATORVASTATIN CALCIUM 10 MILLIGRAM(S): 80 TABLET, FILM COATED ORAL at 21:47

## 2024-02-08 RX ADMIN — Medication 1 TABLET(S): at 11:23

## 2024-02-08 RX ADMIN — Medication 5 MILLIGRAM(S): at 06:32

## 2024-02-08 RX ADMIN — SENNA PLUS 2 TABLET(S): 8.6 TABLET ORAL at 22:39

## 2024-02-08 RX ADMIN — Medication 520 MILLIGRAM(S): at 21:49

## 2024-02-08 RX ADMIN — Medication 1 SPRAY(S): at 14:46

## 2024-02-08 RX ADMIN — Medication 400 MILLIGRAM(S): at 11:06

## 2024-02-08 RX ADMIN — Medication 60 MILLIGRAM(S): at 06:32

## 2024-02-08 RX ADMIN — Medication 1 SPRAY(S): at 06:31

## 2024-02-08 RX ADMIN — Medication 400 MILLIGRAM(S): at 21:47

## 2024-02-08 RX ADMIN — Medication 5 UNIT(S): at 17:12

## 2024-02-08 RX ADMIN — Medication 3 UNIT(S): at 08:24

## 2024-02-08 RX ADMIN — Medication 325 MILLIGRAM(S): at 11:23

## 2024-02-08 RX ADMIN — CEFEPIME 100 MILLIGRAM(S): 1 INJECTION, POWDER, FOR SOLUTION INTRAMUSCULAR; INTRAVENOUS at 06:32

## 2024-02-08 RX ADMIN — INSULIN GLARGINE 20 UNIT(S): 100 INJECTION, SOLUTION SUBCUTANEOUS at 21:52

## 2024-02-08 RX ADMIN — Medication 1 SPRAY(S): at 21:51

## 2024-02-08 RX ADMIN — TAMSULOSIN HYDROCHLORIDE 0.8 MILLIGRAM(S): 0.4 CAPSULE ORAL at 11:23

## 2024-02-08 RX ADMIN — Medication 81 MILLIGRAM(S): at 11:23

## 2024-02-08 RX ADMIN — Medication 3 MILLILITER(S): at 13:21

## 2024-02-08 RX ADMIN — CEFEPIME 100 MILLIGRAM(S): 1 INJECTION, POWDER, FOR SOLUTION INTRAMUSCULAR; INTRAVENOUS at 14:44

## 2024-02-08 RX ADMIN — Medication 520 MILLIGRAM(S): at 14:45

## 2024-02-08 RX ADMIN — Medication 520 MILLIGRAM(S): at 06:32

## 2024-02-08 RX ADMIN — Medication 25 MILLIGRAM(S): at 06:32

## 2024-02-08 RX ADMIN — Medication 50 MICROGRAM(S): at 06:34

## 2024-02-08 RX ADMIN — Medication 1200 MILLIGRAM(S): at 17:11

## 2024-02-08 RX ADMIN — Medication 650 MILLIGRAM(S): at 06:35

## 2024-02-08 RX ADMIN — Medication 3 MILLILITER(S): at 07:20

## 2024-02-08 RX ADMIN — BUDESONIDE AND FORMOTEROL FUMARATE DIHYDRATE 2 PUFF(S): 160; 4.5 AEROSOL RESPIRATORY (INHALATION) at 08:25

## 2024-02-08 RX ADMIN — Medication 10: at 11:24

## 2024-02-08 RX ADMIN — Medication 8: at 17:11

## 2024-02-08 RX ADMIN — Medication 5 UNIT(S): at 11:25

## 2024-02-08 RX ADMIN — SODIUM CHLORIDE 4 MILLILITER(S): 9 INJECTION INTRAMUSCULAR; INTRAVENOUS; SUBCUTANEOUS at 19:26

## 2024-02-08 RX ADMIN — Medication 8: at 08:24

## 2024-02-08 RX ADMIN — Medication 3 MILLILITER(S): at 19:25

## 2024-02-08 RX ADMIN — SODIUM CHLORIDE 4 MILLILITER(S): 9 INJECTION INTRAMUSCULAR; INTRAVENOUS; SUBCUTANEOUS at 10:19

## 2024-02-08 NOTE — PHARMACOTHERAPY INTERVENTION NOTE - COMMENTS
Recommended increasing acyclovir dose to 400mg PO q12h given indication of viral prophylaxis. 
Modified penicillin allergy history to state that patient tolerated cefepime during this admission. 
Recommended decreasing cefepime dose to 2g IV q12h since renal function has worsened, and CrCl is now ~56.5 mL/min. 
Dr. Martin would like to change PO atovaquone to IV sulfamethoxazole-trimethoprim for the treatment of suspected PJP pneumonia. Recommended a dose of 320mg trimethoprim component (~3.7mg/kg) IV q8h, as there is data to support giving a maximum dose of 320mg IV q8h (equivalent to 2 double strength tablets PO q8h).

## 2024-02-08 NOTE — PHYSICAL THERAPY INITIAL EVALUATION ADULT - NAME OF DISCHARGE PLANNER
58yoM hx HTN, ESRD on HD M, W, F, GERD presents to ED requesting dialysis. Pt states that he "never wants to go back" to his dialysis center in "[facility]". Pt states he usually gets his dialysis treatments M/W/F. Pt came to the ED on Monday for dialysis treatment. Pt has no active complaints at this moment. VSS. Physical exam unremarkable. Clinically stable. Pt will 58yoM hx HTN, ESRD on HD M, W, F, GERD presents to ED requesting dialysis. Pt states that he "never wants to go back" to his dialysis center [facility] [place] Pt states he usually gets his dialysis treatments M/W/F. Pt came to the ED on Monday for dialysis treatment. Pt has no active complaints at this moment. VSS. Physical exam unremarkable. Clinically stable. Draw labs. Pt likely admission for dialysis. Ron

## 2024-02-08 NOTE — PROGRESS NOTE ADULT - SUBJECTIVE AND OBJECTIVE BOX
Patient is a 73y old  Male who presents with a chief complaint of hypoxia (07 Feb 2024 16:33)      T(F): 96.8 (02-08-24 @ 05:00), Max: 98 (02-07-24 @ 20:18)  HR: 63 (02-08-24 @ 05:00)  BP: 113/74 (02-08-24 @ 05:00)  RR: 18 (02-08-24 @ 05:00)  SpO2: 96% (02-08-24 @ 05:00) (96% - 97%)    PHYSICAL EXAM:  GENERAL: NAD  HEAD:  Atraumatic, Normocephalic  EYES: EOMI, PERRLA, conjunctiva and sclera clear  NERVOUS SYSTEM:  Alert & Oriented X3, no focal deficits   CHEST/LUNG: Clear to percussion bilaterally; No rales, rhonchi, wheezing, or rubs  HEART: Regular rate and rhythm; No murmurs, rubs, or gallops  ABDOMEN: Soft, Nontender, Nondistended; Bowel sounds present  EXTREMITIES:  2+ Peripheral Pulses, No clubbing, cyanosis, or edema    LABS  02-08    133<L>  |  95<L>  |  25<H>  ----------------------------<  235<H>  4.1   |  30  |  1.2    Ca    8.1<L>      08 Feb 2024 07:14  Mg     1.9     02-07    TPro  5.2<L>  /  Alb  3.5  /  TBili  0.7  /  DBili  x   /  AST  19  /  ALT  22  /  AlkPhos  59  02-07                          10.6   10.69 )-----------( 170      ( 08 Feb 2024 07:14 )             29.2         CARDIAC ENZYMES          Culture Results:   Normal Respiratory Queta present (02-06-24)  Culture Results:   No growth at 72 Hours (02-04-24)  Culture Results:   50,000 - 99,000 CFU/mL Proteus mirabilis (02-04-24)  Culture Results:   No growth at 72 Hours (02-04-24)  Culture Results:   No growth at 72 Hours (02-04-24)    RADIOLOGY    MEDICATIONS  (STANDING):  acyclovir   Oral Tab/Cap 400 milliGRAM(s) Oral every 12 hours  albuterol/ipratropium for Nebulization 3 milliLiter(s) Nebulizer every 6 hours  aspirin enteric coated 81 milliGRAM(s) Oral daily  atorvastatin 10 milliGRAM(s) Oral at bedtime  budesonide 160 MICROgram(s)/formoterol 4.5 MICROgram(s) Inhaler 2 Puff(s) Inhalation two times a day  cefepime   IVPB 2000 milliGRAM(s) IV Intermittent every 8 hours  cefepime   IVPB      dextrose 5%. 1000 milliLiter(s) (100 mL/Hr) IV Continuous <Continuous>  dextrose 5%. 1000 milliLiter(s) (50 mL/Hr) IV Continuous <Continuous>  dextrose 50% Injectable 12.5 Gram(s) IV Push once  dextrose 50% Injectable 25 Gram(s) IV Push once  dextrose 50% Injectable 25 Gram(s) IV Push once  ferrous    sulfate 325 milliGRAM(s) Oral daily  glucagon  Injectable 1 milliGRAM(s) IntraMuscular once  guaiFENesin ER 1200 milliGRAM(s) Oral every 12 hours  insulin glargine Injectable (LANTUS) 20 Unit(s) SubCutaneous at bedtime  insulin lispro (ADMELOG) corrective regimen sliding scale   SubCutaneous three times a day before meals  insulin lispro Injectable (ADMELOG) 3 Unit(s) SubCutaneous three times a day before meals  levothyroxine 50 MICROGram(s) Oral daily  metoprolol succinate ER 25 milliGRAM(s) Oral daily  multivitamin 1 Tablet(s) Oral daily  oxybutynin 5 milliGRAM(s) Oral every 12 hours  pantoprazole    Tablet 40 milliGRAM(s) Oral before breakfast  predniSONE   Tablet 60 milliGRAM(s) Oral daily  sodium chloride 0.65% Nasal 1 Spray(s) Both Nostrils three times a day  sodium chloride 7% Inhalation 4 milliLiter(s) Inhalation every 12 hours  tamsulosin 0.8 milliGRAM(s) Oral daily  trimethoprim / sulfamethoxazole IVPB 320 milliGRAM(s) IV Intermittent every 8 hours    MEDICATIONS  (PRN):  acetaminophen     Tablet .. 650 milliGRAM(s) Oral every 6 hours PRN Temp greater or equal to 38C (100.4F), Mild Pain (1 - 3)  dextrose Oral Gel 15 Gram(s) Oral once PRN Blood Glucose LESS THAN 70 milliGRAM(s)/deciliter  melatonin 3 milliGRAM(s) Oral at bedtime PRN Insomnia      Patient is a 73y old  Male who presents with a chief complaint of hypoxia (07 Feb 2024 16:33)      T(F): 96.8 (02-08-24 @ 05:00), Max: 98 (02-07-24 @ 20:18)  HR: 63 (02-08-24 @ 05:00)  BP: 113/74 (02-08-24 @ 05:00)  RR: 18 (02-08-24 @ 05:00)  SpO2: 96% (02-08-24 @ 05:00) (96% - 97%)    PHYSICAL EXAM:  GENERAL: NAD  HEAD:  Atraumatic, Normocephalic  EYES: EOMI, PERRLA, conjunctiva and sclera clear  NERVOUS SYSTEM:  Alert & Oriented X3, no focal deficits   CHEST/LUNG: Clear to percussion bilaterally; No rales, rhonchi, wheezing, or rubs  HEART: Regular rate and rhythm; No murmurs, rubs, or gallops  ABDOMEN: Soft, Nontender, Nondistended; Bowel sounds present  EXTREMITIES:  2+ Peripheral Pulses, No clubbing, cyanosis, or edema    RADIOLOGY  ACC: [id number] EXAM:  XR CHEST PORTABLE IMMED 1V   ORDERED BY: ABBY WALTON     PROCEDURE DATE:  02/06/2024          INTERPRETATION:  Clinical History / Reason for exam: Chest pain.    Comparison : Chest radiograph and CTA chest 2/4/2024.    Technique/Positioning: Frontal chest radiograph.    Findings:    Support devices: None.    Cardiac/mediastinum/hilum: Unchanged    Lung parenchyma/Pleura: Increased diffuse bilateral opacities. No   pneumothorax.    Skeleton/soft tissues: Unchanged    Impression:    Increased diffuse bilateral opacities.        LABS  02-08    133<L>  |  95<L>  |  25<H>  ----------------------------<  235<H>  4.1   |  30  |  1.2    Ca    8.1<L>      08 Feb 2024 07:14  Mg     1.9     02-07    TPro  5.2<L>  /  Alb  3.5  /  TBili  0.7  /  DBili  x   /  AST  19  /  ALT  22  /  AlkPhos  59  02-07                          10.6   10.69 )-----------( 170      ( 08 Feb 2024 07:14 )             29.2         CARDIAC ENZYMES          Culture Results:   Normal Respiratory Queta present (02-06-24)  Culture Results:   No growth at 72 Hours (02-04-24)  Culture Results:   50,000 - 99,000 CFU/mL Proteus mirabilis (02-04-24)  Culture Results:   No growth at 72 Hours (02-04-24)  Culture Results:   No growth at 72 Hours (02-04-24)        MEDICATIONS  (STANDING):  acyclovir   Oral Tab/Cap 400 milliGRAM(s) Oral every 12 hours  albuterol/ipratropium for Nebulization 3 milliLiter(s) Nebulizer every 6 hours  aspirin enteric coated 81 milliGRAM(s) Oral daily  atorvastatin 10 milliGRAM(s) Oral at bedtime  budesonide 160 MICROgram(s)/formoterol 4.5 MICROgram(s) Inhaler 2 Puff(s) Inhalation two times a day  cefepime   IVPB 2000 milliGRAM(s) IV Intermittent every 8 hours  cefepime   IVPB      dextrose 5%. 1000 milliLiter(s) (100 mL/Hr) IV Continuous <Continuous>  dextrose 5%. 1000 milliLiter(s) (50 mL/Hr) IV Continuous <Continuous>  dextrose 50% Injectable 12.5 Gram(s) IV Push once  dextrose 50% Injectable 25 Gram(s) IV Push once  dextrose 50% Injectable 25 Gram(s) IV Push once  ferrous    sulfate 325 milliGRAM(s) Oral daily  glucagon  Injectable 1 milliGRAM(s) IntraMuscular once  guaiFENesin ER 1200 milliGRAM(s) Oral every 12 hours  insulin glargine Injectable (LANTUS) 20 Unit(s) SubCutaneous at bedtime  insulin lispro (ADMELOG) corrective regimen sliding scale   SubCutaneous three times a day before meals  insulin lispro Injectable (ADMELOG) 3 Unit(s) SubCutaneous three times a day before meals  levothyroxine 50 MICROGram(s) Oral daily  metoprolol succinate ER 25 milliGRAM(s) Oral daily  multivitamin 1 Tablet(s) Oral daily  oxybutynin 5 milliGRAM(s) Oral every 12 hours  pantoprazole    Tablet 40 milliGRAM(s) Oral before breakfast  predniSONE   Tablet 60 milliGRAM(s) Oral daily  sodium chloride 0.65% Nasal 1 Spray(s) Both Nostrils three times a day  sodium chloride 7% Inhalation 4 milliLiter(s) Inhalation every 12 hours  tamsulosin 0.8 milliGRAM(s) Oral daily  trimethoprim / sulfamethoxazole IVPB 320 milliGRAM(s) IV Intermittent every 8 hours    MEDICATIONS  (PRN):  acetaminophen     Tablet .. 650 milliGRAM(s) Oral every 6 hours PRN Temp greater or equal to 38C (100.4F), Mild Pain (1 - 3)  dextrose Oral Gel 15 Gram(s) Oral once PRN Blood Glucose LESS THAN 70 milliGRAM(s)/deciliter  melatonin 3 milliGRAM(s) Oral at bedtime PRN Insomnia      Patient is a 73y old  Male who presents with a chief complaint of hypoxia (07 Feb 2024 16:33)    Patient seen and examined at bedside. He does appear slightly short of breath. He is on 5L oxygen saturation 95-97%. He wants to up out of bed to ambulate today.      T(F): 96.8 (02-08-24 @ 05:00), Max: 98 (02-07-24 @ 20:18)  HR: 63 (02-08-24 @ 05:00)  BP: 113/74 (02-08-24 @ 05:00)  RR: 18 (02-08-24 @ 05:00)  SpO2: 96% (02-08-24 @ 05:00) (96% - 97%)    PHYSICAL EXAM:  GENERAL: NAD  HEAD:  Atraumatic, Normocephalic  EYES: EOMI, PERRLA, conjunctiva and sclera clear  NERVOUS SYSTEM:  Alert & Oriented X3, no focal deficits   CHEST/LUNG: Clear to percussion bilaterally; coarse lung sounds  HEART: Regular rate and rhythm; No murmurs, rubs, or gallops  ABDOMEN: Soft, Nontender, Nondistended; Bowel sounds present  EXTREMITIES:  2+ Peripheral Pulses, No clubbing, cyanosis, or edema    RADIOLOGY  ACC: [id number] EXAM:  XR CHEST PORTABLE IMMED 1V       PROCEDURE DATE:  02/06/2024          INTERPRETATION:  Clinical History / Reason for exam: Chest pain.    Comparison : Chest radiograph and CTA chest 2/4/2024.    Technique/Positioning: Frontal chest radiograph.    Findings:    Support devices: None.    Cardiac/mediastinum/hilum: Unchanged    Lung parenchyma/Pleura: Increased diffuse bilateral opacities. No   pneumothorax.    Skeleton/soft tissues: Unchanged    Impression:    Increased diffuse bilateral opacities.        LABS  02-08    133<L>  |  95<L>  |  25<H>  ----------------------------<  235<H>  4.1   |  30  |  1.2    Ca    8.1<L>      08 Feb 2024 07:14  Mg     1.9     02-07    TPro  5.2<L>  /  Alb  3.5  /  TBili  0.7  /  DBili  x   /  AST  19  /  ALT  22  /  AlkPhos  59  02-07                          10.6   10.69 )-----------( 170      ( 08 Feb 2024 07:14 )             29.2         CARDIAC ENZYMES          Culture Results:   Normal Respiratory Queta present (02-06-24)  Culture Results:   No growth at 72 Hours (02-04-24)  Culture Results:   50,000 - 99,000 CFU/mL Proteus mirabilis (02-04-24)  Culture Results:   No growth at 72 Hours (02-04-24)  Culture Results:   No growth at 72 Hours (02-04-24)        MEDICATIONS  (STANDING):  acyclovir   Oral Tab/Cap 400 milliGRAM(s) Oral every 12 hours  albuterol/ipratropium for Nebulization 3 milliLiter(s) Nebulizer every 6 hours  aspirin enteric coated 81 milliGRAM(s) Oral daily  atorvastatin 10 milliGRAM(s) Oral at bedtime  budesonide 160 MICROgram(s)/formoterol 4.5 MICROgram(s) Inhaler 2 Puff(s) Inhalation two times a day  cefepime   IVPB 2000 milliGRAM(s) IV Intermittent every 8 hours  cefepime   IVPB      dextrose 5%. 1000 milliLiter(s) (100 mL/Hr) IV Continuous <Continuous>  dextrose 5%. 1000 milliLiter(s) (50 mL/Hr) IV Continuous <Continuous>  dextrose 50% Injectable 12.5 Gram(s) IV Push once  dextrose 50% Injectable 25 Gram(s) IV Push once  dextrose 50% Injectable 25 Gram(s) IV Push once  ferrous    sulfate 325 milliGRAM(s) Oral daily  glucagon  Injectable 1 milliGRAM(s) IntraMuscular once  guaiFENesin ER 1200 milliGRAM(s) Oral every 12 hours  insulin glargine Injectable (LANTUS) 20 Unit(s) SubCutaneous at bedtime  insulin lispro (ADMELOG) corrective regimen sliding scale   SubCutaneous three times a day before meals  insulin lispro Injectable (ADMELOG) 3 Unit(s) SubCutaneous three times a day before meals  levothyroxine 50 MICROGram(s) Oral daily  metoprolol succinate ER 25 milliGRAM(s) Oral daily  multivitamin 1 Tablet(s) Oral daily  oxybutynin 5 milliGRAM(s) Oral every 12 hours  pantoprazole    Tablet 40 milliGRAM(s) Oral before breakfast  predniSONE   Tablet 60 milliGRAM(s) Oral daily  sodium chloride 0.65% Nasal 1 Spray(s) Both Nostrils three times a day  sodium chloride 7% Inhalation 4 milliLiter(s) Inhalation every 12 hours  tamsulosin 0.8 milliGRAM(s) Oral daily  trimethoprim / sulfamethoxazole IVPB 320 milliGRAM(s) IV Intermittent every 8 hours    MEDICATIONS  (PRN):  acetaminophen     Tablet .. 650 milliGRAM(s) Oral every 6 hours PRN Temp greater or equal to 38C (100.4F), Mild Pain (1 - 3)  dextrose Oral Gel 15 Gram(s) Oral once PRN Blood Glucose LESS THAN 70 milliGRAM(s)/deciliter  melatonin 3 milliGRAM(s) Oral at bedtime PRN Insomnia

## 2024-02-08 NOTE — PHYSICAL THERAPY INITIAL EVALUATION ADULT - GENERAL OBSERVATIONS, REHAB EVAL
10:50-11:20 Chart reviewed. Pt encountered sitting in chair, may be seen by Physical Therapist as confirmed with Nurse. Patient denied pain at rest and ready to walk but "gets out of breath'; +O2 via NC/ tele/ heplock

## 2024-02-08 NOTE — PROGRESS NOTE ADULT - ASSESSMENT
HEALTH ISSUES - PROBLEM Dx:    73-year-old male with a past medical history of CAD x 4 stents followed by Dr. Murillo (last seen as routine 2 weeks ago), history of MI, diabetes, hypertension, hyperlipidemia, hypothyroidism, BPH, and hemolytic anemia followed by hematologist at [facility] on prednisone 80 mg as been tapered down to 60 mg and weekly Rituxan chemotherapy presents for evaluation of shortness of breath that began yesterday associated with weakness in the proximal legs, weakness in legs has since improved but needing oxygen    Assessment    Sepsis and acute hypoxic respiratory failure possibly secondary to pneumonia / pneumonitis in the setting of prior fibrosis (RVP/COVID negative)  CAD sp 4 stents on aspirin  Likely flash pulm edema (now resolved)  Bladder spasms improvement with oxybutynin  Hemolytic anemia on daily prednisone with weekly rituxan therapy  DM  HTN  HLD  Hypothyroidism  BPH    Plan    - f/u blood cultures, CT shows possible new areas of inflammation, neg for PE, c/w cefepime for now as per ID and now bactrim, MRSA nares neg, lactate negative / RVP and covid neg / ID recs appreciated HIV (-) / Cryptococcal (-) / CMV IgG (+) but IgM (-) / Acid fast (-) / Aspergillosis, PJP pending   - c/w aspirin, echo showing EF 52% and areas of hypokinesis / toprol 25 / cardio recs appreciated  - c/w prednisone for now, on acyclovir ppx and cefepime  - not on insulin at home but needing it here, likely from prednisone and infection, increase lantus 20U this evening/ adding 5U QAC for further glucose control  - c/w synthroid 50, TSH wnl  - Given dose of furosemide for crackles but refused  - uro follow up appreciated / patient having bladder spasms post jeter, started oxybutynin with improvement  -Discontinue jeter catheter  -95% oxygen saturation on 5LNC, taper down as tolerated  -PT eval, OOB to chair- ambulate as jolly  -Patient updated with plan of care and current status      # DVT PPX: holding lovenox as patient is having some bleeding around urethral site and doesn't want to take it, refusing SCDs, discussed with him to keep his legs moving and he's agreeable   HEALTH ISSUES - PROBLEM Dx:    73-year-old male with a past medical history of CAD x 4 stents followed by Dr. Murillo (last seen as routine 2 weeks ago), history of MI, diabetes, hypertension, hyperlipidemia, hypothyroidism, BPH, and hemolytic anemia followed by hematologist at [facility] on prednisone 80 mg as been tapered down to 60 mg and weekly Rituxan chemotherapy presents for evaluation of shortness of breath that began yesterday associated with weakness in the proximal legs, weakness in legs has since improved but needing oxygen    Assessment    Sepsis and acute hypoxic respiratory failure possibly secondary to pneumonia / pneumonitis in the setting of prior fibrosis (RVP/COVID negative)  CAD sp 4 stents on aspirin  Likely flash pulm edema (now resolved)  Bladder spasms improvement with oxybutynin  Hemolytic anemia on daily prednisone with weekly rituxan therapy  DM  HTN  HLD  Hypothyroidism  BPH    Plan    - f/u blood cultures, CT shows possible new areas of inflammation, neg for PE, c/w cefepime/bactrim/Acyclovir (for ppx) per ID, MRSA nares neg, lactate negative / RVP and covid neg / HIV (-) / Cryptococcal (-) / CMV IgG (+) but IgM (-) / Acid fast (-) / Aspergillosis, adenovirus, fungitell, PJP pending   - c/w aspirin, echo showing EF 52% and areas of hypokinesis / toprol 25 / cardio recs appreciated  - c/w prednisone for now  - not on insulin at home but needing it here, likely from prednisone and infection, c/w lantus 20U in hs, c/w 5U TID w/ meals and SSI  - c/w synthroid 50, TSH wnl  - CxR in am  - uro follow up appreciated / patient having bladder spasms post jeter, started oxybutynin with improvement  -5LNC for now, taper down as tolerated  -PT eval, OOB to chair- ambulate as jolly    # DVT PPX: holding lovenox as patient is having some bleeding around urethral site and doesn't want to take it, refusing SCDs, discussed with him to keep his legs moving and he's agreeable   HEALTH ISSUES - PROBLEM Dx:    73-year-old male with a past medical history of CAD x 4 stents followed by Dr. Murillo (last seen as routine 2 weeks ago), history of MI, diabetes, hypertension, hyperlipidemia, hypothyroidism, BPH, and hemolytic anemia followed by hematologist at [facility] on prednisone 80 mg as been tapered down to 60 mg and weekly Rituxan chemotherapy presents for evaluation of shortness of breath that began yesterday associated with weakness in the proximal legs, weakness in legs has since improved but needing oxygen    Assessment    Sepsis and acute hypoxic respiratory failure possibly secondary to pneumonia / pneumonitis in the setting of prior fibrosis (RVP/COVID negative)  CAD sp 4 stents on aspirin  Likely flash pulm edema (now resolved)  Bladder spasms improvement with oxybutynin  Hemolytic anemia on daily prednisone with weekly rituxan therapy  DM  HTN  HLD  Hypothyroidism  BPH    Plan    - f/u blood cultures, CT shows possible new areas of inflammation, neg for PE, c/w cefepime/bactrim/Acyclovir (for ppx) per ID, MRSA nares neg, lactate negative / RVP and covid neg / HIV (-) / Cryptococcal (-) / CMV IgG (+) but IgM (-) / Acid fast (-) / Aspergillosis, adenovirus, fungitell, PJP pending   - c/w aspirin, echo showing EF 52% and areas of hypokinesis / toprol 25 / cardio recs appreciated  - Patient spoke with his oncologist, instructed to take 40mg prednisone  - not on insulin at home but needing it here, likely from prednisone and infection, c/w lantus 20U in hs, c/w 5U TID w/ meals and SSI  - c/w synthroid 50, TSH wnl  - CxR in am  - uro follow up appreciated / patient having bladder spasms post jeter, started oxybutynin with improvement  -5LNC for now, taper down as tolerated  -PT eval, OOB to chair- ambulate as jolly    # DVT PPX: holding lovenox as patient is having some bleeding around urethral site and doesn't want to take it, refusing SCDs, discussed with him to keep his legs moving and he's agreeable

## 2024-02-08 NOTE — PHYSICAL THERAPY INITIAL EVALUATION ADULT - ADDITIONAL COMMENTS
Per patient, they live in private home with 3 steps outside with 2 wide rails, has 15 steps inside with (L) rail going up that also has a stairlift ; was still driving and not using any assistive device to ambulate

## 2024-02-08 NOTE — PROCEDURE NOTE - NSPROCDETAILS_GEN_ALL_CORE
sterile technique, indwelling urinary device inserted/a urinary catheter insertion kit was used for all insertion materials
sterile technique, indwelling urinary device inserted/a urinary catheter insertion kit was used for all insertion materials

## 2024-02-08 NOTE — PHARMACOTHERAPY INTERVENTION NOTE - NSPHARMCOMMASP
ASP - Dose optimization/Non-Renal dose adjustment
ASP - Renal dose adjustment
ASP - Dose optimization/Non-Renal dose adjustment

## 2024-02-08 NOTE — PHYSICAL THERAPY INITIAL EVALUATION ADULT - ASSISTIVE DEVICE FOR TRANSFER: STAND/SIT, REHAB EVAL
Call primary care provider for follow up after discharge/Monitor weight daily/Activities as tolerated/Low salt diet/Report signs and symptoms to primary care provider
bedrail or arms of chair

## 2024-02-08 NOTE — PHYSICAL THERAPY INITIAL EVALUATION ADULT - PERTINENT HX OF CURRENT PROBLEM, REHAB EVAL
72 y/o male admitted with diagnosis of Shortness of breath, presented to ED for SOB, associated with leg weakness, notice O2 dropping to 92% at home so he started using O2, (-) PE on CTA chest, bilateral ground glass opacities likely due to prior Covid

## 2024-02-08 NOTE — PROGRESS NOTE ADULT - SUBJECTIVE AND OBJECTIVE BOX
ANDREE BUSH  73y, Male    LOS  4d    INTERVAL EVENTS/HPI  - No acute events overnight  - T(F): , Max: 98 (02-07-24 @ 20:18)  - Denies any worsening symptoms  - Tolerating medication  - WBC Count: 10.69 (02-08-24 @ 07:14)  WBC Count: 14.09 (02-07-24 @ 07:26)  - Creatinine: 1.2 (02-08-24 @ 07:14)  Creatinine: 0.9 (02-07-24 @ 07:26)    REVIEW OF SYSTEMS:  CONSTITUTIONAL: No fever or chills  HEENT: No sore throat  RESPIRATORY: No cough, no shortness of breath  CARDIOVASCULAR: No chest pain or palpitations  GASTROINTESTINAL: No abdominal or epigastric pain  GENITOURINARY: No dysuria  NEUROLOGICAL: No headache/dizziness  MSK: No joint pain, erythema, or swelling; no back pain  SKIN: No itching, rashes  All other ROS negative except noted above    Prior hospital charts reviewed [Yes]  Primary team notes reviewed [Yes]  Other consultant notes reviewed [Yes]    ANTIMICROBIALS:   acyclovir   Oral Tab/Cap 400 every 12 hours  cefepime   IVPB    cefepime   IVPB 2000 every 8 hours  trimethoprim / sulfamethoxazole IVPB 320 every 8 hours      OTHER MEDS: MEDICATIONS  (STANDING):  acetaminophen     Tablet .. 650 every 6 hours PRN  albuterol/ipratropium for Nebulization 3 every 6 hours  aspirin enteric coated 81 daily  atorvastatin 10 at bedtime  bisacodyl 5 every 12 hours PRN  budesonide 160 MICROgram(s)/formoterol 4.5 MICROgram(s) Inhaler 2 two times a day  dextrose 50% Injectable 12.5 once  dextrose 50% Injectable 25 once  dextrose 50% Injectable 25 once  dextrose Oral Gel 15 once PRN  glucagon  Injectable 1 once  guaiFENesin ER 1200 every 12 hours  insulin glargine Injectable (LANTUS) 20 at bedtime  insulin lispro (ADMELOG) corrective regimen sliding scale  three times a day before meals  insulin lispro Injectable (ADMELOG) 5 before dinner  insulin lispro Injectable (ADMELOG) 5 before lunch  insulin lispro Injectable (ADMELOG) 5 before breakfast  levothyroxine 50 daily  melatonin 3 at bedtime PRN  metoprolol succinate ER 25 daily  oxybutynin 5 every 12 hours  pantoprazole    Tablet 40 before breakfast  senna 2 at bedtime  sodium chloride 7% Inhalation 4 every 12 hours  tamsulosin 0.8 daily      Vital Signs Last 24 Hrs  T(F): 96.8 (02-08-24 @ 05:00), Max: 101.3 (02-04-24 @ 07:35)    Vital Signs Last 24 Hrs  HR: 63 (02-08-24 @ 05:00) (54 - 94)  BP: 113/74 (02-08-24 @ 05:00) (95/51 - 114/56)  RR: 18 (02-08-24 @ 05:00)  SpO2: 96% (02-08-24 @ 05:00) (96% - 97%)  Wt(kg): --    EXAM:  GENERAL: NAD, on Venti Mask.   HEAD: No head lesions  NECK: Supple, nontender to palpation  CHEST/LUNG: Shallow breath sounds.   HEART: S1 S2  ABDOMEN: Soft, nontender, nondistended.  EXTREMITIES: No clubbing, cyanosis, or petal edema  NERVOUS SYSTEM: Alert and oriented to person, time, place and situation, speech clear. No focal deficits   MSK: No joint erythema, swelling or pain  SKIN: No rashes or lesions, no superficial thrombophlebitis    Labs:                        10.6   10.69 )-----------( 170      ( 08 Feb 2024 07:14 )             29.2     02-08    133<L>  |  95<L>  |  25<H>  ----------------------------<  235<H>  4.1   |  30  |  1.2    Ca    8.1<L>      08 Feb 2024 07:14  Mg     1.9     02-07    TPro  5.2<L>  /  Alb  3.5  /  TBili  0.7  /  DBili  x   /  AST  19  /  ALT  22  /  AlkPhos  59  02-07      WBC Trend:  WBC Count: 10.69 (02-08-24 @ 07:14)  WBC Count: 14.09 (02-07-24 @ 07:26)  WBC Count: 12.46 (02-06-24 @ 07:30)  WBC Count: 14.56 (02-05-24 @ 08:12)      Creatine Trend:  Creatinine: 1.2 (02-08)  Creatinine: 0.9 (02-07)  Creatinine: 0.8 (02-06)  Creatinine: 0.8 (02-05)      Liver Biochemical Testing Trend:  Alanine Aminotransferase (ALT/SGPT): 22 (02-07)  Alanine Aminotransferase (ALT/SGPT): 17 (02-06)  Alanine Aminotransferase (ALT/SGPT): 15 (02-05)  Alanine Aminotransferase (ALT/SGPT): 15 (02-04)  Aspartate Aminotransferase (AST/SGOT): 19 (02-07-24 @ 07:26)  Aspartate Aminotransferase (AST/SGOT): 20 (02-06-24 @ 07:30)  Aspartate Aminotransferase (AST/SGOT): 21 (02-05-24 @ 08:12)  Aspartate Aminotransferase (AST/SGOT): 22 (02-04-24 @ 00:18)  Bilirubin Total: 0.7 (02-07)  Bilirubin Total: 0.9 (02-06)  Bilirubin Total: 1.1 (02-05)  Bilirubin Total: 1.6 (02-04)      Trend LDH  02-06-24 @ 07:30  564<H>  03-07-21 @ 11:40  436<H>      Urinalysis Basic - ( 08 Feb 2024 07:14 )    Color: x / Appearance: x / SG: x / pH: x  Gluc: 235 mg/dL / Ketone: x  / Bili: x / Urobili: x   Blood: x / Protein: x / Nitrite: x   Leuk Esterase: x / RBC: x / WBC x   Sq Epi: x / Non Sq Epi: x / Bacteria: x        MICROBIOLOGY:    Male    Culture - Acid Fast - Sputum w/Smear (collected 06 Feb 2024 09:00)  Source: .Sputum Sputum    Culture - Sputum (collected 06 Feb 2024 09:00)  Source: .Sputum Sputum  Final Report:    Normal Respiratory Queta present    Culture - Blood (collected 04 Feb 2024 11:16)  Source: .Blood None  Preliminary Report:    No growth at 72 Hours    Culture - Urine (collected 04 Feb 2024 08:20)  Source: Clean Catch Clean Catch (Midstream)  Final Report:    50,000 - 99,000 CFU/mL Proteus mirabilis  Organism: Proteus mirabilis  Organism: Proteus mirabilis    Sensitivities:      -  Levofloxacin: S <=0.5      -  Tobramycin: I 4      -  Nitrofurantoin: R >64 Should not be used to treat pyelonephritis      -  Aztreonam: S <=4      -  Gentamicin: I 4      -  Cefazolin: S 4 For uncomplicated UTI with K. pneumoniae, E. coli, or P. mirablis: BETSEY <=16 is sensitive and BETSEY >=32 is resistant. This also predicts results for oral agents cefaclor, cefdinir, cefpodoxime, cefprozil, cefuroxime axetil, cephalexin and locarbef for uncomplicated UTI. Note that some isolates may be susceptible to these agents while testing resistant to cefazolin.      -  Cefepime: S <=2      -  Piperacillin/Tazobactam: S <=8      -  Ciprofloxacin: S <=0.25      -  Ceftriaxone: S <=1      -  Ampicillin: S <=8 These ampicillin results predict results for amoxicillin      Method Type: BETSEY      -  Meropenem: S <=1      -  Ampicillin/Sulbactam: S <=4/2      -  Cefoxitin: S <=8      -  Cefuroxime: S <=4      -  Amoxicillin/Clavulanic Acid: S <=8/4      -  Trimethoprim/Sulfamethoxazole: S <=0.5/9.5      -  Ertapenem: S <=0.5    Culture - Blood (collected 04 Feb 2024 06:00)  Source: .Blood Blood  Preliminary Report:    No growth at 72 Hours    Culture - Blood (collected 04 Feb 2024 06:00)  Source: .Blood Blood-Peripheral  Preliminary Report:    No growth at 72 Hours      HIV-1/2 Combo Result: Nonreact (02-06-24 @ 07:30)    Cryptococcal Antigen - Serum: Negative (02-06-24 @ 07:30)  CMV IgM Interpretation: Negative (02-05-24 @ 14:30)  CMV IgG Interpretation: Positive (02-05-24 @ 14:30)      Cryptococcal Antigen - Serum: Negative (02-06 @ 07:30)  Rapid RVP Result: NotDetec (02-04 @ 19:30)      COVID-19 Nucleocapsid DANIEL AB Interp: Positive (02-06-24 @ 07:30)  COVID-19 Rey Domain AB Interp: Positive (02-06-24 @ 07:30)    Procalcitonin, Serum: 0.46 (02-04)    C-Reactive Protein, Serum: 157.9 (02-05)      D-Dimer Assay, Quantitative: 669 (02-06)  D-Dimer Assay, Quantitative: 265 (02-04)    Lactate Dehydrogenase, Serum: 564 (02-06)      Troponin T, High Sensitivity Result: 53 (02-06)  Troponin T, High Sensitivity Result: 64 (02-04)  Troponin T, High Sensitivity Result: 75 (02-04)          RADIOLOGY & ADDITIONAL TESTS:  I have personally reviewed the relevant images.   CXR      CT    < from: Xray Chest 1 View-PORTABLE IMMEDIATE (Xray Chest 1 View-PORTABLE IMMEDIATE .) (02.06.24 @ 08:02) >  INTERPRETATION:  Clinical History / Reason for exam: Chest pain.    Comparison : Chest radiograph and CTA chest 2/4/2024.    Technique/Positioning: Frontal chest radiograph.    Findings:    Support devices: None.    Cardiac/mediastinum/hilum: Unchanged    Lung parenchyma/Pleura: Increased diffuse bilateral opacities. No   pneumothorax.    Skeleton/soft tissues: Unchanged    Impression:    Increased diffuse bilateral opacities.        < end of copied text >      WEIGHT  Weight (kg): 86 (02-04-24 @ 15:23)  Creatinine: 1.2 mg/dL (02-08-24 @ 07:14)      All available historical records have been reviewed

## 2024-02-08 NOTE — PROGRESS NOTE ADULT - ASSESSMENT
This is a 73-year-old male with a past medical history of CAD x 4, history of MI, diabetes, hypertension, hyperlipidemia, hypothyroidism, BPH, and hemolytic anemia- unclear cause on prednisone and weekly Rituxan chemotherapy presents for evaluation of shortness of breath.    IMPRESSION  #Acute hypoxic respiratory failure- Unclear cause. ILD flare?  #Pneumonia- Possible PJP pneumonia.   #Baseline ILD from Covid 2021 (S/p Plasma and RDV). Was not using baseline oxygen recently  #Hemolytic Anemia- Unclear cause. Has been initiated on Prednisone and Rituximab weekly by Hem-oncologist.  (Could be from Covid Vaccine?)  #Immunocompromised.  #CAD, HTN, HLD, Hear failure,DM, Hypothyroidism, BPH  #Acute urinary retention S/p jeter placement.  #Obesity BMI (kg/m2): 28  #RVP negative, MRSA nares negative. HIV screen negative  #Sputum Cultures Normal respiratory gus.   Broad differentials- Bacterial, NTM, Viral or fungal. (Less likely parasitic)  Could be ILD flare?    RECOMMENDATIONS  -baseline Quatiferon negative from OSH records.  -Follow CMV viral load and Adenovirus PCR (although RVP is negative)  -Follow up fungitell and galactomannan serum. LDH elevated perhaps due to hemolysis (baseline in 400s)  -For now continue with IV cefepime 2 gram q 8 hours.  -Continue with IV Bactrim, appreciate pharmacy assistance in dosing. Will likely transition to PO bactrim when closer to discharge.   -On acyclovir to BID PPX.   -Offloading, aspiration precautions  -Discussed care with the outpatient Oncologist Dr. Edwarsd ([phone])    If any questions, please send a message or call on [employer] Teams  Please continue to update ID with any pertinent new laboratory or radiographic findings.    Neo Martin M.D  Infectious Diseases Attending/   [employer] at [facility]   This is a 73-year-old male with a past medical history of CAD x 4, history of MI, diabetes, hypertension, hyperlipidemia, hypothyroidism, BPH, and hemolytic anemia- unclear cause on prednisone and weekly Rituxan chemotherapy presents for evaluation of shortness of breath.    IMPRESSION  #Acute hypoxic respiratory failure- Unclear cause. ILD flare?  #Pneumonia- Possible PJP pneumonia.   #Baseline ILD from Covid 2021 (S/p Plasma and RDV). Was not using baseline oxygen recently  #Hemolytic Anemia- Unclear cause. Has been initiated on Prednisone and Rituximab weekly by Hem-oncologist.  (Could be from Covid Vaccine?)  #Immunocompromised.  #CAD, HTN, HLD, Hear failure,DM, Hypothyroidism, BPH  #Acute urinary retention S/p jeter placement.  #Obesity BMI (kg/m2): 28  #RVP negative, MRSA nares negative. HIV screen negative  #Sputum Cultures Normal respiratory gus.   Broad differentials- Bacterial, NTM, Viral or fungal. (Less likely parasitic)  Could be ILD flare?    RECOMMENDATIONS  -baseline Quatiferon negative from OSH records.  -Follow CMV viral load and Adenovirus PCR (although RVP is negative)  -Follow up fungitell and galactomannan serum. LDH elevated perhaps due to hemolysis (baseline in 400s)  -For now continue with IV cefepime 2 gram q 12 hours.  -Continue with IV Bactrim, appreciate pharmacy assistance in dosing. Will likely transition to PO bactrim when closer to discharge.   -On acyclovir to BID PPX.   -Offloading, aspiration precautions  -Discussed care with the outpatient Oncologist Dr. Edwards ([phone])    If any questions, please send a message or call on [employer] Teams  Please continue to update ID with any pertinent new laboratory or radiographic findings.    Neo Martin M.D  Infectious Diseases Attending/   [employer] at [facility]

## 2024-02-08 NOTE — PROGRESS NOTE ADULT - NS ATTEND AMEND GEN_ALL_CORE FT
Urine clear via Penaloza.  Discussed fully with patient.  Recommend trial of void in a few days while on tamsulosin.
I have made amendments to the note above where applicable.

## 2024-02-08 NOTE — PROCEDURE NOTE - ADDITIONAL PROCEDURE DETAILS
Patient was unable to void on his own, hasn't taken flomax for a few days  bladder scan showed 335ml  Pt requested jeter catheter and start flomax now.  D/w Dr Ortiz
pt request to dc oxybutynin as may be not helping his voiding trials.  Will dc the medication

## 2024-02-08 NOTE — PROGRESS NOTE ADULT - SUBJECTIVE AND OBJECTIVE BOX
Patient is a 73y old  Male who presents with a chief complaint of hypoxia (08 Feb 2024 10:12)      Over Night Events:  Patient seen and examined.   he reported he feel better   ID note reviewed     ROS:  See HPI    PHYSICAL EXAM    ICU Vital Signs Last 24 Hrs  T(C): 36 (08 Feb 2024 05:00), Max: 36.7 (07 Feb 2024 20:18)  T(F): 96.8 (08 Feb 2024 05:00), Max: 98 (07 Feb 2024 20:18)  HR: 63 (08 Feb 2024 05:00) (54 - 94)  BP: 113/74 (08 Feb 2024 05:00) (95/51 - 114/56)  BP(mean): --  ABP: --  ABP(mean): --  RR: 18 (08 Feb 2024 05:00) (18 - 20)  SpO2: 96% (08 Feb 2024 05:00) (96% - 97%)    O2 Parameters below as of 07 Feb 2024 20:48  Patient On (Oxygen Delivery Method): nasal cannula  O2 Flow (L/min): 5          General:awake   HEENT:          geoff      Lymph Nodes: NO cervical LN   Lungs: Bilateral BS  Cardiovascular: Regular   Abdomen: Soft, Positive BS  Extremities: No clubbing   Skin: warm   Neurological: no focal   Musculoskeletal: move all ext     I&O's Detail    07 Feb 2024 07:01  -  08 Feb 2024 07:00  --------------------------------------------------------  IN:  Total IN: 0 mL    OUT:    Indwelling Catheter - Urethral (mL): 1600 mL  Total OUT: 1600 mL    Total NET: -1600 mL          LABS:                          10.6   10.69 )-----------( 170      ( 08 Feb 2024 07:14 )             29.2         08 Feb 2024 07:14    133    |  95     |  25     ----------------------------<  235    4.1     |  30     |  1.2      Ca    8.1        08 Feb 2024 07:14  Mg     1.9       07 Feb 2024 07:26                                                                                      Urinalysis Basic - ( 08 Feb 2024 07:14 )    Color: x / Appearance: x / SG: x / pH: x  Gluc: 235 mg/dL / Ketone: x  / Bili: x / Urobili: x   Blood: x / Protein: x / Nitrite: x   Leuk Esterase: x / RBC: x / WBC x   Sq Epi: x / Non Sq Epi: x / Bacteria: x                                                                Culture - Sputum (collected 06 Feb 2024 09:00)  Source: .Sputum Sputum  Gram Stain (07 Feb 2024 07:27):    Few polymorphonuclear leukocytes per low power field    No Squamous epithelial cells per low power field    Few-moderate Yeast like cells seen per oil power field    Few Gram Negative Rods seen per oil power field    Few Gram Negative Coccobacilli seen peroil power field    Few Gram Positive Rods seen per oil power field  Final Report (08 Feb 2024 06:56):    Normal Respiratory Queta present    Culture - Acid Fast - Sputum w/Smear (collected 06 Feb 2024 09:00)  Source: .Sputum Sputum                                                                                           MEDICATIONS  (STANDING):  acyclovir   Oral Tab/Cap 400 milliGRAM(s) Oral every 12 hours  albuterol/ipratropium for Nebulization 3 milliLiter(s) Nebulizer every 6 hours  aspirin enteric coated 81 milliGRAM(s) Oral daily  atorvastatin 10 milliGRAM(s) Oral at bedtime  budesonide 160 MICROgram(s)/formoterol 4.5 MICROgram(s) Inhaler 2 Puff(s) Inhalation two times a day  cefepime   IVPB      cefepime   IVPB 2000 milliGRAM(s) IV Intermittent every 8 hours  dextrose 5%. 1000 milliLiter(s) (50 mL/Hr) IV Continuous <Continuous>  dextrose 5%. 1000 milliLiter(s) (100 mL/Hr) IV Continuous <Continuous>  dextrose 50% Injectable 12.5 Gram(s) IV Push once  dextrose 50% Injectable 25 Gram(s) IV Push once  dextrose 50% Injectable 25 Gram(s) IV Push once  ferrous    sulfate 325 milliGRAM(s) Oral daily  glucagon  Injectable 1 milliGRAM(s) IntraMuscular once  guaiFENesin ER 1200 milliGRAM(s) Oral every 12 hours  insulin glargine Injectable (LANTUS) 20 Unit(s) SubCutaneous at bedtime  insulin lispro (ADMELOG) corrective regimen sliding scale   SubCutaneous three times a day before meals  insulin lispro Injectable (ADMELOG) 5 Unit(s) SubCutaneous before lunch  insulin lispro Injectable (ADMELOG) 5 Unit(s) SubCutaneous before breakfast  insulin lispro Injectable (ADMELOG) 5 Unit(s) SubCutaneous before dinner  levothyroxine 50 MICROGram(s) Oral daily  metoprolol succinate ER 25 milliGRAM(s) Oral daily  multivitamin 1 Tablet(s) Oral daily  oxybutynin 5 milliGRAM(s) Oral every 12 hours  pantoprazole    Tablet 40 milliGRAM(s) Oral before breakfast  senna 2 Tablet(s) Oral at bedtime  sodium chloride 0.65% Nasal 1 Spray(s) Both Nostrils three times a day  sodium chloride 7% Inhalation 4 milliLiter(s) Inhalation every 12 hours  tamsulosin 0.8 milliGRAM(s) Oral daily  trimethoprim / sulfamethoxazole IVPB 320 milliGRAM(s) IV Intermittent every 8 hours    MEDICATIONS  (PRN):  acetaminophen     Tablet .. 650 milliGRAM(s) Oral every 6 hours PRN Temp greater or equal to 38C (100.4F), Mild Pain (1 - 3)  bisacodyl 5 milliGRAM(s) Oral every 12 hours PRN Constipation  dextrose Oral Gel 15 Gram(s) Oral once PRN Blood Glucose LESS THAN 70 milliGRAM(s)/deciliter  melatonin 3 milliGRAM(s) Oral at bedtime PRN Insomnia          Xrays:  TLC:  OG:  ET tube:                                                                                    b/l opacity    ECHO:  CAM ICU:

## 2024-02-08 NOTE — PROGRESS NOTE ADULT - ASSESSMENT
Impression:  hypoxia   SOB   abnormal ct scan ?? acute infection pneumonitis VS scarring fibrotic changes from old covid         Plan:   procal 0.49,    continue abx as per ID   pending fungetill   discussed with ID and patient regarding bronch over all patient feel better and ID want to wait for the fungetill if elevated possible bronch    symbicort Q12 hrs   keep pox >92%   continue prednisone as per hematology on 60mg daily   need outpatient follow up with Dr cano need PFT   cardiology eval

## 2024-02-08 NOTE — PROCEDURE NOTE - NSPOSTCAREGUIDE_GEN_A_CORE
Verbal/written post procedure instructions were given to patient/caregiver/Instructed patient/caregiver to follow-up with primary care physician/Instructed patient/caregiver regarding signs and symptoms of infection
Verbal/written post procedure instructions were given to patient/caregiver/Instructed patient/caregiver regarding signs and symptoms of infection

## 2024-02-08 NOTE — PROCEDURE NOTE - NSFINDINGS_GEN_A_CORE
positive return of urine in the collection bag
dark yellow urine/positive return of urine in the collection bag

## 2024-02-09 LAB
1,3 BETA GLUCAN SER QL: POSITIVE
CULTURE RESULTS: SIGNIFICANT CHANGE UP
FUNGITELL: >500 PG/ML
GLUCOSE BLDC GLUCOMTR-MCNC: 129 MG/DL — HIGH (ref 70–99)
GLUCOSE BLDC GLUCOMTR-MCNC: 271 MG/DL — HIGH (ref 70–99)
GLUCOSE BLDC GLUCOMTR-MCNC: 294 MG/DL — HIGH (ref 70–99)
GLUCOSE BLDC GLUCOMTR-MCNC: 340 MG/DL — HIGH (ref 70–99)
HCT VFR BLD CALC: 28.4 % — LOW (ref 42–52)
HGB BLD-MCNC: 10.1 G/DL — LOW (ref 14–18)
MCHC RBC-ENTMCNC: 35.6 G/DL — SIGNIFICANT CHANGE UP (ref 32–37)
MCHC RBC-ENTMCNC: 35.7 PG — HIGH (ref 27–31)
MCV RBC AUTO: 100.4 FL — HIGH (ref 80–94)
NRBC # BLD: 0 /100 WBCS — SIGNIFICANT CHANGE UP (ref 0–0)
PLATELET # BLD AUTO: 169 K/UL — SIGNIFICANT CHANGE UP (ref 130–400)
PMV BLD: 10.1 FL — SIGNIFICANT CHANGE UP (ref 7.4–10.4)
RBC # BLD: 2.83 M/UL — LOW (ref 4.7–6.1)
RBC # FLD: 14 % — SIGNIFICANT CHANGE UP (ref 11.5–14.5)
SPECIMEN SOURCE: SIGNIFICANT CHANGE UP
WBC # BLD: 10.15 K/UL — SIGNIFICANT CHANGE UP (ref 4.8–10.8)
WBC # FLD AUTO: 10.15 K/UL — SIGNIFICANT CHANGE UP (ref 4.8–10.8)

## 2024-02-09 PROCEDURE — 99232 SBSQ HOSP IP/OBS MODERATE 35: CPT

## 2024-02-09 PROCEDURE — 71045 X-RAY EXAM CHEST 1 VIEW: CPT | Mod: 26

## 2024-02-09 RX ORDER — FINASTERIDE 5 MG/1
5 TABLET, FILM COATED ORAL DAILY
Refills: 0 | Status: DISCONTINUED | OUTPATIENT
Start: 2024-02-09 | End: 2024-02-09

## 2024-02-09 RX ADMIN — ATORVASTATIN CALCIUM 10 MILLIGRAM(S): 80 TABLET, FILM COATED ORAL at 21:41

## 2024-02-09 RX ADMIN — Medication 1200 MILLIGRAM(S): at 17:21

## 2024-02-09 RX ADMIN — BUDESONIDE AND FORMOTEROL FUMARATE DIHYDRATE 2 PUFF(S): 160; 4.5 AEROSOL RESPIRATORY (INHALATION) at 08:13

## 2024-02-09 RX ADMIN — Medication 25 MILLIGRAM(S): at 05:04

## 2024-02-09 RX ADMIN — Medication 325 MILLIGRAM(S): at 11:58

## 2024-02-09 RX ADMIN — Medication 1200 MILLIGRAM(S): at 05:03

## 2024-02-09 RX ADMIN — Medication 520 MILLIGRAM(S): at 21:40

## 2024-02-09 RX ADMIN — Medication 1 TABLET(S): at 11:58

## 2024-02-09 RX ADMIN — INSULIN GLARGINE 20 UNIT(S): 100 INJECTION, SOLUTION SUBCUTANEOUS at 21:42

## 2024-02-09 RX ADMIN — Medication 6: at 08:13

## 2024-02-09 RX ADMIN — Medication 1 SPRAY(S): at 05:04

## 2024-02-09 RX ADMIN — Medication 1 SPRAY(S): at 21:42

## 2024-02-09 RX ADMIN — BUDESONIDE AND FORMOTEROL FUMARATE DIHYDRATE 2 PUFF(S): 160; 4.5 AEROSOL RESPIRATORY (INHALATION) at 21:44

## 2024-02-09 RX ADMIN — Medication 1 SPRAY(S): at 13:41

## 2024-02-09 RX ADMIN — Medication 5 UNIT(S): at 08:13

## 2024-02-09 RX ADMIN — Medication 400 MILLIGRAM(S): at 11:58

## 2024-02-09 RX ADMIN — Medication 400 MILLIGRAM(S): at 21:41

## 2024-02-09 RX ADMIN — Medication 520 MILLIGRAM(S): at 05:01

## 2024-02-09 RX ADMIN — Medication 50 MICROGRAM(S): at 05:03

## 2024-02-09 RX ADMIN — CEFEPIME 100 MILLIGRAM(S): 1 INJECTION, POWDER, FOR SOLUTION INTRAMUSCULAR; INTRAVENOUS at 06:01

## 2024-02-09 RX ADMIN — CEFEPIME 100 MILLIGRAM(S): 1 INJECTION, POWDER, FOR SOLUTION INTRAMUSCULAR; INTRAVENOUS at 17:24

## 2024-02-09 RX ADMIN — Medication 5 UNIT(S): at 11:58

## 2024-02-09 RX ADMIN — Medication 40 MILLIGRAM(S): at 05:04

## 2024-02-09 RX ADMIN — Medication 8: at 17:21

## 2024-02-09 RX ADMIN — Medication 5 UNIT(S): at 17:21

## 2024-02-09 RX ADMIN — Medication 81 MILLIGRAM(S): at 11:58

## 2024-02-09 RX ADMIN — SENNA PLUS 2 TABLET(S): 8.6 TABLET ORAL at 21:43

## 2024-02-09 RX ADMIN — PANTOPRAZOLE SODIUM 40 MILLIGRAM(S): 20 TABLET, DELAYED RELEASE ORAL at 06:01

## 2024-02-09 RX ADMIN — TAMSULOSIN HYDROCHLORIDE 0.8 MILLIGRAM(S): 0.4 CAPSULE ORAL at 11:58

## 2024-02-09 RX ADMIN — Medication 520 MILLIGRAM(S): at 13:41

## 2024-02-09 NOTE — PROGRESS NOTE ADULT - TIME BILLING
I have personally seen and examined this patient.    I have reviewed all pertinent clinical information and reviewed all relevant imaging and diagnostic studies personally.   I discussed recommendations with the primary team.

## 2024-02-09 NOTE — PROGRESS NOTE ADULT - ASSESSMENT
This is a 73-year-old male with a past medical history of CAD x 4, history of MI, diabetes, hypertension, hyperlipidemia, hypothyroidism, BPH, and hemolytic anemia- unclear cause on prednisone and weekly Rituxan chemotherapy presents for evaluation of shortness of breath.    IMPRESSION  #Acute hypoxic respiratory failure- Unclear cause. ILD flare?  #Pneumonia- Possible PJP pneumonia.   #Baseline ILD from Covid 2021 (S/p Plasma and RDV). Was not using baseline oxygen recently  #Hemolytic Anemia- Unclear cause. Has been initiated on Prednisone and Rituximab weekly by Hem-oncologist.  (Could be from Covid Vaccine?)  #Immunocompromised.  #CAD, HTN, HLD, Hear failure,DM, Hypothyroidism, BPH  #Acute urinary retention S/p jeter placement.  #Obesity BMI (kg/m2): 28  #RVP negative, MRSA nares negative. HIV screen negative  #Sputum Cultures Normal respiratory gus. Adeno Virus PCR negative.   Broad differentials- Bacterial, NTM, Viral or fungal. (Less likely parasitic)  Could be ILD flare?    RECOMMENDATIONS  -baseline Quatiferon negative from OSH records.  -Follow CMV viral load  -Follow up fungitell and galactomannan serum. LDH elevated perhaps due to hemolysis (baseline in 400s)  -For now continue with IV cefepime 2 gram q 12 hours.  -Continue with IV Bactrim, appreciate pharmacy assistance in dosing. Will likely transition to PO bactrim when closer to discharge.   -On acyclovir to BID PPX.   -Offloading, aspiration precautions  -Outpatient Oncologist Dr. Edwards ([phone])    If any questions, please send a message or call on [employer] Teams  Please continue to update ID with any pertinent new laboratory or radiographic findings.    Neo Martin M.D  Infectious Diseases Attending/   [employer] at [facility]   This is a 73-year-old male with a past medical history of CAD x 4, history of MI, diabetes, hypertension, hyperlipidemia, hypothyroidism, BPH, and hemolytic anemia- unclear cause on prednisone and weekly Rituxan chemotherapy presents for evaluation of shortness of breath.    IMPRESSION  #Acute hypoxic respiratory failure- Unclear cause. ILD flare?  #Pneumonia- Possible PJP pneumonia.   #Baseline ILD from Covid 2021 (S/p Plasma and RDV). Was not using baseline oxygen recently  #Hemolytic Anemia- Unclear cause. Has been initiated on Prednisone and Rituximab weekly by Hem-oncologist.  (Could be from Covid Vaccine?)  #Immunocompromised.  #CAD, HTN, HLD, Hear failure,DM, Hypothyroidism, BPH  #Acute urinary retention S/p jeter placement.  #Obesity BMI (kg/m2): 28  #RVP negative, MRSA nares negative. HIV screen negative  #Sputum Cultures Normal respiratory gus. Adeno Virus PCR negative.   Broad differentials- Bacterial, NTM, Viral or fungal. (Less likely parasitic)  Could be ILD flare?    RECOMMENDATIONS  -baseline Quatiferon negative from OSH records.  -Follow CMV viral load  -Follow up fungitell and galactomannan serum. LDH elevated perhaps due to hemolysis (baseline in 400s)  -For now continue with IV cefepime 2 gram q 12 hours. Stop Cefepime at the day of discharge. (Tentative date 2/10) which will give him 7 days of treatment.   -Continue with IV Bactrim, appreciate pharmacy assistance in dosing. Transition to PO bactrim when closer to discharge- 2 double strength tablets PO q8h. Duration of 21 days from 2/6/24.   -After finishing a course of Bactrim, he can be transitioned to 1 double-strength tablet 3 times weekly for PJP ppx while remaining on high dose steroids or rituximab.   -On acyclovir to BID PPX.   -Offloading, aspiration precautions.  -Patient can be advised to follow up out patient with me in the Clinic at [address] [place] [address], [phone]. Ph [phone].  -Outpatient Oncologist Dr. Edwards ([phone])    If any questions, please send a message or call on [employer] Teams  Please continue to update ID with any pertinent new laboratory or radiographic findings.    Neo Martin M.D  Infectious Diseases Attending/   [employer] at [facility]

## 2024-02-09 NOTE — PROGRESS NOTE ADULT - SUBJECTIVE AND OBJECTIVE BOX
Progress note    INTERVAL HPI/OVERNIGHT EVENTS:    Patient seen and examined at bedside. he states he feels better. He is on 3L oxygen.      REVIEW OF SYSTEMS:  All other 13 Review of systems were reviewed and are negative    FAMILY HISTORY:  Family history of myocardial infarction (Mother)      T(C): 36.8 (02-09-24 @ 05:00), Max: 36.8 (02-09-24 @ 05:00)  HR: 76 (02-09-24 @ 05:00) (76 - 105)  BP: 108/59 (02-09-24 @ 05:00) (108/59 - 126/58)  RR: 20 (02-09-24 @ 05:00) (18 - 20)  SpO2: 96% (02-09-24 @ 05:00) (94% - 96%)  Wt(kg): --Vital Signs Last 24 Hrs  T(C): 36.8 (09 Feb 2024 05:00), Max: 36.8 (09 Feb 2024 05:00)  T(F): 98.2 (09 Feb 2024 05:00), Max: 98.2 (09 Feb 2024 05:00)  HR: 76 (09 Feb 2024 05:00) (76 - 105)  BP: 108/59 (09 Feb 2024 05:00) (108/59 - 126/58)  BP(mean): --  RR: 20 (09 Feb 2024 05:00) (18 - 20)  SpO2: 96% (09 Feb 2024 05:00) (94% - 96%)    Parameters below as of 09 Feb 2024 05:00  Patient On (Oxygen Delivery Method): nasal cannula      penicillin (Rash (Severe))      PHYSICAL EXAM:    GENERAL: NAD  HEAD:  Atraumatic, Normocephalic  EYES: EOMI, PERRLA, conjunctiva and sclera clear  NERVOUS SYSTEM:  Alert & Oriented X3, no focal deficits   CHEST/LUNG: Clear to percussion bilaterally; coarse lung sounds  HEART: Regular rate and rhythm; No murmurs, rubs, or gallops  ABDOMEN: Soft, Nontender, Nondistended; Bowel sounds present  EXTREMITIES:  2+ Peripheral Pulses, No clubbing, cyanosis, or edema    Consultant(s) Notes Reviewed:  [x ] YES  [ ] NO  Care Discussed with Consultants/Other Providers [ x] YES  [ ] NO    LABS:      RADIOLOGY & ADDITIONAL TESTS:    Imaging Personally Reviewed:  [ ] YES  [ ] NO  acetaminophen     Tablet .. 650 milliGRAM(s) Oral every 6 hours PRN  acyclovir   Oral Tab/Cap 400 milliGRAM(s) Oral every 12 hours  albuterol/ipratropium for Nebulization 3 milliLiter(s) Nebulizer every 6 hours  aspirin enteric coated 81 milliGRAM(s) Oral daily  atorvastatin 10 milliGRAM(s) Oral at bedtime  bisacodyl 5 milliGRAM(s) Oral every 12 hours PRN  budesonide 160 MICROgram(s)/formoterol 4.5 MICROgram(s) Inhaler 2 Puff(s) Inhalation two times a day  cefepime   IVPB 2000 milliGRAM(s) IV Intermittent every 12 hours  dextrose 5%. 1000 milliLiter(s) IV Continuous <Continuous>  dextrose 5%. 1000 milliLiter(s) IV Continuous <Continuous>  dextrose 50% Injectable 12.5 Gram(s) IV Push once  dextrose 50% Injectable 25 Gram(s) IV Push once  dextrose 50% Injectable 25 Gram(s) IV Push once  dextrose Oral Gel 15 Gram(s) Oral once PRN  ferrous    sulfate 325 milliGRAM(s) Oral daily  glucagon  Injectable 1 milliGRAM(s) IntraMuscular once  guaiFENesin ER 1200 milliGRAM(s) Oral every 12 hours  insulin glargine Injectable (LANTUS) 20 Unit(s) SubCutaneous at bedtime  insulin lispro (ADMELOG) corrective regimen sliding scale   SubCutaneous three times a day before meals  insulin lispro Injectable (ADMELOG) 5 Unit(s) SubCutaneous before lunch  insulin lispro Injectable (ADMELOG) 5 Unit(s) SubCutaneous before breakfast  insulin lispro Injectable (ADMELOG) 5 Unit(s) SubCutaneous before dinner  levothyroxine 50 MICROGram(s) Oral daily  melatonin 3 milliGRAM(s) Oral at bedtime PRN  metoprolol succinate ER 25 milliGRAM(s) Oral daily  multivitamin 1 Tablet(s) Oral daily  pantoprazole    Tablet 40 milliGRAM(s) Oral before breakfast  predniSONE   Tablet 40 milliGRAM(s) Oral daily  senna 2 Tablet(s) Oral at bedtime  sodium chloride 0.65% Nasal 1 Spray(s) Both Nostrils three times a day  sodium chloride 7% Inhalation 4 milliLiter(s) Inhalation every 12 hours  tamsulosin 0.8 milliGRAM(s) Oral daily  trimethoprim / sulfamethoxazole IVPB 320 milliGRAM(s) IV Intermittent every 8 hours      HEALTH ISSUES - PROBLEM Dx:    73-year-old male with a past medical history of CAD x 4 stents followed by Dr. Murillo (last seen as routine 2 weeks ago), history of MI, diabetes, hypertension, hyperlipidemia, hypothyroidism, BPH, and hemolytic anemia followed by hematologist at [facility] on prednisone 80 mg as been tapered down to 60 mg and weekly Rituxan chemotherapy presents for evaluation of shortness of breath that began yesterday associated with weakness in the proximal legs, weakness in legs has since improved but needing oxygen    Assessment    Sepsis and acute hypoxic respiratory failure possibly secondary to pneumonia / pneumonitis in the setting of prior fibrosis (RVP/COVID negative)  CAD sp 4 stents on aspirin  Likely flash pulm edema (now resolved)  Bladder spasms improvement with oxybutynin  Hemolytic anemia on daily prednisone with weekly rituxan therapy  DM  HTN  HLD  Hypothyroidism  BPH    Plan    - CT shows possible new areas of inflammation, neg for PE, c/w cefepime/bactrim/Acyclovir (for ppx) per ID, Blood Cx (-) MRSA nares neg, lactate negative / RVP and covid neg / HIV (-) / Cryptococcal (-) / CMV IgG (+) but IgM (-) / Acid fast (-) / adenovirus (-), Aspergillosis, fungitell, PJP pending   - c/w aspirin, echo showing EF 52% and areas of hypokinesis / toprol 25 / cardio recs appreciated  - Patient spoke with his oncologist, instructed to take 40mg prednisone  - not on insulin at home but needing it here, likely from prednisone and infection, c/w lantus 20U in hs, c/w 5U TID w/ meals and SSI  - c/w synthroid 50, TSH wnl  - CxR stable opacities  - uro follow up appreciated / patient having bladder spasms post jeter, Patient requesting to stop oxybutynin, jeter replaced, TOV tomorrow  -taper oxygen down as tolerated  -PT eval, OOB to chair- ambulate as jolly    # DVT PPX: holding lovenox as patient is having some bleeding around urethral site and doesn't want to take it, refusing SCDs, discussed with him to keep his legs moving and he's agreeable   Progress note    INTERVAL HPI/OVERNIGHT EVENTS:    Patient seen and examined at bedside. he states he feels better. He is on 3L oxygen.      REVIEW OF SYSTEMS:  All other 13 Review of systems were reviewed and are negative    FAMILY HISTORY:  Family history of myocardial infarction (Mother)      T(C): 36.8 (02-09-24 @ 05:00), Max: 36.8 (02-09-24 @ 05:00)  HR: 76 (02-09-24 @ 05:00) (76 - 105)  BP: 108/59 (02-09-24 @ 05:00) (108/59 - 126/58)  RR: 20 (02-09-24 @ 05:00) (18 - 20)  SpO2: 96% (02-09-24 @ 05:00) (94% - 96%)  Wt(kg): --Vital Signs Last 24 Hrs  T(C): 36.8 (09 Feb 2024 05:00), Max: 36.8 (09 Feb 2024 05:00)  T(F): 98.2 (09 Feb 2024 05:00), Max: 98.2 (09 Feb 2024 05:00)  HR: 76 (09 Feb 2024 05:00) (76 - 105)  BP: 108/59 (09 Feb 2024 05:00) (108/59 - 126/58)  BP(mean): --  RR: 20 (09 Feb 2024 05:00) (18 - 20)  SpO2: 96% (09 Feb 2024 05:00) (94% - 96%)    Parameters below as of 09 Feb 2024 05:00  Patient On (Oxygen Delivery Method): nasal cannula      penicillin (Rash (Severe))      PHYSICAL EXAM:    GENERAL: NAD  HEAD:  Atraumatic, Normocephalic  EYES: EOMI, PERRLA, conjunctiva and sclera clear  NERVOUS SYSTEM:  Alert & Oriented X3, no focal deficits   CHEST/LUNG: Clear to percussion bilaterally; coarse lung sounds  HEART: Regular rate and rhythm; No murmurs, rubs, or gallops  ABDOMEN: Soft, Nontender, Nondistended; Bowel sounds present  EXTREMITIES:  2+ Peripheral Pulses, No clubbing, cyanosis, or edema    Consultant(s) Notes Reviewed:  [x ] YES  [ ] NO  Care Discussed with Consultants/Other Providers [ x] YES  [ ] NO    LABS:      RADIOLOGY & ADDITIONAL TESTS:    Imaging Personally Reviewed:  [ ] YES  [ ] NO  acetaminophen     Tablet .. 650 milliGRAM(s) Oral every 6 hours PRN  acyclovir   Oral Tab/Cap 400 milliGRAM(s) Oral every 12 hours  albuterol/ipratropium for Nebulization 3 milliLiter(s) Nebulizer every 6 hours  aspirin enteric coated 81 milliGRAM(s) Oral daily  atorvastatin 10 milliGRAM(s) Oral at bedtime  bisacodyl 5 milliGRAM(s) Oral every 12 hours PRN  budesonide 160 MICROgram(s)/formoterol 4.5 MICROgram(s) Inhaler 2 Puff(s) Inhalation two times a day  cefepime   IVPB 2000 milliGRAM(s) IV Intermittent every 12 hours  dextrose 5%. 1000 milliLiter(s) IV Continuous <Continuous>  dextrose 5%. 1000 milliLiter(s) IV Continuous <Continuous>  dextrose 50% Injectable 12.5 Gram(s) IV Push once  dextrose 50% Injectable 25 Gram(s) IV Push once  dextrose 50% Injectable 25 Gram(s) IV Push once  dextrose Oral Gel 15 Gram(s) Oral once PRN  ferrous    sulfate 325 milliGRAM(s) Oral daily  glucagon  Injectable 1 milliGRAM(s) IntraMuscular once  guaiFENesin ER 1200 milliGRAM(s) Oral every 12 hours  insulin glargine Injectable (LANTUS) 20 Unit(s) SubCutaneous at bedtime  insulin lispro (ADMELOG) corrective regimen sliding scale   SubCutaneous three times a day before meals  insulin lispro Injectable (ADMELOG) 5 Unit(s) SubCutaneous before lunch  insulin lispro Injectable (ADMELOG) 5 Unit(s) SubCutaneous before breakfast  insulin lispro Injectable (ADMELOG) 5 Unit(s) SubCutaneous before dinner  levothyroxine 50 MICROGram(s) Oral daily  melatonin 3 milliGRAM(s) Oral at bedtime PRN  metoprolol succinate ER 25 milliGRAM(s) Oral daily  multivitamin 1 Tablet(s) Oral daily  pantoprazole    Tablet 40 milliGRAM(s) Oral before breakfast  predniSONE   Tablet 40 milliGRAM(s) Oral daily  senna 2 Tablet(s) Oral at bedtime  sodium chloride 0.65% Nasal 1 Spray(s) Both Nostrils three times a day  sodium chloride 7% Inhalation 4 milliLiter(s) Inhalation every 12 hours  tamsulosin 0.8 milliGRAM(s) Oral daily  trimethoprim / sulfamethoxazole IVPB 320 milliGRAM(s) IV Intermittent every 8 hours      HEALTH ISSUES - PROBLEM Dx:    73-year-old male with a past medical history of CAD x 4 stents followed by Dr. Murillo (last seen as routine 2 weeks ago), history of MI, diabetes, hypertension, hyperlipidemia, hypothyroidism, BPH, and hemolytic anemia followed by hematologist at [facility] on prednisone 80 mg as been tapered down to 60 mg and weekly Rituxan chemotherapy presents for evaluation of shortness of breath that began yesterday associated with weakness in the proximal legs, weakness in legs has since improved but needing oxygen    Assessment    Sepsis and acute hypoxic respiratory failure possibly secondary to pneumonia / pneumonitis in the setting of prior fibrosis (RVP/COVID negative)  CAD sp 4 stents on aspirin  Likely flash pulm edema (now resolved)  Bladder spasms improvement with oxybutynin  Hemolytic anemia on daily prednisone with weekly rituxan therapy  DM  HTN  HLD  Hypothyroidism  BPH    Plan    - CT shows possible new areas of inflammation, neg for PE, c/w cefepime/bactrim/Acyclovir (for ppx) per ID, Blood Cx (-) MRSA nares neg, lactate negative / RVP and covid neg / HIV (-) / Cryptococcal (-) / CMV IgG (+) but IgM (-) / Acid fast (-) / adenovirus (-), Aspergillosis, fungitell, PJP pending   - c/w aspirin, echo showing EF 52% and areas of hypokinesis / toprol 25 / cardio recs appreciated  - Patient spoke with his oncologist, instructed to take 40mg prednisone  - not on insulin at home but needing it here, likely from prednisone and infection, c/w lantus 20U in hs, c/w 5U TID w/ meals and SSI  - c/w synthroid 50, TSH wnl  - CxR stable opacities  - uro follow up appreciated / patient having bladder spasms post jeter, Patient requesting to stop oxybutynin, jeter replaced, TOV tomorrow  -taper oxygen down as tolerated  -PT eval, OOB to chair- ambulate as jolly    # DVT PPX: holding lovenox as patient is having some bleeding around urethral site and doesn't want to take it, refusing SCDs, discussed with him to keep his legs moving and he's agreeable    Healthcare maintanence: Dr. Edwards ([phone])

## 2024-02-09 NOTE — PROGRESS NOTE ADULT - SUBJECTIVE AND OBJECTIVE BOX
ANDREE BUSH  73y, Male    LOS  5d    INTERVAL EVENTS/HPI  - No acute events overnight  - T(F): , Max: 98.2 (02-09-24 @ 05:00)  - Denies any worsening symptoms  - Tolerating medication  - WBC Count: 10.15 (02-09-24 @ 07:09)  WBC Count: 10.69 (02-08-24 @ 07:14)  - Creatinine: 1.2 (02-08-24 @ 07:14)    REVIEW OF SYSTEMS:  CONSTITUTIONAL: No fever or chills  HEENT: No sore throat  RESPIRATORY: No cough, no shortness of breath  CARDIOVASCULAR: No chest pain or palpitations  GASTROINTESTINAL: No abdominal or epigastric pain  GENITOURINARY: No dysuria  NEUROLOGICAL: No headache/dizziness  MSK: No joint pain, erythema, or swelling; no back pain  SKIN: No itching, rashes  All other ROS negative except noted above    Prior hospital charts reviewed [Yes]  Primary team notes reviewed [Yes]  Other consultant notes reviewed [Yes]    ANTIMICROBIALS:   acyclovir   Oral Tab/Cap 400 every 12 hours  cefepime   IVPB 2000 every 12 hours  trimethoprim / sulfamethoxazole IVPB 320 every 8 hours    OTHER MEDS: MEDICATIONS  (STANDING):  acetaminophen     Tablet .. 650 every 6 hours PRN  albuterol/ipratropium for Nebulization 3 every 6 hours  aspirin enteric coated 81 daily  atorvastatin 10 at bedtime  bisacodyl 5 every 12 hours PRN  budesonide 160 MICROgram(s)/formoterol 4.5 MICROgram(s) Inhaler 2 two times a day  dextrose 50% Injectable 12.5 once  dextrose 50% Injectable 25 once  dextrose 50% Injectable 25 once  dextrose Oral Gel 15 once PRN  glucagon  Injectable 1 once  guaiFENesin ER 1200 every 12 hours  insulin glargine Injectable (LANTUS) 20 at bedtime  insulin lispro (ADMELOG) corrective regimen sliding scale  three times a day before meals  insulin lispro Injectable (ADMELOG) 5 before lunch  insulin lispro Injectable (ADMELOG) 5 before breakfast  insulin lispro Injectable (ADMELOG) 5 before dinner  levothyroxine 50 daily  melatonin 3 at bedtime PRN  metoprolol succinate ER 25 daily  pantoprazole    Tablet 40 before breakfast  predniSONE   Tablet 40 daily  senna 2 at bedtime  sodium chloride 7% Inhalation 4 every 12 hours  tamsulosin 0.8 daily      Vital Signs Last 24 Hrs  T(F): 98.2 (02-09-24 @ 05:00), Max: 101.3 (02-04-24 @ 07:35)    Vital Signs Last 24 Hrs  HR: 76 (02-09-24 @ 05:00) (76 - 105)  BP: 108/59 (02-09-24 @ 05:00) (108/59 - 126/58)  RR: 20 (02-09-24 @ 05:00)  SpO2: 96% (02-09-24 @ 05:00) (94% - 96%)  Wt(kg): --    EXAM:  GENERAL: NAD, on NC  HEAD: No head lesions  NECK: Supple, nontender to palpation  CHEST/LUNG: Shallow breath sounds.   HEART: S1 S2  ABDOMEN: Soft, nontender, nondistended.  EXTREMITIES: No clubbing, cyanosis, or petal edema  NERVOUS SYSTEM: Alert and oriented to person, time, place and situation, speech clear. No focal deficits   MSK: No joint erythema, swelling or pain  SKIN: No rashes or lesions, no superficial thrombophlebitis      Labs:                        10.1   10.15 )-----------( 169      ( 09 Feb 2024 07:09 )             28.4     02-08    133<L>  |  95<L>  |  25<H>  ----------------------------<  235<H>  4.1   |  30  |  1.2    Ca    8.1<L>      08 Feb 2024 07:14        WBC Trend:  WBC Count: 10.15 (02-09-24 @ 07:09)  WBC Count: 10.69 (02-08-24 @ 07:14)  WBC Count: 14.09 (02-07-24 @ 07:26)  WBC Count: 12.46 (02-06-24 @ 07:30)      Creatine Trend:  Creatinine: 1.2 (02-08)  Creatinine: 0.9 (02-07)  Creatinine: 0.8 (02-06)  Creatinine: 0.8 (02-05)      Liver Biochemical Testing Trend:  Alanine Aminotransferase (ALT/SGPT): 22 (02-07)  Alanine Aminotransferase (ALT/SGPT): 17 (02-06)  Alanine Aminotransferase (ALT/SGPT): 15 (02-05)  Alanine Aminotransferase (ALT/SGPT): 15 (02-04)  Aspartate Aminotransferase (AST/SGOT): 19 (02-07-24 @ 07:26)  Aspartate Aminotransferase (AST/SGOT): 20 (02-06-24 @ 07:30)  Aspartate Aminotransferase (AST/SGOT): 21 (02-05-24 @ 08:12)  Aspartate Aminotransferase (AST/SGOT): 22 (02-04-24 @ 00:18)  Bilirubin Total: 0.7 (02-07)  Bilirubin Total: 0.9 (02-06)  Bilirubin Total: 1.1 (02-05)  Bilirubin Total: 1.6 (02-04)      Trend LDH  02-06-24 @ 07:30  564<H>  03-07-21 @ 11:40  436<H>      Urinalysis Basic - ( 08 Feb 2024 07:14 )    Color: x / Appearance: x / SG: x / pH: x  Gluc: 235 mg/dL / Ketone: x  / Bili: x / Urobili: x   Blood: x / Protein: x / Nitrite: x   Leuk Esterase: x / RBC: x / WBC x   Sq Epi: x / Non Sq Epi: x / Bacteria: x        MICROBIOLOGY:    Male    Culture - Acid Fast - Sputum w/Smear (collected 06 Feb 2024 09:00)  Source: .Sputum Sputum    Culture - Sputum (collected 06 Feb 2024 09:00)  Source: .Sputum Sputum  Final Report:    Normal Respiratory Queta present    Culture - Blood (collected 04 Feb 2024 11:16)  Source: .Blood None  Preliminary Report:    No growth at 4 days    Culture - Urine (collected 04 Feb 2024 08:20)  Source: Clean Catch Clean Catch (Midstream)  Final Report:    50,000 - 99,000 CFU/mL Proteus mirabilis  Organism: Proteus mirabilis  Organism: Proteus mirabilis    Sensitivities:      -  Levofloxacin: S <=0.5      -  Tobramycin: I 4      -  Nitrofurantoin: R >64 Should not be used to treat pyelonephritis      -  Aztreonam: S <=4      -  Gentamicin: I 4      -  Cefazolin: S 4 For uncomplicated UTI with K. pneumoniae, E. coli, or P. mirablis: BETSEY <=16 is sensitive and BETSEY >=32 is resistant. This also predicts results for oral agents cefaclor, cefdinir, cefpodoxime, cefprozil, cefuroxime axetil, cephalexin and locarbef for uncomplicated UTI. Note that some isolates may be susceptible to these agents while testing resistant to cefazolin.      -  Cefepime: S <=2      -  Piperacillin/Tazobactam: S <=8      -  Ciprofloxacin: S <=0.25      -  Ceftriaxone: S <=1      -  Ampicillin: S <=8 These ampicillin results predict results for amoxicillin      Method Type: BETSEY      -  Meropenem: S <=1      -  Ampicillin/Sulbactam: S <=4/2      -  Cefoxitin: S <=8      -  Cefuroxime: S <=4      -  Amoxicillin/Clavulanic Acid: S <=8/4      -  Trimethoprim/Sulfamethoxazole: S <=0.5/9.5      -  Ertapenem: S <=0.5    Culture - Blood (collected 04 Feb 2024 06:00)  Source: .Blood Blood  Preliminary Report:    No growth at 4 days    Culture - Blood (collected 04 Feb 2024 06:00)  Source: .Blood Blood-Peripheral  Preliminary Report:    No growth at 4 days      HIV-1/2 Combo Result: Nonreact (02-06-24 @ 07:30)        Cryptococcal Antigen - Serum: Negative (02-06-24 @ 07:30)  CMV IgM Interpretation: Negative (02-05-24 @ 14:30)  CMV IgG Interpretation: Positive (02-05-24 @ 14:30)              Cryptococcal Antigen - Serum: Negative (02-06 @ 07:30)  Rapid RVP Result: NotDetec (02-04 @ 19:30)      COVID-19 Nucleocapsid DANIEL AB Interp: Positive (02-06-24 @ 07:30)  COVID-19 Rey Domain AB Interp: Positive (02-06-24 @ 07:30)    Procalcitonin, Serum: 0.46 (02-04)    C-Reactive Protein, Serum: 157.9 (02-05)      D-Dimer Assay, Quantitative: 669 (02-06)  D-Dimer Assay, Quantitative: 265 (02-04)    Lactate Dehydrogenase, Serum: 564 (02-06)      Troponin T, High Sensitivity Result: 53 (02-06)  Troponin T, High Sensitivity Result: 64 (02-04)  Troponin T, High Sensitivity Result: 75 (02-04)          RADIOLOGY & ADDITIONAL TESTS:  I have personally reviewed the relevant images.   CXR      CT        WEIGHT  Weight (kg): 86 (02-04-24 @ 15:23)      All available historical records have been reviewed       ANDREE BUSH  73y, Male    LOS  5d    INTERVAL EVENTS/HPI  - No acute events overnight  - T(F): , Max: 98.2 (02-09-24 @ 05:00)  - Denies any worsening symptoms  - Tolerating medication  - WBC Count: 10.15 (02-09-24 @ 07:09)  WBC Count: 10.69 (02-08-24 @ 07:14)  - Creatinine: 1.2 (02-08-24 @ 07:14)    REVIEW OF SYSTEMS:  CONSTITUTIONAL: No fever or chills  HEENT: No sore throat  RESPIRATORY: No cough, no shortness of breath  CARDIOVASCULAR: No chest pain or palpitations  GASTROINTESTINAL: No abdominal or epigastric pain  GENITOURINARY: No dysuria  NEUROLOGICAL: No headache/dizziness  MSK: No joint pain, erythema, or swelling; no back pain  SKIN: No itching, rashes  All other ROS negative except noted above    Prior hospital charts reviewed [Yes]  Primary team notes reviewed [Yes]  Other consultant notes reviewed [Yes]    ANTIMICROBIALS:   acyclovir   Oral Tab/Cap 400 every 12 hours  cefepime   IVPB 2000 every 12 hours  trimethoprim / sulfamethoxazole IVPB 320 every 8 hours    MEDICATIONS  (STANDING):  acyclovir   Oral Tab/Cap   400 milliGRAM(s) Oral (02-09-24 @ 11:58)   400 milliGRAM(s) Oral (02-08-24 @ 21:47)   400 milliGRAM(s) Oral (02-08-24 @ 11:06)   400 milliGRAM(s) Oral (02-07-24 @ 22:01)   400 milliGRAM(s) Oral (02-07-24 @ 11:46)   400 milliGRAM(s) Oral (02-06-24 @ 22:08)    acyclovir   Oral Tab/Cap   400 milliGRAM(s) Oral (02-06-24 @ 11:42)   400 milliGRAM(s) Oral (02-05-24 @ 12:42)   400 milliGRAM(s) Oral (02-04-24 @ 14:09)    atovaquone  Suspension   750 milliGRAM(s) Oral (02-06-24 @ 08:15)    cefepime   IVPB   100 mL/Hr IV Intermittent (02-04-24 @ 10:09)    cefepime   IVPB   100 mL/Hr IV Intermittent (02-08-24 @ 14:44)   100 mL/Hr IV Intermittent (02-08-24 @ 06:32)   100 mL/Hr IV Intermittent (02-07-24 @ 21:44)   100 mL/Hr IV Intermittent (02-07-24 @ 13:24)   100 mL/Hr IV Intermittent (02-07-24 @ 05:25)   100 mL/Hr IV Intermittent (02-06-24 @ 22:07)   100 mL/Hr IV Intermittent (02-06-24 @ 14:39)   100 mL/Hr IV Intermittent (02-06-24 @ 05:45)   100 mL/Hr IV Intermittent (02-05-24 @ 21:21)   100 mL/Hr IV Intermittent (02-05-24 @ 16:03)   100 mL/Hr IV Intermittent (02-05-24 @ 00:22)   100 mL/Hr IV Intermittent (02-04-24 @ 17:51)    cefepime   IVPB   100 mL/Hr IV Intermittent (02-09-24 @ 06:01)    trimethoprim / sulfamethoxazole IVPB   520 mL/Hr IV Intermittent (02-09-24 @ 05:01)   520 mL/Hr IV Intermittent (02-08-24 @ 21:49)   520 mL/Hr IV Intermittent (02-08-24 @ 14:45)   520 mL/Hr IV Intermittent (02-08-24 @ 06:32)   520 mL/Hr IV Intermittent (02-07-24 @ 21:43)   520 mL/Hr IV Intermittent (02-07-24 @ 13:59)   520 mL/Hr IV Intermittent (02-07-24 @ 05:25)   520 mL/Hr IV Intermittent (02-06-24 @ 17:53)      OTHER MEDS: MEDICATIONS  (STANDING):  acetaminophen     Tablet .. 650 every 6 hours PRN  albuterol/ipratropium for Nebulization 3 every 6 hours  aspirin enteric coated 81 daily  atorvastatin 10 at bedtime  bisacodyl 5 every 12 hours PRN  budesonide 160 MICROgram(s)/formoterol 4.5 MICROgram(s) Inhaler 2 two times a day  dextrose 50% Injectable 12.5 once  dextrose 50% Injectable 25 once  dextrose 50% Injectable 25 once  dextrose Oral Gel 15 once PRN  glucagon  Injectable 1 once  guaiFENesin ER 1200 every 12 hours  insulin glargine Injectable (LANTUS) 20 at bedtime  insulin lispro (ADMELOG) corrective regimen sliding scale  three times a day before meals  insulin lispro Injectable (ADMELOG) 5 before lunch  insulin lispro Injectable (ADMELOG) 5 before breakfast  insulin lispro Injectable (ADMELOG) 5 before dinner  levothyroxine 50 daily  melatonin 3 at bedtime PRN  metoprolol succinate ER 25 daily  pantoprazole    Tablet 40 before breakfast  predniSONE   Tablet 40 daily  senna 2 at bedtime  sodium chloride 7% Inhalation 4 every 12 hours  tamsulosin 0.8 daily      Vital Signs Last 24 Hrs  T(F): 98.2 (02-09-24 @ 05:00), Max: 101.3 (02-04-24 @ 07:35)    Vital Signs Last 24 Hrs  HR: 76 (02-09-24 @ 05:00) (76 - 105)  BP: 108/59 (02-09-24 @ 05:00) (108/59 - 126/58)  RR: 20 (02-09-24 @ 05:00)  SpO2: 96% (02-09-24 @ 05:00) (94% - 96%)  Wt(kg): --    EXAM:  GENERAL: NAD, on NC  HEAD: No head lesions  NECK: Supple, nontender to palpation  CHEST/LUNG: Shallow breath sounds.   HEART: S1 S2  ABDOMEN: Soft, nontender, nondistended.  EXTREMITIES: No clubbing, cyanosis, or petal edema  NERVOUS SYSTEM: Alert and oriented to person, time, place and situation, speech clear. No focal deficits   MSK: No joint erythema, swelling or pain  SKIN: No rashes or lesions, no superficial thrombophlebitis      Labs:                        10.1   10.15 )-----------( 169      ( 09 Feb 2024 07:09 )             28.4     02-08    133<L>  |  95<L>  |  25<H>  ----------------------------<  235<H>  4.1   |  30  |  1.2    Ca    8.1<L>      08 Feb 2024 07:14        WBC Trend:  WBC Count: 10.15 (02-09-24 @ 07:09)  WBC Count: 10.69 (02-08-24 @ 07:14)  WBC Count: 14.09 (02-07-24 @ 07:26)  WBC Count: 12.46 (02-06-24 @ 07:30)      Creatine Trend:  Creatinine: 1.2 (02-08)  Creatinine: 0.9 (02-07)  Creatinine: 0.8 (02-06)  Creatinine: 0.8 (02-05)      Liver Biochemical Testing Trend:  Alanine Aminotransferase (ALT/SGPT): 22 (02-07)  Alanine Aminotransferase (ALT/SGPT): 17 (02-06)  Alanine Aminotransferase (ALT/SGPT): 15 (02-05)  Alanine Aminotransferase (ALT/SGPT): 15 (02-04)  Aspartate Aminotransferase (AST/SGOT): 19 (02-07-24 @ 07:26)  Aspartate Aminotransferase (AST/SGOT): 20 (02-06-24 @ 07:30)  Aspartate Aminotransferase (AST/SGOT): 21 (02-05-24 @ 08:12)  Aspartate Aminotransferase (AST/SGOT): 22 (02-04-24 @ 00:18)  Bilirubin Total: 0.7 (02-07)  Bilirubin Total: 0.9 (02-06)  Bilirubin Total: 1.1 (02-05)  Bilirubin Total: 1.6 (02-04)      Trend LDH  02-06-24 @ 07:30  564<H>  03-07-21 @ 11:40  436<H>      Urinalysis Basic - ( 08 Feb 2024 07:14 )    Color: x / Appearance: x / SG: x / pH: x  Gluc: 235 mg/dL / Ketone: x  / Bili: x / Urobili: x   Blood: x / Protein: x / Nitrite: x   Leuk Esterase: x / RBC: x / WBC x   Sq Epi: x / Non Sq Epi: x / Bacteria: x        MICROBIOLOGY:    Male    Culture - Acid Fast - Sputum w/Smear (collected 06 Feb 2024 09:00)  Source: .Sputum Sputum    Culture - Sputum (collected 06 Feb 2024 09:00)  Source: .Sputum Sputum  Final Report:    Normal Respiratory Queta present    Culture - Blood (collected 04 Feb 2024 11:16)  Source: .Blood None  Preliminary Report:    No growth at 4 days    Culture - Urine (collected 04 Feb 2024 08:20)  Source: Clean Catch Clean Catch (Midstream)  Final Report:    50,000 - 99,000 CFU/mL Proteus mirabilis  Organism: Proteus mirabilis  Organism: Proteus mirabilis    Sensitivities:      -  Levofloxacin: S <=0.5      -  Tobramycin: I 4      -  Nitrofurantoin: R >64 Should not be used to treat pyelonephritis      -  Aztreonam: S <=4      -  Gentamicin: I 4      -  Cefazolin: S 4 For uncomplicated UTI with K. pneumoniae, E. coli, or P. mirablis: BETSEY <=16 is sensitive and BETSEY >=32 is resistant. This also predicts results for oral agents cefaclor, cefdinir, cefpodoxime, cefprozil, cefuroxime axetil, cephalexin and locarbef for uncomplicated UTI. Note that some isolates may be susceptible to these agents while testing resistant to cefazolin.      -  Cefepime: S <=2      -  Piperacillin/Tazobactam: S <=8      -  Ciprofloxacin: S <=0.25      -  Ceftriaxone: S <=1      -  Ampicillin: S <=8 These ampicillin results predict results for amoxicillin      Method Type: BETSEY      -  Meropenem: S <=1      -  Ampicillin/Sulbactam: S <=4/2      -  Cefoxitin: S <=8      -  Cefuroxime: S <=4      -  Amoxicillin/Clavulanic Acid: S <=8/4      -  Trimethoprim/Sulfamethoxazole: S <=0.5/9.5      -  Ertapenem: S <=0.5    Culture - Blood (collected 04 Feb 2024 06:00)  Source: .Blood Blood  Preliminary Report:    No growth at 4 days    Culture - Blood (collected 04 Feb 2024 06:00)  Source: .Blood Blood-Peripheral  Preliminary Report:    No growth at 4 days      HIV-1/2 Combo Result: Nonreact (02-06-24 @ 07:30)        Cryptococcal Antigen - Serum: Negative (02-06-24 @ 07:30)  CMV IgM Interpretation: Negative (02-05-24 @ 14:30)  CMV IgG Interpretation: Positive (02-05-24 @ 14:30)              Cryptococcal Antigen - Serum: Negative (02-06 @ 07:30)  Rapid RVP Result: NotDetec (02-04 @ 19:30)      COVID-19 Nucleocapsid DANIEL AB Interp: Positive (02-06-24 @ 07:30)  COVID-19 Rey Domain AB Interp: Positive (02-06-24 @ 07:30)    Procalcitonin, Serum: 0.46 (02-04)    C-Reactive Protein, Serum: 157.9 (02-05)      D-Dimer Assay, Quantitative: 669 (02-06)  D-Dimer Assay, Quantitative: 265 (02-04)    Lactate Dehydrogenase, Serum: 564 (02-06)      Troponin T, High Sensitivity Result: 53 (02-06)  Troponin T, High Sensitivity Result: 64 (02-04)  Troponin T, High Sensitivity Result: 75 (02-04)          RADIOLOGY & ADDITIONAL TESTS:  I have personally reviewed the relevant images.   CXR      CT        WEIGHT  Weight (kg): 86 (02-04-24 @ 15:23)      All available historical records have been reviewed

## 2024-02-10 LAB
ANION GAP SERPL CALC-SCNC: 7 MMOL/L — SIGNIFICANT CHANGE UP (ref 7–14)
BASOPHILS # BLD AUTO: 0.03 K/UL — SIGNIFICANT CHANGE UP (ref 0–0.2)
BASOPHILS NFR BLD AUTO: 0.2 % — SIGNIFICANT CHANGE UP (ref 0–1)
BUN SERPL-MCNC: 17 MG/DL — SIGNIFICANT CHANGE UP (ref 10–20)
CALCIUM SERPL-MCNC: 8.3 MG/DL — LOW (ref 8.4–10.5)
CHLORIDE SERPL-SCNC: 97 MMOL/L — LOW (ref 98–110)
CO2 SERPL-SCNC: 30 MMOL/L — SIGNIFICANT CHANGE UP (ref 17–32)
CREAT SERPL-MCNC: 1 MG/DL — SIGNIFICANT CHANGE UP (ref 0.7–1.5)
CULTURE RESULTS: SIGNIFICANT CHANGE UP
CULTURE RESULTS: SIGNIFICANT CHANGE UP
EGFR: 80 ML/MIN/1.73M2 — SIGNIFICANT CHANGE UP
EOSINOPHIL # BLD AUTO: 0 K/UL — SIGNIFICANT CHANGE UP (ref 0–0.7)
EOSINOPHIL NFR BLD AUTO: 0 % — SIGNIFICANT CHANGE UP (ref 0–8)
GALACTOMANNAN AG SERPL-ACNC: 0.09 INDEX — SIGNIFICANT CHANGE UP (ref 0–0.49)
GLUCOSE BLDC GLUCOMTR-MCNC: 133 MG/DL — HIGH (ref 70–99)
GLUCOSE BLDC GLUCOMTR-MCNC: 139 MG/DL — HIGH (ref 70–99)
GLUCOSE BLDC GLUCOMTR-MCNC: 144 MG/DL — HIGH (ref 70–99)
GLUCOSE BLDC GLUCOMTR-MCNC: 315 MG/DL — HIGH (ref 70–99)
GLUCOSE BLDC GLUCOMTR-MCNC: 329 MG/DL — HIGH (ref 70–99)
GLUCOSE SERPL-MCNC: 102 MG/DL — HIGH (ref 70–99)
HCT VFR BLD CALC: 30.2 % — LOW (ref 42–52)
HGB BLD-MCNC: 10.9 G/DL — LOW (ref 14–18)
IMM GRANULOCYTES NFR BLD AUTO: 1.8 % — HIGH (ref 0.1–0.3)
LYMPHOCYTES # BLD AUTO: 0.63 K/UL — LOW (ref 1.2–3.4)
LYMPHOCYTES # BLD AUTO: 5.2 % — LOW (ref 20.5–51.1)
MCHC RBC-ENTMCNC: 36 PG — HIGH (ref 27–31)
MCHC RBC-ENTMCNC: 36.1 G/DL — SIGNIFICANT CHANGE UP (ref 32–37)
MCV RBC AUTO: 99.7 FL — HIGH (ref 80–94)
MONOCYTES # BLD AUTO: 0.44 K/UL — SIGNIFICANT CHANGE UP (ref 0.1–0.6)
MONOCYTES NFR BLD AUTO: 3.6 % — SIGNIFICANT CHANGE UP (ref 1.7–9.3)
NEUTROPHILS # BLD AUTO: 10.8 K/UL — HIGH (ref 1.4–6.5)
NEUTROPHILS NFR BLD AUTO: 89.2 % — HIGH (ref 42.2–75.2)
NRBC # BLD: 0 /100 WBCS — SIGNIFICANT CHANGE UP (ref 0–0)
PLATELET # BLD AUTO: 191 K/UL — SIGNIFICANT CHANGE UP (ref 130–400)
PMV BLD: 10.2 FL — SIGNIFICANT CHANGE UP (ref 7.4–10.4)
POTASSIUM SERPL-MCNC: 4.7 MMOL/L — SIGNIFICANT CHANGE UP (ref 3.5–5)
POTASSIUM SERPL-SCNC: 4.7 MMOL/L — SIGNIFICANT CHANGE UP (ref 3.5–5)
RBC # BLD: 3.03 M/UL — LOW (ref 4.7–6.1)
RBC # FLD: 14.3 % — SIGNIFICANT CHANGE UP (ref 11.5–14.5)
SODIUM SERPL-SCNC: 134 MMOL/L — LOW (ref 135–146)
SPECIMEN SOURCE: SIGNIFICANT CHANGE UP
SPECIMEN SOURCE: SIGNIFICANT CHANGE UP
WBC # BLD: 12.12 K/UL — HIGH (ref 4.8–10.8)
WBC # FLD AUTO: 12.12 K/UL — HIGH (ref 4.8–10.8)

## 2024-02-10 PROCEDURE — 99232 SBSQ HOSP IP/OBS MODERATE 35: CPT

## 2024-02-10 RX ORDER — CEFEPIME 1 G/1
2000 INJECTION, POWDER, FOR SOLUTION INTRAMUSCULAR; INTRAVENOUS EVERY 12 HOURS
Refills: 0 | Status: DISCONTINUED | OUTPATIENT
Start: 2024-02-10 | End: 2024-02-12

## 2024-02-10 RX ADMIN — Medication 1 TABLET(S): at 11:59

## 2024-02-10 RX ADMIN — Medication 50 MICROGRAM(S): at 05:07

## 2024-02-10 RX ADMIN — Medication 400 MILLIGRAM(S): at 22:21

## 2024-02-10 RX ADMIN — Medication 25 MILLIGRAM(S): at 05:07

## 2024-02-10 RX ADMIN — Medication 40 MILLIGRAM(S): at 05:08

## 2024-02-10 RX ADMIN — TAMSULOSIN HYDROCHLORIDE 0.8 MILLIGRAM(S): 0.4 CAPSULE ORAL at 11:59

## 2024-02-10 RX ADMIN — CEFEPIME 100 MILLIGRAM(S): 1 INJECTION, POWDER, FOR SOLUTION INTRAMUSCULAR; INTRAVENOUS at 05:06

## 2024-02-10 RX ADMIN — Medication 520 MILLIGRAM(S): at 14:02

## 2024-02-10 RX ADMIN — Medication 8: at 17:35

## 2024-02-10 RX ADMIN — Medication 1 SPRAY(S): at 05:09

## 2024-02-10 RX ADMIN — Medication 1 SPRAY(S): at 14:02

## 2024-02-10 RX ADMIN — Medication 8: at 11:48

## 2024-02-10 RX ADMIN — Medication 325 MILLIGRAM(S): at 11:51

## 2024-02-10 RX ADMIN — Medication 520 MILLIGRAM(S): at 05:07

## 2024-02-10 RX ADMIN — CEFEPIME 100 MILLIGRAM(S): 1 INJECTION, POWDER, FOR SOLUTION INTRAMUSCULAR; INTRAVENOUS at 17:38

## 2024-02-10 RX ADMIN — PANTOPRAZOLE SODIUM 40 MILLIGRAM(S): 20 TABLET, DELAYED RELEASE ORAL at 05:08

## 2024-02-10 RX ADMIN — Medication 5 UNIT(S): at 17:38

## 2024-02-10 RX ADMIN — Medication 1200 MILLIGRAM(S): at 05:07

## 2024-02-10 RX ADMIN — SENNA PLUS 2 TABLET(S): 8.6 TABLET ORAL at 22:20

## 2024-02-10 RX ADMIN — Medication 5 UNIT(S): at 11:52

## 2024-02-10 RX ADMIN — Medication 81 MILLIGRAM(S): at 11:52

## 2024-02-10 RX ADMIN — ATORVASTATIN CALCIUM 10 MILLIGRAM(S): 80 TABLET, FILM COATED ORAL at 22:20

## 2024-02-10 RX ADMIN — Medication 520 MILLIGRAM(S): at 21:57

## 2024-02-10 RX ADMIN — Medication 1200 MILLIGRAM(S): at 22:19

## 2024-02-10 RX ADMIN — Medication 400 MILLIGRAM(S): at 11:49

## 2024-02-10 RX ADMIN — INSULIN GLARGINE 20 UNIT(S): 100 INJECTION, SOLUTION SUBCUTANEOUS at 22:35

## 2024-02-10 NOTE — PROGRESS NOTE ADULT - SUBJECTIVE AND OBJECTIVE BOX
Progress note    INTERVAL HPI/OVERNIGHT EVENTS:    Patient seen and examined at bedside. He states he feels much better.      REVIEW OF SYSTEMS:  All other 13 Review of systems were reviewed and are negative    FAMILY HISTORY:  Family history of myocardial infarction (Mother)      T(C): 36.3 (02-10-24 @ 13:19), Max: 36.6 (02-10-24 @ 05:00)  HR: 91 (02-10-24 @ 13:19) (91 - 97)  BP: 109/58 (02-10-24 @ 13:19) (104/50 - 130/83)  RR: 18 (02-10-24 @ 13:19) (18 - 18)  SpO2: 96% (02-10-24 @ 13:19) (96% - 96%)  Wt(kg): --Vital Signs Last 24 Hrs  T(C): 36.3 (10 Feb 2024 13:19), Max: 36.6 (10 Feb 2024 05:00)  T(F): 97.3 (10 Feb 2024 13:19), Max: 97.9 (10 Feb 2024 05:00)  HR: 91 (10 Feb 2024 13:19) (91 - 97)  BP: 109/58 (10 Feb 2024 13:19) (104/50 - 130/83)  BP(mean): --  RR: 18 (10 Feb 2024 13:19) (18 - 18)  SpO2: 96% (10 Feb 2024 13:19) (96% - 96%)    Parameters below as of 10 Feb 2024 05:00  Patient On (Oxygen Delivery Method): nasal cannula  O2 Flow (L/min): 3    penicillin (Rash (Severe))      PHYSICAL EXAM:    GENERAL: NAD  HEAD:  Atraumatic, Normocephalic  EYES: EOMI, PERRLA, conjunctiva and sclera clear  NERVOUS SYSTEM:  Alert & Oriented X3, no focal deficits   CHEST/LUNG: Clear to percussion bilaterally; coarse lung sounds  HEART: Regular rate and rhythm; No murmurs, rubs, or gallops  ABDOMEN: Soft, Nontender, Nondistended; Bowel sounds present  EXTREMITIES:  2+ Peripheral Pulses, No clubbing, cyanosis, or edema    Consultant(s) Notes Reviewed:  [x ] YES  [ ] NO  Care Discussed with Consultants/Other Providers [ x] YES  [ ] NO    LABS:      RADIOLOGY & ADDITIONAL TESTS:    Imaging Personally Reviewed:  [ ] YES  [ ] NO  acetaminophen     Tablet .. 650 milliGRAM(s) Oral every 6 hours PRN  acyclovir   Oral Tab/Cap 400 milliGRAM(s) Oral every 12 hours  albuterol/ipratropium for Nebulization 3 milliLiter(s) Nebulizer every 6 hours  aspirin enteric coated 81 milliGRAM(s) Oral daily  atorvastatin 10 milliGRAM(s) Oral at bedtime  bisacodyl 5 milliGRAM(s) Oral every 12 hours PRN  budesonide 160 MICROgram(s)/formoterol 4.5 MICROgram(s) Inhaler 2 Puff(s) Inhalation two times a day  dextrose 5%. 1000 milliLiter(s) IV Continuous <Continuous>  dextrose 5%. 1000 milliLiter(s) IV Continuous <Continuous>  dextrose 50% Injectable 12.5 Gram(s) IV Push once  dextrose 50% Injectable 25 Gram(s) IV Push once  dextrose 50% Injectable 25 Gram(s) IV Push once  dextrose Oral Gel 15 Gram(s) Oral once PRN  ferrous    sulfate 325 milliGRAM(s) Oral daily  glucagon  Injectable 1 milliGRAM(s) IntraMuscular once  guaiFENesin ER 1200 milliGRAM(s) Oral every 12 hours  insulin glargine Injectable (LANTUS) 20 Unit(s) SubCutaneous at bedtime  insulin lispro (ADMELOG) corrective regimen sliding scale   SubCutaneous three times a day before meals  insulin lispro Injectable (ADMELOG) 5 Unit(s) SubCutaneous before lunch  insulin lispro Injectable (ADMELOG) 5 Unit(s) SubCutaneous before breakfast  insulin lispro Injectable (ADMELOG) 5 Unit(s) SubCutaneous before dinner  levothyroxine 50 MICROGram(s) Oral daily  melatonin 3 milliGRAM(s) Oral at bedtime PRN  metoprolol succinate ER 25 milliGRAM(s) Oral daily  multivitamin 1 Tablet(s) Oral daily  pantoprazole    Tablet 40 milliGRAM(s) Oral before breakfast  predniSONE   Tablet 40 milliGRAM(s) Oral daily  senna 2 Tablet(s) Oral at bedtime  sodium chloride 0.65% Nasal 1 Spray(s) Both Nostrils three times a day  sodium chloride 7% Inhalation 4 milliLiter(s) Inhalation every 12 hours  tamsulosin 0.8 milliGRAM(s) Oral daily  trimethoprim / sulfamethoxazole IVPB 320 milliGRAM(s) IV Intermittent every 8 hours      HEALTH ISSUES - PROBLEM Dx:    Sepsis and acute hypoxic respiratory failure possibly secondary to pneumonia / pneumonitis in the setting of prior fibrosis (RVP/COVID negative)  CAD sp 4 stents on aspirin  Likely flash pulm edema (now resolved)  Bladder spasms improvement with oxybutynin  Hemolytic anemia on daily prednisone with weekly rituxan therapy  DM  HTN  HLD  Hypothyroidism  BPH    Plan    - CT shows possible new areas of inflammation, neg for PE, c/w cefepime/bactrim/Acyclovir (for ppx) per ID, Blood Cx (-) MRSA nares neg, lactate negative / RVP and covid neg / HIV (-) / Cryptococcal (-) / CMV IgG (+) but IgM (-) / Acid fast (-) / adenovirus (-), Aspergillosis (-), fungitell (+), PJP pending. On discharge he can go home on bactrim 2DS TID, if renal function is not worse otherwise  BID  - c/w aspirin, echo showing EF 52% and areas of hypokinesis / toprol 25 / cardio recs appreciated  - Patient spoke with his oncologist, instructed to take 40mg prednisone  - not on insulin at home but needing it here, likely from prednisone and infection, c/w lantus 20U in hs, c/w 5U TID w/ meals and SSI  - c/w synthroid 50, TSH wnl  - CxR stable opacities  - uro follow up appreciated / patient having bladder spasms post jeter, Patient requesting to stop oxybutynin, remove jeter, TOV today  -taper oxygen down as tolerated  -PT eval, OOB to chair- ambulate as jolly    # DVT PPX: holding lovenox as patient is having some bleeding around urethral site and doesn't want to take it, refusing SCDs, discussed with him to keep his legs moving and he's agreeable    Healthcare maintanence: Dr. Edwards ([phone])  Prepare for dc in am

## 2024-02-11 ENCOUNTER — TRANSCRIPTION ENCOUNTER (OUTPATIENT)
Age: 74
End: 2024-02-11

## 2024-02-11 LAB
GLUCOSE BLDC GLUCOMTR-MCNC: 152 MG/DL — HIGH (ref 70–99)
GLUCOSE BLDC GLUCOMTR-MCNC: 203 MG/DL — HIGH (ref 70–99)
GLUCOSE BLDC GLUCOMTR-MCNC: 243 MG/DL — HIGH (ref 70–99)
GLUCOSE BLDC GLUCOMTR-MCNC: 392 MG/DL — HIGH (ref 70–99)

## 2024-02-11 PROCEDURE — 99232 SBSQ HOSP IP/OBS MODERATE 35: CPT

## 2024-02-11 PROCEDURE — 93010 ELECTROCARDIOGRAM REPORT: CPT

## 2024-02-11 RX ORDER — ISOPROPYL ALCOHOL, BENZOCAINE .7; .06 ML/ML; ML/ML
0 SWAB TOPICAL
Qty: 100 | Refills: 1
Start: 2024-02-11

## 2024-02-11 RX ORDER — METOPROLOL TARTRATE 50 MG
1 TABLET ORAL
Qty: 0 | Refills: 0 | DISCHARGE

## 2024-02-11 RX ORDER — METOPROLOL TARTRATE 50 MG
1 TABLET ORAL
Qty: 0 | Refills: 0 | DISCHARGE
Start: 2024-02-11

## 2024-02-11 RX ORDER — INSULIN GLARGINE 100 [IU]/ML
15 INJECTION, SOLUTION SUBCUTANEOUS
Qty: 1 | Refills: 0
Start: 2024-02-11 | End: 2024-03-01

## 2024-02-11 RX ORDER — ENOXAPARIN SODIUM 100 MG/ML
40 INJECTION SUBCUTANEOUS EVERY 24 HOURS
Refills: 0 | Status: DISCONTINUED | OUTPATIENT
Start: 2024-02-11 | End: 2024-02-12

## 2024-02-11 RX ORDER — ACYCLOVIR SODIUM 500 MG
1 VIAL (EA) INTRAVENOUS
Qty: 60 | Refills: 0
Start: 2024-02-11 | End: 2024-03-11

## 2024-02-11 RX ADMIN — Medication 1200 MILLIGRAM(S): at 17:12

## 2024-02-11 RX ADMIN — Medication 25 MILLIGRAM(S): at 05:24

## 2024-02-11 RX ADMIN — TAMSULOSIN HYDROCHLORIDE 0.8 MILLIGRAM(S): 0.4 CAPSULE ORAL at 11:17

## 2024-02-11 RX ADMIN — Medication 1200 MILLIGRAM(S): at 06:38

## 2024-02-11 RX ADMIN — BUDESONIDE AND FORMOTEROL FUMARATE DIHYDRATE 2 PUFF(S): 160; 4.5 AEROSOL RESPIRATORY (INHALATION) at 08:26

## 2024-02-11 RX ADMIN — INSULIN GLARGINE 20 UNIT(S): 100 INJECTION, SOLUTION SUBCUTANEOUS at 21:23

## 2024-02-11 RX ADMIN — Medication 325 MILLIGRAM(S): at 11:17

## 2024-02-11 RX ADMIN — Medication 400 MILLIGRAM(S): at 21:24

## 2024-02-11 RX ADMIN — Medication 520 MILLIGRAM(S): at 21:24

## 2024-02-11 RX ADMIN — BUDESONIDE AND FORMOTEROL FUMARATE DIHYDRATE 2 PUFF(S): 160; 4.5 AEROSOL RESPIRATORY (INHALATION) at 21:41

## 2024-02-11 RX ADMIN — Medication 10: at 17:03

## 2024-02-11 RX ADMIN — Medication 1 SPRAY(S): at 21:41

## 2024-02-11 RX ADMIN — CEFEPIME 100 MILLIGRAM(S): 1 INJECTION, POWDER, FOR SOLUTION INTRAMUSCULAR; INTRAVENOUS at 17:09

## 2024-02-11 RX ADMIN — CEFEPIME 100 MILLIGRAM(S): 1 INJECTION, POWDER, FOR SOLUTION INTRAMUSCULAR; INTRAVENOUS at 05:23

## 2024-02-11 RX ADMIN — Medication 4: at 08:23

## 2024-02-11 RX ADMIN — Medication 81 MILLIGRAM(S): at 11:17

## 2024-02-11 RX ADMIN — Medication 3 MILLILITER(S): at 20:01

## 2024-02-11 RX ADMIN — Medication 400 MILLIGRAM(S): at 11:14

## 2024-02-11 RX ADMIN — SENNA PLUS 2 TABLET(S): 8.6 TABLET ORAL at 21:24

## 2024-02-11 RX ADMIN — Medication 520 MILLIGRAM(S): at 15:00

## 2024-02-11 RX ADMIN — PANTOPRAZOLE SODIUM 40 MILLIGRAM(S): 20 TABLET, DELAYED RELEASE ORAL at 05:23

## 2024-02-11 RX ADMIN — Medication 40 MILLIGRAM(S): at 05:24

## 2024-02-11 RX ADMIN — Medication 5 UNIT(S): at 12:31

## 2024-02-11 RX ADMIN — Medication 5 UNIT(S): at 17:04

## 2024-02-11 RX ADMIN — Medication 1 SPRAY(S): at 14:59

## 2024-02-11 RX ADMIN — Medication 4: at 12:30

## 2024-02-11 RX ADMIN — Medication 1 TABLET(S): at 11:17

## 2024-02-11 RX ADMIN — Medication 50 MICROGRAM(S): at 05:24

## 2024-02-11 RX ADMIN — Medication 5 UNIT(S): at 08:24

## 2024-02-11 RX ADMIN — Medication 520 MILLIGRAM(S): at 06:29

## 2024-02-11 RX ADMIN — ATORVASTATIN CALCIUM 10 MILLIGRAM(S): 80 TABLET, FILM COATED ORAL at 21:24

## 2024-02-11 NOTE — DISCHARGE NOTE PROVIDER - CARE PROVIDERS DIRECT ADDRESSES
,[email or web address] ,[email or web address],DirectAddress_Unknown ,[email or web address],DirectAddress_Unknown,[email or web address]

## 2024-02-11 NOTE — DISCHARGE NOTE PROVIDER - NSDCHHNEEDSERVICE_GEN_ALL_CORE
Catheter insertion/Medication teaching and assessment/Observation and assessment/Rehabilitation services/Teaching and training

## 2024-02-11 NOTE — DISCHARGE NOTE PROVIDER - NPI NUMBER (FOR SYSADMIN USE ONLY) :
[[id number]],[[id number] [[id number]],[[id number]] [[id number]],[UNKNOWN],[[id number]]

## 2024-02-11 NOTE — DISCHARGE NOTE PROVIDER - NSDCMRMEDTOKEN_GEN_ALL_CORE_FT
aspirin 81 mg oral delayed release tablet: 1 tab(s) orally once a day  ferrous sulfate 325 mg (65 mg elemental iron) oral tablet: 1 tab(s) orally once a day  Flomax 0.4 mg oral capsule: 2 cap(s) orally once a day  glipiZIDE 5 mg oral tablet: 1 tab(s) orally once a day  metFORMIN 500 mg oral tablet: 1 tab(s) orally once a day  metoprolol succinate 25 mg oral tablet, extended release: 1 tab(s) orally once a day  Multiple Vitamins oral tablet: 1 tab(s) orally once a day  pantoprazole 40 mg oral delayed release tablet: 1 tab(s) orally once a day (before a meal)  polyethylene glycol 3350 oral powder for reconstitution: 17 gram(s) orally once a day  Pravachol 40 mg oral tablet: 1 tab(s) orally once a day (at bedtime)  predniSONE 20 mg oral tablet: 3 tab(s) orally once a day  Synthroid 50 mcg (0.05 mg) oral tablet: 1 tab(s) orally once a day   acyclovir 400 mg oral tablet: 1 tab(s) orally every 12 hours  aspirin 81 mg oral delayed release tablet: 1 tab(s) orally once a day  Bactrim  mg-160 mg oral tablet: 2 tab(s) orally every 8 hours Please take 2 tablets every 8 hours until 2/26/24  Bactrim  mg-160 mg oral tablet: 1 tab(s) orally 3 times a week Take 1 tablet three times weekly for PJP prophylaxis while remaining on high dose steroids or rituximab, beginning 2/27/24  ferrous sulfate 325 mg (65 mg elemental iron) oral tablet: 1 tab(s) orally once a day  Flomax 0.4 mg oral capsule: 2 cap(s) orally once a day  glipiZIDE 5 mg oral tablet: 1 tab(s) orally once a day  metFORMIN 500 mg oral tablet: 1 tab(s) orally once a day  metoprolol succinate 25 mg oral tablet, extended release: 1 tab(s) orally once a day  Multiple Vitamins oral tablet: 1 tab(s) orally once a day  pantoprazole 40 mg oral delayed release tablet: 1 tab(s) orally once a day (before a meal)  polyethylene glycol 3350 oral powder for reconstitution: 17 gram(s) orally once a day  Pravachol 40 mg oral tablet: 1 tab(s) orally once a day (at bedtime)  predniSONE 20 mg oral tablet: 2 tab(s) orally once a day  Synthroid 50 mcg (0.05 mg) oral tablet: 1 tab(s) orally once a day   acyclovir 400 mg oral tablet: 1 tab(s) orally every 12 hours  alcohol swabs: Apply topically to affected area 4 times a day  aspirin 81 mg oral delayed release tablet: 1 tab(s) orally once a day  Bactrim  mg-160 mg oral tablet: 2 tab(s) orally every 8 hours Please take 2 tablets every 8 hours until 2/26/24  Bactrim  mg-160 mg oral tablet: 1 tab(s) orally 3 times a week Take 1 tablet three times weekly for PJP prophylaxis while remaining on high dose steroids or rituximab, beginning 2/27/24  Basaglar KwikPen 100 units/mL subcutaneous solution: 15 unit(s) subcutaneous once a day (at bedtime)  ferrous sulfate 325 mg (65 mg elemental iron) oral tablet: 1 tab(s) orally once a day  Flomax 0.4 mg oral capsule: 2 cap(s) orally once a day  glipiZIDE 5 mg oral tablet: 1 tab(s) orally once a day  glucometer (per patient&#x27;s insurance): Test blood sugars four times a day. Dispense #1 glucometer.  glucose tablets: Follow instructions on bottle when sugar is low.  lancets: 1 application subcutaneously 4 times a day  metFORMIN 500 mg oral tablet: 1 tab(s) orally once a day  metoprolol succinate 25 mg oral tablet, extended release: 1 tab(s) orally once a day  Multiple Vitamins oral tablet: 1 tab(s) orally once a day  pantoprazole 40 mg oral delayed release tablet: 1 tab(s) orally once a day (before a meal)  polyethylene glycol 3350 oral powder for reconstitution: 17 gram(s) orally once a day  Pravachol 40 mg oral tablet: 1 tab(s) orally once a day (at bedtime)  predniSONE 20 mg oral tablet: 2 tab(s) orally once a day  Synthroid 50 mcg (0.05 mg) oral tablet: 1 tab(s) orally once a day  test strips (per patient&#x27;s insurance): 1 application subcutaneously 4 times a day. ** Compatible with patient&#x27;s glucometer **

## 2024-02-11 NOTE — DISCHARGE NOTE PROVIDER - CARE PROVIDER_API CALL
Lit Lima  Urology  [address] [place] [address], [address]  [place] [address]  Phone: [phone]  Fax: [phone]  Follow Up Time: 1 week    Rosanne Araujo  Geriatric Medicine  [address] [place] [address]  Phone: [phone]  Fax: [phone]  Follow Up Time: 1 week   Lit Lima  Urology  [address] [place] [address], [address]  [place] [address]  Phone: [phone]  Fax: [phone]  Follow Up Time: 1 week    Rosanne Araujo  Geriatric Medicine  [address] [place] [address]  Phone: [phone]  Fax: [phone]  Follow Up Time: 1 week    Dr henry  Please follow up with Dr. Edwards ([phone])  Phone: (   )    -  Fax: (   )    -  Follow Up Time:    Lit Lima  Urology  [address] [facility], [address]  [place] [address]  Phone: [phone]  Fax: [phone]  Follow Up Time: 1 week    Rosanne Araujo  Geriatric Medicine  [address] [place] [address]  Phone: [phone]  Fax: [phone]  Follow Up Time: 1 week    Dr henry  Please follow up with Dr. Edwards ([phone])  Phone: (   )    -  Fax: (   )    -  Follow Up Time:     Neo Martin  Infectious Disease  [address] [facility] [address]  [place] [address]  Phone: [phone]  Fax: [phone]  Follow Up Time:

## 2024-02-11 NOTE — DISCHARGE NOTE PROVIDER - PROVIDER TOKENS
PROVIDER:[TOKEN:[[id number]],FOLLOWUP:[1 week]],PROVIDER:[TOKEN:[[id number]],FOLLOWUP:[1 week]] PROVIDER:[TOKEN:[[id number]],FOLLOWUP:[1 week]],PROVIDER:[TOKEN:[[id number]],FOLLOWUP:[1 week]],FREE:[LAST:[henry],FIRST:[],PHONE:[(   )    -],FAX:[(   )    -],ADDRESS:[Please follow up with Dr. Edwards ([phone])]] PROVIDER:[TOKEN:[[id number]],FOLLOWUP:[1 week]],PROVIDER:[TOKEN:[[id number]],FOLLOWUP:[1 week]],FREE:[LAST:[henry],FIRST:[],PHONE:[(   )    -],FAX:[(   )    -],ADDRESS:[Please follow up with Dr. Edwards ([phone])]],PROVIDER:[TOKEN:[[id number]]]

## 2024-02-11 NOTE — DISCHARGE NOTE PROVIDER - NSDCFUADDAPPT_GEN_ALL_CORE_FT
APPTS ARE READY TO BE MADE: [ ] YES    Best Family or Patient Contact (if needed):    Additional Information about above appointments (if needed):    1: Dr. Martin (please ensure prompt f/u after discharge)  2: Dr. Edwards  3: Dr. Araujo      Other comments or requests:    APPTS ARE READY TO BE MADE: [ ] YES    Best Family or Patient Contact (if needed):    Additional Information about above appointments (if needed):    1: Dr. Martin (please ensure prompt f/u after discharge)  2: Dr. Edwards (oncology - follow up for hemalytic anemia)  3: Dr. Lima (urology to remove jeter cath)  4: Dr. Araujo      Other comments or requests:

## 2024-02-11 NOTE — DISCHARGE NOTE PROVIDER - NSDCCPCAREPLAN_GEN_ALL_CORE_FT
PRINCIPAL DISCHARGE DIAGNOSIS  Diagnosis: SOB (shortness of breath)  Assessment and Plan of Treatment: You were admitted for Sepsis and acute hypoxic respiratory failure possibly secondary to pneumonia / pneumonitis in the setting of prior fibrosis. Your CT shows possible new areas of inflammation, neg for PE, c/w cefepime/bactrim/Acyclovir (for ppx) per ID, Blood Cx (-) MRSA nares neg, lactate negative / RVP and covid neg / HIV (-) / Cryptococcal (-) / CMV IgG (+) but IgM (-) / Acid fast (-) / adenovirus (-), Aspergillosis (-), fungitell (+), PJP pending.   Please continue taking your antibiotics as prescribed. Please follow up with infectious disease team clinic [address] [place] [address], [phone]. Ph [phone].  Please continue wearing your oxygen after discharge        SECONDARY DISCHARGE DIAGNOSES  Diagnosis: Hemolytic anemia  Assessment and Plan of Treatment: Please continue taking your prednisone 40mg as prescribed. Follow up with Dr. Edwards and c/w morena noble     PRINCIPAL DISCHARGE DIAGNOSIS  Diagnosis: SOB (shortness of breath)  Assessment and Plan of Treatment: You were admitted for Sepsis and acute hypoxic respiratory failure possibly secondary to pneumonia / pneumonitis in the setting of prior fibrosis. Your CT shows possible new areas of inflammation, neg for PE, c/w cefepime/bactrim/Acyclovir (for ppx) per ID, Blood Cx (-) MRSA nares neg, lactate negative / RVP and covid neg / HIV (-) / Cryptococcal (-) / CMV IgG (+) but IgM (-) / Acid fast (-) / adenovirus (-), Aspergillosis (-), fungitell (+), PJP pending.   Please continue taking your antibiotics as prescribed. Please follow up with infectious disease team clinic [address] [place] [address], [phone]. Ph [phone].  Please continue wearing your oxygen after discharge  While you are taking steroids, please continue taking insulin as prescribed in the evening. Please check your glucose 4 times per day. Continue taking your metformin and glipide        SECONDARY DISCHARGE DIAGNOSES  Diagnosis: Hemolytic anemia  Assessment and Plan of Treatment: Please continue taking your prednisone 40mg as prescribed. Follow up with Dr. Edwards and c/w morena noble

## 2024-02-11 NOTE — DISCHARGE NOTE PROVIDER - HOSPITAL COURSE
HPI:  73-year-old male with a past medical history of CAD x 4 stents followed by Dr. Murillo (last seen as routine 2 weeks ago), history of MI, diabetes, hypertension, hyperlipidemia, hypothyroidism, BPH, and hemolytic anemia followed by hematologist at [facility] on prednisone 80 mg as been tapered down to 60 mg and weekly Rituxan chemotherapy presents for evaluation of shortness of breath that began yesterday associated with weakness in the proximal legs. Patient reports increased work of breathing and lower O2 sats at home prompting presentation. Patient reports developing emphysema following COVID infection 2 years ago and has had follow up CTs which have shown the same chronic scarring and opacities. Patient does ot use any inhalers. He has a significant smoking history 1ppd x 30 years. Patient denies any ill symptoms, no cough fever chills nausea vomiting or chest pain. Patient reports that his shortness of breath has imrpoved (on 4Lnc) and he is motivated for discharge, if hypoxia resolves.   Patietn also complained of lower extremity weakness which has since completely resolved.  (04 Feb 2024 06:17)      Sepsis and acute hypoxic respiratory failure possibly secondary to pneumonia / pneumonitis in the setting of prior fibrosis (RVP/COVID negative)  CAD sp 4 stents on aspirin  flash pulm edema, now resolved  Bladder spasms, off oxybutynin  Hemolytic anemia- daily prednisone with weekly rituxan therapy  DM  HTN  HLD  Hypothyroidism  BPH    Plan    - CT shows possible new areas of inflammation, neg for PE, c/w cefepime/bactrim/Acyclovir (for ppx) per ID, Blood Cx (-) MRSA nares neg, lactate negative / RVP and covid neg / HIV (-) / Cryptococcal (-) / CMV IgG (+) but IgM (-) / Acid fast (-) / adenovirus (-), Aspergillosis (-), fungitell (+), PJP pending. On discharge he can go home on bactrim 2DS TID, if renal function is not worse otherwise  BID  - c/w aspirin, echo showing EF 52% and areas of hypokinesis / toprol 25 / cardio recs appreciated  - Patient spoke with his oncologist, instructed to take 40mg prednisone  - not on insulin at home but needing it here, likely from prednisone and infection, c/w lantus 20U in hs, c/w 5U TID w/ meals and SSI  - c/w synthroid 50, TSH wnl  - uro follow up appreciated / patient having bladder spasms post jeter, Patient requesting to stop oxybutynin, remove jeter, TOV today      [facility] maintanence: Dr. Edwards ([phone])       HPI:  73-year-old male with a past medical history of CAD x 4 stents followed by Dr. Murillo (last seen as routine 2 weeks ago), history of MI, diabetes, hypertension, hyperlipidemia, hypothyroidism, BPH, and hemolytic anemia followed by hematologist at [facility] on prednisone 80 mg as been tapered down to 60 mg (and now 40mg during admission) and weekly Rituxan chemotherapy presents for evaluation of shortness of breath that began 1 day prior to admission associated with weakness in the proximal legs. Patient reports increased work of breathing and lower O2 sats at home prompting presentation. Patient reports developing emphysema following COVID infection 2 years ago and has had follow up CTs which have shown the same chronic scarring and opacities. Patient does ot use any inhalers. He has a significant smoking history 1ppd x 30 years. Patient denies any ill symptoms, no cough fever chills nausea vomiting or chest pain. Patient reports that his shortness of breath has imrpoved (on 4Lnc) and he is motivated for discharge, if hypoxia resolves.   Patient also complained of lower extremity weakness which has since completely resolved.  (04 Feb 2024 06:17)      Sepsis and acute hypoxic respiratory failure possibly secondary to pneumonia / pneumonitis in the setting of prior fibrosis (RVP/COVID negative)  CAD sp 4 stents on aspirin  flash pulm edema, now resolved  Bladder spasms, off oxybutynin  Hemolytic anemia- daily prednisone with weekly rituxan therapy  DM  HTN  HLD  Hypothyroidism  BPH    Plan    - CT shows possible new areas of inflammation, neg for PE, c/w cefepime/bactrim/Acyclovir (for ppx) per ID, Blood Cx (-) MRSA nares neg, lactate negative / RVP and covid neg / HIV (-) / Cryptococcal (-) / CMV IgG (+) but IgM (-) / Acid fast (-) / adenovirus (-), Aspergillosis (-), fungitell (+), PJP pending. On discharge he can go home on bactrim 2DS TID, if renal function is not worse otherwise  BID  - c/w aspirin, echo showing EF 52% and areas of hypokinesis / toprol 25 / cardio recs appreciated  - Patient spoke with his oncologist, instructed to take 40mg prednisone  - not on insulin at home but needing it here, likely from prednisone and infection, c/w lantus 20U in hs, c/w 5U TID w/ meals and SSI  - c/w synthroid 50, TSH wnl  - uro follow up appreciated / patient having bladder spasms post jeter, Patient requesting to stop oxybutynin, remove jeter, TOV today         HPI:  73-year-old male with a past medical history of CAD x 4 stents followed by Dr. Murillo (last seen as routine 2 weeks ago), history of MI, diabetes, hypertension, hyperlipidemia, hypothyroidism, BPH, and hemolytic anemia followed by hematologist at [facility] on prednisone 80 mg as been tapered down to 60 mg (and now 40mg during admission) and weekly Rituxan chemotherapy presents for evaluation of shortness of breath that began 1 day prior to admission associated with weakness in the proximal legs. Patient reports increased work of breathing and lower O2 sats at home prompting presentation. Patient reports developing emphysema following COVID infection 2 years ago and has had follow up CTs which have shown the same chronic scarring and opacities. Patient does ot use any inhalers. He has a significant smoking history 1ppd x 30 years. Patient denies any ill symptoms, no cough fever chills nausea vomiting or chest pain. Patient reports that his shortness of breath has imrpoved (on 4Lnc) and he is motivated for discharge, if hypoxia resolves.   Patient also complained of lower extremity weakness which has since completely resolved.  (04 Feb 2024 06:17)      Sepsis and acute hypoxic respiratory failure possibly secondary to pneumonia / pneumonitis in the setting of prior fibrosis (RVP/COVID negative)  CAD sp 4 stents on aspirin  flash pulm edema, now resolved  Bladder spasms, off oxybutynin  Hemolytic anemia- daily prednisone with weekly rituxan therapy  DM  HTN  HLD  Hypothyroidism  BPH    Plan    - CT shows possible new areas of inflammation, neg for PE, c/w cefepime/bactrim/Acyclovir (for ppx) per ID, Blood Cx (-) MRSA nares neg, lactate negative / RVP and covid neg / HIV (-) / Cryptococcal (-) / CMV IgG (+) but IgM (-) / Acid fast (-) / adenovirus (-), Aspergillosis (-), fungitell (+), PJP pending. On discharge he can go home on bactrim 2DS TID, if renal function is not worse otherwise  BID  - c/w aspirin, echo showing EF 52% and areas of hypokinesis / toprol 25 / cardio recs appreciated  - Patient spoke with his oncologist, instructed to take 40mg prednisone  - not on insulin at home but needing it here, likely from prednisone and infection, c/w lantus 20U in hs, c/w 5U TID w/ meals and SSI  - c/w synthroid 50, TSH wnl  - uro follow up appreciated / patient having bladder spasms post jeter, Patient requesting to stop oxybutynin, Failed TOV multiple times, will go home w/ jeter, outpt f/u Dr. Lima

## 2024-02-11 NOTE — PROGRESS NOTE ADULT - SUBJECTIVE AND OBJECTIVE BOX
Progress note    INTERVAL HPI/OVERNIGHT EVENTS:    Patient seen and examined at bedside. He states he feels well.      REVIEW OF SYSTEMS:  All other 13 Review of systems were reviewed and are negative    FAMILY HISTORY:  Family history of myocardial infarction (Mother)      T(C): 35.7 (02-11-24 @ 13:41), Max: 35.9 (02-10-24 @ 21:00)  HR: 112 (02-11-24 @ 13:41) (83 - 112)  BP: 103/53 (02-11-24 @ 13:41) (101/60 - 108/57)  RR: 18 (02-11-24 @ 13:41) (18 - 18)  SpO2: 96% (02-11-24 @ 13:41) (92% - 96%)  Wt(kg): --Vital Signs Last 24 Hrs  T(C): 35.7 (11 Feb 2024 13:41), Max: 35.9 (10 Feb 2024 21:00)  T(F): 96.2 (11 Feb 2024 13:41), Max: 96.7 (10 Feb 2024 21:00)  HR: 112 (11 Feb 2024 13:41) (83 - 112)  BP: 103/53 (11 Feb 2024 13:41) (101/60 - 108/57)  BP(mean): --  RR: 18 (11 Feb 2024 13:41) (18 - 18)  SpO2: 96% (11 Feb 2024 13:41) (92% - 96%)    Parameters below as of 11 Feb 2024 04:36  Patient On (Oxygen Delivery Method): nasal cannula      penicillin (Rash (Severe))      PHYSICAL EXAM:    GENERAL: NAD  HEAD:  Atraumatic, Normocephalic  EYES: EOMI, PERRLA, conjunctiva and sclera clear  NERVOUS SYSTEM:  Alert & Oriented X3, no focal deficits   CHEST/LUNG: Clear to percussion bilaterally; coarse lung sounds  HEART: Regular rate and rhythm; No murmurs, rubs, or gallops  ABDOMEN: Soft, Nontender, Nondistended; Bowel sounds present  EXTREMITIES:  2+ Peripheral Pulses, No clubbing, cyanosis, or edema    Consultant(s) Notes Reviewed:  [x ] YES  [ ] NO  Care Discussed with Consultants/Other Providers [ x] YES  [ ] NO    LABS:      RADIOLOGY & ADDITIONAL TESTS:    Imaging Personally Reviewed:  [ ] YES  [ ] NO  acetaminophen     Tablet .. 650 milliGRAM(s) Oral every 6 hours PRN  acyclovir   Oral Tab/Cap 400 milliGRAM(s) Oral every 12 hours  albuterol/ipratropium for Nebulization 3 milliLiter(s) Nebulizer every 6 hours  aspirin enteric coated 81 milliGRAM(s) Oral daily  atorvastatin 10 milliGRAM(s) Oral at bedtime  bisacodyl 5 milliGRAM(s) Oral every 12 hours PRN  budesonide 160 MICROgram(s)/formoterol 4.5 MICROgram(s) Inhaler 2 Puff(s) Inhalation two times a day  cefepime   IVPB 2000 milliGRAM(s) IV Intermittent every 12 hours  dextrose 5%. 1000 milliLiter(s) IV Continuous <Continuous>  dextrose 5%. 1000 milliLiter(s) IV Continuous <Continuous>  dextrose 50% Injectable 12.5 Gram(s) IV Push once  dextrose 50% Injectable 25 Gram(s) IV Push once  dextrose 50% Injectable 25 Gram(s) IV Push once  dextrose Oral Gel 15 Gram(s) Oral once PRN  ferrous    sulfate 325 milliGRAM(s) Oral daily  glucagon  Injectable 1 milliGRAM(s) IntraMuscular once  guaiFENesin ER 1200 milliGRAM(s) Oral every 12 hours  insulin glargine Injectable (LANTUS) 20 Unit(s) SubCutaneous at bedtime  insulin lispro (ADMELOG) corrective regimen sliding scale   SubCutaneous three times a day before meals  insulin lispro Injectable (ADMELOG) 5 Unit(s) SubCutaneous before lunch  insulin lispro Injectable (ADMELOG) 5 Unit(s) SubCutaneous before breakfast  insulin lispro Injectable (ADMELOG) 5 Unit(s) SubCutaneous before dinner  levothyroxine 50 MICROGram(s) Oral daily  melatonin 3 milliGRAM(s) Oral at bedtime PRN  metoprolol succinate ER 25 milliGRAM(s) Oral daily  multivitamin 1 Tablet(s) Oral daily  pantoprazole    Tablet 40 milliGRAM(s) Oral before breakfast  predniSONE   Tablet 40 milliGRAM(s) Oral daily  senna 2 Tablet(s) Oral at bedtime  sodium chloride 0.65% Nasal 1 Spray(s) Both Nostrils three times a day  sodium chloride 7% Inhalation 4 milliLiter(s) Inhalation every 12 hours  tamsulosin 0.8 milliGRAM(s) Oral daily  trimethoprim / sulfamethoxazole IVPB 320 milliGRAM(s) IV Intermittent every 8 hours      HEALTH ISSUES - PROBLEM Dx:    Sepsis and acute hypoxic respiratory failure possibly secondary to pneumonia / pneumonitis in the setting of prior fibrosis (RVP/COVID negative)  CAD sp 4 stents on aspirin  Likely flash pulm edema (now resolved)  Bladder spasms improvement with oxybutynin  Hemolytic anemia on daily prednisone with weekly rituxan therapy  DM  HTN  HLD  Hypothyroidism  BPH    Plan    - CT shows possible new areas of inflammation, neg for PE, c/w cefepime/bactrim/Acyclovir (for ppx) per ID, Blood Cx (-) MRSA nares neg, lactate negative / RVP and covid neg / HIV (-) / Cryptococcal (-) / CMV IgG (+) but IgM (-) / Acid fast (-) / adenovirus (-), Aspergillosis (-), fungitell (+), PJP pending. On discharge he can go home on bactrim 2DS TID, if renal function is not worse otherwise  BID  - c/w aspirin, echo showing EF 52% and areas of hypokinesis / toprol 25 / cardio recs appreciated  - Patient spoke with his oncologist, instructed to take 40mg prednisone  - not on insulin at home but needing it here, likely from prednisone and infection, c/w lantus 20U in hs, c/w 5U TID w/ meals and SSI  - c/w synthroid 50, TSH wnl  - CxR stable opacities  - uro follow up appreciated / patient having bladder spasms post jeter, Patient requesting to stop oxybutynin, remove jeter, TOV today (pt did not want TOV yday)  -taper oxygen down as tolerated  -PT eval, OOB to chair- ambulate as jolly    # DVT PPX: holding lovenox as patient is having some bleeding around urethral site and doesn't want to take it, refusing SCDs, discussed with him to keep his legs moving and he's agreeable    Healthcare maintanence: Dr. Edwards ([phone])  Prepare for dc in am

## 2024-02-12 ENCOUNTER — TRANSCRIPTION ENCOUNTER (OUTPATIENT)
Age: 74
End: 2024-02-12

## 2024-02-12 VITALS
TEMPERATURE: 97 F | DIASTOLIC BLOOD PRESSURE: 60 MMHG | OXYGEN SATURATION: 98 % | HEART RATE: 87 BPM | RESPIRATION RATE: 18 BRPM | SYSTOLIC BLOOD PRESSURE: 108 MMHG

## 2024-02-12 LAB — GLUCOSE BLDC GLUCOMTR-MCNC: 150 MG/DL — HIGH (ref 70–99)

## 2024-02-12 PROCEDURE — 99232 SBSQ HOSP IP/OBS MODERATE 35: CPT

## 2024-02-12 RX ORDER — ISOPROPYL ALCOHOL, BENZOCAINE .7; .06 ML/ML; ML/ML
0 SWAB TOPICAL
Qty: 100 | Refills: 1
Start: 2024-02-12

## 2024-02-12 RX ORDER — CHLORHEXIDINE GLUCONATE 213 G/1000ML
1 SOLUTION TOPICAL
Refills: 0 | Status: DISCONTINUED | OUTPATIENT
Start: 2024-02-12 | End: 2024-02-12

## 2024-02-12 RX ADMIN — CEFEPIME 100 MILLIGRAM(S): 1 INJECTION, POWDER, FOR SOLUTION INTRAMUSCULAR; INTRAVENOUS at 06:23

## 2024-02-12 RX ADMIN — Medication 25 MILLIGRAM(S): at 05:04

## 2024-02-12 RX ADMIN — Medication 1200 MILLIGRAM(S): at 05:05

## 2024-02-12 RX ADMIN — Medication 40 MILLIGRAM(S): at 05:05

## 2024-02-12 RX ADMIN — Medication 520 MILLIGRAM(S): at 05:05

## 2024-02-12 RX ADMIN — PANTOPRAZOLE SODIUM 40 MILLIGRAM(S): 20 TABLET, DELAYED RELEASE ORAL at 05:04

## 2024-02-12 RX ADMIN — Medication 3 MILLILITER(S): at 07:44

## 2024-02-12 RX ADMIN — Medication 50 MICROGRAM(S): at 05:05

## 2024-02-12 NOTE — DISCHARGE NOTE NURSING/CASE MANAGEMENT/SOCIAL WORK - PATIENT PORTAL LINK FT
You can access the FollowMyHealth Patient Portal offered by [facility] by registering at the following website: [email or web address]. By joining [employer]’s FollowMyHealth portal, you will also be able to view your health information using other applications (apps) compatible with our system.

## 2024-02-12 NOTE — PROGRESS NOTE ADULT - SUBJECTIVE AND OBJECTIVE BOX
Progress note    INTERVAL HPI/OVERNIGHT EVENTS:    Patient seen and examined at bedside. He denies any acute distress. He would like to go home.      REVIEW OF SYSTEMS:  All other 13 Review of systems were reviewed and are negative    FAMILY HISTORY:  Family history of myocardial infarction (Mother)      T(C): 36 (02-12-24 @ 05:14), Max: 36 (02-12-24 @ 05:14)  HR: 87 (02-12-24 @ 05:14) (87 - 112)  BP: 108/60 (02-12-24 @ 05:14) (103/53 - 108/60)  RR: 18 (02-12-24 @ 05:14) (18 - 18)  SpO2: 98% (02-12-24 @ 05:14) (93% - 98%)  Wt(kg): --Vital Signs Last 24 Hrs  T(C): 36 (12 Feb 2024 05:14), Max: 36 (12 Feb 2024 05:14)  T(F): 96.8 (12 Feb 2024 05:14), Max: 96.8 (12 Feb 2024 05:14)  HR: 87 (12 Feb 2024 05:14) (87 - 112)  BP: 108/60 (12 Feb 2024 05:14) (103/53 - 108/60)  BP(mean): --  RR: 18 (12 Feb 2024 05:14) (18 - 18)  SpO2: 98% (12 Feb 2024 05:14) (93% - 98%)    Parameters below as of 12 Feb 2024 05:14  Patient On (Oxygen Delivery Method): nasal cannula  O2 Flow (L/min): 2    penicillin (Rash (Severe))      PHYSICAL EXAM:    GENERAL: NAD  HEAD:  Atraumatic, Normocephalic  EYES: EOMI, PERRLA, conjunctiva and sclera clear  NERVOUS SYSTEM:  Alert & Oriented X3, no focal deficits   CHEST/LUNG: Clear to percussion bilaterally; coarse lung sounds  HEART: Regular rate and rhythm; No murmurs, rubs, or gallops  ABDOMEN: Soft, Nontender, Nondistended; Bowel sounds present  EXTREMITIES:  2+ Peripheral Pulses, No clubbing, cyanosis, or edema    Consultant(s) Notes Reviewed:  [x ] YES  [ ] NO  Care Discussed with Consultants/Other Providers [ x] YES  [ ] NO    LABS:      RADIOLOGY & ADDITIONAL TESTS:    Imaging Personally Reviewed:  [ ] YES  [ ] NO  acetaminophen     Tablet .. 650 milliGRAM(s) Oral every 6 hours PRN  acyclovir   Oral Tab/Cap 400 milliGRAM(s) Oral every 12 hours  albuterol/ipratropium for Nebulization 3 milliLiter(s) Nebulizer every 6 hours  aspirin enteric coated 81 milliGRAM(s) Oral daily  atorvastatin 10 milliGRAM(s) Oral at bedtime  bisacodyl 5 milliGRAM(s) Oral every 12 hours PRN  budesonide 160 MICROgram(s)/formoterol 4.5 MICROgram(s) Inhaler 2 Puff(s) Inhalation two times a day  cefepime   IVPB 2000 milliGRAM(s) IV Intermittent every 12 hours  dextrose 5%. 1000 milliLiter(s) IV Continuous <Continuous>  dextrose 5%. 1000 milliLiter(s) IV Continuous <Continuous>  dextrose 50% Injectable 12.5 Gram(s) IV Push once  dextrose 50% Injectable 25 Gram(s) IV Push once  dextrose 50% Injectable 25 Gram(s) IV Push once  dextrose Oral Gel 15 Gram(s) Oral once PRN  enoxaparin Injectable 40 milliGRAM(s) SubCutaneous every 24 hours  ferrous    sulfate 325 milliGRAM(s) Oral daily  glucagon  Injectable 1 milliGRAM(s) IntraMuscular once  guaiFENesin ER 1200 milliGRAM(s) Oral every 12 hours  insulin glargine Injectable (LANTUS) 20 Unit(s) SubCutaneous at bedtime  insulin lispro (ADMELOG) corrective regimen sliding scale   SubCutaneous three times a day before meals  insulin lispro Injectable (ADMELOG) 5 Unit(s) SubCutaneous before breakfast  insulin lispro Injectable (ADMELOG) 5 Unit(s) SubCutaneous before lunch  insulin lispro Injectable (ADMELOG) 5 Unit(s) SubCutaneous before dinner  levothyroxine 50 MICROGram(s) Oral daily  melatonin 3 milliGRAM(s) Oral at bedtime PRN  metoprolol succinate ER 25 milliGRAM(s) Oral daily  multivitamin 1 Tablet(s) Oral daily  pantoprazole    Tablet 40 milliGRAM(s) Oral before breakfast  predniSONE   Tablet 40 milliGRAM(s) Oral daily  senna 2 Tablet(s) Oral at bedtime  sodium chloride 0.65% Nasal 1 Spray(s) Both Nostrils three times a day  sodium chloride 7% Inhalation 4 milliLiter(s) Inhalation every 12 hours  tamsulosin 0.8 milliGRAM(s) Oral daily  trimethoprim / sulfamethoxazole IVPB 320 milliGRAM(s) IV Intermittent every 8 hours      HEALTH ISSUES - PROBLEM Dx:    Sepsis and acute hypoxic respiratory failure possibly secondary to pneumonia / pneumonitis in the setting of prior fibrosis (RVP/COVID negative)  CAD sp 4 stents on aspirin  Likely flash pulm edema (now resolved)  Bladder spasms improvement with oxybutynin  Hemolytic anemia on daily prednisone with weekly rituxan therapy  DM  HTN  HLD  Hypothyroidism  BPH    Plan    - CT shows possible new areas of inflammation, neg for PE, c/w cefepime/bactrim/Acyclovir (for ppx) per ID, Blood Cx (-) MRSA nares neg, lactate negative / RVP and covid neg / HIV (-) / Cryptococcal (-) / CMV IgG (+) but IgM (-) / Acid fast (-) / adenovirus (-), Aspergillosis (-), fungitell (+), PJP pending. On discharge he can go home on bactrim 2DS TID, if renal function is not worse otherwise  BID  - c/w aspirin, echo showing EF 52% and areas of hypokinesis / toprol 25 / cardio recs appreciated  - Patient spoke with his oncologist, instructed to take 40mg prednisone  - not on insulin at home but needing it here, likely from prednisone and infection, c/w lantus 20U in hs, c/w 5U TID w/ meals and SSI  - c/w synthroid 50, TSH wnl  - CxR stable opacities  - uro follow up appreciated / patient having bladder spasms post jeter, Patient requesting to stop oxybutynin, failed tov, will go home with jeter  -taper oxygen down as tolerated  -PT eval, OOB to chair- ambulate as jolly    # DVT PPX: holding lovenox as patient is having some bleeding around urethral site and doesn't want to take it, refusing SCDs, discussed with him to keep his legs moving and he's agreeable    Healthcare maintanence: Dr. Edwards ([phone])  for discharge today

## 2024-02-12 NOTE — PROGRESS NOTE ADULT - PROVIDER SPECIALTY LIST ADULT
Hospitalist
Infectious Disease
Hospitalist
Urology
Hospitalist
Hospitalist
Infectious Disease
Hospitalist
Hospitalist
Pulmonology
Infectious Disease

## 2024-02-12 NOTE — DISCHARGE NOTE NURSING/CASE MANAGEMENT/SOCIAL WORK - NSDCFUADDAPPT_GEN_ALL_CORE_FT
APPTS ARE READY TO BE MADE: [ ] YES    Best Family or Patient Contact (if needed):    Additional Information about above appointments (if needed):    1: Dr. Martin (please ensure prompt f/u after discharge)  2: Dr. Edwards (oncology - follow up for hemalytic anemia)  3: Dr. Lima (urology to remove jeter cath)  4: Dr. Araujo      Other comments or requests:

## 2024-02-14 PROBLEM — Z86.2 PERSONAL HISTORY OF DISEASES OF THE BLOOD AND BLOOD-FORMING ORGANS AND CERTAIN DISORDERS INVOLVING THE IMMUNE MECHANISM: Chronic | Status: ACTIVE | Noted: 2024-02-04

## 2024-02-16 ENCOUNTER — APPOINTMENT (OUTPATIENT)
Dept: UROLOGY | Facility: CLINIC | Age: 74
End: 2024-02-16
Payer: MEDICARE

## 2024-02-16 VITALS
OXYGEN SATURATION: 95 % | HEIGHT: 68 IN | WEIGHT: 190 LBS | BODY MASS INDEX: 28.79 KG/M2 | RESPIRATION RATE: 16 BRPM | DIASTOLIC BLOOD PRESSURE: 85 MMHG | TEMPERATURE: 98.1 F | SYSTOLIC BLOOD PRESSURE: 139 MMHG | HEART RATE: 90 BPM

## 2024-02-16 DIAGNOSIS — J18.9 PNEUMONIA, UNSPECIFIED ORGANISM: ICD-10-CM

## 2024-02-16 DIAGNOSIS — J43.9 EMPHYSEMA, UNSPECIFIED: ICD-10-CM

## 2024-02-16 DIAGNOSIS — E66.9 OBESITY, UNSPECIFIED: ICD-10-CM

## 2024-02-16 DIAGNOSIS — Z79.899 OTHER LONG TERM (CURRENT) DRUG THERAPY: ICD-10-CM

## 2024-02-16 DIAGNOSIS — Z82.49 FAMILY HISTORY OF ISCHEMIC HEART DISEASE AND OTHER DISEASES OF THE CIRCULATORY SYSTEM: ICD-10-CM

## 2024-02-16 DIAGNOSIS — N18.30 CHRONIC KIDNEY DISEASE, STAGE 3 UNSPECIFIED: ICD-10-CM

## 2024-02-16 DIAGNOSIS — E03.9 HYPOTHYROIDISM, UNSPECIFIED: ICD-10-CM

## 2024-02-16 DIAGNOSIS — D84.9 IMMUNODEFICIENCY, UNSPECIFIED: ICD-10-CM

## 2024-02-16 DIAGNOSIS — Z91.148 PATIENT'S OTHER NONCOMPLIANCE WITH MEDICATION REGIMEN FOR OTHER REASON: ICD-10-CM

## 2024-02-16 DIAGNOSIS — Z87.891 PERSONAL HISTORY OF NICOTINE DEPENDENCE: ICD-10-CM

## 2024-02-16 DIAGNOSIS — I25.10 ATHEROSCLEROTIC HEART DISEASE OF NATIVE CORONARY ARTERY WITHOUT ANGINA PECTORIS: ICD-10-CM

## 2024-02-16 DIAGNOSIS — I25.2 OLD MYOCARDIAL INFARCTION: ICD-10-CM

## 2024-02-16 DIAGNOSIS — T44.6X6A UNDERDOSING OF ALPHA-ADRENORECEPTOR ANTAGONISTS, INITIAL ENCOUNTER: ICD-10-CM

## 2024-02-16 DIAGNOSIS — Z95.5 PRESENCE OF CORONARY ANGIOPLASTY IMPLANT AND GRAFT: ICD-10-CM

## 2024-02-16 DIAGNOSIS — Z98.49 CATARACT EXTRACTION STATUS, UNSPECIFIED EYE: ICD-10-CM

## 2024-02-16 DIAGNOSIS — J84.89 OTHER SPECIFIED INTERSTITIAL PULMONARY DISEASES: ICD-10-CM

## 2024-02-16 DIAGNOSIS — R33.8 OTHER RETENTION OF URINE: ICD-10-CM

## 2024-02-16 DIAGNOSIS — A41.9 SEPSIS, UNSPECIFIED ORGANISM: ICD-10-CM

## 2024-02-16 DIAGNOSIS — D58.9 HEREDITARY HEMOLYTIC ANEMIA, UNSPECIFIED: ICD-10-CM

## 2024-02-16 DIAGNOSIS — N40.1 BENIGN PROSTATIC HYPERPLASIA WITH LOWER URINARY TRACT SYMPTOMS: ICD-10-CM

## 2024-02-16 DIAGNOSIS — E11.22 TYPE 2 DIABETES MELLITUS WITH DIABETIC CHRONIC KIDNEY DISEASE: ICD-10-CM

## 2024-02-16 DIAGNOSIS — Z79.82 LONG TERM (CURRENT) USE OF ASPIRIN: ICD-10-CM

## 2024-02-16 DIAGNOSIS — J96.01 ACUTE RESPIRATORY FAILURE WITH HYPOXIA: ICD-10-CM

## 2024-02-16 DIAGNOSIS — E11.65 TYPE 2 DIABETES MELLITUS WITH HYPERGLYCEMIA: ICD-10-CM

## 2024-02-16 DIAGNOSIS — Z98.890 OTHER SPECIFIED POSTPROCEDURAL STATES: ICD-10-CM

## 2024-02-16 DIAGNOSIS — Z80.1 FAMILY HISTORY OF MALIGNANT NEOPLASM OF TRACHEA, BRONCHUS AND LUNG: ICD-10-CM

## 2024-02-16 DIAGNOSIS — D63.1 ANEMIA IN CHRONIC KIDNEY DISEASE: ICD-10-CM

## 2024-02-16 DIAGNOSIS — Z88.0 ALLERGY STATUS TO PENICILLIN: ICD-10-CM

## 2024-02-16 DIAGNOSIS — Z83.3 FAMILY HISTORY OF DIABETES MELLITUS: ICD-10-CM

## 2024-02-16 DIAGNOSIS — J81.0 ACUTE PULMONARY EDEMA: ICD-10-CM

## 2024-02-16 DIAGNOSIS — Z79.4 LONG TERM (CURRENT) USE OF INSULIN: ICD-10-CM

## 2024-02-16 DIAGNOSIS — N32.89 OTHER SPECIFIED DISORDERS OF BLADDER: ICD-10-CM

## 2024-02-16 DIAGNOSIS — E78.5 HYPERLIPIDEMIA, UNSPECIFIED: ICD-10-CM

## 2024-02-16 DIAGNOSIS — J44.9 CHRONIC OBSTRUCTIVE PULMONARY DISEASE, UNSPECIFIED: ICD-10-CM

## 2024-02-16 DIAGNOSIS — Z79.52 LONG TERM (CURRENT) USE OF SYSTEMIC STEROIDS: ICD-10-CM

## 2024-02-16 DIAGNOSIS — I12.9 HYPERTENSIVE CHRONIC KIDNEY DISEASE WITH STAGE 1 THROUGH STAGE 4 CHRONIC KIDNEY DISEASE, OR UNSPECIFIED CHRONIC KIDNEY DISEASE: ICD-10-CM

## 2024-02-16 DIAGNOSIS — Z79.890 HORMONE REPLACEMENT THERAPY: ICD-10-CM

## 2024-02-16 PROBLEM — Z00.00 ENCOUNTER FOR PREVENTIVE HEALTH EXAMINATION: Status: ACTIVE | Noted: 2024-02-16

## 2024-02-16 PROCEDURE — 99214 OFFICE O/P EST MOD 30 MIN: CPT

## 2024-02-16 RX ORDER — PANTOPRAZOLE SODIUM 40 MG/1
40 GRANULE, DELAYED RELEASE ORAL
Refills: 0 | Status: ACTIVE | COMMUNITY

## 2024-02-16 RX ORDER — GLIPIZIDE 5 MG/1
5 TABLET ORAL
Refills: 0 | Status: ACTIVE | COMMUNITY

## 2024-02-16 RX ORDER — ACYCLOVIR 800 MG/1
TABLET ORAL
Refills: 0 | Status: ACTIVE | COMMUNITY

## 2024-02-16 RX ORDER — ASPIRIN 81 MG
81 TABLET, DELAYED RELEASE (ENTERIC COATED) ORAL
Refills: 0 | Status: ACTIVE | COMMUNITY

## 2024-02-16 RX ORDER — LEVOTHYROXINE SODIUM 50 UG/1
50 TABLET ORAL
Refills: 0 | Status: ACTIVE | COMMUNITY

## 2024-02-16 RX ORDER — PREDNISOLONE SODIUM PHOSPHATE 20 MG/5ML
20 SOLUTION ORAL
Refills: 0 | Status: ACTIVE | COMMUNITY

## 2024-02-16 RX ORDER — SULFAMETHOXAZOLE AND TRIMETHOPRIM 400; 80 MG/1; MG/1
TABLET ORAL
Refills: 0 | Status: ACTIVE | COMMUNITY

## 2024-02-16 RX ORDER — POLYETHYLENE GLYCOL 3350
GRANULES (GRAM) MISCELLANEOUS
Refills: 0 | Status: ACTIVE | COMMUNITY

## 2024-02-16 RX ORDER — METOPROLOL TARTRATE 75 MG/1
TABLET, FILM COATED ORAL
Refills: 0 | Status: ACTIVE | COMMUNITY

## 2024-02-16 RX ORDER — FERROUS SULFATE 325(65) MG
325 TABLET ORAL
Refills: 0 | Status: ACTIVE | COMMUNITY

## 2024-02-16 RX ORDER — TAMSULOSIN HYDROCHLORIDE 0.4 MG/1
0.4 CAPSULE ORAL
Refills: 0 | Status: ACTIVE | COMMUNITY

## 2024-02-16 RX ORDER — METFORMIN HYDROCHLORIDE 500 MG/1
500 TABLET, COATED ORAL
Refills: 0 | Status: ACTIVE | COMMUNITY

## 2024-02-16 NOTE — HISTORY OF PRESENT ILLNESS
[FreeTextEntry1] : 73-year-old with history of BPH and also peripheral neuropathy, diabetes went into urinary retention requiring Penaloza catheterization.  He was seen in the hospital.  He had been noncompliant with Flomax which was prescribed by another physician previously.  He failed trial of void in the hospital and is currently on tamsulosin 0.4 twice a day.  He was briefly on oxybutynin while in the hospital and is no longer.  He does have some bladder spasms but declines any treatment for this.  He was hospitalized for poor ambulation secondary to neuropathy and is ambulating better now.

## 2024-02-16 NOTE — ASSESSMENT
[FreeTextEntry1] : Patient with urinary retention, enlarged prostate.  He is currently up to tamsulosin 0.8 daily.  Discussed options for future trial of void.  Offered trial of void today but he declines because he does not want to have to go back to the emergency room should he fail to urinate.  Offered trial of void first appointment next Tuesday and he is agreeable to this.  That way if he fails to urinate he can return to the office for catheter placement.  I also offered urodynamics which would serve to distinguish between obstruction and neurogenic bladder along with controlled trial of void.  He would like to reserve this if he fails to void next visit.  Also discussed possible future treatments for BPH including medications for example 5 alpha reductase inhibitors and various surgical procedures if necessary.  Patient will continue tamsulosin 0.4 twice daily

## 2024-02-20 ENCOUNTER — APPOINTMENT (OUTPATIENT)
Dept: UROLOGY | Facility: CLINIC | Age: 74
End: 2024-02-20
Payer: MEDICARE

## 2024-02-20 PROCEDURE — 99213 OFFICE O/P EST LOW 20 MIN: CPT

## 2024-02-20 NOTE — ASSESSMENT
[FreeTextEntry1] : Fully discussed with patient.  Penaloza removed for trial of void.  Return to office later today if unable to void.  Otherwise will be seen in 2 weeks and further assessment.  If not voiding adequately then urodynamics and transrectal ultrasound.

## 2024-02-20 NOTE — HISTORY OF PRESENT ILLNESS
[FreeTextEntry1] : 73-year-old with BPH, peripheral neuropathy, diabetes and urinary retention.  Currently on tamsulosin 0.8/day and is here for trial of void.  Urine remains clear via catheter.

## 2024-02-21 ENCOUNTER — EMERGENCY (EMERGENCY)
Facility: HOSPITAL | Age: 74
LOS: 0 days | Discharge: HOME | End: 2024-02-21
Attending: EMERGENCY MEDICINE
Payer: MEDICARE

## 2024-02-21 VITALS
RESPIRATION RATE: 16 BRPM | SYSTOLIC BLOOD PRESSURE: 103 MMHG | TEMPERATURE: 95 F | HEART RATE: 87 BPM | DIASTOLIC BLOOD PRESSURE: 65 MMHG | OXYGEN SATURATION: 95 %

## 2024-02-21 VITALS
SYSTOLIC BLOOD PRESSURE: 133 MMHG | WEIGHT: 175.05 LBS | OXYGEN SATURATION: 99 % | RESPIRATION RATE: 22 BRPM | HEIGHT: 69 IN | DIASTOLIC BLOOD PRESSURE: 75 MMHG | TEMPERATURE: 98 F | HEART RATE: 135 BPM

## 2024-02-21 DIAGNOSIS — R33.9 RETENTION OF URINE, UNSPECIFIED: ICD-10-CM

## 2024-02-21 DIAGNOSIS — Z95.5 PRESENCE OF CORONARY ANGIOPLASTY IMPLANT AND GRAFT: Chronic | ICD-10-CM

## 2024-02-21 DIAGNOSIS — Z98.890 OTHER SPECIFIED POSTPROCEDURAL STATES: Chronic | ICD-10-CM

## 2024-02-21 DIAGNOSIS — Z88.0 ALLERGY STATUS TO PENICILLIN: ICD-10-CM

## 2024-02-21 DIAGNOSIS — Z98.49 CATARACT EXTRACTION STATUS, UNSPECIFIED EYE: Chronic | ICD-10-CM

## 2024-02-21 PROCEDURE — 51702 INSERT TEMP BLADDER CATH: CPT

## 2024-02-21 PROCEDURE — 99283 EMERGENCY DEPT VISIT LOW MDM: CPT | Mod: FS

## 2024-02-21 PROCEDURE — 99283 EMERGENCY DEPT VISIT LOW MDM: CPT | Mod: 25

## 2024-02-21 NOTE — ED ADULT NURSE NOTE - NSICDXPASTSURGICALHX_GEN_ALL_CORE_FT
PAST SURGICAL HISTORY:  Coronary stent patent     History of ligation of vein 2012    History of tonsillectomy 1957    S/P cataract surgery

## 2024-02-21 NOTE — ED PROVIDER NOTE - PHYSICAL EXAMINATION
VITAL SIGNS: I have reviewed nursing notes and confirm.  CONSTITUTIONAL: Well-developed; well-nourished  SKIN: skin exam is warm and dry, no acute rash.    HEAD: Normocephalic; atraumatic.  EYES: conjunctiva and sclera clear.  ENT: No nasal discharge; airway clear.  ABD: Normal bowel sounds; soft; non-distended; non-tender  EXT: Normal ROM.  No clubbing, cyanosis or edema.   NEURO: Alert, oriented, grossly unremarkable

## 2024-02-21 NOTE — ED PROVIDER NOTE - PATIENT PORTAL LINK FT
You can access the FollowMyHealth Patient Portal offered by [facility] by registering at the following website: [email or web address]. By joining [employer]’s FollowMyHealth portal, you will also be able to view your health information using other applications (apps) compatible with our system.

## 2024-02-21 NOTE — ED ADULT TRIAGE NOTE - CHIEF COMPLAINT QUOTE
pt states his urinary catheter was removed yesterday, pt has not been able to void any urine since 1am. pt c/o retention and pain

## 2024-02-21 NOTE — ED CLERICAL - NS ED CLERK NOTE PRE-ARRIVAL INFORMATION; ADDITIONAL PRE-ARRIVAL INFORMATION
This patient is enrolled in the STAR readmission reduction initiative and has active care navigation. This patient can be followed up by the care navigation team within 24 hours.  To arrange close follow-up or to obtain additional clinical information, please call the contact number above. The on-call [facility] Hospitalist has been notified and will coordinate care in concert with the ED Physician including consults as necessary. Please reach out to the Hospitalist on-call directly (schedule on AMiON posted on the intranet). You may also call the Hospitalist Division at [phone] at either site. Consider CDU for management per guideline”

## 2024-02-21 NOTE — ED PROVIDER NOTE - NSFOLLOWUPINSTRUCTIONS_ED_ALL_ED_FT
SEE DR VALENCIA IN THE NEXT 24-48 HOURS   RETURN FOR ANY CONCERNS     Urinary Retention    Urinary retention is the inability to completely empty your bladder. This is a common problem in older men, especially with enlarged prostates. If you are sent home with a jeter catheter and a drainage system make sure to keep the drainage bag emptied and lower than your catheter. Keep the jeter catheter in until you follow up with a urologist.    SEEK IMMEDIATE MEDICAL CARE IF YOU DEVELOP THE FOLLOWING SYMPTOMS: the catheter stops draining urine, the catheter falls out, abdominal pain, nausea/vomiting, or chills/fever.

## 2024-02-21 NOTE — ED PROVIDER NOTE - ATTENDING APP SHARED VISIT CONTRIBUTION OF CARE
I have personally performed a history and physical exam on this patient and personally directed the management of the patient. Patient is a 73-year-old male presents for evaluation of urinary retention patient was recently admitted yesterday diagnosed with PCP pneumonia had urinary retention at that time requiring Penaloza catheter he was discharged from the hospital improved condition followed up as an outpatient 1 day prior with Dr. santiago catheter was removed initially patient passed a urination trial at 9 AM but has been progressively having decreased amount of urine he woke up at 1 AM last night with pain that has worsened overnight he presents this morning with worsening pain suprapubic denies abdominal pain back pain fevers chills vomiting or diaphoresis    On physical exam patient is normocephalic atraumatic pupils equal round reactive light accommodation extraocular muscles intact oropharynx clear chest clear to auscultation bilaterally abdomen soft nontender nondistended bowel sounds positive no guarding or rebound extremities full range of motion no edema noted radial pulse 2+ pedal pulse 2+    Assessment plan  We successfully placed Penaloza catheter patient had adequate urine output currently is asymptomatic I will discharge follow-up with Dr. Hicks next several days for reevaluation patient is aware I have personally performed a history and physical exam on this patient and personally directed the management of the patient. Patient is a 73-year-old male presents for evaluation of urinary retention patient was recently admitted yesterday diagnosed with PCP pneumonia had urinary retention at that time requiring Penaloza catheter he was discharged from the hospital improved condition followed up as an outpatient 1 day prior with Dr. santiago catheter was removed initially patient passed a urination trial at 9 AM but has been progressively having decreased amount of urine he woke up at 1 AM last night with pain that has worsened overnight he presents this morning with worsening pain suprapubic denies abdominal pain back pain fevers chills vomiting or diaphoresis    On physical exam patient is normocephalic atraumatic pupils equal round reactive light accommodation extraocular muscles intact oropharynx clear chest clear to auscultation bilaterally abdomen soft nontender nondistended bowel sounds positive no guarding or rebound extremities full range of motion no edema noted radial pulse 2+ pedal pulse 2+    Assessment plan  We successfully placed Penaloza catheter patient had adequate urine output currently is asymptomatic I will discharge follow-up with Dr. Hicks next several days for reevaluation patient is aware.

## 2024-02-21 NOTE — ED ADULT NURSE NOTE - NSFALLUNIVINTERV_ED_ALL_ED
Bed/Stretcher in lowest position, wheels locked, appropriate side rails in place/Call bell, personal items and telephone in reach/Instruct patient to call for assistance before getting out of bed/chair/stretcher/Non-slip footwear applied when patient is off stretcher/[place] to call system/Physically safe environment - no spills, clutter or unnecessary equipment/Purposeful proactive rounding/Room/bathroom lighting operational, light cord in reach

## 2024-02-21 NOTE — ED ADULT NURSE NOTE - NSICDXPASTMEDICALHX_GEN_ALL_CORE_FT
PAST MEDICAL HISTORY:  ASHD (arteriosclerotic heart disease) STEMI 12/31/17, PCI RCA    CAD (coronary artery disease)     Chronic kidney disease (CKD) III    Dyspnea, unspecified type     H/O hemolytic anemia     Hypertension, unspecified type     Myocardial infarction     Type 2 diabetes mellitus without complication, unspecified long term insulin use status

## 2024-02-21 NOTE — ED PROVIDER NOTE - OBJECTIVE STATEMENT
Patient is a 73-year-old male presents for evaluation of urinary retention patient was recently admitted yesterday diagnosed with PCP pneumonia had urinary retention at that time requiring Penaloza catheter he was discharged from the hospital improved condition followed up as an outpatient 1 day prior with Dr. santiago catheter was removed initially patient passed a urination trial at 9 AM but has been progressively having decreased amount of urine he woke up at 1 AM last night with pain that has worsened overnight he presents this morning with worsening pain suprapubic denies abdominal pain back pain fevers chills vomiting or diaphoresis

## 2024-02-21 NOTE — ED PROVIDER NOTE - CLINICAL SUMMARY MEDICAL DECISION MAKING FREE TEXT BOX
On physical exam patient is normocephalic atraumatic pupils equal round reactive light accommodation extraocular muscles intact oropharynx clear chest clear to auscultation bilaterally abdomen soft nontender nondistended bowel sounds positive no guarding or rebound extremities full range of motion no edema noted radial pulse 2+ pedal pulse 2+    Assessment plan  We successfully placed Penaloza catheter patient had adequate urine output currently is asymptomatic I will discharge follow-up with Dr. Hicks next several days for reevaluation patient is aware

## 2024-02-22 ENCOUNTER — TRANSCRIPTION ENCOUNTER (OUTPATIENT)
Age: 74
End: 2024-02-22

## 2024-03-05 ENCOUNTER — APPOINTMENT (OUTPATIENT)
Dept: UROLOGY | Facility: CLINIC | Age: 74
End: 2024-03-05
Payer: MEDICARE

## 2024-03-05 VITALS
TEMPERATURE: 98.1 F | WEIGHT: 190 LBS | DIASTOLIC BLOOD PRESSURE: 83 MMHG | HEIGHT: 68 IN | SYSTOLIC BLOOD PRESSURE: 138 MMHG | BODY MASS INDEX: 28.79 KG/M2 | HEART RATE: 88 BPM

## 2024-03-05 PROBLEM — I25.10 ATHEROSCLEROTIC HEART DISEASE OF NATIVE CORONARY ARTERY WITHOUT ANGINA PECTORIS: Chronic | Status: ACTIVE | Noted: 2024-02-21

## 2024-03-05 PROBLEM — I21.9 ACUTE MYOCARDIAL INFARCTION, UNSPECIFIED: Chronic | Status: ACTIVE | Noted: 2024-02-21

## 2024-03-05 PROCEDURE — 99213 OFFICE O/P EST LOW 20 MIN: CPT

## 2024-03-05 NOTE — REVIEW OF SYSTEMS
[see HPI] : see HPI [Fever] : no fever [Chills] : no chills [Shortness Of Breath] : no shortness of breath [Chest Pain] : no chest pain [Abdominal Pain] : no abdominal pain [Vomiting] : no vomiting

## 2024-03-05 NOTE — PHYSICAL EXAM
[General Appearance - In No Acute Distress] : no acute distress [] : no respiratory distress [Oriented To Time, Place, And Person] : oriented to person, place, and time [No Focal Deficits] : no focal deficits [de-identified] : Clear yellow urine in catheter bag.

## 2024-03-05 NOTE — ASSESSMENT
[FreeTextEntry1] : 73 year old with urinary retention. Failed TOV.   Currently on bactrim for PCP pneumonia. Has several weeks of bactrim left.   Currently feeling well.   Wants to proceed with TRUS and UDS.  Procedure reviewed and risks reviewed.   He will continue Bactrim.   Follow up this Friday for Urodynamics.

## 2024-03-05 NOTE — HISTORY OF PRESENT ILLNESS
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  He has hx of peripheral neuropathy and diabetes.  Currently managed on Tamsulosin 0.8 mg daily.   Trial of void on 02/20/2024 which patient was urinating 10-15 cc at a time when he went home.  Had severe pelvic pain and went to ER 02/21/2024 and had catheter replaced.   Currently on Bactrim for PCP Pnuemonia. Immunocompromised due to hemolytic anemia on long term steroids.

## 2024-03-06 ENCOUNTER — TRANSCRIPTION ENCOUNTER (OUTPATIENT)
Age: 74
End: 2024-03-06

## 2024-03-07 ENCOUNTER — RESULT REVIEW (OUTPATIENT)
Age: 74
End: 2024-03-07

## 2024-03-07 ENCOUNTER — OUTPATIENT (OUTPATIENT)
Dept: OUTPATIENT SERVICES | Facility: HOSPITAL | Age: 74
LOS: 1 days | End: 2024-03-07
Payer: MEDICARE

## 2024-03-07 DIAGNOSIS — R33.9 RETENTION OF URINE, UNSPECIFIED: ICD-10-CM

## 2024-03-07 DIAGNOSIS — Z98.49 CATARACT EXTRACTION STATUS, UNSPECIFIED EYE: Chronic | ICD-10-CM

## 2024-03-07 DIAGNOSIS — Z98.890 OTHER SPECIFIED POSTPROCEDURAL STATES: Chronic | ICD-10-CM

## 2024-03-07 DIAGNOSIS — Z95.5 PRESENCE OF CORONARY ANGIOPLASTY IMPLANT AND GRAFT: Chronic | ICD-10-CM

## 2024-03-07 DIAGNOSIS — N40.1 BENIGN PROSTATIC HYPERPLASIA WITH LOWER URINARY TRACT SYMPTOMS: ICD-10-CM

## 2024-03-07 PROCEDURE — 76856 US EXAM PELVIC COMPLETE: CPT

## 2024-03-07 PROCEDURE — 76856 US EXAM PELVIC COMPLETE: CPT | Mod: 26

## 2024-03-07 PROCEDURE — 76872 US TRANSRECTAL: CPT | Mod: 26

## 2024-03-07 PROCEDURE — 76872 US TRANSRECTAL: CPT

## 2024-03-08 ENCOUNTER — APPOINTMENT (OUTPATIENT)
Dept: UROLOGY | Facility: CLINIC | Age: 74
End: 2024-03-08
Payer: MEDICARE

## 2024-03-08 DIAGNOSIS — R33.9 RETENTION OF URINE, UNSPECIFIED: ICD-10-CM

## 2024-03-08 DIAGNOSIS — N40.1 BENIGN PROSTATIC HYPERPLASIA WITH LOWER URINARY TRACT SYMPTOMS: ICD-10-CM

## 2024-03-08 LAB
BILIRUB UR QL STRIP: NORMAL
COLLECTION METHOD: NORMAL
GLUCOSE UR-MCNC: 250
HCG UR QL: 0.2 EU/DL
HGB UR QL STRIP.AUTO: NORMAL
KETONES UR-MCNC: NORMAL
LEUKOCYTE ESTERASE UR QL STRIP: NORMAL
NITRITE UR QL STRIP: NORMAL
PH UR STRIP: 6
PROT UR STRIP-MCNC: NORMAL
SP GR UR STRIP: 1

## 2024-03-08 PROCEDURE — 99215 OFFICE O/P EST HI 40 MIN: CPT | Mod: 57,25

## 2024-03-08 PROCEDURE — 52000 CYSTOURETHROSCOPY: CPT

## 2024-03-08 PROCEDURE — 51728 CYSTOMETROGRAM W/VP: CPT

## 2024-03-08 PROCEDURE — 51784 ANAL/URINARY MUSCLE STUDY: CPT

## 2024-03-08 NOTE — HISTORY OF PRESENT ILLNESS
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  He also has a history of peripheral neuropathy on diabetes.  Patient had painful right tension and has bladder spasms with Penaloza catheter.  He has failed trial of void on high-dose alpha-blocker.  Urodynamic shows severe uninhibited contractions at low capacity.  Unable to hold enough for adequate voiding study.  Cystoscopy shows mostly bilobar prostatic occlusion with moderate to severely trabeculated bladder consistent with bladder outlet obstruction.  History of hemolytic anemia treated with long-term steroids slowly resolving.  Patient relates adequate platelets.

## 2024-03-08 NOTE — ASSESSMENT
[FreeTextEntry1] : Urinary retention, large obstructing prostate.  Patient does have some function and detrussor but unknown whether he can sustain a good contraction.  I discussed with him fully best treatments for urinary retention.  Recommend prostate procedure to relieve bladder outlet obstruction to give him his best chance at voiding spontaneously.  I discussed with him possible failure to urinate postop.  Because of his prostate size he is not a candidate for Rezum or UroLift.  Above the size that I would recommend TURP.  He is a candidate for aqua ablation and risk benefits and alternatives discussed regarding this.  Also discussed laser nucleation and open/robotic simple prostatectomy.  Patient is agreeable to aqua ablation.  Risk benefits and alternatives fully discussed including failure to void, bleeding, transfusion, infection, scar tissue requiring operation, permanent urinary incontinence, sexual side effects, ejaculatory side effects, recurrence.  He understands this is a procedure done under general anesthesia and overnight stay in hospital with CBI and catheter.  He will be discharged with catheter.  Delayed bleeding risks discussed.

## 2024-03-13 RX ORDER — NITROFURANTOIN (MONOHYDRATE/MACROCRYSTALS) 25; 75 MG/1; MG/1
100 CAPSULE ORAL
Qty: 20 | Refills: 0 | Status: ACTIVE | COMMUNITY
Start: 2024-03-13 | End: 1900-01-01

## 2024-03-14 LAB — URINE CULTURE <10: ABNORMAL

## 2024-03-15 ENCOUNTER — OUTPATIENT (OUTPATIENT)
Dept: OUTPATIENT SERVICES | Facility: HOSPITAL | Age: 74
LOS: 1 days | End: 2024-03-15
Payer: MEDICARE

## 2024-03-15 VITALS
SYSTOLIC BLOOD PRESSURE: 116 MMHG | RESPIRATION RATE: 13 BRPM | HEIGHT: 68 IN | DIASTOLIC BLOOD PRESSURE: 75 MMHG | OXYGEN SATURATION: 98 % | TEMPERATURE: 98 F | WEIGHT: 182.98 LBS | HEART RATE: 103 BPM

## 2024-03-15 DIAGNOSIS — Z98.890 OTHER SPECIFIED POSTPROCEDURAL STATES: Chronic | ICD-10-CM

## 2024-03-15 DIAGNOSIS — Z95.5 PRESENCE OF CORONARY ANGIOPLASTY IMPLANT AND GRAFT: Chronic | ICD-10-CM

## 2024-03-15 DIAGNOSIS — Z01.818 ENCOUNTER FOR OTHER PREPROCEDURAL EXAMINATION: ICD-10-CM

## 2024-03-15 DIAGNOSIS — N40.1 BENIGN PROSTATIC HYPERPLASIA WITH LOWER URINARY TRACT SYMPTOMS: ICD-10-CM

## 2024-03-15 DIAGNOSIS — Z98.49 CATARACT EXTRACTION STATUS, UNSPECIFIED EYE: Chronic | ICD-10-CM

## 2024-03-15 LAB
ALBUMIN SERPL ELPH-MCNC: 4.1 G/DL — SIGNIFICANT CHANGE UP (ref 3.5–5.2)
ALP SERPL-CCNC: 76 U/L — SIGNIFICANT CHANGE UP (ref 30–115)
ALT FLD-CCNC: 14 U/L — SIGNIFICANT CHANGE UP (ref 0–41)
ANION GAP SERPL CALC-SCNC: 12 MMOL/L — SIGNIFICANT CHANGE UP (ref 7–14)
APTT BLD: 24.4 SEC — LOW (ref 27–39.2)
AST SERPL-CCNC: 15 U/L — SIGNIFICANT CHANGE UP (ref 0–41)
BASOPHILS # BLD AUTO: 0.08 K/UL — SIGNIFICANT CHANGE UP (ref 0–0.2)
BASOPHILS NFR BLD AUTO: 0.5 % — SIGNIFICANT CHANGE UP (ref 0–1)
BILIRUB SERPL-MCNC: 1.2 MG/DL — SIGNIFICANT CHANGE UP (ref 0.2–1.2)
BUN SERPL-MCNC: 19 MG/DL — SIGNIFICANT CHANGE UP (ref 10–20)
CALCIUM SERPL-MCNC: 8.7 MG/DL — SIGNIFICANT CHANGE UP (ref 8.4–10.5)
CHLORIDE SERPL-SCNC: 102 MMOL/L — SIGNIFICANT CHANGE UP (ref 98–110)
CO2 SERPL-SCNC: 28 MMOL/L — SIGNIFICANT CHANGE UP (ref 17–32)
CREAT SERPL-MCNC: 1 MG/DL — SIGNIFICANT CHANGE UP (ref 0.7–1.5)
EGFR: 80 ML/MIN/1.73M2 — SIGNIFICANT CHANGE UP
EOSINOPHIL # BLD AUTO: 0 K/UL — SIGNIFICANT CHANGE UP (ref 0–0.7)
EOSINOPHIL NFR BLD AUTO: 0 % — SIGNIFICANT CHANGE UP (ref 0–8)
GLUCOSE SERPL-MCNC: 115 MG/DL — HIGH (ref 70–99)
HCT VFR BLD CALC: 32.6 % — LOW (ref 42–52)
HGB BLD-MCNC: 11.7 G/DL — LOW (ref 14–18)
IMM GRANULOCYTES NFR BLD AUTO: 2.5 % — HIGH (ref 0.1–0.3)
INR BLD: 1.03 RATIO — SIGNIFICANT CHANGE UP (ref 0.65–1.3)
LYMPHOCYTES # BLD AUTO: 12.8 % — LOW (ref 20.5–51.1)
LYMPHOCYTES # BLD AUTO: 2.08 K/UL — SIGNIFICANT CHANGE UP (ref 1.2–3.4)
MCHC RBC-ENTMCNC: 35.3 PG — HIGH (ref 27–31)
MCHC RBC-ENTMCNC: 35.9 G/DL — SIGNIFICANT CHANGE UP (ref 32–37)
MCV RBC AUTO: 98.5 FL — HIGH (ref 80–94)
MONOCYTES # BLD AUTO: 0.93 K/UL — HIGH (ref 0.1–0.6)
MONOCYTES NFR BLD AUTO: 5.7 % — SIGNIFICANT CHANGE UP (ref 1.7–9.3)
NEUTROPHILS # BLD AUTO: 12.81 K/UL — HIGH (ref 1.4–6.5)
NEUTROPHILS NFR BLD AUTO: 78.5 % — HIGH (ref 42.2–75.2)
NRBC # BLD: 0 /100 WBCS — SIGNIFICANT CHANGE UP (ref 0–0)
PLATELET # BLD AUTO: 205 K/UL — SIGNIFICANT CHANGE UP (ref 130–400)
PMV BLD: 10.7 FL — HIGH (ref 7.4–10.4)
POTASSIUM SERPL-MCNC: 4.5 MMOL/L — SIGNIFICANT CHANGE UP (ref 3.5–5)
POTASSIUM SERPL-SCNC: 4.5 MMOL/L — SIGNIFICANT CHANGE UP (ref 3.5–5)
PROT SERPL-MCNC: 6 G/DL — SIGNIFICANT CHANGE UP (ref 6–8)
PROTHROM AB SERPL-ACNC: 11.8 SEC — SIGNIFICANT CHANGE UP (ref 9.95–12.87)
RBC # BLD: 3.31 M/UL — LOW (ref 4.7–6.1)
RBC # FLD: 18.1 % — HIGH (ref 11.5–14.5)
SODIUM SERPL-SCNC: 142 MMOL/L — SIGNIFICANT CHANGE UP (ref 135–146)
WBC # BLD: 16.3 K/UL — HIGH (ref 4.8–10.8)
WBC # FLD AUTO: 16.3 K/UL — HIGH (ref 4.8–10.8)

## 2024-03-15 PROCEDURE — 85025 COMPLETE CBC W/AUTO DIFF WBC: CPT

## 2024-03-15 PROCEDURE — 36415 COLL VENOUS BLD VENIPUNCTURE: CPT

## 2024-03-15 PROCEDURE — 85610 PROTHROMBIN TIME: CPT

## 2024-03-15 PROCEDURE — 85730 THROMBOPLASTIN TIME PARTIAL: CPT

## 2024-03-15 PROCEDURE — 80053 COMPREHEN METABOLIC PANEL: CPT

## 2024-03-15 PROCEDURE — 99214 OFFICE O/P EST MOD 30 MIN: CPT | Mod: 25

## 2024-03-15 NOTE — H&P PST ADULT - REASON FOR ADMISSION
Case Type: OP   Block TimeSuite: OR SouthProceduralist: Lit Lima  Confirmed Surgery DateTime: 03- - 0:00PAST DateTime: 03- - 10:30Procedure: AQUABLATION, CYSTOSCOPY TRANSURETHRAL RESECTION OF PROSTATE  ERP?: NoLaterality: N/ALength of Procedure: 90 Minutes  Anesthesia Type: General Case Type: OP   Suite: [facility]  Proceduralist: Lit Lima  Confirmed Surgery Date Time: 03-   PAST Date Time: 03- - 10:30  Procedure: AQUABLATION, CYSTOSCOPY TRANSURETHRAL RESECTION OF PROSTATE  Laterality: N/A  Length of Procedure: 90 Minutes  Anesthesia Type: General

## 2024-03-15 NOTE — H&P PST ADULT - NSICDXPASTMEDICALHX_GEN_ALL_CORE_FT
PAST MEDICAL HISTORY:  2019 novel coronavirus disease (COVID-19)     ASHD (arteriosclerotic heart disease) STEMI 12/31/17, PCI RCA    CAD (coronary artery disease)     Chronic kidney disease (CKD) III    Dyspnea, unspecified type     H/O hemolytic anemia     H/O pulmonary fibrosis     H/O urinary retention     Hypertension, unspecified type     Myocardial infarction     Pneumonia due to COVID-19 virus     Type 2 diabetes mellitus without complication, unspecified long term insulin use status

## 2024-03-15 NOTE — H&P PST ADULT - HISTORY OF COVID-19 VACCINATION
Initial Clinical Review    Thank you,  [address] [facility] in the [employer] by Cameron Jerez for 2017  Network Utilization Review Department  Phone: [phone]; Fax [phone]  ATTENTION: The Network Utilization Review Department is now centralized for our 7 Facilities  Make a note that we have a new phone and fax numbers for our Department  Please call with any questions or concerns to [phone] and carefully follow the prompts so that you are directed to the right person  All voicemails are confidential  Fax any determinations, approvals, denials, and requests for initial or continue stay review clinical to [phone]  Due to HIGH CALL volume, it would be easier if you could please send faxed requests to expedite your requests and in part, help us provide discharge notifications faster  Age/Sex: 41 y o  male    Surgery Date: 6/21/2018    Procedure: liver resection/ablation, intraoperative ultrasound of liver (N/A)  hemicolectomy, low anterior resection (open) SPY fluorescence angiography (Left)  SIGMOIDOSCOPY FLEXIBLE (N/A)  LAPAROTOMY EXPLORATORY (N/A)  Partial proctectomy  (N/A)     Operative Findings:  -EEA 29 colorectal anastomosis to ~10cm low anterior resection/partial proctectomy  -Excellent SPY fluorescence angiography, intact anastomotic rings(thinner on rectal side), negative bubble leak test with sigmoidoscopy  Anesthesia:  General w/ epidural     Admission Orders: Date/Time/Statement: 6/21/18 @ 1552     Orders Placed This Encounter   Procedures    Inpatient Admission     Standing Status:   Standing     Number of Occurrences:   1     Order Specific Question:   Admitting Physician     Answer:   Quentin Patel     Order Specific Question:   Level of Care     Answer:   Med Surg [16]     Order Specific Question:   Estimated length of stay     Answer:   More than 2 Midnights     Order Specific Question:   Certification     Answer:    I certify that inpatient services are medically necessary for this patient for a duration of greater than two midnights  See H&P and MD Progress Notes for additional information about the patient's course of treatment         Vital Signs: BP 90/53   Pulse 62   Temp 99 4 °F (37 4 °C)   Resp 18   Ht 5' 6" (1 676 m)   Wt 68 1 kg (150 lb 2 1 oz)   SpO2 99%   BMI 24 23 kg/m²       06/21 0701 06/22 0700  Most Recent    Temperature (°F) 97 3-99 4  99 4 (37 4)    Pulse 56-85  62    Respirations 4-20  18    Blood Pressure 86//62  90/53    Arterial Line BP 98//52      SpO2 (%)   99        Scheduled Meds:  Current Facility-Administered Medications:  diphenhydrAMINE 25 mg Intravenous Q6H PRN    docusate sodium 100 mg Oral BID PRN    heparin (porcine) 5,000 Units Subcutaneous Q12H [facility]    metoclopramide 5 mg Intravenous Q6H PRN    nicotine 1 patch Transdermal Daily    ondansetron 4 mg Intravenous Q4H PRN    pantoprazole 20 mg Oral Early Morning      Continuous Infusions:  dextrose 5 % and sodium chloride 0 45 % with KCl 20 mEq/L 75 mL/hr Started 6/22 @ 07:51   ropivacaine 0 1% and fentaNYL 2 mcg/mL PCEA    NSS  125ml/hr -d/c'd 6/22 @ 07:24         Nursing orders - Telem - VS q 4 - Post op care - Continuous Pulse ox monitoring - I & O q shift - daily weights - SCD's to le's - up as tolerated  With assistance - Diet surgical clears no carbonation - PT/OT eval Yes

## 2024-03-15 NOTE — H&P PST ADULT - CARDIOVASCULAR
Called patient gave the results patient verbalized understanding.   normal/regular rate and rhythm/S1 S2 present/no gallops/no rub/no murmur negative

## 2024-03-15 NOTE — H&P PST ADULT - HISTORY OF PRESENT ILLNESS
Patient denies any cp, sob, palpitations, fever, cough, URI, abdominal pains, N/V, UTI, Rashes or open wounds.  As per patient exercise tolerance of1- 2 fos walks with out sob  Patient denies any s/s covid 19 and reports no contact with known positive people. Patient has appointment for repeat covid testing pre op and instructed to continue to self monitor and report any concerns to MD. 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   Patient denies any cp, sob, palpitations, fever, cough, URI, abdominal pains, N/V, UTI, Rashes or open wounds.  As per patient exercise tolerance of1- 2 fos walks with out sob  Patient denies any s/s covid 19 and reports no contact with known positive people. Patient has appointment for repeat covid testing pre op and instructed to continue to self monitor and report any concerns to MD. 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   Patient denies any cp, sob, palpitations, fever, cough, URI, abdominal pains, N/V, UTI, Rashes or open wounds.  As per patient exercise tolerance of1- 2 fos walks with out sob  Patient denies any s/s covid 19 and reports no contact with known positive people. Patient has appointment for repeat covid testing pre op and instructed to continue to self monitor and report any concerns to MD. Pt will continue to practice self isolation and  exposure control measures pre op  Anesthesia Alert  NO--Difficult Airway  NO--History of neck surgery or radiation  NO--Limited ROM of neck  NO--History of Malignant hyperthermia  NO--Personal or family history of Pseudocholinesterase deficiency  NO--Prior Anesthesia Complication  NO--Latex Allergy  NO--Loose teeth  NO--History of Rheumatoid Arthritis  NO--KARL  NO-Risk of Bleedings Hemolytic anemia

## 2024-03-15 NOTE — H&P PST ADULT - ATTENDING COMMENTS
For aquablation, turp.  risks, benefits, alternatives discussed including bleeding, transfusion, infection, failure to void, incontinence, post op catheter.

## 2024-03-16 DIAGNOSIS — N40.1 BENIGN PROSTATIC HYPERPLASIA WITH LOWER URINARY TRACT SYMPTOMS: ICD-10-CM

## 2024-03-16 DIAGNOSIS — Z01.818 ENCOUNTER FOR OTHER PREPROCEDURAL EXAMINATION: ICD-10-CM

## 2024-03-21 ENCOUNTER — INPATIENT (INPATIENT)
Facility: HOSPITAL | Age: 74
LOS: 0 days | Discharge: HOME | DRG: 714 | End: 2024-03-22
Attending: UROLOGY | Admitting: UROLOGY
Payer: MEDICARE

## 2024-03-21 ENCOUNTER — RESULT REVIEW (OUTPATIENT)
Age: 74
End: 2024-03-21

## 2024-03-21 ENCOUNTER — APPOINTMENT (OUTPATIENT)
Dept: UROLOGY | Facility: HOSPITAL | Age: 74
End: 2024-03-21

## 2024-03-21 VITALS
HEART RATE: 80 BPM | TEMPERATURE: 96 F | HEIGHT: 68 IN | SYSTOLIC BLOOD PRESSURE: 128 MMHG | WEIGHT: 188.05 LBS | DIASTOLIC BLOOD PRESSURE: 68 MMHG | RESPIRATION RATE: 17 BRPM | OXYGEN SATURATION: 99 %

## 2024-03-21 DIAGNOSIS — Z95.5 PRESENCE OF CORONARY ANGIOPLASTY IMPLANT AND GRAFT: Chronic | ICD-10-CM

## 2024-03-21 DIAGNOSIS — Z98.49 CATARACT EXTRACTION STATUS, UNSPECIFIED EYE: Chronic | ICD-10-CM

## 2024-03-21 DIAGNOSIS — Z98.890 OTHER SPECIFIED POSTPROCEDURAL STATES: Chronic | ICD-10-CM

## 2024-03-21 DIAGNOSIS — N40.1 BENIGN PROSTATIC HYPERPLASIA WITH LOWER URINARY TRACT SYMPTOMS: ICD-10-CM

## 2024-03-21 LAB
GLUCOSE BLDC GLUCOMTR-MCNC: 116 MG/DL — HIGH (ref 70–99)
GLUCOSE BLDC GLUCOMTR-MCNC: 238 MG/DL — HIGH (ref 70–99)
GLUCOSE BLDC GLUCOMTR-MCNC: 287 MG/DL — HIGH (ref 70–99)
GLUCOSE BLDC GLUCOMTR-MCNC: 294 MG/DL — HIGH (ref 70–99)

## 2024-03-21 PROCEDURE — 88305 TISSUE EXAM BY PATHOLOGIST: CPT | Mod: 26

## 2024-03-21 PROCEDURE — 84132 ASSAY OF SERUM POTASSIUM: CPT

## 2024-03-21 PROCEDURE — 85027 COMPLETE CBC AUTOMATED: CPT

## 2024-03-21 PROCEDURE — 82962 GLUCOSE BLOOD TEST: CPT

## 2024-03-21 PROCEDURE — C9399: CPT

## 2024-03-21 PROCEDURE — 36415 COLL VENOUS BLD VENIPUNCTURE: CPT

## 2024-03-21 PROCEDURE — 80048 BASIC METABOLIC PNL TOTAL CA: CPT

## 2024-03-21 RX ORDER — LEVOTHYROXINE SODIUM 125 MCG
1 TABLET ORAL
Qty: 0 | Refills: 0 | DISCHARGE

## 2024-03-21 RX ORDER — ONDANSETRON 8 MG/1
4 TABLET, FILM COATED ORAL EVERY 8 HOURS
Refills: 0 | Status: DISCONTINUED | OUTPATIENT
Start: 2024-03-21 | End: 2024-03-22

## 2024-03-21 RX ORDER — SODIUM CHLORIDE 9 MG/ML
1000 INJECTION, SOLUTION INTRAVENOUS
Refills: 0 | Status: COMPLETED | OUTPATIENT
Start: 2024-03-21 | End: 2024-03-21

## 2024-03-21 RX ORDER — METFORMIN HYDROCHLORIDE 850 MG/1
1 TABLET ORAL
Refills: 0 | DISCHARGE

## 2024-03-21 RX ORDER — DEXTROSE 50 % IN WATER 50 %
15 SYRINGE (ML) INTRAVENOUS ONCE
Refills: 0 | Status: DISCONTINUED | OUTPATIENT
Start: 2024-03-21 | End: 2024-03-22

## 2024-03-21 RX ORDER — DEXTROSE 50 % IN WATER 50 %
25 SYRINGE (ML) INTRAVENOUS ONCE
Refills: 0 | Status: DISCONTINUED | OUTPATIENT
Start: 2024-03-21 | End: 2024-03-22

## 2024-03-21 RX ORDER — SODIUM CHLORIDE 9 MG/ML
1000 INJECTION, SOLUTION INTRAVENOUS
Refills: 0 | Status: DISCONTINUED | OUTPATIENT
Start: 2024-03-21 | End: 2024-03-21

## 2024-03-21 RX ORDER — ONDANSETRON 8 MG/1
4 TABLET, FILM COATED ORAL ONCE
Refills: 0 | Status: COMPLETED | OUTPATIENT
Start: 2024-03-21 | End: 2024-03-21

## 2024-03-21 RX ORDER — CHLORHEXIDINE GLUCONATE 213 G/1000ML
1 SOLUTION TOPICAL
Refills: 0 | Status: DISCONTINUED | OUTPATIENT
Start: 2024-03-21 | End: 2024-03-22

## 2024-03-21 RX ORDER — SODIUM CHLORIDE 9 MG/ML
1000 INJECTION, SOLUTION INTRAVENOUS
Refills: 0 | Status: DISCONTINUED | OUTPATIENT
Start: 2024-03-21 | End: 2024-03-22

## 2024-03-21 RX ORDER — OXYBUTYNIN CHLORIDE 5 MG
5 TABLET ORAL ONCE
Refills: 0 | Status: COMPLETED | OUTPATIENT
Start: 2024-03-21 | End: 2024-03-21

## 2024-03-21 RX ORDER — INSULIN LISPRO 100/ML
VIAL (ML) SUBCUTANEOUS AT BEDTIME
Refills: 0 | Status: DISCONTINUED | OUTPATIENT
Start: 2024-03-21 | End: 2024-03-22

## 2024-03-21 RX ORDER — INSULIN LISPRO 100/ML
VIAL (ML) SUBCUTANEOUS
Refills: 0 | Status: DISCONTINUED | OUTPATIENT
Start: 2024-03-21 | End: 2024-03-22

## 2024-03-21 RX ORDER — PHENAZOPYRIDINE HCL 100 MG
100 TABLET ORAL THREE TIMES A DAY
Refills: 0 | Status: DISCONTINUED | OUTPATIENT
Start: 2024-03-21 | End: 2024-03-22

## 2024-03-21 RX ORDER — METOPROLOL TARTRATE 50 MG
25 TABLET ORAL DAILY
Refills: 0 | Status: DISCONTINUED | OUTPATIENT
Start: 2024-03-21 | End: 2024-03-22

## 2024-03-21 RX ORDER — LEVOTHYROXINE SODIUM 125 MCG
50 TABLET ORAL DAILY
Refills: 0 | Status: DISCONTINUED | OUTPATIENT
Start: 2024-03-21 | End: 2024-03-22

## 2024-03-21 RX ORDER — TAMSULOSIN HYDROCHLORIDE 0.4 MG/1
0.8 CAPSULE ORAL AT BEDTIME
Refills: 0 | Status: DISCONTINUED | OUTPATIENT
Start: 2024-03-21 | End: 2024-03-22

## 2024-03-21 RX ORDER — LANOLIN ALCOHOL/MO/W.PET/CERES
3 CREAM (GRAM) TOPICAL AT BEDTIME
Refills: 0 | Status: DISCONTINUED | OUTPATIENT
Start: 2024-03-21 | End: 2024-03-22

## 2024-03-21 RX ORDER — TAMSULOSIN HYDROCHLORIDE 0.4 MG/1
2 CAPSULE ORAL
Refills: 0 | DISCHARGE

## 2024-03-21 RX ORDER — DEXTROSE 50 % IN WATER 50 %
12.5 SYRINGE (ML) INTRAVENOUS ONCE
Refills: 0 | Status: DISCONTINUED | OUTPATIENT
Start: 2024-03-21 | End: 2024-03-22

## 2024-03-21 RX ORDER — GENTAMICIN SULFATE 40 MG/ML
80 VIAL (ML) INJECTION EVERY 8 HOURS
Refills: 0 | Status: COMPLETED | OUTPATIENT
Start: 2024-03-21 | End: 2024-03-22

## 2024-03-21 RX ORDER — ACETAMINOPHEN 500 MG
650 TABLET ORAL EVERY 6 HOURS
Refills: 0 | Status: DISCONTINUED | OUTPATIENT
Start: 2024-03-21 | End: 2024-03-22

## 2024-03-21 RX ORDER — GLUCAGON INJECTION, SOLUTION 0.5 MG/.1ML
1 INJECTION, SOLUTION SUBCUTANEOUS ONCE
Refills: 0 | Status: DISCONTINUED | OUTPATIENT
Start: 2024-03-21 | End: 2024-03-22

## 2024-03-21 RX ORDER — ATORVASTATIN CALCIUM 80 MG/1
10 TABLET, FILM COATED ORAL AT BEDTIME
Refills: 0 | Status: DISCONTINUED | OUTPATIENT
Start: 2024-03-21 | End: 2024-03-22

## 2024-03-21 RX ORDER — HYDROMORPHONE HYDROCHLORIDE 2 MG/ML
0.5 INJECTION INTRAMUSCULAR; INTRAVENOUS; SUBCUTANEOUS
Refills: 0 | Status: DISCONTINUED | OUTPATIENT
Start: 2024-03-21 | End: 2024-03-21

## 2024-03-21 RX ORDER — OXYBUTYNIN CHLORIDE 5 MG
5 TABLET ORAL
Refills: 0 | Status: DISCONTINUED | OUTPATIENT
Start: 2024-03-21 | End: 2024-03-22

## 2024-03-21 RX ORDER — HYDROMORPHONE HYDROCHLORIDE 2 MG/ML
1 INJECTION INTRAMUSCULAR; INTRAVENOUS; SUBCUTANEOUS EVERY 4 HOURS
Refills: 0 | Status: DISCONTINUED | OUTPATIENT
Start: 2024-03-21 | End: 2024-03-22

## 2024-03-21 RX ORDER — SENNA PLUS 8.6 MG/1
2 TABLET ORAL AT BEDTIME
Refills: 0 | Status: DISCONTINUED | OUTPATIENT
Start: 2024-03-21 | End: 2024-03-22

## 2024-03-21 RX ADMIN — Medication 0: at 22:35

## 2024-03-21 RX ADMIN — Medication 650 MILLIGRAM(S): at 17:18

## 2024-03-21 RX ADMIN — Medication 5 MILLIGRAM(S): at 16:06

## 2024-03-21 RX ADMIN — ONDANSETRON 4 MILLIGRAM(S): 8 TABLET, FILM COATED ORAL at 16:27

## 2024-03-21 RX ADMIN — HYDROMORPHONE HYDROCHLORIDE 1 MILLIGRAM(S): 2 INJECTION INTRAMUSCULAR; INTRAVENOUS; SUBCUTANEOUS at 18:48

## 2024-03-21 RX ADMIN — TAMSULOSIN HYDROCHLORIDE 0.8 MILLIGRAM(S): 0.4 CAPSULE ORAL at 22:39

## 2024-03-21 RX ADMIN — Medication 100 MILLIGRAM(S): at 22:38

## 2024-03-21 RX ADMIN — Medication 650 MILLIGRAM(S): at 16:43

## 2024-03-21 RX ADMIN — Medication 3: at 16:27

## 2024-03-21 RX ADMIN — HYDROMORPHONE HYDROCHLORIDE 0.5 MILLIGRAM(S): 2 INJECTION INTRAMUSCULAR; INTRAVENOUS; SUBCUTANEOUS at 17:07

## 2024-03-21 RX ADMIN — SODIUM CHLORIDE 100 MILLILITER(S): 9 INJECTION, SOLUTION INTRAVENOUS at 15:09

## 2024-03-21 RX ADMIN — SENNA PLUS 2 TABLET(S): 8.6 TABLET ORAL at 22:38

## 2024-03-21 RX ADMIN — HYDROMORPHONE HYDROCHLORIDE 0.5 MILLIGRAM(S): 2 INJECTION INTRAMUSCULAR; INTRAVENOUS; SUBCUTANEOUS at 17:19

## 2024-03-21 RX ADMIN — ATORVASTATIN CALCIUM 10 MILLIGRAM(S): 80 TABLET, FILM COATED ORAL at 22:38

## 2024-03-21 RX ADMIN — Medication 200 MILLIGRAM(S): at 22:46

## 2024-03-21 RX ADMIN — SODIUM CHLORIDE 100 MILLILITER(S): 9 INJECTION, SOLUTION INTRAVENOUS at 16:28

## 2024-03-21 RX ADMIN — Medication 5 MILLIGRAM(S): at 22:39

## 2024-03-21 NOTE — ASU PATIENT PROFILE, ADULT - NSICDXPASTMEDICALHX_GEN_ALL_CORE_FT
PAST MEDICAL HISTORY:  2019 novel coronavirus disease (COVID-19)     ASHD (arteriosclerotic heart disease) STEMI 12/31/17, PCI RCA    CAD (coronary artery disease)     Chronic kidney disease (CKD) III    Dyspnea, unspecified type     H/O hemolytic anemia     H/O pulmonary fibrosis     H/O urinary retention with jeter catheter    Hypertension, unspecified type     Myocardial infarction     Pneumonia due to COVID-19 virus     Type 2 diabetes mellitus without complication, unspecified long term insulin use status

## 2024-03-21 NOTE — ASU PATIENT PROFILE, ADULT - FALL HARM RISK - HARM RISK INTERVENTIONS

## 2024-03-21 NOTE — PATIENT PROFILE ADULT - TRANSPORTATION
Last appt: 2-  Next appt: No future appointment is scheduled at this time.    Medication matches medication on Epic list
no

## 2024-03-21 NOTE — ASU PATIENT PROFILE, ADULT - VISION (WITH CORRECTIVE LENSES IF THE PATIENT USUALLY WEARS THEM):
glasses with daughter/Partially impaired: cannot see medication labels or newsprint, but can see obstacles in path, and the surrounding layout; can count fingers at arm's length

## 2024-03-21 NOTE — BRIEF OPERATIVE NOTE - NSICDXBRIEFPROCEDURE_GEN_ALL_CORE_FT
PROCEDURES:  Aquablation of prostate 21-Mar-2024 14:46:01 Trans urethral resection prostate iLt Lima

## 2024-03-21 NOTE — PATIENT PROFILE ADULT - FALL HARM RISK - HARM RISK INTERVENTIONS
Assistance with ambulation/Assistance OOB with selected safe patient handling equipment/Communicate Risk of Fall with Harm to all staff/Discuss with provider need for PT consult/Monitor gait and stability/Provide patient with walking aids - walker, cane, crutches/Reinforce activity limits and safety measures with patient and family/Tailored Fall Risk Interventions/Use of alarms - bed, chair and/or voice tab/Visual Cue: Yellow wristband and red socks/Bed in lowest position, wheels locked, appropriate side rails in place/Call bell, personal items and telephone in reach/Instruct patient to call for assistance before getting out of bed or chair/Non-slip footwear when patient is out of bed/[place] to call system/Physically safe environment - no spills, clutter or unnecessary equipment/Purposeful Proactive Rounding/Room/bathroom lighting operational, light cord in reach

## 2024-03-21 NOTE — ASU PATIENT PROFILE, ADULT - NSICDXPASTSURGICALHX_GEN_ALL_CORE_FT
PAST SURGICAL HISTORY:  Coronary stent patent x 4 stents    History of ligation of vein 2012    History of tonsillectomy 1957    S/P cataract surgery b/l

## 2024-03-21 NOTE — PATIENT PROFILE ADULT - NSPROMEDSBROUGHTTOHOSP_GEN_A_NUR
----- Message from Suyapa Reyes sent at 10/20/2017  1:37 PM CDT -----  Contact: Pt  Pt would like to be called back regarding a thyroid scan she scheduled for.        Please call pt at [phone].        Thanks!   no Yes

## 2024-03-21 NOTE — ASU PREOP CHECKLIST - INTERNAL PROSTHESES
cardiac stents x 4 and jeter catheter/yes(specify) cardiac stents x 4 and jeter catheter, b/l IOL/yes(specify)

## 2024-03-21 NOTE — CHART NOTE - NSCHARTNOTEFT_GEN_A_CORE
.  Urology Post Operative Note --> s/p Aquablation today by Dr. Lima      Patient reports feels pain around his meatus from the Penaloza catheter and pressure type pain in Perineum.    RN just gave a dose of Oxybutynin ans Tylenol.  24 Danish 3 way catheter in place with traction and CBI running.    Denies N/V, subjective fever, chills, tremors, CP or SOB.         Vitals: T(F): 97.5 (03-21-24 @ 16:01), Max: 97.5 (03-21-24 @ 14:49)  HR: 73 (03-21-24 @ 17:03)  BP: 117/57 (03-21-24 @ 17:03) (117/57 - 147/66)  RR: 16 (03-21-24 @ 17:03)  SpO2: 97% (03-21-24 @ 17:03)      I&O's:  In:   03-21-24 @ 07:01  -  03-21-24 @ 17:27  --------------------------------------------------------  IN: 9200 mL      IV Fluids: dextrose 5%. 1000 milliLiter(s) (50 mL/Hr) IV Continuous <Continuous>  dextrose 5%. 1000 milliLiter(s) (100 mL/Hr) IV Continuous <Continuous>      Out:   03-21-24 @ 07:01  -  03-21-24 @ 17:27  --------------------------------------------------------  OUT: 8650 mL      Voided Urine:   03-21-24 @ 07:01  -  03-21-24 @ 17:27  --------------------------------------------------------  OUT: 8650 mL      Penaloza Catheter:  Yes 24 Danish 3 way to CBI.          Physical Examination:  General:  Awake, alert and conversant male in NAD.   Lungs:  CTA bilaterally, No W/R/R.  Cor:  S1 & S2 RRR, No M/R/G.  Abd:  + BS, soft , no distention, no tenderness.  No rebound, guarding or peritoneal signs.   :  Uncircumcised, (+) 24 Danish 3 way Penaloza in place taped to right thigh under traction with CBI running.  Draining very light pink hematuria.  (+) Pain around meatus from Penaloza catheter and traction.  Scant blood noted on gauze pads that were around his meatus. No active bleeding or discharge.  Foreskin not retracted and loose over glans, No paraphimosis noted.   (+) Perineum tenderness.  No CVAT,  No suprapubic tenderness.    Ext:  No C/C/E,  No calf tenderness.  Neuro:  No focal deficits, no facial droop.          Medications: [Standing]  acetaminophen     Tablet .. 650 milliGRAM(s) Oral every 6 hours PRN  aluminum hydroxide/magnesium hydroxide/simethicone Suspension 30 milliLiter(s) Oral every 4 hours PRN  atorvastatin 10 milliGRAM(s) Oral at bedtime  chlorhexidine 2% Cloths 1 Application(s) Topical <User Schedule>  dextrose 5%. 1000 milliLiter(s) IV Continuous <Continuous>  dextrose 5%. 1000 milliLiter(s) IV Continuous <Continuous>  dextrose 50% Injectable 25 Gram(s) IV Push once  dextrose 50% Injectable 12.5 Gram(s) IV Push once  dextrose 50% Injectable 25 Gram(s) IV Push once  dextrose Oral Gel 15 Gram(s) Oral once PRN  gentamicin   IVPB 80 milliGRAM(s) IV Intermittent every 8 hours  glucagon  Injectable 1 milliGRAM(s) IntraMuscular once  HYDROmorphone  Injectable 1 milliGRAM(s) IV Push every 4 hours PRN  HYDROmorphone  Injectable 0.5 milliGRAM(s) IV Push every 10 minutes PRN  insulin lispro (ADMELOG) corrective regimen sliding scale   SubCutaneous three times a day before meals  insulin lispro (ADMELOG) corrective regimen sliding scale   SubCutaneous at bedtime  levothyroxine 50 MICROGram(s) Oral daily  melatonin 3 milliGRAM(s) Oral at bedtime PRN  metoprolol succinate ER 25 milliGRAM(s) Oral daily  ondansetron Injectable 4 milliGRAM(s) IV Push every 8 hours PRN  oxybutynin 5 milliGRAM(s) Oral two times a day  phenazopyridine 100 milliGRAM(s) Oral three times a day  predniSONE   Tablet 60 milliGRAM(s) Oral daily  senna 2 Tablet(s) Oral at bedtime  tamsulosin 0.8 milliGRAM(s) Oral at bedtime          Labs:  No Post-op labs ordered.    Imaging:  No post-op imaging studies              Assessment:  73yMale patient S/P Aquablation for BPH.      Plan:  - Diet:  Resume Low Carbohydrate / DASH diet.    - IVF's with LR at 100 mL/Hr and can IV lock when tolerating diet well.    - Ivabx:  Gentamicin 80 mg IVPB x 2 doses per Dr. Lima.  - Pain medications:  Tylenol PRN mild pain,  Dilaudid 0.5 mg IVP PRN moderate pain and Dilaudid 1 mg IVP PRN severe pain.  - Oxybutynin 5 mg po BID ordered for bladder spasms.  - Pyridium for dysuria.   - Senna 2 tabs po q hs ordered to avoid constipation.    - VTE prophylaxis:  No Pharmacological prophylaxis per Dr. Lima due to bleeding post op.  Can use SCD's while in bed and Ambulate.     - Continue 24 Danish 3 way Penaloza with CBI and Monitor Urine output q shift and document.  May irrigate PRN clot retention.   - As per Dr. Lima,  keep Penaloza to traction overnight and check in am and if urine remaining light pink or clear then can take off traction and stop CBI.   - Follow up am labs with CBC/BMP ordered.  - Encouraged OOB to chair and ambulate with assistance.  - Resume routine home medications except for his oral diabetic medications.  Will use Lispro sliding scale PRN.   - Will follow.

## 2024-03-22 ENCOUNTER — TRANSCRIPTION ENCOUNTER (OUTPATIENT)
Age: 74
End: 2024-03-22

## 2024-03-22 VITALS
RESPIRATION RATE: 18 BRPM | DIASTOLIC BLOOD PRESSURE: 59 MMHG | HEART RATE: 76 BPM | SYSTOLIC BLOOD PRESSURE: 96 MMHG | TEMPERATURE: 97 F

## 2024-03-22 PROBLEM — Z87.898 PERSONAL HISTORY OF OTHER SPECIFIED CONDITIONS: Chronic | Status: ACTIVE | Noted: 2024-03-15

## 2024-03-22 PROBLEM — Z87.09 PERSONAL HISTORY OF OTHER DISEASES OF THE RESPIRATORY SYSTEM: Chronic | Status: ACTIVE | Noted: 2024-03-15

## 2024-03-22 LAB
ANION GAP SERPL CALC-SCNC: 9 MMOL/L — SIGNIFICANT CHANGE UP (ref 7–14)
BUN SERPL-MCNC: 22 MG/DL — HIGH (ref 10–20)
CALCIUM SERPL-MCNC: 8.6 MG/DL — SIGNIFICANT CHANGE UP (ref 8.4–10.5)
CHLORIDE SERPL-SCNC: 99 MMOL/L — SIGNIFICANT CHANGE UP (ref 98–110)
CO2 SERPL-SCNC: 30 MMOL/L — SIGNIFICANT CHANGE UP (ref 17–32)
CREAT SERPL-MCNC: 0.9 MG/DL — SIGNIFICANT CHANGE UP (ref 0.7–1.5)
EGFR: 91 ML/MIN/1.73M2 — SIGNIFICANT CHANGE UP
GLUCOSE BLDC GLUCOMTR-MCNC: 168 MG/DL — HIGH (ref 70–99)
GLUCOSE BLDC GLUCOMTR-MCNC: 226 MG/DL — HIGH (ref 70–99)
GLUCOSE SERPL-MCNC: 183 MG/DL — HIGH (ref 70–99)
HCT VFR BLD CALC: 29.7 % — LOW (ref 42–52)
HGB BLD-MCNC: 10.8 G/DL — LOW (ref 14–18)
MCHC RBC-ENTMCNC: 34.7 PG — HIGH (ref 27–31)
MCHC RBC-ENTMCNC: 36.4 G/DL — SIGNIFICANT CHANGE UP (ref 32–37)
MCV RBC AUTO: 95.5 FL — HIGH (ref 80–94)
NRBC # BLD: 0 /100 WBCS — SIGNIFICANT CHANGE UP (ref 0–0)
PLATELET # BLD AUTO: 149 K/UL — SIGNIFICANT CHANGE UP (ref 130–400)
PMV BLD: 10.7 FL — HIGH (ref 7.4–10.4)
POTASSIUM SERPL-MCNC: 5 MMOL/L — SIGNIFICANT CHANGE UP (ref 3.5–5)
POTASSIUM SERPL-MCNC: 6 MMOL/L — CRITICAL HIGH (ref 3.5–5)
POTASSIUM SERPL-SCNC: 5 MMOL/L — SIGNIFICANT CHANGE UP (ref 3.5–5)
POTASSIUM SERPL-SCNC: 6 MMOL/L — CRITICAL HIGH (ref 3.5–5)
RBC # BLD: 3.11 M/UL — LOW (ref 4.7–6.1)
RBC # FLD: 18.1 % — HIGH (ref 11.5–14.5)
SODIUM SERPL-SCNC: 138 MMOL/L — SIGNIFICANT CHANGE UP (ref 135–146)
WBC # BLD: 15.98 K/UL — HIGH (ref 4.8–10.8)
WBC # FLD AUTO: 15.98 K/UL — HIGH (ref 4.8–10.8)

## 2024-03-22 RX ORDER — OXYBUTYNIN CHLORIDE 5 MG
1 TABLET ORAL
Qty: 5 | Refills: 0
Start: 2024-03-22

## 2024-03-22 RX ORDER — PHENAZOPYRIDINE HCL 100 MG
2 TABLET ORAL
Qty: 18 | Refills: 0
Start: 2024-03-22 | End: 2024-03-24

## 2024-03-22 RX ORDER — DOCUSATE SODIUM 100 MG
1 CAPSULE ORAL
Qty: 21 | Refills: 0
Start: 2024-03-22 | End: 2024-03-28

## 2024-03-22 RX ORDER — LANOLIN ALCOHOL/MO/W.PET/CERES
1 CREAM (GRAM) TOPICAL
Qty: 0 | Refills: 0 | DISCHARGE
Start: 2024-03-22

## 2024-03-22 RX ADMIN — Medication 200 MILLIGRAM(S): at 05:38

## 2024-03-22 RX ADMIN — Medication 100 MILLIGRAM(S): at 05:39

## 2024-03-22 RX ADMIN — Medication 5 MILLIGRAM(S): at 05:39

## 2024-03-22 RX ADMIN — Medication 25 MILLIGRAM(S): at 05:39

## 2024-03-22 RX ADMIN — Medication 100 MILLIGRAM(S): at 13:20

## 2024-03-22 RX ADMIN — Medication 1: at 07:48

## 2024-03-22 RX ADMIN — Medication 50 MICROGRAM(S): at 05:38

## 2024-03-22 RX ADMIN — HYDROMORPHONE HYDROCHLORIDE 1 MILLIGRAM(S): 2 INJECTION INTRAMUSCULAR; INTRAVENOUS; SUBCUTANEOUS at 02:57

## 2024-03-22 RX ADMIN — Medication 650 MILLIGRAM(S): at 10:26

## 2024-03-22 RX ADMIN — HYDROMORPHONE HYDROCHLORIDE 1 MILLIGRAM(S): 2 INJECTION INTRAMUSCULAR; INTRAVENOUS; SUBCUTANEOUS at 03:09

## 2024-03-22 RX ADMIN — Medication 2: at 11:46

## 2024-03-22 RX ADMIN — CHLORHEXIDINE GLUCONATE 1 APPLICATION(S): 213 SOLUTION TOPICAL at 06:15

## 2024-03-22 NOTE — DISCHARGE NOTE PROVIDER - NSDCMRMEDTOKEN_GEN_ALL_CORE_FT
acyclovir 400 mg oral tablet: 1 tab(s) orally every 12 hours  aspirin 81 mg oral delayed release tablet: 1 tab(s) orally once a day  Bactrim  mg-160 mg oral tablet: 1 tab(s) orally 3 times a week Take 1 tablet three times weekly for PJP prophylaxis while remaining on high dose steroids or rituximab, beginning 2/27/24  Basaglar KwikPen 100 units/mL subcutaneous solution: 15 unit(s) subcutaneous once a day (at bedtime)  docusate sodium 100 mg oral tablet: 1 tab(s) orally 3 times a day  ferrous sulfate 325 mg (65 mg elemental iron) oral tablet: 1 tab(s) orally once a day  Flomax 0.4 mg oral capsule: 2 cap(s) orally once a day  melatonin 3 mg oral tablet: 1 tab(s) orally once a day (at bedtime) As needed Insomnia  metoprolol succinate 25 mg oral tablet, extended release: 1 tab(s) orally once a day  Multiple Vitamins oral tablet: 1 tab(s) orally once a day  oxyBUTYnin 5 mg oral tablet: 1 tab(s) orally 2 times a day  pantoprazole 40 mg oral delayed release tablet: 1 tab(s) orally once a day (before a meal)  Pravachol 40 mg oral tablet: 1 tab(s) orally once a day (at bedtime)  predniSONE 20 mg oral tablet: 3 tab(s) orally once a day  Pyridium 100 mg oral tablet: 2 tab(s) orally 3 times a day  Synthroid 50 mcg (0.05 mg) oral tablet: 1 tab(s) orally once a day

## 2024-03-22 NOTE — DISCHARGE NOTE NURSING/CASE MANAGEMENT/SOCIAL WORK - PATIENT PORTAL LINK FT
You can access the FollowMyHealth Patient Portal offered by [facility] by registering at the following website: [email or web address]. By joining [facility]’s FollowMyHealth portal, you will also be able to view your health information using other applications (apps) compatible with our system.

## 2024-03-22 NOTE — DISCHARGE NOTE PROVIDER - NSDCFUSCHEDAPPT_GEN_ALL_CORE_FT
Lit Lima  [facility] Physician [facility]  UROLOGY [address] [facility]  Scheduled Appointment: 03/26/2024

## 2024-03-22 NOTE — DISCHARGE NOTE PROVIDER - CARE PROVIDER_API CALL
Lit Lima  Urology  [address] [place] [id number]  Phone: [phone]  Fax: [phone]  Follow Up Time: 1-3 days

## 2024-03-22 NOTE — DISCHARGE NOTE PROVIDER - CARE PROVIDERS DIRECT ADDRESSES
[email or web address]

## 2024-03-22 NOTE — PROGRESS NOTE ADULT - ASSESSMENT
Refill Routing Note   Medication(s) are not appropriate for processing by [facility] for the following reason(s):        Required labs abnormal    [facility] action(s):  Defer     Requires labs : Yes             Appointments  past 12m or future 3m with PCP    Date Provider   Last Visit   12/20/2023 Yazmin Grove MD   Next Visit   Visit date not found Yazmin Grove MD   ED visits in past 90 days: 0        Note composed:6:51 PM 03/22/2024           71 yo M with PMH of CAD, MI (s/p 4 stents), DM, HTN, BPH, hypothyroid and GERD, recently received Pfizer vaccine (1/3 and 1/24) presented to ED c/o progressive SOB, fever, and weakness. Patient stated that his symptoms began on Wednesday 2/3. COVID PCR + on 2/4. Admitted to medicine with acute hypoxemic respiratory failure secondary to COVID-19 PNA.  S/p pfizer vaccine 1/3 and booster 1/24  Routine nasal swab on 1/27 COVID-19 positive ( works in a [employer] )  Symptomatic since 2/3    IMPRESSION;  COVID 19 with severe illness. SpO2 < 94% on RA and need for supplemental O2. NRB  CTA 2/4 : extensive bilateral patchy ground-glass opacities involving all lobes most pronounced in the upper lung zones. No PE  Pt is in the early viral replicative phase based on the timeline/onset of symptoms.  procalcitonin 1.61 , not suggestive of a bacterial PNA.  Ferritin 404  CRP 31.59 downtrending   Ddimers 364  Procalcitonin, Serum: 0.97 (02-06-21 @ 08:12)  Procalcitonin, Serum: 1.61 (02-04-21 @ 14:13)    RECOMMENDATIONS;  RDV x 5 days, monitor LFTS  s/p convalescent plasma 1u  If worsening oxygenation will consider tocilizumab, markers now downtrending   Elevated procalcitonin, on ceftriaxone/levaquin       Discussed with Infection Control  Believe Pfizer needs to be notified for genetic sequencing of the virus as pt has SEVERE DISEASE

## 2024-03-22 NOTE — DISCHARGE NOTE PROVIDER - HOSPITAL COURSE
72yo male with PMHX CAD, MI, HTN, HLD, CKD, BPH admitted s/p aquablation. Pt is POD 1. Pt admits to some abdominal pressure overnight requiring Morphine. Pt now comfortable. Pt denies n/v, f/c. CBI is still running and urine is clear.    Pt did well throughout the day. CBI was stopped and urine ............    Pt had elevated K which was repeated and ........    Pt is stable for DC home with jeter  Cont antibx, stool softeners, Pyridium and Oxybutnin PRN  Follow up Dr Janie Barger for jeter removal.        72yo male with PMHX CAD, MI, HTN, HLD, CKD, BPH admitted s/p aquablation. Pt is POD 1. Pt admits to some abdominal pressure overnight requiring Morphine. Pt now comfortable. Pt denies n/v, f/c. CBI is still running and urine is clear.    Pt did well throughout the day. CBI was stopped and urine was light red    Pt had elevated K which was repeated and was normal     Pt is stable for DC home with jeter  Cont antibx, stool softeners, Pyridium and Oxybutnin PRN  Follow up Dr Janie Barger for jeter removal.

## 2024-03-22 NOTE — DISCHARGE NOTE PROVIDER - NSDCCPCAREPLAN_GEN_ALL_CORE_FT
PRINCIPAL DISCHARGE DIAGNOSIS  Diagnosis: BPH (benign prostatic hyperplasia)  Assessment and Plan of Treatment: s/p aquablation  stay well hydrated  cont jeter to leg bag  complete antibx course  take colace  Pyridium and Oxybutnin PRN  FUP Dr Janie Barger for jeter removal    
4

## 2024-03-22 NOTE — PROGRESS NOTE ADULT - SUBJECTIVE AND OBJECTIVE BOX
Subjective: 74yo male with PMHX CAD, MI, HTN, HLD, CKD POD 1 s/p aquablation. Pt admits to some abdominal pressure overnight requiring Morphine. Pt now comfortable. Pt denies n/v, f/c. Pt has not been OOB as yet.             Vital Signs Last 24 Hrs  T(C): 35.7 (22 Mar 2024 08:43), Max: 37.3 (22 Mar 2024 02:07)  T(F): 96.3 (22 Mar 2024 08:43), Max: 99.2 (22 Mar 2024 02:07)  HR: 68 (22 Mar 2024 08:43) (55 - 84)  BP: 111/55 (22 Mar 2024 08:43) (108/56 - 147/66)  BP(mean): --  RR: 18 (22 Mar 2024 08:43) (14 - 18)  SpO2: 99% (22 Mar 2024 08:43) (85% - 100%)    Parameters below as of 22 Mar 2024 08:43  Patient On (Oxygen Delivery Method): nasal cannula  O2 Flow (L/min): 2      General Appearance: Appears well, NAD  Neck: Supple  Chest: Equal expansion bilaterally, equal breath sounds  CV: Pulse regular presently  Abdomen: Soft, NT/ND,+bs,  no rebound/gaurding  Extremities: Grossly symmetric, no calf tend b/l    Penaloza: clear urine with CBI running      I&O's Summary    21 Mar 2024 07:01  -  22 Mar 2024 07:00  --------------------------------------------------------  IN: 33458 mL / OUT: 29751 mL / NET: -3275 mL      I&O's Detail    21 Mar 2024 07:01  -  22 Mar 2024 07:00  --------------------------------------------------------  IN:    Continuous Bladder Irrigation (mL): 62502 mL    Lactated Ringers: 1200 mL  Total IN: 91524 mL    OUT:    Continuous Bladder Irrigation (mL): 84390 mL  Total OUT: 27480 mL    Total NET: -3275 mL          MEDICATIONS  (STANDING):  atorvastatin 10 milliGRAM(s) Oral at bedtime  chlorhexidine 2% Cloths 1 Application(s) Topical <User Schedule>  dextrose 5%. 1000 milliLiter(s) (50 mL/Hr) IV Continuous <Continuous>  dextrose 5%. 1000 milliLiter(s) (100 mL/Hr) IV Continuous <Continuous>  dextrose 50% Injectable 25 Gram(s) IV Push once  dextrose 50% Injectable 12.5 Gram(s) IV Push once  dextrose 50% Injectable 25 Gram(s) IV Push once  glucagon  Injectable 1 milliGRAM(s) IntraMuscular once  insulin lispro (ADMELOG) corrective regimen sliding scale   SubCutaneous three times a day before meals  insulin lispro (ADMELOG) corrective regimen sliding scale   SubCutaneous at bedtime  levothyroxine 50 MICROGram(s) Oral daily  metoprolol succinate ER 25 milliGRAM(s) Oral daily  oxybutynin 5 milliGRAM(s) Oral two times a day  phenazopyridine 100 milliGRAM(s) Oral three times a day  predniSONE   Tablet 60 milliGRAM(s) Oral daily  senna 2 Tablet(s) Oral at bedtime  tamsulosin 0.8 milliGRAM(s) Oral at bedtime    MEDICATIONS  (PRN):  acetaminophen     Tablet .. 650 milliGRAM(s) Oral every 6 hours PRN Temp greater or equal to 38C (100.4F), Mild Pain (1 - 3)  aluminum hydroxide/magnesium hydroxide/simethicone Suspension 30 milliLiter(s) Oral every 4 hours PRN Dyspepsia  dextrose Oral Gel 15 Gram(s) Oral once PRN Blood Glucose LESS THAN 70 milliGRAM(s)/deciliter  HYDROmorphone  Injectable 1 milliGRAM(s) IV Push every 4 hours PRN Severe Pain (7 - 10)  melatonin 3 milliGRAM(s) Oral at bedtime PRN Insomnia  ondansetron Injectable 4 milliGRAM(s) IV Push every 8 hours PRN Nausea and/or Vomiting      LABS:                        10.8   15.98 )-----------( 149      ( 22 Mar 2024 06:14 )             29.7     03-22    138  |  99  |  22<H>  ----------------------------<  183<H>  6.0<HH>   |  30  |  0.9    Ca    8.6      22 Mar 2024 06:14        Urinalysis Basic - ( 22 Mar 2024 06:14 )    Color: x / Appearance: x / SG: x / pH: x  Gluc: 183 mg/dL / Ketone: x  / Bili: x / Urobili: x   Blood: x / Protein: x / Nitrite: x   Leuk Esterase: x / RBC: x / WBC x   Sq Epi: x / Non Sq Epi: x / Bacteria: x        RADIOLOGY & ADDITIONAL STUDIES:  none new

## 2024-03-22 NOTE — PROGRESS NOTE ADULT - ASSESSMENT
72yo male POD 1 s/p aquablation        Plan:  1. POD 1 s/p aquablation  - doing well  - slow down CBI and reevaluate  - OOB to chair  - IS    2. Hyperkalemia  - repeat K STAT  - will treat if still elevated    Poss DC later if K normal and urine remains clear    All D/W Dr Wharton and Dr Lima

## 2024-03-23 LAB
CULTURE RESULTS: SIGNIFICANT CHANGE UP
SPECIMEN SOURCE: SIGNIFICANT CHANGE UP

## 2024-03-26 ENCOUNTER — APPOINTMENT (OUTPATIENT)
Dept: UROLOGY | Facility: CLINIC | Age: 74
End: 2024-03-26
Payer: MEDICARE

## 2024-03-26 VITALS
BODY MASS INDEX: 37.3 KG/M2 | HEART RATE: 96 BPM | SYSTOLIC BLOOD PRESSURE: 132 MMHG | WEIGHT: 190 LBS | DIASTOLIC BLOOD PRESSURE: 78 MMHG | TEMPERATURE: 97.6 F | OXYGEN SATURATION: 96 % | RESPIRATION RATE: 16 BRPM | HEIGHT: 60 IN

## 2024-03-26 DIAGNOSIS — Z79.82 LONG TERM (CURRENT) USE OF ASPIRIN: ICD-10-CM

## 2024-03-26 DIAGNOSIS — E11.22 TYPE 2 DIABETES MELLITUS WITH DIABETIC CHRONIC KIDNEY DISEASE: ICD-10-CM

## 2024-03-26 DIAGNOSIS — R33.8 OTHER RETENTION OF URINE: ICD-10-CM

## 2024-03-26 DIAGNOSIS — Z79.890 HORMONE REPLACEMENT THERAPY: ICD-10-CM

## 2024-03-26 DIAGNOSIS — Z79.52 LONG TERM (CURRENT) USE OF SYSTEMIC STEROIDS: ICD-10-CM

## 2024-03-26 DIAGNOSIS — Z79.899 OTHER LONG TERM (CURRENT) DRUG THERAPY: ICD-10-CM

## 2024-03-26 DIAGNOSIS — N18.9 CHRONIC KIDNEY DISEASE, UNSPECIFIED: ICD-10-CM

## 2024-03-26 DIAGNOSIS — N40.1 BENIGN PROSTATIC HYPERPLASIA WITH LOWER URINARY TRACT SYMPTOMS: ICD-10-CM

## 2024-03-26 DIAGNOSIS — Z79.4 LONG TERM (CURRENT) USE OF INSULIN: ICD-10-CM

## 2024-03-26 DIAGNOSIS — Z88.0 ALLERGY STATUS TO PENICILLIN: ICD-10-CM

## 2024-03-26 DIAGNOSIS — E87.5 HYPERKALEMIA: ICD-10-CM

## 2024-03-26 DIAGNOSIS — I12.9 HYPERTENSIVE CHRONIC KIDNEY DISEASE WITH STAGE 1 THROUGH STAGE 4 CHRONIC KIDNEY DISEASE, OR UNSPECIFIED CHRONIC KIDNEY DISEASE: ICD-10-CM

## 2024-03-26 DIAGNOSIS — I25.2 OLD MYOCARDIAL INFARCTION: ICD-10-CM

## 2024-03-26 DIAGNOSIS — I25.10 ATHEROSCLEROTIC HEART DISEASE OF NATIVE CORONARY ARTERY WITHOUT ANGINA PECTORIS: ICD-10-CM

## 2024-03-26 DIAGNOSIS — Z95.5 PRESENCE OF CORONARY ANGIOPLASTY IMPLANT AND GRAFT: ICD-10-CM

## 2024-03-26 PROBLEM — U07.1 COVID-19: Chronic | Status: ACTIVE | Noted: 2024-03-15

## 2024-03-26 LAB — SURGICAL PATHOLOGY STUDY: SIGNIFICANT CHANGE UP

## 2024-03-26 PROCEDURE — 99024 POSTOP FOLLOW-UP VISIT: CPT

## 2024-03-26 NOTE — ASSESSMENT
[FreeTextEntry1] : Penaloza Catheter removed without difficulty.  Post procedure expectations reviewed.  RTO and ER precautions clearly outlined. Patient verbalized understanding.   Follow up 1 week for bladder scan PVR.

## 2024-03-26 NOTE — END OF VISIT
[FreeTextEntry3] : I participated in the history, formulated treatment plan, discussed fully with patient, and agree with the above transcription by the physicians assistant

## 2024-03-26 NOTE — HISTORY OF PRESENT ILLNESS
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 He also has a history of peripheral neuropathy on diabetes. Patient had painful right tension and has bladder spasms with Penaloza catheter. He has failed trial of void on high-dose alpha-blocker.  Urodynamic shows severe uninhibited contractions at low capacity. Unable to hold enough for adequate voiding study. Cystoscopy shows mostly bilobar prostatic occlusion with moderate to severely trabeculated bladder consistent with bladder outlet obstruction.  Now s/p Aqua-ablation of the prostate. Presents to office feeling well. No longer taking oxybutynin.  Clear orange color urine in catheter bag.

## 2024-04-02 ENCOUNTER — APPOINTMENT (OUTPATIENT)
Dept: UROLOGY | Facility: CLINIC | Age: 74
End: 2024-04-02
Payer: MEDICARE

## 2024-04-02 ENCOUNTER — RESULT CHARGE (OUTPATIENT)
Age: 74
End: 2024-04-02

## 2024-04-02 DIAGNOSIS — R35.0 FREQUENCY OF MICTURITION: ICD-10-CM

## 2024-04-02 LAB
BILIRUB UR QL STRIP: NORMAL
COLLECTION METHOD: NORMAL
GLUCOSE UR-MCNC: 100
HCG UR QL: 1 EU/DL
HGB UR QL STRIP.AUTO: NORMAL
KETONES UR-MCNC: NORMAL
LEUKOCYTE ESTERASE UR QL STRIP: NORMAL
NITRITE UR QL STRIP: POSITIVE
PH UR STRIP: 5.5
PROT UR STRIP-MCNC: 300
SP GR UR STRIP: 1.03

## 2024-04-02 PROCEDURE — 99024 POSTOP FOLLOW-UP VISIT: CPT

## 2024-04-02 NOTE — ASSESSMENT
[FreeTextEntry1] : 73 year old s.p aqua ablation of the prostate.  Doing well.  Emptying bladder.   Urine Culture ordered for urinary frequency.  Patient understands that frequency and urgency is expected post operative.   Follow up 6 weeks to reassess.

## 2024-04-02 NOTE — HISTORY OF PRESENT ILLNESS
[FreeTextEntry1] : 73 year old s/p Aqua-ablation of the prostate. Had Catheter removed 03/26/2024.  Currently doing well. Does have frequency and urgency. Denies difficulties voiding.  PVR today 16 cc.

## 2024-04-04 LAB — BACTERIA UR CULT: NORMAL

## 2024-05-14 ENCOUNTER — APPOINTMENT (OUTPATIENT)
Dept: UROLOGY | Facility: CLINIC | Age: 74
End: 2024-05-14
Payer: MEDICARE

## 2024-05-14 DIAGNOSIS — N13.8 BENIGN PROSTATIC HYPERPLASIA WITH LOWER URINARY TRACT SYMPMS: ICD-10-CM

## 2024-05-14 DIAGNOSIS — N40.1 BENIGN PROSTATIC HYPERPLASIA WITH LOWER URINARY TRACT SYMPMS: ICD-10-CM

## 2024-05-14 DIAGNOSIS — R30.0 DYSURIA: ICD-10-CM

## 2024-05-14 PROCEDURE — 99024 POSTOP FOLLOW-UP VISIT: CPT

## 2024-05-14 NOTE — ASSESSMENT
[FreeTextEntry1] : 73-year-old status post aqua ablation of the prostate. Having improvement in urinary symptoms. Continues to empty bladder appropriately.  Occasional dysuria. Culture ordered to assess for bacteria. Culture in April was negative.   Continues on tamsulosin 0.8 mg daily.  He will decrease to 0.4 mg daily.  Patient's goal is to discontinue tamsulosin altogether.  Patient will follow-up in 3 months with Dr. Lima  Sooner as needed.  Patient verbalized satisfaction with procedure and is happy with results.

## 2024-05-14 NOTE — HISTORY OF PRESENT ILLNESS
[FreeTextEntry1] : Patient is a 73-year-old status post aqua ablation of the prostate.  Had catheter removed March 26, 2024.  Presents to office for 6-week follow-up.  States that frequency and urgency has improved since his last follow-up.  States that he continues to urinate with good force of stream and feels that he is emptying his bladder appropriately.  He reports occasional dysuria.  Denies any gross hematuria.  PVR today 13 cc.  Continues on tamsulosin 0.8 mg daily.

## 2024-06-14 NOTE — H&P PST ADULT - NSICDXFAMILYHX_GEN_ALL_CORE_FT
Okay to refer to ENT   FAMILY HISTORY:  Mother  Still living? Unknown  Family history of myocardial infarction, Age at diagnosis: Age Unknown

## 2024-08-13 ENCOUNTER — APPOINTMENT (OUTPATIENT)
Dept: UROLOGY | Facility: CLINIC | Age: 74
End: 2024-08-13
Payer: MEDICARE

## 2024-08-13 VITALS
HEART RATE: 88 BPM | TEMPERATURE: 98 F | DIASTOLIC BLOOD PRESSURE: 71 MMHG | SYSTOLIC BLOOD PRESSURE: 119 MMHG | HEIGHT: 68 IN | WEIGHT: 190 LBS | OXYGEN SATURATION: 98 % | BODY MASS INDEX: 28.79 KG/M2

## 2024-08-13 DIAGNOSIS — N13.8 BENIGN PROSTATIC HYPERPLASIA WITH LOWER URINARY TRACT SYMPMS: ICD-10-CM

## 2024-08-13 DIAGNOSIS — N40.1 BENIGN PROSTATIC HYPERPLASIA WITH LOWER URINARY TRACT SYMPMS: ICD-10-CM

## 2024-08-13 LAB
BILIRUB UR QL STRIP: NORMAL
COLLECTION METHOD: NORMAL
GLUCOSE UR-MCNC: NORMAL
HCG UR QL: 0.2 EU/DL
HGB UR QL STRIP.AUTO: NORMAL
KETONES UR-MCNC: NORMAL
LEUKOCYTE ESTERASE UR QL STRIP: NORMAL
NITRITE UR QL STRIP: NORMAL
PH UR STRIP: 5.5
PROT UR STRIP-MCNC: NORMAL
SP GR UR STRIP: 1.03

## 2024-08-13 PROCEDURE — 99214 OFFICE O/P EST MOD 30 MIN: CPT

## 2024-08-13 NOTE — END OF VISIT
[FreeTextEntry3] : I obtained history/physical, formulated treatment plans as discussed with the patient and agree with the above transcription by the physicians assistant

## 2024-08-13 NOTE — HISTORY OF PRESENT ILLNESS
[FreeTextEntry1] : 73-year-old status post aqua ablation of the prostate.  Had cath removed March 26, 2024.  Presents to office for follow-up.  Currently doing well.  States that he feels "fantastic."  Urinating without difficulty.  Has decreased tamsulosin to once daily.  Denies any dysuria and any gross hematuria.  History of BPH  Continuing tamsulosin for lower urinary tract symptoms.  Currently while on it no symptoms.

## 2024-08-13 NOTE — ASSESSMENT
[FreeTextEntry1] : 73-year-old status post aqua ablation of prostate.  Doing well. Very satisfied with results of the surgery.  Return to office 1 year with PSA prior. Can discontinue tamsulosin.

## 2024-12-02 NOTE — DISCHARGE NOTE PROVIDER - NSDCCPGOAL_GEN_ALL_CORE_FT
Start hydrochlorothiazide once a roberto nd check the BP in 2 weeks  6 month  
To get better and follow your care plan as instructed.

## 2025-01-23 NOTE — PROGRESS NOTE ADULT - REASON FOR ADMISSION
hypoxia
LABS/RADIOLOGY RESULTS:                          11.8   9.33  )-----------( 372      ( 23 Jan 2025 07:50 )             37.1   01-23    141  |  102  |  8[L]  ----------------------------<  140[H]  4.3   |  29  |  0.8    Ca    9.5      23 Jan 2025 05:35    TPro  7.0  /  Alb  4.0  /  TBili  0.3  /  DBili  x   /  AST  39  /  ALT  49[H]  /  AlkPhos  96  01-23  Blood Cultures    Urinalysis Basic - ( 23 Jan 2025 05:35 )    Color:  / Appearance:  / SG:  / pH:   Gluc: 140 mg/dL / Ketone:   / Bili:  / Urobili:    Blood:  / Protein:  / Nitrite:    Leuk Esterase:  / RBC:  / WBC    Sq Epi:  / Non Sq Epi:  / Bacteria:
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
## 2025-02-19 NOTE — PROGRESS NOTE ADULT - ASSESSMENT
Due for AWV/fasting labs anytime 4/10 or later.  Please reach out to schedule   Pt is a 70 y.o male a/w COVID PNA, following for epistaxis. Pt required re-insertion of packing this AM.    ·	keep nasal packing in place for 48-72 hours  ·	hold AC if possible  ·	cont NRBM  ·	s/w pt re: CTA & embolization. We do not normally recommend IR embo until packing has failed multiple times & pt off AC, given risks of procedure. pt aware we will keep packing in 48-72 hours and will reassess. pt ok with moving forward with this plan  ·	w/d with attng, will follow

## 2025-02-26 NOTE — DISCHARGE NOTE NURSING/CASE MANAGEMENT/SOCIAL WORK - NSDCVIVACCINE_GEN_ALL_CORE_FT
No Vaccines Administered. If you are a smoker, it is important for your health to stop smoking. Please be aware that second hand smoke is also harmful.

## 2025-03-24 NOTE — ED PROVIDER NOTE - ED STEMI HIDDEN
hide Render Note In Bullet Format When Appropriate: No Detail Level: Simple Show Applicator Variable?: Yes Post-Care Instructions: Pt may apply Vaseline to crusted or scabbing areas. Consent: The patient's consent was obtained including but not limited to risks of crusting, blistering, scarring, pigmentary change. Number Of Freeze-Thaw Cycles: 1 freeze-thaw cycle Duration Of Freeze Thaw-Cycle (Seconds): 5

## 2025-08-19 ENCOUNTER — APPOINTMENT (OUTPATIENT)
Dept: UROLOGY | Facility: CLINIC | Age: 75
End: 2025-08-19
Payer: MEDICARE

## 2025-08-19 VITALS
SYSTOLIC BLOOD PRESSURE: 129 MMHG | HEIGHT: 68 IN | WEIGHT: 190 LBS | OXYGEN SATURATION: 95 % | DIASTOLIC BLOOD PRESSURE: 73 MMHG | RESPIRATION RATE: 18 BRPM | TEMPERATURE: 98 F | BODY MASS INDEX: 28.79 KG/M2 | HEART RATE: 97 BPM

## 2025-08-19 DIAGNOSIS — N13.8 BENIGN PROSTATIC HYPERPLASIA WITH LOWER URINARY TRACT SYMPMS: ICD-10-CM

## 2025-08-19 DIAGNOSIS — N40.1 BENIGN PROSTATIC HYPERPLASIA WITH LOWER URINARY TRACT SYMPMS: ICD-10-CM

## 2025-08-19 LAB
BILIRUB UR QL STRIP: NORMAL
COLLECTION METHOD: NORMAL
GLUCOSE UR-MCNC: 1000
HCG UR QL: 0.2 EU/DL
HGB UR QL STRIP.AUTO: NORMAL
KETONES UR-MCNC: NORMAL
LEUKOCYTE ESTERASE UR QL STRIP: NORMAL
NITRITE UR QL STRIP: NORMAL
PH UR STRIP: 5.5
PROT UR STRIP-MCNC: NORMAL
SP GR UR STRIP: 1.02

## 2025-08-19 PROCEDURE — 99212 OFFICE O/P EST SF 10 MIN: CPT
